# Patient Record
Sex: FEMALE | Race: BLACK OR AFRICAN AMERICAN | NOT HISPANIC OR LATINO | Employment: FULL TIME | URBAN - METROPOLITAN AREA
[De-identification: names, ages, dates, MRNs, and addresses within clinical notes are randomized per-mention and may not be internally consistent; named-entity substitution may affect disease eponyms.]

---

## 2017-01-03 ENCOUNTER — TRANSFERRED RECORDS (OUTPATIENT)
Dept: HEALTH INFORMATION MANAGEMENT | Facility: CLINIC | Age: 37
End: 2017-01-03

## 2017-01-27 ENCOUNTER — OFFICE VISIT (OUTPATIENT)
Dept: PSYCHOLOGY | Facility: CLINIC | Age: 37
End: 2017-01-27

## 2017-01-27 VITALS — BODY MASS INDEX: 31.6 KG/M2 | WEIGHT: 217 LBS

## 2017-01-27 DIAGNOSIS — F33.0 MAJOR DEPRESSIVE DISORDER, RECURRENT EPISODE, MILD (H): ICD-10-CM

## 2017-01-27 DIAGNOSIS — F54 PSYCHOLOGICAL FACTOR AFFECTING PHYSICAL CONDITION: ICD-10-CM

## 2017-01-27 NOTE — PROGRESS NOTES
Health Psychology                  Clinic    Department of Medicine  Lis Chavez, Ph.D., L.P. [phone]                          [facility] Earline Anderson, Ph.D.,  L.P. [phone]                 3rd Floor, Clinic 3A  [place] Mail Code 746   Jayro Elias, Ph.D., AMITA.ÁLVARO.ROB., L.P. [phone]     [address] [place] [address] Melissa Stokes, Ph.D., L.P. [phone]  [address] [id number]  [place] [address]    Health Psychology Follow-Up Note    Ashley Catherine is a 36-year-old woman referred initially  for psychological consultation for workup for a gastric sleeve procedure who now returns following the surgery, with concerns about recent weight gain.    We reviewed in detail.    Wt Readings from Last 4 Encounters:   01/27/17 98.431 kg (217 lb)   12/16/16 96.888 kg (213 lb 9.6 oz)   11/02/16 97.796 kg (215 lb 9.6 oz)   10/17/16 95.737 kg (211 lb 1 oz)       She is  5 foot 11 inches.  Her goal is 175. She has been gaining weight.  She acknowledges eating  More cookies and icr cream, and stopping working out.  She has been working many extra shifts.  However, she is spending money  As well, so isn't getting ahead on her bills  WE discussed meeting with a financial counselor.     She is feeling somewhat  more depressed and exhausted, for no clear reason.  She will consider getting full spectrum light as she thinks there is a seasonal component. We discussed problems at work and her thoughts about doing travel nursing.  We also explored local options.  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    She is feeling isolated, and  a bit disappointed in her friends.    She has an [employer] membership which she is under utilizing.  We explored early morning work-outs days she is working.  She reports being tired from 12-hour shifts.  We explored ways to work in  healthy eating as well as  exercise, even when tired.    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  Affect is generally positive despite her frustrations with her weight and work.  Rapport is excellent.   Extended session due to complexity of case and length of interval.      Time in:  4:03  Time out:  4:58  DIAGNOSIS:   Psychological factors affecting obesity (F54).   Major depression, recurrent, mild (F3e3.0).   Occupational Problems (Z56.9)    PLAN/RECOMMENDATIONS:   1. Ms. Catherine will return 3/7 @ 4:00 to address weight loss and social issues.  2.  Establish schedule of regular exercise and  Decrease grazing eating.    Tx plan due 4/6/17.

## 2017-02-15 ENCOUNTER — OFFICE VISIT (OUTPATIENT)
Dept: FAMILY MEDICINE | Facility: CLINIC | Age: 37
End: 2017-02-15
Payer: COMMERCIAL

## 2017-02-15 VITALS
DIASTOLIC BLOOD PRESSURE: 76 MMHG | WEIGHT: 217.3 LBS | RESPIRATION RATE: 17 BRPM | HEART RATE: 89 BPM | BODY MASS INDEX: 31.11 KG/M2 | SYSTOLIC BLOOD PRESSURE: 126 MMHG | TEMPERATURE: 98.1 F | HEIGHT: 70 IN

## 2017-02-15 DIAGNOSIS — M54.50 MIDLINE LOW BACK PAIN WITHOUT SCIATICA, UNSPECIFIED CHRONICITY: ICD-10-CM

## 2017-02-15 DIAGNOSIS — M21.42 ACQUIRED PES PLANUS OF BOTH FEET: ICD-10-CM

## 2017-02-15 DIAGNOSIS — R22.2 LUMP OF SKIN OF BACK: Primary | ICD-10-CM

## 2017-02-15 DIAGNOSIS — M21.41 ACQUIRED PES PLANUS OF BOTH FEET: ICD-10-CM

## 2017-02-15 DIAGNOSIS — I10 ESSENTIAL HYPERTENSION WITH GOAL BLOOD PRESSURE LESS THAN 140/90: ICD-10-CM

## 2017-02-15 DIAGNOSIS — Z11.3 SCREEN FOR STD (SEXUALLY TRANSMITTED DISEASE): ICD-10-CM

## 2017-02-15 PROCEDURE — 86780 TREPONEMA PALLIDUM: CPT | Performed by: FAMILY MEDICINE

## 2017-02-15 PROCEDURE — 99214 OFFICE O/P EST MOD 30 MIN: CPT | Performed by: FAMILY MEDICINE

## 2017-02-15 PROCEDURE — 87389 HIV-1 AG W/HIV-1&-2 AB AG IA: CPT | Performed by: FAMILY MEDICINE

## 2017-02-15 PROCEDURE — 87591 N.GONORRHOEAE DNA AMP PROB: CPT | Performed by: FAMILY MEDICINE

## 2017-02-15 PROCEDURE — 36415 COLL VENOUS BLD VENIPUNCTURE: CPT | Performed by: FAMILY MEDICINE

## 2017-02-15 PROCEDURE — 87491 CHLMYD TRACH DNA AMP PROBE: CPT | Performed by: FAMILY MEDICINE

## 2017-02-15 ASSESSMENT — ANXIETY QUESTIONNAIRES
IF YOU CHECKED OFF ANY PROBLEMS ON THIS QUESTIONNAIRE, HOW DIFFICULT HAVE THESE PROBLEMS MADE IT FOR YOU TO DO YOUR WORK, TAKE CARE OF THINGS AT HOME, OR GET ALONG WITH OTHER PEOPLE: SOMEWHAT DIFFICULT
7. FEELING AFRAID AS IF SOMETHING AWFUL MIGHT HAPPEN: NOT AT ALL
GAD7 TOTAL SCORE: 1
6. BECOMING EASILY ANNOYED OR IRRITABLE: NOT AT ALL
2. NOT BEING ABLE TO STOP OR CONTROL WORRYING: NOT AT ALL
3. WORRYING TOO MUCH ABOUT DIFFERENT THINGS: NOT AT ALL
1. FEELING NERVOUS, ANXIOUS, OR ON EDGE: SEVERAL DAYS
5. BEING SO RESTLESS THAT IT IS HARD TO SIT STILL: NOT AT ALL

## 2017-02-15 ASSESSMENT — PATIENT HEALTH QUESTIONNAIRE - PHQ9: 5. POOR APPETITE OR OVEREATING: NOT AT ALL

## 2017-02-15 NOTE — NURSING NOTE
"Chief Complaint   Patient presents with     Mass     STD     /76  Pulse 89  Temp 98.1  F (36.7  C) (Oral)  Resp 17  Ht 5' 9.5\" (1.765 m)  Wt 217 lb 4.8 oz (98.6 kg)  LMP 02/12/2017 (Exact Date)  Breastfeeding? No  BMI 31.63 kg/m2 Estimated body mass index is 31.63 kg/(m^2) as calculated from the following:    Height as of this encounter: 5' 9.5\" (1.765 m).    Weight as of this encounter: 217 lb 4.8 oz (98.6 kg).  bp completed using cuff size: large      left    Health Maintenance Due Pending Provider Review:  GAD7    Completing today.    Brandy Paz MA  [facility]  "

## 2017-02-15 NOTE — MR AVS SNAPSHOT
After Visit Summary   2/15/2017    Ashley Catherine    MRN: [id number]           Patient Information     Date Of Birth          1980        Visit Information        Provider Department      2/15/2017 11:00 AM Jackeline Leyva MD [facility]        Today's Diagnoses     Lump of skin of back    -  1    Screen for STD (sexually transmitted disease)        Essential hypertension with goal blood pressure less than 140/90        Midline low back pain without sciatica, unspecified chronicity           Follow-ups after your visit        Additional Services     GENERAL SURG ADULT REFERRAL       Your provider has referred you to: FM[facility]: [facility] [place] [phone]   [email or web address]  [facility]: [facility] [place] [phone]   [email or web address]      Please be aware that coverage of these services is subject to the terms and limitations of your health insurance plan.  Call member services at your health plan with any benefit or coverage questions.      Please bring the following with you to your appointment:    (1) Any X-Rays, CTs or MRIs which have been performed.  Contact the facility where they were done to arrange for  prior to your scheduled appointment.   (2) List of current medications   (3) This referral request   (4) Any documents/labs given to you for this referral            ORTHOTICS REFERRAL                 Your next 10 appointments already scheduled     Mar 07, 2017  4:00 PM CST   (Arrive by 3:45 PM)   Return Visit with Jayro Elias, PhD [facility] ([facility])    [address] [place] [address]  3rd [facility] [address] [phone]              Who to contact     If you have questions or need follow up information about today's clinic visit or your schedule please contact [facility] directly  "at [phone].  Normal or non-critical lab and imaging results will be communicated to you by MyChart, letter or phone within 4 business days after the clinic has received the results. If you do not hear from us within 7 days, please contact the clinic through [employer] or phone. If you have a critical or abnormal lab result, we will notify you by phone as soon as possible.  Submit refill requests through [employer] or call your pharmacy and they will forward the refill request to us. Please allow 3 business days for your refill to be completed.          Additional Information About Your Visit        [employer] Information     [employer] gives you secure access to your electronic health record. If you see a primary care provider, you can also send messages to your care team and make appointments. If you have questions, please call your primary care clinic.  If you do not have a primary care provider, please call [phone] and they will assist you.        Care EveryWhere ID     This is your Care EveryWhere ID. This could be used by other organizations to access your [facility] medical records  [id number]        Your Vitals Were     Pulse Temperature Respirations Height Last Period Breastfeeding?    89 98.1  F (36.7  C) (Oral) 17 5' 9.5\" (1.765 m) 02/12/2017 (Exact Date) No    BMI (Body Mass Index)                   31.63 kg/m2            Blood Pressure from Last 3 Encounters:   02/15/17 126/76   10/17/16 122/80   08/12/16 123/83    Weight from Last 3 Encounters:   02/15/17 217 lb 4.8 oz (98.6 kg)   10/17/16 211 lb 1 oz (95.7 kg)   08/12/16 202 lb (91.6 kg)              We Performed the Following     Anti Treponema     Chlamydia trachomatis PCR     GENERAL SURG ADULT REFERRAL     HIV Antigen Antibody Combo     Neisseria gonorrhoeae PCR     ORTHOTICS REFERRAL          Today's Medication Changes          These changes are accurate as of: 2/15/17 11:50 AM.  If you have any questions, ask your nurse or doctor.             "   Stop taking these medicines if you haven't already. Please contact your care team if you have questions.     cyclobenzaprine 10 MG tablet   Commonly known as:  FLEXERIL   Stopped by:  Jackeline Leyva MD           methylPREDNISolone 4 MG tablet   Commonly known as:  MEDROL DOSEPAK   Stopped by:  Jackeline Leyva MD                    Primary Care Provider Office Phone # Fax #    Violet Agrawal MD [place] [phone]       [facility] [address]  [facility] [address]        Thank you!     Thank you for choosing [facility]  for your care. Our goal is always to provide you with excellent care. Hearing back from our patients is one way we can continue to improve our services. Please take a few minutes to complete the written survey that you may receive in the mail after your visit with us. Thank you!             Your Updated Medication List - Protect others around you: Learn how to safely use, store and throw away your medicines at www.disposemymeds.org.          This list is accurate as of: 2/15/17 11:50 AM.  Always use your most recent med list.                   Brand Name Dispense Instructions for use    Biotin 5000 MCG Tabs          CALCIUM + D PO      Take by mouth daily       cetirizine 10 MG tablet    zyrTEC     Take 10 mg by mouth daily as needed for allergies       [employer] B-12 5000 MCG Subl   Generic drug:  Cyanocobalamin          hydrochlorothiazide 25 MG tablet    HYDRODIURIL    90 tablet    Take 1 tablet (25 mg) by mouth daily       MELATONIN PO      Take 3 mg by mouth       MULTIVITAMINS PO      Take by mouth daily       polyethylene glycol powder    MIRALAX    510 g    Take 17 g (1 capful) by mouth daily as needed for constipation       TYLENOL PM EXTRA STRENGTH PO

## 2017-02-15 NOTE — PROGRESS NOTES
"  SUBJECTIVE:                                                    Ashley Catherine is a 36 year old female who presents to clinic today for the following health issues:      Chief Complaint   Patient presents with     Mass     STD     Patient states that she has some type of lump on her mid-back area that she has noticed.  She has had it for 2 years- now somewhat noticeably increased in size , no pain     She's had lower back pain for a couple years now, and at first thought that this lump had something to do with that, but now thinks that it may be something different. She has flat feet, has not changed the orthotics for sometimes  Flexeril does not help much  She would also like to have STD screening done today.  Anxiety more situational dorothy winter, SEASONAL AFFECTIVE DISORDER-light helps dorothy during the jan-feb  Does not need medications for now  MAXIMO-7 SCORE 12/4/2012 1/6/2016 2/15/2017   Total Score 4 - -   Total Score - 0 1     PHQ-9 SCORE 1/6/2016 6/24/2016 2/15/2017   Total Score - - -   Total Score MyChart - 6 (Mild depression) -   Total Score 3 - 3           ROS:  Constitutional, HEENT, cardiovascular, pulmonary, gi and gu systems are negative, except as otherwise noted.    OBJECTIVE:                                                    /76  Pulse 89  Temp 98.1  F (36.7  C) (Oral)  Resp 17  Ht 5' 9.5\" (1.765 m)  Wt 217 lb 4.8 oz (98.6 kg)  LMP 02/12/2017 (Exact Date)  Breastfeeding? No  BMI 31.63 kg/m2  Body mass index is 31.63 kg/(m^2).  GENERAL: healthy, alert and no distress  RESP: lungs clear to auscultation - no rales, rhonchi or wheezes  CV: regular rates and rhythm  MS: no gross musculoskeletal defects noted, no edema.  SKIN: palpable well demarcated , possibly a cystic mass, mid upper back -left side , it about an inch    Diagnostic Test Results:  No results found for this or any previous visit (from the past 24 hour(s)).     ASSESSMENT/PLAN:                                                       " (R22.2) Lump of skin of back  (primary encounter diagnosis)  Comment: sebaceous cyst vs lipoma- increased in size  excisional biopsy- general surgeon   Plan: GENERAL SURG ADULT REFERRAL      (M21.41,  M21.42) Acquired pes planus of both feet  Plan: ORTHOTICS REFERRAL      (M54.5) Midline low back pain without sciatica, unspecified chronicity  Plan: ORTHOTICS  For flat feet may help with lower back pain as well  We also discussed lower back excercise, core strengthening, she will let us  Know if wants to start PHYSICAL THERAPY for that.    (Z11.3) Screen for STD (sexually transmitted disease)  Plan: Anti Treponema, Neisseria gonorrhoeae PCR, HIV      Antigen Antibody Combo, Chlamydia trachomatis       PCR      (I10) Essential hypertension with goal blood pressure less than 140/90- controlled  Plan: HCTZ-25 mg once daily  no medications changes. Does not need refill        History of depression & anxiety  Stable, without medications    The patient indicates understanding of these issues and agrees with the plan.          Jackeline Leyva MD  [facility]

## 2017-02-16 LAB
C TRACH DNA SPEC QL NAA+PROBE: NORMAL
HIV 1+2 AB+HIV1 P24 AG SERPL QL IA: NORMAL
N GONORRHOEA DNA SPEC QL NAA+PROBE: NORMAL
SPECIMEN SOURCE: NORMAL
SPECIMEN SOURCE: NORMAL
T PALLIDUM IGG+IGM SER QL: NEGATIVE

## 2017-02-16 ASSESSMENT — PATIENT HEALTH QUESTIONNAIRE - PHQ9: SUM OF ALL RESPONSES TO PHQ QUESTIONS 1-9: 3

## 2017-02-16 ASSESSMENT — ANXIETY QUESTIONNAIRES: GAD7 TOTAL SCORE: 1

## 2017-02-23 ENCOUNTER — TRANSFERRED RECORDS (OUTPATIENT)
Dept: HEALTH INFORMATION MANAGEMENT | Facility: CLINIC | Age: 37
End: 2017-02-23

## 2017-03-24 ENCOUNTER — OFFICE VISIT (OUTPATIENT)
Dept: PSYCHOLOGY | Facility: CLINIC | Age: 37
End: 2017-03-24

## 2017-03-24 VITALS — BODY MASS INDEX: 31.94 KG/M2 | WEIGHT: 219.4 LBS

## 2017-03-24 DIAGNOSIS — F54 PSYCHOLOGICAL FACTOR AFFECTING PHYSICAL CONDITION: ICD-10-CM

## 2017-03-24 DIAGNOSIS — F33.0 MAJOR DEPRESSIVE DISORDER, RECURRENT EPISODE, MILD (H): Primary | ICD-10-CM

## 2017-03-24 NOTE — PROGRESS NOTES
Health Psychology                  Clinic    Department of Medicine  Lis Chavez, Ph.D., L.P. [phone]                          [facility] Earline Anderson, Ph.D.,  L.P. [phone]                 3rd Floor, Clinic 3A  [place] Mail Code 74   Jayro Elias, Ph.D., A.ÁLVARO.IDALIA.P., L.P. [phone]     [address] Melissa Stokes, Ph.D., L.P. [phone]  [place] [id number]  [place] [address]    Health Psychology Follow-Up Note    Ashley Catherine is a 36-year-old woman referred initially  for psychological consultation for workup for a gastric sleeve procedure who now returns following the surgery, with concerns about recent weight gain.    We reviewed in detail.    Wt Readings from Last 4 Encounters:   03/24/17 99.5 kg (219 lb 6.4 oz)   02/15/17 98.6 kg (217 lb 4.8 oz)   01/27/17 98.4 kg (217 lb)   12/16/16 96.9 kg (213 lb 9.6 oz)       She is  5 foot 11 inches.  Her goal is 175. She has been gaining weight.  She acknowledges eating more cookies and icr cream, and stopping working out.  She feels it isn't fair  How little she eats, yet she is gaining weight..  She has been working many extra shifts.  However, she is spending money,  so isn't getting ahead on her bills  We discussed meeting with a financial counselor.  She acknowledges going to the [employer] frequently to feel better.       She is feeling somewhat  more depressed and exhausted, for no clear reason.  She feels she is stuck, despite making progress in her career (e.g., now studying for oncology certification).  We discussed problems at work and her thoughts about doing travel nursing.      She is feeling isolated, and  a bit disappointed in her friends.    She has an [employer] membership which she is under utilizing.   We explored ways to work in  healthy eating as well as exercise, even when tired.  She is still traveling (e.g., [place] is her next  trip).    We discussed her consumption of 200 calorie coffee drinks and 2 glasses of wine, exploring what cutting back could be like,  And how it would help with her plan for weight loss.    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  Affect is generally positive despite her frustrations with her weight and work.  Rapport is excellent.   Extended session due to complexity of case and length of interval.      Time in:  3:02  Time out:  3:59  DIAGNOSIS:   Psychological factors affecting obesity (F54).   Major depression, recurrent, mild (F3e3.0).   Occupational Problems (Z56.9)  PLAN/RECOMMENDATIONS:   1. Ms. Catherine will return 5/3  @ 11:00 to address weight loss and social issues.  2.  Establish schedule of regular exercise and  Decrease grazing eating.    Tx plan due 4/6/17 (next session).

## 2017-03-24 NOTE — MR AVS SNAPSHOT
After Visit Summary   3/24/2017    Ashley Catherine    MRN: [id number]           Patient Information     Date Of Birth          1980        Visit Information        Provider Department      3/24/2017 3:00 PM Jayro Elias, PhD [facility]        Today's Diagnoses     Major depressive disorder, recurrent episode, mild (H)    -  1    Psychological factor affecting physical condition           Follow-ups after your visit        Who to contact     Please call your clinic at [phone] to:    Ask questions about your health    Make or cancel appointments    Discuss your medicines    Learn about your test results    Speak to your doctor   If you have compliments or concerns about an experience at your clinic, or if you wish to file a complaint, please contact [facility] Patient Relations at [phone] or email us at [email or web address]         Additional Information About Your Visit        MyChart Information     [employer] gives you secure access to your electronic health record. If you see a primary care provider, you can also send messages to your care team and make appointments. If you have questions, please call your primary care clinic.  If you do not have a primary care provider, please call [phone] and they will assist you.      [employer] is an electronic gateway that provides easy, online access to your medical records. With [employer], you can request a clinic appointment, read your test results, renew a prescription or communicate with your care team.     To access your existing account, please contact your [facility] Clinic or call [phone] for assistance.        Care EveryWhere ID     This is your Care EveryWhere ID. This could be used by other organizations to access your [facility] medical records  [id number]        Your Vitals Were     BMI (Body Mass Index)                   31.94 kg/m2             Blood Pressure from Last 3 Encounters:   02/15/17 126/76   10/17/16 122/80   08/12/16 123/83    Weight from Last 3 Encounters:   03/24/17 99.5 kg (219 lb 6.4 oz)   02/15/17 98.6 kg (217 lb 4.8 oz)   01/27/17 98.4 kg (217 lb)              Today, you had the following     No orders found for display       Primary Care Provider Office Phone # Fax #    Violet Humphreys [phone]       [facility] [address]  [facility] [address]        Thank you!     Thank you for choosing [facility]  for your care. Our goal is always to provide you with excellent care. Hearing back from our patients is one way we can continue to improve our services. Please take a few minutes to complete the written survey that you may receive in the mail after your visit with us. Thank you!             Your Updated Medication List - Protect others around you: Learn how to safely use, store and throw away your medicines at www.disposemymeds.org.          This list is accurate as of: 3/24/17  4:06 PM.  Always use your most recent med list.                   Brand Name Dispense Instructions for use    Biotin 5000 MCG Tabs          CALCIUM + D PO      Take by mouth daily       cetirizine 10 MG tablet    zyrTEC     Take 10 mg by mouth daily as needed for allergies       CVS B-12 5000 MCG Subl   Generic drug:  Cyanocobalamin          hydrochlorothiazide 25 MG tablet    HYDRODIURIL    90 tablet    Take 1 tablet (25 mg) by mouth daily       MELATONIN PO      Take 3 mg by mouth       MULTIVITAMINS PO      Take by mouth daily       polyethylene glycol powder    MIRALAX    510 g    Take 17 g (1 capful) by mouth daily as needed for constipation       TYLENOL PM EXTRA STRENGTH PO

## 2017-03-29 ENCOUNTER — OFFICE VISIT (OUTPATIENT)
Dept: FAMILY MEDICINE | Facility: CLINIC | Age: 37
End: 2017-03-29
Payer: COMMERCIAL

## 2017-03-29 VITALS
OXYGEN SATURATION: 98 % | HEIGHT: 70 IN | WEIGHT: 220 LBS | SYSTOLIC BLOOD PRESSURE: 128 MMHG | TEMPERATURE: 98.7 F | HEART RATE: 68 BPM | RESPIRATION RATE: 16 BRPM | DIASTOLIC BLOOD PRESSURE: 72 MMHG | BODY MASS INDEX: 31.5 KG/M2

## 2017-03-29 DIAGNOSIS — N63.0 LUMP OR MASS IN BREAST: ICD-10-CM

## 2017-03-29 DIAGNOSIS — B96.89 BV (BACTERIAL VAGINOSIS): Primary | ICD-10-CM

## 2017-03-29 DIAGNOSIS — N76.0 BV (BACTERIAL VAGINOSIS): Primary | ICD-10-CM

## 2017-03-29 LAB
MICRO REPORT STATUS: ABNORMAL
SPECIMEN SOURCE: ABNORMAL
WET PREP SPEC: ABNORMAL

## 2017-03-29 PROCEDURE — 87210 SMEAR WET MOUNT SALINE/INK: CPT | Performed by: FAMILY MEDICINE

## 2017-03-29 PROCEDURE — 99214 OFFICE O/P EST MOD 30 MIN: CPT | Performed by: FAMILY MEDICINE

## 2017-03-29 RX ORDER — METRONIDAZOLE 7.5 MG/G
1 GEL VAGINAL AT BEDTIME
Qty: 35 G | Refills: 0 | Status: SHIPPED | OUTPATIENT
Start: 2017-03-29 | End: 2017-04-05

## 2017-03-29 NOTE — PROGRESS NOTES
SUBJECTIVE:                                                    Ashley Catherine is a 37 year old female who presents to clinic today for the following health issues:      1) Patient states that she has felt a lump on her left breast - yesterday has a painful lump in the left axilla and got worried but today seems to be smaller , she can still feel it but not as big and not as painful.   Vaginal Symptoms      Duration: 1 week    Description  itching    Intensity:  moderate    Accompanying signs and symptoms (fever/dysuria/abdominal or back pain): Redness    History  Sexually active: yes, single partner, contraception not required  Possibility of pregnancy: No  Recent antibiotic use: no     Precipitating or alleviating factors: None    Therapies tried and outcome:            Problem list and histories reviewed & adjusted, as indicated.  Additional history: as documented    Patient Active Problem List   Diagnosis     OLIGOMENORRHEA, C/W PCOS     Sleep apnea     HX ABNL PAP SMEAR OF CERVIX       CARDIOVASCULAR SCREENING; LDL GOAL LESS THAN 160     Hypertension goal BP (blood pressure) < 140/90     Flat feet     GERD (gastroesophageal reflux disease)     Sciatica     Obesity     S/P gastrectomy     Occupational problem     Elevated parathyroid hormone     Multiple thyroid nodules     Abnormal thyroid cytology-follicular neoplasm     Chronic pain syndrome     Major depressive disorder, recurrent episode, in full remission (H)     Bilateral low back pain with sciatica, sciatica laterality unspecified     Throat symptom     Psychological factor affecting physical condition     Major depressive disorder, recurrent episode, mild (H)     Past Surgical History:   Procedure Laterality Date     BIOPSY  03/13/2015    Thyroid nodule     ESOPHAGOSCOPY, GASTROSCOPY, DUODENOSCOPY (EGD), COMBINED  5/28/2013    Procedure: COMBINED ESOPHAGOSCOPY, GASTROSCOPY, DUODENOSCOPY (EGD);;  Surgeon: Best Willett MD;  Location: [facility]      LAPAROSCOPIC GASTRIC SLEEVE  5/13/2013    Procedure: LAPAROSCOPIC GASTRIC SLEEVE;  Laparoscopic Sleeve Gastrectomy ;  Surgeon: Best Willett MD;  Location: [facility] OR     LEEP TX, CERVICAL  1995    age 15     SEPTOPLASTY       TONSILLECTOMY  age 20s     wisdom teeth extraction         Social History   Substance Use Topics     Smoking status: Never Smoker     Smokeless tobacco: Never Used     Alcohol use 2.4 - 3.0 oz/week      Comment: 3 drinks/week     Family History   Problem Relation Age of Onset     Hypertension Sister      Hypertension Father      Allergies Father      seasonal     Lipids Father      Obesity Father      Arthritis Mother      Gynecology Mother      problems with menopause     Hypertension Mother      Other Cancer Mother      Endometrial     OSTEOPOROSIS Mother      Obesity Mother      Parathyroid Disorders Mother      3 operations     Obesity Sister      DIABETES Maternal Aunt      x several w. DM     Breast Cancer Maternal Aunt      Cancer - colorectal Maternal Uncle      60ish     Nephrolithiasis No family hx of          Current Outpatient Prescriptions   Medication Sig Dispense Refill     metroNIDAZOLE (METROGEL) 0.75 % vaginal gel Place 1 applicator (5 g) vaginally At Bedtime for 7 days 35 g 0     MELATONIN PO Take 3 mg by mouth       Diphenhydramine-APAP, sleep, (TYLENOL PM EXTRA STRENGTH PO)        hydrochlorothiazide (HYDRODIURIL) 25 MG tablet Take 1 tablet (25 mg) by mouth daily 90 tablet 3     Biotin 5000 MCG TABS        Cyanocobalamin (CVS B-12) 5000 MCG SUBL        polyethylene glycol (MIRALAX) powder Take 17 g (1 capful) by mouth daily as needed for constipation 510 g 1     cetirizine (ZYRTEC) 10 MG tablet Take 10 mg by mouth daily as needed for allergies       Calcium Carbonate-Vitamin D (CALCIUM + D PO) Take by mouth daily       Multiple Vitamin (MULTIVITAMINS PO) Take by mouth daily       Allergies   Allergen Reactions     Nkda [No Known Drug Allergies]      No Known Allergies       Recent Labs   Lab Test 02/11/16 02/08/16   1152  02/01/16   1152  01/27/16   1003  11/06/15   1211   06/03/15   1040   12/23/14   1613   08/08/13   1456   06/03/13   1100   02/19/13   1252   07/12/12   0820   A1C   --    --    --    --    --    --    --    --   5.0   --   4.6   --    --    --    --    --   5.4   LDL   --    --    --    --    --    --    --    --   67   --   120   --    --    --   84   --   108   HDL   --    --    --    --    --    --    --    --   77   --   44*   --    --    --   51   --   55   TRIG   --    --    --    --    --    --    --    --   91   --   80   --   136   --   106   --   86   ALT   --    --   19   --   20   --   18   --   20   < >  29   < >  26   < >  27   --   12   CR   --   0.69  0.67   --   0.67   < >  0.67   --   0.78   < >  0.72   < >  0.58   < >  0.68   < >  0.60   GFRESTIMATED   --   >90  Non  GFR Calc    >90  Non  GFR Calc     --   >90  Non  GFR Calc     --   >90  Non  GFR Calc     --   84   < >  >90   < >  >90   < >  >90   < >  >90   GFRESTBLACK   --   >90   GFR Calc    >90   GFR Calc     --   >90   GFR Calc     --   >90   GFR Calc     --   >90   GFR Calc     < >  >90   < >  >90   < >  >90   < >  >90   POTASSIUM   --   3.6  3.2*   --   3.8   --   3.9   --   3.8   < >  3.7   < >  3.8   < >  4.3   < >  3.8   TSH  0.94   --    --   0.86   --    --    --    < >   --    < >   --    --    --    --    --    < >  1.05    < > = values in this interval not displayed.      BP Readings from Last 3 Encounters:   03/29/17 128/72   02/15/17 126/76   10/17/16 122/80    Wt Readings from Last 3 Encounters:   03/29/17 220 lb (99.8 kg)   02/15/17 217 lb 4.8 oz (98.6 kg)   10/17/16 211 lb 1 oz (95.7 kg)                  Labs reviewed in EPIC    Reviewed and updated as needed this visit by clinical staff  Tobacco  Allergies  Meds  Med Hx  Surg  "Hx  Fam Hx  Soc Hx      Reviewed and updated as needed this visit by Provider         ROS:  Constitutional, HEENT, cardiovascular, pulmonary, GI, , musculoskeletal, neuro, skin, endocrine and psych systems are negative, except as otherwise noted.    OBJECTIVE:                                                    /72  Pulse 68  Temp 98.7  F (37.1  C) (Oral)  Resp 16  Ht 5' 9.5\" (1.765 m)  Wt 220 lb (99.8 kg)  LMP 03/20/2017  SpO2 98%  BMI 32.02 kg/m2  Body mass index is 32.02 kg/(m^2).  GENERAL: healthy, alert and no distress  NECK: no adenopathy, no asymmetry, masses, or scars and thyroid normal to palpation  BREAST: normal without masses on the right side and there is an area of tenderness with palpation and a small lump - LEFT breast lower outer quadrant , no erythema and no skin changes  tenderness or nipple discharge and no palpable axillary masses or adenopathy  MS: no gross musculoskeletal defects noted, no edema    Diagnostic Test Results:  Wet prep , shows BV      ASSESSMENT/PLAN:                                                      1. BV (bacterial vaginosis)  Will treat , RTC if no improving or worsening.    - Wet prep  - metroNIDAZOLE (METROGEL) 0.75 % vaginal gel; Place 1 applicator (5 g) vaginally At Bedtime for 7 days  Dispense: 35 g; Refill: 0    2. Lump or mass in breast  We discussed doing a diagnostic mammo and an US of the left breast where the lump is ,   - MA Diagnostic Digital Bilateral; Future    RTC if no improving or worsening.  Pt is aware  and comfortable with the current plan.      Ct Jameson MD  [facility]    "

## 2017-03-29 NOTE — MR AVS SNAPSHOT
After Visit Summary   3/29/2017    Ashley Catherine    MRN: [id number]           Patient Information     Date Of Birth          1980        Visit Information        Provider Department      3/29/2017 9:30 AM Ct Jameson MD [facility]        Today's Diagnoses     BV (bacterial vaginosis)    -  1    Lump or mass in breast           Follow-ups after your visit        Your next 10 appointments already scheduled     May 03, 2017 11:00 AM CDT   (Arrive by 10:45 AM)   Return Visit with Jayro Elias, PhD [facility] ([facility])    [address] [place] [address]  3rd Floor  [facility] [address] [phone]              Future tests that were ordered for you today     Open Future Orders        Priority Expected Expires Ordered    MA Diagnostic Digital Bilateral Routine  3/29/2018 3/29/2017            Who to contact     If you have questions or need follow up information about today's clinic visit or your schedule please contact [facility] directly at [phone].  Normal or non-critical lab and imaging results will be communicated to you by MyChart, letter or phone within 4 business days after the clinic has received the results. If you do not hear from us within 7 days, please contact the clinic through [employer] or phone. If you have a critical or abnormal lab result, we will notify you by phone as soon as possible.  Submit refill requests through [employer] or call your pharmacy and they will forward the refill request to us. Please allow 3 business days for your refill to be completed.          Additional Information About Your Visit        MyChart Information     [employer] gives you secure access to your electronic health record. If you see a primary care provider, you can also send messages to your care team and make appointments. If you have questions, please call your primary care clinic.  If you do not have a primary care provider,  "please call [phone] and they will assist you.        Care EveryWhere ID     This is your Care EveryWhere ID. This could be used by other organizations to access your [facility] medical records  [id number]        Your Vitals Were     Pulse Temperature Respirations Height Last Period Pulse Oximetry    68 98.7  F (37.1  C) (Oral) 16 5' 9.5\" (1.765 m) 03/20/2017 98%    BMI (Body Mass Index)                   32.02 kg/m2            Blood Pressure from Last 3 Encounters:   03/29/17 128/72   02/15/17 126/76   10/17/16 122/80    Weight from Last 3 Encounters:   03/29/17 220 lb (99.8 kg)   02/15/17 217 lb 4.8 oz (98.6 kg)   10/17/16 211 lb 1 oz (95.7 kg)              We Performed the Following     Wet prep          Today's Medication Changes          These changes are accurate as of: 3/29/17 10:30 AM.  If you have any questions, ask your nurse or doctor.               Start taking these medicines.        Dose/Directions    metroNIDAZOLE 0.75 % vaginal gel   Commonly known as:  METROGEL   Used for:  BV (bacterial vaginosis)   Started by:  Ct Jameson MD        Dose:  1 applicator   Place 1 applicator (5 g) vaginally At Bedtime for 7 days   Quantity:  35 g   Refills:  0            Where to get your medicines      These medications were sent to [facility] [address], [address]  [address], [facility] [address], [facility] [address]     Phone:  [phone]     metroNIDAZOLE 0.75 % vaginal gel                Primary Care Provider Office Phone # Fax #    Viloet Humphreys [phone]       [facility] [address]  [facility] [address]        Thank you!     Thank you for choosing [facility]  for your care. Our goal is always to provide you with excellent care. Hearing back from our patients is one way we can continue to improve our services. Please take a few minutes to complete the written survey that you may receive in the mail " after your visit with us. Thank you!             Your Updated Medication List - Protect others around you: Learn how to safely use, store and throw away your medicines at www.disposemymeds.org.          This list is accurate as of: 3/29/17 10:30 AM.  Always use your most recent med list.                   Brand Name Dispense Instructions for use    Biotin 5000 MCG Tabs          CALCIUM + D PO      Take by mouth daily       cetirizine 10 MG tablet    zyrTEC     Take 10 mg by mouth daily as needed for allergies       CVS B-12 5000 MCG Subl   Generic drug:  Cyanocobalamin          hydrochlorothiazide 25 MG tablet    HYDRODIURIL    90 tablet    Take 1 tablet (25 mg) by mouth daily       MELATONIN PO      Take 3 mg by mouth       metroNIDAZOLE 0.75 % vaginal gel    METROGEL    35 g    Place 1 applicator (5 g) vaginally At Bedtime for 7 days       MULTIVITAMINS PO      Take by mouth daily       polyethylene glycol powder    MIRALAX    510 g    Take 17 g (1 capful) by mouth daily as needed for constipation       TYLENOL PM EXTRA STRENGTH PO

## 2017-04-04 ENCOUNTER — HOSPITAL ENCOUNTER (OUTPATIENT)
Dept: MAMMOGRAPHY | Facility: CLINIC | Age: 37
Discharge: HOME | End: 2017-04-04
Attending: FAMILY MEDICINE | Admitting: FAMILY MEDICINE
Payer: COMMERCIAL

## 2017-04-04 ENCOUNTER — HOSPITAL ENCOUNTER (OUTPATIENT)
Dept: MAMMOGRAPHY | Facility: CLINIC | Age: 37
End: 2017-04-04
Attending: FAMILY MEDICINE
Payer: COMMERCIAL

## 2017-04-04 DIAGNOSIS — N63.0 LUMP OR MASS IN BREAST: ICD-10-CM

## 2017-04-04 PROCEDURE — 76642 ULTRASOUND BREAST LIMITED: CPT | Mod: LT

## 2017-04-04 PROCEDURE — G0204 DX MAMMO INCL CAD BI: HCPCS

## 2017-04-25 ENCOUNTER — OFFICE VISIT (OUTPATIENT)
Dept: FAMILY MEDICINE | Facility: CLINIC | Age: 37
End: 2017-04-25
Payer: COMMERCIAL

## 2017-04-25 VITALS
HEART RATE: 84 BPM | SYSTOLIC BLOOD PRESSURE: 124 MMHG | HEIGHT: 70 IN | BODY MASS INDEX: 31.85 KG/M2 | TEMPERATURE: 98.2 F | WEIGHT: 222.5 LBS | RESPIRATION RATE: 14 BRPM | OXYGEN SATURATION: 96 % | DIASTOLIC BLOOD PRESSURE: 84 MMHG

## 2017-04-25 DIAGNOSIS — F33.42 MAJOR DEPRESSIVE DISORDER, RECURRENT EPISODE, IN FULL REMISSION (H): ICD-10-CM

## 2017-04-25 DIAGNOSIS — Z11.3 SCREENING EXAMINATION FOR VENEREAL DISEASE: Primary | ICD-10-CM

## 2017-04-25 LAB
HCV AB SERPL QL IA: NORMAL
HIV 1+2 AB+HIV1 P24 AG SERPL QL IA: NORMAL
T PALLIDUM IGG+IGM SER QL: NEGATIVE

## 2017-04-25 PROCEDURE — 36415 COLL VENOUS BLD VENIPUNCTURE: CPT | Performed by: FAMILY MEDICINE

## 2017-04-25 PROCEDURE — 99213 OFFICE O/P EST LOW 20 MIN: CPT | Performed by: FAMILY MEDICINE

## 2017-04-25 PROCEDURE — 87389 HIV-1 AG W/HIV-1&-2 AB AG IA: CPT | Performed by: FAMILY MEDICINE

## 2017-04-25 PROCEDURE — 87491 CHLMYD TRACH DNA AMP PROBE: CPT | Performed by: FAMILY MEDICINE

## 2017-04-25 PROCEDURE — 86803 HEPATITIS C AB TEST: CPT | Performed by: FAMILY MEDICINE

## 2017-04-25 PROCEDURE — 86780 TREPONEMA PALLIDUM: CPT | Performed by: FAMILY MEDICINE

## 2017-04-25 PROCEDURE — 87591 N.GONORRHOEAE DNA AMP PROB: CPT | Performed by: FAMILY MEDICINE

## 2017-04-25 NOTE — MR AVS SNAPSHOT
After Visit Summary   4/25/2017    Ashley Catherine    MRN: [id number]           Patient Information     Date Of Birth          1980        Visit Information        Provider Department      4/25/2017 9:00 AM Ct Jameson MD [facility]        Today's Diagnoses     Screening examination for venereal disease    -  1    Major depressive disorder, recurrent episode, in full remission (H)           Follow-ups after your visit        Your next 10 appointments already scheduled     May 03, 2017 11:00 AM CDT   (Arrive by 10:45 AM)   Return Visit with Jayro Elias, PhD [facility] ([facility])    [address] [address]  3rd Floor  [facility] [address] [phone]              Who to contact     If you have questions or need follow up information about today's clinic visit or your schedule please contact [facility] directly at [phone].  Normal or non-critical lab and imaging results will be communicated to you by MyChart, letter or phone within 4 business days after the clinic has received the results. If you do not hear from us within 7 days, please contact the clinic through [employer] or phone. If you have a critical or abnormal lab result, we will notify you by phone as soon as possible.  Submit refill requests through [employer] or call your pharmacy and they will forward the refill request to us. Please allow 3 business days for your refill to be completed.          Additional Information About Your Visit        MyChart Information     [employer] gives you secure access to your electronic health record. If you see a primary care provider, you can also send messages to your care team and make appointments. If you have questions, please call your primary care clinic.  If you do not have a primary care provider, please call [phone] and they will assist you.        Care EveryWhere ID     This is your Care EveryWhere ID. This  "could be used by other organizations to access your [facility] medical records  [id number]        Your Vitals Were     Pulse Temperature Respirations Height Last Period Pulse Oximetry    84 98.2  F (36.8  C) (Oral) 14 5' 9.5\" (1.765 m) 03/20/2017 96%    Breastfeeding? BMI (Body Mass Index)                No 32.39 kg/m2           Blood Pressure from Last 3 Encounters:   04/25/17 124/84   03/29/17 128/72   02/15/17 126/76    Weight from Last 3 Encounters:   04/25/17 222 lb 8 oz (100.9 kg)   03/29/17 220 lb (99.8 kg)   02/15/17 217 lb 4.8 oz (98.6 kg)              We Performed the Following     Anti Treponema     CHLAMYDIA TRACHOMATIS PCR     Hepatitis C antibody     HIV Antigen Antibody Combo     NEISSERIA GONORRHOEA PCR        Primary Care Provider Office Phone # Fax #    Violet Humphreys [phone]       [facility] [address] [address]        Thank you!     Thank you for choosing [facility]  for your care. Our goal is always to provide you with excellent care. Hearing back from our patients is one way we can continue to improve our services. Please take a few minutes to complete the written survey that you may receive in the mail after your visit with us. Thank you!             Your Updated Medication List - Protect others around you: Learn how to safely use, store and throw away your medicines at www.disposemymeds.org.          This list is accurate as of: 4/25/17  9:34 AM.  Always use your most recent med list.                   Brand Name Dispense Instructions for use    Biotin 5000 MCG Tabs          CALCIUM + D PO      Take by mouth daily       cetirizine 10 MG tablet    zyrTEC     Take 10 mg by mouth daily as needed for allergies       CVS B-12 5000 MCG Subl   Generic drug:  Cyanocobalamin          hydrochlorothiazide 25 MG tablet    HYDRODIURIL    90 tablet    Take 1 tablet (25 mg) by mouth daily       MELATONIN PO      Take 3 mg by mouth "       MULTIVITAMINS PO      Take by mouth daily       polyethylene glycol powder    MIRALAX    510 g    Take 17 g (1 capful) by mouth daily as needed for constipation       TYLENOL PM EXTRA STRENGTH PO

## 2017-04-25 NOTE — NURSING NOTE
"Chief Complaint   Patient presents with     STD     possible exposure-       Initial /84  Pulse 84  Temp 98.2  F (36.8  C) (Oral)  Resp 14  Ht 5' 9.5\" (1.765 m)  Wt 222 lb 8 oz (100.9 kg)  LMP 03/20/2017  SpO2 96%  Breastfeeding? No  BMI 32.39 kg/m2 Estimated body mass index is 32.39 kg/(m^2) as calculated from the following:    Height as of this encounter: 5' 9.5\" (1.765 m).    Weight as of this encounter: 222 lb 8 oz (100.9 kg).  BP completed using cuff size: large    Health Maintenance that is potentially due pending provider review:  There are no preventive care reminders to display for this patient.      n/a  "

## 2017-04-25 NOTE — PROGRESS NOTES
SUBJECTIVE:                                                    Ashley Catherine is a 37 year old female who presents to clinic today for the following health issues:      Chief Complaint   Patient presents with     STD     possible exposure-             Problem list and histories reviewed & adjusted, as indicated.  Additional history: as documented    Patient Active Problem List   Diagnosis     OLIGOMENORRHEA, C/W PCOS     Sleep apnea     HX ABNL PAP SMEAR OF CERVIX       CARDIOVASCULAR SCREENING; LDL GOAL LESS THAN 160     Hypertension goal BP (blood pressure) < 140/90     Flat feet     GERD (gastroesophageal reflux disease)     Sciatica     Obesity     S/P gastrectomy     Occupational problem     Elevated parathyroid hormone     Multiple thyroid nodules     Abnormal thyroid cytology-follicular neoplasm     Chronic pain syndrome     Major depressive disorder, recurrent episode, in full remission (H)     Bilateral low back pain with sciatica, sciatica laterality unspecified     Throat symptom     Psychological factor affecting physical condition     Major depressive disorder, recurrent episode, mild (H)     Past Surgical History:   Procedure Laterality Date     BIOPSY  03/13/2015    Thyroid nodule     ESOPHAGOSCOPY, GASTROSCOPY, DUODENOSCOPY (EGD), COMBINED  5/28/2013    Procedure: COMBINED ESOPHAGOSCOPY, GASTROSCOPY, DUODENOSCOPY (EGD);;  Surgeon: Best Willett MD;  Location:  GI     LAPAROSCOPIC GASTRIC SLEEVE  5/13/2013    Procedure: LAPAROSCOPIC GASTRIC SLEEVE;  Laparoscopic Sleeve Gastrectomy ;  Surgeon: Best Willett MD;  Location: [facility] OR     LEEP TX, CERVICAL  1995    age 15     SEPTOPLASTY       TONSILLECTOMY  age 20s     wisdom teeth extraction         Social History   Substance Use Topics     Smoking status: Never Smoker     Smokeless tobacco: Never Used     Alcohol use 2.4 - 3.0 oz/week      Comment: 3 drinks/week     Family History   Problem Relation Age of Onset     Hypertension Sister       Hypertension Father      Allergies Father      seasonal     Lipids Father      Obesity Father      Arthritis Mother      Gynecology Mother      problems with menopause     Hypertension Mother      Other Cancer Mother      Endometrial     OSTEOPOROSIS Mother      Obesity Mother      Parathyroid Disorders Mother      3 operations     Obesity Sister      DIABETES Maternal Aunt      x several w. DM     Breast Cancer Maternal Aunt      Cancer - colorectal Maternal Uncle      60ish     Nephrolithiasis No family hx of          Current Outpatient Prescriptions   Medication Sig Dispense Refill     MELATONIN PO Take 3 mg by mouth       Diphenhydramine-APAP, sleep, (TYLENOL PM EXTRA STRENGTH PO)        hydrochlorothiazide (HYDRODIURIL) 25 MG tablet Take 1 tablet (25 mg) by mouth daily 90 tablet 3     Biotin 5000 MCG TABS        Cyanocobalamin (CVS B-12) 5000 MCG SUBL        polyethylene glycol (MIRALAX) powder Take 17 g (1 capful) by mouth daily as needed for constipation 510 g 1     cetirizine (ZYRTEC) 10 MG tablet Take 10 mg by mouth daily as needed for allergies       Calcium Carbonate-Vitamin D (CALCIUM + D PO) Take by mouth daily       Multiple Vitamin (MULTIVITAMINS PO) Take by mouth daily       Allergies   Allergen Reactions     Nkda [No Known Drug Allergies]      No Known Allergies      Recent Labs   Lab Test 02/11/16 02/08/16   1152  02/01/16   1152  01/27/16   1003  11/06/15   1211   06/03/15   1040   12/23/14   1613   08/08/13   1456   06/03/13   1100   02/19/13   1252   07/12/12   0820   A1C   --    --    --    --    --    --    --    --   5.0   --   4.6   --    --    --    --    --   5.4   LDL   --    --    --    --    --    --    --    --   67   --   120   --    --    --   84   --   108   HDL   --    --    --    --    --    --    --    --   77   --   44*   --    --    --   51   --   55   TRIG   --    --    --    --    --    --    --    --   91   --   80   --   136   --   106   --   86   ALT   --    --   19   --   " 20   --   18   --   20   < >  29   < >  26   < >  27   --   12   CR   --   0.69  0.67   --   0.67   < >  0.67   --   0.78   < >  0.72   < >  0.58   < >  0.68   < >  0.60   GFRESTIMATED   --   >90  Non  GFR Calc    >90  Non  GFR Calc     --   >90  Non  GFR Calc     --   >90  Non  GFR Calc     --   84   < >  >90   < >  >90   < >  >90   < >  >90   GFRESTBLACK   --   >90   GFR Calc    >90   GFR Calc     --   >90   GFR Calc     --   >90   GFR Calc     --   >90   GFR Calc     < >  >90   < >  >90   < >  >90   < >  >90   POTASSIUM   --   3.6  3.2*   --   3.8   --   3.9   --   3.8   < >  3.7   < >  3.8   < >  4.3   < >  3.8   TSH  0.94   --    --   0.86   --    --    --    < >   --    < >   --    --    --    --    --    < >  1.05    < > = values in this interval not displayed.      BP Readings from Last 3 Encounters:   04/25/17 124/84   03/29/17 128/72   02/15/17 126/76    Wt Readings from Last 3 Encounters:   04/25/17 222 lb 8 oz (100.9 kg)   03/29/17 220 lb (99.8 kg)   02/15/17 217 lb 4.8 oz (98.6 kg)                  Labs reviewed in EPIC    Reviewed and updated as needed this visit by clinical staff  Tobacco  Allergies  Meds  Med Hx  Surg Hx  Fam Hx  Soc Hx      Reviewed and updated as needed this visit by Provider         ROS:  Constitutional, HEENT, cardiovascular, pulmonary, GI, , musculoskeletal, neuro, skin, endocrine and psych systems are negative, except as otherwise noted.    OBJECTIVE:                                                    /84  Pulse 84  Temp 98.2  F (36.8  C) (Oral)  Resp 14  Ht 5' 9.5\" (1.765 m)  Wt 222 lb 8 oz (100.9 kg)  LMP 03/20/2017  SpO2 96%  Breastfeeding? No  BMI 32.39 kg/m2  Body mass index is 32.39 kg/(m^2).  GENERAL: healthy, alert and no distress  ABDOMEN: soft, nontender, no hepatosplenomegaly, no masses and bowel " sounds normal  MS: no gross musculoskeletal defects noted, no edema    Diagnostic Test Results:  none      ASSESSMENT/PLAN:                                                        1. Screening examination for venereal disease  STD screen and no symptoms, will check and will inform her about the results .  - NEISSERIA GONORRHOEA PCR  - CHLAMYDIA TRACHOMATIS PCR  - HIV Antigen Antibody Combo  - Anti Treponema  - Hepatitis C antibody    (F33.42) Major depressive disorder, recurrent episode, in full remission (H)  Comment: her depression is under control and she is not on any antidepressants, doing well, with exercise , yoga, melatonin for sleep etc   Plan: f/u in 6 months , Pt is aware  and comfortable with the current plan.        Pt is aware  and comfortable with the current plan.      Ct Jameson MD  [facility]

## 2017-04-26 LAB
C TRACH DNA SPEC QL NAA+PROBE: NORMAL
N GONORRHOEA DNA SPEC QL NAA+PROBE: NORMAL
SPECIMEN SOURCE: NORMAL
SPECIMEN SOURCE: NORMAL

## 2017-05-03 ENCOUNTER — OFFICE VISIT (OUTPATIENT)
Dept: PSYCHOLOGY | Facility: CLINIC | Age: 37
End: 2017-05-03

## 2017-05-03 VITALS — WEIGHT: 223.5 LBS | BODY MASS INDEX: 32.53 KG/M2

## 2017-05-03 DIAGNOSIS — F54 PSYCHOLOGICAL FACTOR AFFECTING PHYSICAL CONDITION: ICD-10-CM

## 2017-05-03 DIAGNOSIS — F33.0 MAJOR DEPRESSIVE DISORDER, RECURRENT EPISODE, MILD (H): Primary | ICD-10-CM

## 2017-05-03 DIAGNOSIS — Z56.9 OCCUPATIONAL PROBLEM: ICD-10-CM

## 2017-05-03 SDOH — ECONOMIC STABILITY - INCOME SECURITY: UNSPECIFIED PROBLEMS RELATED TO EMPLOYMENT: Z56.9

## 2017-05-03 NOTE — PROGRESS NOTES
Health Psychology                  Clinic    Department of Medicine  Lis Chavez, Ph.D., L.P. [phone]                          [facility] Earlien Anderson, Ph.D.,  L.P. [phone]                 3rd Floor, Clinic 3A  [place] Mail Code 749   Jayro Elias, Ph.D., AMITA.ÁLVARO.ROB., L.P. [phone]     [address] Melissa Stokes, Ph.D., L.P. [phone]  [place] [address]  [place] [address]    Health Psychology Follow-Up Note    Ashley Catherine is a 37-year-old woman referred initially  for psychological consultation for workup for a gastric sleeve procedure who now returns following the surgery, with concerns about recent weight gain.    We reviewed in detail.    Wt Readings from Last 4 Encounters:   05/03/17 101.4 kg (223 lb 8 oz)   04/25/17 100.9 kg (222 lb 8 oz)   03/29/17 99.8 kg (220 lb)   03/24/17 99.5 kg (219 lb 6.4 oz)       She is  5 foot 11 inches.  Her goal is 175. She has been gaining weight.  She acknowledges eating more cookies and icr cream, and stopping working out.  She feels it isn't fair  How little she eats, yet she is gaining weight..  She has been working many extra shifts.  However, she is spending money,  so isn't getting ahead on her bills  We previously discussed meeting with a financial counselor, but did not today.     We discussed her consumption of 200 calorie coffee drinks and 2 glasses of wine, exploring what cutting back could be like,  And how it would help with her plan for weight loss.  She walks >10,000 steps most days and feels frustrated it isn't enough.  We spent most of the session putting it into perspective, and reaching a point of her recognizing that if she wants to lose weight she has to resume the patterns that previously worked for her.  [place] before coming in today.  She reports some sleep issues, wondering how that affects her  weight.    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  Affect is generally positive despite her frustrations with her weight and work.  Rapport is excellent.   Extended session due to complexity of case and length of interval.    Extended session due to complexity of case and length of interval.  A treatment plan was completed.        Time in:  11:03  Time out:  11:59  DIAGNOSIS:   Psychological factors affecting obesity (F54).   Major depression, recurrent, mild (F3e3.0).   Occupational Problems (Z56.9)  PLAN/RECOMMENDATIONS:   1. Ms. Catherine will return 6/21 @ 11:00 to address weight loss and social issues.  2.  Establish schedule of regular exercise and  Decrease grazing eating.    Tx plan due 5/3/18

## 2017-05-03 NOTE — MR AVS SNAPSHOT
After Visit Summary   5/3/2017    Ashley Catherine    MRN: [id number]           Patient Information     Date Of Birth          1980        Visit Information        Provider Department      5/3/2017 11:00 AM Jayro Elias, PhD [facility]        Today's Diagnoses     Major depressive disorder, recurrent episode, mild (H)    -  1    Psychological factor affecting physical condition        Occupational problem           Follow-ups after your visit        Your next 10 appointments already scheduled     May 10, 2017 10:15 AM CDT   [facility] C[facility]I [facility]    [address]  [address]  [facility] [address] [phone]           Bring a current list of meds and any records pertaining to this visit.  For Physicals, please bring immunization records and any forms needing to be filled out.  Please arrive 10 minutes early to complete paperwork.              Who to contact     Please call your clinic at [phone] to:    Ask questions about your health    Make or cancel appointments    Discuss your medicines    Learn about your test results    Speak to your doctor   If you have compliments or concerns about an experience at your clinic, or if you wish to file a complaint, please contact [facility] Patient Relations at [phone] or email us at [email or web address]         Additional Information About Your Visit        MyChart Information     [employer] gives you secure access to your electronic health record. If you see a primary care provider, you can also send messages to your care team and make appointments. If you have questions, please call your primary care clinic.  If you do not have a primary care provider, please call [phone] and they will assist you.      [employer] is an electronic gateway that provides easy, online access to your medical records.  With [employer], you can request a clinic appointment, read your test results, renew a prescription or communicate with your care team.     To access your existing account, please contact your [facility] or call [phone] for assistance.        Care EveryWhere ID     This is your Care EveryWhere ID. This could be used by other organizations to access your [facility] medical records  [id number]        Your Vitals Were     BMI (Body Mass Index)                   32.53 kg/m2            Blood Pressure from Last 3 Encounters:   04/25/17 124/84   03/29/17 128/72   02/15/17 126/76    Weight from Last 3 Encounters:   05/03/17 101.4 kg (223 lb 8 oz)   04/25/17 100.9 kg (222 lb 8 oz)   03/29/17 99.8 kg (220 lb)              Today, you had the following     No orders found for display       Primary Care Provider Office Phone # Fax #    Violet Humphreys [phone]       [facility] [address] [address]        Thank you!     Thank you for choosing [facility]IMARY [facility]  for your care. Our goal is always to provide you with excellent care. Hearing back from our patients is one way we can continue to improve our services. Please take a few minutes to complete the written survey that you may receive in the mail after your visit with us. Thank you!             Your Updated Medication List - Protect others around you: Learn how to safely use, store and throw away your medicines at www.disposemymeds.org.          This list is accurate as of: 5/3/17 12:04 PM.  Always use your most recent med list.                   Brand Name Dispense Instructions for use    Biotin 5000 MCG Tabs          CALCIUM + D PO      Take by mouth daily       cetirizine 10 MG tablet    zyrTEC     Take 10 mg by mouth daily as needed for allergies       [employer] B-12 5000 MCG Subl   Generic drug:  Cyanocobalamin          hydrochlorothiazide 25 MG tablet    HYDRODIURIL     90 tablet    Take 1 tablet (25 mg) by mouth daily       MELATONIN PO      Take 3 mg by mouth       MULTIVITAMINS PO      Take by mouth daily       polyethylene glycol powder    MIRALAX    510 g    Take 17 g (1 capful) by mouth daily as needed for constipation       TYLENOL PM EXTRA STRENGTH PO

## 2017-05-10 ENCOUNTER — OFFICE VISIT (OUTPATIENT)
Dept: FAMILY MEDICINE | Facility: CLINIC | Age: 37
End: 2017-05-10
Payer: COMMERCIAL

## 2017-05-10 VITALS — DIASTOLIC BLOOD PRESSURE: 70 MMHG | TEMPERATURE: 98.4 F | SYSTOLIC BLOOD PRESSURE: 120 MMHG

## 2017-05-10 DIAGNOSIS — Z71.84 TRAVEL ADVICE ENCOUNTER: Primary | ICD-10-CM

## 2017-05-10 DIAGNOSIS — Z23 NEED FOR VACCINATION: ICD-10-CM

## 2017-05-10 PROCEDURE — 99402 PREV MED CNSL INDIV APPRX 30: CPT | Mod: 25 | Performed by: NURSE PRACTITIONER

## 2017-05-10 PROCEDURE — 90632 HEPA VACCINE ADULT IM: CPT | Mod: GA | Performed by: NURSE PRACTITIONER

## 2017-05-10 PROCEDURE — 90471 IMMUNIZATION ADMIN: CPT | Mod: GA | Performed by: NURSE PRACTITIONER

## 2017-05-10 RX ORDER — AZITHROMYCIN 500 MG/1
500 TABLET, FILM COATED ORAL DAILY
Qty: 3 TABLET | Refills: 0 | Status: SHIPPED | OUTPATIENT
Start: 2017-05-10 | End: 2017-05-13

## 2017-05-10 NOTE — MR AVS SNAPSHOT
After Visit Summary   5/10/2017    Ashley Catherine    MRN: [id number]           Patient Information     Date Of Birth          1980        Visit Information        Provider Department      5/10/2017 10:15 AM Karly Coats APRN [facility]        Today's Diagnoses     Travel advice encounter    -  1    Need for vaccination          Care Instructions    Today May 10, 2017 you received the    Hepatitis A Vaccine -   .    These appointments can be made as a NURSE ONLY visit.    **It is very important for the vaccinations to be given on the scheduled day(s), this helps ensure you receive the full effectiveness of the vaccine.**    Please call [facility] with any questions [phone]    Thank you for visiting [facility]            Follow-ups after your visit        Your next 10 appointments already scheduled     May 11, 2017 11:45 AM CDT   Abyt Short with Violet Humphreys MD   [facility] ([facility])    [address] [facility] [id number] [phone]            May 15, 2017 11:00 AM CDT   (Arrive by 10:45 AM)   New Patient Visit with Santosh De Los Santos MD   [facility] ([facility])    [address] [place] [address]   [phone]            Jun 01, 2017 12:30 PM CDT   (Arrive by 12:15 PM)   RETURN BARIATRIC SURGERY with Elizabeth Park PA-C   [facility] ([facility])    [address] [place] [address]   [phone]            Jun 21, 2017 11:00 AM CDT   (Arrive by 10:45 AM)   Return Visit with Jayro Elias, PhD [facility] ([facility])    [address] [place] [id number] [phone]              Who to contact     If you have questions or need follow  up information about today's clinic visit or your schedule please contact [facility] directly at [phone].  Normal or non-critical lab and imaging results will be communicated to you by [employer], letter or phone within 4 business days after the clinic has received the results. If you do not hear from us within 7 days, please contact the clinic through [employer] or phone. If you have a critical or abnormal lab result, we will notify you by phone as soon as possible.  Submit refill requests through [employer] or call your pharmacy and they will forward the refill request to us. Please allow 3 business days for your refill to be completed.          Additional Information About Your Visit        [employer] Information     [employer] gives you secure access to your electronic health record. If you see a primary care provider, you can also send messages to your care team and make appointments. If you have questions, please call your primary care clinic.  If you do not have a primary care provider, please call [phone] and they will assist you.        Care EveryWhere ID     This is your Care EveryWhere ID. This could be used by other organizations to access your [facility] medical records  [id number]        Your Vitals Were     Temperature                   98.4  F (36.9  C) (Oral)            Blood Pressure from Last 3 Encounters:   05/10/17 120/70   04/25/17 124/84   03/29/17 128/72    Weight from Last 3 Encounters:   04/25/17 222 lb 8 oz (100.9 kg)   03/29/17 220 lb (99.8 kg)   02/15/17 217 lb 4.8 oz (98.6 kg)              We Performed the Following     HEPA VACCINE ADULT IM          Today's Medication Changes          These changes are accurate as of: 5/10/17 10:58 AM.  If you have any questions, ask your nurse or doctor.               Start taking these medicines.        Dose/Directions    azithromycin 500 MG tablet   Commonly known as:  ZITHROMAX   Used for:  Travel advice encounter   Started by:  Karly Coats  CECI Padron CNP        Dose:  500 mg   Take 1 tablet (500 mg) by mouth daily for 3 doses Take 1 tablet a day for up to 3 days for severe diarrhea   Quantity:  3 tablet   Refills:  0            Where to get your medicines      These medications were sent to [facility] [place] - [address], [address]  [address], [address], [facility] [address]     Phone:  [phone]     azithromycin 500 MG tablet                Primary Care Provider Office Phone # Fax #    [place]uTanja Humphreys [phone]       [facility] [address]  [facility] [address]        Thank you!     Thank you for choosing [facility]  for your care. Our goal is always to provide you with excellent care. Hearing back from our patients is one way we can continue to improve our services. Please take a few minutes to complete the written survey that you may receive in the mail after your visit with us. Thank you!             Your Updated Medication List - Protect others around you: Learn how to safely use, store and throw away your medicines at www.disposemymeds.org.          This list is accurate as of: 5/10/17 10:58 AM.  Always use your most recent med list.                   Brand Name Dispense Instructions for use    azithromycin 500 MG tablet    ZITHROMAX    3 tablet    Take 1 tablet (500 mg) by mouth daily for 3 doses Take 1 tablet a day for up to 3 days for severe diarrhea       Biotin 5000 MCG Tabs          CALCIUM + D PO      Take by mouth daily       cetirizine 10 MG tablet    zyrTEC     Take 10 mg by mouth daily as needed for allergies       CVS B-12 5000 MCG Subl   Generic drug:  Cyanocobalamin          hydrochlorothiazide 25 MG tablet    HYDRODIURIL    90 tablet    Take 1 tablet (25 mg) by mouth daily       MELATONIN PO      Take 3 mg by mouth       MULTIVITAMINS PO      Take by mouth daily       polyethylene glycol powder    MIRALAX    510 g    Take 17 g  (1 capful) by mouth daily as needed for constipation       TYLENOL PM EXTRA STRENGTH PO

## 2017-05-10 NOTE — PROGRESS NOTES
Nurse Note      Itinerary:  [facility]      Departure Date: 6/7/17      Return Date: 6/16/17      Length of Trip 10 days      Reason for Travel: Tourism           Urban or rural: urban      Accommodations: Private dwelling        IMMUNIZATION HISTORY  Have you received any immunizations within the past 4 weeks?  No  Have you ever fainted from having your blood drawn or from an injection?  No  Have you ever had a fever reaction to vaccination?  No  Have you ever had any bad reaction or side effect from any vaccination?  No  Have you ever had hepatitis A or B vaccine?  Yes  Do you live (or work closely) with anyone who has AIDS, an AIDS-like condition, any other immune disorder or who is on chemotherapy for cancer?  Yes  Do you have a family history of immunodeficiency?  No  Have you received any injection of immune globulin or any blood products during the past 12 months?  no    Patient roomed by Brandon Rod  Ashley Catherine is a 37 year old female seen today alone for counsultation for international travel to [facility] for Tourism.  Patient will be departing in  1 month(s) and staying for   1 week(s) and  traveling alone.      Patient itinerary :  will be in the [facility] region [facility] which presents risk for Dengue Fever, Chikungungya, Zika, food borne illnesses, motor vehicle accidents and Typhoid. exposure.      Patient's activities will include sightseeing and beach activities (salt water).    Patient's country of birth is [place]    Special medical concerns: none  Pre-travel questionnaire was completed by patient and reviewed by provider.     Vitals: /70  Temp 98.4  F (36.9  C) (Oral)  BMI= There is no height or weight on file to calculate BMI.    EXAM:  General:  Well-nourished, well-developed in no acute distress.  Appears to be stated age, interacts appropriately and expresses understanding of information given to patient.    Current Outpatient Prescriptions   Medication Sig Dispense  Refill     MELATONIN PO Take 3 mg by mouth       Diphenhydramine-APAP, sleep, (TYLENOL PM EXTRA STRENGTH PO)        hydrochlorothiazide (HYDRODIURIL) 25 MG tablet Take 1 tablet (25 mg) by mouth daily 90 tablet 3     Biotin 5000 MCG TABS        Cyanocobalamin (CVS B-12) 5000 MCG SUBL        polyethylene glycol (MIRALAX) powder Take 17 g (1 capful) by mouth daily as needed for constipation 510 g 1     cetirizine (ZYRTEC) 10 MG tablet Take 10 mg by mouth daily as needed for allergies       Calcium Carbonate-Vitamin D (CALCIUM + D PO) Take by mouth daily       Multiple Vitamin (MULTIVITAMINS PO) Take by mouth daily       Patient Active Problem List   Diagnosis     OLIGOMENORRHEA, C/W PCOS     Sleep apnea     HX ABNL PAP SMEAR OF CERVIX       CARDIOVASCULAR SCREENING; LDL GOAL LESS THAN 160     Hypertension goal BP (blood pressure) < 140/90     Flat feet     GERD (gastroesophageal reflux disease)     Sciatica     Obesity     S/P gastrectomy     Occupational problem     Elevated parathyroid hormone     Multiple thyroid nodules     Abnormal thyroid cytology-follicular neoplasm     Chronic pain syndrome     Major depressive disorder, recurrent episode, in full remission (H)     Bilateral low back pain with sciatica, sciatica laterality unspecified     Throat symptom     Psychological factor affecting physical condition     Major depressive disorder, recurrent episode, mild (H)     Allergies   Allergen Reactions     Nkda [No Known Drug Allergies]      No Known Allergies          Immunizations discussed include:   Hepatitis A:  Ordered/given today, risks, benefits and side effects reviewed  Hepatitis B: Up to date  Influenza: Declined  Not concerned about risk of disease  Typhoid: Up to date  Rabies: Declined  Not concerned about risk of disease  Yellow Fever: Not indicated  Japanese Encephalitis: Not indicated  Meningococcus: Not indicated  Tetanus/Diphtheria: Up to date  Measles/Mumps/Rubella: Up to date  Cholera: Not  needed  Polio: Up to date  Pneumococcal: Under age of 65  Varicella: Immune by disease history per patient report  Zostavax:  Not indicated  HPV:  Not indicated  TB:  Low risk     Altitude Exposure on this trip: no    ASSESSMENT/PLAN:    ICD-10-CM    1. Travel advice encounter Z71.89 HEPA VACCINE ADULT IM     azithromycin (ZITHROMAX) 500 MG tablet   2. Need for vaccination Z23 HEPA VACCINE ADULT IM     I have reviewed general recommendations for safe travel   including: food/water precautions, insect precautions, safer sex   practices given high prevalence of Zika, HIV and other STDs,   roadway safety. Educational materials and [employer] report provided.    Malaraia prophylaxis recommended: none  Symptomatic treatment for traveler's diarrhea: azithromycin  Altitude illness prevention and treatment: no      Evacuation insurance advised and resources were provided to patient.    Total visit time 30 minutes  with over 50% of time spent counseling patient as detailed above.    Karly Coats CNP

## 2017-05-10 NOTE — NURSING NOTE
Screening Questionnaire for Adult Immunization    Are you sick today?   No   Do you have allergies to medications, food, a vaccine component or latex?   No   Have you ever had a serious reaction after receiving a vaccination?   No   Do you have a long-term health problem with heart disease, lung disease, asthma, kidney disease, metabolic disease (e.g. diabetes), anemia, or other blood disorder?   No   Do you have cancer, leukemia, HIV/AIDS, or any other immune system problem?   No   In the past 3 months, have you taken medications that affect  your immune system, such as prednisone, other steroids, or anticancer drugs; drugs for the treatment of rheumatoid arthritis, Crohn s disease, or psoriasis; or have you had radiation treatments?   No   Have you had a seizure, or a brain or other nervous system problem?   No   During the past year, have you received a transfusion of blood or blood     products, or been given immune (gamma) globulin or antiviral drug?   No   For women: Are you pregnant or is there a chance you could become        pregnant during the next month?   No   Have you received any vaccinations in the past 4 weeks?   No     Immunization questionnaire answers were all negative.      MNVFC doesn't apply on this patient    Per orders of MANDO Coats, injection of Hep A  given by Delia Hinson. Patient instructed to remain in clinic for 20 minutes afterwards, and to report any adverse reaction to me immediately.       Screening performed by Delia Hinson on 5/10/2017 at 12:01 PM.

## 2017-05-10 NOTE — PATIENT INSTRUCTIONS
Today May 10, 2017 you received the    Hepatitis A Vaccine -   .    These appointments can be made as a NURSE ONLY visit.    **It is very important for the vaccinations to be given on the scheduled day(s), this helps ensure you receive the full effectiveness of the vaccine.**    Please call [facility] with any questions [phone]    Thank you for visiting [facility]

## 2017-05-10 NOTE — NURSING NOTE
"Chief Complaint   Patient presents with     Travel Clinic     initial /70  Temp 98.4  F (36.9  C) (Oral) Estimated body mass index is 32.53 kg/(m^2) as calculated from the following:    Height as of 4/25/17: 5' 9.5\" (1.765 m).    Weight as of 5/3/17: 223 lb 8 oz (101.4 kg).  BP completed using cuff size: large.  L  arm      Health Maintenance that is potentially due pending provider review:  NONE    n/a    Brandon Peñaloza ma  "

## 2017-05-11 ENCOUNTER — OFFICE VISIT (OUTPATIENT)
Dept: FAMILY MEDICINE | Facility: CLINIC | Age: 37
End: 2017-05-11
Payer: COMMERCIAL

## 2017-05-11 VITALS
RESPIRATION RATE: 15 BRPM | WEIGHT: 220.6 LBS | OXYGEN SATURATION: 100 % | DIASTOLIC BLOOD PRESSURE: 78 MMHG | BODY MASS INDEX: 31.58 KG/M2 | SYSTOLIC BLOOD PRESSURE: 132 MMHG | HEART RATE: 93 BPM | HEIGHT: 70 IN | TEMPERATURE: 96 F

## 2017-05-11 DIAGNOSIS — R87.9 ABNORMAL VAGINAL FLUIDS: Primary | ICD-10-CM

## 2017-05-11 DIAGNOSIS — D17.1 BENIGN LIPOMATOUS NEOPLASM OF SKIN AND SUBCUTANEOUS TISSUE OF TRUNK: ICD-10-CM

## 2017-05-11 DIAGNOSIS — R10.31 RLQ ABDOMINAL PAIN: ICD-10-CM

## 2017-05-11 LAB
MICRO REPORT STATUS: ABNORMAL
SPECIMEN SOURCE: ABNORMAL
WET PREP SPEC: ABNORMAL

## 2017-05-11 PROCEDURE — 87210 SMEAR WET MOUNT SALINE/INK: CPT | Performed by: FAMILY MEDICINE

## 2017-05-11 PROCEDURE — 99214 OFFICE O/P EST MOD 30 MIN: CPT | Performed by: FAMILY MEDICINE

## 2017-05-11 RX ORDER — METRONIDAZOLE 500 MG/1
500 TABLET ORAL 2 TIMES DAILY
Qty: 14 TABLET | Refills: 0 | Status: SHIPPED | OUTPATIENT
Start: 2017-05-11 | End: 2017-05-18

## 2017-05-11 RX ORDER — METRONIDAZOLE 7.5 MG/G
GEL VAGINAL
Qty: 70 G | Refills: 3 | Status: SHIPPED | OUTPATIENT
Start: 2017-05-11 | End: 2018-03-29

## 2017-05-11 NOTE — PROGRESS NOTES
"  SUBJECTIVE:                                                    Ashley Catherine is a 37 year old female who presents to clinic today for the following health issues:      Vaginal Symptoms      Duration: 1 week    Description  vaginal discharge - white, sometimes \"glue-like\" discharge and itching    Intensity:  mild    Accompanying signs and symptoms (fever/dysuria/abdominal or back pain): None    History  Sexually active: not at present  Possibility of pregnancy: No  Recent antibiotic use: no- not since last month    Precipitating or alleviating factors: None    Therapies tried and outcome: none      Had recent STI screens - all neg    Also having some sporadic right side pain- by her lower abdomen/ hip area.   Has constipation - just started miralax for this has not had reg bms yet though  No fevers no blood  gastric bypass surgery status    Has lipoma along back - would like removed    Problem list and histories reviewed & adjusted, as indicated.  Additional history: as documented    Patient Active Problem List   Diagnosis     OLIGOMENORRHEA, C/W PCOS     Sleep apnea     HX ABNL PAP SMEAR OF CERVIX       CARDIOVASCULAR SCREENING; LDL GOAL LESS THAN 160     Hypertension goal BP (blood pressure) < 140/90     Flat feet     GERD (gastroesophageal reflux disease)     Sciatica     Obesity     S/P gastrectomy     Occupational problem     Elevated parathyroid hormone     Multiple thyroid nodules     Abnormal thyroid cytology-follicular neoplasm     Chronic pain syndrome     Major depressive disorder, recurrent episode, in full remission (H)     Bilateral low back pain with sciatica, sciatica laterality unspecified     Throat symptom     Psychological factor affecting physical condition     Major depressive disorder, recurrent episode, mild (H)     Past Surgical History:   Procedure Laterality Date     BIOPSY  03/13/2015    Thyroid nodule     ESOPHAGOSCOPY, GASTROSCOPY, DUODENOSCOPY (EGD), COMBINED  5/28/2013    Procedure: " COMBINED ESOPHAGOSCOPY, GASTROSCOPY, DUODENOSCOPY (EGD);;  Surgeon: Best Willett MD;  Location: [facility]     LAPAROSCOPIC GASTRIC SLEEVE  5/13/2013    Procedure: LAPAROSCOPIC GASTRIC SLEEVE;  Laparoscopic Sleeve Gastrectomy ;  Surgeon: Best Willett MD;  Location: [facility]     LEEP TX, CERVICAL  1995    age 15     SEPTOPLASTY       TONSILLECTOMY  age 20s     wisdom teeth extraction         Social History   Substance Use Topics     Smoking status: Never Smoker     Smokeless tobacco: Never Used     Alcohol use 2.4 - 3.0 oz/week      Comment: 3 drinks/week     Family History   Problem Relation Age of Onset     Hypertension Sister      Hypertension Father      Allergies Father      seasonal     Lipids Father      Obesity Father      Arthritis Mother      Gynecology Mother      problems with menopause     Hypertension Mother      Other Cancer Mother      Endometrial     OSTEOPOROSIS Mother      Obesity Mother      Parathyroid Disorders Mother      3 operations     Obesity Sister      DIABETES Maternal Aunt      x several w. DM     Breast Cancer Maternal Aunt      Cancer - colorectal Maternal Uncle      60ish     Nephrolithiasis No family hx of          Current Outpatient Prescriptions   Medication Sig Dispense Refill     MELATONIN PO Take 3 mg by mouth       Diphenhydramine-APAP, sleep, (TYLENOL PM EXTRA STRENGTH PO)        hydrochlorothiazide (HYDRODIURIL) 25 MG tablet Take 1 tablet (25 mg) by mouth daily 90 tablet 3     Biotin 5000 MCG TABS        Cyanocobalamin (CVS B-12) 5000 MCG SUBL        polyethylene glycol (MIRALAX) powder Take 17 g (1 capful) by mouth daily as needed for constipation 510 g 1     cetirizine (ZYRTEC) 10 MG tablet Take 10 mg by mouth daily as needed for allergies       Calcium Carbonate-Vitamin D (CALCIUM + D PO) Take by mouth daily       Multiple Vitamin (MULTIVITAMINS PO) Take by mouth daily       azithromycin (ZITHROMAX) 500 MG tablet Take 1 tablet (500 mg) by mouth daily for 3 doses  "Take 1 tablet a day for up to 3 days for severe diarrhea (Patient not taking: Reported on 5/11/2017) 3 tablet 0     Labs reviewed in EPIC    Reviewed and updated as needed this visit by clinical staff  Meds       Reviewed and updated as needed this visit by Provider         ROS:  Constitutional, HEENT, cardiovascular, pulmonary, gi and gu systems are negative, except as otherwise noted.    OBJECTIVE:                                                    /78  Pulse 93  Temp 96  F (35.6  C) (Oral)  Resp 15  Ht 5' 9.5\" (1.765 m)  Wt 220 lb 9.6 oz (100.1 kg)  LMP 04/21/2017 (Approximate)  SpO2 100%  Breastfeeding? No  BMI 32.11 kg/m2  Body mass index is 32.11 kg/(m^2).  GENERAL APPEARANCE: healthy, alert and no distress  ABDOMEN: soft, nontender, without hepatosplenomegaly or masses, bowel sounds normal and right lower quad pain but n rebound or guarding  No masses felt  Pain right along ridge of ant sup vaibhav crest   (female): normal cervix, adnexae, and uterus without masses or discharge  SKIN: no suspicious lesions or rashes and lipoma along her back left side midline, center    Diagnostic test results:  Diagnostic Test Results:  Results for orders placed or performed in visit on 05/11/17 (from the past 24 hour(s))   Wet prep   Result Value Ref Range    Specimen Description Vagina     Wet Prep (A)      No Trichomonas seen  Clue cells seen  No yeast seen      Micro Report Status FINAL 05/11/2017         ASSESSMENT/PLAN:                                                    1. Abnormal vaginal fluids  Recurrent BV - discussed treatment  Plan: Wet prep, metroNIDAZOLE (FLAGYL) 500 MG tablet,        (R10.31) RLQ abdominal pain  Comment: discussed trying stool clearing first but if no better or if fevers or worsening pain should have imaging and labs for appy  Plan:     (D17.1) Benign lipomatous neoplasm of skin and subcutaneous tissue of trunk  Comment:   Plan: GENERAL SURG ADULT REFERRAL        " referred      Follow up with Provider - see avs     Violet Humphreys MD  [facility]

## 2017-05-11 NOTE — MR AVS SNAPSHOT
After Visit Summary   5/11/2017    Ashley Catherine    MRN: [id number]           Patient Information     Date Of Birth          1980        Visit Information        Provider Department      5/11/2017 11:45 AM Violet Humphreys MD [facility]        Today's Diagnoses     Abnormal vaginal fluids    -  1    RLQ abdominal pain        Benign lipomatous neoplasm of skin and subcutaneous tissue of trunk           Follow-ups after your visit        Additional Services     GENERAL SURG ADULT REFERRAL       Your provider has referred you to: [facility]: [facility] [phone]   [email or web address]  [facility]: [facility] [phone]   [email or web address]    Please be aware that coverage of these services is subject to the terms and limitations of your health insurance plan.  Call member services at your health plan with any benefit or coverage questions.      Please bring the following with you to your appointment:    (1) Any X-Rays, CTs or MRIs which have been performed.  Contact the facility where they were done to arrange for  prior to your scheduled appointment.   (2) List of current medications   (3) This referral request   (4) Any documents/labs given to you for this referral                  Your next 10 appointments already scheduled     May 15, 2017 11:00 AM CDT   (Arrive by 10:45 AM)   New Patient Visit with Santosh De Los Santos MD   [facility] ([facility])    [address] [place] [id number] [phone]            Jun 01, 2017 12:30 PM CDT   (Arrive by 12:15 PM)   RETURN BARIATRIC SURGERY with Elizabeth Park PA-C   [facility] ([facility])    [address] [place] [address] [id number] [phone]         "    Jun 21, 2017 11:00 AM CDT   (Arrive by 10:45 AM)   Return Visit with Jayro Elias, PhD [facility] ([facility])    [address]  [address] [facility] [address] [phone]              Who to contact     If you have questions or need follow up information about today's clinic visit or your schedule please contact [facility] directly at [phone].  Normal or non-critical lab and imaging results will be communicated to you by Agendiahart, letter or phone within 4 business days after the clinic has received the results. If you do not hear from us within 7 days, please contact the clinic through Agendiahart or phone. If you have a critical or abnormal lab result, we will notify you by phone as soon as possible.  Submit refill requests through Cinelan or call your pharmacy and they will forward the refill request to us. Please allow 3 business days for your refill to be completed.          Additional Information About Your Visit        [employer] Information     [employer] gives you secure access to your electronic health record. If you see a primary care provider, you can also send messages to your care team and make appointments. If you have questions, please call your primary care clinic.  If you do not have a primary care provider, please call [phone] and they will assist you.        Care EveryWhere ID     This is your Care EveryWhere ID. This could be used by other organizations to access your [place] medical records  [id number]        Your Vitals Were     Pulse Temperature Respirations Height Last Period Pulse Oximetry    93 96  F (35.6  C) (Oral) 15 5' 9.5\" (1.765 m) 04/21/2017 (Approximate) 100%    Breastfeeding? BMI (Body Mass Index)                No 32.11 kg/m2           Blood Pressure from Last 3 Encounters:   05/11/17 132/78   05/10/17 120/70   04/25/17 124/84    Weight from Last 3 Encounters:   05/11/17 220 lb 9.6 oz (100.1 " kg)   05/03/17 223 lb 8 oz (101.4 kg)   04/25/17 222 lb 8 oz (100.9 kg)              We Performed the Following     GENERAL SURG ADULT REFERRAL     Wet prep          Today's Medication Changes          These changes are accurate as of: 5/11/17 12:28 PM.  If you have any questions, ask your nurse or doctor.               Start taking these medicines.        Dose/Directions    metroNIDAZOLE 0.75 % vaginal gel   Commonly known as:  METROGEL   Used for:  Abnormal vaginal fluids   Started by:  Violet Humphreys MD        Use twice weekly to vagina for recurrent BV symptoms.  Do this for 3 months after finishing oral medications   Quantity:  70 g   Refills:  3       metroNIDAZOLE 500 MG tablet   Commonly known as:  FLAGYL   Used for:  Abnormal vaginal fluids   Started by:  Violet Humphreys MD        Dose:  500 mg   Take 1 tablet (500 mg) by mouth 2 times daily for 7 days   Quantity:  14 tablet   Refills:  0            Where to get your medicines      These medications were sent to [facility] [address], [facility] [address]  [address], [place] [address], [facility] [address]     Phone:  [phone]     metroNIDAZOLE 0.75 % vaginal gel    metroNIDAZOLE 500 MG tablet                Primary Care Provider Office Phone # Fax #    Violet Humphreys [phone]       [facility] [address] [address]        Thank you!     Thank you for choosing [facility]  for your care. Our goal is always to provide you with excellent care. Hearing back from our patients is one way we can continue to improve our services. Please take a few minutes to complete the written survey that you may receive in the mail after your visit with us. Thank you!             Your Updated Medication List - Protect others around you: Learn how to safely use, store and throw away your medicines at www.disposemymeds.org.          This list is accurate as  of: 5/11/17 12:28 PM.  Always use your most recent med list.                   Brand Name Dispense Instructions for use    azithromycin 500 MG tablet    ZITHROMAX    3 tablet    Take 1 tablet (500 mg) by mouth daily for 3 doses Take 1 tablet a day for up to 3 days for severe diarrhea       Biotin 5000 MCG Tabs          CALCIUM + D PO      Take by mouth daily       cetirizine 10 MG tablet    zyrTEC     Take 10 mg by mouth daily as needed for allergies       CVS B-12 5000 MCG Subl   Generic drug:  Cyanocobalamin          hydrochlorothiazide 25 MG tablet    HYDRODIURIL    90 tablet    Take 1 tablet (25 mg) by mouth daily       MELATONIN PO      Take 3 mg by mouth       metroNIDAZOLE 0.75 % vaginal gel    METROGEL    70 g    Use twice weekly to vagina for recurrent BV symptoms.  Do this for 3 months after finishing oral medications       metroNIDAZOLE 500 MG tablet    FLAGYL    14 tablet    Take 1 tablet (500 mg) by mouth 2 times daily for 7 days       MULTIVITAMINS PO      Take by mouth daily       polyethylene glycol powder    MIRALAX    510 g    Take 17 g (1 capful) by mouth daily as needed for constipation       TYLENOL PM EXTRA STRENGTH PO

## 2017-05-15 ENCOUNTER — OFFICE VISIT (OUTPATIENT)
Dept: ENDOCRINOLOGY | Facility: CLINIC | Age: 37
End: 2017-05-15

## 2017-05-15 VITALS
DIASTOLIC BLOOD PRESSURE: 88 MMHG | HEART RATE: 93 BPM | BODY MASS INDEX: 31.85 KG/M2 | TEMPERATURE: 98.7 F | WEIGHT: 222.5 LBS | SYSTOLIC BLOOD PRESSURE: 143 MMHG | OXYGEN SATURATION: 94 % | HEIGHT: 70 IN

## 2017-05-15 DIAGNOSIS — Z98.84 STATUS POST BARIATRIC SURGERY: ICD-10-CM

## 2017-05-15 DIAGNOSIS — E66.09 NON MORBID OBESITY DUE TO EXCESS CALORIES: Primary | ICD-10-CM

## 2017-05-15 LAB
ALBUMIN SERPL-MCNC: 3.4 G/DL (ref 3.4–5)
ALP SERPL-CCNC: 60 U/L (ref 40–150)
ALT SERPL W P-5'-P-CCNC: 20 U/L (ref 0–50)
ANION GAP SERPL CALCULATED.3IONS-SCNC: 8 MMOL/L (ref 3–14)
AST SERPL W P-5'-P-CCNC: 19 U/L (ref 0–45)
BILIRUB SERPL-MCNC: 0.6 MG/DL (ref 0.2–1.3)
BUN SERPL-MCNC: 13 MG/DL (ref 7–30)
CALCIUM SERPL-MCNC: 9.1 MG/DL (ref 8.5–10.1)
CHLORIDE SERPL-SCNC: 104 MMOL/L (ref 94–109)
CHOLEST SERPL-MCNC: 157 MG/DL
CO2 SERPL-SCNC: 28 MMOL/L (ref 20–32)
CREAT SERPL-MCNC: 0.54 MG/DL (ref 0.52–1.04)
FERRITIN SERPL-MCNC: 16 NG/ML (ref 12–150)
GFR SERPL CREATININE-BSD FRML MDRD: ABNORMAL ML/MIN/1.7M2
GLUCOSE SERPL-MCNC: 100 MG/DL (ref 70–99)
HDLC SERPL-MCNC: 81 MG/DL
LDLC SERPL CALC-MCNC: 52 MG/DL
NONHDLC SERPL-MCNC: 76 MG/DL
POTASSIUM SERPL-SCNC: 3.8 MMOL/L (ref 3.4–5.3)
PREALB SERPL IA-MCNC: 25 MG/DL (ref 15–45)
PROT SERPL-MCNC: 7.2 G/DL (ref 6.8–8.8)
SODIUM SERPL-SCNC: 140 MMOL/L (ref 133–144)
TRIGL SERPL-MCNC: 120 MG/DL
VIT B12 SERPL-MCNC: 1891 PG/ML (ref 193–986)

## 2017-05-15 ASSESSMENT — ENCOUNTER SYMPTOMS
MYALGIAS: 0
FEVER: 0
TROUBLE SWALLOWING: 0
VOMITING: 1
BLOATING: 0
WEIGHT GAIN: 1
RECTAL PAIN: 0
POLYDIPSIA: 0
MEMORY LOSS: 0
TREMORS: 0
NECK PAIN: 0
HEARTBURN: 1
HEADACHES: 0
JOINT SWELLING: 0
CHILLS: 0
NUMBNESS: 1
MUSCLE WEAKNESS: 0
DECREASED APPETITE: 0
ARTHRALGIAS: 0
BOWEL INCONTINENCE: 0
PARALYSIS: 0
NAUSEA: 0
BLOOD IN STOOL: 0
SINUS CONGESTION: 0
HALLUCINATIONS: 0
INCREASED ENERGY: 0
BACK PAIN: 1
SORE THROAT: 1
DIZZINESS: 0
STIFFNESS: 0
ABDOMINAL PAIN: 1
NECK MASS: 0
MUSCLE CRAMPS: 0
TASTE DISTURBANCE: 0
CONSTIPATION: 0
DECREASED LIBIDO: 0
FATIGUE: 0
SEIZURES: 0
LOSS OF CONSCIOUSNESS: 0
DISTURBANCES IN COORDINATION: 0
WEIGHT LOSS: 0
SPEECH CHANGE: 0
RECTAL BLEEDING: 0
TINGLING: 1
SINUS PAIN: 0
DIARRHEA: 0
JAUNDICE: 0
ALTERED TEMPERATURE REGULATION: 0
HOARSE VOICE: 0
SMELL DISTURBANCE: 0
NIGHT SWEATS: 0
HOT FLASHES: 0
POLYPHAGIA: 0
WEAKNESS: 0

## 2017-05-15 ASSESSMENT — PAIN SCALES - GENERAL: PAINLEVEL: NO PAIN (0)

## 2017-05-15 NOTE — MR AVS SNAPSHOT
After Visit Summary   5/15/2017    Ashley Catherine    MRN: [id number]           Patient Information     Date Of Birth          1980        Visit Information        Provider Department      5/15/2017 11:00 AM Santosh De Los Santos MD [facility] Medical Weight Management        Today's Diagnoses     Non morbid obesity due to excess calories    -  1      Care Instructions    Follow up with Dr. De Los Santos in 3 months    Belen Mancia, RN care coordinator [phone]      MEDICATION STARTED AT THIS APPOINTMENT    We are starting Qsymia. This is a specific obesity medication and is a combination of Phentermine and Topiramate, formulated as a sustained release, taken one time a day. There are a few different doses of the medication. Doses come in 3.75/23 mg (usually skipped), 7.5/46 mg, 11.25/69 mg, and 15/92 mg. Call the nurse at [phone] if you have any questions or concerns. (Do not stop taking it if you don't think it's working. For some people it works even though they do not feel much different.)    For some of our patients, the pills work right away. They feel and think quite differently about food. Other patients don't feel much of a change but find in fact they have lost weight! Like all weight loss medications, Qsymia works best when you help it work.  This means:    1) Have less tempting high calorie (fattening) food around the house or office    2) Have lower calorie food (fruits, vegetables,low fat meats and dairy) for snacks    3) Eat out only one time or less each week.   4) Eat your meals at a table with the TV or computer off.    Side-effects (generally well tolerated because it is a sustained release medication)    Topiramate:   -Tingling in hands,feet, or face (usually not very troublesome)   -Mental confusion and word finding trouble (about 10% of patients have this.)     -Feeling sleepy or a bit dopey- this goes away very soon after starting.      Phentermine:    -Feelings of  racing pulse or rapid heart beat.    -Increased anxiety.   -Some people can get an elevated blood pressure. Because of this we may have  you  come back within a week or so of starting the medication for a blood pressure check.    One of the dangers of topiramate is the possibility of birth defects--if you get pregnant when you are on it, there is the risk that your baby will be born with a cleft lip or palate.  If you are on topiramate and of child bearing age, you need to be on a reliable form of birth control or refrain from sexual intercourse.     It is very rare for insurance to pay for this and it typically costs around $200 per month. Please check out the website www.qsymia.com and sign up for the Pharmacy savings program to save $75 a month for 12 months if eligible. The medication can also be paid for out of pocket. It may require a prior authorization which could take up to 1-2 weeks.      In order to get refills of this or any medication we prescribe you must be seen in the medical weight mgmt clinic every 2-4 months. Please have your pharmacy fax a refill request to [phone].                  Follow-ups after your visit        Follow-up notes from your care team     Return in about 3 months (around 8/15/2017).      Your next 10 appointments already scheduled     May 15, 2017 11:45 AM CDT   LAB with  LAB    Health Lab ([facility])    [address] [place] [id number] [phone]           Patient must bring picture ID.  Patient should be prepared to give a urine specimen  Please do not eat 10-12 hours before your appointment if you are coming in fasting for labs on lipids, cholesterol, or glucose (sugar).  Pregnant women should follow their Care Team instructions. Water with medications is okay. Do not drink coffee or other fluids.   If you have concerns about taking  your medications, please ask at office or if scheduling via Indexing, send a  message by clicking on Secure Messaging, Message Your Care Team.            Jun 01, 2017 12:30 PM CDT   (Arrive by 12:15 PM)   RETURN BARIATRIC SURGERY with Elizabeth Park PA-C   [facility] ([facility])    [address]  [address] [facility] [address] [phone]            Jun 21, 2017 11:00 AM CDT   (Arrive by 10:45 AM)   Return Visit with Jayro Elias, PhD [facility] ([facility])    [address]  [address] [facility] [address] [phone]              Who to contact     Please call your clinic at [phone] to:    Ask questions about your health    Make or cancel appointments    Discuss your medicines    Learn about your test results    Speak to your doctor   If you have compliments or concerns about an experience at your clinic, or if you wish to file a complaint, please contact [facility] Patient Relations at [phone] or email us at [email or web address]         Additional Information About Your Visit        [employer] Information     [employer] gives you secure access to your electronic health record. If you see a primary care provider, you can also send messages to your care team and make appointments. If you have questions, please call your primary care clinic.  If you do not have a primary care provider, please call [phone] and they will assist you.      [employer] is an electronic gateway that provides easy, online access to your medical records. With [employer], you can request a clinic appointment, read your test results, renew a prescription or communicate with your care team.     To access your existing account, please contact your [facility] or call [phone] for assistance.        Care EveryWhere ID     This is your Care EveryWhere ID. This could be used by other organizations to access  "your [place] medical records  [id number]        Your Vitals Were     Pulse Temperature Height Last Period Pulse Oximetry BMI (Body Mass Index)    93 98.7  F (37.1  C) 1.765 m (5' 9.5\") 04/21/2017 (Approximate) 94% 32.39 kg/m2       Blood Pressure from Last 3 Encounters:   05/15/17 143/88   05/11/17 132/78   05/10/17 120/70    Weight from Last 3 Encounters:   05/15/17 100.9 kg (222 lb 8 oz)   05/11/17 100.1 kg (220 lb 9.6 oz)   04/25/17 100.9 kg (222 lb 8 oz)              Today, you had the following     No orders found for display         Today's Medication Changes          These changes are accurate as of: 5/15/17 11:28 AM.  If you have any questions, ask your nurse or doctor.               Start taking these medicines.        Dose/Directions    Phentermine-Topiramate 7.5-46 MG Cp24   Used for:  Non morbid obesity due to excess calories   Started by:  Santosh De Los Santos MD        Dose:  1 capsule   Take 1 capsule by mouth daily   Quantity:  30 capsule   Refills:  5            Where to get your medicines      Some of these will need a paper prescription and others can be bought over the counter.  Ask your nurse if you have questions.     Bring a paper prescription for each of these medications     Phentermine-Topiramate 7.5-46 MG Cp24                Primary Care Provider Office Phone # Fax #    Violet Humphreys [phone]       [address] [address]        Thank you!     Thank you for choosing [facility] MEDICAL WEIGHT MANAGEMENT  for your care. Our goal is always to provide you with excellent care. Hearing back from our patients is one way we can continue to improve our services. Please take a few minutes to complete the written survey that you may receive in the mail after your visit with us. Thank you!             Your Updated Medication List - Protect others around you: Learn how to safely use, store and throw away your medicines at " www.disposemymeds.org.          This list is accurate as of: 5/15/17 11:28 AM.  Always use your most recent med list.                   Brand Name Dispense Instructions for use    Biotin 5000 MCG Tabs          CALCIUM + D PO      Take by mouth daily       cetirizine 10 MG tablet    zyrTEC     Take 10 mg by mouth daily as needed for allergies       CVS B-12 5000 MCG Subl   Generic drug:  Cyanocobalamin          hydrochlorothiazide 25 MG tablet    HYDRODIURIL    90 tablet    Take 1 tablet (25 mg) by mouth daily       MELATONIN PO      Take 3 mg by mouth       metroNIDAZOLE 0.75 % vaginal gel    METROGEL    70 g    Use twice weekly to vagina for recurrent BV symptoms.  Do this for 3 months after finishing oral medications       metroNIDAZOLE 500 MG tablet    FLAGYL    14 tablet    Take 1 tablet (500 mg) by mouth 2 times daily for 7 days       MULTIVITAMINS PO      Take by mouth daily       Phentermine-Topiramate 7.5-46 MG Cp24     30 capsule    Take 1 capsule by mouth daily       polyethylene glycol powder    MIRALAX    510 g    Take 17 g (1 capful) by mouth daily as needed for constipation       TYLENOL PM EXTRA STRENGTH PO

## 2017-05-15 NOTE — LETTER
"5/15/2017       RE: Ashley Catherine  [address]  [facility] [address]     Dear Colleague,    Thank you for referring your patient, Ashley Catherine, to the [facility] MEDICAL WEIGHT MANAGEMENT at [facility]. Please see a copy of my visit note below.        New Medical Weight Management Consult    PATIENT:  Ashley Catherine  MRN:         [id number]  :         1980  RAKEL:         5/15/2017    Dear Violet Humphreys MD,    I had the pleasure of seeing your patient, Ashley Catherine.  Full intake/assessment done to determine barriers to weight loss success and develop a treatment plan.  Ashley Catherine is a 37 year old female interested in treatment of medical problems associated with weight.  Her weight today is 222 lbs 8 oz, Body mass index is 32.39 kg/(m^2)., and she has the following co-morbidities:     5/15/2017   I have the following co-morbidities associated with obesity: High Blood Pressure, GERD (Reflux)       Patient Goals Reviewed With Patient 5/15/2017   I am interested in attaining a healthier weight to diminish current health problems related to co-morbid conditions: Yes   I am interested in attaining a healthier weight in order to prevent future health problems: Yes       Referring Provider 5/15/2017   Please name the provider who referred you to Medical Weight Management.  If you do not know, please answer: \"I Don't Know\". None       Wt Readings from Last 4 Encounters:   05/15/17 100.9 kg (222 lb 8 oz)   17 100.1 kg (220 lb 9.6 oz)   17 100.9 kg (222 lb 8 oz)   17 99.8 kg (220 lb)       Weight History Reviewed With Patient 5/15/2017   How concerned are you about your weight? Very Concerned   Would you describe your weight gain as gradual? Yes   I became overweight: As a Teenager   The following factors have contributed to my weight gain:  A Health Crisis/Stress   I have tried the following methods to lose weight: Watching Portions or Calories, " Exercise, Medications, Weight Loss Surgery   I have the following family history of obesity/being overweight:  My mother is overwieght, One or more of my siblings are overweight, Many of my relatives are overweight   Has anyone in your family had weight loss surgery? Yes       Diet Recall Reviewed With Patient 5/15/2017   How many glasses of juice do you drink in a typical day? 1   How many of glasses of milk do you drink in a typical day? 0   How many 8oz glasses of sugar containing drinks such as Ankush-Aid/sweet tea do you drink in a day? 0   How many cans/bottles of sugar pop/soda/tea/sports drinks do you drink in a day? 2   How many cans/bottles of diet pop/soda/tea or sports drink do you drink in a day? 0   How often do you have a drink of alcohol? 2-4 Times a Month   If you do drink, how many drinks might you have in a day? 1 or 2       Eating Habits Reviewed With Patient 5/15/2017   Generally, my meals include foods like these: bread, pasta, rice, potatoes, corn, crackers, sweet dessert, pop, or juice. A Few Times a Week   Generally, my meals include foods like these: fried meats, brats, burgers, french fries, pizza, cheese, chips, or ice cream. Half of the Week   Eat fast food (like [employer], [employer], [employer]aco [employer]). A Few Times a Week   Eat at a buffet or sit-down restaurant. A Few Times a Week   Eat most of my meals in front of the TV or computer. A Few Times a Week   Often skip meals, eat at random times, have no regular eating times. Half of the Week   Rarely sit down for a meal but snack or graze throughout.  A Few Times a Week   Eat extra snacks between meals. A Few Times a Week   Eat most of my food at the end of the day. Never   Eat in the middle of the night or wake up at night to eat. Never   Eat extra snacks to prevent or correct low blood sugar. Never   Eat to prevent acid reflux or stomach pain. Never   Worry about not having enough food to eat. Never   Have you been to the food shelf at least a  few times this year? No   I eat when I am depressed, stressed, anxious, or bored. A Few Times a Week   I eat when I am happy or as a reward. Never   I feel hungry all the time even if I just have eaten. A Few Times a Week   Feeling full is important to me. Never   Once I start eating, it is hard to stop. Never   I finish all the food on my plate even if I am already full. Never   I can't resist eating delicious food or walk past the good food/smell. Never   I eat/snack without noticing that I am eating. Never   I eat when I am preparing the meal. Never   I eat more than usual when I see others eating. Never   I have trouble not eating sweets, ice cream, cookies, or chips if they are around the house. A Few Times a Week   I think about food all day. Never   What foods, if any, do you crave? Sweets/Candy/Chocolate   Please list any other foods you crave? Ice cream, chocolate   I feel out of control when eating. Weekly   I eat a large amount of food, like a loaf of bread, a box of cookies, a pint/quart of ice cream, all at once. Never   I eat a large amount of food even when I am not hungry. Never   I eat rapidly. Almost Everyday   I eat alone because I feel embarrassed and do not want others to see how much I have eaten. Never   I eat until I am uncomfortably full. Monthly   I feel bad, disgusted, or guilty after I overeat. Monthly   I make myself vomit what I have eaten or use laxatives to get rid of food. Never       Activity/Exercise History Reviewed With Patient 5/15/2017   How much of a typical 12 hour day do you spend sitting? Less Than Half the Day   How much of a typical 12 hour day do you spend lying down? Less Than Half the Day   How much of a typical day do you spend walking/standing? Most of the Day   How many hours (not including work) do you spend on the TV/Video Games/Computer/Tablet/Phone? 2-3 Hours   How many times a week are you active for the purpose of exercise? 2-3 Times a Week   How many total  minutes do you spend doing some activity for the purpose of exercising when you exercise? More Than 30 Minutes   What keeps you from being more active? 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14    hospitalized at [facility] x 1 week     History of steroid therapy      HX ABNL PAP SMEAR OF CERVIX   1995    LEEP AT 15 yo     Hypertension      Hypertrophy of breast      Hypertrophy of tonsils alone      Irregular heart beat     palpitations     LBP (low back pain)      LSIL (low grade squamous intraepithelial lesion) on Pap smear 5/2006     Lumbago     RESOLVED     Major depressive disorder, recurrent episode, in partial or unspecified remission 4/1/2013     Morbid obesity (H)     bariatric surgery 5/13/2013     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 Less Than 50%   What is your marital status? Single   If in a relationship, is your significant other overweight? N/A   Do you have children? No   If you have children, are they overweight? N/A       Mental Health History Reviewed With Patient 5/15/2017   Have you ever been physically or sexually abused? No   How often in the past 2 weeks have you felt little interest or pleasure in doing things? For Several Days   Over the past 2 weeks how often have you felt down, depressed, or hopeless? Not at all       Sleep History Reviewed With Patient 5/15/2017   How many hours do you sleep at night? 7   Do you think that you snore loudly or has  "anybody ever heard you snore loudly (louder than talking or so loud it can be heard behind a shut door)? No   Has anyone seen or heard you stop breathing during your sleep? No   Do you often feel tired, fatigued, or sleepy during the day? No       MEDICATIONS:   Current Outpatient Prescriptions   Medication Sig Dispense Refill     metroNIDAZOLE (FLAGYL) 500 MG tablet Take 1 tablet (500 mg) by mouth 2 times daily for 7 days 14 tablet 0     metroNIDAZOLE (METROGEL) 0.75 % vaginal gel Use twice weekly to vagina for recurrent BV symptoms.  Do this for 3 months after finishing oral medications 70 g 3     MELATONIN PO Take 3 mg by mouth       Diphenhydramine-APAP, sleep, (TYLENOL PM EXTRA STRENGTH PO)        hydrochlorothiazide (HYDRODIURIL) 25 MG tablet Take 1 tablet (25 mg) by mouth daily 90 tablet 3     Biotin 5000 MCG TABS        Cyanocobalamin ([employer] B-12) 5000 MCG SUBL        polyethylene glycol (MIRALAX) powder Take 17 g (1 capful) by mouth daily as needed for constipation 510 g 1     cetirizine (ZYRTEC) 10 MG tablet Take 10 mg by mouth daily as needed for allergies       Calcium Carbonate-Vitamin D (CALCIUM + D PO) Take by mouth daily       Multiple Vitamin (MULTIVITAMINS PO) Take by mouth daily         ALLERGIES:   Allergies   Allergen Reactions     Nkda [No Known Drug Allergies]      No Known Allergies        PHYSICAL EXAM:  /88 (BP Location: Left arm, Patient Position: Chair, Cuff Size: Adult Large)  Pulse 93  Temp 98.7  F (37.1  C)  Ht 1.765 m (5' 9.5\")  Wt 100.9 kg (222 lb 8 oz)  LMP 04/21/2017 (Approximate)  SpO2 94%  BMI 32.39 kg/m2   A & O x 3  HEENT: NCAT, mucous membranes moist  Respirations unlabored  Location of obesity: Mixed Obesity    ASSESSMENT:  Ashley is a patient with mature onset obesity with significant element of familial/genetic influence and with current health consequences. She does need aggressive weight loss plan due to High Blood Pressure, GERD (Reflux). Was seen here twice " in 2015 with weight loss from 221 to 194 on Qsymia 7/46, had side effects at higher dose.    Ashley Catherine eats a high carb diet, eats a high fat diet, uses food as mood management and has a disorganized meal pattern.    Her problem is complicated by strong craving/reward pathways    Her ability to lose weight is impacted by lack of confidence.    PLAN:    Volumetrics eating plan  Meal planning    Craving/Reward   Ancillary testing:  N/A.  Food Plan:  Volumetrics and High protein/low carbohydrate.   Activity Plan:  Activity journal.  Supplementary:  N/A.   Medication:  The patient will begin medication in pursuit of improved medical status as influenced by body weight. She will start Qsymia.  There is a mutual understanding of the goals and risks of this therapy. The patient is in agreement. She is educated on dosage regimen and possible side effects.    RTC:    12 weeks.  30/45 minutes spent on counseling and education    Sincerely,    Santosh De Los Santos MD

## 2017-05-15 NOTE — NURSING NOTE
"Chief Complaint   Patient presents with     Weight Problem     NMWM Has Not Been Seen Over A Year       Vitals:    05/15/17 1110   BP: 143/88   BP Location: Left arm   Patient Position: Chair   Cuff Size: Adult Large   Pulse: 93   Temp: 98.7  F (37.1  C)   SpO2: 94%   Weight: 222 lb 8 oz   Height: 5' 9.5\"       Body mass index is 32.39 kg/(m^2).      Saniya Singh                          "

## 2017-05-15 NOTE — PATIENT INSTRUCTIONS
Follow up with Dr. De Los Santos in 3 months    Belen Mancia, RN care coordinator [phone]      MEDICATION STARTED AT THIS APPOINTMENT    We are starting Qsymia. This is a specific obesity medication and is a combination of Phentermine and Topiramate, formulated as a sustained release, taken one time a day. There are a few different doses of the medication. Doses come in 3.75/23 mg (usually skipped), 7.5/46 mg, 11.25/69 mg, and 15/92 mg. Call the nurse at [phone] if you have any questions or concerns. (Do not stop taking it if you don't think it's working. For some people it works even though they do not feel much different.)    For some of our patients, the pills work right away. They feel and think quite differently about food. Other patients don't feel much of a change but find in fact they have lost weight! Like all weight loss medications, Qsymia works best when you help it work.  This means:    1) Have less tempting high calorie (fattening) food around the house or office    2) Have lower calorie food (fruits, vegetables,low fat meats and dairy) for snacks    3) Eat out only one time or less each week.   4) Eat your meals at a table with the TV or computer off.    Side-effects (generally well tolerated because it is a sustained release medication)    Topiramate:   -Tingling in hands,feet, or face (usually not very troublesome)   -Mental confusion and word finding trouble (about 10% of patients have this.)     -Feeling sleepy or a bit dopey- this goes away very soon after starting.      Phentermine:    -Feelings of racing pulse or rapid heart beat.    -Increased anxiety.   -Some people can get an elevated blood pressure. Because of this we may have  you  come back within a week or so of starting the medication for a blood pressure check.    One of the dangers of topiramate is the possibility of birth defects--if you get pregnant when you are on it, there is the risk that your baby will be born with a  cleft lip or palate.  If you are on topiramate and of child bearing age, you need to be on a reliable form of birth control or refrain from sexual intercourse.     It is very rare for insurance to pay for this and it typically costs around $200 per month. Please check out the website www.qsymia.com and sign up for the Pharmacy savings program to save $75 a month for 12 months if eligible. The medication can also be paid for out of pocket. It may require a prior authorization which could take up to 1-2 weeks.      In order to get refills of this or any medication we prescribe you must be seen in the medical weight mgmt clinic every 2-4 months. Please have your pharmacy fax a refill request to [phone].

## 2017-05-15 NOTE — PROGRESS NOTES
"    New Medical Weight Management Consult    PATIENT:  Ashley Catherine  MRN:         [id number]  :         1980  RAKEL:         5/15/2017    Dear Violet Humphreys MD,    I had the pleasure of seeing your patient, Ashley Catherine.  Full intake/assessment done to determine barriers to weight loss success and develop a treatment plan.  Ashley Catherine is a 37 year old female interested in treatment of medical problems associated with weight.  Her weight today is 222 lbs 8 oz, Body mass index is 32.39 kg/(m^2)., and she has the following co-morbidities:     5/15/2017   I have the following co-morbidities associated with obesity: High Blood Pressure, GERD (Reflux)       Patient Goals Reviewed With Patient 5/15/2017   I am interested in attaining a healthier weight to diminish current health problems related to co-morbid conditions: Yes   I am interested in attaining a healthier weight in order to prevent future health problems: Yes       Referring Provider 5/15/2017   Please name the provider who referred you to Medical Weight Management.  If you do not know, please answer: \"I Don't Know\". None       Wt Readings from Last 4 Encounters:   05/15/17 100.9 kg (222 lb 8 oz)   17 100.1 kg (220 lb 9.6 oz)   17 100.9 kg (222 lb 8 oz)   17 99.8 kg (220 lb)       Weight History Reviewed With Patient 5/15/2017   How concerned are you about your weight? Very Concerned   Would you describe your weight gain as gradual? Yes   I became overweight: As a Teenager   The following factors have contributed to my weight gain:  A Health Crisis/Stress   I have tried the following methods to lose weight: Watching Portions or Calories, Exercise, Medications, Weight Loss Surgery   I have the following family history of obesity/being overweight:  My mother is overwieght, One or more of my siblings are overweight, Many of my relatives are overweight   Has anyone in your family had weight loss surgery? Yes       Diet Recall Reviewed " With Patient 5/15/2017   How many glasses of juice do you drink in a typical day? 1   How many of glasses of milk do you drink in a typical day? 0   How many 8oz glasses of sugar containing drinks such as Ankush-Aid/sweet tea do you drink in a day? 0   How many cans/bottles of sugar pop/soda/tea/sports drinks do you drink in a day? 2   How many cans/bottles of diet pop/soda/tea or sports drink do you drink in a day? 0   How often do you have a drink of alcohol? 2-4 Times a Month   If you do drink, how many drinks might you have in a day? 1 or 2       Eating Habits Reviewed With Patient 5/15/2017   Generally, my meals include foods like these: bread, pasta, rice, potatoes, corn, crackers, sweet dessert, pop, or juice. A Few Times a Week   Generally, my meals include foods like these: fried meats, brats, burgers, french fries, pizza, cheese, chips, or ice cream. Half of the Week   Eat fast food (like [employer], [employer], [employer]). A Few Times a Week   Eat at a buffet or sit-down restaurant. A Few Times a Week   Eat most of my meals in front of the TV or computer. A Few Times a Week   Often skip meals, eat at random times, have no regular eating times. Half of the Week   Rarely sit down for a meal but snack or graze throughout.  A Few Times a Week   Eat extra snacks between meals. A Few Times a Week   Eat most of my food at the end of the day. Never   Eat in the middle of the night or wake up at night to eat. Never   Eat extra snacks to prevent or correct low blood sugar. Never   Eat to prevent acid reflux or stomach pain. Never   Worry about not having enough food to eat. Never   Have you been to the food shelf at least a few times this year? No   I eat when I am depressed, stressed, anxious, or bored. A Few Times a Week   I eat when I am happy or as a reward. Never   I feel hungry all the time even if I just have eaten. A Few Times a Week   Feeling full is important to me. Never   Once I start eating, it is hard  to stop. Never   I finish all the food on my plate even if I am already full. Never   I can't resist eating delicious food or walk past the good food/smell. Never   I eat/snack without noticing that I am eating. Never   I eat when I am preparing the meal. Never   I eat more than usual when I see others eating. Never   I have trouble not eating sweets, ice cream, cookies, or chips if they are around the house. A Few Times a Week   I think about food all day. Never   What foods, if any, do you crave? Sweets/Candy/Chocolate   Please list any other foods you crave? Ice cream, chocolate   I feel out of control when eating. Weekly   I eat a large amount of food, like a loaf of bread, a box of cookies, a pint/quart of ice cream, all at once. Never   I eat a large amount of food even when I am not hungry. Never   I eat rapidly. Almost Everyday   I eat alone because I feel embarrassed and do not want others to see how much I have eaten. Never   I eat until I am uncomfortably full. Monthly   I feel bad, disgusted, or guilty after I overeat. Monthly   I make myself vomit what I have eaten or use laxatives to get rid of food. Never       Activity/Exercise History Reviewed With Patient 5/15/2017   How much of a typical 12 hour day do you spend sitting? Less Than Half the Day   How much of a typical 12 hour day do you spend lying down? Less Than Half the Day   How much of a typical day do you spend walking/standing? Most of the Day   How many hours (not including work) do you spend on the TV/Video Games/Computer/Tablet/Phone? 2-3 Hours   How many times a week are you active for the purpose of exercise? 2-3 Times a Week   How many total minutes do you spend doing some activity for the purpose of exercising when you exercise? More Than 30 Minutes   What keeps you from being more active? Lack of Time, Too tired, Worried People Will Look At Me       PAST MEDICAL HISTORY:  Past Medical History:   Diagnosis Date     Depression       Esophageal reflux      Family history of hyperparathyroidism 3/6/2015     Forearm fracture childhood    jumping fall     Guillain-Kemp disease (H) age 14    hospitalized at [facility] x 1 week     History of steroid therapy      HX ABNL PAP SMEAR OF CERVIX   1995    LEEP AT 15 yo     Hypertension      Hypertrophy of breast      Hypertrophy of tonsils alone      Irregular heart beat     palpitations     LBP (low back pain)      LSIL (low grade squamous intraepithelial lesion) on Pap smear 5/2006     Lumbago     RESOLVED     Major depressive disorder, recurrent episode, in partial or unspecified remission 4/1/2013     Morbid obesity (H)     bariatric surgery 5/13/2013     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 Less Than 50%   What is your marital status? Single   If in a relationship, is your significant other overweight? N/A   Do you have children? No   If you have children, are they overweight? N/A       Mental Health History Reviewed With Patient 5/15/2017   Have you ever been physically or sexually abused? No   How often in the past 2 weeks have you felt little interest or pleasure in doing things? For Several Days   Over the past 2 weeks how often have you felt down, depressed, or hopeless? Not at all       Sleep History Reviewed With Patient 5/15/2017   How many hours do you sleep at night? 7   Do you think that you snore loudly or has anybody ever heard you snore loudly (louder than talking or so loud it can be heard behind a shut door)? No   Has anyone seen or heard you stop breathing during your sleep? No   Do you often feel tired, fatigued, or sleepy during the day? No       MEDICATIONS:   Current Outpatient Prescriptions  "  Medication Sig Dispense Refill     metroNIDAZOLE (FLAGYL) 500 MG tablet Take 1 tablet (500 mg) by mouth 2 times daily for 7 days 14 tablet 0     metroNIDAZOLE (METROGEL) 0.75 % vaginal gel Use twice weekly to vagina for recurrent BV symptoms.  Do this for 3 months after finishing oral medications 70 g 3     MELATONIN PO Take 3 mg by mouth       Diphenhydramine-APAP, sleep, (TYLENOL PM EXTRA STRENGTH PO)        hydrochlorothiazide (HYDRODIURIL) 25 MG tablet Take 1 tablet (25 mg) by mouth daily 90 tablet 3     Biotin 5000 MCG TABS        Cyanocobalamin (CVS B-12) 5000 MCG SUBL        polyethylene glycol (MIRALAX) powder Take 17 g (1 capful) by mouth daily as needed for constipation 510 g 1     cetirizine (ZYRTEC) 10 MG tablet Take 10 mg by mouth daily as needed for allergies       Calcium Carbonate-Vitamin D (CALCIUM + D PO) Take by mouth daily       Multiple Vitamin (MULTIVITAMINS PO) Take by mouth daily         ALLERGIES:   Allergies   Allergen Reactions     Nkda [No Known Drug Allergies]      No Known Allergies        PHYSICAL EXAM:  /88 (BP Location: Left arm, Patient Position: Chair, Cuff Size: Adult Large)  Pulse 93  Temp 98.7  F (37.1  C)  Ht 1.765 m (5' 9.5\")  Wt 100.9 kg (222 lb 8 oz)  LMP 04/21/2017 (Approximate)  SpO2 94%  BMI 32.39 kg/m2   A & O x 3  HEENT: NCAT, mucous membranes moist  Respirations unlabored  Location of obesity: Mixed Obesity    ASSESSMENT:  Ashley is a patient with mature onset obesity with significant element of familial/genetic influence and with current health consequences. She does need aggressive weight loss plan due to High Blood Pressure, GERD (Reflux). Was seen here twice in 2015 with weight loss from 221 to 194 on Qsymia 7/46, had side effects at higher dose.    Ashley Catherine eats a high carb diet, eats a high fat diet, uses food as mood management and has a disorganized meal pattern.    Her problem is complicated by strong craving/reward pathways    Her ability to " lose weight is impacted by lack of confidence.    PLAN:    Volumetrics eating plan  Meal planning    Craving/Reward   Ancillary testing:  N/A.  Food Plan:  Volumetrics and High protein/low carbohydrate.   Activity Plan:  Activity journal.  Supplementary:  N/A.   Medication:  The patient will begin medication in pursuit of improved medical status as influenced by body weight. She will start Qsymia.  There is a mutual understanding of the goals and risks of this therapy. The patient is in agreement. She is educated on dosage regimen and possible side effects.    RTC:    12 weeks.  30/45 minutes spent on counseling and education    Sincerely,    Santosh De Los Santos MD

## 2017-05-17 LAB
ANNOTATION COMMENT IMP: NORMAL
DEPRECATED CALCIDIOL+CALCIFEROL SERPL-MC: 47 UG/L (ref 20–75)
RETINYL PALMITATE SERPL-MCNC: 0.03 UG/ML
VIT A SERPL-MCNC: 0.54 UG/ML
VIT B1 BLD-MCNC: 121 UG/DL

## 2017-05-31 ENCOUNTER — CARE COORDINATION (OUTPATIENT)
Dept: SURGERY | Facility: CLINIC | Age: 37
End: 2017-05-31

## 2017-05-31 NOTE — PROGRESS NOTES
Left a message on patients phone in regards to needing to have questionnaire completed before appt. Also informed her of the dieitican visit. I also gave her the number in case she needs to reschedule.

## 2017-06-01 ENCOUNTER — ALLIED HEALTH/NURSE VISIT (OUTPATIENT)
Dept: SURGERY | Facility: CLINIC | Age: 37
End: 2017-06-01

## 2017-06-01 ENCOUNTER — OFFICE VISIT (OUTPATIENT)
Dept: SURGERY | Facility: CLINIC | Age: 37
End: 2017-06-01

## 2017-06-01 VITALS
OXYGEN SATURATION: 98 % | SYSTOLIC BLOOD PRESSURE: 133 MMHG | HEART RATE: 90 BPM | DIASTOLIC BLOOD PRESSURE: 91 MMHG | HEIGHT: 70 IN | WEIGHT: 215.7 LBS | TEMPERATURE: 98.1 F | BODY MASS INDEX: 30.88 KG/M2

## 2017-06-01 DIAGNOSIS — Z98.84 BARIATRIC SURGERY STATUS: Primary | ICD-10-CM

## 2017-06-01 RX ORDER — MULTIVIT-MIN/IRON/FOLIC ACID/K 18-600-40
CAPSULE ORAL
Status: ON HOLD | COMMUNITY
End: 2019-06-25

## 2017-06-01 RX ORDER — CYCLOBENZAPRINE HCL 10 MG
TABLET ORAL
COMMUNITY
Start: 2015-06-23 | End: 2017-09-25

## 2017-06-01 NOTE — NURSING NOTE
"(   Chief Complaint   Patient presents with     RECHECK     Yearly follow upL 5/28/13    )    ( Weight: 215 lb 11.2 oz )  ( Height: 5' 9.5\" )  ( BMI (Calculated): 31.46 )  ( Initial Weight: 274 lb 12.8 oz )  ( Cumulative weight loss (lbs): 59.1 )  ( Last Visits Weight: 203 lb 4.8 oz )  ( Wt change since last visit (lbs): 12.4 )  (   )  (   )    ( BP: (!) 133/91 )  ( Site: Arm, upper left )  ( Temp: 98.1  F (36.7  C) )  ( Temp src: Oral )  ( Pulse: 90 )  (   )  ( SpO2: 98 % )    (   Patient Active Problem List   Diagnosis     OLIGOMENORRHEA, C/W PCOS     Sleep apnea     HX ABNL PAP SMEAR OF CERVIX       CARDIOVASCULAR SCREENING; LDL GOAL LESS THAN 160     Hypertension goal BP (blood pressure) < 140/90     Flat feet     GERD (gastroesophageal reflux disease)     Sciatica     Obesity     S/P gastrectomy     Occupational problem     Elevated parathyroid hormone     Multiple thyroid nodules     Abnormal thyroid cytology-follicular neoplasm     Chronic pain syndrome     Major depressive disorder, recurrent episode, in full remission (H)     Bilateral low back pain with sciatica, sciatica laterality unspecified     Throat symptom     Psychological factor affecting physical condition     Major depressive disorder, recurrent episode, mild (H)    )  (   Current Outpatient Prescriptions   Medication Sig Dispense Refill     cyclobenzaprine (FLEXERIL) 10 MG tablet Indications: PN:   RESHMA MAYES Apr 5, 2016  5:34 PM Received from: External Pharmacy       Cholecalciferol (VITAMIN D) 2000 UNITS CAPS        Phentermine-Topiramate 7.5-46 MG CP24 Take 1 capsule by mouth daily 30 capsule 5     Diphenhydramine-APAP, sleep, (TYLENOL PM EXTRA STRENGTH PO)        hydrochlorothiazide (HYDRODIURIL) 25 MG tablet Take 1 tablet (25 mg) by mouth daily 90 tablet 3     Cyanocobalamin (CVS B-12) 5000 MCG SUBL        polyethylene glycol (MIRALAX) powder Take 17 g (1 capful) by mouth daily as needed for constipation 510 g 1     cetirizine " (ZYRTEC) 10 MG tablet Take 10 mg by mouth daily as needed for allergies       Calcium Carbonate-Vitamin D (CALCIUM + D PO) Take by mouth daily       Multiple Vitamin (MULTIVITAMINS PO) Take by mouth daily       metroNIDAZOLE (METROGEL) 0.75 % vaginal gel Use twice weekly to vagina for recurrent BV symptoms.  Do this for 3 months after finishing oral medications (Patient not taking: Reported on 6/1/2017) 70 g 3     MELATONIN PO Take 3 mg by mouth      )  ( Diabetes Eval:    )    ( Pain Eval:  No Pain (0) )    ( Wound Eval:       )    (   History   Smoking Status     Never Smoker   Smokeless Tobacco     Never Used    )    ( Signed By:  Liya Curran; June 1, 2017; 12:54 PM )

## 2017-06-01 NOTE — MR AVS SNAPSHOT
After Visit Summary   6/1/2017    Ashley Catherine    MRN: [id number]           Patient Information     Date Of Birth          1980        Visit Information        Provider Department      6/1/2017 12:30 PM Elizabeth Park PA-C [facility]        Today's Diagnoses     Bariatric surgery status    -  1       Follow-ups after your visit        Your next 10 appointments already scheduled     Jun 01, 2017  1:30 PM CDT   NUTRITION VISIT with Cheryle Ricketts   [facility] ([facility])    [address] [place] [id number]   [phone]            Jun 21, 2017 11:00 AM CDT   (Arrive by 10:45 AM)   Return Visit with Jayro Elias, PhD [facility] ([facility])    [address] [place] [id number]   [phone]            Aug 21, 2017 11:30 AM CDT   (Arrive by 11:15 AM)   Return Visit with Santosh De Los Santos MD   [facility] ([facility])    [address] [place] [id number] [phone]              Who to contact     Please call your clinic at [phone] to:    Ask questions about your health    Make or cancel appointments    Discuss your medicines    Learn about your test results    Speak to your doctor   If you have compliments or concerns about an experience at your clinic, or if you wish to file a complaint, please contact [facility] Patient Relations at [phone] or email us at [email or web address]         Additional Information About Your Visit        MyChart Information     [employer] gives you secure access to your electronic health record. If you see a primary care provider, you can also send messages to your care team and make appointments. If you have questions, please call your primary care clinic.  If  "you do not have a primary care provider, please call [phone] and they will assist you.      [employer] is an electronic gateway that provides easy, online access to your medical records. With [employer], you can request a clinic appointment, read your test results, renew a prescription or communicate with your care team.     To access your existing account, please contact your [facility] or call [phone] for assistance.        Care EveryWhere ID     This is your Care EveryWhere ID. This could be used by other organizations to access your [facility] medical records  [id number]        Your Vitals Were     Pulse Temperature Height Last Period Pulse Oximetry BMI (Body Mass Index)    90 98.1  F (36.7  C) (Oral) 5' 9.5\" 04/21/2017 (Approximate) 98% 31.4 kg/m2       Blood Pressure from Last 3 Encounters:   06/01/17 (!) 133/91   05/15/17 143/88   05/11/17 132/78    Weight from Last 3 Encounters:   06/01/17 215 lb 11.2 oz   05/15/17 222 lb 8 oz   05/11/17 220 lb 9.6 oz              Today, you had the following     No orders found for display         Today's Medication Changes          These changes are accurate as of: 6/1/17  1:12 PM.  If you have any questions, ask your nurse or doctor.               Stop taking these medicines if you haven't already. Please contact your care team if you have questions.     Biotin 5000 MCG Tabs   Stopped by:  Elizabeth Park PA-C                    Primary Care Provider Office Phone # Fax #    [place] Humphreys [phone]       [facility] [address] [address]        Thank you!     Thank you for choosing [facility] SURGICAL WE[facility] MANAGEMENT  for your care. Our goal is always to provide you with excellent care. Hearing back from our patients is one way we can continue to improve our services. Please take a few minutes to complete the written survey that you may receive in the mail after your " visit with us. Thank you!             Your Updated Medication List - Protect others around you: Learn how to safely use, store and throw away your medicines at www.disposemymeds.org.          This list is accurate as of: 6/1/17  1:12 PM.  Always use your most recent med list.                   Brand Name Dispense Instructions for use    CALCIUM + D PO      Take by mouth daily       cetirizine 10 MG tablet    zyrTEC     Take 10 mg by mouth daily as needed for allergies       CVS B-12 5000 MCG Subl   Generic drug:  Cyanocobalamin          cyclobenzaprine 10 MG tablet    FLEXERIL     Indications: PN:   RESHMA MAYES Apr 5, 2016  5:34 PM Received from: External Pharmacy       hydrochlorothiazide 25 MG tablet    HYDRODIURIL    90 tablet    Take 1 tablet (25 mg) by mouth daily       MELATONIN PO      Take 3 mg by mouth       metroNIDAZOLE 0.75 % vaginal gel    METROGEL    70 g    Use twice weekly to vagina for recurrent BV symptoms.  Do this for 3 months after finishing oral medications       MULTIVITAMINS PO      Take by mouth daily       Phentermine-Topiramate 7.5-46 MG Cp24     30 capsule    Take 1 capsule by mouth daily       polyethylene glycol powder    MIRALAX    510 g    Take 17 g (1 capful) by mouth daily as needed for constipation       TYLENOL PM EXTRA STRENGTH PO          vitamin D 2000 UNITS Caps

## 2017-06-01 NOTE — PROGRESS NOTES
Nutrition Assessment  Reason For Visit:  Ashley Catherine is a 34 year-old female presenting today for nutrition follow-up, 4 years s/p SG. Referred by JAKE Palacios    Anthropometrics: Pre-op Weight: 274.8 lbs  Current Weight: 215.7 lbs  Weight loss: 59.1lbs    Pt reports her lowest weight after surgery was 187#'s.     Nutrition History:  Pt follows bariatric regular diet and reports eating ~ 1 cup of food per meal   B: (if not working) skinny latte (~200kcals), greek yogurt and 3 pieces of myles/ 1 sausage rico  L: salad w/ veggies cheese chicken and salad dressing or soup  Snack: crackers with peanut butter or jayson crackers  D: eat out or on the go foods (hot foods from hot bar at [facility])  Beverages:latte, 2-4 drinks 1 night per week (wine, vodka)     Exercise: has fitbit 10,000 steps, gym 1x/week stairs or treadmill  Nutrition Prescription:  Grams Protein: 50-60 (minimum)  Amount of Fluid: 48-64 oz     Nutrition Diagnosis  Food and nutrition related knowledge deficit r/t needing bariatric regular diet reinforcement aeb pt unable to verbalize understanding of bariatric regular diet.     Intervention  Intervention At Appointment:  Materials/Education provided on mindful eating. Discussed incorporating more non-starchy vegetables in diet and doing modified my plate. Utilize non-starchy vegetables and small fruit for snacks. Encouraged low calorie/no calorie beverages. Provided pt with list of goals and RD contact information.      Goals:   2) Pt to consume 50-60 gm protein/day.  3) Pt to consume 48-64 oz fluids/day- between meals only.  4) Eat slowly, chew foods well.  5) Follow modified my plate method for weight loss      Follow-Up:  PRN     Time spent with patient: 15 minutes.     Cheryle Ricketts RD, LD

## 2017-06-01 NOTE — PROGRESS NOTES
Return Bariatric Surgery Note    RE: Ashley Catherine  MR#: [id number]  : 1980  VISIT DATE: 2017    Dear Juliann, Violet Agrawal,    I had the pleasure of seeing your patient, Ashley Catherine, in my post-bariatric surgery assessment clinic.    CHIEF COMPLAINT: Post-bariatric surgery follow-up    HISTORY OF PRESENT ILLNESS:  Questions Regarding Prior Weight Loss Surgery Reviewed With Patient 2017   I had the following weight loss procedure: Sleeve Gastrectomy   What year was your surgery? 2013   How has your weight changed since your last visit? I have gained weight   Are you currently taking any weight loss medications? Yes   Do you currently have any of the following: Heartburn, acid reflux, or GERD (acid reflux disease)?   Have you been to the Emergency room since your last visit with us? Yes   Were you in the hospital since your last visit with us? No   Do you have any concerns today? Acid reflux   GERD has been worse recently    Weight History:     2017   What is your highest lifetime weight? 284   What is your lowest weight since surgery? (In pounds) 184     Wt Readings from Last 4 Encounters:   17 215 lb 11.2 oz   05/15/17 222 lb 8 oz   17 220 lb 9.6 oz   17 222 lb 8 oz     Initial Weight: 274 lb 12.8 oz  Current Weight: Weight: 215 lb 11.2 oz  Cumulative weight loss (lbs): 59.1  Last Visits Weight: 203 lb 4.8 oz    Took Qsymia in the past and lost 20 lbs.  She was having side effects (numbness, depression) and so she discontinued it.  Recently saw Dr De Los Santos again and restarted Qsymia. Has lost 7 lbs since seen last.    Questions Regarding Co-Morbidities and Health Concerns Reviewed With Patient 2017   Pre-diabetes: Never   Diabetes II: Never   High Blood Pressure: Improved   High cholesterol: Never   Heartburn/Reflux: Never   Are you taking daily medication for heartburn, acid reflux, or GERD (acid reflux disease)? No   Sleep apnea: Gone away   PCOS: Never   Back pain:  Improved   Joint pain: Never   Lower leg swelling: Never       Eating Habits 6/1/2017   How many meals do you eat per day? 3   Do you snack between meals? Sometimes   How much food are you eating at each meal? 1/2 cup to 1 cup   Are you able to separate your meals and liquids by at least 30 minutes? Yes   Are you able to avoid liquid calories? Sometimes       Exercise Questions Reviewed With Patient 6/1/2017   How often do you exercise? 1 to 2 times per week   What is the duration of your exercise (in minutes)? 60+ Minutes   What types of exercise do you do? walking, gym membership, swimming, group fitness classes, weightlifting   What keeps you from being more active?  Lack of Time, Too tired       Social History:      6/1/2017   Are you smoking? No   Are you drinking alcohol? Yes   How much alcohol? 2-4       Medications:  Current Outpatient Prescriptions   Medication     cyclobenzaprine (FLEXERIL) 10 MG tablet     Cholecalciferol (VITAMIN D) 2000 UNITS CAPS     Phentermine-Topiramate 7.5-46 MG CP24     Diphenhydramine-APAP, sleep, (TYLENOL PM EXTRA STRENGTH PO)     hydrochlorothiazide (HYDRODIURIL) 25 MG tablet     Cyanocobalamin (CVS B-12) 5000 MCG SUBL     polyethylene glycol (MIRALAX) powder     cetirizine (ZYRTEC) 10 MG tablet     Calcium Carbonate-Vitamin D (CALCIUM + D PO)     Multiple Vitamin (MULTIVITAMINS PO)     metroNIDAZOLE (METROGEL) 0.75 % vaginal gel     MELATONIN PO     No current facility-administered medications for this visit.          6/1/2017   Do you avoid NSAIDs such as (Ibuprofen, Aleve, Naproxen, Advil)?   No       ROS:  GI:      6/1/2017   Vomiting: Yes   Diarrhea: No   Constipation: Yes   Swallowing trouble: No   Abdominal pain: No   Heartburn: No   Rash in skin folds: No   Depression: Yes   Stress urinary incontinence No     Skin:   BAR RBS ROS - SKIN 6/1/2017   Rash in skin folds: No     Psych:      6/1/2017   Depression: Yes   Anxiety: No     Female Only:   SANJAY RBS ROS - FEMALE ONLY  "6/1/2017   Female ONLY: Irregular menstrual cycles     LABS/IMAGING/MEDICAL RECORDS REVIEW: Recent bariatric labs reviewed and normal.    PHYSICAL EXAMINATION:  BP (!) 133/91  Pulse 90  Temp 98.1  F (36.7  C) (Oral)  Ht 5' 9.5\"  Wt 215 lb 11.2 oz  LMP 04/21/2017 (Approximate)  SpO2 98%  BMI 31.4 kg/m2   General: No apparent distress  Neuro: A & O x 3  Head: Atraumatic, normocephalic  Eyes: PERRL, EOMI  Skin: warm and dry, no rashes on exposed skin  Respiratory: respirations unlabored  Abdomen: soft NT ND  Extremities: No LE swelling    ASSESSMENT AND PLAN:      1. 4 years status laparoscopic gastric sleeve  2. Morbid Obesity current BMI: Body mass index is 31.4 kg/(m^2).  3. Post surgical malabsorption:   Labs ordered per protocol.   Follow food plan per dietitian recommendations.   Continue taking recommended post-op vitamins.  4. Return to clinic in 1 year.      Sincerely,    Elizabeth Park PA-C       I spent a total of 20 minutes face to face with Ashley during today's office visit. Over 50% of this time was spent counseling the patient and/or coordinating care.  "

## 2017-06-01 NOTE — MR AVS SNAPSHOT
MRN:[id number]                      After Visit Summary   6/1/2017    Ashley Catherine    MRN: [id number]           Visit Information        Provider Department      6/1/2017 1:30 PM Cheryle Ricketts [facility] Surgical Weight Management        Your next 10 appointments already scheduled     Jun 21, 2017 11:00 AM CDT   (Arrive by 10:45 AM)   Return Visit with Jayro Elias, PhD [facility] ([facility])    [address]  3rd Floor  [place] [address] [phone]            Aug 21, 2017 11:30 AM CDT   (Arrive by 11:15 AM)   Return Visit with Santosh De Los Santos MD   [facility] Medical [facility] Management ([facility])    [address]  [address]th [facility] [address] [phone]              MyChart Information     [employer] gives you secure access to your electronic health record. If you see a primary care provider, you can also send messages to your care team and make appointments. If you have questions, please call your primary care clinic.  If you do not have a primary care provider, please call [phone] and they will assist you.      [employer] is an electronic gateway that provides easy, online access to your medical records. With [employer], you can request a clinic appointment, read your test results, renew a prescription or communicate with your care team.     To access your existing account, please contact your [facility] or call [phone] for assistance.        Care EveryWhere ID     This is your Care EveryWhere ID. This could be used by other organizations to access your [place] medical records  [id number]

## 2017-06-01 NOTE — LETTER
2017       RE: Ashley Catherine  [address]  [place] [address]     Dear Colleague,    Thank you for referring your patient, Ashley Catherine, to the [facility] SURGICAL WE[facility] MANAGEMENT at [facility]. Please see a copy of my visit note below.  Return Bariatric Surgery Note  RE: Ashley Catherine  MR#: [id number]  : 1980  VISIT DATE: 2017    Dear Juliann, Violet Agrawal,    I had the pleasure of seeing your patient, Ashley Catherine, in my post-bariatric surgery assessment clinic.    CHIEF COMPLAINT: Post-bariatric surgery follow-up    HISTORY OF PRESENT ILLNESS:  Questions Regarding Prior Weight Loss Surgery Reviewed With Patient 2017   I had the following weight loss procedure: Sleeve Gastrectomy   What year was your surgery? 2013   How has your weight changed since your last visit? I have gained weight   Are you currently taking any weight loss medications? Yes   Do you currently have any of the following: Heartburn, acid reflux, or GERD (acid reflux disease)?   Have you been to the Emergency room since your last visit with us? Yes   Were you in the hospital since your last visit with us? No   Do you have any concerns today? Acid reflux   GERD has been worse recently    Weight History:     2017   What is your highest lifetime weight? 284   What is your lowest weight since surgery? (In pounds) 184     Wt Readings from Last 4 Encounters:   17 215 lb 11.2 oz   05/15/17 222 lb 8 oz   17 220 lb 9.6 oz   17 222 lb 8 oz     Initial Weight: 274 lb 12.8 oz  Current Weight: Weight: 215 lb 11.2 oz  Cumulative weight loss (lbs): 59.1  Last Visits Weight: 203 lb 4.8 oz    Took Qsymia in the past and lost 20 lbs.  She was having side effects (numbness, depression) and so she discontinued it.  Recently saw Dr De Los Santos again and restarted Qsymia. Has lost 7 lbs since seen last.    Questions Regarding Co-Morbidities and Health Concerns Reviewed With  Patient 6/1/2017   Pre-diabetes: Never   Diabetes II: Never   High Blood Pressure: Improved   High cholesterol: Never   Heartburn/Reflux: Never   Are you taking daily medication for heartburn, acid reflux, or GERD (acid reflux disease)? No   Sleep apnea: Gone away   PCOS: Never   Back pain: Improved   Joint pain: Never   Lower leg swelling: Never       Eating Habits 6/1/2017   How many meals do you eat per day? 3   Do you snack between meals? Sometimes   How much food are you eating at each meal? 1/2 cup to 1 cup   Are you able to separate your meals and liquids by at least 30 minutes? Yes   Are you able to avoid liquid calories? Sometimes       Exercise Questions Reviewed With Patient 6/1/2017   How often do you exercise? 1 to 2 times per week   What is the duration of your exercise (in minutes)? 60+ Minutes   What types of exercise do you do? walking, gym membership, swimming, group fitness classes, weightlifting   What keeps you from being more active?  Lack of Time, Too tired       Social History:      6/1/2017   Are you smoking? No   Are you drinking alcohol? Yes   How much alcohol? 2-4       Medications:  Current Outpatient Prescriptions   Medication     cyclobenzaprine (FLEXERIL) 10 MG tablet     Cholecalciferol (VITAMIN D) 2000 UNITS CAPS     Phentermine-Topiramate 7.5-46 MG CP24     Diphenhydramine-APAP, sleep, (TYLENOL PM EXTRA STRENGTH PO)     hydrochlorothiazide (HYDRODIURIL) 25 MG tablet     Cyanocobalamin (CVS B-12) 5000 MCG SUBL     polyethylene glycol (MIRALAX) powder     cetirizine (ZYRTEC) 10 MG tablet     Calcium Carbonate-Vitamin D (CALCIUM + D PO)     Multiple Vitamin (MULTIVITAMINS PO)     metroNIDAZOLE (METROGEL) 0.75 % vaginal gel     MELATONIN PO     No current facility-administered medications for this visit.          6/1/2017   Do you avoid NSAIDs such as (Ibuprofen, Aleve, Naproxen, Advil)?   No       ROS:  GI:      6/1/2017   Vomiting: Yes   Diarrhea: No   Constipation: Yes   Swallowing  "trouble: No   Abdominal pain: No   Heartburn: No   Rash in skin folds: No   Depression: Yes   Stress urinary incontinence No     Skin:   BAR RBS ROS - SKIN 6/1/2017   Rash in skin folds: No     Psych:      6/1/2017   Depression: Yes   Anxiety: No     Female Only:   BAR RBS ROS - FEMALE ONLY 6/1/2017   Female ONLY: Irregular menstrual cycles     LABS/IMAGING/MEDICAL RECORDS REVIEW: Recent bariatric labs reviewed and normal.    PHYSICAL EXAMINATION:  BP (!) 133/91  Pulse 90  Temp 98.1  F (36.7  C) (Oral)  Ht 5' 9.5\"  Wt 215 lb 11.2 oz  LMP 04/21/2017 (Approximate)  SpO2 98%  BMI 31.4 kg/m2   General: No apparent distress  Neuro: A & O x 3  Head: Atraumatic, normocephalic  Eyes: PERRL, EOMI  Skin: warm and dry, no rashes on exposed skin  Respiratory: respirations unlabored  Abdomen: soft NT ND  Extremities: No LE swelling    ASSESSMENT AND PLAN:      1. 4 years status laparoscopic gastric sleeve  2. Morbid Obesity current BMI: Body mass index is 31.4 kg/(m^2).  3. Post surgical malabsorption:   Labs ordered per protocol.   Follow food plan per dietitian recommendations.   Continue taking recommended post-op vitamins.  4. Return to clinic in 1 year.      Sincerely,    Elizabeth Park PA-C       "

## 2017-06-21 ENCOUNTER — OFFICE VISIT (OUTPATIENT)
Dept: PSYCHOLOGY | Facility: CLINIC | Age: 37
End: 2017-06-21

## 2017-06-21 VITALS — WEIGHT: 204.8 LBS | BODY MASS INDEX: 29.81 KG/M2

## 2017-06-21 DIAGNOSIS — Z56.9 OCCUPATIONAL PROBLEM: ICD-10-CM

## 2017-06-21 DIAGNOSIS — F54 PSYCHOLOGICAL FACTOR AFFECTING PHYSICAL CONDITION: ICD-10-CM

## 2017-06-21 DIAGNOSIS — F33.0 MAJOR DEPRESSIVE DISORDER, RECURRENT EPISODE, MILD (H): Primary | ICD-10-CM

## 2017-06-21 SDOH — ECONOMIC STABILITY - INCOME SECURITY: UNSPECIFIED PROBLEMS RELATED TO EMPLOYMENT: Z56.9

## 2017-06-21 NOTE — MR AVS SNAPSHOT
After Visit Summary   6/21/2017    Ashley Catherine    MRN: [id number]           Patient Information     Date Of Birth          1980        Visit Information        Provider Department      6/21/2017 11:00 AM Jayro Elias, PhD [facility]        Today's Diagnoses     Major depressive disorder, recurrent episode, mild (H)    -  1    Psychological factor affecting physical condition        Occupational problem           Follow-ups after your visit        Your next 10 appointments already scheduled     Aug 21, 2017 11:30 AM CDT   (Arrive by 11:15 AM)   Return Visit with Santosh De Los Santos MD   [facility] Medical Weight Management ([facility])    [address]  [address]th [place] [address] [phone]              Who to contact     Please call your clinic at [phone] to:    Ask questions about your health    Make or cancel appointments    Discuss your medicines    Learn about your test results    Speak to your doctor   If you have compliments or concerns about an experience at your clinic, or if you wish to file a complaint, please contact [facility] Patient Relations at [phone] or email us at [email or web address]         Additional Information About Your Visit        MyChart Information     [employer] gives you secure access to your electronic health record. If you see a primary care provider, you can also send messages to your care team and make appointments. If you have questions, please call your primary care clinic.  If you do not have a primary care provider, please call [phone] and they will assist you.      [employer] is an electronic gateway that provides easy, online access to your medical records. With [employer], you can request a clinic appointment, read your test results, renew a prescription or communicate with your care team.     To access your existing account, please  contact your [facility] or call [phone] for assistance.        Care EveryWhere ID     This is your Care EveryWhere ID. This could be used by other organizations to access your [place] medical records  [id number]        Your Vitals Were     BMI (Body Mass Index)                   29.81 kg/m2            Blood Pressure from Last 3 Encounters:   06/01/17 (!) 133/91   05/15/17 143/88   05/11/17 132/78    Weight from Last 3 Encounters:   06/21/17 92.9 kg (204 lb 12.8 oz)   06/01/17 97.8 kg (215 lb 11.2 oz)   05/15/17 100.9 kg (222 lb 8 oz)              Today, you had the following     No orders found for display       Primary Care Provider Office Phone # Fax #    Violet Humphreys [phone]       [facility] [address] [address]        Equal Access to Services     MABEL BAILEY : Hadii aad ku hadasho Soomaali, waaxda luqadaha, qaybta kaalmada adeegyada, waxay idiin hayaan adeeg kharafranklyn arteaga . So [facility] [phone].    ATENCIÓN: Si habla español, tiene a gibbons disposición servicios gratuitos de asistencia lingüística. Llame al [phone].    We comply with applicable federal civil rights laws and [place] laws. We do not discriminate on the basis of race, color, national origin, age, disability sex, sexual orientation or gender identity.            Thank you!     Thank you for choosing [facility] PRIMARY CARE [facility]  for your care. Our goal is always to provide you with excellent care. Hearing back from our patients is one way we can continue to improve our services. Please take a few minutes to complete the written survey that you may receive in the mail after your visit with us. Thank you!             Your Updated Medication List - Protect others around you: Learn how to safely use, store and throw away your medicines at www.disposemymeds.org.          This list is accurate as of: 6/21/17 12:02 PM.  Always use your most  recent med list.                   Brand Name Dispense Instructions for use Diagnosis    CALCIUM + D PO      Take by mouth daily        cetirizine 10 MG tablet    zyrTEC     Take 10 mg by mouth daily as needed for allergies        CVS B-12 5000 MCG Subl   Generic drug:  Cyanocobalamin       Multiple thyroid nodules       cyclobenzaprine 10 MG tablet    FLEXERIL     Indications: PN:   GERBER RESHMAAMITA Diaz Apr 5, 2016  5:34 PM Received from: External Pharmacy        hydrochlorothiazide 25 MG tablet    HYDRODIURIL    90 tablet    Take 1 tablet (25 mg) by mouth daily    Essential hypertension with goal blood pressure less than 140/90       MELATONIN PO      Take 3 mg by mouth        metroNIDAZOLE 0.75 % vaginal gel    METROGEL    70 g    Use twice weekly to vagina for recurrent BV symptoms.  Do this for 3 months after finishing oral medications    Abnormal vaginal fluids       MULTIVITAMINS PO      Take by mouth daily        Phentermine-Topiramate 7.5-46 MG Cp24     30 capsule    Take 1 capsule by mouth daily    Non morbid obesity due to excess calories       polyethylene glycol powder    MIRALAX    510 g    Take 17 g (1 capful) by mouth daily as needed for constipation    Constipation, unspecified constipation type       TYLENOL PM EXTRA STRENGTH PO           vitamin D 2000 UNITS Caps

## 2017-06-21 NOTE — PROGRESS NOTES
Health Psychology                  Clinic    Department of Medicine  Lis Chavez, Ph.D., L.P. [phone]                          [facility] Earline Anderson, Ph.D.,  L.P. [phone]                 3rd Floor, Clinic [address]A  [place] Mail [address]   Jayro Elias, Ph.D., AMITA.[email or web address]., L.P. [phone]     [address] Melissa Stokes, Ph.D., L.P. [phone]  [place] [id number]  [place] [address]    Health Psychology Follow-Up Note    Ashley Catherine is a 37-year-old woman referred initially  for psychological consultation for workup for a gastric sleeve procedure who now returns following the surgery, with concerns about weight management.  She initiated topirimate and is making great progress, however  Hopes not to have long term use and is concerned about side effects.    We reviewed in detail the changes she is making with rgard to nutrional intake.  She is exercising with 10,000 or more steps per day almost every day.    Wt Readings from Last 4 Encounters:   06/21/17 92.9 kg (204 lb 12.8 oz)   06/01/17 97.8 kg (215 lb 11.2 oz)   05/15/17 100.9 kg (222 lb 8 oz)   05/11/17 100.1 kg (220 lb 9.6 oz)       She is  5 foot 11 inches.  Her goal is 175. She has been gaining weight.  She acknowledges eating more cookies and icr cream, and stopping working out.  She feels it isn't fair  How little she eats, yet she is gaining weight..  She has been working many extra shifts.  However, she is spending money,  so isn't getting ahead on her bills  We previously discussed meeting with a financial counselor, but did not today. Her mood is much improved.  Her job is okay, but there are frustrations with management (e.g., hasn't yet had onfloor chemo training). We discussed her frustrations with trying to date.    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  Affect is generally positive  despite her frustrations with her weight and work.  Rapport is excellent.   Extended session due to complexity of case and length of interval.    Extended session due to complexity of case and length of interval.    Time in:  11:03  Time out:  11:59  DIAGNOSIS:   Psychological factors affecting obesity (F54).   Major depression, recurrent, mild (F3e3.0).   Occupational Problems (Z56.9)  PLAN/RECOMMENDATIONS:   1. Ms. Catherine will return 8/2 @ 11:00 to address weight loss and social issues.  2.  Establish schedule of regular exercise and  Decrease grazing eating.    Tx plan due 5/3/18

## 2017-06-29 DIAGNOSIS — E04.1 THYROID NODULE: Primary | ICD-10-CM

## 2017-07-12 DIAGNOSIS — E66.9 NON MORBID OBESITY, UNSPECIFIED OBESITY TYPE: Primary | ICD-10-CM

## 2017-07-13 ENCOUNTER — TELEPHONE (OUTPATIENT)
Dept: INTERNAL MEDICINE | Facility: CLINIC | Age: 37
End: 2017-07-13

## 2017-07-13 ENCOUNTER — OFFICE VISIT (OUTPATIENT)
Dept: FAMILY MEDICINE | Facility: CLINIC | Age: 37
End: 2017-07-13
Payer: COMMERCIAL

## 2017-07-13 ENCOUNTER — TELEPHONE (OUTPATIENT)
Dept: ENDOCRINOLOGY | Facility: CLINIC | Age: 37
End: 2017-07-13

## 2017-07-13 VITALS
TEMPERATURE: 98.3 F | SYSTOLIC BLOOD PRESSURE: 106 MMHG | WEIGHT: 197 LBS | OXYGEN SATURATION: 99 % | BODY MASS INDEX: 28.2 KG/M2 | RESPIRATION RATE: 14 BRPM | DIASTOLIC BLOOD PRESSURE: 66 MMHG | HEART RATE: 97 BPM | HEIGHT: 70 IN

## 2017-07-13 DIAGNOSIS — N76.0 BV (BACTERIAL VAGINOSIS): Primary | ICD-10-CM

## 2017-07-13 DIAGNOSIS — B96.89 BV (BACTERIAL VAGINOSIS): Primary | ICD-10-CM

## 2017-07-13 DIAGNOSIS — E66.9 NON MORBID OBESITY, UNSPECIFIED OBESITY TYPE: ICD-10-CM

## 2017-07-13 LAB
ERYTHROCYTE [DISTWIDTH] IN BLOOD BY AUTOMATED COUNT: 13.2 % (ref 10–15)
HCT VFR BLD AUTO: 38.2 % (ref 35–47)
HGB BLD-MCNC: 12.9 G/DL (ref 11.7–15.7)
MCH RBC QN AUTO: 31.2 PG (ref 26.5–33)
MCHC RBC AUTO-ENTMCNC: 33.8 G/DL (ref 31.5–36.5)
MCV RBC AUTO: 92 FL (ref 78–100)
MICRO REPORT STATUS: NORMAL
PLATELET # BLD AUTO: 370 10E9/L (ref 150–450)
RBC # BLD AUTO: 4.14 10E12/L (ref 3.8–5.2)
SPECIMEN SOURCE: NORMAL
WBC # BLD AUTO: 6.4 10E9/L (ref 4–11)
WET PREP SPEC: NORMAL

## 2017-07-13 PROCEDURE — 87210 SMEAR WET MOUNT SALINE/INK: CPT | Performed by: FAMILY MEDICINE

## 2017-07-13 PROCEDURE — 80053 COMPREHEN METABOLIC PANEL: CPT | Performed by: PHYSICIAN ASSISTANT

## 2017-07-13 PROCEDURE — 85027 COMPLETE CBC AUTOMATED: CPT | Performed by: PHYSICIAN ASSISTANT

## 2017-07-13 PROCEDURE — 36415 COLL VENOUS BLD VENIPUNCTURE: CPT | Performed by: PHYSICIAN ASSISTANT

## 2017-07-13 PROCEDURE — 99213 OFFICE O/P EST LOW 20 MIN: CPT | Performed by: FAMILY MEDICINE

## 2017-07-13 NOTE — NURSING NOTE
"Chief Complaint   Patient presents with     RECHECK     follow up BV       Initial /66  Pulse 97  Temp 98.3  F (36.8  C) (Oral)  Resp 14  Ht 5' 9.5\" (1.765 m)  Wt 197 lb (89.4 kg)  SpO2 99%  Breastfeeding? No  BMI 28.67 kg/m2 Estimated body mass index is 28.67 kg/(m^2) as calculated from the following:    Height as of this encounter: 5' 9.5\" (1.765 m).    Weight as of this encounter: 197 lb (89.4 kg).  BP completed using cuff size: large    Health Maintenance that is potentially due pending provider review:  There are no preventive care reminders to display for this patient.      n/a  "

## 2017-07-13 NOTE — TELEPHONE ENCOUNTER
[facility] Prior Authorization Team Request    Medication: Qsymia 7.5/45mg  Dosin daily  Qty: 30  Day Supply: 30  NDC (required for Medicaid members): [address]-58     Insurance   BIN: [id number]  PCN: [id number]  Grp: [id number]  ID: [id number]    CoverMyMeds Key (if applicable):     Additional documentation:       Filling Pharmacy: [facility]  Phone Number: [phone]  Contact:    Pharmacy NPI (required for Medicaid members): [id number]

## 2017-07-13 NOTE — MR AVS SNAPSHOT
After Visit Summary   7/13/2017    Ashley Catherine    MRN: [id number]           Patient Information     Date Of Birth          1980        Visit Information        Provider Department      7/13/2017 1:45 PM Violet Humphreys MD [facility]        Today's Diagnoses     BV (bacterial vaginosis)    -  1    Non morbid obesity, unspecified obesity type           Follow-ups after your visit        Your next 10 appointments already scheduled     Jul 14, 2017 10:00 AM CDT   (Arrive by 9:45 AM)   US BIOPSY THYROID FINE NEEDLE ASPIRATION with UCUS3,  IMAGING NURSE, UC PROCEDURAL RAD   [facility] ([facility] [place])    [address]  1st Floor  [facility] [address] [phone]           Tell us in advance if there s any chance you may be pregnant.  Bring a list of your medicines to the exam. Include vitamins, minerals and over-the-counter drugs.  If you take blood thinners, you may need to stop taking them a few days before treatment. Talk to your doctor before stopping these medicines. You will need a blood test the morning of your exam.   Stop taking Coumadin (warfarin) 3 days before your exam. Restart the day after your exam.   If you take aspirin, you may need to stop taking it 3 days before your scan.   If you take Plavix, Ticlid, Pletal or Persantine, you may need to stop taking them 5 days before your scan. Please talk to your doctor before stopping these medicines.  If you will receive sedation for this test (medicine to help you relax):   See your family doctor for an exam within 30 days of treatment.   Plan for an adult to drive you home and stay with you for at least 6 hours.   Follow the eating and drinking guidelines checked below:   No eating or drinking for 4 hours before your test. You may take medicine with small sips of water.   If you have diabetes:If you take insulin, call your diabetes care team. Do not take diabetes pills  on the morning of your test. If you take metformin (Avandamet, Glucophage, Glucovance, Metaglip) and received contrast, wait 48 hours before re-starting this medicine.  Please call the Imaging Department at your exam site with any questions.            Aug 02, 2017 11:00 AM CDT   (Arrive by 10:45 AM)   Return Visit with Jayro Elias, PhD [facility] ([facility] [place])    [address]  3rd Floor  [facility] [address] [phone]            Aug 21, 2017 11:30 AM CDT   (Arrive by 11:15 AM)   Return Visit with Santosh De Los Santos MD   [facility] ([facility])    [address]  4th Floor  [facility] [address] [phone]              Who to contact     If you have questions or need follow up information about today's clinic visit or your schedule please contact [facility] directly at [phone].  Normal or non-critical lab and imaging results will be communicated to you by ValenTxhart, letter or phone within 4 business days after the clinic has received the results. If you do not hear from us within 7 days, please contact the clinic through ValenTxhart or phone. If you have a critical or abnormal lab result, we will notify you by phone as soon as possible.  Submit refill requests through Linkovery or call your pharmacy and they will forward the refill request to us. Please allow 3 business days for your refill to be completed.          Additional Information About Your Visit        Linkovery Information     Linkovery gives you secure access to your electronic health record. If you see a primary care provider, you can also send messages to your care team and make appointments. If you have questions, please call your primary care clinic.  If you do not have a primary care provider, please call [phone] and they will assist you.        Care EveryWhere ID     This is your Care EveryWhere ID.  "This could be used by other organizations to access your [facility] medical records  [id number]        Your Vitals Were     Pulse Temperature Respirations Height Pulse Oximetry Breastfeeding?    97 98.3  F (36.8  C) (Oral) 14 5' 9.5\" (1.765 m) 99% No    BMI (Body Mass Index)                   28.67 kg/m2            Blood Pressure from Last 3 Encounters:   07/13/17 106/66   06/01/17 (!) 133/91   05/15/17 143/88    Weight from Last 3 Encounters:   07/13/17 197 lb (89.4 kg)   06/21/17 204 lb 12.8 oz (92.9 kg)   06/01/17 215 lb 11.2 oz (97.8 kg)              We Performed the Following     CBC with platelets     Comprehensive metabolic panel     Wet [facility]        Primary Care Provider Office Phone # Fax #    Violet Humphreys [phone]       [facility] [address] [address]        Equal Access to Services     JOSSE [facility] SystemSARAH : Hadii sabas ku hadasho Soomaali, waaxda luqadaha, qaybta kaalmada adeegyada, carmen arteaga . So [facility] [phone].    ATENCIÓN: Si habla español, tiene a gibbons disposición servicios gratuitos de asistencia lingüística. Llame al [phone].    We comply with applicable federal civil rights laws and [place] laws. We do not discriminate on the basis of race, color, national origin, age, disability sex, sexual orientation or gender identity.            Thank you!     Thank you for choosing [facility]  for your care. Our goal is always to provide you with excellent care. Hearing back from our patients is one way we can continue to improve our services. Please take a few minutes to complete the written survey that you may receive in the mail after your visit with us. Thank you!             Your Updated Medication List - Protect others around you: Learn how to safely use, store and throw away your medicines at www.disposemymeds.org.          This list is accurate as of: 7/13/17  3:25 PM.  Always use your most " recent med list.                   Brand Name Dispense Instructions for use Diagnosis    CALCIUM + D PO      Take by mouth daily        cetirizine 10 MG tablet    zyrTEC     Take 10 mg by mouth daily as needed for allergies        [employer] B-12 5000 MCG Subl   Generic drug:  Cyanocobalamin       Multiple thyroid nodules       cyclobenzaprine 10 MG tablet    FLEXERIL     Indications: PN:   GERBER RESHMAAMITA Diaz Apr 5, 2016  5:34 PM Received from: External Pharmacy        hydrochlorothiazide 25 MG tablet    HYDRODIURIL    90 tablet    Take 1 tablet (25 mg) by mouth daily    Essential hypertension with goal blood pressure less than 140/90       MELATONIN PO      Take 3 mg by mouth        metroNIDAZOLE 0.75 % vaginal gel    METROGEL    70 g    Use twice weekly to vagina for recurrent BV symptoms.  Do this for 3 months after finishing oral medications    Abnormal vaginal fluids       MULTIVITAMINS PO      Take by mouth daily        Phentermine-Topiramate 7.5-46 MG Cp24     30 capsule    Take 1 capsule by mouth daily    Non morbid obesity due to excess calories       polyethylene glycol powder    MIRALAX    510 g    Take 17 g (1 capful) by mouth daily as needed for constipation    Constipation, unspecified constipation type       TYLENOL PM EXTRA STRENGTH PO           vitamin D 2000 UNITS Caps

## 2017-07-13 NOTE — LETTER
July 19, 2017      TO: Ashley Catherine  [address] [facility] [address]         Dear Ms. Ashley Catherine,    We received and reviewed your test results done on 7/13/2017.  You may receive more than one letter if we receive the results on multiple days.  Please share all lab and test results with your primary care provider and keep a copy for your own records.        Your test results are normal except for the following addressed below:    Your Potassium level is low. As discussed over the phone, please follow with Primary Care Provider to discuss at treatment plan.    If you have any questions, feel free contact us at the Call Center [phone].      Resulted Orders   Comprehensive metabolic panel   Result Value Ref Range    Sodium 140 133 - 144 mmol/L    Potassium 3.0 (L) 3.4 - 5.3 mmol/L    Chloride 100 94 - 109 mmol/L    Carbon Dioxide 33 (H) 20 - 32 mmol/L    Anion Gap 7 3 - 14 mmol/L    Glucose 80 70 - 99 mg/dL    Urea Nitrogen 13 7 - 30 mg/dL    Creatinine 0.72 0.52 - 1.04 mg/dL    GFR Estimate >90  Non  GFR Calc   >60 mL/min/1.7m2    GFR Estimate If Black >90   GFR Calc   >60 mL/min/1.7m2    Calcium 9.3 8.5 - 10.1 mg/dL    Bilirubin Total 0.5 0.2 - 1.3 mg/dL    Albumin 3.8 3.4 - 5.0 g/dL    Protein Total 7.3 6.8 - 8.8 g/dL    Alkaline Phosphatase 49 40 - 150 U/L    ALT 22 0 - 50 U/L    AST 14 0 - 45 U/L   CBC with platelets   Result Value Ref Range    WBC 6.4 4.0 - 11.0 10e9/L    RBC Count 4.14 3.8 - 5.2 10e12/L    Hemoglobin 12.9 11.7 - 15.7 g/dL    Hematocrit 38.2 35.0 - 47.0 %    MCV 92 78 - 100 fl    MCH 31.2 26.5 - 33.0 pg    MCHC 33.8 31.5 - 36.5 g/dL    RDW 13.2 10.0 - 15.0 %    Platelet Count 370 150 - 450 10e9/L           Sincerely,    [place] PA-C

## 2017-07-13 NOTE — TELEPHONE ENCOUNTER
----- Message from Rebeka Patel RN sent at 7/7/2017  2:24 PM CDT -----  Regarding: FW: call  My chart read and she is scheduled   ----- Message -----     From: Crystal Wyatt MD     Sent: 7/6/2017   6:27 PM       To: [facility] Triage-  Subject: call                                             Please call her and read her the mychart she hasn't read.  Thanks    Crystal Wyatt

## 2017-07-13 NOTE — PROGRESS NOTES
SUBJECTIVE:                                                    Ashley Catherine is a 37 year old female who presents to clinic today for the following health issues:      Chief Complaint   Patient presents with     RECHECK     follow up BV     Pt states did not use medication and wants retest to make sure BV is gone. Doesn't have symptoms, just feels a bit of vaginal dryness.  See previous notes  Had recurrent BV in past  Started treatment dose and then maintenance dose but stopped  Wonders if she needs to continue - not really having symptoms.  No new partner  Not sexually active now    Also has plans for repeat thyroid nodule biopsy - has had one followed and appears to be larger/changing    NKDA      Problem list and histories reviewed & adjusted, as indicated.  Additional history: as documented    Patient Active Problem List   Diagnosis     OLIGOMENORRHEA, C/W PCOS     Sleep apnea     HX ABNL PAP SMEAR OF CERVIX       CARDIOVASCULAR SCREENING; LDL GOAL LESS THAN 160     Hypertension goal BP (blood pressure) < 140/90     Flat feet     GERD (gastroesophageal reflux disease)     Sciatica     Obesity     S/P gastrectomy     Occupational problem     Elevated parathyroid hormone     Multiple thyroid nodules     Abnormal thyroid cytology-follicular neoplasm     Chronic pain syndrome     Major depressive disorder, recurrent episode, in full remission (H)     Bilateral low back pain with sciatica, sciatica laterality unspecified     Throat symptom     Psychological factor affecting physical condition     Major depressive disorder, recurrent episode, mild (H)     Past Surgical History:   Procedure Laterality Date     BIOPSY  03/13/2015    Thyroid nodule     ESOPHAGOSCOPY, GASTROSCOPY, DUODENOSCOPY (EGD), COMBINED  5/28/2013    Procedure: COMBINED ESOPHAGOSCOPY, GASTROSCOPY, DUODENOSCOPY (EGD);;  Surgeon: Best Willett MD;  Location:  GI     LAPAROSCOPIC GASTRIC SLEEVE  5/13/2013    Procedure: LAPAROSCOPIC GASTRIC  SLEEVE;  Laparoscopic Sleeve Gastrectomy ;  Surgeon: Best Willett MD;  Location:  OR     [facility] [place], CERVICAL  1995    age 15     SEPTOPLASTY       TONSILLECTOMY  age 20s     wisdom teeth extraction         Social History   Substance Use Topics     Smoking status: Never Smoker     Smokeless tobacco: Never Used     Alcohol use 2.4 - 3.0 oz/week      Comment: 3 drinks/week     Family History   Problem Relation Age of Onset     Hypertension Sister      Hypertension Father      Allergies Father      seasonal     Lipids Father      Obesity Father      Arthritis Mother      Gynecology Mother      problems with menopause     Hypertension Mother      Other Cancer Mother      Endometrial     OSTEOPOROSIS Mother      Obesity Mother      Parathyroid Disorders Mother      3 operations     Obesity Sister      DIABETES Maternal Aunt      x several w. DM     Breast Cancer Maternal Aunt      Cancer - colorectal Maternal Uncle      60ish     Hypertension Sister      Obesity Sister      Nephrolithiasis No family hx of          Current Outpatient Prescriptions   Medication Sig Dispense Refill     cyclobenzaprine (FLEXERIL) 10 MG tablet Indications: PN:   RESHMA MAYES Apr 5, 2016  5:34 PM Received from: External Pharmacy       Cholecalciferol (VITAMIN D) 2000 UNITS CAPS        Phentermine-Topiramate 7.5-46 MG CP24 Take 1 capsule by mouth daily 30 capsule 5     MELATONIN PO Take 3 mg by mouth       Diphenhydramine-APAP, sleep, (TYLENOL PM EXTRA STRENGTH PO)        hydrochlorothiazide (HYDRODIURIL) 25 MG tablet Take 1 tablet (25 mg) by mouth daily 90 tablet 3     Cyanocobalamin (CVS B-12) 5000 MCG SUBL        polyethylene glycol (MIRALAX) powder Take 17 g (1 capful) by mouth daily as needed for constipation 510 g 1     cetirizine (ZYRTEC) 10 MG tablet Take 10 mg by mouth daily as needed for allergies       Calcium Carbonate-Vitamin D (CALCIUM + D PO) Take by mouth daily       Multiple Vitamin (MULTIVITAMINS PO) Take by mouth  "daily       metroNIDAZOLE (METROGEL) 0.75 % vaginal gel Use twice weekly to vagina for recurrent BV symptoms.  Do this for 3 months after finishing oral medications (Patient not taking: Reported on 6/1/2017) 70 g 3     Allergies   Allergen Reactions     Nkda [No Known Drug Allergies]      No Known Allergies        Reviewed and updated as needed this visit by clinical staff  Tobacco  Allergies  Meds  Med Hx  Surg Hx  Fam Hx  Soc Hx      Reviewed and updated as needed this visit by Provider         ROS:  Constitutional, HEENT, cardiovascular, pulmonary, gi and gu systems are negative, except as otherwise noted.    OBJECTIVE:                                                    /66  Pulse 97  Temp 98.3  F (36.8  C) (Oral)  Resp 14  Ht 5' 9.5\" (1.765 m)  Wt 197 lb (89.4 kg)  SpO2 99%  Breastfeeding? No  BMI 28.67 kg/m2  Body mass index is 28.67 kg/(m^2).  GENERAL APPEARANCE: healthy, alert and no distress   (female): normal cervix, adnexae, and uterus without masses or discharge    Diagnostic test results:  Diagnostic Test Results:  Results for orders placed or performed in visit on 07/13/17 (from the past 24 hour(s))   CBC with platelets   Result Value Ref Range    WBC 6.4 4.0 - 11.0 10e9/L    RBC Count 4.14 3.8 - 5.2 10e12/L    Hemoglobin 12.9 11.7 - 15.7 g/dL    Hematocrit 38.2 35.0 - 47.0 %    MCV 92 78 - 100 fl    MCH 31.2 26.5 - 33.0 pg    MCHC 33.8 31.5 - 36.5 g/dL    RDW 13.2 10.0 - 15.0 %    Platelet Count 370 150 - 450 10e9/L   Wet prep   Result Value Ref Range    Specimen Description Vagina     Wet Prep       No Trichomonas seen  No clue cells seen  No yeast seen      Micro Report Status FINAL 07/13/2017         ASSESSMENT/PLAN:                                                    1. BV (bacterial vaginosis)  Cleared  May return with new partners - reviewed this with her  Can stop vag gel for now  - Wet prep      Follow up with Provider - connor Humphreys MD  [facility] " CLINIC

## 2017-07-13 NOTE — PROGRESS NOTES
Hello,    Great news, your results were normal.  No BV noted!  Feel free to stop the vaginal gel for now    Violet Humphreys MD

## 2017-07-14 LAB
ALBUMIN SERPL-MCNC: 3.8 G/DL (ref 3.4–5)
ALP SERPL-CCNC: 49 U/L (ref 40–150)
ALT SERPL W P-5'-P-CCNC: 22 U/L (ref 0–50)
ANION GAP SERPL CALCULATED.3IONS-SCNC: 7 MMOL/L (ref 3–14)
AST SERPL W P-5'-P-CCNC: 14 U/L (ref 0–45)
BILIRUB SERPL-MCNC: 0.5 MG/DL (ref 0.2–1.3)
BUN SERPL-MCNC: 13 MG/DL (ref 7–30)
CALCIUM SERPL-MCNC: 9.3 MG/DL (ref 8.5–10.1)
CHLORIDE SERPL-SCNC: 100 MMOL/L (ref 94–109)
CO2 SERPL-SCNC: 33 MMOL/L (ref 20–32)
CREAT SERPL-MCNC: 0.72 MG/DL (ref 0.52–1.04)
GFR SERPL CREATININE-BSD FRML MDRD: ABNORMAL ML/MIN/1.7M2
GLUCOSE SERPL-MCNC: 80 MG/DL (ref 70–99)
POTASSIUM SERPL-SCNC: 3 MMOL/L (ref 3.4–5.3)
PROT SERPL-MCNC: 7.3 G/DL (ref 6.8–8.8)
SODIUM SERPL-SCNC: 140 MMOL/L (ref 133–144)

## 2017-07-17 NOTE — NURSING NOTE
Called and spoke to patient regarding her low potassium. Patient will plan to follow up with her PCP and she is currently taking HCTZ. She will update our team accordingly.

## 2017-07-18 ENCOUNTER — MYC MEDICAL ADVICE (OUTPATIENT)
Dept: FAMILY MEDICINE | Facility: CLINIC | Age: 37
End: 2017-07-18

## 2017-07-18 DIAGNOSIS — Z90.3 S/P GASTRECTOMY: Primary | ICD-10-CM

## 2017-07-19 RX ORDER — POTASSIUM CHLORIDE 1500 MG/1
20 TABLET, EXTENDED RELEASE ORAL DAILY
Qty: 90 TABLET | Refills: 3 | Status: SHIPPED | OUTPATIENT
Start: 2017-07-19 | End: 2018-01-29 | Stop reason: ALTCHOICE

## 2017-07-19 NOTE — TELEPHONE ENCOUNTER
PA Initiation    Medication: Qsymia 7.5/46mg  Insurance Company: "[employer] - Phone [phone] Fax [phone]  Pharmacy Filling the Rx: [place] [address], [address]  Filling Pharmacy Phone: [phone]  Filling Pharmacy Fax:    Start Date: 7/19/2017

## 2017-07-25 NOTE — TELEPHONE ENCOUNTER
Received additional questions from insurance, answered and manually faxed back to [facility] @[phone]

## 2017-08-02 ENCOUNTER — VIRTUAL VISIT (OUTPATIENT)
Dept: ENDOCRINOLOGY | Facility: CLINIC | Age: 37
End: 2017-08-02

## 2017-08-02 ENCOUNTER — OFFICE VISIT (OUTPATIENT)
Dept: PSYCHOLOGY | Facility: CLINIC | Age: 37
End: 2017-08-02

## 2017-08-02 ENCOUNTER — MYC MEDICAL ADVICE (OUTPATIENT)
Dept: FAMILY MEDICINE | Facility: CLINIC | Age: 37
End: 2017-08-02

## 2017-08-02 VITALS — BODY MASS INDEX: 28.75 KG/M2 | WEIGHT: 197.5 LBS

## 2017-08-02 DIAGNOSIS — R89.6 ABNORMAL CYTOLOGY: ICD-10-CM

## 2017-08-02 DIAGNOSIS — F33.0 MAJOR DEPRESSIVE DISORDER, RECURRENT EPISODE, MILD (H): Primary | ICD-10-CM

## 2017-08-02 DIAGNOSIS — Z56.9 OCCUPATIONAL PROBLEM: ICD-10-CM

## 2017-08-02 DIAGNOSIS — E04.2 MULTIPLE THYROID NODULES: ICD-10-CM

## 2017-08-02 DIAGNOSIS — E04.2 MULTIPLE THYROID NODULES: Primary | ICD-10-CM

## 2017-08-02 DIAGNOSIS — Z98.84 BARIATRIC SURGERY STATUS: Primary | ICD-10-CM

## 2017-08-02 DIAGNOSIS — F54 PSYCHOLOGICAL FACTOR AFFECTING PHYSICAL CONDITION: ICD-10-CM

## 2017-08-02 LAB — TSH SERPL DL<=0.05 MIU/L-ACNC: 0.56 MU/L (ref 0.4–4)

## 2017-08-02 SDOH — ECONOMIC STABILITY - INCOME SECURITY: UNSPECIFIED PROBLEMS RELATED TO EMPLOYMENT: Z56.9

## 2017-08-02 NOTE — PROGRESS NOTES
Health Psychology                  Clinic    Department of Medicine  Lis Chavez, Ph.D., L.P. [phone]                          [facility] Earline Anderson, Ph.D.,  L.P. [phone]                 3rd Floor, Clinic [address]  [place] Mail Code 749   Jayro Elias, Ph.D., AMITA.[email or web address]., L.P. [phone]     [address] [place] [address] Melissa Stokes, Ph.D., L.P. [phone]  [id number]  [place] [address]    Health Psychology Follow-Up Note    Ashley Catherine is a 37-year-old woman referred initially  for psychological consultation for workup for a gastric sleeve procedure who now returns following the surgery, with concerns about weight management.  She initiated topirimate and is making great progress, however  Hopes not to have long term use and is concerned about side effects.    We reviewed in detail the changes she is making with regard to nutrional intake. She is cooking more of her own food.  She is exercising with 10,000 or more steps per day almost every day.  She is reinforced for the changes and can see the pattern of not doing as well when travelling.    Wt Readings from Last 4 Encounters:   08/02/17 89.6 kg (197 lb 8 oz)   07/13/17 89.4 kg (197 lb)   06/21/17 92.9 kg (204 lb 12.8 oz)   06/01/17 97.8 kg (215 lb 11.2 oz)       She is  5 foot 11 inches.  Her goal is 175. She has been gaining weight. Her mood is much improved.  Her job is okay, but there are frustrations with management (e.g., hasn't yet had onfloor chemo training)getting better.  She has gotten the chemo training and is working toward certification.  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  She still has hopes to leave within 6-12 months.    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  Affect is generally positive despite her frustrations with her weight and work.  Rapport is excellent.   Extended session due  to complexity of case and length of interval.      Time in:  11:04  Time out:  11:58  DIAGNOSIS:   Psychological factors affecting obesity (F54).   Major depression, recurrent, mild (F3e3.0).   Occupational Problems (Z56.9)  PLAN/RECOMMENDATIONS:   1. Ms. Catherine will return 9/29 @ 10:00 to address weight loss and social issues.  2.  Establish schedule of regular exercise and  Decrease grazing eating.    Tx plan due 5/3/18

## 2017-08-02 NOTE — PROGRESS NOTES
Telephone visit    Attending ASSESSMENT/PLAN:     1.  Thyroid nodule, left isthmus, 2.2 cm with  follicular neoplasm cytology x 2 . Predominance of Hurthle cells. Thyroseq negative. This nodule has shown significant size fluctuations since discovery in 2015.  Most recently it has increased in size over 6 fold, yet it is still approximatley back to the size it was in 2015 when lst discovered.      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  Negative predictive value of 96% has been reported for Thyroseq negative molecular in the setting of FN cytology .  Still, I am uncomfortable with the changes we see on serial US.   Https://www.ncbi.nlm.nih.gov/pubmed/26825205    I have counseled her again on thyroid nodule cytology and risks of malignancy, interpretation of the moleulcar and additional options, including  1.  Continue to monitor without more tests  2.  Diagnostic lobectomy  3.  Afirma - this would be very helpful if negative but it positive we would have 2 molecular tests with conflicting results, and we would have to decide which we believe more.    Ashley would like to speak with thyroid surgeon about what surgery entails.  We will schedule follow up US for 12/16 to be done in the event she doesn't go to surgery before then.    Repeat TSH at her convenience - high TSH could explain the growth we have seen.     START TIME 731 AM  STOP TIME 747  TOTAL TIME: 16 minutes    Crystal Wyatt MD      Cc/  HISTORY OF PRESENT ILLNESS Ashley presents for follow up of thyroid nodule. She was last seen by me 6/16.     She has now had FNAB x 2  of the same thyroid nodule   3/13/15 US guided FNAB of a thyroid nodule -cytology  suspicious for follicular neoplasm.  Predominance of Hurthle cell features. Needle wash PTH < 10.  Thyroseq negative  Repeat FNAB was 7/14/17 performed because of significant increase in size of the nodule.  Cytology [id number] suspicoius for follicular  "neoplasm.  Predominance of Hurthle cell features.       I have again reviewed all the images.  6/29/17 thyroid US: left isthmus nodule 1.3 x 1.1 x 2.2 cm hypoechoic , grade 4 vascularity; The anterior border is not delineated on transverse view.   1/27/16: left isthmus nodule 0.8 x 0.5 x 1 cm  2/23/15 left isthmus nodule 1.6 x 1.3 x 1.9 cm.      Her last TFTS were 2/11/16: TSH 0.94, free T4 1.13.      REVIEW OF SYSTEMS  Has URI now  Chronic \"throat\" issues she doesn't believe are due to the nodule.    Past Medical History  Past Medical History:   Diagnosis Date     Bariatric surgery status 05/13/2013     Depression      Esophageal reflux      Family history of hyperparathyroidism 3/6/2015     Forearm fracture childhood    jumping fall     Guillain-Alvarado disease (H) age 14    hospitalized at [facility] x 1 week     History of steroid therapy      HX ABNL PAP SMEAR OF CERVIX   1995    LEEP AT 15 yo     Hypertension      Hypertrophy of breast      Hypertrophy of tonsils alone      Hypovitaminosis D     with secondary high PTH     Irregular heart beat     palpitations     LBP (low back pain)      LSIL (low grade squamous intraepithelial lesion) on Pap smear 5/2006     Lumbago     RESOLVED     Major depressive disorder, recurrent episode, in partial or unspecified remission 4/1/2013     Morbid obesity (H)     bariatric surgery 5/13/2013     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      OLIGOMENORRHEA, C/W PCOS      Sciatica      SLEEP APNEA 6/2002    No longer has since tonsillectomy and septoplasty     Past Surgical History:   Procedure Laterality Date     BIOPSY  03/13/2015    Thyroid nodule     ESOPHAGOSCOPY, GASTROSCOPY, DUODENOSCOPY (EGD), COMBINED  5/28/2013    Procedure: COMBINED ESOPHAGOSCOPY, GASTROSCOPY, DUODENOSCOPY (EGD);;  Surgeon: Best Willett MD;  Location: [facility]     LAPAROSCOPIC GASTRIC SLEEVE  5/13/2013    Procedure: LAPAROSCOPIC GASTRIC SLEEVE;  Laparoscopic Sleeve Gastrectomy ;  Surgeon: Best Willett MD;  " Location:  OR     [facility] [place], CERVICAL  1995    age 15     SEPTOPLASTY       TONSILLECTOMY  age 20s     wisdom teeth extraction         Medications    Current Outpatient Prescriptions   Medication Sig Dispense Refill     potassium chloride SA (POTASSIUM CHLORIDE) 20 MEQ CR tablet Take 1 tablet (20 mEq) by mouth daily 90 tablet 3     cyclobenzaprine (FLEXERIL) 10 MG tablet Indications: PN:   RESHMA MAYES   Johnlauren Apr 5, 2016  5:34 PM Received from: External Pharmacy       Cholecalciferol (VITAMIN D) 2000 UNITS CAPS        Phentermine-Topiramate 7.5-46 MG CP24 Take 1 capsule by mouth daily 30 capsule 5     metroNIDAZOLE (METROGEL) 0.75 % vaginal gel Use twice weekly to vagina for recurrent BV symptoms.  Do this for 3 months after finishing oral medications (Patient not taking: Reported on 6/1/2017) 70 g 3     MELATONIN PO Take 3 mg by mouth       Diphenhydramine-APAP, sleep, (TYLENOL PM EXTRA STRENGTH PO)        hydrochlorothiazide (HYDRODIURIL) 25 MG tablet Take 1 tablet (25 mg) by mouth daily 90 tablet 3     Cyanocobalamin (CVS B-12) 5000 MCG SUBL        polyethylene glycol (MIRALAX) powder Take 17 g (1 capful) by mouth daily as needed for constipation 510 g 1     cetirizine (ZYRTEC) 10 MG tablet Take 10 mg by mouth daily as needed for allergies       Calcium Carbonate-Vitamin D (CALCIUM + D PO) Take by mouth daily       Multiple Vitamin (MULTIVITAMINS PO) Take by mouth daily         Family History  family history includes Allergies in her father; Arthritis in her mother; Breast Cancer in her maternal aunt; Cancer - colorectal in her maternal uncle; DIABETES in her maternal aunt; Gynecology in her mother; Hypertension in her father, mother, sister, and sister; Lipids in her father; OSTEOPOROSIS in her mother; Obesity in her father, mother, sister, and sister; Other Cancer in her mother; Parathyroid Disorders in her mother. There is no history of Nephrolithiasis.      DATA REVIEW    ENDO THYROID LABS-[facility] Latest Ref Rng &  Units 2/11/2016 1/27/2016   TSH mcU/mL 0.94 0.86   T4 FREE ng/dL 1.13    THYROGLOBULIN ANTIBODY <40 IU/mL     THYR PEROXIDASE REYES <35 IU/mL     PREALBUMIN 15 - 45 mg/dL     CALCITONIN        ENDO THYROID LABS-[facility] Latest Ref Rng & Units 3/25/2015 3/3/2015   TSH mcU/mL  1.14   T4 FREE ng/dL     THYROGLOBULIN ANTIBODY <40 IU/mL <20    THYR PEROXIDASE REYES <35 IU/mL <10    PREALBUMIN 15 - 45 mg/dL     CALCITONIN   <2.0 . . .     ENDO CALCIUM LABS-[facility] Latest Ref Rng & Units 7/13/2017   CALCIUM 8.5 - 10.1 mg/dL 9.3   CALCIUM IONIZED 4.4 - 5.2 mg/dL    PHOSPHOROUS 2.5 - 4.5 mg/dL    MAGNESIUM 1.6 - 2.3 mg/dL    ALBUMIN 3.4 - 5.0 g/dL 3.8   BUN 7 - 30 mg/dL 13   CREATININE 0.52 - 1.04 mg/dL 0.72   PARATHYROID HORMONE INTACT 12 - 72 pg/mL    ALKPHOS 40 - 150 U/L 49   25 OH VIT D2 ug/L    25 OH VIT D3 ug/L    25 OH VIT D TOTAL 30 - 75 ug/L    VITAMIN D DEFICIENCY SCREENING 20 - 75 ug/L    PROTEIN, TOTAL 6.8 - 8.8 g/dL 7.3     ENDO CALCIUM LABS-[facility] Latest Ref Rng & Units 5/15/2017   CALCIUM 8.5 - 10.1 mg/dL 9.1   CALCIUM IONIZED 4.4 - 5.2 mg/dL    PHOSPHOROUS 2.5 - 4.5 mg/dL    MAGNESIUM 1.6 - 2.3 mg/dL    ALBUMIN 3.4 - 5.0 g/dL 3.4   BUN 7 - 30 mg/dL 13   CREATININE 0.52 - 1.04 mg/dL 0.54   PARATHYROID HORMONE INTACT 12 - 72 pg/mL    ALKPHOS 40 - 150 U/L 60   25 OH VIT D2 ug/L    25 OH VIT D3 ug/L    25 OH VIT D TOTAL 30 - 75 ug/L    VITAMIN D DEFICIENCY SCREENING 20 - 75 ug/L 47   PROTEIN, TOTAL 6.8 - 8.8 g/dL 7.2     Patient Name: JOSEY KHALIL   MR#: [id number]   Specimen #: [id number]   Collected: 7/14/2017   Received: 7/14/2017   Reported: 7/17/2017 16:41   Ordering Phy(s): TEN KABA     For improved result formatting, select 'View Enhanced Report Format'   under Linked Documents section.     SPECIMEN/STAIN PROCESS:   FNA-thyroid, Isthmus Nodule #1        Pap-Cyto x 3, Diff Quick Stain-cyto x 3     ----------------------------------------------------------------     CYTOLOGIC INTERPRETATION:     FNA-thyroid, Isthmus  Nodule #1:   -Suspicious for follicular neoplasm     Predominance of Hurthle cell features     The Printer implied risk of malignancy and recommended clinical   management:   Suspicious for a follicular neoplasm has a 15-30% risk of malignancy,   recommended management is surgical lobectomy     Specimen Adequacy: Satisfactory for evaluation.     I have personally reviewed all specimens and/or slides, including the   listed special stains, and used them with my medical judgment to   determine the final diagnosis.     Electronically signed out by:   Frankie Henriquez M.D., [facility]     Processed and screened at [facility]     CLINICAL HISTORY:   The patient is a 37-year-old female undergoing repeat fine needle   aspiration of a nodule previously read as follicular neoplasm, thyroid   sequence negative.     ,     GROSS:   FNA-thyroid, Isthmus Nodule #1: Received are 3 fixed slides, processed   for Pap stain, and 3 air dried slides, processed for Diff Quik stain.   Afirma tube held.     INTRAOPERATIVE CONSULTATION:   FNA Performance: Fine needle aspiration was not performed by [facility],    Pathology staff.   Immediate Adequacy: On site specimen adequacy evaluation was performed   by Dr. SARA Crawley MD via telepathology.     Onsite adequacy/interpretation:   Pass A1 adequate. Pass A2 put directly into Afirma tube. Pass A3-A4   adequate.     MICROSCOPIC:   The microscopic examination is performed.     Rao Pacheco MD, Cytopathology Fellow, and Frankie Henriquez MD     CPT Codes:    35194-SCEG-AUS, 35896-AMXB-RNG   A: 24847-ISDM     TESTING LAB LOCATION:   [facility], [address]   [address] [phone]     COLLECTION SITE:   Client:  [facility]   Location:  [facility] (B)

## 2017-08-02 NOTE — MR AVS SNAPSHOT
After Visit Summary   8/2/2017    Ashley Catherine    MRN: [id number]           Patient Information     Date Of Birth          1980        Visit Information        Provider Department      8/2/2017 7:30 AM Crystal Wyatt MD [facility]        Today's Diagnoses     Multiple thyroid nodules    -  1    Abnormal thyroid cytology-follicular neoplasm          Care Instructions    We will set you up to meet with thyroid surgeon Dr Delia Harper to discuss the potential for diagnostic surgery on the thyroid nodule.  I am placing order for follow up thyroid ultrasound in December, to be done in the event you do not have surgery before then.    I am also placing orders for follow up thyroid blood test, to confirm it remains normal.  Your last one was Feb, 2016, normal then.   If the thyroid function has become abnormal it could stimulate thyroid nodule growth.      I am leaving orders for follow up labs on the medical record.  Please call [phone]  to schedule lab follow up testing as directed.  Alternatively, it can be drawn in any [facility] lab.              Follow-ups after your visit        Additional Services     GENERAL SURG ADULT REFERRAL       Your provider has referred you to: Dr Delia Harper thyroid surgeon.     Please be aware that coverage of these services is subject to the terms and limitations of your health insurance plan.  Call member services at your health plan with any benefit or coverage questions.      Please bring the following with you to your appointment:    (1) Any X-Rays, CTs or MRIs which have been performed.  Contact the facility where they were done to arrange for  prior to your scheduled appointment.   (2) List of current medications   (3) This referral request   (4) Any documents/labs given to you for this referral                  Follow-up notes from your care team     Return in about 5 months (around 12/19/2017).      Your next 10 appointments  already scheduled     Aug 21, 2017 11:30 AM CDT   (Arrive by 11:15 AM)   Return Visit with Santosh De Los Santos MD   [facility] ([facility])    [address] [address] [phone]              Future tests that were ordered for you today     Open Future Orders        Priority Expected Expires Ordered    TSH Routine  8/2/2018 8/2/2017    US Thyroid Routine 12/4/2017 8/2/2018 8/2/2017    US head neck soft tissue Routine 12/4/2017 8/2/2018 8/2/2017            Who to contact     Please call your clinic at [phone] to:    Ask questions about your health    Make or cancel appointments    Discuss your medicines    Learn about your test results    Speak to your doctor   If you have compliments or concerns about an experience at your clinic, or if you wish to file a complaint, please contact [facility] Patient Relations at [phone] or email us at [email or web address]         Additional Information About Your Visit        [employer] Information     [employer] gives you secure access to your electronic health record. If you see a primary care provider, you can also send messages to your care team and make appointments. If you have questions, please call your primary care clinic.  If you do not have a primary care provider, please call [phone] and they will assist you.      [employer] is an electronic gateway that provides easy, online access to your medical records. With [employer], you can request a clinic appointment, read your test results, renew a prescription or communicate with your care team.     To access your existing account, please contact your [facility] or call [phone] for assistance.        Care EveryWhere ID     This is your Care EveryWhere ID. This could be used by other organizations to access your [facility] medical records  [id number]          Blood Pressure from Last 3 Encounters:   07/13/17 106/66   06/01/17 (!) 133/91   05/15/17 143/88    Weight from Last 3 Encounters:   07/13/17 89.4 kg (197 lb)   06/01/17 97.8 kg (215 lb 11.2 oz)   05/15/17 100.9 kg (222 lb 8 oz)              We Performed the Following     GENERAL SURG ADULT REFERRAL        Primary Care Provider Office Phone # Fax #    [place] [phone]       [facility] [address]  [facility] [address]        Equal Access to Services     [facility] CenterSARAH : Hadii sabas Alejandre, waaxda lawrence, qaybta kaalmada akanksha, carmen arteaga . So [facility] [phone].    ATENCIÓN: Si habla español, tiene a gibbons disposición servicios gratuitos de asistencia lingüística. Ll[facility] [phone].    We comply with applicable federal civil rights laws and [place] laws. We do not discriminate on the basis of race, color, national origin, age, disability sex, sexual orientation or gender identity.            Thank you!     Thank you for choosing [facility] ENDOCRINOLOGY  for your care. Our goal is always to provide you with excellent care. Hearing back from our patients is one way we can continue to improve our services. Please take a few minutes to complete the written survey that you may receive in the mail after your visit with us. Thank you!             Your Updated Medication List - Protect others around you: Learn how to safely use, store and throw away your medicines at www.disposemymeds.org.          This list is accurate as of: 8/2/17 11:11 AM.  Always use your most recent med list.                   Brand Name Dispense Instructions for use Diagnosis    CALCIUM + D PO      Take by mouth daily        cetirizine 10 MG tablet    zyrTEC     Take 10 mg by mouth daily as needed for allergies        CVS B-12 5000 MCG Subl   Generic drug:  Cyanocobalamin       Multiple thyroid nodules       cyclobenzaprine 10 MG tablet     FLEXERIL     Indications: PN:   RESHMA MAYES Apr 5, 2016  5:34 PM Received from: External Pharmacy        hydrochlorothiazide 25 MG tablet    HYDRODIURIL    90 tablet    Take 1 tablet (25 mg) by mouth daily    Essential hypertension with goal blood pressure less than 140/90       MELATONIN PO      Take 3 mg by mouth        metroNIDAZOLE 0.75 % vaginal gel    METROGEL    70 g    Use twice weekly to vagina for recurrent BV symptoms.  Do this for 3 months after finishing oral medications    Abnormal vaginal fluids       MULTIVITAMINS PO      Take by mouth daily        Phentermine-Topiramate 7.5-46 MG Cp24     30 capsule    Take 1 capsule by mouth daily    Non morbid obesity due to excess calories       polyethylene glycol powder    MIRALAX    510 g    Take 17 g (1 capful) by mouth daily as needed for constipation    Constipation, unspecified constipation type       potassium chloride SA 20 MEQ CR tablet    potassium chloride    90 tablet    Take 1 tablet (20 mEq) by mouth daily    S/P gastrectomy       TYLENOL PM EXTRA STRENGTH PO           vitamin D 2000 UNITS Caps

## 2017-08-02 NOTE — MR AVS SNAPSHOT
After Visit Summary   8/2/2017    Ashley Catherine    MRN: [id number]           Patient Information     Date Of Birth          1980        Visit Information        Provider Department      8/2/2017 11:00 AM Jayro Elias, PhD [facility]        Today's Diagnoses     Major depressive disorder, recurrent episode, mild (H)    -  1    Psychological factor affecting physical condition        Occupational problem           Follow-ups after your visit        Your next 10 appointments already scheduled     Aug 02, 2017 12:15 PM CDT   LAB with  LAB   [facility] ([facility])    [address] [place] [id number] [phone]           Patient must bring picture ID. Patient should be prepared to give a urine specimen  Please do not eat 10-12 hours before your appointment if you are coming in fasting for labs on lipids, cholesterol, or glucose (sugar). Pregnant women should follow their Care Team instructions. Water with medications is okay. Do not drink coffee or other fluids. If you have concerns about taking  your medications, please ask at office or if scheduling via [facility], send a message by clicking on Secure Messaging, Message Your Care Team.            Aug 21, 2017 11:30 AM CDT   (Arrive by 11:15 AM)   Return Visit with Santosh De Los Santos MD   [facility] ([facility])    [address] [place] [id number] [phone]            Sep 25, 2017  3:00 PM CDT   (Arrive by 2:45 PM)   New Patient Visit with Delia Harper MD   [facility] ([facility])    [address] [place] [address]   [phone]              Future tests that were ordered for you today     Open Future Orders        Priority Expected Expires Ordered    TSH Routine  8/2/2018 8/2/2017    US Thyroid Routine  12/4/2017 8/2/2018 8/2/2017    US head neck soft tissue Routine 12/4/2017 8/2/2018 8/2/2017            Who to contact     Please call your clinic at [phone] to:    Ask questions about your health    Make or cancel appointments    Discuss your medicines    Learn about your test results    Speak to your doctor   If you have compliments or concerns about an experience at your clinic, or if you wish to file a complaint, please contact [facility] Patient Relations at [phone] or email us at [email or web address]         Additional Information About Your Visit        [employer] Information     [employer] gives you secure access to your electronic health record. If you see a primary care provider, you can also send messages to your care team and make appointments. If you have questions, please call your primary care clinic.  If you do not have a primary care provider, please call [phone] and they will assist you.      [employer] is an electronic gateway that provides easy, online access to your medical records. With [employer], you can request a clinic appointment, read your test results, renew a prescription or communicate with your care team.     To access your existing account, please contact your [facility] or call [phone] for assistance.        Care EveryWhere ID     This is your Care EveryWhere ID. This could be used by other organizations to access your [facility] medical records  [id number]        Your Vitals Were     BMI (Body Mass Index)                   28.75 kg/m2            Blood Pressure from Last 3 Encounters:   07/13/17 106/66   06/01/17 (!) 133/91   05/15/17 143/88    Weight from Last 3 Encounters:   08/02/17 89.6 kg (197 lb 8 oz)   07/13/17 89.4 kg (197 lb)   06/21/17 92.9 kg (204 lb 12.8 oz)              Today, you had the following     No orders found for display       Primary Care Provider Office Phone # Fax #    Violet Agrawal  MD Juliann [phone]       [facility] [address]  [facility] [address]        Equal Access to Services     MABEL BAILEY AH: Nati sabas lundberg tee Alejandre, wajuanisda luqadaha, qaybta kaalmada akanksha, carmen melo laJarrodvalerie weston. So [facility] [phone].    ATENCIÓN: Si habla español, tiene a gibbons disposición servicios gratuitos de asistencia lingüística. Llame al [phone].    We comply with applicable federal civil rights laws and [place] laws. We do not discriminate on the basis of race, color, national origin, age, disability sex, sexual orientation or gender identity.            Thank you!     Thank you for choosing [facility]  for your care. Our goal is always to provide you with excellent care. Hearing back from our patients is one way we can continue to improve our services. Please take a few minutes to complete the written survey that you may receive in the mail after your visit with us. Thank you!             Your Updated Medication List - Protect others around you: Learn how to safely use, store and throw away your medicines at www.disposemymeds.org.          This list is accurate as of: 8/2/17 12:05 PM.  Always use your most recent med list.                   Brand Name Dispense Instructions for use Diagnosis    CALCIUM + D PO      Take by mouth daily        cetirizine 10 MG tablet    zyrTEC     Take 10 mg by mouth daily as needed for allergies        CVS B-12 5000 MCG Subl   Generic drug:  Cyanocobalamin       Multiple thyroid nodules       cyclobenzaprine 10 MG tablet    FLEXERIL     Indications: PN:   RESHMA MAYES Apr 5, 2016  5:34 PM Received from: External Pharmacy        hydrochlorothiazide 25 MG tablet    HYDRODIURIL    90 tablet    Take 1 tablet (25 mg) by mouth daily    Essential hypertension with goal blood pressure less than 140/90       MELATONIN PO      Take 3 mg by mouth        metroNIDAZOLE 0.75 % vaginal gel     METROGEL    70 g    Use twice weekly to vagina for recurrent BV symptoms.  Do this for 3 months after finishing oral medications    Abnormal vaginal fluids       MULTIVITAMINS PO      Take by mouth daily        Phentermine-Topiramate 7.5-46 MG Cp24     30 capsule    Take 1 capsule by mouth daily    Non morbid obesity due to excess calories       polyethylene glycol powder    MIRALAX    510 g    Take 17 g (1 capful) by mouth daily as needed for constipation    Constipation, unspecified constipation type       potassium chloride SA 20 MEQ CR tablet    potassium chloride    90 tablet    Take 1 tablet (20 mEq) by mouth daily    S/P gastrectomy       TYLENOL PM EXTRA STRENGTH PO           vitamin D 2000 UNITS Caps

## 2017-08-02 NOTE — PATIENT INSTRUCTIONS
We will set you up to meet with thyroid surgeon Dr Delia Harper to discuss the potential for diagnostic surgery on the thyroid nodule.  I am placing order for follow up thyroid ultrasound in December, to be done in the event you do not have surgery before then.    I am also placing orders for follow up thyroid blood test, to confirm it remains normal.  Your last one was Feb, 2016, normal then.   If the thyroid function has become abnormal it could stimulate thyroid nodule growth.      I am leaving orders for follow up labs on the medical record.  Please call [phone]  to schedule lab follow up testing as directed.  Alternatively, it can be drawn in any [place] lab.

## 2017-08-03 NOTE — TELEPHONE ENCOUNTER
SN  Please see Loylap message below.  Do you want a potassium checked? Was 3.0 on 7/13/17.  Carolyn James RN

## 2017-08-04 LAB — POTASSIUM SERPL-SCNC: 3.7 MMOL/L (ref 3.4–5.3)

## 2017-08-11 ENCOUNTER — OFFICE VISIT (OUTPATIENT)
Dept: ENDOCRINOLOGY | Facility: CLINIC | Age: 37
End: 2017-08-11

## 2017-08-11 VITALS
DIASTOLIC BLOOD PRESSURE: 89 MMHG | WEIGHT: 195 LBS | OXYGEN SATURATION: 98 % | HEART RATE: 86 BPM | SYSTOLIC BLOOD PRESSURE: 132 MMHG | BODY MASS INDEX: 27.92 KG/M2 | HEIGHT: 70 IN | TEMPERATURE: 98.3 F

## 2017-08-11 DIAGNOSIS — E66.01 MORBID OBESITY DUE TO EXCESS CALORIES (H): Primary | ICD-10-CM

## 2017-08-11 ASSESSMENT — ENCOUNTER SYMPTOMS
BACK PAIN: 1
JAUNDICE: 0
SINUS PAIN: 0
DEPRESSION: 0
ABDOMINAL PAIN: 0
HOARSE VOICE: 0
TINGLING: 1
BLOATING: 0
SEIZURES: 0
STIFFNESS: 0
MUSCLE CRAMPS: 1
VOMITING: 1
TASTE DISTURBANCE: 0
INSOMNIA: 1
HEARTBURN: 1
DECREASED CONCENTRATION: 0
ARTHRALGIAS: 0
PANIC: 0
BOWEL INCONTINENCE: 0
DIZZINESS: 0
NAUSEA: 0
SPEECH CHANGE: 0
NUMBNESS: 1
DISTURBANCES IN COORDINATION: 0
PARALYSIS: 0
TREMORS: 0
HEADACHES: 0
NERVOUS/ANXIOUS: 1
LOSS OF CONSCIOUSNESS: 0
BLOOD IN STOOL: 0
RECTAL PAIN: 0
WEAKNESS: 0
TROUBLE SWALLOWING: 0
SINUS CONGESTION: 0
RECTAL BLEEDING: 0
NECK PAIN: 0
DIARRHEA: 0
JOINT SWELLING: 0
SORE THROAT: 0
SMELL DISTURBANCE: 0
CONSTIPATION: 1
NECK MASS: 0
MEMORY LOSS: 0
MUSCLE WEAKNESS: 0

## 2017-08-11 ASSESSMENT — PAIN SCALES - GENERAL: PAINLEVEL: NO PAIN (0)

## 2017-08-11 NOTE — MR AVS SNAPSHOT
After Visit Summary   8/11/2017    Ashley Catherine    MRN: [id number]           Patient Information     Date Of Birth          1980        Visit Information        Provider Department      8/11/2017 11:45 AM aSntosh De Los Santos MD [facility] Medical Weight Management        Today's Diagnoses     Morbid obesity due to excess calories (H)    -  1       Follow-ups after your visit        Follow-up notes from your care team     Return in about 3 months (around 11/11/2017).      Your next 10 appointments already scheduled     Sep 25, 2017  3:00 PM CDT   (Arrive by 2:45 PM)   New Patient Visit with Delia Harper MD   [facility] Ear Nose and Throat ([facility] [place])    [address] [place] [address]  4th [facility] [address]   [phone]            Sep 29, 2017 10:00 AM CDT   (Arrive by 9:45 AM)   Return Visit with Jayro Elias, PhD [facility] ([facility])    [address] [place] [address]  3rd [facility] [address]   [phone]            Jan 05, 2018 10:00 AM CST   US HEAD NECK SOFT TISSUE with [facility] [place] [address] Imaging Center US ([facility])    [address] [place] [id number] [phone]           Please bring a list of your medicines (including vitamins, minerals and over-the-counter drugs). Also, tell your doctor about any allergies you may have. Wear comfortable clothes and leave your valuables at home.  You do not need to do anything special to prepare for your exam.  Please call the Imaging Department at your exam site with any questions.            Jan 05, 2018 10:45 AM CST   US THYROID with UCUS1   [facility] Imaging Center US ([facility])    [address] [place] [id number] [phone]           Please bring a list of your medicines (including vitamins, minerals and over-the-counter drugs). Also, tell your  doctor about any allergies you may have. Wear comfortable clothes and leave your valuables at home.  You do not need to do anything special to prepare for your exam.  Please call the Imaging Department at your exam site with any questions.            Kwesi 10, 2018  1:00 PM CST   (Arrive by 12:45 PM)   RETURN ENDOCRINE with Crystal Wyatt MD   [facility] Endocrinology ([facility] [place])    [address] [address] [phone]              Who to contact     Please call your clinic at [phone] to:    Ask questions about your health    Make or cancel appointments    Discuss your medicines    Learn about your test results    Speak to your doctor   If you have compliments or concerns about an experience at your clinic, or if you wish to file a complaint, please contact [facility] Patient Relations at [phone] or email us at [email or web address]         Additional Information About Your Visit        LOOKKharPPI Information     Fariqakt gives you secure access to your electronic health record. If you see a primary care provider, you can also send messages to your care team and make appointments. If you have questions, please call your primary care clinic.  If you do not have a primary care provider, please call [phone] and they will assist you.      ThinkHR is an electronic gateway that provides easy, online access to your medical records. With ThinkHR, you can request a clinic appointment, read your test results, renew a prescription or communicate with your care team.     To access your existing account, please contact your [facility] Clinic or call [phone] for assistance.        Care EveryWhere ID     This is your Care EveryWhere ID. This could be used by other organizations to access your [place] medical records  [id number]        Your Vitals Were     Pulse Temperature Height Pulse  "Oximetry BMI (Body Mass Index)       86 98.3  F (36.8  C) (Oral) 1.779 m (5' 10.03\") 98% 27.96 kg/m2        Blood Pressure from Last 3 Encounters:   08/11/17 132/89   07/13/17 106/66   06/01/17 (!) 133/91    Weight from Last 3 Encounters:   08/11/17 88.5 kg (195 lb)   07/13/17 89.4 kg (197 lb)   06/01/17 97.8 kg (215 lb 11.2 oz)              Today, you had the following     No orders found for display       Primary Care Provider Office Phone # Fax #    Violet Humphreys [phone] [address] [address]        Equal Access to Services     [facility] : Nati Alejandre, waaxdariel lukonstantin, qaybta kaalzohaib vale, carmen arteaga . So [facility] [phone].    ATENCIÓN: Si habla español, tiene a gibbons disposición servicios gratuitos de asistencia lingüística. Stefanjohn al [phone].    We comply with applicable federal civil rights laws and [place] laws. We do not discriminate on the basis of race, color, national origin, age, disability sex, sexual orientation or gender identity.            Thank you!     Thank you for choosing [facility] WEIGHT MANAGEMENT  for your care. Our goal is always to provide you with excellent care. Hearing back from our patients is one way we can continue to improve our services. Please take a few minutes to complete the written survey that you may receive in the mail after your visit with us. Thank you!             Your Updated Medication List - Protect others around you: Learn how to safely use, store and throw away your medicines at www.disposemymeds.org.          This list is accurate as of: 8/11/17 11:59 PM.  Always use your most recent med list.                   Brand Name Dispense Instructions for use Diagnosis    CALCIUM + D PO      Take by mouth daily        cetirizine 10 MG tablet    zyrTEC     Take 10 mg by mouth daily as needed for allergies        [employer] B-12 5000 MCG Subl   Generic drug: "  Cyanocobalamin       Multiple thyroid nodules       cyclobenzaprine 10 MG tablet    FLEXERIL     Indications: PN:   RESHMA MAYES Apr 5, 2016  5:34 PM Received from: External Pharmacy        MELATONIN PO      Take 3 mg by mouth        metroNIDAZOLE 0.75 % vaginal gel    METROGEL    70 g    Use twice weekly to vagina for recurrent BV symptoms.  Do this for 3 months after finishing oral medications    Abnormal vaginal fluids       MULTIVITAMINS PO      Take by mouth daily        Phentermine-Topiramate 7.5-46 MG Cp24     30 capsule    Take 1 capsule by mouth daily    Non morbid obesity due to excess calories       polyethylene glycol powder    MIRALAX    510 g    Take 17 g (1 capful) by mouth daily as needed for constipation    Constipation, unspecified constipation type       potassium chloride SA 20 MEQ CR tablet    potassium chloride    90 tablet    Take 1 tablet (20 mEq) by mouth daily    S/P gastrectomy       TYLENOL PM EXTRA STRENGTH PO           vitamin D 2000 UNITS Caps

## 2017-08-11 NOTE — PROGRESS NOTES
"    Return Medical Weight Management Note     Ashley Catherine  MRN:  [id number]  :  1980  RAKEL:  2017    Dear Violet Humphreys MD,    I had the pleasure of seeing your patient Ashley Catherine.  She is a 37 year old female who I am continuing to see for treatment of obesity related to:       2017   I have the following co-morbidities associated with obesity: -   Are you taking daily medication for heartburn, acid reflux, or GERD (acid reflux disease)? No       CURRENT WEIGHT:   195 lbs 0 oz    Wt Readings from Last 4 Encounters:   17 88.5 kg (195 lb)   17 89.4 kg (197 lb)   17 97.8 kg (215 lb 11.2 oz)   05/15/17 100.9 kg (222 lb 8 oz)       Height:  5' 10.025\"  Body Mass Index:  Body mass index is 27.96 kg/(m^2).  Vitals:  B/P: 132/89, P: 86    Initial consult weight was 222 on 5/15/2017.  Weight change since last seen on 5/15/2017 is down 27 pounds.   Total loss is 27 pounds.    INTERVAL HISTORY:  Has been able to major food life changes, more vegetables and less starch, with aid of Qsymia. Tolerating the medication fairly well with perhaps some mild memory issues.    Diet and Activity Changes Since Last Visit Reviewed With Patient 2017   I have made the following changes to my diet since my last visit: Less deserts, more vegetables, less dairy   For breakfast, I typically eat: Boiled egg, hummus   For lunch, I typically eat: Hummus, chicken, vegetable   For supper, I typically eat: Salad, fish,    For snack(s), I typically eat: Nuts, hummus, popcorn   I have made the following changes to my activity/exercise [place]       MEDICATIONS:   Current Outpatient Prescriptions   Medication     potassium chloride SA (POTASSIUM CHLORIDE) 20 MEQ CR tablet     cyclobenzaprine (FLEXERIL) 10 MG tablet     Cholecalciferol (VITAMIN D) 2000 UNITS CAPS     Phentermine-Topiramate 7.5-46 MG CP24     metroNIDAZOLE (METROGEL) 0.75 % vaginal gel     MELATONIN PO "     Diphenhydramine-APAP, sleep, (TYLENOL PM EXTRA STRENGTH PO)     hydrochlorothiazide (HYDRODIURIL) 25 MG tablet     Cyanocobalamin (CVS B-12) 5000 MCG SUBL     polyethylene glycol (MIRALAX) powder     cetirizine (ZYRTEC) 10 MG tablet     Calcium Carbonate-Vitamin D (CALCIUM + D PO)     Multiple Vitamin (MULTIVITAMINS PO)     No current facility-administered medications for this visit.        Weight Loss Medication History Reviewed With Patient 8/11/2017   Which weight loss medications are you currently taking on a regular basis?  Qysmia (phentermine/topiramate)   Are you having any side effects from the weight loss medication that we have prescribed you? Yes   If you are having side effects please describe: Psych, low K       ASSESSMENT:   Excellent initial response to food change with support of Qsymia, maintain this plan.    FOLLOW-UP:    12 weeks.  I spent 15 minutes with this patient face to face and explained the conditions and plans (more than 50% of time was counseling/coordination of weight management).    Sincerely,    Santosh De Los Santos MD

## 2017-08-11 NOTE — NURSING NOTE
"No chief complaint on file.      Vitals:    08/11/17 1156   BP: 132/89   Pulse: 86   Temp: 98.3  F (36.8  C)   TempSrc: Oral   SpO2: 98%   Weight: 195 lb   Height: 5' 10.03\"       Body mass index is 27.96 kg/(m^2).      Ely WALKER LPN                       "

## 2017-08-11 NOTE — LETTER
"2017       RE: Ashley Catherine  [address]  [facility] [address]     Dear Colleague,    Thank you for referring your patient, Ashley Catherine, to the [facility] MEDICAL WEIGHT MANAGEMENT at [facility]. Please see a copy of my visit note below.        Return Medical Weight Management Note     Ashley Catherine  MRN:  [id number]  :  1980  RAKEL:  2017    Dear Violet Humphreys MD,    I had the pleasure of seeing your patient Ashley Catherine.  She is a 37 year old female who I am continuing to see for treatment of obesity related to:       2017   I have the following co-morbidities associated with obesity: -   Are you taking daily medication for heartburn, acid reflux, or GERD (acid reflux disease)? No       CURRENT WEIGHT:   195 lbs 0 oz    Wt Readings from Last 4 Encounters:   17 88.5 kg (195 lb)   17 89.4 kg (197 lb)   17 97.8 kg (215 lb 11.2 oz)   05/15/17 100.9 kg (222 lb 8 oz)       Height:  5' 10.025\"  Body Mass Index:  Body mass index is 27.96 kg/(m^2).  Vitals:  B/P: 132/89, P: 86    Initial consult weight was 222 on 5/15/2017.  Weight change since last seen on 5/15/2017 is down 27 pounds.   Total loss is 27 pounds.    INTERVAL HISTORY:  Has been able to major food life changes, more vegetables and less starch, with aid of Qsymia. Tolerating the medication fairly well with perhaps some mild memory issues.    Diet and Activity Changes Since Last Visit Reviewed With Patient 2017   I have made the following changes to my diet since my last visit: Less deserts, more vegetables, less dairy   For breakfast, I typically eat: Boiled egg, hummus   For lunch, I typically eat: Hummus, chicken, vegetable   For supper, I typically eat: Salad, fish,    For snack(s), I typically eat: Nuts, hummus, popcorn   I have made the following changes to my activity/exercise [place]       MEDICATIONS:   Current Outpatient " Prescriptions   Medication     potassium chloride SA (POTASSIUM CHLORIDE) 20 MEQ CR tablet     cyclobenzaprine (FLEXERIL) 10 MG tablet     Cholecalciferol (VITAMIN D) 2000 UNITS CAPS     Phentermine-Topiramate 7.5-46 MG CP24     metroNIDAZOLE (METROGEL) 0.75 % vaginal gel     MELATONIN PO     Diphenhydramine-APAP, sleep, (TYLENOL PM EXTRA STRENGTH PO)     hydrochlorothiazide (HYDRODIURIL) 25 MG tablet     Cyanocobalamin (CVS B-12) 5000 MCG SUBL     polyethylene glycol (MIRALAX) powder     cetirizine (ZYRTEC) 10 MG tablet     Calcium Carbonate-Vitamin D (CALCIUM + D PO)     Multiple Vitamin (MULTIVITAMINS PO)     No current facility-administered medications for this visit.        Weight Loss Medication History Reviewed With Patient 8/11/2017   Which weight loss medications are you currently taking on a regular basis?  Qysmia (phentermine/topiramate)   Are you having any side effects from the weight loss medication that we have prescribed you? Yes   If you are having side effects please describe: Psych, low K       ASSESSMENT:   Excellent initial response to food change with support of Qsymia, maintain this plan.    FOLLOW-UP:    12 weeks.  I spent 15 minutes with this patient face to face and explained the conditions and plans (more than 50% of time was counseling/coordination of weight management).    Sincerely,    Santosh De Los Santos MD

## 2017-08-31 ENCOUNTER — PRE VISIT (OUTPATIENT)
Dept: OTOLARYNGOLOGY | Facility: CLINIC | Age: 37
End: 2017-08-31

## 2017-08-31 NOTE — TELEPHONE ENCOUNTER
1.  Date/reason for appt:  9/25/17 - surgery consult    2.  Referring provider: Dr. Wyatt    3.  Call to patient (Yes / No - short description): no, recs in epic    4.  Previous care at:    [facility] Endocrinology (office notes, FNA, US, labs, path)

## 2017-09-01 ENCOUNTER — MYC REFILL (OUTPATIENT)
Dept: FAMILY MEDICINE | Facility: CLINIC | Age: 37
End: 2017-09-01

## 2017-09-01 DIAGNOSIS — M54.30 SCIATICA, UNSPECIFIED LATERALITY: ICD-10-CM

## 2017-09-01 DIAGNOSIS — I10 ESSENTIAL HYPERTENSION WITH GOAL BLOOD PRESSURE LESS THAN 140/90: ICD-10-CM

## 2017-09-01 RX ORDER — HYDROCHLOROTHIAZIDE 25 MG/1
25 TABLET ORAL DAILY
Qty: 90 TABLET | Refills: 3 | Status: CANCELLED | OUTPATIENT
Start: 2017-09-01

## 2017-09-01 RX ORDER — HYDROCHLOROTHIAZIDE 25 MG/1
TABLET ORAL
Qty: 90 TABLET | Refills: 1 | Status: SHIPPED | OUTPATIENT
Start: 2017-09-01 | End: 2018-02-24

## 2017-09-01 NOTE — TELEPHONE ENCOUNTER
Flexeril       Last Written Prescription Date:  06/23/2015  Last Fill Quantity: ,   # refills:   Last Office Visit with [facility], [facility] or [facility] prescribing provider: 07/13/2017  Future Office visit:       Routing refill request to provider for review/approval because:  Drug not on the [facility], [facility] or [facility] refill protocol or controlled substance  Medication is reported/historical    Hydrochlorothiazide       Last Written Prescription Date: 08/12/2016  Last Fill Quantity: 90, # refills: 3  Last Office Visit with [facility], [facility] or [facility] prescribing provider: 07/13/2017       Potassium   Date Value Ref Range Status   08/02/2017 3.7 3.4 - 5.3 mmol/L Final     Creatinine   Date Value Ref Range Status   07/13/2017 0.72 0.52 - 1.04 mg/dL Final     BP Readings from Last 3 Encounters:   08/11/17 132/89   07/13/17 106/66   06/01/17 (!) 133/91

## 2017-09-01 NOTE — TELEPHONE ENCOUNTER
HCTZ:  Prescription approved per [facility] Refill Protocol.    Flexeril:  Routing refill request to provider for review/approval because:  Drug not on the [facility] refill protocol   Medication is reported/historical    Laureen SOMERS RN

## 2017-09-04 RX ORDER — CYCLOBENZAPRINE HCL 10 MG
TABLET ORAL
Qty: 30 TABLET | Refills: 5 | Status: ON HOLD | OUTPATIENT
Start: 2017-09-04 | End: 2019-06-25

## 2017-09-25 ENCOUNTER — OFFICE VISIT (OUTPATIENT)
Dept: OTOLARYNGOLOGY | Facility: CLINIC | Age: 37
End: 2017-09-25

## 2017-09-25 DIAGNOSIS — E04.2 MULTIPLE THYROID NODULES: Primary | ICD-10-CM

## 2017-09-25 ASSESSMENT — PAIN SCALES - GENERAL: PAINLEVEL: NO PAIN (0)

## 2017-09-25 NOTE — NURSING NOTE
Chief Complaint   Patient presents with     RECHECK     Surgical consult. declined height and weight.     Alexander Mendoza LPN

## 2017-09-25 NOTE — LETTER
9/25/2017       RE: Ashley Catherine  [address]  [facility] [address]     Dear Colleague,    Thank you for referring your patient, Ashley Catherine, to the [facility] EAR NOSE AND THROAT at [facility]. Please see a copy of my visit note below.    I was asked by Dr. Crystal Wyatt to see this patient regarding a left isthmus nodule.      HISTORY OF PRESENT ILLNESS:  This is a 37-year-old female who was initially noted to have an isthmus nodule around 07/2016.  At that time, she underwent a biopsy that was suspicious for follicular neoplasm.  Regarding this thyroid nodule, she initially was noted to have a left isthmus nodule in 2015.  Again, biopsy of that nodule at that time was also suspicious for follicular neoplasm.  She underwent sequential ultrasounds and biopsies over a 6-12 month period.  Ultrasound of the isthmus nodule, most recently in 06/2017, revealed a 1.3 x 1.1 x 2.2 cm isthmus nodule that is categorized as a greater than 20% growth compared to the 2015 ultrasound.        BIOPSIES:  Again, we have 2 fine needle aspiration biopsies suspicious for follicular neoplasm.  Afirma testing or ThyroSeq was negative.      Regarding the thyroid nodule, she has no problems with voice quality, inspiration or swallowing.  She has no symptoms of hypo or hyperthyroidism.  The patient's TSH is within normal limits.  There is a previous history of secondary hyperparathyroidism likely secondary to her gastric sleeve.  There is also family history of hyperparathyroidism.  No head and neck radiation exposure, no previous thyroid carcinoma.  No family history of thyroid carcinoma.      PAST MEDICAL, SURGICAL HISTORY AND MEDICATIONS:  Reviewed and are available in the EMR.      REVIEW OF SYSTEMS:  A 10-point review of systems is pertinent for that noted in the HPI.      PHYSICAL EXAMINATION:  Just inspecting the neck, you can see a fullness in the midline of the neck just above the  collarbone that moves with swallowing.  On palpating it, she actually has multiple nodules, more prominent on the left than the right, and a firm nodule in the mid to left aspect of the isthmus.  The thyroid gland does move nicely with swallowing.  There is no cervical lymphadenopathy.      LABORATORY, DATA PATHOLOGY AND ULTRASOUND:  As discussed above.      ASSESSMENT/PLAN:  I had a long discussion with the patient that again she does have 2 biopsies that are suspicious for follicular neoplasm over at least a 1-year period.  Options include repeating an ultrasound in 6 months to a year.  If no change in size or characteristics of the nodules, the options would be to continue monitoring versus repeat biopsy.  If there is a change in size, then I agree with Dr. Wyatt repeat the biopsy or proceed with a left thyroid lobectomy and isthmusectomy.  The patient seems very reliable and does not want to proceed with surgery at this time.  We did, however, review what a left thyroid lobectomy and isthmusectomy would entail as well as the risks of bleeding, infection, injury to the recurrent laryngeal nerve and potential need for thyroid hormone replacement.  The patient is aware of this and would like to follow up with the ultrasound at 6 months from her last one to see if there is any change in size of the nodule.         BRYCE HARPER MD             D: 10/23/2017 13:02   T: 10/23/2017 14:05   MT: craig      Name:     JOSEY KHALIL   MRN:      [id number]        Account:      [id number]   :      1980           Service Date: 2017      Document: [id number]

## 2017-09-25 NOTE — MR AVS SNAPSHOT
After Visit Summary   9/25/2017    Ashley Catherine    MRN: [id number]           Patient Information     Date Of Birth          1980        Visit Information        Provider Department      9/25/2017 3:00 PM Delia Harper MD [facility]        Today's Diagnoses     Multiple thyroid nodules    -  1      Care Instructions    The care plan will include a repeat ultra sound in 6 months, March, to re-evaluate the thyroid nodules.   Jose Davis RN  [phone]              Follow-ups after your visit        Your next 10 appointments already scheduled     Oct 30, 2017  2:00 PM CDT   MyChart Short with Violet Humphreys MD   [facility] ([facility])    [address] [facility] [address] [address]   [phone]            Nov 15, 2017  4:00 PM CST   (Arrive by 3:45 PM)   Return Visit with Jayro Elias, PhD [facility] ([facility])    [address] [place] [id number] [phone]            Jan 05, 2018 10:00 AM CST   US HEAD NECK SOFT TISSUE with [address] [facility]  ([facility])    [address] [place] [address] [id number] [phone]           Please bring a list of your medicines (including vitamins, minerals and over-the-counter drugs). Also, tell your doctor about any allergies you may have. Wear comfortable clothes and leave your valuables at home.  You do not need to do anything special to prepare for your exam.  Please call the Imaging Department at your exam site with any questions.            Jan 05, 2018 10:45 AM CST   US THYROID with [address] [place] [address] [facility] [place] ([facility])    [address] [place] [address] [id number] [phone]           Please bring a list of your medicines (including vitamins, minerals and over-the-counter drugs). Also,  tell your doctor about any allergies you may have. Wear comfortable clothes and leave your valuables at home.  You do not need to do anything special to prepare for your exam.  Please call the Imaging Department at your exam site with any questions.            Kwesi 10, 2018  1:00 PM CST   (Arrive by 12:45 PM)   RETURN ENDOCRINE with Crystal Wyatt MD   [facility] ([facility] [place])    [address] [address] [phone]              Who to contact     Please call your clinic at [phone] to:    Ask questions about your health    Make or cancel appointments    Discuss your medicines    Learn about your test results    Speak to your doctor   If you have compliments or concerns about an experience at your clinic, or if you wish to file a complaint, please contact [facility] Patient Relations at [phone] or email us at [email or web address]         Additional Information About Your Visit        [employer] Information     [employer] gives you secure access to your electronic health record. If you see a primary care provider, you can also send messages to your care team and make appointments. If you have questions, please call your primary care clinic.  If you do not have a primary care provider, please call [phone] and they will assist you.      RETAIL PRO is an electronic gateway that provides easy, online access to your medical records. With RETAIL PRO, you can request a clinic appointment, read your test results, renew a prescription or communicate with your care team.     To access your existing account, please contact your [facility] or call [phone] for assistance.        Care EveryWhere ID     This is your Care EveryWhere ID. This could be used by other organizations to access your [place] medical records  [id number]         Blood Pressure from Last 3 Encounters:    10/04/17 110/70   09/28/17 108/74   08/11/17 132/89    Weight from Last 3 Encounters:   10/04/17 84.4 kg (186 lb)   09/28/17 87.5 kg (193 lb)   08/11/17 88.5 kg (195 lb)               Primary Care Provider Office Phone # Fax #    [place]ja Humphreys [phone]       [address] [address]        Equal Access to Services     MABEL BAILEY : Hadii aad ku hadasho Soomaali, waaxda luqadaha, qaybta kaalmada adeegyada, carmen bruno hayzairan riri arteaga . So [facility] [phone].    ATENCIÓN: Si habla español, tiene a gibbons disposición servicios gratuitos de asistencia lingüística. [facility] [phone].    We comply with applicable federal civil rights laws and [place] laws. We do not discriminate on the basis of race, color, national origin, age, disability, sex, sexual orientation, or gender identity.            Thank you!     Thank you for choosing [facility] E[facility]  for your care. Our goal is always to provide you with excellent care. Hearing back from our patients is one way we can continue to improve our services. Please take a few minutes to complete the written survey that you may receive in the mail after your visit with us. Thank you!             Your Updated Medication List - Protect others around you: Learn how to safely use, store and throw away your medicines at www.disposemymeds.org.          This list is accurate as of: 9/25/17 11:59 PM.  Always use your most recent med list.                   Brand Name Dispense Instructions for use Diagnosis    CALCIUM + D PO      Take by mouth daily        cetirizine 10 MG tablet    zyrTEC     Take 10 mg by mouth daily as needed for allergies        [employer] B-12 5000 MCG Subl   Generic drug:  Cyanocobalamin       Multiple thyroid nodules       cyclobenzaprine 10 MG tablet    FLEXERIL    30 tablet    TAKE ONE-HALF TABLET BY MOUTH TWICE A DAY AS NEEDED FOR MUSCLE SPASMS    Sciatica, unspecified laterality        hydrochlorothiazide 25 MG tablet    HYDRODIURIL    90 tablet    TAKE ONE TABLET BY MOUTH EVERY DAY    Essential hypertension with goal blood pressure less than 140/90       MELATONIN PO      Take 3 mg by mouth        metroNIDAZOLE 0.75 % vaginal gel    METROGEL    70 g    Use twice weekly to vagina for recurrent BV symptoms.  Do this for 3 months after finishing oral medications    Abnormal vaginal fluids       MULTIVITAMINS PO      Take by mouth daily        Phentermine-Topiramate 7.5-46 MG Cp24     30 capsule    Take 1 capsule by mouth daily    Non morbid obesity due to excess calories       polyethylene glycol powder    MIRALAX    510 g    Take 17 g (1 capful) by mouth daily as needed for constipation    Constipation, unspecified constipation type       potassium chloride SA 20 MEQ CR tablet    KLOR-CON    90 tablet    Take 1 tablet (20 mEq) by mouth daily    S/P gastrectomy       TYLENOL PM EXTRA STRENGTH PO           vitamin D 2000 UNITS Caps

## 2017-09-25 NOTE — PATIENT INSTRUCTIONS
The care plan will include a repeat ultra sound in 6 months, March, to re-evaluate the thyroid nodules.   Jose Davis RN  [phone]

## 2017-09-28 ENCOUNTER — OFFICE VISIT (OUTPATIENT)
Dept: FAMILY MEDICINE | Facility: CLINIC | Age: 37
End: 2017-09-28
Payer: COMMERCIAL

## 2017-09-28 VITALS
DIASTOLIC BLOOD PRESSURE: 74 MMHG | OXYGEN SATURATION: 100 % | HEART RATE: 84 BPM | SYSTOLIC BLOOD PRESSURE: 108 MMHG | WEIGHT: 193 LBS | TEMPERATURE: 97.5 F | RESPIRATION RATE: 22 BRPM | BODY MASS INDEX: 27.67 KG/M2

## 2017-09-28 DIAGNOSIS — Z11.3 SCREEN FOR STD (SEXUALLY TRANSMITTED DISEASE): ICD-10-CM

## 2017-09-28 DIAGNOSIS — E66.3 OVERWEIGHT (BMI 25.0-29.9): ICD-10-CM

## 2017-09-28 DIAGNOSIS — Z23 NEED FOR PROPHYLACTIC VACCINATION AND INOCULATION AGAINST INFLUENZA: ICD-10-CM

## 2017-09-28 DIAGNOSIS — F33.42 MAJOR DEPRESSIVE DISORDER, RECURRENT EPISODE, IN FULL REMISSION (H): ICD-10-CM

## 2017-09-28 DIAGNOSIS — B37.31 CANDIDIASIS OF VULVA AND VAGINA: Primary | ICD-10-CM

## 2017-09-28 DIAGNOSIS — I10 BENIGN ESSENTIAL HYPERTENSION: ICD-10-CM

## 2017-09-28 LAB
HIV 1+2 AB+HIV1 P24 AG SERPL QL IA: NONREACTIVE
SPECIMEN SOURCE: ABNORMAL
WET PREP SPEC: ABNORMAL

## 2017-09-28 PROCEDURE — 36415 COLL VENOUS BLD VENIPUNCTURE: CPT | Performed by: FAMILY MEDICINE

## 2017-09-28 PROCEDURE — 87591 N.GONORRHOEAE DNA AMP PROB: CPT | Performed by: FAMILY MEDICINE

## 2017-09-28 PROCEDURE — 87491 CHLMYD TRACH DNA AMP PROBE: CPT | Performed by: FAMILY MEDICINE

## 2017-09-28 PROCEDURE — 87389 HIV-1 AG W/HIV-1&-2 AB AG IA: CPT | Performed by: FAMILY MEDICINE

## 2017-09-28 PROCEDURE — 99214 OFFICE O/P EST MOD 30 MIN: CPT | Performed by: FAMILY MEDICINE

## 2017-09-28 PROCEDURE — 87210 SMEAR WET MOUNT SALINE/INK: CPT | Performed by: FAMILY MEDICINE

## 2017-09-28 RX ORDER — FLUCONAZOLE 150 MG/1
150 TABLET ORAL ONCE
Qty: 1 TABLET | Refills: 0 | Status: SHIPPED | OUTPATIENT
Start: 2017-09-28 | End: 2017-09-28

## 2017-09-28 ASSESSMENT — ANXIETY QUESTIONNAIRES
GAD7 TOTAL SCORE: 1
3. WORRYING TOO MUCH ABOUT DIFFERENT THINGS: NOT AT ALL
1. FEELING NERVOUS, ANXIOUS, OR ON EDGE: NOT AT ALL
IF YOU CHECKED OFF ANY PROBLEMS ON THIS QUESTIONNAIRE, HOW DIFFICULT HAVE THESE PROBLEMS MADE IT FOR YOU TO DO YOUR WORK, TAKE CARE OF THINGS AT HOME, OR GET ALONG WITH OTHER PEOPLE: NOT DIFFICULT AT ALL
5. BEING SO RESTLESS THAT IT IS HARD TO SIT STILL: NOT AT ALL
7. FEELING AFRAID AS IF SOMETHING AWFUL MIGHT HAPPEN: NOT AT ALL
6. BECOMING EASILY ANNOYED OR IRRITABLE: SEVERAL DAYS
2. NOT BEING ABLE TO STOP OR CONTROL WORRYING: NOT AT ALL

## 2017-09-28 ASSESSMENT — PATIENT HEALTH QUESTIONNAIRE - PHQ9
5. POOR APPETITE OR OVEREATING: NOT AT ALL
SUM OF ALL RESPONSES TO PHQ QUESTIONS 1-9: 1

## 2017-09-28 NOTE — MR AVS SNAPSHOT
After Visit Summary   9/28/2017    Ashley Catherine    MRN: [id number]           Patient Information     Date Of Birth          1980        Visit Information        Provider Department      9/28/2017 8:20 AM Odilia Barrera MD [facility]        Today's Diagnoses     Need for prophylactic vaccination and inoculation against influenza    -  1    Screen for STD (sexually transmitted disease)        Benign essential hypertension        Overweight (BMI 25.0-29.9)        Major depressive disorder, recurrent episode, in full remission (H)        Vaginitis and vulvovaginitis          Care Instructions    1.  Weight Loss Tips  1. Do not eat after 6 hrs before your expected bedtime  2. Have your heaviest meal for breakfast, a slightly lighter meal at lunch and a snack 6 hrs before bed  3. No sugar/calorie drinks except milk ie no fruit juice, pop, alcohol.  4. Drink milk 30min before meals to decrease your hunger. Also it is excellent as part of your last meal of the day snack  5. Drink lots of water  6. Increase fiber in diet: all bran cereal, salads, popcorn etc  7. Have only one small serving of fruit a day about 1/2 cup (as this is high in sugar)  8. EXERCISE is the bottom line. Without it, you will gain weight even on a low calorie diet. Best if done 2-3X a day as can    Being overweight contributes to high blood pressure and high cholesterol, both of which cause heart attacks, strokes and kidney failure, prediabetes and diabetes, arthritis, and liver disease             Follow-ups after your visit        Your next 10 appointments already scheduled     Sep 29, 2017 10:00 AM CDT   (Arrive by 9:45 AM)   Return Visit with Jayro Elias, PhD [facility] ([facility])    [address] [place] [address] [phone]            Jan 05, 2018 10:00 AM CST   US HEAD NECK SOFT TISSUE with UCUS1     [facility] ([facility])    [address] [place] [address] [phone]           Please bring a list of your medicines (including vitamins, minerals and over-the-counter drugs). Also, tell your doctor about any allergies you may have. Wear comfortable clothes and leave your valuables at home.  You do not need to do anything special to prepare for your exam.  Please call the Imaging Department at your exam site with any questions.            Jan 05, 2018 10:45 AM CST   US THYROID with UCUS1   [facility] ([facility])    [address] [place] [address] [phone]           Please bring a list of your medicines (including vitamins, minerals and over-the-counter drugs). Also, tell your doctor about any allergies you may have. Wear comfortable clothes and leave your valuables at home.  You do not need to do anything special to prepare for your exam.  Please call the Imaging Department at your exam site with any questions.            Kwesi 10, 2018  1:00 PM CST   (Arrive by 12:45 PM)   RETURN ENDOCRINE with Crystal Wyatt MD   [facility] ([facility])    [address] [place] [address] [phone]              Who to contact     If you have questions or need follow up information about today's clinic visit or your schedule please contact [facility] directly at [phone].  Normal or non-critical lab and imaging results will be communicated to you by MyChart, letter or phone within 4 business days after the clinic has received the results. If you do not hear from us within 7 days, please contact the clinic through MyChart or phone. If you have a critical or abnormal lab result, we will notify you by phone as soon as possible.  Submit refill requests through [employer] or call your pharmacy  and they will forward the refill request to us. Please allow 3 business days for your refill to be completed.          Additional Information About Your Visit        [employer] Information     [employer] gives you secure access to your electronic health record. If you see a primary care provider, you can also send messages to your care team and make appointments. If you have questions, please call your primary care clinic.  If you do not have a primary care provider, please call [phone] and they will assist you.        Care EveryWhere ID     This is your Care EveryWhere ID. This could be used by other organizations to access your [place] medical records  [id number]        Your Vitals Were     Pulse Temperature Respirations Last Period Pulse Oximetry BMI (Body Mass Index)    84 97.5  F (36.4  C) (Tympanic) 22 09/01/2017 (Approximate) 100% 27.67 kg/m2       Blood Pressure from Last 3 Encounters:   09/28/17 108/74   08/11/17 132/89   07/13/17 106/66    Weight from Last 3 Encounters:   09/28/17 193 lb (87.5 kg)   08/11/17 195 lb (88.5 kg)   07/13/17 197 lb (89.4 kg)              We Performed the Following     Chlamydia trachomatis PCR     DEPRESSION ACTION PLAN (DAP)     HIV Antigen Antibody Combo     Neisseria gonorrhoeae PCR     Wet prep        Primary Care Provider Office Phone # Fax #    [place]ja Humphreys [phone] [address] [address]        Equal Access to Services     JOSSE BAILEY : Hadii sabas oliveira Sowaldo, waaxda luqadaha, qaybta kaalmada akanksha, waxay kiana weston. So [facility] [phone].    ATENCIÓN: Si habla español, tiene a gibbons disposición servicios gratuitos de asistencia lingüística. Llame al [phone].    We comply with applicable federal civil rights laws and [place] laws. We do not discriminate on the basis of race, color, national origin, age, disability sex, sexual orientation or gender identity.             Thank you!     Thank you for choosing [facility]  for your care. Our goal is always to provide you with excellent care. Hearing back from our patients is one way we can continue to improve our services. Please take a few minutes to complete the written survey that you may receive in the mail after your visit with us. Thank you!             Your Updated Medication List - Protect others around you: Learn how to safely use, store and throw away your medicines at www.disposemymeds.org.          This list is accurate as of: 9/28/17 12:33 PM.  Always use your most recent med list.                   Brand Name Dispense Instructions for use Diagnosis    CALCIUM + D PO      Take by mouth daily        cetirizine 10 MG tablet    zyrTEC     Take 10 mg by mouth daily as needed for allergies        [employer]S B-12 5000 MCG Subl   Generic drug:  Cyanocobalamin       Multiple thyroid nodules       cyclobenzaprine 10 MG tablet    FLEXERIL    30 tablet    TAKE ONE-HALF TABLET BY MOUTH TWICE A DAY AS NEEDED FOR MUSCLE SPASMS    Sciatica, unspecified laterality       hydrochlorothiazide 25 MG tablet    HYDRODIURIL    90 tablet    TAKE ONE TABLET BY MOUTH EVERY DAY    Essential hypertension with goal blood pressure less than 140/90       MELATONIN PO      Take 3 mg by mouth        metroNIDAZOLE 0.75 % vaginal gel    METROGEL    70 g    Use twice weekly to vagina for recurrent BV symptoms.  Do this for 3 months after finishing oral medications    Abnormal vaginal fluids       MULTIVITAMINS PO      Take by mouth daily        Phentermine-Topiramate 7.5-46 MG Cp24     30 capsule    Take 1 capsule by mouth daily    Non morbid obesity due to excess calories       polyethylene glycol powder    MIRALAX    510 g    Take 17 g (1 capful) by mouth daily as needed for constipation    Constipation, unspecified constipation type       potassium chloride SA 20 MEQ CR tablet    KLOR-CON    90 tablet    Take 1 tablet (20 mEq) by  mouth daily    S/P gastrectomy       TYLENOL PM EXTRA STRENGTH PO           vitamin D 2000 UNITS Caps

## 2017-09-28 NOTE — LETTER
September 30, 2017      Ashley Matuten  [address] [place] [facility] [address]        Dear ,    We are writing to inform you of your test results.    Test results indicate you may require additional follow up, see comment below.    Resulted Orders   HIV Antigen Antibody Combo   Result Value Ref Range    HIV Antigen Antibody Combo Nonreactive NR^Nonreactive          Comment:      HIV-1 p24 Ag & HIV-1/HIV-2 Ab Not Detected   Wet prep   Result Value Ref Range    Specimen Description Vagina     Wet Prep No Trichomonas seen     Wet Prep Yeast seen (A)     Wet Prep No clue cells seen        If you have any questions or concerns, please call the clinic at the number listed above.       Sincerely,        Odilia Barrera MD

## 2017-09-28 NOTE — LETTER
My Depression Action Plan  Name: Ashley Catherine   Date of Birth 1980  Date: 9/28/2017    My doctor: Violet Humphreys   My clinic: [address] [address]  [phone]          GREEN    ZONE   Good Control    What it looks like:     Things are going generally well. You have normal up s and down s. You may even feel depressed from time to time, but bad moods usually last less than a day.   What you need to do:  1. Continue to care for yourself (see self care plan)  2. Check your depression survival kit and update it as needed  3. Follow your physician s recommendations including any medication.  4. Do not stop taking medication unless you consult with your physician first.           YELLOW         ZONE Getting Worse    What it looks like:     Depression is starting to interfere with your life.     It may be hard to get out of bed; you may be starting to isolate yourself from others.    Symptoms of depression are starting to last most all day and this has happened for several days.     You may have suicidal thoughts but they are not constant.   What you need to do:     1. Call your care team, your response to treatment will improve if you keep your care team informed of your progress. Yellow periods are signs an adjustment may need to be made.     2. Continue your self-care, even if you have to fake it!    3. Talk to someone in your support network    4. Open up your depression survival kit           RED    ZONE Medical Alert - Get Help    What it looks like:     Depression is seriously interfering with your life.     You may experience these or other symptoms: You can t get out of bed most days, can t work or engage in other necessary activities, you have trouble taking care of basic hygiene, or basic responsibilities, thoughts of suicide or death that will not go away, self-injurious behavior.     What you need to  do:  1. Call your care team and request a same-day appointment. If they are not available (weekends or after hours) call your local crisis line, emergency room or 911.      Electronically signed by: Odilia Barrera, September 28, 2017    Depression Self Care Plan / Survival Kit    Self-Care for Depression  Here s the deal. Your body and mind are really not as separate as most people think.  What you do and think affects how you feel and how you feel influences what you do and think. This means if you do things that people who feel good do, it will help you feel better.  Sometimes this is all it takes.  There is also a place for medication and therapy depending on how severe your depression is, so be sure to consult with your medical provider and/ or Behavioral Health Consultant if your symptoms are worsening or not improving.     In order to better manage my stress, I will:    Exercise  Get some form of exercise, every day. This will help reduce pain and release endorphins, the  feel good  chemicals in your brain. This is almost as good as taking antidepressants!  This is not the same as joining a gym and then never going! (they count on that by the way ) It can be as simple as just going for a walk or doing some gardening, anything that will get you moving.      Hygiene   Maintain good hygiene (Get out of bed in the morning, Make your bed, Brush your teeth, Take a shower, and Get dressed like you were going to work, even if you are unemployed).  If your clothes don't fit try to get ones that do.    Diet  I will strive to eat foods that are good for me, drink plenty of water, and avoid excessive sugar, caffeine, alcohol, and other mood-altering substances.  Some foods that are helpful in depression are: complex carbohydrates, B vitamins, flaxseed, fish or fish oil, fresh fruits and vegetables.    Psychotherapy  I agree to participate in Individual Therapy (if recommended).    Medication  If prescribed medications, I  agree to take them.  Missing doses can result in serious side effects.  I understand that drinking alcohol, or other illicit drug use, may cause potential side effects.  I will not stop my medication abruptly without first discussing it with my provider.    Staying Connected With Others  I will stay in touch with my friends, family members, and my primary care provider/team.    Use your imagination  Be creative.  We all have a creative side; it doesn t matter if it s oil painting, sand castles, or mud pies! This will also kick up the endorphins.    Witness Beauty  (AKA stop and smell the roses) Take a look outside, even in mid-winter. Notice colors, textures. Watch the squirrels and birds.     Service to others  Be of service to others.  There is always someone else in need.  By helping others we can  get out of ourselves  and remember the really important things.  This also provides opportunities for practicing all the other parts of the program.    Humor  Laugh and be silly!  Adjust your TV habits for less news and crime-drama and more comedy.    Control your stress  Try breathing deep, massage therapy, biofeedback, and meditation. Find time to relax each day.     My support system    Clinic Contact:  Phone number:    Contact 1:  Phone number:    Contact 2:  Phone number:    Jehovah's witness/:  Phone number:    Therapist:  Phone number:    Local crisis center:    Phone number:    Other community support:  Phone number:

## 2017-09-28 NOTE — NURSING NOTE
"Chief Complaint   Patient presents with     STD     std screening       Initial /74  Pulse 84  Temp 97.5  F (36.4  C) (Tympanic)  Resp 22  Wt 193 lb (87.5 kg)  LMP 09/01/2017 (Approximate)  SpO2 100%  BMI 27.67 kg/m2 Estimated body mass index is 27.67 kg/(m^2) as calculated from the following:    Height as of 8/11/17: 5' 10.03\" (1.779 m).    Weight as of this encounter: 193 lb (87.5 kg).  Medication Reconciliation: complete    "

## 2017-09-28 NOTE — LETTER
September 30, 2017      Ashley Catherine  [address] [place] [facility] [address]        Dear ,    We are writing to inform you of your test results.    Your test results fall within the expected range(s) or remain unchanged from previous results.  Please continue with current treatment plan.    Resulted Orders   HIV Antigen Antibody Combo   Result Value Ref Range    HIV Antigen Antibody Combo Nonreactive NR^Nonreactive          Comment:      HIV-1 p24 Ag & HIV-1/HIV-2 Ab Not Detected   Wet prep   Result Value Ref Range    Specimen Description Vagina     Wet Prep No Trichomonas seen     Wet Prep Yeast seen (A)     Wet Prep No clue cells seen    Chlamydia trachomatis PCR   Result Value Ref Range    Specimen Description Cervical     Chlamydia Trachomatis PCR Negative NEG^Negative      Comment:      Negative for C. trachomatis rRNA by transcription mediated amplification.  A negative result by transcription mediated amplification does not preclude   the presence of C. trachomatis infection because results are dependent on   proper and adequate collection, absence of inhibitors, and sufficient rRNA to   be detected.     Neisseria gonorrhoeae PCR   Result Value Ref Range    Specimen Descrip Cervical     N Gonorrhea PCR Negative NEG^Negative      Comment:      Negative for N. gonorrhoeae rRNA by transcription mediated amplification.  A negative result by transcription mediated amplification does not preclude   the presence of N. gonorrhoeae infection because results are dependent on   proper and adequate collection, absence of inhibitors, and sufficient rRNA to   be detected.         If you have any questions or concerns, please call the clinic at the number listed above.       Sincerely,        Odilia Barrera MD

## 2017-09-28 NOTE — PROGRESS NOTES
SUBJECTIVE:   Ashley Catherine is a 37 year old female who presents to clinic today for the following health issues:      Std testing      Duration: 2 d of malaise and vaginal discharge     Description (location/character/radiation): patient is here today for STD testing    Intensity:  n/a    Accompanying signs and symptoms: n/a    History (similar episodes/previous evaluation): None    Precipitating or alleviating factors: several partners recently - none with known stds or symptoms     Therapies tried and outcome: None         Vaginal Symptoms      Duration: 2 d of discharge     Description  vaginal discharge - white    Intensity:  moderate    Accompanying signs and symptoms (fever/dysuria/abdominal or back pain): None    History  Sexually active: yes, multiple partners, contraception - yes  Possibility of pregnancy: No  Recent antibiotic use: no     Precipitating or alleviating factors: None    Therapies tried and outcome: none        OVERWEIGHT    -min  -gaining   -comorbid HTN ,     Depression and Anxiety Follow-Up    Status since last visit: No change- in remission     Other associated symptoms:None    Complicating factors:     Significant life event: No     Current substance abuse: None    PHQ-9 SCORE 6/24/2016 2/15/2017 9/28/2017   Total Score - - -   Total Score MyChart 6 (Mild depression) - -   Total Score - 3 1     MAXIMO-7 SCORE 1/6/2016 2/15/2017 9/28/2017   Total Score - - -   Total Score 0 1 1       PHQ-9  English  PHQ-9   Any Language  GAD7    Hypertension Follow-up      Outpatient blood pressures are not being checked.   Here < 140/80    Low Salt Diet: not monitoring salt          Problem list and histories reviewed & adjusted, as indicated.  Additional history: as documented    Labs reviewed in EPIC    Reviewed and updated as needed this visit by clinical staff     Reviewed and updated as needed this visit by Provider         ROS:  C: NEGATIVE for fever, chills, change in weight  I: NEGATIVE for  worrisome rashes, moles or lesions  E: NEGATIVE for vision changes or irritation  E/M: NEGATIVE for ear, mouth and throat problems  R: NEGATIVE for significant cough or SOB  B: NEGATIVE for masses, tenderness or discharge  CV: NEGATIVE for chest pain, palpitations or peripheral edema  GI: NEGATIVE for nausea, abdominal pain, heartburn, or change in bowel habits   female: dysuria and vaginal discharge   M: NEGATIVE for significant arthralgias or myalgia  N: NEGATIVE for weakness, dizziness or paresthesias  E: NEGATIVE for temperature intolerance, skin/hair changes  H: NEGATIVE for bleeding problems  P: NEGATIVE for changes in mood or affect    OBJECTIVE:     /74  Pulse 84  Temp 97.5  F (36.4  C) (Tympanic)  Resp 22  Wt 193 lb (87.5 kg)  LMP 09/01/2017 (Approximate)  SpO2 100%  BMI 27.67 kg/m2  Body mass index is 27.67 kg/(m^2).  GENERAL: healthy, alert and no distress  EYES: Eyes grossly normal to inspection, PERRL and conjunctivae and sclerae normal  RESP: lungs clear to auscultation - no rales, rhonchi or wheezes  CV: regular rate and rhythm, normal S1 S2, no S3 or S4, no murmur, click or rub, no peripheral edema and peripheral pulses strong  ABDOMEN: soft, nontender, no hepatosplenomegaly, no masses and bowel sounds normal   (female): normal female external genitalia, normal urethral meatus, vaginal mucosa pink, moist, well rugated, anslight white  discharge  MS: no gross musculoskeletal defects noted, no edema  SKIN: no suspicious lesions or rashes  NEURO: Normal strength and tone, mentation intact and speech normal  PSYCH: mentation appears normal, affect normal/bright    Diagnostic Test Results:  Results for orders placed or performed in visit on 09/28/17   Wet prep   Result Value Ref Range    Specimen Description Vagina     Wet Prep No Trichomonas seen     Wet Prep Yeast seen (A)     Wet Prep No clue cells seen        ASSESSMENT/PLAN:               ICD-10-CM    1. Candidiasis of vulva and  vagina B37.3 fluconazole (DIFLUCAN) 150 MG tablet   2. Screen for STD (sexually transmitted disease) W EXPOSURE  per several partners  Z11.3 HIV Antigen Antibody Combo     Chlamydia trachomatis PCR     Neisseria gonorrhoeae PCR   3. Benign essential hypertension I10    4. Major depressive disorder, recurrent episode, in full remission (H) F33.42    5. Overweight (BMI 25.0-29.9) E66.3    6. Need for prophylactic vaccination and inoculation against influenza Z23        Patient Instructions   1.  Weight Loss Tips  1. Do not eat after 6 hrs before your expected bedtime  2. Have your heaviest meal for breakfast, a slightly lighter meal at lunch and a snack 6 hrs before bed  3. No sugar/calorie drinks except milk ie no fruit juice, pop, alcohol.  4. Drink milk 30min before meals to decrease your hunger. Also it is excellent as part of your last meal of the day snack  5. Drink lots of water  6. Increase fiber in diet: all bran cereal, salads, popcorn etc  7. Have only one small serving of fruit a day about 1/2 cup (as this is high in sugar)  8. EXERCISE is the bottom line. Without it, you will gain weight even on a low calorie diet. Best if done 2-3X a day as can    Being overweight contributes to high blood pressure and high cholesterol, both of which cause heart attacks, strokes and kidney failure, prediabetes and diabetes, arthritis, and liver disease         Odilia Barrera MD  [facility]  Weight management plan: Discussed healthy diet and exercise guidelines and patient will follow up in 6 months in clinic to re-evaluate.      Odilia Barrear MD\

## 2017-09-29 ENCOUNTER — OFFICE VISIT (OUTPATIENT)
Dept: PSYCHOLOGY | Facility: CLINIC | Age: 37
End: 2017-09-29

## 2017-09-29 VITALS — BODY MASS INDEX: 27.47 KG/M2 | WEIGHT: 191.6 LBS

## 2017-09-29 DIAGNOSIS — F54 PSYCHOLOGICAL FACTOR AFFECTING PHYSICAL CONDITION: ICD-10-CM

## 2017-09-29 DIAGNOSIS — F33.0 MAJOR DEPRESSIVE DISORDER, RECURRENT EPISODE, MILD (H): Primary | ICD-10-CM

## 2017-09-29 DIAGNOSIS — Z56.9 OCCUPATIONAL PROBLEM: ICD-10-CM

## 2017-09-29 LAB
C TRACH DNA SPEC QL NAA+PROBE: NEGATIVE
N GONORRHOEA DNA SPEC QL NAA+PROBE: NEGATIVE
SPECIMEN SOURCE: NORMAL
SPECIMEN SOURCE: NORMAL

## 2017-09-29 SDOH — ECONOMIC STABILITY - INCOME SECURITY: UNSPECIFIED PROBLEMS RELATED TO EMPLOYMENT: Z56.9

## 2017-09-29 ASSESSMENT — ANXIETY QUESTIONNAIRES: GAD7 TOTAL SCORE: 1

## 2017-09-29 NOTE — MR AVS SNAPSHOT
After Visit Summary   9/29/2017    Ashley Catherine    MRN: [id number]           Patient Information     Date Of Birth          1980        Visit Information        Provider Department      9/29/2017 10:00 AM Jayro Elias, PhD [facility]        Today's Diagnoses     Major depressive disorder, recurrent episode, mild (H)    -  1    Psychological factor affecting physical condition        Occupational problem           Follow-ups after your visit        Your next 10 appointments already scheduled     Jan 05, 2018 10:00 AM CST   US HEAD NECK SOFT TISSUE with [address] [place] [address] Imaging Center [place] ([facility])    [address] [place] [address] [id number] [phone]           Please bring a list of your medicines (including vitamins, minerals and over-the-counter drugs). Also, tell your doctor about any allergies you may have. Wear comfortable clothes and leave your valuables at home.  You do not need to do anything special to prepare for your exam.  Please call the Imaging Department at your exam site with any questions.            Jan 05, 2018 10:45 AM CST   US THYROID with [address] [place] [address] Imaging Center [facility] ([facility])    [address] [place] [address] [id number] [phone]           Please bring a list of your medicines (including vitamins, minerals and over-the-counter drugs). Also, tell your doctor about any allergies you may have. Wear comfortable clothes and leave your valuables at home.  You do not need to do anything special to prepare for your exam.  Please call the Imaging Department at your exam site with any questions.            Kwesi 10, 2018  1:00 PM CST   (Arrive by 12:45 PM)   RETURN ENDOCRINE with Crystal Wyatt MD   [facility])    [address] [place] [address] [id number] [phone]              Who to  contact     Please call your clinic at [phone] to:    Ask questions about your health    Make or cancel appointments    Discuss your medicines    Learn about your test results    Speak to your doctor   If you have compliments or concerns about an experience at your clinic, or if you wish to file a complaint, please contact [facility] Patient Relations at [phone] or email us at [email or web address]         Additional Information About Your Visit        Doctolibhart Information     [employer] gives you secure access to your electronic health record. If you see a primary care provider, you can also send messages to your care team and make appointments. If you have questions, please call your primary care clinic.  If you do not have a primary care provider, please call [phone] and they will assist you.      [address] is an electronic gateway that provides easy, online access to your medical records. With [address], you can request a clinic appointment, read your test results, renew a prescription or communicate with your care team.     To access your existing account, please contact your [facility] or call [phone] for assistance.        Care EveryWhere ID     This is your Care EveryWhere ID. This could be used by other organizations to access your [facility] medical records  [id number]        Your Vitals Were     Last Period BMI (Body Mass Index)                09/01/2017 (Approximate) 27.47 kg/m2           Blood Pressure from Last 3 Encounters:   09/28/17 108/74   08/11/17 132/89   07/13/17 106/66    Weight from Last 3 Encounters:   09/29/17 86.9 kg (191 lb 9.6 oz)   09/28/17 87.5 kg (193 lb)   08/11/17 88.5 kg (195 lb)              Today, you had the following     No orders found for display       Primary Care Provider Office Phone # Fax #    Violet Humphreys [phone] [address]  [address]        Equal Access to Services     [facility]: Hadii sabas lundberg tee Alejandre, wajuanisda luqjhony, qalori kakaran akanksha, carmen weston. So [facility] [phone].    ATENCIÓN: Si habla español, tiene a gibbons disposición servicios gratuitos de asistencia lingüística. Llame al [phone].    We comply with applicable federal civil rights laws and [place] laws. We do not discriminate on the basis of race, color, national origin, age, disability sex, sexual orientation or gender identity.            Thank you!     Thank you for choosing [facility]  for your care. Our goal is always to provide you with excellent care. Hearing back from our patients is one way we can continue to improve our services. Please take a few minutes to complete the written survey that you may receive in the mail after your visit with us. Thank you!             Your Updated Medication List - Protect others around you: Learn how to safely use, store and throw away your medicines at www.disposemymeds.org.          This list is accurate as of: 9/29/17 11:00 AM.  Always use your most recent med list.                   Brand Name Dispense Instructions for use Diagnosis    CALCIUM + D PO      Take by mouth daily        cetirizine 10 MG tablet    zyrTEC     Take 10 mg by mouth daily as needed for allergies        [employer] B-12 5000 MCG Subl   Generic drug:  Cyanocobalamin       Multiple thyroid nodules       cyclobenzaprine 10 MG tablet    FLEXERIL    30 tablet    TAKE ONE-HALF TABLET BY MOUTH TWICE A DAY AS NEEDED FOR MUSCLE SPASMS    Sciatica, unspecified laterality       hydrochlorothiazide 25 MG tablet    HYDRODIURIL    90 tablet    TAKE ONE TABLET BY MOUTH EVERY DAY    Essential hypertension with goal blood pressure less than 140/90       MELATONIN PO      Take 3 mg by mouth        metroNIDAZOLE 0.75 % vaginal gel    METROGEL    70 g    Use twice weekly to vagina for recurrent BV symptoms.  Do this  for 3 months after finishing oral medications    Abnormal vaginal fluids       MULTIVITAMINS PO      Take by mouth daily        Phentermine-Topiramate 7.5-46 MG Cp24     30 capsule    Take 1 capsule by mouth daily    Non morbid obesity due to excess calories       polyethylene glycol powder    MIRALAX    510 g    Take 17 g (1 capful) by mouth daily as needed for constipation    Constipation, unspecified constipation type       potassium chloride SA 20 MEQ CR tablet    KLOR-CON    90 tablet    Take 1 tablet (20 mEq) by mouth daily    S/P gastrectomy       TYLENOL PM EXTRA STRENGTH PO           vitamin D 2000 UNITS Caps

## 2017-09-29 NOTE — PROGRESS NOTES
Health Psychology                  Clinic    Department of Medicine  Lis Chavez, Ph.D., L.P. [phone]                          [facility] Earline Anderson, Ph.D.,  L.P. [phone]                 3rd Floor, Clinic 3A  [place] Mail Code 747   Jayro Elias, Ph.D., A.ÁLVARO.IDALIA.P., L.P. [phone]     [address] Melissa Stokes, Ph.D., L.P. [phone]  [place] [address]  [place] [address]    Health Psychology Follow-Up Note    Ashley Catherine is a 37-year-old woman referred initially  for psychological consultation for workup for a gastric sleeve procedure who now returns following the surgery, with concerns about weight management.  She initiated topirimate and is making great progress.    We reviewed in detail the changes she is making with regard to nutrional intake. She is cooking more of her own food.  She is exercising with 10,000 or more steps per day almost every day.  She is reinforced for the changes and can see the pattern of not doing as well when travelling.  She recently has been travelling more, but also walking more (e.g., climbed a mountain in [place]).    Wt Readings from Last 4 Encounters:   09/29/17 86.9 kg (191 lb 9.6 oz)   09/28/17 87.5 kg (193 lb)   08/11/17 88.5 kg (195 lb)   08/02/17 89.6 kg (197 lb 8 oz)       She is  5 foot 11 inches.  Her goal is 175. Her mood is much improved.  Her job is okay, but there are frustrations with management (e.g., now though she is chemo trained, she is frustrated by staffing shortages).  She  is working toward certification.  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  She still has hopes to leave this area, possibly as a travel nurse. 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  She has resumed online dating.    Affect is positive despite her frustrations with her weight and work.  Rapport is excellent.    Extended session due to complexity of case and length of interval.      Time in:  10:02  Time out:  10:53  Psychological factors affecting obesity (F54).   Major depression, recurrent, mild (F3e3.0).   Occupational Problems (Z56.9)  PLAN/RECOMMENDATIONS:   1. Ms. Catherine will return 11/15 @ 4:00 to address weight loss and social issues.  2.  Establish schedule of regular exercise and  Decrease grazing eating.    Tx plan due 5/3/18

## 2017-10-03 ENCOUNTER — TELEPHONE (OUTPATIENT)
Dept: FAMILY MEDICINE | Facility: CLINIC | Age: 37
End: 2017-10-03

## 2017-10-03 NOTE — TELEPHONE ENCOUNTER
Patient left a travel voicemail, requesting Malaria pills, was here in May 2017 travel to [facility], is traveling again on Monday to [facility], does she need to be seen for anything or can she get refills on Malaria, please anais her at [phone]    [facility] Unit Coordinator

## 2017-10-04 ENCOUNTER — OFFICE VISIT (OUTPATIENT)
Dept: FAMILY MEDICINE | Facility: CLINIC | Age: 37
End: 2017-10-04
Payer: COMMERCIAL

## 2017-10-04 VITALS
TEMPERATURE: 98.3 F | SYSTOLIC BLOOD PRESSURE: 110 MMHG | HEART RATE: 124 BPM | OXYGEN SATURATION: 99 % | DIASTOLIC BLOOD PRESSURE: 70 MMHG | RESPIRATION RATE: 16 BRPM | BODY MASS INDEX: 26.63 KG/M2 | HEIGHT: 70 IN | WEIGHT: 186 LBS

## 2017-10-04 DIAGNOSIS — N89.8 VAGINAL DISCHARGE: Primary | ICD-10-CM

## 2017-10-04 LAB
SPECIMEN SOURCE: ABNORMAL
WET PREP SPEC: ABNORMAL

## 2017-10-04 PROCEDURE — 87210 SMEAR WET MOUNT SALINE/INK: CPT | Performed by: FAMILY MEDICINE

## 2017-10-04 PROCEDURE — 87591 N.GONORRHOEAE DNA AMP PROB: CPT | Performed by: FAMILY MEDICINE

## 2017-10-04 PROCEDURE — 99213 OFFICE O/P EST LOW 20 MIN: CPT | Performed by: FAMILY MEDICINE

## 2017-10-04 PROCEDURE — 87491 CHLMYD TRACH DNA AMP PROBE: CPT | Performed by: FAMILY MEDICINE

## 2017-10-04 RX ORDER — FLUCONAZOLE 150 MG/1
150 TABLET ORAL ONCE
Qty: 1 TABLET | Refills: 0 | Status: SHIPPED | OUTPATIENT
Start: 2017-10-04 | End: 2017-10-04

## 2017-10-04 NOTE — NURSING NOTE
"Chief Complaint   Patient presents with     Vaginal Problem     initial /70 (BP Location: Right arm, Cuff Size: Adult Large)  Pulse 124  Temp 98.3  F (36.8  C) (Oral)  Resp 16  Ht 5' 10\" (1.778 m)  Wt 186 lb (84.4 kg)  LMP 10/04/2017  SpO2 99%  BMI 26.69 kg/m2 Estimated body mass index is 26.69 kg/(m^2) as calculated from the following:    Height as of this encounter: 5' 10\" (1.778 m).    Weight as of this encounter: 186 lb (84.4 kg).  BP completed using cuff size: large.   R arm      Health Maintenance that is potentially due pending provider review:  NONE    n/a    Brandon Peñaloza ma  "

## 2017-10-04 NOTE — MR AVS SNAPSHOT
After Visit Summary   10/4/2017    Ashley Catherine    MRN: [id number]           Patient Information     Date Of Birth          1980        Visit Information        Provider Department      10/4/2017 3:00 PM Payal Otto,  [facility]        Today's Diagnoses     Vaginal discharge    -  1       Follow-ups after your visit        Your next 10 appointments already scheduled     Nov 15, 2017  4:00 PM CST   (Arrive by 3:45 PM)   Return Visit with Jayro Elias, PhD [facility] ([facility])    [address] [place] [address] [id number] [phone]            Jan 05, 2018 10:00 AM CST   US HEAD NECK SOFT TISSUE with UCUS1   [facility]  ([facility])    [address] [place] [id number] [phone]           Please bring a list of your medicines (including vitamins, minerals and over-the-counter drugs). Also, tell your doctor about any allergies you may have. Wear comfortable clothes and leave your valuables at home.  You do not need to do anything special to prepare for your exam.  Please call the Imaging Department at your exam site with any questions.            Jan 05, 2018 10:45 AM CST   US THYROID with UCUS1   [facility] ([facility])    [address] [place] [address] [id number] [phone]           Please bring a list of your medicines (including vitamins, minerals and over-the-counter drugs). Also, tell your doctor about any allergies you may have. Wear comfortable clothes and leave your valuables at home.  You do not need to do anything special to prepare for your exam.  Please call the Imaging Department at your exam site with any questions.            Kwesi 10, 2018  1:00 PM CST   (Arrive by 12:45 PM)   RETURN ENDOCRINE with Crystal Wyatt MD   [facility] ([facility]  [facility])    [address] [facility]  3rd [facility] [address] [phone]              Who to contact     If you have questions or need follow up information about today's clinic visit or your schedule please contact [facility] directly at [phone].  Normal or non-critical lab and imaging results will be communicated to you by MyChart, letter or phone within 4 business days after the clinic has received the results. If you do not hear from us within 7 days, please contact the clinic through [employer] or phone. If you have a critical or abnormal lab result, we will notify you by phone as soon as possible.  Submit refill requests through [email or web address] or call your pharmacy and they will forward the refill request to us. Please allow 3 business days for your refill to be completed.          Additional Information About Your Visit        MyChart Information     [email or web address] gives you secure access to your electronic health record. If you see a primary care provider, you can also send messages to your care team and make appointments. If you have questions, please call your primary care clinic.  If you do not have a primary care provider, please call [phone] and they will assist you.        Care EveryWhere ID     This is your Care EveryWhere ID. This could be used by other organizations to access your [facility] medical records  [id number]        Your Vitals Were     Pulse Temperature Respirations Height Last Period Pulse Oximetry    124 98.3  F (36.8  C) (Oral) 16 5' 10\" (1.778 m) 10/04/2017 99%    BMI (Body Mass Index)                   26.69 kg/m2            Blood Pressure from Last 3 Encounters:   10/04/17 110/70   09/28/17 108/74   08/11/17 132/89    Weight from Last 3 Encounters:   10/04/17 186 lb (84.4 kg)   09/28/17 193 lb (87.5 kg)   08/11/17 195 lb (88.5 kg)              We Performed the Following     CHLAMYDIA TRACHOMATIS PCR     NEISSERIA GONORRHOEA PCR     Wet prep        "   Today's Medication Changes          These changes are accurate as of: 10/4/17  4:20 PM.  If you have any questions, ask your nurse or doctor.               Start taking these medicines.        Dose/Directions    fluconazole 150 MG tablet   Commonly known as:  DIFLUCAN   Used for:  Vaginal discharge   Started by:  Payal Otto DO        Dose:  150 mg   Take 1 tablet (150 mg) by mouth once for 1 dose   Quantity:  1 tablet   Refills:  0            Where to get your medicines      These medications were sent to [facility] [address]  [address], [facility] [address]     Phone:  [phone]     fluconazole 150 MG tablet                Primary Care Provider Office Phone # Fax #    Violet Humphreys [phone] [address] [address]        Equal Access to Services     [facility]: Hadii sabas lundberg hadasho Sowaldo, waaxda luqadaha, qaybta kaalmada adejannieyada, carmen arteaga . So [facility] [phone].    ATENCIÓN: Si habla español, tiene a gibbons disposición servicios gratuitos de asistencia lingüística. Faby al [phone].    We comply with applicable federal civil rights laws and [place] laws. We do not discriminate on the basis of race, color, national origin, age, disability, sex, sexual orientation, or gender identity.            Thank you!     Thank you for choosing [facility]  for your care. Our goal is always to provide you with excellent care. Hearing back from our patients is one way we can continue to improve our services. Please take a few minutes to complete the written survey that you may receive in the mail after your visit with us. Thank you!             Your Updated Medication List - Protect others around you: Learn how to safely use, store and throw away your medicines at www.disposemymeds.org.          This list is accurate as of: 10/4/17  4:20 PM.   Always use your most recent med list.                   Brand Name Dispense Instructions for use Diagnosis    CALCIUM + D PO      Take by mouth daily        cetirizine 10 MG tablet    zyrTEC     Take 10 mg by mouth daily as needed for allergies        [employer]S B-12 5000 MCG Subl   Generic drug:  Cyanocobalamin       Multiple thyroid nodules       cyclobenzaprine 10 MG tablet    FLEXERIL    30 tablet    TAKE ONE-HALF TABLET BY MOUTH TWICE A DAY AS NEEDED FOR MUSCLE SPASMS    Sciatica, unspecified laterality       fluconazole 150 MG tablet    DIFLUCAN    1 tablet    Take 1 tablet (150 mg) by mouth once for 1 dose    Vaginal discharge       hydrochlorothiazide 25 MG tablet    HYDRODIURIL    90 tablet    TAKE ONE TABLET BY MOUTH EVERY DAY    Essential hypertension with goal blood pressure less than 140/90       MELATONIN PO      Take 3 mg by mouth        metroNIDAZOLE 0.75 % vaginal gel    METROGEL    70 g    Use twice weekly to vagina for recurrent BV symptoms.  Do this for 3 months after finishing oral medications    Abnormal vaginal fluids       MULTIVITAMINS PO      Take by mouth daily        Phentermine-Topiramate 7.5-46 MG Cp24     30 capsule    Take 1 capsule by mouth daily    Non morbid obesity due to excess calories       polyethylene glycol powder    MIRALAX    510 g    Take 17 g (1 capful) by mouth daily as needed for constipation    Constipation, unspecified constipation type       potassium chloride SA 20 MEQ CR tablet    KLOR-CON    90 tablet    Take 1 tablet (20 mEq) by mouth daily    S/P gastrectomy       TYLENOL PM EXTRA STRENGTH PO           vitamin D 2000 UNITS Caps

## 2017-10-04 NOTE — PROGRESS NOTES
SUBJECTIVE:   Ashley Catherine is a 37 year old female who presents to clinic today for the following health issues:      Vaginal Symptoms      Duration: was treated for yeast last week    Description  vaginal discharge - creamy and itching    Intensity:  mild    Accompanying signs and symptoms (fever/dysuria/abdominal or back pain): None    History  Sexually active: yes, multiple partners, contraception - condoms  Possibility of pregnancy: No  Recent antibiotic use: no     Precipitating or alleviating factors: None    Therapies tried and outcome: Diflucan   Outcome: didn't work    Had visit on 9/28 - diflucan one dose  Improved and feels like it was was not completely treated    Pt also admits to having new partner this past weekend (after her last visit)   She did use a condom but thinks not the entire time -   Would like to get GC and chlamydia test    -------------------------------------    Problem list and histories reviewed & adjusted, as indicated.  Additional history: as documented    Patient Active Problem List   Diagnosis     OLIGOMENORRHEA, C/W PCOS     Sleep apnea     HX ABNL PAP SMEAR OF CERVIX       Flat feet     GERD (gastroesophageal reflux disease)     Sciatica     S/P gastrectomy     Occupational problem     Elevated parathyroid hormone     Multiple thyroid nodules     Abnormal thyroid cytology-follicular neoplasm     Chronic pain syndrome     Major depressive disorder, recurrent episode, in full remission (H)     Bilateral low back pain with sciatica, sciatica laterality unspecified     Throat symptom     Psychological factor affecting physical condition     Past Surgical History:   Procedure Laterality Date     BIOPSY  03/13/2015    Thyroid nodule     ESOPHAGOSCOPY, GASTROSCOPY, DUODENOSCOPY (EGD), COMBINED  5/28/2013    Procedure: COMBINED ESOPHAGOSCOPY, GASTROSCOPY, DUODENOSCOPY (EGD);;  Surgeon: Best Willett MD;  Location:  GI     LAPAROSCOPIC GASTRIC SLEEVE  5/13/2013    Procedure:  "LAPAROSCOPIC GASTRIC SLEEVE;  Laparoscopic Sleeve Gastrectomy ;  Surgeon: Best Willett MD;  Location:  OR     [facility] [place], CERVICAL  1995    age 15     SEPTOPLASTY       TONSILLECTOMY  age 20s     TONSILLECTOMY  2004?     wisdom teeth extraction         Social History   Substance Use Topics     Smoking status: Never Smoker     Smokeless tobacco: Never Used     Alcohol use 2.4 - 3.0 oz/week      Comment: 3 drinks/week     Family History   Problem Relation Age of Onset     Hypertension Sister      Hypertension Father      Allergies Father      seasonal     Lipids Father      Obesity Father      Arthritis Mother      Gynecology Mother      problems with menopause     Hypertension Mother      Other Cancer Mother      Endometrial     OSTEOPOROSIS Mother      Obesity Mother      Parathyroid Disorders Mother      3 operations     Obesity Sister      DIABETES Maternal Aunt      x several w. DM     Breast Cancer Maternal Aunt      Cancer - colorectal Maternal Uncle      60ish     Hypertension Sister      Obesity Sister      Nephrolithiasis No family hx of              Reviewed and updated as needed this visit by clinical staffTobacco  Allergies  Meds  Problems       Reviewed and updated as needed this visit by Provider  Allergies  Meds  Problems         ROS:  Constitutional, HEENT, cardiovascular, pulmonary, gi and gu systems are negative, except as otherwise noted.      OBJECTIVE:   /70 (BP Location: Right arm, Cuff Size: Adult Large)  Pulse 124  Temp 98.3  F (36.8  C) (Oral)  Resp 16  Ht 5' 10\" (1.778 m)  Wt 186 lb (84.4 kg)  LMP 10/04/2017  SpO2 99%  BMI 26.69 kg/m2  Body mass index is 26.69 kg/(m^2).  GENERAL: healthy, alert and no distress    Diagnostic Test Results:  Results for orders placed or performed in visit on 10/04/17 (from the past 24 hour(s))   Wet prep   Result Value Ref Range    Specimen Description Vagina     Wet Prep No Trichomonas seen     Wet Prep Clue cells seen (A)     Wet " Prep No yeast seen      Additional labs pending    ASSESSMENT/PLAN:     1. Vaginal discharge  Vaginal discharge but her menses started today -   The wet prep was obscured by blood so unsure if this is accurate  [place] sent with Rx for diflucan if things worsen  She also has home BV vaginal meds   Added the GC chlamydia as she had new partner with unprotected since last weeks check  - Wet prep  - NEISSERIA GONORRHOEA PCR  - CHLAMYDIA TRACHOMATIS PCR  - fluconazole (DIFLUCAN) 150 MG tablet; Take 1 tablet (150 mg) by mouth once for 1 dose  Dispense: 1 tablet; Refill: 0    Pt will call or RTC if symptoms worsen or do not improve.     Payal Otto, DO  [facility]

## 2017-10-06 NOTE — PROGRESS NOTES
Dear Ashley,   Your test results are all back -   GC and chlamydia are normal/negative.  Let us know if you have any questions.  -Payal Otto, DO

## 2017-10-23 NOTE — PROGRESS NOTES
I was asked by Dr. Crystal Wyatt to see this patient regarding a left isthmus nodule.      HISTORY OF PRESENT ILLNESS:  This is a 37-year-old female who was initially noted to have an isthmus nodule around 07/2016.  At that time, she underwent a biopsy that was suspicious for follicular neoplasm.  Regarding this thyroid nodule, she initially was noted to have a left isthmus nodule in 2015.  Again, biopsy of that nodule at that time was also suspicious for follicular neoplasm.  She underwent sequential ultrasounds and biopsies over a 6-12 month period.  Ultrasound of the isthmus nodule, most recently in 06/2017, revealed a 1.3 x 1.1 x 2.2 cm isthmus nodule that is categorized as a greater than 20% growth compared to the 2015 ultrasound.        BIOPSIES:  Again, we have 2 fine needle aspiration biopsies suspicious for follicular neoplasm.  Afirma testing or ThyroSeq was negative.      Regarding the thyroid nodule, she has no problems with voice quality, inspiration or swallowing.  She has no symptoms of hypo or hyperthyroidism.  The patient's TSH is within normal limits.  There is a previous history of secondary hyperparathyroidism likely secondary to her gastric sleeve.  There is also family history of hyperparathyroidism.  No head and neck radiation exposure, no previous thyroid carcinoma.  No family history of thyroid carcinoma.      PAST MEDICAL, SURGICAL HISTORY AND MEDICATIONS:  Reviewed and are available in the EMR.      REVIEW OF SYSTEMS:  A 10-point review of systems is pertinent for that noted in the HPI.      PHYSICAL EXAMINATION:  Just inspecting the neck, you can see a fullness in the midline of the neck just above the collarbone that moves with swallowing.  On palpating it, she actually has multiple nodules, more prominent on the left than the right, and a firm nodule in the mid to left aspect of the isthmus.  The thyroid gland does move nicely with swallowing.  There is no cervical lymphadenopathy.       LABORATORY, DATA PATHOLOGY AND ULTRASOUND:  As discussed above.      ASSESSMENT/PLAN:  I had a long discussion with the patient that again she does have 2 biopsies that are suspicious for follicular neoplasm over at least a 1-year period.  Options include repeating an ultrasound in 6 months to a year.  If no change in size or characteristics of the nodules, the options would be to continue monitoring versus repeat biopsy.  If there is a change in size, then I agree with Dr. Wyatt repeat the biopsy or proceed with a left thyroid lobectomy and isthmusectomy.  The patient seems very reliable and does not want to proceed with surgery at this time.  We did, however, review what a left thyroid lobectomy and isthmusectomy would entail as well as the risks of bleeding, infection, injury to the recurrent laryngeal nerve and potential need for thyroid hormone replacement.  The patient is aware of this and would like to follow up with the ultrasound at 6 months from her last one to see if there is any change in size of the nodule.         BRYCE HARPER MD             D: 10/23/2017 13:02   T: 10/23/2017 14:05   MT: craig      Name:     JOSEY KHALIL   MRN:      [id number]        Account:      [id number]   :      1980           Service Date: 2017      Document: [id number]

## 2017-10-30 ENCOUNTER — OFFICE VISIT (OUTPATIENT)
Dept: FAMILY MEDICINE | Facility: CLINIC | Age: 37
End: 2017-10-30
Payer: COMMERCIAL

## 2017-10-30 ENCOUNTER — DOCUMENTATION ONLY (OUTPATIENT)
Dept: OTHER | Facility: CLINIC | Age: 37
End: 2017-10-30

## 2017-10-30 VITALS
OXYGEN SATURATION: 100 % | DIASTOLIC BLOOD PRESSURE: 80 MMHG | TEMPERATURE: 98.2 F | SYSTOLIC BLOOD PRESSURE: 132 MMHG | HEART RATE: 98 BPM | BODY MASS INDEX: 27.09 KG/M2 | HEIGHT: 70 IN | WEIGHT: 189.2 LBS

## 2017-10-30 DIAGNOSIS — Z86.69 HX OF GUILLAIN-BARRE SYNDROME: ICD-10-CM

## 2017-10-30 DIAGNOSIS — Z23 NEED FOR TD VACCINE: ICD-10-CM

## 2017-10-30 DIAGNOSIS — I83.90 VARICOSE VEIN OF LEG: Primary | ICD-10-CM

## 2017-10-30 PROCEDURE — 99213 OFFICE O/P EST LOW 20 MIN: CPT | Mod: 25 | Performed by: FAMILY MEDICINE

## 2017-10-30 PROCEDURE — 90714 TD VACC NO PRESV 7 YRS+ IM: CPT | Performed by: FAMILY MEDICINE

## 2017-10-30 PROCEDURE — 90471 IMMUNIZATION ADMIN: CPT | Performed by: FAMILY MEDICINE

## 2017-10-30 NOTE — MR AVS SNAPSHOT
After Visit Summary   10/30/2017    Ashley Catherine    MRN: [id number]           Patient Information     Date Of Birth          1980        Visit Information        Provider Department      10/30/2017 2:00 PM Violet Humphreys MD [facility]        Today's Diagnoses     Varicose vein of leg    -  1    Need for TD vaccine           Follow-ups after your visit        Additional Services     VASCULAR SURGERY REFERRAL       Your provider has referred you to:   **Vascular  Services [phone] - Varicose Veins & per provider   [email or web address]  [facility]: [facility] [place] [phone]   [email or web address]    Please be aware that coverage of these services is subject to the terms and limitations of your health insurance plan.  Call member services at your health plan with any benefit or coverage questions.      Please bring the following with you to your appointment:    (1) Any X-Rays, CTs or MRIs which have been performed.  Contact the facility where they were done to arrange for  prior to your scheduled appointment.    (2) List of current medications   (3) This referral request   (4) Any documents/labs given to you for this referral                  Your next 10 appointments already scheduled     Nov 15, 2017  4:00 PM CST   (Arrive by 3:45 PM)   Return Visit with Jayro Elias, PhD [facility] ([facility])    [address] [place] [id number] [phone]            Jan 05, 2018 10:00 AM CST   US HEAD NECK SOFT TISSUE with UCUS1   [facility] ([facility])    [address] [place] [id number] [phone]           Please bring a list of your medicines (including vitamins, minerals and over-the-counter drugs). Also, tell your doctor about  any allergies you may have. Wear comfortable clothes and leave your valuables at home.  You do not need to do anything special to prepare for your exam.  Please call the Imaging Department at your exam site with any questions.            Jan 05, 2018 10:45 AM CST   US THYROID with UCUS1   [facility] ([facility])    [address] [place], [place] [id number] [phone]           Please bring a list of your medicines (including vitamins, minerals and over-the-counter drugs). Also, tell your doctor about any allergies you may have. Wear comfortable clothes and leave your valuables at home.  You do not need to do anything special to prepare for your exam.  Please call the Imaging Department at your exam site with any questions.            Kwesi 10, 2018  1:00 PM CST   (Arrive by 12:45 PM)   RETURN ENDOCRINE with Crystal Wyatt MD   [facility] ([facility])    [address] [place] [id number] [phone]              Who to contact     If you have questions or need follow up information about today's clinic visit or your schedule please contact [facility] directly at [phone].  Normal or non-critical lab and imaging results will be communicated to you by MyChart, letter or phone within 4 business days after the clinic has received the results. If you do not hear from us within 7 days, please contact the clinic through MyChart or phone. If you have a critical or abnormal lab result, we will notify you by phone as soon as possible.  Submit refill requests through [employer] or call your pharmacy and they will forward the refill request to us. Please allow 3 business days for your refill to be completed.          Additional Information About Your Visit        [employer] Information     [employer] gives you secure access to your electronic health record. If you see a primary care provider,  "you can also send messages to your care team and make appointments. If you have questions, please call your primary care clinic.  If you do not have a primary care provider, please call [phone] and they will assist you.        Care EveryWhere ID     This is your Care EveryWhere ID. This could be used by other organizations to access your [place] medical records  [id number]        Your Vitals Were     Pulse Temperature Height Last Period Pulse Oximetry Breastfeeding?    98 98.2  F (36.8  C) (Oral) 5' 10\" (1.778 m) 10/04/2017 (Exact Date) 100% No    BMI (Body Mass Index)                   27.15 kg/m2            Blood Pressure from Last 3 Encounters:   10/30/17 132/80   10/04/17 110/70   09/28/17 108/74    Weight from Last 3 Encounters:   10/30/17 189 lb 3.2 oz (85.8 kg)   10/04/17 186 lb (84.4 kg)   09/29/17 191 lb 9.6 oz (86.9 kg)              We Performed the Following     TD (ADULT, 7+) PRESERVE FREE     VASCULAR SURGERY REFERRAL        Primary Care Provider Office Phone # Fax #    Violet Humphreys [phone] [address] [address]        Equal Access to Services     JOSSE BAILEY : Hadii sabas ku hadasho Soomaali, waaxda luqadaha, qaybta kaalmada adeegyada, carmen arteaga . So [facility] [phone].    ATENCIÓN: Si habla español, tiene a gibbons disposición servicios gratuitos de asistencia lingüística. john al [phone].    We comply with applicable federal civil rights laws and [place] laws. We do not discriminate on the basis of race, color, national origin, age, disability, sex, sexual orientation, or gender identity.            Thank you!     Thank you for choosing [facility]  for your care. Our goal is always to provide you with excellent care. Hearing back from our patients is one way we can continue to improve our services. Please take a few minutes to complete the written survey that you may receive in the mail " after your visit with us. Thank you!             Your Updated Medication List - Protect others around you: Learn how to safely use, store and throw away your medicines at www.disposemymeds.org.          This list is accurate as of: 10/30/17  2:40 PM.  Always use your most recent med list.                   Brand Name Dispense Instructions for use Diagnosis    CALCIUM + D PO      Take by mouth daily        cetirizine 10 MG tablet    zyrTEC     Take 10 mg by mouth daily as needed for allergies        [employer] B-12 5000 MCG Subl   Generic drug:  Cyanocobalamin       Multiple thyroid nodules       cyclobenzaprine 10 MG tablet    FLEXERIL    30 tablet    TAKE ONE-HALF TABLET BY MOUTH TWICE A DAY AS NEEDED FOR MUSCLE SPASMS    Sciatica, unspecified laterality       hydrochlorothiazide 25 MG tablet    HYDRODIURIL    90 tablet    TAKE ONE TABLET BY MOUTH EVERY DAY    Essential hypertension with goal blood pressure less than 140/90       MELATONIN PO      Take 3 mg by mouth        metroNIDAZOLE 0.75 % vaginal gel    METROGEL    70 g    Use twice weekly to vagina for recurrent BV symptoms.  Do this for 3 months after finishing oral medications    Abnormal vaginal fluids       MULTIVITAMINS PO      Take by mouth daily        Phentermine-Topiramate 7.5-46 MG Cp24     30 capsule    Take 1 capsule by mouth daily    Non morbid obesity due to excess calories       polyethylene glycol powder    MIRALAX    510 g    Take 17 g (1 capful) by mouth daily as needed for constipation    Constipation, unspecified constipation type       potassium chloride SA 20 MEQ CR tablet    KLOR-CON    90 tablet    Take 1 tablet (20 mEq) by mouth daily    S/P gastrectomy       TYLENOL PM EXTRA STRENGTH PO           vitamin D 2000 UNITS Caps

## 2017-10-30 NOTE — PROGRESS NOTES
SUBJECTIVE:   Ashley Catherine is a 37 year old female who presents to clinic today for the following health issues:    Lt leg Varicose veins - would like to discuss getting this removed.    Has noted tenderness right over the area where the varicosities are. 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 Does use compression hose and reports they are roughly a few months to year old. Does have a family history of varicose veins. No swelling of her legs that she's noted.    Due for tetanus booster      Problem list and histories reviewed & adjusted, as indicated.  Additional history: as documented    Patient Active Problem List   Diagnosis     OLIGOMENORRHEA, C/W PCOS     Sleep apnea     HX ABNL PAP SMEAR OF CERVIX       Flat feet     GERD (gastroesophageal reflux disease)     Sciatica     S/P gastrectomy     Occupational problem     Elevated parathyroid hormone     Multiple thyroid nodules     Abnormal thyroid cytology-follicular neoplasm     Chronic pain syndrome     Major depressive disorder, recurrent episode, in full remission (H)     Bilateral low back pain with sciatica, sciatica laterality unspecified     Throat symptom     Psychological factor affecting physical condition     Hx of Guillain-[place] syndrome     Past Surgical History:   Procedure Laterality Date     BIOPSY  03/13/2015    Thyroid nodule     ESOPHAGOSCOPY, GASTROSCOPY, DUODENOSCOPY (EGD), COMBINED  5/28/2013    Procedure: COMBINED ESOPHAGOSCOPY, GASTROSCOPY, DUODENOSCOPY (EGD);;  Surgeon: Best Willett MD;  Location:  GI     LAPAROSCOPIC GASTRIC SLEEVE  5/13/2013    Procedure: LAPAROSCOPIC GASTRIC SLEEVE;  Laparoscopic Sleeve Gastrectomy ;  Surgeon: Best Willett MD;  Location:  OR     LEEP [place], CERVICAL  1995    age 15     SEPTOPLASTY       TONSILLECTOMY  age 20s     TONSILLECTOMY  2004?     wisdom teeth extraction         Social History   Substance Use Topics     Smoking status: Never Smoker     Smokeless tobacco: Never Used      Alcohol use 2.4 - 3.0 oz/week      Comment: 3 drinks/week     Family History   Problem Relation Age of Onset     Hypertension Sister      Hypertension Father      Allergies Father      seasonal     Lipids Father      Obesity Father      Arthritis Mother      Gynecology Mother      problems with menopause     Hypertension Mother      Other Cancer Mother      Endometrial     OSTEOPOROSIS Mother      Obesity Mother      Parathyroid Disorders Mother      3 operations     Obesity Sister      DIABETES Maternal Aunt      x several w. DM     Breast Cancer Maternal Aunt      Cancer - colorectal Maternal Uncle      60ish     Hypertension Sister      Obesity Sister      Nephrolithiasis No family hx of          Current Outpatient Prescriptions   Medication Sig Dispense Refill     cyclobenzaprine (FLEXERIL) 10 MG tablet TAKE ONE-HALF TABLET BY MOUTH TWICE A DAY AS NEEDED FOR MUSCLE SPASMS 30 tablet 5     hydrochlorothiazide (HYDRODIURIL) 25 MG tablet TAKE ONE TABLET BY MOUTH EVERY DAY 90 tablet 1     potassium chloride SA (POTASSIUM CHLORIDE) 20 MEQ CR tablet Take 1 tablet (20 mEq) by mouth daily 90 tablet 3     Cholecalciferol (VITAMIN D) 2000 UNITS CAPS        Phentermine-Topiramate 7.5-46 MG CP24 Take 1 capsule by mouth daily 30 capsule 5     metroNIDAZOLE (METROGEL) 0.75 % vaginal gel Use twice weekly to vagina for recurrent BV symptoms.  Do this for 3 months after finishing oral medications 70 g 3     MELATONIN PO Take 3 mg by mouth       Diphenhydramine-APAP, sleep, (TYLENOL PM EXTRA STRENGTH PO)        Cyanocobalamin ([employer] B-12) 5000 MCG SUBL        polyethylene glycol (MIRALAX) powder Take 17 g (1 capful) by mouth daily as needed for constipation 510 g 1     cetirizine (ZYRTEC) 10 MG tablet Take 10 mg by mouth daily as needed for allergies       Calcium Carbonate-Vitamin D (CALCIUM + D PO) Take by mouth daily       Multiple Vitamin (MULTIVITAMINS PO) Take by mouth daily           Reviewed and updated as needed this visit  "by clinical staff       Reviewed and updated as needed this visit by Provider         ROS:  Constitutional, HEENT, cardiovascular, pulmonary, gi and gu systems are negative, except as otherwise noted.      OBJECTIVE:                                                    /80  Pulse 98  Temp 98.2  F (36.8  C) (Oral)  Ht 5' 10\" (1.778 m)  Wt 189 lb 3.2 oz (85.8 kg)  LMP 10/04/2017 (Exact Date)  SpO2 100%  Breastfeeding? No  BMI 27.15 kg/m2  Body mass index is 27.15 kg/(m^2).  GENERAL APPEARANCE: healthy, alert and no distress  SKIN: no suspicious lesions or rashes and few superficial varicose veins are noted just behind popliteal fossa left knee.  Musculoskeletal: No lower extremity edema and negative Homans sign no pain or calf tenderness         ASSESSMENT/PLAN:                                                    1. Varicose vein of leg  Discussed talking with vascular however treatment might be more cosmetic if vessels are indeed more superficial. Reviewed making sure hose are up-to-date and firm to allow for support  - VASCULAR SURGERY REFERRAL    2. Need for TD vaccine    - TD (ADULT, 7+) PRESERVE FREE    3. Hx of Guillain-[place] syndrome  Declined flu vaccine      Follow up with Provider - as needed     Violet Humphreys MD  [facility]  "

## 2017-10-30 NOTE — PROGRESS NOTES
"Received referral via \"in-basket\", per  guidelines referral forwarded to [facility].    Greta Davis, STALINN, RN      "

## 2017-11-06 ENCOUNTER — OFFICE VISIT (OUTPATIENT)
Dept: VASCULAR SURGERY | Facility: CLINIC | Age: 37
End: 2017-11-06
Payer: COMMERCIAL

## 2017-11-06 ENCOUNTER — OFFICE VISIT (OUTPATIENT)
Dept: ENDOCRINOLOGY | Facility: CLINIC | Age: 37
End: 2017-11-06

## 2017-11-06 VITALS
DIASTOLIC BLOOD PRESSURE: 90 MMHG | WEIGHT: 190.5 LBS | SYSTOLIC BLOOD PRESSURE: 137 MMHG | BODY MASS INDEX: 27.27 KG/M2 | HEART RATE: 98 BPM | OXYGEN SATURATION: 98 % | HEIGHT: 70 IN | TEMPERATURE: 97.5 F

## 2017-11-06 DIAGNOSIS — E66.09 NON MORBID OBESITY DUE TO EXCESS CALORIES: Primary | ICD-10-CM

## 2017-11-06 DIAGNOSIS — I10 BENIGN ESSENTIAL HYPERTENSION: ICD-10-CM

## 2017-11-06 DIAGNOSIS — Z53.9 ERRONEOUS ENCOUNTER--DISREGARD: Primary | ICD-10-CM

## 2017-11-06 LAB
ANION GAP SERPL CALCULATED.3IONS-SCNC: 5 MMOL/L (ref 3–14)
BUN SERPL-MCNC: 12 MG/DL (ref 7–30)
CALCIUM SERPL-MCNC: 9 MG/DL (ref 8.5–10.1)
CHLORIDE SERPL-SCNC: 103 MMOL/L (ref 94–109)
CO2 SERPL-SCNC: 30 MMOL/L (ref 20–32)
CREAT SERPL-MCNC: 0.93 MG/DL (ref 0.52–1.04)
GFR SERPL CREATININE-BSD FRML MDRD: 68 ML/MIN/1.7M2
GLUCOSE SERPL-MCNC: 87 MG/DL (ref 70–99)
POTASSIUM SERPL-SCNC: 3.7 MMOL/L (ref 3.4–5.3)
SODIUM SERPL-SCNC: 138 MMOL/L (ref 133–144)

## 2017-11-06 PROCEDURE — 99207 ZZC VEINSOLUTIONS FREE SCREENING: CPT | Performed by: SURGERY

## 2017-11-06 ASSESSMENT — ENCOUNTER SYMPTOMS
EXERCISE INTOLERANCE: 0
EYE WATERING: 0
MYALGIAS: 0
WEIGHT GAIN: 0
DYSURIA: 0
WEAKNESS: 0
JAUNDICE: 0
LIGHT-HEADEDNESS: 0
SORE THROAT: 0
HEMATURIA: 0
STIFFNESS: 0
SNORES LOUDLY: 0
DIARRHEA: 0
HEARTBURN: 0
NECK MASS: 0
LEG PAIN: 0
PANIC: 0
RECTAL PAIN: 0
DECREASED CONCENTRATION: 1
SPUTUM PRODUCTION: 0
POOR WOUND HEALING: 0
MUSCLE WEAKNESS: 0
SMELL DISTURBANCE: 0
LOSS OF CONSCIOUSNESS: 0
MUSCLE CRAMPS: 1
BACK PAIN: 1
FLANK PAIN: 0
COUGH: 0
HEADACHES: 0
NAIL CHANGES: 0
SINUS CONGESTION: 0
HEMOPTYSIS: 0
ABDOMINAL PAIN: 0
FEVER: 0
HYPERTENSION: 0
VOMITING: 0
SLEEP DISTURBANCES DUE TO BREATHING: 0
POLYPHAGIA: 0
SEIZURES: 0
PALPITATIONS: 0
TINGLING: 1
RESPIRATORY PAIN: 0
SWOLLEN GLANDS: 0
COUGH DISTURBING SLEEP: 0
SKIN CHANGES: 0
SHORTNESS OF BREATH: 0
DECREASED LIBIDO: 0
FATIGUE: 1
NAUSEA: 0
INSOMNIA: 1
MEMORY LOSS: 0
HYPOTENSION: 0
NIGHT SWEATS: 0
ORTHOPNEA: 0
SINUS PAIN: 0
TASTE DISTURBANCE: 0
POSTURAL DYSPNEA: 0
DEPRESSION: 1
DOUBLE VISION: 0
DYSPNEA ON EXERTION: 0
SPEECH CHANGE: 0
DIFFICULTY URINATING: 0
ARTHRALGIAS: 0
SYNCOPE: 0
HOARSE VOICE: 0
WHEEZING: 0
INCREASED ENERGY: 0
NECK PAIN: 0
DIZZINESS: 0
EYE REDNESS: 0
HOT FLASHES: 0
BLOOD IN STOOL: 0
BRUISES/BLEEDS EASILY: 0
ALTERED TEMPERATURE REGULATION: 0
BOWEL INCONTINENCE: 0
NERVOUS/ANXIOUS: 1
WEIGHT LOSS: 0
EYE IRRITATION: 0
RECTAL BLEEDING: 0
CHILLS: 0
TREMORS: 0
NUMBNESS: 1
DISTURBANCES IN COORDINATION: 0
CONSTIPATION: 0
DECREASED APPETITE: 0
TROUBLE SWALLOWING: 0
POLYDIPSIA: 1
BLOATING: 0
BREAST MASS: 0
EYE PAIN: 0
JOINT SWELLING: 0
PARALYSIS: 0
HALLUCINATIONS: 0
BREAST PAIN: 0

## 2017-11-06 ASSESSMENT — PAIN SCALES - GENERAL: PAINLEVEL: NO PAIN (0)

## 2017-11-06 NOTE — PROGRESS NOTES
"Return Medical Weight Management Note     Ashley Catherine  MRN:  [id number]  :  1980  RAKEL:  2017    Dear Juliann, Violet Agrawal,    I had the pleasure of seeing your patient Ashley Catherine.  She is a 37 year old female who I am continuing to see for treatment of obesity related to:       2017   I have the following co-morbidities associated with obesity: -   Are you taking daily medication for heartburn, acid reflux, or GERD (acid reflux disease)? No       INTERVAL HISTORY:  Last visit with Dr. De Los Santos 17. She has been on Qsymia with great response of weight loss.   She has dry mouth, thirsty, little bit of tingling, and a depressed mood.   Last time she stopped Qsymia,she gained her weight back.   She is unsure of whether this is QSYMIA related or now (especially the mood change).     CURRENT WEIGHT:   190 lbs 8 oz    Wt Readings from Last 4 Encounters:   17 86.4 kg (190 lb 8 oz)   10/30/17 85.8 kg (189 lb 3.2 oz)   10/04/17 84.4 kg (186 lb)   17 87.5 kg (193 lb)       Height:  5' 10\"  Body Mass Index:  Body mass index is 27.33 kg/(m^2).  Vitals:  B/P: 137/90, P: 98, R: Data Unavailable     Initial consult weight was 222 on 5/15/2017.  Weight change since last seen on 17 is down 5 pounds.   Total loss is 32 pounds.    Diet and Activity Changes Since Last Visit Reviewed With Patient 2017   I have made the following changes to my diet since my last visit: more sweets    With regards to my diet, I am still struggling with: alcohol   For breakfast, I typically eat: coffee and eggs   For lunch, I typically eat: cafeteria   For supper, I typically eat: meat and veggies   For snack(s), I typically eat: nuts hummus   I have made the following changes to my activity/exercise since my last visit: stairs walking   With regards to my activity/exercise, I am still struggling with: regular exercise       Review of Systems     Constitutional:  Positive for fatigue and recent stressors. " Negative for fever, chills, weight loss, weight gain, decreased appetite, night sweats, height loss, post-operative complications, incisional pain, hallucinations, increased energy, hyperactivity and confused.   HENT:  Negative for ear pain, hearing loss, tinnitus, nosebleeds, trouble swallowing, hoarse voice, mouth sores, sore throat, ear discharge, tooth pain, gum tenderness, taste disturbance, smell disturbance, hearing aid, bleeding gums, dry mouth, sinus pain, sinus congestion and neck mass.    Eyes:  Negative for double vision, pain, redness, eye pain, decreased vision, eye watering, eye bulging, eye dryness, flashing lights, spots, floaters, strabismus, tunnel vision, jaundice and eye irritation.   Respiratory:   Negative for cough, hemoptysis, sputum production, shortness of breath, wheezing, sleep disturbances due to breathing, snores loudly, respiratory pain, dyspnea on exertion, cough disturbing sleep and postural dyspnea.    Cardiovascular:  Positive for few scattered varicosities. Negative for chest pain, dyspnea on exertion, palpitations, orthopnea, fingers/toes turn blue, hypertension, hypotension, syncope, history of heart murmur, pacemaker, leg pain, sleep disturbances due to breathing, light-headedness, exercise intolerance and edema.   Gastrointestinal:  Negative for heartburn, nausea, vomiting, abdominal pain, diarrhea, constipation, blood in stool, melena, rectal pain, bloating, hemorrhoids, bowel incontinence, jaundice, rectal bleeding, coffee ground emesis and change in stool.   Genitourinary:  Negative for bladder incontinence, dysuria, urgency, hematuria, flank pain, vaginal discharge, difficulty urinating, genital sores, dyspareunia, decreased libido, nocturia, voiding less frequently, arousal difficulty, abnormal vaginal bleeding, excessive menstruation, menstrual changes, hot flashes, vaginal dryness and postmenopausal bleeding.   Musculoskeletal:  Positive for back pain and muscle  cramps. Negative for myalgias, joint swelling, arthralgias, stiffness, neck pain, bone pain, muscle weakness and fracture.   Skin:  Negative for nail changes, itching, poor wound healing, rash, hair changes, skin changes, acne, warts, poor wound healing, scarring, flaky skin, Raynaud's phenomenon, sensitivity to sunlight and skin thickening.   Neurological:  Positive for tingling and numbness. Negative for dizziness, tremors, speech change, seizures, loss of consciousness, weakness, light-headedness, headaches, disturbances in coordination, memory loss, difficulty walking and paralysis.   Endo/Heme:  Negative for anemia, swollen glands and bruises/bleeds easily.   Psychiatric/Behavioral:  Positive for depression, decreased concentration and mood swings. Negative for hallucinations, memory loss and panic attacks.    Breast:  Negative for breast discharge, breast mass, breast pain and nipple retraction.   Endocrine:  Positive for polydipsia.Negative for altered temperature regulation, polyphagia, unwanted hair growth and change in facial hair.      MEDICATIONS:   Current Outpatient Prescriptions   Medication     cyclobenzaprine (FLEXERIL) 10 MG tablet     hydrochlorothiazide (HYDRODIURIL) 25 MG tablet     potassium chloride SA (POTASSIUM CHLORIDE) 20 MEQ CR tablet     Cholecalciferol (VITAMIN D) 2000 UNITS CAPS     Phentermine-Topiramate 7.5-46 MG CP24     metroNIDAZOLE (METROGEL) 0.75 % vaginal gel     MELATONIN PO     Diphenhydramine-APAP, sleep, (TYLENOL PM EXTRA STRENGTH PO)     Cyanocobalamin (CVS B-12) 5000 MCG SUBL     polyethylene glycol (MIRALAX) powder     cetirizine (ZYRTEC) 10 MG tablet     Calcium Carbonate-Vitamin D (CALCIUM + D PO)     Multiple Vitamin (MULTIVITAMINS PO)     No current facility-administered medications for this visit.        Weight Loss Medication History Reviewed With Patient 11/6/2017   Which weight loss medications are you currently taking on a regular basis?  Traci  (phentermine/topiramate)   Are you having any side effects from the weight loss medication that we have prescribed you? Yes   If you are having side effects please describe: thirst dry mouth potassium mood?       ASSESSMENT:   Obesity, with good weight loss result on medium dose QSYMIA.   She is wondering is she is having side effects to it. It is primarily low mood.   She has gained weight back last time she stopped QSYMIA.     PLAN:   - continue QSYMIA  - Discuss with Dr. Elias whether her mood issues are med side effects or not  - consider switching to Contrave if she wants to stop QSYMIA    2. HYPERTENSION:  On HCTZ with hypokalemia on KCL  - check potassium today    FOLLOW-UP:    12 weeks.    Time: 20 min spent on evaluation, management, counseling, education, & motivational interviewing with greater than 50 % of the total time was spent on counseling and coordinating care    Sincerely,    Hector Justice MD

## 2017-11-06 NOTE — PROGRESS NOTES
[facility]: Cesia Catherine Came to see me for evaluation.  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  She's never had any larger close veins.  She noticed a sore area in her left lateral popliteal region for several weeks with no history of trauma.  She noticed a small varicose vein at the area and one to have this evaluated.    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    Exam: Healthy woman.  Good results of  gastric surgery.             Good distal pulses. No large varicose veins in either leg  No edema               A small complex of reticular veins as noted in the left lateral popliteal region.                This is the area where she notices discomfort.  There is no phlebitis.      Impression:  This small area in the popliteal region may be the cause of her pain.  This is somewhat smaller than we would normally expect for discomfort from a varicosity.  This would be a very difficult place to treat with compression senses in the popliteal fossa with either a knee-high or thigh-high compression stocking.   GERD has an over-the-counter stocking that she'll occasionally wear work but the top of this is below the small varicosity.    I would recommend that she try an Ace bandage over the area and if this does relieve her discomfort to consider sclerotherapy at this site.  We discussed the risks and benefits and expected recovery with a relatively focal sclerotherapy treatment which if this is indeed tenderness caused by the vein should have a very good result.    She will call should like to undergo sclerotherapy which would be a one half session treatment.    Jayro Pérez MD   Dictated 11/6/2017

## 2017-11-06 NOTE — PATIENT INSTRUCTIONS
INFORMATION ON CONTRAVE    Contrave is specifically prescribed for obesity. It is a combination of two medications, Naltrexone and Bupropione (Wellbutrin). The Bupropion helps lessen appetite and the Naltrexone works by blocking certain receptors in the brain and curbing cravings.      For some of our patients the medication works right away. Other patients don't feel much of a change but find they've lost weight. Like all weight loss medications, Contrave works best when you help it work. This means:  1. Having less tempting high calorie (fattening) food around the house or office. (For people with strong cravings this is very important.)   2. Staying away from situations or people that may trigger your cravings .   3. Eating out only one time or less each week.  4. Eating your meals at a table with the TV or computer off.    WARNING: This medication blocks the action of opioid type pain medications. If you routinely take any medication like Codeine, Oxycontin, Percocet, Morphine, Dilaudid or Methodone, do not take this until you have talked with weight management staff. If you are planning surgery you should stop Naltrexone 4 days prior to the surgery, and should not restart it until 4 days after your last dose of narcotics. If you have an injury that requires pain medication, make sure the health care staff knows you take Naltrexone.     Dosing as follows:  Week 1- 1 tablet in the morning  Week 2- 1 tablet in the morning and 1 tablet at bedtime  Week 3- 2 tablets in the morning and 1 tablet at bedtime  Week 4 and thereafter- 2 tablets in the morning and 2 tablets at bedtime    Side-effects: The most common side effect include: nausea; constipation; headache; vomiting; dizziness; diarrhea; trouble sleeping; and dry mouth.     This medication typically isn t covered by insurance and will require a prior authorization, which can take 1-2 weeks to review. Patients can visit www.[email or web address] and sign up for the  Pharmacy savings program and receive the medication for $60-70 per month for 12 months if eligible.    Call the nurse at [phone] if you have any questions or concerns. (Do not stop taking it if you don't think it's working. For some people it works without them knowing it.)       In order to get refills of this or any medication we prescribe you must be seen in the medical weight mgmt clinic every 2-4 months. Please have your pharmacy fax a refill request to [phone].

## 2017-11-06 NOTE — NURSING NOTE
"Chief Complaint   Patient presents with     Clinic Care Coordination - Follow-up     F/u weight loss.        Vitals:    11/06/17 0805   BP: 137/90   BP Location: Left arm   Patient Position: Chair   Cuff Size: Adult Large   Pulse: 98   Temp: 97.5  F (36.4  C)   TempSrc: Oral   SpO2: 98%   Weight: 190 lb 8 oz   Height: 5' 10\"       Body mass index is 27.33 kg/(m^2).    Paulie MOLINA LPN                  "

## 2017-11-06 NOTE — MR AVS SNAPSHOT
After Visit Summary   11/6/2017    Ashley Catherine    MRN: [id number]           Patient Information     Date Of Birth          1980        Visit Information        Provider Department      11/6/2017 8:00 AM Hector Justice MD [facility] Medical Weight Management        Today's Diagnoses     Benign essential hypertension    -  1    Non morbid obesity due to excess calories          Care Instructions      INFORMATION ON CONTRAVE    Contrave is specifically prescribed for obesity. It is a combination of two medications, Naltrexone and Bupropione (Wellbutrin). The Bupropion helps lessen appetite and the Naltrexone works by blocking certain receptors in the brain and curbing cravings.      For some of our patients the medication works right away. Other patients don't feel much of a change but find they've lost weight. Like all weight loss medications, Contrave works best when you help it work. This means:  1. Having less tempting high calorie (fattening) food around the house or office. (For people with strong cravings this is very important.)   2. Staying away from situations or people that may trigger your cravings .   3. Eating out only one time or less each week.  4. Eating your meals at a table with the TV or computer off.    WARNING: This medication blocks the action of opioid type pain medications. If you routinely take any medication like Codeine, Oxycontin, Percocet, Morphine, Dilaudid or Methodone, do not take this until you have talked with weight management staff. If you are planning surgery you should stop Naltrexone 4 days prior to the surgery, and should not restart it until 4 days after your last dose of narcotics. If you have an injury that requires pain medication, make sure the health care staff knows you take Naltrexone.     Dosing as follows:  Week 1- 1 tablet in the morning  Week 2- 1 tablet in the morning and 1 tablet at bedtime  Week 3- 2 tablets in the morning and 1 tablet at  bedtime  Week 4 and thereafter- 2 tablets in the morning and 2 tablets at bedtime    Side-effects: The most common side effect include: nausea; constipation; headache; vomiting; dizziness; diarrhea; trouble sleeping; and dry mouth.     This medication typically isn t covered by insurance and will require a prior authorization, which can take 1-2 weeks to review. Patients can visit [email or web address] and sign up for the Pharmacy savings program and receive the medication for $60-70 per month for 12 months if eligible.    Call the nurse at [phone] if you have any questions or concerns. (Do not stop taking it if you don't think it's working. For some people it works without them knowing it.)       In order to get refills of this or any medication we prescribe you must be seen in the medical weight mgmt clinic every 2-4 months. Please have your pharmacy fax a refill request to [phone].                Follow-ups after your visit        Your next 10 appointments already scheduled     Nov 06, 2017  3:45 PM CST   Free Screening with Jayro Pérez MD   [facility]    [address]  [facility] [address]   [phone]            Nov 09, 2017  8:00 AM CST   MyChart Short with Violet Humphreys MD   [facility] ([facility])    [address] [place] [address]   [phone]            Nov 15, 2017  4:00 PM CST   (Arrive by 3:45 PM)   Return Visit with Jayro Elias, PhD [facility] ([facility])    [address] [place] [address]  3rd Floor  [facility] [id number] [phone]            Jan 05, 2018 10:00 AM CST   US HEAD NECK SOFT TISSUE with UCUS1   [facility]  ([facility])    [address] [place] [address]  1st [facility] [id number] [phone]           Please bring a list of your medicines  (including vitamins, minerals and over-the-counter drugs). Also, tell your doctor about any allergies you may have. Wear comfortable clothes and leave your valuables at home.  You do not need to do anything special to prepare for your exam.  Please call the Imaging Department at your exam site with any questions.            Jan 05, 2018 10:45 AM CST   US THYROID with UCUS1   [facility] ([facility])    [address] [place], [place] [id number] [phone]           Please bring a list of your medicines (including vitamins, minerals and over-the-counter drugs). Also, tell your doctor about any allergies you may have. Wear comfortable clothes and leave your valuables at home.  You do not need to do anything special to prepare for your exam.  Please call the Imaging Department at your exam site with any questions.            Kwesi 10, 2018  1:00 PM CST   (Arrive by 12:45 PM)   RETURN ENDOCRINE with Crystal Wyatt MD   [facility] ([facility])    [address] [place] [address] [phone]              Future tests that were ordered for you today     Open Future Orders        Priority Expected Expires Ordered    Basic metabolic panel Routine 11/6/2017 11/6/2018 11/6/2017            Who to contact     Please call your clinic at [phone] to:    Ask questions about your health    Make or cancel appointments    Discuss your medicines    Learn about your test results    Speak to your doctor   If you have compliments or concerns about an experience at your clinic, or if you wish to file a complaint, please contact [facility] Patient Relations at [phone] or email us at [email or web address]         Additional Information About Your Visit        [employer] Information     [employer]ot gives you secure access to your electronic health record. If you see a primary  "care provider, you can also send messages to your care team and make appointments. If you have questions, please call your primary care clinic.  If you do not have a primary care provider, please call [phone] and they will assist you.      Imagine [employer] is an electronic gateway that provides easy, online access to your medical records. With [employer], you can request a clinic appointment, read your test results, renew a prescription or communicate with your care team.     To access your existing account, please contact your [facility] or call [phone] for assistance.        Care EveryWhere ID     This is your Care EveryWhere ID. This could be used by other organizations to access your [facility] medical records  [id number]        Your Vitals Were     Pulse Temperature Height Last Period Pulse Oximetry BMI (Body Mass Index)    98 97.5  F (36.4  C) (Oral) 1.778 m (5' 10\") 10/04/2017 (Exact Date) 98% 27.33 kg/m2       Blood Pressure from Last 3 Encounters:   11/06/17 137/90   10/30/17 132/80   10/04/17 110/70    Weight from Last 3 Encounters:   11/06/17 86.4 kg (190 lb 8 oz)   10/30/17 85.8 kg (189 lb 3.2 oz)   10/04/17 84.4 kg (186 lb)                 Where to get your medicines      Some of these will need a paper prescription and others can be bought over the counter.  Ask your nurse if you have questions.     Bring a paper prescription for each of these medications     Phentermine-Topiramate 7.5-46 MG Cp24          Primary Care Provider Office Phone # Fax #    Violet Humphreys [phone] [address] [address]        Equal Access to Services     MABEL BAILEY : paulo Martin, carmen cameron. So [facility] [phone].    ATENCIÓN: Si habla español, tiene a gibbons disposición servicios gratuitos de asistencia lingüística. Llame al [phone].    We comply " with applicable federal civil rights laws and [place] laws. We do not discriminate on the basis of race, color, national origin, age, disability, sex, sexual orientation, or gender identity.            Thank you!     Thank you for choosing [facility] MEDICAL WEIGHT MANAGEMENT  for your care. Our goal is always to provide you with excellent care. Hearing back from our patients is one way we can continue to improve our services. Please take a few minutes to complete the written survey that you may receive in the mail after your visit with us. Thank you!             Your Updated Medication List - Protect others around you: Learn how to safely use, store and throw away your medicines at www.disposemymeds.org.          This list is accurate as of: 11/6/17  8:30 AM.  Always use your most recent med list.                   Brand Name Dispense Instructions for use Diagnosis    CALCIUM + D PO      Take by mouth daily        cetirizine 10 MG tablet    zyrTEC     Take 10 mg by mouth daily as needed for allergies        CVS B-12 5000 MCG Subl   Generic drug:  Cyanocobalamin       Multiple thyroid nodules       cyclobenzaprine 10 MG tablet    FLEXERIL    30 tablet    TAKE ONE-HALF TABLET BY MOUTH TWICE A DAY AS NEEDED FOR MUSCLE SPASMS    Sciatica, unspecified laterality       hydrochlorothiazide 25 MG tablet    HYDRODIURIL    90 tablet    TAKE ONE TABLET BY MOUTH EVERY DAY    Essential hypertension with goal blood pressure less than 140/90       MELATONIN PO      Take 3 mg by mouth        metroNIDAZOLE 0.75 % vaginal gel    METROGEL    70 g    Use twice weekly to vagina for recurrent BV symptoms.  Do this for 3 months after finishing oral medications    Abnormal vaginal fluids       MULTIVITAMINS PO      Take by mouth daily        Phentermine-Topiramate 7.5-46 MG Cp24     30 capsule    Take 1 capsule by mouth daily    Non morbid obesity due to excess calories       polyethylene glycol powder    MIRALAX    510 g    Take 17 g (1  capful) by mouth daily as needed for constipation    Constipation, unspecified constipation type       potassium chloride SA 20 MEQ CR tablet    KLOR-CON    90 tablet    Take 1 tablet (20 mEq) by mouth daily    S/P gastrectomy       TYLENOL PM EXTRA STRENGTH PO           vitamin D 2000 UNITS Caps

## 2017-11-06 NOTE — LETTER
"2017       RE: Ashley Catherine  [address]  [facility] [address]     Dear Colleague,    Thank you for referring your patient, Ashley Catherine, to the [facility] MEDICAL WEIGHT MANAGEMENT at [facility]. Please see a copy of my visit note below.    Return Medical Weight Management Note     Ashley Catherine  MRN:  [id number]  :  1980  RAKEL:  2017    Dear Juliann, Violet Agrawal,    I had the pleasure of seeing your patient Ashley Catherine.  She is a 37 year old female who I am continuing to see for treatment of obesity related to:       2017   I have the following co-morbidities associated with obesity: -   Are you taking daily medication for heartburn, acid reflux, or GERD (acid reflux disease)? No       INTERVAL HISTORY:  Last visit with Dr. De Los Santos 17. She has been on Qsymia with great response of weight loss.   She has dry mouth, thirsty, little bit of tingling, and a depressed mood.   Last time she stopped Qsymia,she gained her weight back.   She is unsure of whether this is QSYMIA related or now (especially the mood change).     CURRENT WEIGHT:   190 lbs 8 oz    Wt Readings from Last 4 Encounters:   17 86.4 kg (190 lb 8 oz)   10/30/17 85.8 kg (189 lb 3.2 oz)   10/04/17 84.4 kg (186 lb)   17 87.5 kg (193 lb)       Height:  5' 10\"  Body Mass Index:  Body mass index is 27.33 kg/(m^2).  Vitals:  B/P: 137/90, P: 98, R: Data Unavailable     Initial consult weight was 222 on 5/15/2017.  Weight change since last seen on 17 is down 5 pounds.   Total loss is 32 pounds.    Diet and Activity Changes Since Last Visit Reviewed With Patient 2017   I have made the following changes to my diet since my last visit: more sweets    With regards to my diet, I am still struggling with: alcohol   For breakfast, I typically eat: coffee and eggs   For lunch, I typically eat: cafeteria   For supper, I typically eat: meat and veggies   For snack(s), I typically " eat: nuts hummus   I have made the following changes to my activity/exercise since my last visit: stairs walking   With regards to my activity/exercise, I am still struggling with: regular exercise       Review of Systems     Constitutional:  Positive for fatigue and recent stressors. Negative for fever, chills, weight loss, weight gain, decreased appetite, night sweats, height loss, post-operative complications, incisional pain, hallucinations, increased energy, hyperactivity and confused.   HENT:  Negative for ear pain, hearing loss, tinnitus, nosebleeds, trouble swallowing, hoarse voice, mouth sores, sore throat, ear discharge, tooth pain, gum tenderness, taste disturbance, smell disturbance, hearing aid, bleeding gums, dry mouth, sinus pain, sinus congestion and neck mass.    Eyes:  Negative for double vision, pain, redness, eye pain, decreased vision, eye watering, eye bulging, eye dryness, flashing lights, spots, floaters, strabismus, tunnel vision, jaundice and eye irritation.   Respiratory:   Negative for cough, hemoptysis, sputum production, shortness of breath, wheezing, sleep disturbances due to breathing, snores loudly, respiratory pain, dyspnea on exertion, cough disturbing sleep and postural dyspnea.    Cardiovascular:  Positive for few scattered varicosities. Negative for chest pain, dyspnea on exertion, palpitations, orthopnea, fingers/toes turn blue, hypertension, hypotension, syncope, history of heart murmur, pacemaker, leg pain, sleep disturbances due to breathing, light-headedness, exercise intolerance and edema.   Gastrointestinal:  Negative for heartburn, nausea, vomiting, abdominal pain, diarrhea, constipation, blood in stool, melena, rectal pain, bloating, hemorrhoids, bowel incontinence, jaundice, rectal bleeding, coffee ground emesis and change in stool.   Genitourinary:  Negative for bladder incontinence, dysuria, urgency, hematuria, flank pain, vaginal discharge, difficulty urinating,  genital sores, dyspareunia, decreased libido, nocturia, voiding less frequently, arousal difficulty, abnormal vaginal bleeding, excessive menstruation, menstrual changes, hot flashes, vaginal dryness and postmenopausal bleeding.   Musculoskeletal:  Positive for back pain and muscle cramps. Negative for myalgias, joint swelling, arthralgias, stiffness, neck pain, bone pain, muscle weakness and fracture.   Skin:  Negative for nail changes, itching, poor wound healing, rash, hair changes, skin changes, acne, warts, poor wound healing, scarring, flaky skin, Raynaud's phenomenon, sensitivity to sunlight and skin thickening.   Neurological:  Positive for tingling and numbness. Negative for dizziness, tremors, speech change, seizures, loss of consciousness, weakness, light-headedness, headaches, disturbances in coordination, memory loss, difficulty walking and paralysis.   Endo/Heme:  Negative for anemia, swollen glands and bruises/bleeds easily.   Psychiatric/Behavioral:  Positive for depression, decreased concentration and mood swings. Negative for hallucinations, memory loss and panic attacks.    Breast:  Negative for breast discharge, breast mass, breast pain and nipple retraction.   Endocrine:  Positive for polydipsia.Negative for altered temperature regulation, polyphagia, unwanted hair growth and change in facial hair.      MEDICATIONS:   Current Outpatient Prescriptions   Medication     cyclobenzaprine (FLEXERIL) 10 MG tablet     hydrochlorothiazide (HYDRODIURIL) 25 MG tablet     potassium chloride SA (POTASSIUM CHLORIDE) 20 MEQ CR tablet     Cholecalciferol (VITAMIN D) 2000 UNITS CAPS     Phentermine-Topiramate 7.5-46 MG CP24     metroNIDAZOLE (METROGEL) 0.75 % vaginal gel     MELATONIN PO     Diphenhydramine-APAP, sleep, (TYLENOL PM EXTRA STRENGTH PO)     Cyanocobalamin (CVS B-12) 5000 MCG SUBL     polyethylene glycol (MIRALAX) powder     cetirizine (ZYRTEC) 10 MG tablet     Calcium Carbonate-Vitamin D (CALCIUM +  D PO)     Multiple Vitamin (MULTIVITAMINS PO)     No current facility-administered medications for this visit.        Weight Loss Medication History Reviewed With Patient 11/6/2017   Which weight loss medications are you currently taking on a regular basis?  Qysmia (phentermine/topiramate)   Are you having any side effects from the weight loss medication that we have prescribed you? Yes   If you are having side effects please describe: thirst dry mouth potassium mood?       ASSESSMENT:   Obesity, with good weight loss result on medium dose QSYMIA.   She is wondering is she is having side effects to it. It is primarily low mood.   She has gained weight back last time she stopped QSYMIA.     PLAN:   - continue QSYMIA  - Discuss with Dr. Elias whether her mood issues are med side effects or not  - consider switching to Contrave if she wants to stop QSYMIA    2. HYPERTENSION:  On HCTZ with hypokalemia on KCL  - check potassium today    FOLLOW-UP:    12 weeks.    Time: 20 min spent on evaluation, management, counseling, education, & motivational interviewing with greater than 50 % of the total time was spent on counseling and coordinating care    Sincerely,    Hector Justice MD

## 2017-11-06 NOTE — MR AVS SNAPSHOT
After Visit Summary   11/6/2017    Ashley Catherine    MRN: [id number]           Patient Information     Date Of Birth          1980        Visit Information        Provider Department      11/6/2017 3:45 PM Jayro Pérez MD [facility]      Today's Diagnoses     ERRONEOUS ENCOUNTER--DISREGARD    -  1       Follow-ups after your visit        Your next 10 appointments already scheduled     Apr 03, 2018   Procedure with Delia Harper MD   [facility] ([facility])    [address] [place] [address]  5th [facility] [id number] [phone]           Located in the [facility] at [address] [place] [address].   parking is very convenient and highly recommended.  is a $6 flat rate fee.  Both  and self parkers should enter the main arrival plaza from [facility]; parking attendants will direct you based on your parking preference.            Apr 16, 2018  1:30 PM CDT   (Arrive by 1:15 PM)   Return Visit with Delia Harper MD   [facility] ([facility])    [address] [place] [address]  4th [facility] [id number] [phone]            May 02, 2018  4:00 PM CDT   (Arrive by 3:45 PM)   Return Visit with Jayro Elias, PhD [facility] ([facility])    [address] [place] [address]  3rd [facility] [id number] [phone]              Who to contact     If you have questions or need follow up information about today's clinic visit or your schedule please contact [facility] directly at [phone].  Normal or non-critical lab and imaging results will be communicated to you by MyChart, letter or phone within 4 business days after the clinic has received the results. If you do not hear from us within 7 days, please  contact the clinic through [email or web address] or phone. If you have a critical or abnormal lab result, we will notify you by phone as soon as possible.  Submit refill requests through [email or web address] or call your pharmacy and they will forward the refill request to us. Please allow 3 business days for your refill to be completed.          Additional Information About Your Visit        Sq[facility]gle[facility] Information     [email or web address] gives you secure access to your electronic health record. If you see a primary care provider, you can also send messages to your care team and make appointments. If you have questions, please call your primary care clinic.  If you do not have a primary care provider, please call [phone] and they will assist you.        Care EveryWhere ID     This is your Care EveryWhere ID. This could be used by other organizations to access your [facility] medical records  [id number]        Your Vitals Were     Last Period                   11/02/2017 (Approximate)            Blood Pressure from Last 3 Encounters:   03/26/18 118/62   03/14/18 124/76   01/17/18 126/84    Weight from Last 3 Encounters:   03/26/18 190 lb 3.2 oz (86.3 kg)   03/14/18 194 lb 12.8 oz (88.4 kg)   01/29/18 186 lb (84.4 kg)              Today, you had the following     No orders found for display         Where to get your medicines      Some of these will need a paper prescription and others can be bought over the counter.  Ask your nurse if you have questions.     Bring a paper prescription for each of these medications     Phentermine-Topiramate 7.5-46 MG Cp24          Primary Care Provider Office Phone # Fax #    [place] [phone] [address] [address]        Equal Access to Services     [facility]: Hadreuben Alejandre, paulo bravo, carmen cameron. So [facility] [phone].    ATENCIÓN: Si habla español, tiene a gibbons disposición  servicios gratuitos de asistencia lingüística. Faby jhaveri [phone].    We comply with applicable federal civil rights laws and [place] laws. We do not discriminate on the basis of race, color, national origin, age, disability, sex, sexual orientation, or gender identity.            Thank you!     Thank you for choosing [facility]  for your care. Our goal is always to provide you with excellent care. Hearing back from our patients is one way we can continue to improve our services. Please take a few minutes to complete the written survey that you may receive in the mail after your visit with us. Thank you!             Your Updated Medication List - Protect others around you: Learn how to safely use, store and throw away your medicines at www.disposemymeds.org.          This list is accurate as of 11/6/17 11:59 PM.  Always use your most recent med list.                   Brand Name Dispense Instructions for use Diagnosis    CALCIUM + D PO      Take by mouth daily        cetirizine 10 MG tablet    zyrTEC     Take 10 mg by mouth daily as needed for allergies        CVS B-12 5000 MCG Subl   Generic drug:  Cyanocobalamin       Multiple thyroid nodules       cyclobenzaprine 10 MG tablet    FLEXERIL    30 tablet    TAKE ONE-HALF TABLET BY MOUTH TWICE A DAY AS NEEDED FOR MUSCLE SPASMS    Sciatica, unspecified laterality       MELATONIN PO      Take 3 mg by mouth        metroNIDAZOLE 0.75 % vaginal gel    METROGEL    70 g    Use twice weekly to vagina for recurrent BV symptoms.  Do this for 3 months after finishing oral medications    Abnormal vaginal fluids       MULTIVITAMINS PO      Take by mouth daily        Phentermine-Topiramate 7.5-46 MG Cp24     30 capsule    Take 1 capsule by mouth daily    Non morbid obesity due to excess calories       polyethylene glycol powder    MIRALAX    510 g    Take 17 g (1 capful) by mouth daily as needed for constipation    Constipation, unspecified constipation  type       potassium chloride SA 20 MEQ CR tablet    KLOR-CON    90 tablet    Take 1 tablet (20 mEq) by mouth daily    S/P gastrectomy       TYLENOL PM EXTRA STRENGTH PO           vitamin D 2000 UNITS Caps

## 2017-11-09 ENCOUNTER — TELEPHONE (OUTPATIENT)
Dept: FAMILY MEDICINE | Facility: CLINIC | Age: 37
End: 2017-11-09

## 2017-11-09 ENCOUNTER — OFFICE VISIT (OUTPATIENT)
Dept: FAMILY MEDICINE | Facility: CLINIC | Age: 37
End: 2017-11-09
Payer: COMMERCIAL

## 2017-11-09 VITALS
HEIGHT: 70 IN | BODY MASS INDEX: 26.88 KG/M2 | TEMPERATURE: 96.9 F | DIASTOLIC BLOOD PRESSURE: 81 MMHG | OXYGEN SATURATION: 98 % | SYSTOLIC BLOOD PRESSURE: 115 MMHG | WEIGHT: 187.8 LBS | HEART RATE: 82 BPM

## 2017-11-09 DIAGNOSIS — F43.22 ADJUSTMENT DISORDER WITH ANXIOUS MOOD: Primary | ICD-10-CM

## 2017-11-09 PROCEDURE — 99214 OFFICE O/P EST MOD 30 MIN: CPT | Performed by: FAMILY MEDICINE

## 2017-11-09 RX ORDER — HYDROXYZINE PAMOATE 50 MG/1
50-100 CAPSULE ORAL
Qty: 30 CAPSULE | Refills: 1 | Status: SHIPPED | OUTPATIENT
Start: 2017-11-09 | End: 2018-09-15

## 2017-11-09 ASSESSMENT — PATIENT HEALTH QUESTIONNAIRE - PHQ9
SUM OF ALL RESPONSES TO PHQ QUESTIONS 1-9: 8
5. POOR APPETITE OR OVEREATING: MORE THAN HALF THE DAYS

## 2017-11-09 ASSESSMENT — ANXIETY QUESTIONNAIRES
7. FEELING AFRAID AS IF SOMETHING AWFUL MIGHT HAPPEN: SEVERAL DAYS
IF YOU CHECKED OFF ANY PROBLEMS ON THIS QUESTIONNAIRE, HOW DIFFICULT HAVE THESE PROBLEMS MADE IT FOR YOU TO DO YOUR WORK, TAKE CARE OF THINGS AT HOME, OR GET ALONG WITH OTHER PEOPLE: SOMEWHAT DIFFICULT
5. BEING SO RESTLESS THAT IT IS HARD TO SIT STILL: NOT AT ALL
3. WORRYING TOO MUCH ABOUT DIFFERENT THINGS: MORE THAN HALF THE DAYS
6. BECOMING EASILY ANNOYED OR IRRITABLE: MORE THAN HALF THE DAYS
1. FEELING NERVOUS, ANXIOUS, OR ON EDGE: NEARLY EVERY DAY
GAD7 TOTAL SCORE: 12
2. NOT BEING ABLE TO STOP OR CONTROL WORRYING: MORE THAN HALF THE DAYS

## 2017-11-09 NOTE — TELEPHONE ENCOUNTER
Received form(s) from [employer] - Certification of Health Care Provider for Employee's Serious Health Condition.  Placed form(s) in/on SN's box.  Forms need to be filled out and signed, no fax number or address provided on form. Called patient and left voicemail asking for her to call back with information on where to send form.     Call pt to verify form was sent: No  Copy needs to be sent for scanning after completion: Yes    MARIANNE Smith

## 2017-11-09 NOTE — NURSING NOTE
"Chief Complaint   Patient presents with     MOOD CHANGES       Initial /81  Pulse 82  Temp 96.9  F (36.1  C) (Oral)  Ht 5' 10\" (1.778 m)  Wt 187 lb 12.8 oz (85.2 kg)  LMP 11/02/2017 (Approximate)  SpO2 98%  BMI 26.95 kg/m2 Estimated body mass index is 26.95 kg/(m^2) as calculated from the following:    Height as of this encounter: 5' 10\" (1.778 m).    Weight as of this encounter: 187 lb 12.8 oz (85.2 kg).  Medication Reconciliation: complete      Health Maintenance that is potentially due pending provider review:  NONE    n/a    MARIANNE Smith  "

## 2017-11-09 NOTE — PROGRESS NOTES
SUBJECTIVE:   Ashley Catherine is a 37 year old female who presents to clinic today for the following health issues:      Abnormal Mood Symptoms      Duration: x2-3 weeks    Description:  Depression: YES- mild   Anxiety: YES- moderate   Panic attacks: YES- mild     Accompanying signs and symptoms: see PHQ-9 and MAXIMO scores    History (similar episodes/previous evaluation): Depression in the past, had evaluation done and used to be on Celexa     Precipitating or alleviating factors: None    Therapies tried and outcome: Celexa (Citalopram) - was on it for about a year, felt better after stopping Celexa     Here today with feelings of anxiety. Has been off of work for most of the summer and is now back to work. Cannot really say that she feels anxious or nervous but has noted increasing irritability and frustration and overall dis ease. Has been easily frustrated by coworkers and snapped at one coworkers well. Does not like feeling this way wonders what she can do. In the past she has tried citalopram felt that this really did not work for her. Had Wellbutrin added to this and felt this caused a lot of increased panic and anxiety. Also is not sleeping well and feels a lot of anxiety before her shifts and thinks this is the cause. She otherwise feels great is exercising has no other concerns. Her other medical conditions are stable. She does have a history of gastric bypass and weight is under good control. She is taking combination medications for this phentermine and topiramate but has not had the dose increased recently. She is tolerating a low-dose see notes below. No other stimulant medications are noted. No changes to medicines.  PHQ-9 SCORE 2/15/2017 9/28/2017 11/9/2017   Total Score - - -   Total Score MyChart - - -   Total Score 3 1 8      MAXIMO-7 SCORE 2/15/2017 9/28/2017 11/9/2017   Total Score - - -   Total Score 1 1 12           Current Outpatient Prescriptions   Medication     hydrOXYzine (VISTARIL) 50 MG  capsule     sertraline (ZOLOFT) 50 MG tablet     Phentermine-Topiramate 7.5-46 MG CP24     cyclobenzaprine (FLEXERIL) 10 MG tablet     hydrochlorothiazide (HYDRODIURIL) 25 MG tablet     potassium chloride SA (POTASSIUM CHLORIDE) 20 MEQ CR tablet     Cholecalciferol (VITAMIN D) 2000 UNITS CAPS     metroNIDAZOLE (METROGEL) 0.75 % vaginal gel     MELATONIN PO     Diphenhydramine-APAP, sleep, (TYLENOL PM EXTRA STRENGTH PO)     Cyanocobalamin (CVS B-12) 5000 MCG SUBL     polyethylene glycol (MIRALAX) powder     cetirizine (ZYRTEC) 10 MG tablet     Calcium Carbonate-Vitamin D (CALCIUM + D PO)     Multiple Vitamin (MULTIVITAMINS PO)     No current facility-administered medications for this visit.             Problem list and histories reviewed & adjusted, as indicated.  Additional history: as documented    Patient Active Problem List   Diagnosis     OLIGOMENORRHEA, C/W PCOS     Sleep apnea     HX ABNL PAP SMEAR OF CERVIX       Flat feet     GERD (gastroesophageal reflux disease)     Sciatica     S/P gastrectomy     Occupational problem     Elevated parathyroid hormone     Multiple thyroid nodules     Abnormal thyroid cytology-follicular neoplasm     Chronic pain syndrome     Major depressive disorder, recurrent episode, in full remission (H)     Bilateral low back pain with sciatica, sciatica laterality unspecified     Throat symptom     Psychological factor affecting physical condition     Hx of Guillain-Emporia syndrome     Adjustment disorder with anxious mood     Past Surgical History:   Procedure Laterality Date     BIOPSY  03/13/2015    Thyroid nodule     ESOPHAGOSCOPY, GASTROSCOPY, DUODENOSCOPY (EGD), COMBINED  5/28/2013    Procedure: COMBINED ESOPHAGOSCOPY, GASTROSCOPY, DUODENOSCOPY (EGD);;  Surgeon: Best Willett MD;  Location: [facility]     LAPAROSCOPIC GASTRIC SLEEVE  5/13/2013    Procedure: LAPAROSCOPIC GASTRIC SLEEVE;  Laparoscopic Sleeve Gastrectomy ;  Surgeon: Best Willett MD;  Location: [facility]      LEEP TX, CERVICAL  1995    age 15     SEPTOPLASTY       TONSILLECTOMY  age 20s     TONSILLECTOMY  2004?     wisdom teeth extraction         Social History   Substance Use Topics     Smoking status: Never Smoker     Smokeless tobacco: Never Used     Alcohol use 2.4 - 3.0 oz/week      Comment: 3 drinks/week     Family History   Problem Relation Age of Onset     Hypertension Sister      Hypertension Father      Allergies Father      seasonal     Lipids Father      Obesity Father      Arthritis Mother      Gynecology Mother      problems with menopause     Hypertension Mother      Other Cancer Mother      Endometrial     OSTEOPOROSIS Mother      Obesity Mother      Parathyroid Disorders Mother      3 operations     Obesity Sister      DIABETES Maternal Aunt      x several w. DM     Breast Cancer Maternal Aunt      Cancer - colorectal Maternal Uncle      60ish     Hypertension Sister      Obesity Sister      Nephrolithiasis No family hx of          Current Outpatient Prescriptions   Medication Sig Dispense Refill     hydrOXYzine (VISTARIL) 50 MG capsule Take 1-2 capsules ( mg) by mouth nightly as needed for anxiety (sleep) 30 capsule 1     sertraline (ZOLOFT) 50 MG tablet Take 1 tablet (50 mg) by mouth daily 30 tablet 1     Phentermine-Topiramate 7.5-46 MG CP24 Take 1 capsule by mouth daily 30 capsule 5     cyclobenzaprine (FLEXERIL) 10 MG tablet TAKE ONE-HALF TABLET BY MOUTH TWICE A DAY AS NEEDED FOR MUSCLE SPASMS 30 tablet 5     hydrochlorothiazide (HYDRODIURIL) 25 MG tablet TAKE ONE TABLET BY MOUTH EVERY DAY 90 tablet 1     potassium chloride SA (POTASSIUM CHLORIDE) 20 MEQ CR tablet Take 1 tablet (20 mEq) by mouth daily 90 tablet 3     Cholecalciferol (VITAMIN D) 2000 UNITS CAPS        metroNIDAZOLE (METROGEL) 0.75 % vaginal gel Use twice weekly to vagina for recurrent BV symptoms.  Do this for 3 months after finishing oral medications 70 g 3     MELATONIN PO Take 3 mg by mouth       Diphenhydramine-APAP, sleep,  "(TYLENOL PM EXTRA STRENGTH PO)        Cyanocobalamin (CVS B-12) 5000 MCG SUBL        polyethylene glycol (MIRALAX) powder Take 17 g (1 capful) by mouth daily as needed for constipation 510 g 1     cetirizine (ZYRTEC) 10 MG tablet Take 10 mg by mouth daily as needed for allergies       Calcium Carbonate-Vitamin D (CALCIUM + D PO) Take by mouth daily       Multiple Vitamin (MULTIVITAMINS PO) Take by mouth daily           Reviewed and updated as needed this [facility]  Meds       Reviewed and updated as needed this visit by Provider         ROS:  Constitutional, HEENT, cardiovascular, pulmonary, gi and gu systems are negative, except as otherwise noted.      OBJECTIVE:                                                    /81  Pulse 82  Temp 96.9  F (36.1  C) (Oral)  Ht 5' 10\" (1.778 m)  Wt 187 lb 12.8 oz (85.2 kg)  LMP 11/02/2017 (Approximate)  SpO2 98%  BMI 26.95 kg/m2  Body mass index is 26.95 kg/(m^2).  GENERAL APPEARANCE: healthy, alert and no distress  PSYCH: mentation appears normal, affect normal/bright and worried         ASSESSMENT/PLAN:                                                    1. Adjustment disorder with anxious mood  We talked about different ways to manage this. Certainly her poor sleep is feeding into a lot of her symptoms. However she feels that sleep alone is not the only cause. She has tried some over-the-counter Benadryl and melatonin and these have not worked well. Has tried trazodone in the past and did not have a good response to this. We talked about trying Vistaril instead for nighttime use discussed trying different doses. And we also talked about sertraline starting with 50 mg and increasing as needed over the next month. It will likely goal of 100 mg if tolerated well. Certainly reviewed importance of stress reduction with meditation and exercise and mindfulness. Would like to see if better sleep and medications below help.  - hydrOXYzine (VISTARIL) " 50 MG capsule; Take 1-2 capsules ( mg) by mouth nightly as needed for anxiety (sleep)  Dispense: 30 capsule; Refill: 1  - sertraline (ZOLOFT) 50 MG tablet; Take 1 tablet (50 mg) by mouth daily  Dispense: 30 tablet; Refill: 1  She did ask about possibility of having a few days of FMLA per month authorized if she does have difficulty managing this in the short-term. I think that is appropriate for the next few months. When forms, will fill them out.      Follow up with Provider - 4 weeks  Total time was over 25 minutes with >50% spent in counseling      Violet Humphreys MD  [facility]

## 2017-11-09 NOTE — MR AVS SNAPSHOT
After Visit Summary   11/9/2017    Ashley Catherine    MRN: [id number]           Patient Information     Date Of Birth          1980        Visit Information        Provider Department      11/9/2017 8:00 AM Violet Humphreys MD [facility]        Today's Diagnoses     Adjustment disorder with anxious mood    -  1       Follow-ups after your visit        Your next 10 appointments already scheduled     Nov 15, 2017  4:00 PM CST   (Arrive by 3:45 PM)   Return Visit with Jayro Elias, PhD [facility] ([facility] [place])    [address] [place] [id number] [phone]            Jan 05, 2018 10:00 AM CST   US HEAD NECK SOFT TISSUE with [address] [place] [address] Imaging Center  ([facility])    [address] [place] [id number] [phone]           Please bring a list of your medicines (including vitamins, minerals and over-the-counter drugs). Also, tell your doctor about any allergies you may have. Wear comfortable clothes and leave your valuables at home.  You do not need to do anything special to prepare for your exam.  Please call the Imaging Department at your exam site with any questions.            Jan 05, 2018 10:45 AM CST   US THYROID with [address]US[address] [place] [address] Imaging Center US ([facility])    [address] [place] [address] [id number] [phone]           Please bring a list of your medicines (including vitamins, minerals and over-the-counter drugs). Also, tell your doctor about any allergies you may have. Wear comfortable clothes and leave your valuables at home.  You do not need to do anything special to prepare for your exam.  Please call the Imaging Department at your exam site with any questions.            Kwesi 10, 2018  1:00 PM CST   (Arrive by 12:45 PM)   RETURN ENDOCRINE with Crystal Wyatt MD   [facility]  "Endocrinology ([facility] [place])    [address]  3rd Floor  [facility] [address] [phone]              Who to contact     If you have questions or need follow up information about today's clinic visit or your schedule please contact [facility] directly at [phone].  Normal or non-critical lab and imaging results will be communicated to you by MyChart, letter or phone within 4 business days after the clinic has received the results. If you do not hear from us within 7 days, please contact the clinic through Videojughart or phone. If you have a critical or abnormal lab result, we will notify you by phone as soon as possible.  Submit refill requests through Bill.Forward or call your pharmacy and they will forward the refill request to us. Please allow 3 business days for your refill to be completed.          Additional Information About Your Visit        Videojughart Information     Bill.Forward gives you secure access to your electronic health record. If you see a primary care provider, you can also send messages to your care team and make appointments. If you have questions, please call your primary care clinic.  If you do not have a primary care provider, please call [phone] and they will assist you.        Care EveryWhere ID     This is your Care EveryWhere ID. This could be used by other organizations to access your [place] medical records  [id number]        Your Vitals Were     Pulse Temperature Height Last Period Pulse Oximetry BMI (Body Mass Index)    82 96.9  F (36.1  C) (Oral) 5' 10\" (1.778 m) 11/02/2017 (Approximate) 98% 26.95 kg/m2       Blood Pressure from Last 3 Encounters:   11/09/17 115/81   11/06/17 137/90   10/30/17 132/80    Weight from Last 3 Encounters:   11/09/17 187 lb 12.8 oz (85.2 kg)   11/06/17 190 lb 8 oz (86.4 kg)   10/30/17 189 lb 3.2 oz (85.8 kg)              Today, you had the following     No orders found for display         Today's Medication " Changes          These changes are accurate as of: 11/9/17 10:33 AM.  If you have any questions, ask your nurse or doctor.               Start taking these medicines.        Dose/Directions    hydrOXYzine 50 MG capsule   Commonly known as:  VISTARIL   Used for:  Adjustment disorder with anxious mood        Dose:   mg   Take 1-2 capsules ( mg) by mouth nightly as needed for anxiety (sleep)   Quantity:  30 capsule   Refills:  1       sertraline 50 MG tablet   Commonly known as:  ZOLOFT   Used for:  Adjustment disorder with anxious mood        Dose:  50 mg   Take 1 tablet (50 mg) by mouth daily   Quantity:  30 tablet   Refills:  1            Where to get your medicines      These medications were sent to [facility], [place] - [address]  [address], [place] [address], [facility] [address]     Phone:  [phone]     hydrOXYzine 50 MG capsule    sertraline 50 MG tablet                Primary Care Provider Office Phone # Fax #    FoxhomeTanja Humphreys [phone] [address] [address]        Equal Access to Services     [facility]: Hadii sabas lundberg hadasho Sowaldo, waaxda luqadaha, qaybta kaalmada adeblossom, carmen arteaga . So [facility] [phone].    ATENCIÓN: Si habla español, tiene a gibbons disposición servicios gratuitos de asistencia lingüística. [facility] [phone].    We comply with applicable federal civil rights laws and [place] laws. We do not discriminate on the basis of race, color, national origin, age, disability, sex, sexual orientation, or gender identity.            Thank you!     Thank you for choosing [facility]  for your care. Our goal is always to provide you with excellent care. Hearing back from our patients is one way we can continue to improve our services. Please take a few minutes to complete the written survey that you may receive in the mail after your visit  with us. Thank you!             Your Updated Medication List - Protect others around you: Learn how to safely use, store and throw away your medicines at www.disposemymeds.org.          This list is accurate as of: 11/9/17 10:33 AM.  Always use your most recent med list.                   Brand Name Dispense Instructions for use Diagnosis    CALCIUM + D PO      Take by mouth daily        cetirizine 10 MG tablet    zyrTEC     Take 10 mg by mouth daily as needed for allergies        [employer] B-12 5000 MCG Subl   Generic drug:  Cyanocobalamin       Multiple thyroid nodules       cyclobenzaprine 10 MG tablet    FLEXERIL    30 tablet    TAKE ONE-HALF TABLET BY MOUTH TWICE A DAY AS NEEDED FOR MUSCLE SPASMS    Sciatica, unspecified laterality       hydrochlorothiazide 25 MG tablet    HYDRODIURIL    90 tablet    TAKE ONE TABLET BY MOUTH EVERY DAY    Essential hypertension with goal blood pressure less than 140/90       hydrOXYzine 50 MG capsule    VISTARIL    30 capsule    Take 1-2 capsules ( mg) by mouth nightly as needed for anxiety (sleep)    Adjustment disorder with anxious mood       MELATONIN PO      Take 3 mg by mouth        metroNIDAZOLE 0.75 % vaginal gel    METROGEL    70 g    Use twice weekly to vagina for recurrent BV symptoms.  Do this for 3 months after finishing oral medications    Abnormal vaginal fluids       MULTIVITAMINS PO      Take by mouth daily        Phentermine-Topiramate 7.5-46 MG Cp24     30 capsule    Take 1 capsule by mouth daily    Non morbid obesity due to excess calories       polyethylene glycol powder    MIRALAX    510 g    Take 17 g (1 capful) by mouth daily as needed for constipation    Constipation, unspecified constipation type       potassium chloride SA 20 MEQ CR tablet    KLOR-CON    90 tablet    Take 1 tablet (20 mEq) by mouth daily    S/P gastrectomy       sertraline 50 MG tablet    ZOLOFT    30 tablet    Take 1 tablet (50 mg) by mouth daily    Adjustment disorder with anxious  mood       TYLENOL PM EXTRA STRENGTH PO           vitamin D 2000 UNITS Caps

## 2017-11-10 ASSESSMENT — ANXIETY QUESTIONNAIRES: GAD7 TOTAL SCORE: 12

## 2017-11-15 ENCOUNTER — OFFICE VISIT (OUTPATIENT)
Dept: PSYCHOLOGY | Facility: CLINIC | Age: 37
End: 2017-11-15

## 2017-11-15 VITALS — BODY MASS INDEX: 27.2 KG/M2 | WEIGHT: 189.6 LBS

## 2017-11-15 DIAGNOSIS — E66.01 PSYCHOLOGICAL FACTORS AFFECTING MORBID OBESITY (H): ICD-10-CM

## 2017-11-15 DIAGNOSIS — F54 PSYCHOLOGICAL FACTORS AFFECTING MORBID OBESITY (H): ICD-10-CM

## 2017-11-15 DIAGNOSIS — F33.0 MAJOR DEPRESSIVE DISORDER, RECURRENT EPISODE, MILD (H): Primary | ICD-10-CM

## 2017-11-15 DIAGNOSIS — Z56.9 OCCUPATIONAL PROBLEM: ICD-10-CM

## 2017-11-15 SDOH — ECONOMIC STABILITY - INCOME SECURITY: UNSPECIFIED PROBLEMS RELATED TO EMPLOYMENT: Z56.9

## 2017-11-15 NOTE — MR AVS SNAPSHOT
After Visit Summary   11/15/2017    Ashley Catherine    MRN: [id number]           Patient Information     Date Of Birth          1980        Visit Information        Provider Department      11/15/2017 4:00 PM Jayro Elias, PhD [facility]        Today's Diagnoses     Major depressive disorder, recurrent episode, mild (H)    -  1    Psychological factors affecting morbid obesity (H)        Occupational problem           Follow-ups after your visit        Your next 10 appointments already scheduled     Jan 05, 2018 10:00 AM CST   US HEAD NECK SOFT TISSUE with UCUS[address] [place] [address] Imaging Center US ([facility])    [address] [place] [id number] [phone]           Please bring a list of your medicines (including vitamins, minerals and over-the-counter drugs). Also, tell your doctor about any allergies you may have. Wear comfortable clothes and leave your valuables at home.  You do not need to do anything special to prepare for your exam.  Please call the Imaging Department at your exam site with any questions.            Jan 05, 2018 10:45 AM CST   US THYROID with [address]US[address] [place] [address] Imaging Center US ([facility])    [address] [place] [address] [id number] [phone]           Please bring a list of your medicines (including vitamins, minerals and over-the-counter drugs). Also, tell your doctor about any allergies you may have. Wear comfortable clothes and leave your valuables at home.  You do not need to do anything special to prepare for your exam.  Please call the Imaging Department at your exam site with any questions.            Kwesi 10, 2018  1:00 PM CST   (Arrive by 12:45 PM)   RETURN ENDOCRINE with Crystal Wyatt MD   [facility])    [address] [place] [id number] [phone]              Who  to contact     Please call your clinic at [phone] to:    Ask questions about your health    Make or cancel appointments    Discuss your medicines    Learn about your test results    Speak to your doctor   If you have compliments or concerns about an experience at your clinic, or if you wish to file a complaint, please contact [facility] Patient Relations at [phone] or email us at [email or web address]         Additional Information About Your Visit        PharmaNationhart Information     BrightScopet gives you secure access to your electronic health record. If you see a primary care provider, you can also send messages to your care team and make appointments. If you have questions, please call your primary care clinic.  If you do not have a primary care provider, please call [phone] and they will assist you.      rateGenius is an electronic gateway that provides easy, online access to your medical records. With rateGenius, you can request a clinic appointment, read your test results, renew a prescription or communicate with your care team.     To access your existing account, please contact your [facility] or call [phone] for assistance.        Care EveryWhere ID     This is your Care EveryWhere ID. This could be used by other organizations to access your [place] medical records  [id number]        Your Vitals Were     Last Period BMI (Body Mass Index)                11/02/2017 (Approximate) 27.2 kg/m2           Blood Pressure from Last 3 Encounters:   11/09/17 115/81   11/06/17 137/90   10/30/17 132/80    Weight from Last 3 Encounters:   11/15/17 86 kg (189 lb 9.6 oz)   11/09/17 85.2 kg (187 lb 12.8 oz)   11/06/17 86.4 kg (190 lb 8 oz)              Today, you had the following     No orders found for display       Primary Care Provider Office Phone # Fax #    Violet Humphreys [phone]       [address]  [facility] [address]        Equal Access to Services     [facility] : Hadii aad ku hadduanejorge Rachelali, wajuanisda palmiraqkenzieha, qalori kaalbadariel vale, carmen weston. So [facility] [phone].    ATENCIÓN: Si habla español, tiene a gibbons disposición servicios gratuitos de asistencia lingüística. Faby al [phone].    We comply with applicable federal civil rights laws and [place] laws. We do not discriminate on the basis of race, color, national origin, age, disability, sex, sexual orientation, or gender identity.            Thank you!     Thank you for choosing [facility]  for your care. Our goal is always to provide you with excellent care. Hearing back from our patients is one way we can continue to improve our services. Please take a few minutes to complete the written survey that you may receive in the mail after your visit with us. Thank you!             Your Updated Medication List - Protect others around you: Learn how to safely use, store and throw away your medicines at www.disposemymeds.org.          This list is accurate as of: 11/15/17  5:03 PM.  Always use your most recent med list.                   Brand Name Dispense Instructions for use Diagnosis    CALCIUM + D PO      Take by mouth daily        cetirizine 10 MG tablet    zyrTEC     Take 10 mg by mouth daily as needed for allergies        [employer] B-12 5000 MCG Subl   Generic drug:  Cyanocobalamin       Multiple thyroid nodules       cyclobenzaprine 10 MG tablet    FLEXERIL    30 tablet    TAKE ONE-HALF TABLET BY MOUTH TWICE A DAY AS NEEDED FOR MUSCLE SPASMS    Sciatica, unspecified laterality       hydrochlorothiazide 25 MG tablet    HYDRODIURIL    90 tablet    TAKE ONE TABLET BY MOUTH EVERY DAY    Essential hypertension with goal blood pressure less than 140/90       hydrOXYzine 50 MG capsule    VISTARIL    30 capsule    Take 1-2 capsules ( mg) by mouth nightly as needed for anxiety (sleep)     Adjustment disorder with anxious mood       MELATONIN PO      Take 3 mg by mouth        metroNIDAZOLE 0.75 % vaginal gel    METROGEL    70 g    Use twice weekly to vagina for recurrent BV symptoms.  Do this for 3 months after finishing oral medications    Abnormal vaginal fluids       MULTIVITAMINS PO      Take by mouth daily        Phentermine-Topiramate 7.5-46 MG Cp24     30 capsule    Take 1 capsule by mouth daily    Non morbid obesity due to excess calories       polyethylene glycol powder    MIRALAX    510 g    Take 17 g (1 capful) by mouth daily as needed for constipation    Constipation, unspecified constipation type       potassium chloride SA 20 MEQ CR tablet    KLOR-CON    90 tablet    Take 1 tablet (20 mEq) by mouth daily    S/P gastrectomy       sertraline 50 MG tablet    ZOLOFT    30 tablet    Take 1 tablet (50 mg) by mouth daily    Adjustment disorder with anxious mood       TYLENOL PM EXTRA STRENGTH PO           vitamin D 2000 UNITS Caps

## 2017-11-15 NOTE — PROGRESS NOTES
Health Psychology                  Clinic    Department of Medicine  Lis Chavez, Ph.D., L.P. [phone]                          [facility] Earline Anderson, Ph.D.,  L.P. [phone]                 3rd Floor, Clinic 3A  [place] Mail Code 780   Jayro Elias, Ph.D., A.B.IDALIA.P., L.P. [phone]     [address] Melissa Stokes, Ph.D., L.P. [phone]  [place] [id number]  [place] [address]    Health Psychology Follow-Up Note    Ashley Catherine is a 37-year-old woman referred initially  for psychological consultation for workup for a gastric sleeve procedure who now returns following the surgery, with concerns about weight management.  She initiated topirimate and is making great progress.    We reviewed in detail the changes she is making with regard to nutrional intake. She is cooking more of her own food.  She is exercising with 10,000 or more steps per day almost every day.  She is reinforced for the changes and can see the pattern of not doing as well when travelling.  She recently has been travelling more, but also walking more (e.g., climbed a mountain in [place]).    2 lb. Weight loss since last visit.  Today 189.6 lbs.     Wt Readings from Last 4 Encounters:   11/15/17 86 kg (189 lb 9.6 oz)   11/09/17 85.2 kg (187 lb 12.8 oz)   11/06/17 86.4 kg (190 lb 8 oz)   10/30/17 85.8 kg (189 lb 3.2 oz)       She is  5 foot 11 inches.  Her goal is 175. Her mood is much improved.  Her job is okay, but there are frustrations with management (e.g., now though she is chemo trained, she is frustrated by staffing shortages).  She  is working toward certification.  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  She still has hopes to leave this area. 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  She has resumed online dating.  She is participating in various activities (e.g., [employer], a  food shelf).  She reports increased anxiety.  She is not sure why.  There may be a seasonal component.  We discussed drivers of anxiety:  Work, long-term finances/debt, not being in a relationship).   She is encouraged to speak with a financial counselor  To develop financial goals.  She feels others want her to conform.  She is frustrated by a lack of recognition at work and by colleagues who are lazy.   We discussed how to gain more positive recognition at work. She has cut back on exercise and is encouraged to resume. She also got a script for Zoloft and hydroxazine.  She is encouraged to use her full spectrum light again.    Affect is positive despite her frustrations with her weight and work.  Rapport is excellent.   Extended session due to complexity of case and length of interval.      Time in:  3:58  Time out:  4:54  Psychological factors affecting obesity (F54).   Major depression, recurrent, mild (F3e3.0).   Occupational Problems (Z56.9)  PLAN/RECOMMENDATIONS:   1. Ms. Catherine will return 12/22 @ 4:00 to address weight loss and social issues with problem solving therapy. .  2.  Resume schedule of regular exercise and  decrease grazing eating.  3.  Full spectrum lighting.      Tx plan due 5/3/18

## 2017-11-24 ENCOUNTER — TELEPHONE (OUTPATIENT)
Dept: FAMILY MEDICINE | Facility: CLINIC | Age: 37
End: 2017-11-24

## 2017-11-24 NOTE — TELEPHONE ENCOUNTER
Reason for Call:  Form, our goal is to have forms completed with 72 hours, however, some forms may require a visit or additional information.    Type of letter, form or note:  FMLA    Who is the form from?: Patient    Where did the form come from: form was mailed in    What clinic location was the form placed at?:[place]    Where the form was placed: I dont know    What number is listed as a contact on the form?:        Additional comments: Please fax to [phone]    Call taken on 11/24/2017 at 10:45 AM by Nidia Reyna

## 2017-11-24 NOTE — TELEPHONE ENCOUNTER
Found copy of forms in media and faxed them again to [phone]. Informed patient.     MARIANNE Smith

## 2017-11-26 ENCOUNTER — MYC MEDICAL ADVICE (OUTPATIENT)
Dept: ENDOCRINOLOGY | Facility: CLINIC | Age: 37
End: 2017-11-26

## 2017-11-26 DIAGNOSIS — E66.01 MORBID OBESITY (H): Primary | ICD-10-CM

## 2017-11-29 ENCOUNTER — TELEPHONE (OUTPATIENT)
Dept: CARDIOLOGY | Facility: CLINIC | Age: 37
End: 2017-11-29

## 2017-11-29 NOTE — TELEPHONE ENCOUNTER
Please do not close this encounter until this has been addressed.  (prior auth approved/denied, prescriber refusal to complete prior auth or medication changed/discontinued)    Prior Authorization needed on: Contrave  Drug NDC: [address]-23     Insurance: [employer]  Member ID: [id number]   Insurance phone #: [phone]    Pharmacy NPI: [id number]  Pharmacy Phone #: [phone]  Pharmacy Fax #: [phone]    Please let us know if the PA gets approved or denied or if medication is changed

## 2017-12-01 NOTE — TELEPHONE ENCOUNTER
Prior Authorization Specialty Medication Request    Medication/Dose: Contrave  Diagnosis and ICD  New/Renewal/Insurance Change PA    Important Lab Values:    Previously Tried and Failed Therapies:     Rationale    Would you like to include any research articles?    If yes please include the hyperlink(s) below or fax @ [phone].    (Include Name and MRN)    If you received a fax notification from an outside Pharmacy;     Prior Authorization needed on: Contrave  Drug NDC: [address]-45                    Insurance: [employer]  Member ID: [id number]   Insurance phone #: [phone]     Pharmacy NPI: [id number]  Pharmacy Phone #: [phone]  Pharmacy Fax #: [phone]                          Documentation

## 2017-12-01 NOTE — TELEPHONE ENCOUNTER
Central Prior Authorization Team   Phone: [phone]  Fax: [phone]    PA Initiation    Medication: Contrave - PA initiated  Insurance Company: [employer] - Phone [phone] Fax [phone]  Pharmacy Filling the Rx: [place], [place] - [address], [address]  Filling Pharmacy Phone: [phone]  Filling Pharmacy Fax: [phone]  Start Date: 12/1/2017

## 2017-12-08 NOTE — TELEPHONE ENCOUNTER
PRIOR AUTHORIZATION DENIED    Medication: Contrave - PA Denied    Denial Date: 12/7/2017    Denial Rational: Pt has not tried/failed both phentermine and orlistat          Appeal Information:

## 2017-12-22 ENCOUNTER — OFFICE VISIT (OUTPATIENT)
Dept: PSYCHOLOGY | Facility: CLINIC | Age: 37
End: 2017-12-22
Payer: COMMERCIAL

## 2017-12-22 VITALS — BODY MASS INDEX: 28.09 KG/M2 | WEIGHT: 195.8 LBS

## 2017-12-22 DIAGNOSIS — F54 PSYCHOLOGICAL FACTORS AFFECTING MORBID OBESITY (H): ICD-10-CM

## 2017-12-22 DIAGNOSIS — E66.01 PSYCHOLOGICAL FACTORS AFFECTING MORBID OBESITY (H): ICD-10-CM

## 2017-12-22 DIAGNOSIS — Z56.9 OCCUPATIONAL PROBLEM: ICD-10-CM

## 2017-12-22 DIAGNOSIS — F33.0 MAJOR DEPRESSIVE DISORDER, RECURRENT EPISODE, MILD (H): Primary | ICD-10-CM

## 2017-12-22 SDOH — ECONOMIC STABILITY - INCOME SECURITY: UNSPECIFIED PROBLEMS RELATED TO EMPLOYMENT: Z56.9

## 2017-12-22 NOTE — MR AVS SNAPSHOT
After Visit Summary   12/22/2017    Ashley Catherine    MRN: [id number]           Patient Information     Date Of Birth          1980        Visit Information        Provider Department      12/22/2017 4:00 PM Jayro Elias, PhD [facility]        Today's Diagnoses     Major depressive disorder, recurrent episode, mild (H)    -  1    Psychological factors affecting morbid obesity (H)        Occupational problem           Follow-ups after your visit        Your next 10 appointments already scheduled     Jan 05, 2018 10:00 AM CST   US HEAD NECK SOFT TISSUE with [facility] [place] [address] Imaging Center [place] ([facility])    [address] [place] [address] [id number] [phone]           Please bring a list of your medicines (including vitamins, minerals and over-the-counter drugs). Also, tell your doctor about any allergies you may have. Wear comfortable clothes and leave your valuables at home.  You do not need to do anything special to prepare for your exam.  Please call the Imaging Department at your exam site with any questions.            Jan 05, 2018 10:45 AM CST   US THYROID with [address] [place] [address] Imaging Center [place] ([facility])    [address] [place] [address] [id number] [phone]           Please bring a list of your medicines (including vitamins, minerals and over-the-counter drugs). Also, tell your doctor about any allergies you may have. Wear comfortable clothes and leave your valuables at home.  You do not need to do anything special to prepare for your exam.  Please call the Imaging Department at your exam site with any questions.            Kwesi 10, 2018  1:00 PM CST   (Arrive by 12:45 PM)   RETURN ENDOCRINE with Crystal Wyatt MD   [facility] Endocrinology [facility])    [address] [place] [address] [id number] [phone]              Who  to contact     Please call your clinic at [phone] to:    Ask questions about your health    Make or cancel appointments    Discuss your medicines    Learn about your test results    Speak to your doctor   If you have compliments or concerns about an experience at your clinic, or if you wish to file a complaint, please contact [facility] Patient Relations at [phone] or email us at [email or web address]         Additional Information About Your Visit        Ki[employer] Information     [employer] gives you secure access to your electronic health record. If you see a primary care provider, you can also send messages to your care team and make appointments. If you have questions, please call your primary care clinic.  If you do not have a primary care provider, please call [phone] and they will assist you.      [employer] is an electronic gateway that provides easy, online access to your medical records. With [employer], you can request a clinic appointment, read your test results, renew a prescription or communicate with your care team.     To access your existing account, please contact your [facility] or call [phone] for assistance.        Care EveryWhere ID     This is your Care EveryWhere ID. This could be used by other organizations to access your [facility] medical records  [id number]        Your Vitals Were     BMI (Body Mass Index)                   28.09 kg/m2            Blood Pressure from Last 3 Encounters:   11/09/17 115/81   11/06/17 137/90   10/30/17 132/80    Weight from Last 3 Encounters:   12/22/17 88.8 kg (195 lb 12.8 oz)   11/15/17 86 kg (189 lb 9.6 oz)   11/09/17 85.2 kg (187 lb 12.8 oz)              Today, you had the following     No orders found for display       Primary Care Provider Office Phone # Fax #    Violet Humphreys [phone] [address] [address]        Equal  Access to Services     [facility]: Hadii aad ku hadduanejorge Rachelali, wajuanisda luqjhony, qalori kaalbacarmen alex. So [facility] [phone].    ATENCIÓN: Si habla espphuc, tiene a gibbosn disposición servicios gratuitos de asistencia lingüística. Llame al [phone].    We comply with applicable federal civil rights laws and [place] laws. We do not discriminate on the basis of race, color, national origin, age, disability, sex, sexual orientation, or gender identity.            Thank you!     Thank you for choosing [facility]  for your care. Our goal is always to provide you with excellent care. Hearing back from our patients is one way we can continue to improve our services. Please take a few minutes to complete the written survey that you may receive in the mail after your visit with us. Thank you!             Your Updated Medication List - Protect others around you: Learn how to safely use, store and throw away your medicines at www.disposemymeds.org.          This list is accurate as of: 12/22/17  5:13 PM.  Always use your most recent med list.                   Brand Name Dispense Instructions for use Diagnosis    CALCIUM + D PO      Take by mouth daily        cetirizine 10 MG tablet    zyrTEC     Take 10 mg by mouth daily as needed for allergies        [employer] B-12 5000 MCG Subl   Generic drug:  Cyanocobalamin       Multiple thyroid nodules       cyclobenzaprine 10 MG tablet    FLEXERIL    30 tablet    TAKE ONE-HALF TABLET BY MOUTH TWICE A DAY AS NEEDED FOR MUSCLE SPASMS    Sciatica, unspecified laterality       hydrochlorothiazide 25 MG tablet    HYDRODIURIL    90 tablet    TAKE ONE TABLET BY MOUTH EVERY DAY    Essential hypertension with goal blood pressure less than 140/90       hydrOXYzine 50 MG capsule    VISTARIL    30 capsule    Take 1-2 capsules ( mg) by mouth nightly as needed for anxiety (sleep)    Adjustment disorder with anxious mood        MELATONIN PO      Take 3 mg by mouth        metroNIDAZOLE 0.75 % vaginal gel    METROGEL    70 g    Use twice weekly to vagina for recurrent BV symptoms.  Do this for 3 months after finishing oral medications    Abnormal vaginal fluids       MULTIVITAMINS PO      Take by mouth daily        naltrexone-bupropion 8-90 MG per 12 hr tablet    CONTRAVE    120 tablet    Take 2 tablets by mouth 2 times daily    Morbid obesity (H)       polyethylene glycol powder    MIRALAX    510 g    Take 17 g (1 capful) by mouth daily as needed for constipation    Constipation, unspecified constipation type       potassium chloride SA 20 MEQ CR tablet    KLOR-CON    90 tablet    Take 1 tablet (20 mEq) by mouth daily    S/P gastrectomy       sertraline 50 MG tablet    ZOLOFT    30 tablet    Take 1 tablet (50 mg) by mouth daily    Adjustment disorder with anxious mood       TYLENOL PM EXTRA STRENGTH PO           vitamin D 2000 UNITS Caps

## 2017-12-22 NOTE — PROGRESS NOTES
Health Psychology                  Clinic    Department of Medicine  Lis Chavez, Ph.D., L.P. [phone]                          [facility] Earline Anderson, Ph.D.,  L.P. [phone]                 3rd Floor, Clinic 3A  [place] Mail Code 718   Jayro Elias, Ph.D., A.B.P.P., L.P. [phone]     [address] [place] [address] Melissa Stokes, Ph.D., L.P. [phone]  [address]  [place] [address]    Health Psychology Follow-Up Note    Ashley Catherine is a 37-year-old woman referred initially  for psychological consultation for workup for a gastric sleeve procedure who now returns following the surgery, with concerns about weight management.  She initiated topirimate and is making great progress.    We reviewed in detail the changes she is making with regard to nutrional intake. She is cooking more of her own food.  She is exercising with 10,000 or more steps per day almost every day.  She is reinforced for the changes and can see the pattern of not doing as well when travelling.  She recently has been travelling more, but also walking more (e.g., climbed a mountain in [place]).    6 lb. Weight gainsince last visit.  Today 195.8 lbs.   She gained 2 lbs on a cruise.    Wt Readings from Last 4 Encounters:   12/22/17 88.8 kg (195 lb 12.8 oz)   11/15/17 86 kg (189 lb 9.6 oz)   11/09/17 85.2 kg (187 lb 12.8 oz)   11/06/17 86.4 kg (190 lb 8 oz)     Past Medical History:   Diagnosis Date     Bariatric surgery status 05/13/2013     Depression      Esophageal reflux      Family history of hyperparathyroidism 3/6/2015     Forearm fracture childhood    jumping fall     Guillain-Alto disease (H) age 14    hospitalized at [facility] x 1 week     History of steroid therapy      HX ABNL PAP SMEAR OF CERVIX   1995    LEEP AT 15 yo     Hypertension      Hypertrophy of breast      Hypertrophy of tonsils alone      Hypovitaminosis D     with  secondary high PTH     Irregular heart beat     palpitations     LBP (low back pain)      LSIL (low grade squamous intraepithelial lesion) on Pap smear 5/2006     Lumbago     RESOLVED     Major depressive disorder, recurrent episode, in partial or unspecified remission 4/1/2013     Morbid obesity (H)     bariatric surgery 5/13/2013     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)      OLIGOMENORRHEA, C/W PCOS      Sciatica      SLEEP APNEA 6/2002    No longer has since tonsillectomy and septoplasty     Past Surgical History:   Procedure Laterality Date     BIOPSY  03/13/2015    Thyroid nodule     ESOPHAGOSCOPY, GASTROSCOPY, DUODENOSCOPY (EGD), COMBINED  5/28/2013    Procedure: COMBINED ESOPHAGOSCOPY, GASTROSCOPY, DUODENOSCOPY (EGD);;  Surgeon: Best Willett MD;  Location:  GI     LAPAROSCOPIC GASTRIC SLEEVE  5/13/2013    Procedure: LAPAROSCOPIC GASTRIC SLEEVE;  Laparoscopic Sleeve Gastrectomy ;  Surgeon: Best Willett MD;  Location:  OR     LEEP TX, CERVICAL  1995    age 15     SEPTOPLASTY       TONSILLECTOMY  age 20s     TONSILLECTOMY  2004?     wisdom teeth extraction         She is  5 foot 11 inches.  Her goal is 175. Her mood is much improved.  Her job is okay, but there are frustrations with management (e.g., now though she is chemo trained, she is frustrated by staffing shortages).  She  is working toward certification, with test 12/28. She has plans to study..  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  She still has hopes to leave this area. 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  She has resumed online dating.  She is participating in various activities (e.g., [employer], a food shelf).  She reports increased anxiety.  She reported panic this week.  She recently started Contrave, which has WE]ellbutrin in it, so there is a possibility that is a factor.  We discussed drivers of anxiety:  Work, long-term finances/debt, not being in a  relationship).   She is encouraged to speak with a financial counselor again to develop financial goals  And money management practices.  She feels others want her to conform.  She is frustrated by a lack of recognition at work and by colleagues who are lazy.   We discussed how to gain more positive recognition at work. She has cut back on exercise and is encouraged to resume. She also got a script for Zoloft and hydroxazine, but hasn't taken the Zoloft yet.  She is encouraged to use her full spectrum light again.    Affect is positive despite her frustrations with her weight and work.  Rapport is excellent.   Extended session due to complexity of case and length of interval.      Time in:  4:06  Time out:  5:08  Psychological factors affecting obesity (F54).   Major depression, recurrent, mild (F3e3.0).   Occupational Problems (Z56.9)  PLAN/RECOMMENDATIONS:   1. Ms. Catherine will return 2/8 @ 11:00 to address weight loss and social issues with problem solving therapy. .  2.  Resume schedule of regular exercise and  decrease grazing eating.  3.  Full spectrum lighting.      Tx plan due 5/3/18

## 2018-01-04 ENCOUNTER — MYC MEDICAL ADVICE (OUTPATIENT)
Dept: FAMILY MEDICINE | Facility: CLINIC | Age: 38
End: 2018-01-04

## 2018-01-04 DIAGNOSIS — M21.42 FLAT FEET: Primary | ICD-10-CM

## 2018-01-04 DIAGNOSIS — M21.41 FLAT FEET: Primary | ICD-10-CM

## 2018-01-04 NOTE — TELEPHONE ENCOUNTER
Last A1C done in 2014.  Diabetes not on current problem list.  Asked patient who sent her message.  Carolyn James RN

## 2018-01-04 NOTE — TELEPHONE ENCOUNTER
SN,  Please see below MyChart messages.  Only thing not addressed is orthotics referral patient requesting  Please advise.  Thanks,  Laureen SOMERS RN

## 2018-01-05 ENCOUNTER — RADIANT APPOINTMENT (OUTPATIENT)
Dept: ULTRASOUND IMAGING | Facility: CLINIC | Age: 38
End: 2018-01-05
Payer: COMMERCIAL

## 2018-01-05 DIAGNOSIS — E04.2 MULTIPLE THYROID NODULES: ICD-10-CM

## 2018-01-05 DIAGNOSIS — R89.6 ABNORMAL CYTOLOGY: ICD-10-CM

## 2018-01-05 NOTE — PROGRESS NOTES
Review of images on PACS  1/5/18 thyroid and neck US:  Left isthmus nodule 2 x 1.3 x 2.4 cm. Hypoechoic with thin halo, grade 3 blood flow. (was 1.3 x 1.1 x 2.2 6/29/17; was 0.8 x 0.5 x 1 1/27/16; was 1.6 x 1.3 x 1.9 cm 2/23/15)  subcm left thyroid nodules  No abnormal cervical nodes.    8/2/17 TSH 0.56      EXAMINATION: Ultrasound thyroid , 1/5/2018 10:35 AM      COMPARISON: 6/29/2017     HISTORY: Multiple thyroid nodules, abnormal cytology     Technique: Grayscale and color ultrasound imaging of the thyroid was  performed.     Findings:    Thyroid parenchyma: Homogeneously hyperechoic     The right lobe of the thyroid measures: 2.1 x 1.9 x 5.9 cm      The thyroid isthmus measures: 0.5 cm      The left lobe of the thyroid measures: 2.4 x 1.8 x 5.2 cm      Right lobe:  No nodules.     Isthmus:   Nodule 1: This nodule was previously sampled on 7/14/2017 and returned  suspicious for follicular neoplasm.  Nodule measurement: 2.0 x 1.3 x 2.4 cm , measuring 1.3 x 1.1 x 2.2 cm  on the previous scan  Echogenicity: Heterogeneous  Consistency: solid  Calcifications: None  Hypervascular: Mild hyperemia  Interval growth (>20%): Significant interval growth since 1/27/2016,  mild interval growth since 6/29/2017     Left Lobe:   Nodule 1:  Nodule measurement: 5 x 3 x 6 mm , 5 x 3 x 5 mm  Echogenicity: Hypoechoic   Consistency: solid  Calcifications: no  Hypervascular: no  Interval growth (>20%): no     Nodule 2:  Nodule measurement: 4 x 3 x 4 mm , previously the same based on cine  images.  Echogenicity: Hypoechoic  Consistency: Likely cystic  Calcifications: None  Hypervascular: no  Interval growth (>20%): no         Impression:  1. Mild interval increase since 6/29/2017 and significant interval  growth since 1/27/2016 of the isthmus nodule which was previously  sampled (7/14/2017) and returned suspicious for follicular neoplasm.   2. Subcentimeter nodules in the left lobe of the thyroid without  suspicious characteristics.  These are stable from prior     I have personally reviewed the examination and initial interpretation  and I agree with the findings.     TOM RICHARDSON MD    EXAMINATION: Ultrasound soft tissue neck, 1/5/2018 10:31 AM      COMPARISON: 2/23/2015     HISTORY: Multiple thyroid nodules with abnormal cytology of isthmus  nodule sampled 7/14/2017, suspicious for follicular neoplasm with a  predominance of Hurtle cell features.     FINDINGS:     Lymph nodes are measured bilaterally with measurements given in  transverse, AP, and craniocaudal dimensions as follows:     Right:  Level 1: None  Level 2: 2 oval hypoechoic lymph nodes measuring 1.3 x 0.5 x 1.7 cm  and 0.5 x 0.7 x 1.2 cm.  Level 3: 1 oval lymph node measuring 0.7 x 0.5 x 1.2 cm.  Level 4: None  Level 5: None  Level 6: None     Left:  Level 1: None  Level 2: 3 oval lymph nodes measuring 1.5 x 0.6 x 1.4 cm, 0.7 x 0.6 x  1.0 cm and 0.6 x 0.5 x 1.2 cm.  Level 3: None  Level 4: None  Level 5: None  Level 6: None     The above lymph nodes all demonstrate normal echogenic fatty xiang.         IMPRESSION: Soft tissue neck ultrasound with lymph node measurements  as described above.  No pathologically enlarged or morphologically  suspicious lymph nodes identified.      I have personally reviewed the examination and initial interpretation  and I agree with the findings.     SRINIVASAN FERNANDES MD

## 2018-01-08 NOTE — TELEPHONE ENCOUNTER
Thanks you - I placed an order for orthotics    I don;t see that she needs an A1C however we can talk about his at her next visit    SN

## 2018-01-10 ENCOUNTER — OFFICE VISIT (OUTPATIENT)
Dept: ENDOCRINOLOGY | Facility: CLINIC | Age: 38
End: 2018-01-10
Payer: COMMERCIAL

## 2018-01-10 DIAGNOSIS — E04.2 MULTIPLE THYROID NODULES: Primary | ICD-10-CM

## 2018-01-10 DIAGNOSIS — R49.0 HOARSENESS: ICD-10-CM

## 2018-01-10 DIAGNOSIS — R89.6 ABNORMAL CYTOLOGY: ICD-10-CM

## 2018-01-10 ASSESSMENT — ENCOUNTER SYMPTOMS
SORE THROAT: 0
HEADACHES: 1
SMELL DISTURBANCE: 0
MUSCLE WEAKNESS: 0
MYALGIAS: 0
TINGLING: 1
NECK PAIN: 1
SPEECH CHANGE: 0
HALLUCINATIONS: 0
DECREASED CONCENTRATION: 0
STIFFNESS: 0
PARALYSIS: 0
LOSS OF CONSCIOUSNESS: 0
NUMBNESS: 1
POLYDIPSIA: 0
PANIC: 1
MEMORY LOSS: 0
DEPRESSION: 0
INSOMNIA: 1
INCREASED ENERGY: 1
FEVER: 0
WEIGHT GAIN: 0
ARTHRALGIAS: 0
SINUS CONGESTION: 0
WEAKNESS: 0
NIGHT SWEATS: 0
SEIZURES: 0
SINUS PAIN: 0
NERVOUS/ANXIOUS: 1
JOINT SWELLING: 0
POLYPHAGIA: 0
DIZZINESS: 1
HOARSE VOICE: 0
DECREASED APPETITE: 0
DISTURBANCES IN COORDINATION: 0
BACK PAIN: 1
FATIGUE: 1
WEIGHT LOSS: 0
MUSCLE CRAMPS: 0
CHILLS: 0
TASTE DISTURBANCE: 0
TROUBLE SWALLOWING: 0
ALTERED TEMPERATURE REGULATION: 0
NECK MASS: 0
TREMORS: 0

## 2018-01-10 ASSESSMENT — PAIN SCALES - GENERAL: PAINLEVEL: NO PAIN (0)

## 2018-01-10 NOTE — MR AVS SNAPSHOT
After Visit Summary   1/10/2018    Ashley Catherine    MRN: [id number]           Patient Information     Date Of Birth          1980        Visit Information        Provider Department      1/10/2018 1:00 PM Crystal Wyatt MD [facility]        Care Instructions    I recommend you call Dr Harper's office and schedule the surgery for sometime before summer.  See me 2 months after the surgery              Follow-ups after your visit        Your next 10 appointments already scheduled     Feb 08, 2018 11:00 AM CST   (Arrive by 10:45 AM)   Return Visit with Jayro Elias, PhD [facility] ([facility])    [address] [address] [phone]              Who to contact     Please call your clinic at [phone] to:    Ask questions about your health    Make or cancel appointments    Discuss your medicines    Learn about your test results    Speak to your doctor   If you have compliments or concerns about an experience at your clinic, or if you wish to file a complaint, please contact [facility] Patient Relations at [phone] or email us at [email or web address]         Additional Information About Your Visit        MyChart Information     [employer] gives you secure access to your electronic health record. If you see a primary care provider, you can also send messages to your care team and make appointments. If you have questions, please call your primary care clinic.  If you do not have a primary care provider, please call [phone] and they will assist you.      [employer] is an electronic gateway that provides easy, online access to your medical records. With [employer], you can request a clinic appointment, read your test results, renew a prescription or communicate with your care team.     To access your existing account, please contact your [facility] Clinic or call [phone] for assistance.        Care EveryWhere ID     This is your Care EveryWhere ID. This could be used by other organizations to access your [place] medical records  [id number]         Blood Pressure from Last 3 Encounters:   01/10/18 (P) 129/88   11/09/17 115/81   11/06/17 137/90    Weight from Last 3 Encounters:   01/10/18 (P) 86.2 kg (190 lb)   11/09/17 85.2 kg (187 lb 12.8 oz)   11/06/17 86.4 kg (190 lb 8 oz)              Today, you had the following     No orders found for display         Today's Medication Changes          These changes are accurate as of: 1/10/18  1:15 PM.  If you have any questions, ask your nurse or doctor.               Stop taking these medicines if you haven't already. Please contact your care team if you have questions.     sertraline 50 MG tablet   Commonly known as:  ZOLOFT   Stopped by:  Crystal Wyatt MD                    Primary Care Provider Office Phone # Fax #    [place] Humphreys [phone]       [address] [address]        Equal Access to Services     [facility]: Hadii sabas gabrielo Sowaldo, waaxda lukonstantin, qaybta kaalmada adeblossom, carmen weston. So [facility] [phone].    ATENCIÓN: Si habla español, tiene a gibbons disposición servicios gratuitos de asistencia lingüística. Faby al [phone].    We comply with applicable federal civil rights laws and [place] laws. We do not discriminate on the basis of race, color, national origin, age, disability, sex, sexual orientation, or gender identity.            Thank you!     Thank you for choosing [facility]  for your care. Our goal is always to provide you with excellent care. Hearing back from our patients is one way we can continue to improve our services. Please take a few minutes to complete the written survey that you may receive in the mail after your visit with us. Thank you!              Your Updated Medication List - Protect others around you: Learn how to safely use, store and throw away your medicines at www.disposemymeds.org.          This list is accurate as of: 1/10/18  1:15 PM.  Always use your most recent med list.                   Brand Name Dispense Instructions for use Diagnosis    CALCIUM + D PO      Take by mouth daily        cetirizine 10 MG tablet    zyrTEC     Take 10 mg by mouth daily as needed for allergies        [employer] B-12 5000 MCG Subl   Generic drug:  Cyanocobalamin       Multiple thyroid nodules       cyclobenzaprine 10 MG tablet    FLEXERIL    30 tablet    TAKE ONE-HALF TABLET BY MOUTH TWICE A DAY AS NEEDED FOR MUSCLE SPASMS    Sciatica, unspecified laterality       hydrochlorothiazide 25 MG tablet    HYDRODIURIL    90 tablet    TAKE ONE TABLET BY MOUTH EVERY DAY    Essential hypertension with goal blood pressure less than 140/90       hydrOXYzine 50 MG capsule    VISTARIL    30 capsule    Take 1-2 capsules ( mg) by mouth nightly as needed for anxiety (sleep)    Adjustment disorder with anxious mood       MELATONIN PO      Take 3 mg by mouth        metroNIDAZOLE 0.75 % vaginal gel    METROGEL    70 g    Use twice weekly to vagina for recurrent BV symptoms.  Do this for 3 months after finishing oral medications    Abnormal vaginal fluids       MULTIVITAMINS PO      Take by mouth daily        naltrexone-bupropion 8-90 MG per 12 hr tablet    CONTRAVE    120 tablet    Take 2 tablets by mouth 2 times daily    Morbid obesity (H)       polyethylene glycol powder    MIRALAX    510 g    Take 17 g (1 capful) by mouth daily as needed for constipation    Constipation, unspecified constipation type       potassium chloride SA 20 MEQ CR tablet    KLOR-CON    90 tablet    Take 1 tablet (20 mEq) by mouth daily    S/P gastrectomy       TYLENOL PM EXTRA STRENGTH PO           vitamin D 2000 UNITS Caps

## 2018-01-10 NOTE — PATIENT INSTRUCTIONS
I recommend you call Dr Harper's office and schedule the surgery for sometime before summer.  See me 2 months after the surgery

## 2018-01-10 NOTE — NURSING NOTE
Chief Complaint   Patient presents with     RECHECK     F/U THYRIOD NODULES     Lizz Lal, [facility]  Endocrinology & Diabetes 3G

## 2018-01-10 NOTE — LETTER
1/10/2018       RE: Ashley Catherine  [address]  [facility] [address]     Dear Colleague,    Thank you for referring your patient, Ashley Catherine, to the [facility] ENDOCRINOLOGY at [facility]. Please see a copy of my visit note below.    Telephone visit    Attending ASSESSMENT/PLAN:     1.  Thyroid nodule, left isthmus, 2.2 cm with  follicular neoplasm cytology x 2 . Predominance of Hurthle cells. Thyroseq negative. This nodule has shown significant size fluctuations since discovery in 2015.  It is now larger than in summer 2017. It is larger than when we discovered it in 2015.     Abnormal thyroid cytology - as per # 1 - Follicular neoplasm x 2  but thyroseq negative.   Still, I am uncomfortable with the changes we see on serial US.     Greater than 50% of 15 minute face to face appt on counseling. I have reviewed all the nodule images with her dating to 2015.    At this time, given the continued significant size changes, I recommend she go for diagnostic lobectomy.  She has already met with Dr Harper .   We will try to help her schedule surgery.    3  Hoarseness is noted today on exam.     Crystal Wyatt MD      Cc/  HISTORY OF PRESENT ILLNESS Ashley presents for follow up of thyroid nodule. She was last seen by me 6/16.     She has now had FNAB x 2  of the same thyroid nodule   3/13/15 US guided FNAB of a thyroid nodule -cytology  suspicious for follicular neoplasm.  Predominance of Hurthle cell features. Needle wash PTH < 10.  Thyroseq negative  Repeat FNAB was 7/14/17 performed because of significant increase in size of the nodule.  Cytology [id number] suspicoius for follicular neoplasm.  Predominance of Hurthle cell features.       I have again reviewed all the images.  6/29/17 thyroid US: left isthmus nodule 1.3 x 1.1 x 2.2 cm hypoechoic , grade 4 vascularity; The anterior border is not delineated on transverse view.   1/27/16: left isthmus nodule 0.8 x 0.5 x 1  cm  2/23/15 left isthmus nodule 1.6 x 1.3 x 1.9 cm.      Her last TFTS were 2/11/16: TSH 0.94, free T4 1.13.      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14    hospitalized at [facility] x 1 week     History of steroid therapy      HX ABNL PAP SMEAR OF CERVIX   1995    LEEP AT 15 yo     Hypertension      Hypertrophy of breast      Hypertrophy of tonsils alone      Hypovitaminosis D     with secondary high PTH     Irregular heart beat     palpitations     LBP (low back pain)      LSIL (low grade squamous intraepithelial lesion) on Pap smear 5/2006     Lumbago     RESOLVED     Major depressive disorder, recurrent episode, in partial or unspecified remission 4/1/2013     Morbid obesity (H)     bariatric surgery 5/13/2013     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)      OLIGOMENORRHEA, C/W PCOS      Sciatica      SLEEP APNEA 6/2002    No longer has since tonsillectomy and septoplasty     Past Surgical History:   Procedure Laterality Date     BIOPSY  03/13/2015    Thyroid nodule     ESOPHAGOSCOPY, GASTROSCOPY, DUODENOSCOPY (EGD), COMBINED  5/28/2013    Procedure: COMBINED ESOPHAGOSCOPY, GASTROSCOPY, DUODENOSCOPY (EGD);;  Surgeon: Best Willett MD;  Location:  GI     LAPAROSCOPIC GASTRIC SLEEVE  5/13/2013    Procedure: LAPAROSCOPIC GASTRIC SLEEVE;  Laparoscopic Sleeve Gastrectomy ;  Surgeon: Best Willett MD;  Location: [facility]     LEEP TX, CERVICAL  1995    age 15     SEPTOPLASTY       TONSILLECTOMY  age 20s     TONSILLECTOMY  2004?     wisdom teeth extraction         Medications    Current Outpatient Prescriptions   Medication Sig Dispense Refill     naltrexone-bupropion (CONTRAVE) 8-90 MG per 12  "hr tablet Take 2 tablets by mouth 2 times daily 120 tablet 3     hydrOXYzine (VISTARIL) 50 MG capsule Take 1-2 capsules ( mg) by mouth nightly as needed for anxiety (sleep) 30 capsule 1     sertraline (ZOLOFT) 50 MG tablet Take 1 tablet (50 mg) by mouth daily 30 tablet 1     cyclobenzaprine (FLEXERIL) 10 MG tablet TAKE ONE-HALF TABLET BY MOUTH TWICE A DAY AS NEEDED FOR MUSCLE SPASMS 30 tablet 5     hydrochlorothiazide (HYDRODIURIL) 25 MG tablet TAKE ONE TABLET BY MOUTH EVERY DAY 90 tablet 1     potassium chloride SA (POTASSIUM CHLORIDE) 20 MEQ CR tablet Take 1 tablet (20 mEq) by mouth daily 90 tablet 3     Cholecalciferol (VITAMIN D) 2000 UNITS CAPS        metroNIDAZOLE (METROGEL) 0.75 % vaginal gel Use twice weekly to vagina for recurrent BV symptoms.  Do this for 3 months after finishing oral medications 70 g 3     MELATONIN PO Take 3 mg by mouth       Diphenhydramine-APAP, sleep, (TYLENOL PM EXTRA STRENGTH PO)        Cyanocobalamin (CVS B-12) 5000 MCG SUBL        polyethylene glycol (MIRALAX) powder Take 17 g (1 capful) by mouth daily as needed for constipation 510 g 1     cetirizine (ZYRTEC) 10 MG tablet Take 10 mg by mouth daily as needed for allergies       Calcium Carbonate-Vitamin D (CALCIUM + D PO) Take by mouth daily       Multiple Vitamin (MULTIVITAMINS PO) Take by mouth daily         Family History  family history includes Allergies in her father; Arthritis in her mother; Breast Cancer in her maternal aunt; Cancer - colorectal in her maternal uncle; DIABETES in her maternal aunt; Gynecology in her mother; Hypertension in her father, mother, sister, and sister; Lipids in her father; OSTEOPOROSIS in her mother; Obesity in her father, mother, sister, and sister; Other Cancer in her mother; Parathyroid Disorders in her mother. There is no history of Nephrolithiasis.     GENERAL Young woman in NAD; she is hoarse.  BP (P) 129/88  Pulse (P) 86  Ht (P) 1.778 m (5' 10\")  Wt (P) 86.2 kg (190 lb)  BMI (P) " 27.26 kg/m2  SKIN: normal color, temperature, texture without hirsutism, alopecia or purple striae  HEENT: PER,  no scleral icterus, eyelid retraction, stare, lid lag, proptosis or conjunctival injection.     NECK: subtle visible prominence left of center; Left thyroid nodule approx 2 cm by palpation.  Nontender.     NEURO: Alert, responds appropriately to questions, , moves all extremities, gait normal, no tremor of the outstretched hand      DATA REVIEW    ENDO THYROID LABS-[facility] Latest Ref Rng & Units 8/2/2017 5/15/2017   TSH 0.40 - 4.00 mU/L 0.56    T4 FREE ng/dL     THYROGLOBULIN ANTIBODY <40 IU/mL     THYR PEROXIDASE REYES <35 IU/mL       ENDO THYROID LABS-[facility] Latest Ref Rng & Units 2/11/2016 1/27/2016   TSH 0.40 - 4.00 mU/L 0.94 0.86   T4 FREE ng/dL 1.13    THYROGLOBULIN ANTIBODY <40 IU/mL     THYR PEROXIDASE REYES <35 IU/mL       [facility]   TSH 0.40 - 4.00 mU/L    T4 FREE ng/dL    THYROGLOBULIN ANTIBODY <40 IU/mL <20   THYR PEROXIDASE REYES <35 IU/mL <10       EXAMINATION: Ultrasound thyroid , 1/5/2018 10:35 AM      COMPARISON: 6/29/2017     HISTORY: Multiple thyroid nodules, abnormal cytology     Technique: Grayscale and color ultrasound imaging of the thyroid was  performed.     Findings:    Thyroid parenchyma: Homogeneously hyperechoic     The right lobe of the thyroid measures: 2.1 x 1.9 x 5.9 cm      The thyroid isthmus measures: 0.5 cm      The left lobe of the thyroid measures: 2.4 x 1.8 x 5.2 cm      Right lobe:  No nodules.     Isthmus:   Nodule 1: This nodule was previously sampled on 7/14/2017 and returned  suspicious for follicular neoplasm.  Nodule measurement: 2.0 x 1.3 x 2.4 cm , measuring 1.3 x 1.1 x 2.2 cm  on the previous scan  Echogenicity: Heterogeneous  Consistency: solid  Calcifications: None  Hypervascular: Mild hyperemia  Interval growth (>20%): Significant interval growth since 1/27/2016,  mild interval growth since 6/29/2017     Left Lobe:    Nodule 1:  Nodule measurement: 5 x 3 x 6 mm , 5 x 3 x 5 mm  Echogenicity: Hypoechoic   Consistency: solid  Calcifications: no  Hypervascular: no  Interval growth (>20%): no     Nodule 2:  Nodule measurement: 4 x 3 x 4 mm , previously the same based on cine  images.  Echogenicity: Hypoechoic  Consistency: Likely cystic  Calcifications: None  Hypervascular: no  Interval growth (>20%): no         Impression:  1. Mild interval increase since 6/29/2017 and significant interval  growth since 1/27/2016 of the isthmus nodule which was previously  sampled (7/14/2017) and returned suspicious for follicular neoplasm.   2. Subcentimeter nodules in the left lobe of the thyroid without  suspicious characteristics. These are stable from prior     I have personally reviewed the examination and initial interpretation  and I agree with the findings.     TOM RICHARDSON MD    Again, thank you for allowing me to participate in the care of your patient.      Sincerely,    Crystal Wyatt MD

## 2018-01-10 NOTE — PROGRESS NOTES
Telephone visit    Attending ASSESSMENT/PLAN:     1.  Thyroid nodule, left isthmus, 2.2 cm with  follicular neoplasm cytology x 2 . Predominance of Hurthle cells. Thyroseq negative. This nodule has shown significant size fluctuations since discovery in 2015.  It is now larger than in summer 2017. It is larger than when we discovered it in 2015.     Abnormal thyroid cytology - as per # 1 - Follicular neoplasm x 2  but thyroseq negative.   Still, I am uncomfortable with the changes we see on serial US.     Greater than 50% of 15 minute face to face appt on counseling. I have reviewed all the nodule images with her dating to 2015.    At this time, given the continued significant size changes, I recommend she go for diagnostic lobectomy.  She has already met with Dr Harper .   We will try to help her schedule surgery.    3  Hoarseness is noted today on exam.     Crystal Wyatt MD      Cc/  HISTORY OF PRESENT ILLNESS Ashley presents for follow up of thyroid nodule. She was last seen by me 6/16.     She has now had FNAB x 2  of the same thyroid nodule   3/13/15 US guided FNAB of a thyroid nodule -cytology  suspicious for follicular neoplasm.  Predominance of Hurthle cell features. Needle wash PTH < 10.  Thyroseq negative  Repeat FNAB was 7/14/17 performed because of significant increase in size of the nodule.  Cytology [id number] suspicoius for follicular neoplasm.  Predominance of Hurthle cell features.       I have again reviewed all the images.  6/29/17 thyroid US: left isthmus nodule 1.3 x 1.1 x 2.2 cm hypoechoic , grade 4 vascularity; The anterior border is not delineated on transverse view.   1/27/16: left isthmus nodule 0.8 x 0.5 x 1 cm  2/23/15 left isthmus nodule 1.6 x 1.3 x 1.9 cm.      Her last TFTS were 2/11/16: TSH 0.94, free T4 1.13.      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14    hospitalized at [facility] x [facility] week     History of steroid therapy      HX ABNL PAP SMEAR OF CERVIX   1995    LEEP AT 15 yo     Hypertension      Hypertrophy of breast      Hypertrophy of tonsils alone      Hypovitaminosis D     with secondary high PTH     Irregular heart beat     palpitations     LBP (low back pain)      LSIL (low grade squamous intraepithelial lesion) on Pap smear 5/2006     Lumbago     RESOLVED     Major depressive disorder, recurrent episode, in partial or unspecified remission 4/1/2013     Morbid obesity (H)     bariatric surgery 5/13/2013     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)      OLIGOMENORRHEA, C/W PCOS      Sciatica      SLEEP APNEA 6/2002    No longer has since tonsillectomy and septoplasty     Past Surgical History:   Procedure Laterality Date     BIOPSY  03/13/2015    Thyroid nodule     ESOPHAGOSCOPY, GASTROSCOPY, DUODENOSCOPY (EGD), COMBINED  5/28/2013    Procedure: COMBINED ESOPHAGOSCOPY, GASTROSCOPY, DUODENOSCOPY (EGD);;  Surgeon: Best Willett MD;  [facility]     LAPAROSCOPIC GASTRIC SLEEVE  5/13/2013    Procedure: LAPAROSCOPIC GASTRIC SLEEVE;  Laparoscopic Sleeve Gastrectomy ;  Surgeon: Best Willett MD;  Location: [facility] OR     LEEP TX, CERVICAL  1995    age 15     SEPTOPLASTY       TONSILLECTOMY  age 20s     TONSILLECTOMY  2004?     wisdom teeth extraction         Medications    Current Outpatient Prescriptions   Medication Sig Dispense Refill     naltrexone-bupropion (CONTRAVE) 8-90 MG per 12 hr tablet Take 2 tablets by mouth 2 times daily 120 tablet 3     hydrOXYzine (VISTARIL) 50 MG capsule Take 1-2 capsules ( mg) by mouth nightly as needed for anxiety (sleep) 30 capsule 1     sertraline (ZOLOFT) 50 MG tablet Take 1 tablet (50 mg) by mouth daily 30 tablet  "1     cyclobenzaprine (FLEXERIL) 10 MG tablet TAKE ONE-HALF TABLET BY MOUTH TWICE A DAY AS NEEDED FOR MUSCLE SPASMS 30 tablet 5     hydrochlorothiazide (HYDRODIURIL) 25 MG tablet TAKE ONE TABLET BY MOUTH EVERY DAY 90 tablet 1     potassium chloride SA (POTASSIUM CHLORIDE) 20 MEQ CR tablet Take 1 tablet (20 mEq) by mouth daily 90 tablet 3     Cholecalciferol (VITAMIN D) 2000 UNITS CAPS        metroNIDAZOLE (METROGEL) 0.75 % vaginal gel Use twice weekly to vagina for recurrent BV symptoms.  Do this for 3 months after finishing oral medications 70 g 3     MELATONIN PO Take 3 mg by mouth       Diphenhydramine-APAP, sleep, (TYLENOL PM EXTRA STRENGTH PO)        Cyanocobalamin (CVS B-12) 5000 MCG SUBL        polyethylene glycol (MIRALAX) powder Take 17 g (1 capful) by mouth daily as needed for constipation 510 g 1     cetirizine (ZYRTEC) 10 MG tablet Take 10 mg by mouth daily as needed for allergies       Calcium Carbonate-Vitamin D (CALCIUM + D PO) Take by mouth daily       Multiple Vitamin (MULTIVITAMINS PO) Take by mouth daily         Family History  family history includes Allergies in her father; Arthritis in her mother; Breast Cancer in her maternal aunt; Cancer - colorectal in her maternal uncle; DIABETES in her maternal aunt; Gynecology in her mother; Hypertension in her father, mother, sister, and sister; Lipids in her father; OSTEOPOROSIS in her mother; Obesity in her father, mother, sister, and sister; Other Cancer in her mother; Parathyroid Disorders in her mother. There is no history of Nephrolithiasis.     GENERAL Young woman in NAD; she is hoarse.  BP (P) 129/88  Pulse (P) 86  Ht (P) 1.778 m (5' 10\")  Wt (P) 86.2 kg (190 lb)  BMI (P) 27.26 kg/m2  SKIN: normal color, temperature, texture without hirsutism, alopecia or purple striae  HEENT: PER,  no scleral icterus, eyelid retraction, stare, lid lag, proptosis or conjunctival injection.     NECK: subtle visible prominence left of center; Left thyroid " nodule approx 2 cm by palpation.  Nontender.     NEURO: Alert, responds appropriately to questions, , moves all extremities, gait normal, no tremor of the outstretched hand      DATA REVIEW    ENDO THYROID LABS[facility] Latest Ref Rng & Units 8/2/2017 5/15/2017   TSH 0.40 - 4.00 mU/L 0.56    T4 FREE ng/dL     THYROGLOBULIN ANTIBODY <40 IU/mL     THYR PEROXIDASE REYES <35 IU/mL       ENDO THYROID LABS[facility] Latest Ref Rng & Units 2/11/2016 1/27/2016   TSH 0.40 - 4.00 mU/L 0.94 0.86   T4 FREE ng/dL 1.13    THYROGLOBULIN ANTIBODY <40 IU/mL     THYR PEROXIDASE REYES <35 IU/mL       ENDO THYROID LABS-[facility] Latest Ref Rng & Units 3/25/2015   TSH 0.40 - 4.00 mU/L    T4 FREE ng/dL    THYROGLOBULIN ANTIBODY <40 IU/mL <20   THYR PEROXIDASE REYES <35 IU/mL <10       EXAMINATION: Ultrasound thyroid , 1/5/2018 10:35 AM      COMPARISON: 6/29/2017     HISTORY: Multiple thyroid nodules, abnormal cytology     Technique: Grayscale and color ultrasound imaging of the thyroid was  performed.     Findings:    Thyroid parenchyma: Homogeneously hyperechoic     The right lobe of the thyroid measures: 2.1 x 1.9 x 5.9 cm      The thyroid isthmus measures: 0.5 cm      The left lobe of the thyroid measures: 2.4 x 1.8 x 5.2 cm      Right lobe:  No nodules.     Isthmus:   Nodule 1: This nodule was previously sampled on 7/14/2017 and returned  suspicious for follicular neoplasm.  Nodule measurement: 2.0 x 1.3 x 2.4 cm , measuring 1.3 x 1.1 x 2.2 cm  on the previous scan  Echogenicity: Heterogeneous  Consistency: solid  Calcifications: None  Hypervascular: Mild hyperemia  Interval growth (>20%): Significant interval growth since 1/27/2016,  mild interval growth since 6/29/2017     Left Lobe:   Nodule 1:  Nodule measurement: 5 x 3 x 6 mm , 5 x 3 x 5 mm  Echogenicity: Hypoechoic   Consistency: solid  Calcifications: no  Hypervascular: no  Interval growth (>20%): no     Nodule 2:  Nodule measurement: 4 x 3 x 4 mm , previously the same based on  cine  images.  Echogenicity: Hypoechoic  Consistency: Likely cystic  Calcifications: None  Hypervascular: no  Interval growth (>20%): no         Impression:  1. Mild interval increase since 6/29/2017 and significant interval  growth since 1/27/2016 of the isthmus nodule which was previously  sampled (7/14/2017) and returned suspicious for follicular neoplasm.   2. Subcentimeter nodules in the left lobe of the thyroid without  suspicious characteristics. These are stable from prior     I have personally reviewed the examination and initial interpretation  and I agree with the findings.     TOM RICHARDSON MD

## 2018-01-17 ENCOUNTER — OFFICE VISIT (OUTPATIENT)
Dept: FAMILY MEDICINE | Facility: CLINIC | Age: 38
End: 2018-01-17
Payer: COMMERCIAL

## 2018-01-17 VITALS
TEMPERATURE: 98.6 F | BODY MASS INDEX: 27.13 KG/M2 | HEART RATE: 89 BPM | HEIGHT: 70 IN | DIASTOLIC BLOOD PRESSURE: 84 MMHG | WEIGHT: 189.5 LBS | SYSTOLIC BLOOD PRESSURE: 126 MMHG | OXYGEN SATURATION: 100 %

## 2018-01-17 DIAGNOSIS — R30.0 DYSURIA: Primary | ICD-10-CM

## 2018-01-17 DIAGNOSIS — R82.90 ABNORMAL URINE: ICD-10-CM

## 2018-01-17 LAB
ALBUMIN SERPL-MCNC: 3.3 G/DL (ref 3.4–5)
ALBUMIN UR-MCNC: ABNORMAL MG/DL
ALBUMIN UR-MCNC: NEGATIVE MG/DL
ALP SERPL-CCNC: 45 U/L (ref 40–150)
ALT SERPL W P-5'-P-CCNC: 16 U/L (ref 0–50)
ANION GAP SERPL CALCULATED.3IONS-SCNC: 4 MMOL/L (ref 3–14)
APPEARANCE UR: ABNORMAL
APPEARANCE UR: CLEAR
AST SERPL W P-5'-P-CCNC: 17 U/L (ref 0–45)
BACTERIA #/AREA URNS HPF: ABNORMAL /HPF
BILIRUB SERPL-MCNC: 0.5 MG/DL (ref 0.2–1.3)
BILIRUB UR QL STRIP: ABNORMAL
BILIRUB UR QL STRIP: NEGATIVE
BUN SERPL-MCNC: 10 MG/DL (ref 7–30)
CALCIUM SERPL-MCNC: 8.9 MG/DL (ref 8.5–10.1)
CHLORIDE SERPL-SCNC: 104 MMOL/L (ref 94–109)
CO2 SERPL-SCNC: 32 MMOL/L (ref 20–32)
COLOR UR AUTO: ABNORMAL
COLOR UR AUTO: YELLOW
CREAT SERPL-MCNC: 0.82 MG/DL (ref 0.52–1.04)
ERYTHROCYTE [DISTWIDTH] IN BLOOD BY AUTOMATED COUNT: 12.9 % (ref 10–15)
GFR SERPL CREATININE-BSD FRML MDRD: 78 ML/MIN/1.7M2
GLUCOSE SERPL-MCNC: 87 MG/DL (ref 70–99)
GLUCOSE UR STRIP-MCNC: ABNORMAL MG/DL
GLUCOSE UR STRIP-MCNC: NEGATIVE MG/DL
HCT VFR BLD AUTO: 37.8 % (ref 35–47)
HGB BLD-MCNC: 12.6 G/DL (ref 11.7–15.7)
HGB UR QL STRIP: ABNORMAL
HGB UR QL STRIP: NEGATIVE
KETONES UR STRIP-MCNC: ABNORMAL MG/DL
KETONES UR STRIP-MCNC: ABNORMAL MG/DL
LEUKOCYTE ESTERASE UR QL STRIP: ABNORMAL
LEUKOCYTE ESTERASE UR QL STRIP: NEGATIVE
MCH RBC QN AUTO: 31.9 PG (ref 26.5–33)
MCHC RBC AUTO-ENTMCNC: 33.3 G/DL (ref 31.5–36.5)
MCV RBC AUTO: 96 FL (ref 78–100)
MUCOUS THREADS #/AREA URNS LPF: PRESENT /LPF
NITRATE UR QL: ABNORMAL
NITRATE UR QL: NEGATIVE
NON-SQ EPI CELLS #/AREA URNS LPF: ABNORMAL /LPF
PH UR STRIP: 6.5 PH (ref 5–7)
PH UR STRIP: ABNORMAL PH (ref 5–7)
PLATELET # BLD AUTO: 389 10E9/L (ref 150–450)
POTASSIUM SERPL-SCNC: 3.7 MMOL/L (ref 3.4–5.3)
PROT SERPL-MCNC: 6.5 G/DL (ref 6.8–8.8)
RBC # BLD AUTO: 3.95 10E12/L (ref 3.8–5.2)
RBC #/AREA URNS AUTO: ABNORMAL /HPF
SODIUM SERPL-SCNC: 140 MMOL/L (ref 133–144)
SOURCE: ABNORMAL
SOURCE: ABNORMAL
SP GR UR STRIP: 1.02 (ref 1–1.03)
SP GR UR STRIP: ABNORMAL (ref 1–1.03)
UROBILINOGEN UR STRIP-ACNC: 2 EU/DL (ref 0.2–1)
UROBILINOGEN UR STRIP-ACNC: ABNORMAL EU/DL (ref 0.2–1)
WBC # BLD AUTO: 5.7 10E9/L (ref 4–11)
WBC #/AREA URNS AUTO: ABNORMAL /HPF

## 2018-01-17 PROCEDURE — 81001 URINALYSIS AUTO W/SCOPE: CPT | Performed by: FAMILY MEDICINE

## 2018-01-17 PROCEDURE — 99214 OFFICE O/P EST MOD 30 MIN: CPT | Performed by: FAMILY MEDICINE

## 2018-01-17 PROCEDURE — 85027 COMPLETE CBC AUTOMATED: CPT | Performed by: FAMILY MEDICINE

## 2018-01-17 PROCEDURE — 87086 URINE CULTURE/COLONY COUNT: CPT | Performed by: FAMILY MEDICINE

## 2018-01-17 PROCEDURE — 36415 COLL VENOUS BLD VENIPUNCTURE: CPT | Performed by: FAMILY MEDICINE

## 2018-01-17 PROCEDURE — 80053 COMPREHEN METABOLIC PANEL: CPT | Performed by: FAMILY MEDICINE

## 2018-01-17 NOTE — MR AVS SNAPSHOT
After Visit Summary   1/17/2018    Ashley Catherine    MRN: [id number]           Patient Information     Date Of Birth          1980        Visit Information        Provider Department      1/17/2018 10:30 AM Violet Humphreys MD [facility]        Today's Diagnoses     Dysuria    -  1    Abnormal urine          Care Instructions    Elevated Urobilinogen:    Can be due to breakdown of red cells, constipation or bacterial overgrowth.    Try to see if stools can get moving  Labs today to rule out hemolysis - if needed we can add a peripheral smear.  Liver enzymes for hepatitis            Follow-ups after your visit        Your next 10 appointments already scheduled     Jan 29, 2018  3:45 PM CST   (Arrive by 3:30 PM)   Return Visit with Delia Harper MD   [facility] ([facility])    [address] [place] [id number] [phone]            Feb 08, 2018 11:00 AM CST   (Arrive by 10:45 AM)   Return Visit with Jayro Elias, PhD [facility] ([facility])    [address] [place] [id number] [phone]              Who to contact     If you have questions or need follow up information about today's clinic visit or your schedule please contact [facility] directly at [phone].  Normal or non-critical lab and imaging results will be communicated to you by MyChart, letter or phone within 4 business days after the clinic has received the results. If you do not hear from us within 7 days, please contact the clinic through MyChart or phone. If you have a critical or abnormal lab result, we will notify you by phone as soon as possible.  Submit refill requests through [employer] or call your pharmacy and they will forward the refill request to us. Please allow 3 business days for your refill to be completed.          Additional  "Information About Your Visit        MyChart Information     [facility] gives you secure access to your electronic health record. If you see a primary care provider, you can also send messages to your care team and make appointments. If you have questions, please call your primary care clinic.  If you do not have a primary care provider, please call [phone] and they will assist you.        Care EveryWhere ID     This is your Care EveryWhere ID. This could be used by other organizations to access your [facility] medical records  [id number]        Your Vitals Were     Pulse Temperature Height Last Period Pulse Oximetry BMI (Body Mass Index)    89 98.6  F (37  C) (Oral) 5' 10\" (1.778 m) 01/05/2018 100% 27.19 kg/m2       Blood Pressure from Last 3 Encounters:   01/17/18 126/84   01/10/18 (P) 129/88   11/09/17 115/81    Weight from Last 3 Encounters:   01/17/18 189 lb 8 oz (86 kg)   01/10/18 (P) 190 lb (86.2 kg)   12/22/17 195 lb 12.8 oz (88.8 kg)              We Performed the Following     *UA reflex to Microscopic and Culture ([place] [facility] (except [place] and [place])     CBC with platelets     Comprehensive metabolic panel     UA with Microscopic reflex to Culture     Urine Culture Aerobic Bacterial        Primary Care Provider Office Phone # Fax #    [place] Humph[place] [phone] [address] [address]        Equal Access to Services     MABEL BAILEY : Hadii aad ku hadasho Soomaali, waaxda luqadaha, qaybta kaalmada akanksha, waxstar kiana arteaga . So [facility] [phone].    ATENCIÓN: Si habla español, tiene a gibbons disposición servicios gratuitos de asistencia lingüística. Llame al [phone].    We comply with applicable federal civil rights laws and [place] laws. We do not discriminate on the basis of race, color, national origin, age, disability, sex, sexual orientation, or gender identity.            Thank you!     Thank you " for choosing [facility]  for your care. Our goal is always to provide you with excellent care. Hearing back from our patients is one way we can continue to improve our services. Please take a few minutes to complete the written survey that you may receive in the mail after your visit with us. Thank you!             Your Updated Medication List - Protect others around you: Learn how to safely use, store and throw away your medicines at www.disposemymeds.org.          This list is accurate as of: 1/17/18 11:22 AM.  Always use your most recent med list.                   Brand Name Dispense Instructions for use Diagnosis    CALCIUM + D PO      Take by mouth daily        cetirizine 10 MG tablet    zyrTEC     Take 10 mg by mouth daily as needed for allergies        CVS B-12 5000 MCG Subl   Generic drug:  Cyanocobalamin       Multiple thyroid nodules       cyclobenzaprine 10 MG tablet    FLEXERIL    30 tablet    TAKE ONE-HALF TABLET BY MOUTH TWICE A DAY AS NEEDED FOR MUSCLE SPASMS    Sciatica, unspecified laterality       hydrochlorothiazide 25 MG tablet    HYDRODIURIL    90 tablet    TAKE ONE TABLET BY MOUTH EVERY DAY    Essential hypertension with goal blood pressure less than 140/90       hydrOXYzine 50 MG capsule    VISTARIL    30 capsule    Take 1-2 capsules ( mg) by mouth nightly as needed for anxiety (sleep)    Adjustment disorder with anxious mood       MELATONIN PO      Take 3 mg by mouth        metroNIDAZOLE 0.75 % vaginal gel    METROGEL    70 g    Use twice weekly to vagina for recurrent BV symptoms.  Do this for 3 months after finishing oral medications    Abnormal vaginal fluids       MULTIVITAMINS PO      Take by mouth daily        naltrexone-bupropion 8-90 MG per 12 hr tablet    CONTRAVE    120 tablet    Take 2 tablets by mouth 2 times daily    Morbid obesity (H)       polyethylene glycol powder    MIRALAX    510 g    Take 17 g (1 capful) by mouth daily as needed for constipation     Constipation, unspecified constipation type       potassium chloride SA 20 MEQ CR tablet    KLOR-CON    90 tablet    Take 1 tablet (20 mEq) by mouth daily    S/P gastrectomy       TYLENOL PM EXTRA STRENGTH PO           vitamin D 2000 UNITS Caps

## 2018-01-17 NOTE — NURSING NOTE
"Chief Complaint   Patient presents with     UTI     /84  Pulse 89  Temp 98.6  F (37  C) (Oral)  Ht 5' 10\" (1.778 m)  Wt 189 lb 8 oz (86 kg)  LMP 01/05/2018  SpO2 100%  BMI 27.19 kg/m2 Estimated body mass index is 27.19 kg/(m^2) as calculated from the following:    Height as of this encounter: 5' 10\" (1.778 m).    Weight as of this encounter: 189 lb 8 oz (86 kg).  Medication Reconciliation: complete      Health Maintenance due pending provider review:  NONE    n/a    Rosita Caraballo CMA  "

## 2018-01-17 NOTE — PROGRESS NOTES
SUBJECTIVE:   Ashley Catherine is a 37 year old female who presents to clinic today for the following health issues:      URINARY TRACT SYMPTOMS      Duration: 1 week    Description  odor and back pain    Intensity:  moderate    Accompanying signs and symptoms:  Fever/chills: no   Flank pain YES  Nausea and vomiting: YES-nausea--but also on new med  Vaginal symptoms: none  Abdominal/Pelvic Pain: no     History  History of frequent UTI's: no   History of kidney stones: no   Sexually Active: YES  Possibility of pregnancy: No    Precipitating or alleviating factors: None    Therapies tried and outcome: none     \  Provider note:  See notes above. She is here because of a foul odor in her urine. Has noticed this over the last week. Denies any new foods that she is eating. Is taking a new medication for weight loss but has been on this for several weeks and has not noticed symptoms until just now. She recently tried increasing fluids and this has helped color of the urine however the smell is still the same. No vaginal symptoms are noted. She has no pain or burning no blood in urine          Problem list and histories reviewed & adjusted, as indicated.  Additional history: as documented    Patient Active Problem List   Diagnosis     OLIGOMENORRHEA, C/W PCOS     Sleep apnea     HX ABNL PAP SMEAR OF CERVIX       Flat feet     GERD (gastroesophageal reflux disease)     Sciatica     S/P gastrectomy     Occupational problem     Elevated parathyroid hormone     Multiple thyroid nodules     Abnormal thyroid cytology-follicular neoplasm     Chronic pain syndrome     Major depressive disorder, recurrent episode, in full remission (H)     Bilateral low back pain with sciatica, sciatica laterality unspecified     Throat symptom     Psychological factor affecting physical condition     Hx of Guillain-Wawaka syndrome     Adjustment disorder with anxious mood     Past Surgical History:   Procedure Laterality Date     BIOPSY  03/13/2015     Thyroid nodule     ESOPHAGOSCOPY, GASTROSCOPY, DUODENOSCOPY (EGD), COMBINED  5/28/2013    Procedure: COMBINED ESOPHAGOSCOPY, GASTROSCOPY, DUODENOSCOPY (EGD);;  Surgeon: Best Willett MD;  Location: [facility]     LAPAROSCOPIC GASTRIC SLEEVE  5/13/2013    Procedure: LAPAROSCOPIC GASTRIC SLEEVE;  Laparoscopic Sleeve Gastrectomy ;  Surgeon: Best Willett MD;  Location: [facility]     LEEP TX, CERVICAL  1995    age 15     SEPTOPLASTY       TONSILLECTOMY  age 20s     TONSILLECTOMY  2004?     wisdom teeth extraction         Social History   Substance Use Topics     Smoking status: Never Smoker     Smokeless tobacco: Never Used     Alcohol use 2.4 - 3.0 oz/week      Comment: 3 drinks/week     Family History   Problem Relation Age of Onset     Hypertension Sister      Hypertension Father      Allergies Father      seasonal     Lipids Father      Obesity Father      Arthritis Mother      Gynecology Mother      problems with menopause     Hypertension Mother      Other Cancer Mother      Endometrial     OSTEOPOROSIS Mother      Obesity Mother      Parathyroid Disorders Mother      3 operations     Obesity Sister      DIABETES Maternal Aunt      x several w. DM     Breast Cancer Maternal Aunt      Cancer - colorectal Maternal Uncle      60ish     Hypertension Sister      Obesity Sister      Nephrolithiasis No family hx of          Current Outpatient Prescriptions   Medication Sig Dispense Refill     naltrexone-bupropion (CONTRAVE) 8-90 MG per 12 hr tablet Take 2 tablets by mouth 2 times daily 120 tablet 3     hydrOXYzine (VISTARIL) 50 MG capsule Take 1-2 capsules ( mg) by mouth nightly as needed for anxiety (sleep) 30 capsule 1     cyclobenzaprine (FLEXERIL) 10 MG tablet TAKE ONE-HALF TABLET BY MOUTH TWICE A DAY AS NEEDED FOR MUSCLE SPASMS 30 tablet 5     hydrochlorothiazide (HYDRODIURIL) 25 MG tablet TAKE ONE TABLET BY MOUTH EVERY DAY 90 tablet 1     potassium chloride SA (POTASSIUM CHLORIDE) 20 MEQ CR tablet Take 1  "tablet (20 mEq) by mouth daily 90 tablet 3     Cholecalciferol (VITAMIN D) 2000 UNITS CAPS        metroNIDAZOLE (METROGEL) 0.75 % vaginal gel Use twice weekly to vagina for recurrent BV symptoms.  Do this for 3 months after finishing oral medications 70 g 3     MELATONIN PO Take 3 mg by mouth       Diphenhydramine-APAP, sleep, (TYLENOL PM EXTRA STRENGTH PO)        Cyanocobalamin (CVS B-12) 5000 MCG SUBL        polyethylene glycol (MIRALAX) powder Take 17 g (1 capful) by mouth daily as needed for constipation 510 g 1     cetirizine (ZYRTEC) 10 MG tablet Take 10 mg by mouth daily as needed for allergies       Calcium Carbonate-Vitamin D (CALCIUM + D PO) Take by mouth daily       Multiple Vitamin (MULTIVITAMINS PO) Take by mouth daily           Reviewed and updated as needed this visit by clinical staff     Reviewed and updated as needed this visit by Provider         ROS:  Constitutional, HEENT, cardiovascular, pulmonary, gi and gu systems are negative, except as otherwise noted.      OBJECTIVE:                                                    /84  Pulse 89  Temp 98.6  F (37  C) (Oral)  Ht 5' 10\" (1.778 m)  Wt 189 lb 8 oz (86 kg)  LMP 01/05/2018  SpO2 100%  BMI 27.19 kg/m2  Body mass index is 27.19 kg/(m^2).  GENERAL APPEARANCE: healthy, alert and no distress  ABDOMEN: soft, nontender, without hepatosplenomegaly or masses and bowel sounds normal    Diagnostic test results:  Diagnostic Test Results:  Results for orders placed or performed in visit on 01/17/18 (from the past 24 hour(s))   *UA reflex to Microscopic and Culture ([place] and [facility] (except [place] and [place])   Result Value Ref Range    Color Urine Canceled, Test credited     Appearance Urine Canceled, Test credited     Glucose Urine Canceled, Test credited (A) NEG^Negative mg/dL    Bilirubin Urine Canceled, Test credited (A) NEG^Negative    Ketones Urine Canceled, Test credited (A) NEG^Negative mg/dL    Specific Gravity Urine " Canceled, Test credited 1.003 - 1.035    Blood Urine Canceled, Test credited (A) NEG^Negative    pH Urine Canceled, Test credited 5.0 - 7.0 pH    Protein Albumin Urine Canceled, Test credited (A) NEG^Negative mg/dL    Urobilinogen Urine Canceled, Test credited 0.2 - 1.0 EU/dL    Nitrite Urine Canceled, Test credited (A) NEG^Negative    Leukocyte Esterase Urine Canceled, Test credited (A) NEG^Negative    Source Canceled, Test credited    UA with Microscopic reflex to Culture   Result Value Ref Range    Color Urine Yellow     Appearance Urine Clear     Glucose Urine Negative NEG^Negative mg/dL    Bilirubin Urine Negative NEG^Negative    Ketones Urine Trace (A) NEG^Negative mg/dL    Specific Gravity Urine 1.020 1.003 - 1.035    pH Urine 6.5 5.0 - 7.0 pH    Protein Albumin Urine Negative NEG^Negative mg/dL    Urobilinogen Urine 2.0 (H) 0.2 - 1.0 EU/dL    Nitrite Urine Negative NEG^Negative    Blood Urine Negative NEG^Negative    Leukocyte Esterase Urine Negative NEG^Negative    Source Midstream Urine     WBC Urine O - 2 OTO2^O - 2 /HPF    RBC Urine O - 2 OTO2^O - 2 /HPF    Squamous Epithelial /LPF Urine Moderate (A) FEW^Few /LPF    Bacteria Urine Moderate (A) NEG^Negative /HPF    Mucous Urine Present (A) NEG^Negative /LPF   CBC with platelets   Result Value Ref Range    WBC 5.7 4.0 - 11.0 10e9/L    RBC Count 3.95 3.8 - 5.2 10e12/L    Hemoglobin 12.6 11.7 - 15.7 g/dL    Hematocrit 37.8 35.0 - 47.0 %    MCV 96 78 - 100 fl    MCH 31.9 26.5 - 33.0 pg    MCHC 33.3 31.5 - 36.5 g/dL    RDW 12.9 10.0 - 15.0 %    Platelet Count 389 150 - 450 10e9/L          ASSESSMENT/PLAN:                                                    1. Dysuria  Abnormal urine smell is noted by the patient. Her urinalysis initially showed elevated urobilinogen levels. Reviewed that she has no signs or symptoms of hemolysis based on labs. She does report that she has had some constipation and x-ray started MiraLAX. Discussed that this could be the cause of  the urobilinogen in the urine and reviewed that we should get her stools moving and see if her symptoms improve. Slightly discussed that other causes such as hepatitis can also cause urobilinogen and breakdown of red cells, labs as noted below are pending for conference of panel. Other possibility is bacterial overgrowth  - *UA reflex to Microscopic and Culture ([place] [facility] (except [place] and [place])  - UA with Microscopic reflex to Culture  - Urine Culture Aerobic Bacterial  - CBC with platelets  - Comprehensive metabolic panel    2. Abnormal urine    - CBC with platelets  - Comprehensive metabolic panel      Follow up with Provider - we'll contact her with results would like to see if her symptoms improve after moving stools and improving constipation     Violet Humphreys MD  [facility]

## 2018-01-17 NOTE — PATIENT INSTRUCTIONS
Elevated Urobilinogen:    Can be due to breakdown of red cells, constipation or bacterial overgrowth.    Try to see if stools can get moving  Labs today to rule out hemolysis - if needed we can add a peripheral smear.  Liver enzymes for hepatitis

## 2018-01-18 LAB
BACTERIA SPEC CULT: NORMAL
SPECIMEN SOURCE: NORMAL

## 2018-01-25 DIAGNOSIS — E04.1 THYROID NODULE: Primary | ICD-10-CM

## 2018-01-29 ENCOUNTER — OFFICE VISIT (OUTPATIENT)
Dept: OTOLARYNGOLOGY | Facility: CLINIC | Age: 38
End: 2018-01-29
Payer: COMMERCIAL

## 2018-01-29 VITALS — HEIGHT: 70 IN | BODY MASS INDEX: 26.63 KG/M2 | WEIGHT: 186 LBS

## 2018-01-29 DIAGNOSIS — E04.1 THYROID NODULE: Primary | ICD-10-CM

## 2018-01-29 ASSESSMENT — PAIN SCALES - GENERAL: PAINLEVEL: MILD PAIN (3)

## 2018-01-29 NOTE — MR AVS SNAPSHOT
After Visit Summary   1/29/2018    Ashley Catherine    MRN: [id number]           Patient Information     Date Of Birth          1980        Visit Information        Provider Department      1/29/2018 3:45 PM Delia Harper MD [facility]        Today's Diagnoses     Thyroid nodule    -  1      Care Instructions    Nurse teaching given on left thyroid lobectomy  and the patient expresses understanding and acceptance of instructions. Jose Davis 1/29/2018 5:19 PM          Follow-ups after your visit        Your next 10 appointments already scheduled     Mar 26, 2018  9:00 AM CDT   Pre-Op physical with Violet Humphreys MD   [facility] ([facility])    [address] [facility] [id number] [phone]            Mar 28, 2018 10:00 AM CDT   (Arrive by 9:45 AM)   Return Visit with Jayro Elias, PhD [facility] ([facility] [place])    [address] [place] [address]  3rd [facility] [id number] [phone]            Mar 30, 2018  7:30 AM CDT   (Arrive by 7:15 AM)   PAC PHONE RN ASSESSMENT with  Pac Rn   [facility] ([facility] [place] [facility])    [address] [place] [address]  4th [facility] [id number] [phone]           Note: this is not an onsite visit; there is no need to come to the facility.            Apr 03, 2018   Procedure with Delia Harper MD   [facility] ([facility] [place] [facility])    [address] [place] [address]  5th [facility] [id number] [phone]           Located in the Clinics and Surgery Center at [address] [place] [address].   parking is very convenient and highly recommended.  is a $6 flat rate fee.  Both  and self parkers should enter the main arrival plaza from [facility]; parking attendants will direct you based on your parking  "preference.            Apr 16, 2018  1:30 PM CDT   (Arrive by 1:15 PM)   Return Visit with Delia Harper MD   [facility] ([facility] [place])    [address] [address] [phone]              Who to contact     Please call your clinic at [phone] to:    Ask questions about your health    Make or cancel appointments    Discuss your medicines    Learn about your test results    Speak to your doctor            Additional Information About Your Visit        [employer] Information     [employer] gives you secure access to your electronic health record. If you see a primary care provider, you can also send messages to your care team and make appointments. If you have questions, please call your primary care clinic.  If you do not have a primary care provider, please call [phone] and they will assist you.      [employer] is an electronic gateway that provides easy, online access to your medical records. With [employer], you can request a clinic appointment, read your test results, renew a prescription or communicate with your care team.     To access your existing account, please contact your [facility] or call [phone] for assistance.        Care EveryWhere ID     This is your Care EveryWhere ID. This could be used by other organizations to access your [facility] medical records  [id number]        Your Vitals Were     Height Last Period BMI (Body Mass Index)             1.778 m (5' 10\") 01/05/2018 26.69 kg/m2          Blood Pressure from Last 3 Encounters:   01/17/18 126/84   01/10/18 (P) 129/88   11/09/17 115/81    Weight from Last 3 Encounters:   01/29/18 84.4 kg (186 lb)   01/17/18 86 kg (189 lb 8 oz)   01/10/18 (P) 86.2 kg (190 lb)              We Performed the Following     Kisha-Operative Worksheet (Head & Neck)          Today's Medication Changes          These changes are accurate as of 1/29/18 11:59 " PM.  If you have any questions, ask your nurse or doctor.               Stop taking these medicines if you haven't already. Please contact your care team if you have questions.     potassium chloride SA 20 MEQ CR tablet   Commonly known as:  KLOR-CON   Stopped by:  Delia Harper MD                    Primary Care Provider Office Phone # Fax #    MiamiTanja Humphreys [phone] [address] [address]        Equal Access to Services     [facility]: Hadii aad ku hadasho Soomaali, waaxda luqadaha, qaybta kaalmada adeegyada, waxay idiin hayaan adeeg luis alfredoarafranklyn arteaga . So [facility] [phone].    ATENCIÓN: Si habla español, tiene a gibbons disposición servicios gratuitos de asistencia lingüística. [facility] [phone].    We comply with applicable federal civil rights laws and [place] laws. We do not discriminate on the basis of race, color, national origin, age, disability, sex, sexual orientation, or gender identity.            Thank you!     Thank you for choosing [facility] EAR NOSE AND THROAT  for your care. Our goal is always to provide you with excellent care. Hearing back from our patients is one way we can continue to improve our services. Please take a few minutes to complete the written survey that you may receive in the mail after your visit with us. Thank you!             Your Updated Medication List - Protect others around you: Learn how to safely use, store and throw away your medicines at www.disposemymeds.org.          This list is accurate as of 1/29/18 11:59 PM.  Always use your most recent med list.                   Brand Name Dispense Instructions for use Diagnosis    CALCIUM + D PO      Take by mouth daily        cetirizine 10 MG tablet    zyrTEC     Take 10 mg by mouth daily as needed for allergies        CVS B-12 5000 MCG Subl   Generic drug:  Cyanocobalamin       Multiple thyroid nodules       cyclobenzaprine 10 MG tablet    FLEXERIL    30  tablet    TAKE ONE-HALF TABLET BY MOUTH TWICE A DAY AS NEEDED FOR MUSCLE SPASMS    Sciatica, unspecified laterality       hydrochlorothiazide 25 MG tablet    HYDRODIURIL    90 tablet    TAKE ONE TABLET BY MOUTH EVERY DAY    Essential hypertension with goal blood pressure less than 140/90       hydrOXYzine 50 MG capsule    VISTARIL    30 capsule    Take 1-2 capsules ( mg) by mouth nightly as needed for anxiety (sleep)    Adjustment disorder with anxious mood       MELATONIN PO      Take 3 mg by mouth        metroNIDAZOLE 0.75 % vaginal gel    METROGEL    70 g    Use twice weekly to vagina for recurrent BV symptoms.  Do this for 3 months after finishing oral medications    Abnormal vaginal fluids       MULTIVITAMINS PO      Take by mouth daily        naltrexone-bupropion 8-90 MG per 12 hr tablet    CONTRAVE    120 tablet    Take 2 tablets by mouth 2 times daily    Morbid obesity (H)       polyethylene glycol powder    MIRALAX    510 g    Take 17 g (1 capful) by mouth daily as needed for constipation    Constipation, unspecified constipation type       TYLENOL PM EXTRA STRENGTH PO           vitamin D 2000 UNITS Caps

## 2018-01-29 NOTE — PATIENT INSTRUCTIONS
Nurse teaching given on left thyroid lobectomy  and the patient expresses understanding and acceptance of instructions. Jose Davis 1/29/2018 5:19 PM

## 2018-01-29 NOTE — NURSING NOTE
"Chief Complaint   Patient presents with     RECHECK     Follow up thyroid nodule has increased in size     Height 1.778 m (5' 10\"), weight 84.4 kg (186 lb), last menstrual period 01/05/2018, not currently breastfeeding.    Charanjit Mustafa    "

## 2018-01-29 NOTE — NURSING NOTE
Relevant Diagnosis: left thyroid nodule   Teaching Topic:left thyroid lobectomy   Person(s) involved in teaching: Patient     Teaching Concerns Addressed:  Pre op teaching included the need for an H&P, NPO status pre op, hospital routines, expected recovery, activity  restrictions, antimicrobial scrub, s/s of infection, pain control methods and the importance of follow up appointments.  The patient voiced an understanding of all instructions and will call with questions.     Motivation Level:  Asks Questions:   Yes  Eager to Learn:   Yes  Cooperative:   Yes  Receptive (willing/able to accept information):   Yes     Patient  demonstrates understanding of the following:  Reason for the appointment, diagnosis and treatment plan:   Yes  Knowledge of proper use of medications and conditions for which they are ordered (with special attention to potential side effects or drug interactions):   Yes  Which situations necessitate calling provider and whom to contact:   Yes        Proper use and care of  (medical equip, care aids, etc.):   NA  Nutritional needs and diet plan:   Yes  Pain management techniques:   Yes  Patient instructed on hand hygiene:  Yes  How and/when to access community resources:   NA     Infection Prevention:  Patient   demonstrates understanding of the following:  Surgical procedure site care taught   Signs and symptoms of infection taught Yes  Wound care taught Yes     Instructional Materials Used/Given: Pre op booklet.

## 2018-01-29 NOTE — LETTER
1/29/2018       RE: Ashley Catherine  [address]  [facility] [address]     Dear Colleague,    Thank you for referring your patient, Ashley Catherine, to the [facility] E[facility] THROAT at [facility]. Please see a copy of my visit note below.    Service Date: 01/29/2018      Greater than 50% of the 40 minute appointment was in the care and consultation of this patient.  I was asked by Dr. Crystal Wyatt to see this patient for surgical options for a thyroid nodule.      HISTORY OF PRESENT ILLNESS:  This is a 37-year-old female who was noted have a left thyroid nodule just lateral to the isthmus in 02/2015.  She has had multiple ultrasounds with varying sizes, but more consistently with increasing growth of this thyroid nodule.  Most recently, the ultrasound notes that the nodule was 1.3 x 1.1 x 2.2 cm in size, grade 4 vascularity, hypoechoic.  The patient underwent 2 previous biopsies.  Both are suspicious for follicular neoplasm, ThyroSeq negative.        Regarding the thyroid nodule, the patient denies any problems with voice quality, inspiration or swallowing.  There is no family history of thyroid carcinoma, no previous history of thyroid carcinoma, no previous head and neck radiation exposure, no symptoms of hypo- or hyperthyroidism.  Thyroid function tests are normal.      PAST MEDICAL HISTORY, SURGICAL HISTORY AND MEDICATIONS:  Reviewed and are available in the EMR.      REVIEW OF SYSTEMS:  A 10 point review of systems is pertinent for that noted in the HPI.      PHYSICAL EXAMINATION:  Inspecting the neck, there is a nodule noted more towards the midline, particularly when swallowing.  In palpating the thyroid gland, the superior and inferior borders of the thyroid gland are palpable.  There is a nodule that is well circumscribed, somewhat firm on the left lobe of the thyroid gland just to the left of the midline.  There is no obvious cervical lymphadenopathy.         Thyroid function tests are discussed above.      Radiographic images include an ultrasound of the thyroid from 2018 that essentially shows that the nodule has now increased in size, measuring 2.0 x 1.3 x 2.4 cm.  This is a greater than 20% increase since the last ultrasound.      ASSESSMENT:  Enlarging left thyroid nodule that is suspicious for follicular neoplasm on biopsy.      PLAN:  Based on this, I would recommend the patient undergo a left thyroid lobectomy and isthmusectomy.  I discussed the surgical procedure with the patient, including, but not limited to, the risks of bleeding, infection, injury to the recurrent laryngeal nerve, potential loss of airway.  The patient is aware of this and agrees to proceed with surgery, and we will schedule her accordingly.         BRYCE HARPER MD             D: 2018   T: 2018   MT: swapnil      Name:     JOSEY KHALIL   MRN:      [id number]        Account:      [id number]   :      1980           Service Date: 2018      Document: [id number]

## 2018-01-30 ENCOUNTER — TELEPHONE (OUTPATIENT)
Dept: OTOLARYNGOLOGY | Facility: CLINIC | Age: 38
End: 2018-01-30

## 2018-01-30 NOTE — TELEPHONE ENCOUNTER
1/25/18  Lt msg to call Michelle in surgery scheduling for Dr Harper    1/30/18  [facility] msg to call michelle in surgery scheduling    Michelle Hi   ENT Kisha-Op Coordinator  [phone]

## 2018-01-31 ENCOUNTER — TELEPHONE (OUTPATIENT)
Dept: OTOLARYNGOLOGY | Facility: CLINIC | Age: 38
End: 2018-01-31

## 2018-01-31 NOTE — TELEPHONE ENCOUNTER
1/31/18  Pt returned call - surgery scheduled with Dr Harper at the [facility] for 4/3/18 (pt choice, earlier dates offered).    Lt thyroid lobectomy and isthmusectomy    Pre-Op PE  [facility]    PAC Call 3/30/18 7:30 am    [place] 4/16/18  1:30   [facility]    Teaching and materials given to patient by Chas TAYLOR (ENT [facility])  Michelle Hi   ENT Kisha-Op Coordinator  [phone]

## 2018-02-04 NOTE — PROGRESS NOTES
Hello,    The recent labs we did showed some ketones in the urine and some urobilinogen as well.  This could be due to dehydration or diet.  We can recheck with your next labs    Violet Humphreys MD

## 2018-02-08 ENCOUNTER — OFFICE VISIT (OUTPATIENT)
Dept: PSYCHOLOGY | Facility: CLINIC | Age: 38
End: 2018-02-08
Payer: COMMERCIAL

## 2018-02-08 VITALS — WEIGHT: 189.8 LBS | BODY MASS INDEX: 27.23 KG/M2

## 2018-02-08 DIAGNOSIS — F54 PSYCHOLOGICAL FACTORS AFFECTING MEDICAL CONDITION: ICD-10-CM

## 2018-02-08 DIAGNOSIS — F33.0 MAJOR DEPRESSIVE DISORDER, RECURRENT EPISODE, MILD (H): Primary | ICD-10-CM

## 2018-02-08 DIAGNOSIS — Z56.9 OCCUPATIONAL PROBLEM: ICD-10-CM

## 2018-02-08 DIAGNOSIS — F54 PSYCHOLOGICAL FACTORS AFFECTING MORBID OBESITY (H): ICD-10-CM

## 2018-02-08 DIAGNOSIS — E66.01 PSYCHOLOGICAL FACTORS AFFECTING MORBID OBESITY (H): ICD-10-CM

## 2018-02-08 SDOH — ECONOMIC STABILITY - INCOME SECURITY: UNSPECIFIED PROBLEMS RELATED TO EMPLOYMENT: Z56.9

## 2018-02-08 NOTE — PROGRESS NOTES
Health Psychology                  Clinic    Department of Medicine  Lis Chavez, Ph.D., L.P. [phone]                          [facility] Earline Anderson, Ph.D.,  L.P. [phone]                 3rd Floor, Clinic 3A  [place] Mail [address]   Jayro Elias, Ph.D., A.B.P.P., L.P. [phone]     [address] Melissa Stokes, Ph.D., L.P. [phone]  [place] [id number]  [place] [address]    Health Psychology Follow-Up Note    Ashley Catherine is a 37-year-old woman referred initially  for psychological consultation for workup for a gastric sleeve procedure who now returns following the surgery, with concerns about weight management.  She initiated topirimate and is making great progress.    We reviewed in detail the changes she is making with regard to nutrional intake. She is cooking more of her own food.  She is exercising with 10,000 or more steps per day almost every day.  She is reinforced for the changes and can see the pattern of not doing as well when travelling.  She recently has been travelling more, but also walking more (e.g., climbed a mountain in [place]).    189.8 down from 195.8 lbs.   When last seen 2 months ago Since early weight loss,, it has been stable during this interval  She states she plans to resume exercise at an hour most days, with some days 1.5 hours.    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14    hospitalized at [facility] x 1 week     History of steroid therapy      HX ABNL PAP SMEAR OF CERVIX   1995    LEEP AT 15  yo     Hypertension      Hypertrophy of breast      Hypertrophy of tonsils alone      Hypovitaminosis D     with secondary high PTH     Irregular heart beat     palpitations     LBP (low back pain)      LSIL (low grade squamous intraepithelial lesion) on Pap smear 5/2006     Lumbago     RESOLVED     Major depressive disorder, recurrent episode, in partial or unspecified remission 4/1/2013     Morbid obesity (H)     bariatric surgery 5/13/2013     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)      OLIGOMENORRHEA, C/W PCOS      Sciatica      SLEEP APNEA 6/2002    No longer has since tonsillectomy and septoplasty     Past Surgical History:   Procedure Laterality Date     BIOPSY  03/13/2015    Thyroid nodule     ESOPHAGOSCOPY, GASTROSCOPY, DUODENOSCOPY (EGD), COMBINED  5/28/2013    Procedure: COMBINED ESOPHAGOSCOPY, GASTROSCOPY, DUODENOSCOPY (EGD);;  Surgeon: Best Willett MD;  Location:  GI     LAPAROSCOPIC GASTRIC SLEEVE  5/13/2013    Procedure: LAPAROSCOPIC GASTRIC SLEEVE;  Laparoscopic Sleeve Gastrectomy ;  Surgeon: Best Willett MD;  Location:  OR     LEEP TX, CERVICAL  1995    age 15     SEPTOPLASTY       TONSILLECTOMY  age 20s     TONSILLECTOMY  2004?     wisdom teeth extraction         She is  5 foot 11 inches.  Her goal is 175. Her mood is much improved.  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   She still has hopes to leave this area. 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  She has resumed online dating.  She has been  participating in various activities (e.g., [employer] for the Super Bowl, a food shelf).   We discussed a friendship with a man she met in the Fall.  She is frustrtaed by his apparent lack of interest in getting to know her  They have been spending some time.  He is  fairly recently  , and is taking care of his 8-year old daughter.  We discussed relationship, communication and other issues. She has cut back on exercise and is encouraged to resume.     Affect is positive despite her frustrations with her weight and work.  Rapport is excellent.   Extended session due to complexity of case and length of interval.      Time in:  11:03  Time out:  11:58  Psychological factors affecting obesity (F54).   Major depression, recurrent, mild (F3e3.0).   Occupational Problems (Z56.9)  PLAN/RECOMMENDATIONS:   1. Ms. Catherine will return 3/28 @ 10:00 to address weight loss and social issues with problem solving therapy. .  2.  Resume schedule of regular exercise and  decrease grazing eating.  3.  Full spectrum lighting.      Tx plan due 5/3/18

## 2018-02-08 NOTE — MR AVS SNAPSHOT
After Visit Summary   2/8/2018    Ashley Catherine    MRN: [id number]           Patient Information     Date Of Birth          1980        Visit Information        Provider Department      2/8/2018 11:00 AM Jayro Elias, PhD [facility]        Today's Diagnoses     Major depressive disorder, recurrent episode, mild (H)    -  1    Psychological factors affecting morbid obesity (H)        Occupational problem        Psychological factors affecting medical condition           Follow-ups after your visit        Your next 10 appointments already scheduled     Mar 30, 2018  7:30 AM CDT   (Arrive by 7:15 AM)   PAC PHONE RN ASSESSMENT with Uc Pac Rn   [facility] ([facility])    [address] [place] [address]  [address] [facility] [address] [phone]           Note: this is not an onsite visit; there is no need to come to the facility.            Apr 03, 2018   Procedure with Delia Harper MD   [facility] ([facility])    [address] [place] [address]  5th [facility] [id number] [phone]           Located in the Clinics and Surgery Center at [address] [place] [address].   parking is very convenient and highly recommended.  is a $6 flat rate fee.  Both  and self parkers should enter the main arrival plaza from [facility]; parking attendants will direct you based on your parking preference.            Apr 16, 2018  1:30 PM CDT   (Arrive by 1:15 PM)   Return Visit with Delia Harper MD   [facility] ([facility])    [address] [place] [address] [phone]              Who to contact     Please call your clinic at [phone] to:    Ask questions about your health    Make or cancel appointments    Discuss your medicines    Learn about your test results    Speak  to your doctor   If you have compliments or concerns about an experience at your clinic, or if you wish to file a complaint, please contact [facility] Patient Relations at [phone] or email us at [email or web address]         Additional Information About Your Visit        MyChart Information     [employer] gives you secure access to your electronic health record. If you see a primary care provider, you can also send messages to your care team and make appointments. If you have questions, please call your primary care clinic.  If you do not have a primary care provider, please call [phone] and they will assist you.      [email or web address] is an electronic gateway that provides easy, online access to your medical records. With [email or web address], you can request a clinic appointment, read your test results, renew a prescription or communicate with your care team.     To access your existing account, please contact your [facility] Clinic or call [phone] for assistance.        Care EveryWhere ID     This is your Care EveryWhere ID. This could be used by other organizations to access your [place] medical records  [id number]        Your Vitals Were     BMI (Body Mass Index)                   27.23 kg/m2            Blood Pressure from Last 3 Encounters:   01/17/18 126/84   01/10/18 (P) 129/88   11/09/17 115/81    Weight from Last 3 Encounters:   02/08/18 86.1 kg (189 lb 12.8 oz)   01/29/18 84.4 kg (186 lb)   01/17/18 86 kg (189 lb 8 oz)              Today, you had the following     No orders found for display       Primary Care Provider Office Phone # Fax #    Violet Humphreys [phone] [address] [address]        Equal Access to Services     MABEL BAILEY : paulo Martin qaybta kaalmada adeegyada, waxay idiin hayaan adeeg kharash la'aan ah. So [facility] [phone].    ATENCIÓN: Si kenjila  español, tiene a gibbons disposición servicios gratuitos de asistencia lingüística. Faby jhaveri [phone].    We comply with applicable federal civil rights laws and [place] laws. We do not discriminate on the basis of race, color, national origin, age, disability, sex, sexual orientation, or gender identity.            Thank you!     Thank you for choosing [facility]  for your care. Our goal is always to provide you with excellent care. Hearing back from our patients is one way we can continue to improve our services. Please take a few minutes to complete the written survey that you may receive in the mail after your visit with us. Thank you!             Your Updated Medication List - Protect others around you: Learn how to safely use, store and throw away your medicines at www.disposemymeds.org.          This list is accurate as of 2/8/18 12:03 PM.  Always use your most recent med list.                   Brand Name Dispense Instructions for use Diagnosis    CALCIUM + D PO      Take by mouth daily        cetirizine 10 MG tablet    zyrTEC     Take 10 mg by mouth daily as needed for allergies        [employer] B-12 5000 MCG [address]   Generic drug:  Cyanocobalamin       Multiple thyroid nodules       cyclobenzaprine 10 MG tablet    FLEXERIL    30 tablet    TAKE ONE-HALF TABLET BY MOUTH TWICE A DAY AS NEEDED FOR MUSCLE SPASMS    Sciatica, unspecified laterality       hydrochlorothiazide 25 MG tablet    HYDRODIURIL    90 tablet    TAKE ONE TABLET BY MOUTH EVERY DAY    Essential hypertension with goal blood pressure less than 140/90       hydrOXYzine 50 MG capsule    VISTARIL    30 capsule    Take 1-2 capsules ( mg) by mouth nightly as needed for anxiety (sleep)    Adjustment disorder with anxious mood       MELATONIN PO      Take 3 mg by mouth        metroNIDAZOLE 0.75 % vaginal gel    METROGEL    70 g    Use twice weekly to vagina for recurrent BV symptoms.  Do this for 3 months after finishing oral  medications    Abnormal vaginal fluids       MULTIVITAMINS PO      Take by mouth daily        naltrexone-bupropion 8-90 MG per 12 hr tablet    CONTRAVE    120 tablet    Take 2 tablets by mouth 2 times daily    Morbid obesity (H)       polyethylene glycol powder    MIRALAX    510 g    Take 17 g (1 capful) by mouth daily as needed for constipation    Constipation, unspecified constipation type       TYLENOL PM EXTRA STRENGTH PO           vitamin D 2000 UNITS Caps

## 2018-02-08 NOTE — PROGRESS NOTES
Service Date: 01/29/2018      Greater than 50% of the 40 minute appointment was in the care and consultation of this patient.  I was asked by Dr. Crystal Wyatt to see this patient for surgical options for a thyroid nodule.      HISTORY OF PRESENT ILLNESS:  This is a 37-year-old female who was noted have a left thyroid nodule just lateral to the isthmus in 02/2015.  She has had multiple ultrasounds with varying sizes, but more consistently with increasing growth of this thyroid nodule.  Most recently, the ultrasound notes that the nodule was 1.3 x 1.1 x 2.2 cm in size, grade 4 vascularity, hypoechoic.  The patient underwent 2 previous biopsies.  Both are suspicious for follicular neoplasm, ThyroSeq negative.        Regarding the thyroid nodule, the patient denies any problems with voice quality, inspiration or swallowing.  There is no family history of thyroid carcinoma, no previous history of thyroid carcinoma, no previous head and neck radiation exposure, no symptoms of hypo- or hyperthyroidism.  Thyroid function tests are normal.      PAST MEDICAL HISTORY, SURGICAL HISTORY AND MEDICATIONS:  Reviewed and are available in the EMR.      REVIEW OF SYSTEMS:  A 10 point review of systems is pertinent for that noted in the HPI.      PHYSICAL EXAMINATION:  Inspecting the neck, there is a nodule noted more towards the midline, particularly when swallowing.  In palpating the thyroid gland, the superior and inferior borders of the thyroid gland are palpable.  There is a nodule that is well circumscribed, somewhat firm on the left lobe of the thyroid gland just to the left of the midline.  There is no obvious cervical lymphadenopathy.        Thyroid function tests are discussed above.      Radiographic images include an ultrasound of the thyroid from 01/2018 that essentially shows that the nodule has now increased in size, measuring 2.0 x 1.3 x 2.4 cm.  This is a greater than 20% increase since the last ultrasound.       ASSESSMENT:  Enlarging left thyroid nodule that is suspicious for follicular neoplasm on biopsy.      PLAN:  Based on this, I would recommend the patient undergo a left thyroid lobectomy and isthmusectomy.  I discussed the surgical procedure with the patient, including, but not limited to, the risks of bleeding, infection, injury to the recurrent laryngeal nerve, potential loss of airway.  The patient is aware of this and agrees to proceed with surgery, and we will schedule her accordingly.         BRYCE HARPER MD             D: 2018   T: 2018   MT: swapnil      Name:     JOSEY KHALIL   MRN:      [id number]        Account:      [id number]   :      1980           Service Date: 2018      Document: [id number]

## 2018-02-20 DIAGNOSIS — I10 ESSENTIAL HYPERTENSION WITH GOAL BLOOD PRESSURE LESS THAN 140/90: ICD-10-CM

## 2018-02-20 NOTE — TELEPHONE ENCOUNTER
"AS,  Please advise on refill in SN's absence  Pt has upcoming preop - appt over 30 days away though  Ok for 90 day Rx?  Recent OVs for acute issues  Last physical Aug 2016  Laureen SOMERS RN    Requested Prescriptions   Pending Prescriptions Disp Refills     hydrochlorothiazide (HYDRODIURIL) 25 MG tablet [Pharmacy Med Name: HYDROCHLOROTHIAZIDE 25MG TABS] 90 tablet 1     Sig: TAKE ONE TABLET BY MOUTH EVERY DAY    Diuretics (Including Combos) Protocol Passed    2/20/2018  8:42 AM       Passed - Blood pressure under 140/90 in past 12 months    BP Readings from Last 3 Encounters:   01/17/18 126/84   01/10/18 (P) 129/88   11/09/17 115/81                Passed - Recent or future visit with authorizing provider's specialty    Patient had office visit in the last year or has a visit in the next 30 days with authorizing provider.  See \"Patient Info\" tab in inbasket, or \"Choose Columns\" in Meds & Orders section of the refill encounter.            Passed - Patient is age 18 or older       Passed - No active pregancy on record       Passed - Normal serum creatinine on file in past 12 months    Recent Labs   Lab Test  01/17/18   1129   CR  0.82             Passed - Normal serum potassium on file in past 12 months    Recent Labs   Lab Test  01/17/18   1129   POTASSIUM  3.7                   Passed - Normal serum sodium on file in past 12 months    Recent Labs   Lab Test  01/17/18   1129   NA  140             Passed - No positive pregnancy test in past 12 months        Next 5 appointments (look out 90 days)     Mar 26, 2018  9:00 AM CDT   Pre-Op physical with Violet Humphreys MD   [facility] ([facility])    [address]  [facility] [id number] [phone]                  "

## 2018-02-24 ENCOUNTER — MYC REFILL (OUTPATIENT)
Dept: FAMILY MEDICINE | Facility: CLINIC | Age: 38
End: 2018-02-24

## 2018-02-24 DIAGNOSIS — I10 ESSENTIAL HYPERTENSION WITH GOAL BLOOD PRESSURE LESS THAN 140/90: ICD-10-CM

## 2018-02-26 RX ORDER — HYDROCHLOROTHIAZIDE 25 MG/1
25 TABLET ORAL DAILY
Qty: 90 TABLET | Refills: 1 | Status: SHIPPED | OUTPATIENT
Start: 2018-02-26 | End: 2018-08-17

## 2018-02-26 RX ORDER — HYDROCHLOROTHIAZIDE 25 MG/1
TABLET ORAL
Qty: 90 TABLET | Refills: 0 | Status: SHIPPED | OUTPATIENT
Start: 2018-02-26 | End: 2018-03-14

## 2018-02-26 NOTE — TELEPHONE ENCOUNTER
"Requested Prescriptions   Pending Prescriptions Disp Refills     hydrochlorothiazide (HYDRODIURIL) 25 MG tablet 90 tablet 1     Sig: Take 1 tablet (25 mg) by mouth daily    Diuretics (Including Combos) Protocol Failed    2/26/2018  7:42 AM       Failed - No positive pregnancy test in past 12 months       Passed - Blood pressure under 140/90 in past 12 months    BP Readings from Last 3 Encounters:   01/17/18 126/84   01/10/18 (P) 129/88   11/09/17 115/81                Passed - Recent or future visit with authorizing provider's specialty    Patient had office visit in the last year or has a visit in the next 30 days with authorizing provider.  See \"Patient Info\" tab in inbasket, or \"Choose Columns\" in Meds & Orders section of the refill encounter.            Passed - Patient is age 18 or older       Passed - No active pregancy on record       Passed - Normal serum creatinine on file in past 12 months    Recent Labs   Lab Test  01/17/18   1129   CR  0.82             Passed - Normal serum potassium on file in past 12 months    Recent Labs   Lab Test  01/17/18   1129   POTASSIUM  3.7                   Passed - Normal serum sodium on file in past 12 months    Recent Labs   Lab Test  01/17/18   1129   NA  140              "

## 2018-02-26 NOTE — TELEPHONE ENCOUNTER
Prescription approved per [facility] Refill Protocol.  Carolyn James RN

## 2018-02-26 NOTE — TELEPHONE ENCOUNTER
Message from MyChart:  Original authorizing provider: MD Ashley Pandya would like a refill of the following medications:  hydrochlorothiazide (HYDRODIURIL) 25 MG tablet [Violet Humphreys MD]    Preferred pharmacy: [facility], [place] - [address], [address]    Comment:

## 2018-03-14 ENCOUNTER — OFFICE VISIT (OUTPATIENT)
Dept: FAMILY MEDICINE | Facility: CLINIC | Age: 38
End: 2018-03-14
Payer: COMMERCIAL

## 2018-03-14 VITALS
HEART RATE: 88 BPM | TEMPERATURE: 98.1 F | SYSTOLIC BLOOD PRESSURE: 124 MMHG | OXYGEN SATURATION: 100 % | RESPIRATION RATE: 16 BRPM | HEIGHT: 70 IN | BODY MASS INDEX: 27.89 KG/M2 | DIASTOLIC BLOOD PRESSURE: 76 MMHG | WEIGHT: 194.8 LBS

## 2018-03-14 DIAGNOSIS — N89.8 VAGINAL DISCHARGE: Primary | ICD-10-CM

## 2018-03-14 DIAGNOSIS — B37.31 YEAST INFECTION OF THE VAGINA: ICD-10-CM

## 2018-03-14 PROCEDURE — 87491 CHLMYD TRACH DNA AMP PROBE: CPT | Performed by: FAMILY MEDICINE

## 2018-03-14 PROCEDURE — 87210 SMEAR WET MOUNT SALINE/INK: CPT | Performed by: FAMILY MEDICINE

## 2018-03-14 PROCEDURE — 99214 OFFICE O/P EST MOD 30 MIN: CPT | Performed by: FAMILY MEDICINE

## 2018-03-14 PROCEDURE — 87591 N.GONORRHOEAE DNA AMP PROB: CPT | Performed by: FAMILY MEDICINE

## 2018-03-14 RX ORDER — FLUCONAZOLE 150 MG/1
150 TABLET ORAL ONCE
Qty: 1 TABLET | Refills: 1 | Status: SHIPPED | OUTPATIENT
Start: 2018-03-14 | End: 2018-03-14

## 2018-03-14 NOTE — PROGRESS NOTES
SUBJECTIVE:   Ashley Catherine is a 37 year old female who presents to clinic today for the following health issues:      Vaginal Symptoms      Duration: 2 days    Description  vaginal discharge - clumpy discharge and itching    Intensity:  moderate    Accompanying signs and symptoms (fever/dysuria/abdominal or back pain): None    History  Sexually active: yes, multiple partners, contraception - condoms  Possibility of pregnancy: Don't Know  Recent antibiotic use: No    Precipitating or alleviating factors: None    Therapies tried and outcome: Metrogel   Outcome: does feel like it helps somewhat          Problem list and histories reviewed & adjusted, as indicated.  Additional history: as documented    Patient Active Problem List   Diagnosis     OLIGOMENORRHEA, C/W PCOS     Sleep apnea     HX ABNL PAP SMEAR OF CERVIX       Flat feet     GERD (gastroesophageal reflux disease)     Sciatica     S/P gastrectomy     Occupational problem     Elevated parathyroid hormone     Multiple thyroid nodules     Abnormal thyroid cytology-follicular neoplasm     Chronic pain syndrome     Major depressive disorder, recurrent episode, in full remission (H)     Bilateral low back pain with sciatica, sciatica laterality unspecified     Throat symptom     Psychological factor affecting physical condition     Hx of Guillain-[place] syndrome     Adjustment disorder with anxious mood     Thyroid nodule     Past Surgical History:   Procedure Laterality Date     BIOPSY  03/13/2015    Thyroid nodule     ESOPHAGOSCOPY, GASTROSCOPY, DUODENOSCOPY (EGD), COMBINED  5/28/2013    Procedure: COMBINED ESOPHAGOSCOPY, GASTROSCOPY, DUODENOSCOPY (EGD);;  Surgeon: Best Willett MD;  Location:  GI     LAPAROSCOPIC GASTRIC SLEEVE  5/13/2013    Procedure: LAPAROSCOPIC GASTRIC SLEEVE;  Laparoscopic Sleeve Gastrectomy ;  Surgeon: Best Willett MD;  Location:  OR     LEEP TX, CERVICAL  1995    age 15     SEPTOPLASTY       TONSILLECTOMY  age 20s      TONSILLECTOMY  2004?     wisdom teeth extraction         Social History   Substance Use Topics     Smoking status: Never Smoker     Smokeless tobacco: Never Used     Alcohol use 2.4 - 3.0 oz/week      Comment: 3 drinks/week     Family History   Problem Relation Age of Onset     Hypertension Sister      Hypertension Father      Allergies Father      seasonal     Lipids Father      Obesity Father      Arthritis Mother      Gynecology Mother      problems with menopause     Hypertension Mother      Other Cancer Mother      Endometrial     OSTEOPOROSIS Mother      Obesity Mother      Parathyroid Disorders Mother      3 operations     Obesity Sister      DIABETES Maternal Aunt      x several w. DM     Breast Cancer Maternal Aunt      Cancer - colorectal Maternal Uncle      60ish     Hypertension Sister      Obesity Sister      Nephrolithiasis No family hx of          Current Outpatient Prescriptions   Medication Sig Dispense Refill     fluconazole (DIFLUCAN) 150 MG tablet Take 1 tablet (150 mg) by mouth once for 1 dose 1 tablet 1     hydrochlorothiazide (HYDRODIURIL) 25 MG tablet Take 1 tablet (25 mg) by mouth daily 90 tablet 1     naltrexone-bupropion (CONTRAVE) 8-90 MG per 12 hr tablet Take 2 tablets by mouth 2 times daily 120 tablet 3     hydrOXYzine (VISTARIL) 50 MG capsule Take 1-2 capsules ( mg) by mouth nightly as needed for anxiety (sleep) 30 capsule 1     cyclobenzaprine (FLEXERIL) 10 MG tablet TAKE ONE-HALF TABLET BY MOUTH TWICE A DAY AS NEEDED FOR MUSCLE SPASMS 30 tablet 5     Cholecalciferol (VITAMIN D) 2000 UNITS CAPS        metroNIDAZOLE (METROGEL) 0.75 % vaginal gel Use twice weekly to vagina for recurrent BV symptoms.  Do this for 3 months after finishing oral medications 70 g 3     MELATONIN PO Take 3 mg by mouth       Diphenhydramine-APAP, sleep, (TYLENOL PM EXTRA STRENGTH PO)        Cyanocobalamin (CVS B-12) 5000 MCG SUBL        polyethylene glycol (MIRALAX) powder Take 17 g (1 capful) by mouth  daily as needed for constipation 510 g 1     cetirizine (ZYRTEC) 10 MG tablet Take 10 mg by mouth daily as needed for allergies       Calcium Carbonate-Vitamin D (CALCIUM + D PO) Take by mouth daily       Multiple Vitamin (MULTIVITAMINS PO) Take by mouth daily       [DISCONTINUED] hydrochlorothiazide (HYDRODIURIL) 25 MG tablet TAKE ONE TABLET BY MOUTH EVERY DAY 90 tablet 0     Allergies   Allergen Reactions     Nkda [No Known Drug Allergies]      No Known Allergies      Recent Labs   Lab Test  01/17/18   1129  11/06/17   0846  08/02/17   1227  07/13/17   1412  05/15/17   1056 02/11/16 12/23/14   1613   08/08/13   1456   07/12/12   0820   A1C   --    --    --    --    --    --    --   5.0   --   4.6   --   5.4   LDL   --    --    --    --   52   --    --   67   --   120   < >  108   HDL   --    --    --    --   81   --    --   77   --   44*   < >  55   TRIG   --    --    --    --   120   --    --   91   --   80   < >  86   ALT  16   --    --   22  20   --    < >  20   < >  29   < >  12   CR  0.82  0.93   --   0.72  0.54   --    < >  0.78   < >  0.72   < >  0.60   GFRESTIMATED  78  68   --   >90  Non  GFR Calc    >90  Non  GFR Calc     --    < >  84   < >  >90   < >  >90   GFRESTBLACK  >90  82   --   >90   GFR Calc    >90   GFR Calc     --    < >  >90   GFR Calc     < >  >90   < >  >90   POTASSIUM  3.7  3.7  3.7  3.0*  3.8   --    < >  3.8   < >  3.7   < >  3.8   TSH   --    --   0.56   --    --   0.94   < >   --    < >   --    < >  1.05    < > = values in this interval not displayed.      BP Readings from Last 3 Encounters:   03/14/18 124/76   01/17/18 126/84   01/10/18 (P) 129/88    Wt Readings from Last 3 Encounters:   03/14/18 194 lb 12.8 oz (88.4 kg)   01/29/18 186 lb (84.4 kg)   01/17/18 189 lb 8 oz (86 kg)                  Labs reviewed in EPIC    Reviewed and updated as needed this visit by clinical staff  Tobacco  Allergies   "Meds  Problems  Med Hx  Surg Hx  Fam Hx  Soc Hx        Reviewed and updated as needed this visit by Provider         ROS:  Constitutional, HEENT, cardiovascular, pulmonary, GI, , musculoskeletal, neuro, skin, endocrine and psych systems are negative, except as otherwise noted.    OBJECTIVE:     /76  Pulse 88  Temp 98.1  F (36.7  C) (Oral)  Resp 16  Ht 5' 10\" (1.778 m)  Wt 194 lb 12.8 oz (88.4 kg)  LMP 03/01/2018 (Approximate)  SpO2 100%  Breastfeeding? No  BMI 27.95 kg/m2  Body mass index is 27.95 kg/(m^2).  GENERAL: healthy, alert and no distress  NECK: no adenopathy, no asymmetry, masses, or scars and thyroid normal to palpation  RESP: lungs clear to auscultation - no rales, rhonchi or wheezes  CV: regular rate and rhythm, normal S1 S2, no S3 or S4, no murmur, click or rub, no peripheral edema and peripheral pulses strong  ABDOMEN: soft, nontender, no hepatosplenomegaly, no masses and bowel sounds normal   (female): normal female external genitalia, normal urethral meatus, vaginal mucosa, normal cervix/adnexa/uterus without masses WITH some white cottage cheese -liek vag discharge   MS: no gross musculoskeletal defects noted, no edema    Diagnostic Test Results:  Results for orders placed or performed in visit on 03/14/18 (from the past 24 hour(s))   Wet prep   Result Value Ref Range    Specimen Description Vagina     Wet Prep No Trichomonas seen     Wet Prep No clue cells seen     Wet Prep No yeast seen        ASSESSMENT/PLAN:         1. Vaginal discharge  Will check wet prep and also discussed checking for GC and chlamydia because she has a new partner   - Wet prep  - NEISSERIA GONORRHOEA PCR  - CHLAMYDIA TRACHOMATIS PCR    2. Yeast infection of the vagina  nova treat based on her symptoms and the evidence of white cheese- curds like vag discharge   - fluconazole (DIFLUCAN) 150 MG tablet; Take 1 tablet (150 mg) by mouth once for 1 dose  Dispense: 1 tablet; Refill: 1    RTC if no improving " or worsening.  Pt is aware  and comfortable with the current plan.      Ct Jameson MD  [facility]

## 2018-03-14 NOTE — MR AVS SNAPSHOT
After Visit Summary   3/14/2018    Ashley Catherine    MRN: [id number]           Patient Information     Date Of Birth          1980        Visit Information        Provider Department      3/14/2018 2:45 PM Ct Jameson MD [facility]        Today's Diagnoses     Vaginal discharge    -  1    Yeast infection of the vagina           Follow-ups after your visit        Your next 10 appointments already scheduled     Mar 26, 2018  9:00 AM CDT   Pre-Op physical with Violet Humphreys MD   [facility] ([facility])    [address]  [facility] [address]   [phone]            Mar 28, 2018 10:00 AM CDT   (Arrive by 9:45 AM)   Return Visit with Jayro Elias, PhD [facility] ([facility])    [address] [place] [address]  3rd [facility] [id number] [phone]            Mar 30, 2018  7:30 AM CDT   (Arrive by 7:15 AM)   PAC PHONE RN ASSESSMENT with  Pac Rn   [facility] Preoperative [facility] ([facility])    [address] [place] [address]  4th Floor  [facility] [address] [phone]           Note: this is not an onsite visit; there is no need to come to the facility.            Apr 03, 2018   Procedure with Delia Harper MD   [facility] Surgery [facility] Procedure [facility] ([facility])    [address] [place] [address]  5th [facility] [address] [phone]           Located in the Clinics and Surgery Center at [address] [place] [address].   parking is very convenient and highly recommended.  is a $6 flat rate fee.  Both  and self parkers should enter the main arrival plaza from [facility]; parking attendants will direct you based on your parking preference.            Apr 16, 2018  1:30 PM CDT   (Arrive by 1:15 PM)   Return Visit with Delia Harper MD   [facility] Ear Nose and Throat ([facility])    [address] [facility]  4th [facility] [address] [phone]              Who to contact     If you have questions or need follow up information about today's clinic visit or your schedule please contact [facility] directly at [phone].  Normal or non-critical lab and imaging results will be communicated to you by MyChart, letter or phone within 4 business days after the clinic has received the results. If you do not hear from us within 7 days, please contact the clinic through [employer] or phone. If you have a critical or abnormal lab result, we will notify you by phone as soon as possible.  Submit refill requests through [facility] or call your pharmacy and they will forward the refill request to us. Please allow 3 business days for your refill to be completed.          Additional Information About Your Visit        MyChart Information     [facility] gives you secure access to your electronic health record. If you see a primary care provider, you can also send messages to your care team and make appointments. If you have questions, please call your primary care clinic.  If you do not have a primary care provider, please call [phone] and they will assist you.        Care EveryWhere ID     This is your Care EveryWhere ID. This could be used by other organizations to access your [facility] medical records  [id number]        Your Vitals Were     Pulse Temperature Respirations Height Last Period Pulse Oximetry    88 98.1  F (36.7  C) (Oral) 16 5' 10\" (1.778 m) 03/01/2018 (Approximate) 100%    Breastfeeding? BMI (Body Mass Index)                No 27.95 kg/m2           Blood Pressure from Last 3 Encounters:   03/14/18 124/76   01/17/18 126/84   01/10/18 (P) 129/88    Weight from Last 3 Encounters:   03/14/18 194 lb 12.8 oz (88.4 kg)   01/29/18 186 lb (84.4 kg)   01/17/18 189 lb 8 oz (86 kg)              We Performed the Following     CHLAMYDIA TRACHOMATIS PCR     NEISSERIA " GONORRHOEA PCR     Wet prep          Today's Medication Changes          These changes are accurate as of 3/14/18  9:53 PM.  If you have any questions, ask your nurse or doctor.               Start taking these medicines.        Dose/Directions    fluconazole 150 MG tablet   Commonly known as:  DIFLUCAN   Used for:  Yeast infection of the vagina   Started by:  Ct Jameson MD        Dose:  150 mg   Take 1 tablet (150 mg) by mouth once for 1 dose   Quantity:  1 tablet   Refills:  1            Where to get your medicines      These medications were sent to [facility], [place] - [address], [address]  [address], [address], [facility] [address]     Phone:  [phone]     fluconazole 150 MG tablet                Primary Care Provider Office Phone # Fax #    Violet Humphreys [phone] [address] [address]        Equal Access to Services     [facility] : Hadii sabas gabrielo Sowaldo, waaxda luqadaha, qaybta kaalmada adeegyada, carmen arteaga . So [facility] [phone].    ATENCIÓN: Si habla español, tiene a gibbons disposición servicios gratuitos de asistencia lingüística. Llame al [phone].    We comply with applicable federal civil rights laws and [place] laws. We do not discriminate on the basis of race, color, national origin, age, disability, sex, sexual orientation, or gender identity.            Thank you!     Thank you for choosing [facility]  for your care. Our goal is always to provide you with excellent care. Hearing back from our patients is one way we can continue to improve our services. Please take a few minutes to complete the written survey that you may receive in the mail after your visit with us. Thank you!             Your Updated Medication List - Protect others around you: Learn how to safely use, store and throw away your medicines at www.disposemymeds.org.           This list is accurate as of 3/14/18  9:53 PM.  Always use your most recent med list.                   Brand Name Dispense Instructions for use Diagnosis    CALCIUM + D PO      Take by mouth daily        cetirizine 10 MG tablet    zyrTEC     Take 10 mg by mouth daily as needed for allergies        CVS B-12 5000 MCG Subl   Generic drug:  Cyanocobalamin       Multiple thyroid nodules       cyclobenzaprine 10 MG tablet    FLEXERIL    30 tablet    TAKE ONE-HALF TABLET BY MOUTH TWICE A DAY AS NEEDED FOR MUSCLE SPASMS    Sciatica, unspecified laterality       fluconazole 150 MG tablet    DIFLUCAN    1 tablet    Take 1 tablet (150 mg) by mouth once for 1 dose    Yeast infection of the vagina       hydrochlorothiazide 25 MG tablet    HYDRODIURIL    90 tablet    Take 1 tablet (25 mg) by mouth daily    Essential hypertension with goal blood pressure less than 140/90       hydrOXYzine 50 MG capsule    VISTARIL    30 capsule    Take 1-2 capsules ( mg) by mouth nightly as needed for anxiety (sleep)    Adjustment disorder with anxious mood       MELATONIN PO      Take 3 mg by mouth        metroNIDAZOLE 0.75 % vaginal gel    METROGEL    70 g    Use twice weekly to vagina for recurrent BV symptoms.  Do this for 3 months after finishing oral medications    Abnormal vaginal fluids       MULTIVITAMINS PO      Take by mouth daily        naltrexone-bupropion 8-90 MG per 12 hr tablet    CONTRAVE    120 tablet    Take 2 tablets by mouth 2 times daily    Morbid obesity (H)       polyethylene glycol powder    MIRALAX    510 g    Take 17 g (1 capful) by mouth daily as needed for constipation    Constipation, unspecified constipation type       TYLENOL PM EXTRA STRENGTH PO           vitamin D 2000 UNITS Caps

## 2018-03-15 LAB
SPECIMEN SOURCE: NORMAL
WET PREP SPEC: NORMAL

## 2018-03-26 ENCOUNTER — OFFICE VISIT (OUTPATIENT)
Dept: FAMILY MEDICINE | Facility: CLINIC | Age: 38
End: 2018-03-26
Payer: COMMERCIAL

## 2018-03-26 VITALS
HEIGHT: 70 IN | SYSTOLIC BLOOD PRESSURE: 118 MMHG | HEART RATE: 90 BPM | DIASTOLIC BLOOD PRESSURE: 62 MMHG | OXYGEN SATURATION: 99 % | RESPIRATION RATE: 16 BRPM | WEIGHT: 190.2 LBS | BODY MASS INDEX: 27.23 KG/M2 | TEMPERATURE: 98.4 F

## 2018-03-26 DIAGNOSIS — Z01.818 PREOP GENERAL PHYSICAL EXAM: Primary | ICD-10-CM

## 2018-03-26 DIAGNOSIS — E04.2 MULTIPLE THYROID NODULES: ICD-10-CM

## 2018-03-26 DIAGNOSIS — I10 BENIGN ESSENTIAL HYPERTENSION: ICD-10-CM

## 2018-03-26 LAB
ALBUMIN SERPL-MCNC: 3.7 G/DL (ref 3.4–5)
ALP SERPL-CCNC: 52 U/L (ref 40–150)
ALT SERPL W P-5'-P-CCNC: 16 U/L (ref 0–50)
ANION GAP SERPL CALCULATED.3IONS-SCNC: 7 MMOL/L (ref 3–14)
AST SERPL W P-5'-P-CCNC: 20 U/L (ref 0–45)
BILIRUB SERPL-MCNC: 0.4 MG/DL (ref 0.2–1.3)
BUN SERPL-MCNC: 11 MG/DL (ref 7–30)
CALCIUM SERPL-MCNC: 8.8 MG/DL (ref 8.5–10.1)
CHLORIDE SERPL-SCNC: 103 MMOL/L (ref 94–109)
CO2 SERPL-SCNC: 29 MMOL/L (ref 20–32)
CREAT SERPL-MCNC: 0.75 MG/DL (ref 0.52–1.04)
GFR SERPL CREATININE-BSD FRML MDRD: 86 ML/MIN/1.7M2
GLUCOSE SERPL-MCNC: 76 MG/DL (ref 70–99)
POTASSIUM SERPL-SCNC: 3.8 MMOL/L (ref 3.4–5.3)
PROT SERPL-MCNC: 7 G/DL (ref 6.8–8.8)
SODIUM SERPL-SCNC: 139 MMOL/L (ref 133–144)

## 2018-03-26 PROCEDURE — 36415 COLL VENOUS BLD VENIPUNCTURE: CPT | Performed by: FAMILY MEDICINE

## 2018-03-26 PROCEDURE — 99214 OFFICE O/P EST MOD 30 MIN: CPT | Performed by: FAMILY MEDICINE

## 2018-03-26 PROCEDURE — 80053 COMPREHEN METABOLIC PANEL: CPT | Performed by: FAMILY MEDICINE

## 2018-03-26 RX ORDER — FERROUS SULFATE 325(65) MG
325 TABLET ORAL
Status: ON HOLD | COMMUNITY
End: 2019-06-25

## 2018-03-26 NOTE — PROGRESS NOTES
SUBJECTIVE:   Ashley Catherine is a 38 year old female who presents to clinic today for the following health issues:      [facility]  [address]  [facility] [id number] [phone]  Dept: [phone]    PRE-OP EVALUATION:  Today's date: 3/26/2018    Ashley Catherine (: 1980) presents for pre-operative evaluation assessment as requested by Poonam Jaime .  She requires evaluation and anesthesia risk assessment prior to undergoing surgery/procedure for treatment of thyroidectomy .  Procedure 4/3/18    Fax number for surgical facility:  OR  Primary Physician: Violet Humphreys  Type of Anesthesia Anticipated: to be determined    Patient has a Health Care Directive or Living Will:  NO    Preop Questions 3/26/2018   Who is doing your surgery? poonam santana   What are you having done? left thyroidectomy   Date of Surgery/Procedure: 2018   Facility or Hospital where procedure/surgery will be performed: [facility]   1.  Do you have a history of Heart attack, stroke, stent, coronary bypass surgery, or other heart surgery? No   2.  Do you ever have any pain or discomfort in your chest? No   3.  Do you have a history of  Heart Failure? No   4.   Are you troubled by shortness of breath when:  walking on a level surface, or up a slight hill, or at night? No   5.  Do you currently have a cold, bronchitis or other respiratory infection? No   6.  Do you have a cough, shortness of breath, or wheezing? No   7.  Do you sometimes get pains in the calves of your legs when you walk? No   8. Do you or anyone in your family have previous history of blood clots? YES - mother had blood clot with flying    9.  Do you or does anyone in your family have a serious bleeding problem such as prolonged bleeding following surgeries or cuts? No   10. Have you ever had problems with anemia or been told to take iron pills? YES - due to gastric sleeve   11. Have you  had any abnormal blood loss such as black, tarry or bloody stools, or abnormal vaginal bleeding? No   12. Have you ever had a blood transfusion? YES -  Had plasmapheresis in past with GB   13. Have you or any of your relatives ever had problems with anesthesia? No   14. Do you have sleep apnea, excessive snoring or daytime drowsiness? No   15. Do you have any prosthetic heart valves? No   16. Do you have prosthetic joints? No   17. Is there any chance that you may be pregnant? UNKNOWN -           HPI:     HPI related to upcoming procedure: thyroid nodule removal      See problem list for active medical problems.  Problems all longstanding and stable, except as noted/documented.  See ROS for pertinent symptoms related to these conditions.                                                                                                  .    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  Avoids flu vaccines       Psychological factor affecting physical condition 11/02/2016     Priority: Medium     Throat symptom 06/25/2016     Priority: Medium     Bilateral low back pain with sciatica, sciatica laterality unspecified 01/25/2016     Priority: Medium     Major depressive disorder, recurrent episode, in full remission (H) 12/21/2015     Priority: Medium     Chronic pain syndrome 11/06/2015     Priority: Medium     Patient is followed by KHAI RAYA for ongoing prescription of pain medication.  All refills should be approved by this provider, or covering partner.    Medication(s): Vicodin.   Maximum quantity per month: 2 doses per month OK for #30 per year  Clinic visit frequency  required: 1 year     Controlled substance agreement on file: Yes       Date(s):     Pain Clinic evaluation in the past: No    DIRE Total Score(s):  No flowsheet data found.    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     Priority: Medium     Multiple thyroid nodules 02/26/2015     Priority: Medium     Elevated parathyroid hormone 01/30/2015     Priority: Medium     Stopping HCTZ  Checking dexa  Plans to repeat CA/phos/D and PTH in 3 months along with 24 hour urine calcium    SN       Occupational problem 12/08/2014     Priority: Medium     S/P gastrectomy 10/18/2013     Priority: Medium     Gastric sleeve procedure 5/2013       Sciatica 02/22/2013     Priority: Medium     GERD (gastroesophageal reflux disease) 12/04/2012     Priority: Medium     Flat feet 02/23/2012     Priority: Medium     HX ABNL PAP SMEAR OF CERVIX   04/16/2005     Priority: Medium     LEEP AT 15 yo       OLIGOMENORRHEA, C/W PCOS 06/02/2003     Priority: Medium     Sleep apnea 06/02/2003     Priority: Medium     Problem list name updated by automated process. 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14    hospitalized at [facility] x 1 week     History of steroid therapy      HX ABNL PAP SMEAR OF CERVIX   1995    LEEP AT 15 yo     Hypertension      Hypertrophy of breast      Hypertrophy of tonsils alone      Hypovitaminosis D     with secondary high PTH     Irregular heart beat     palpitations     LBP (low back pain)      LSIL (low grade squamous intraepithelial lesion) on Pap smear 5/2006     Lumbago     RESOLVED     Major depressive disorder, recurrent episode, in partial or unspecified remission 4/1/2013      Morbid obesity (H)     bariatric surgery 5/13/2013     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)      OLIGOMENORRHEA, C/W PCOS      Sciatica      SLEEP APNEA 6/2002    No longer has since tonsillectomy and septoplasty     Past Surgical History:   Procedure Laterality Date     BIOPSY  03/13/2015    Thyroid nodule     ESOPHAGOSCOPY, GASTROSCOPY, DUODENOSCOPY (EGD), COMBINED  5/28/2013    Procedure: COMBINED ESOPHAGOSCOPY, GASTROSCOPY, DUODENOSCOPY (EGD);;  Surgeon: Best Willett MD;  Location: [facility] GI     LAPAROSCOPIC GASTRIC SLEEVE  5/13/2013    Procedure: LAPAROSCOPIC GASTRIC SLEEVE;  Laparoscopic Sleeve Gastrectomy ;  Surgeon: Best Willett MD;  Location: [facility] OR     LEEP TX, CERVICAL  1995    age 15     SEPTOPLASTY       TONSILLECTOMY  age 20s     TONSILLECTOMY  2004?     wisdom teeth extraction       Current Outpatient Prescriptions   Medication Sig Dispense Refill     Ferrous Sulfate (IRON SUPPLEMENT PO)        ferrous sulfate (IRON) 325 (65 FE) MG tablet Take 325 mg by mouth daily (with breakfast)       hydrochlorothiazide (HYDRODIURIL) 25 MG tablet Take 1 tablet (25 mg) by mouth daily 90 tablet 1     naltrexone-bupropion (CONTRAVE) 8-90 MG per 12 hr tablet Take 2 tablets by mouth 2 times daily 120 tablet 3     hydrOXYzine (VISTARIL) 50 MG capsule Take 1-2 capsules ( mg) by mouth nightly as needed for anxiety (sleep) 30 capsule 1     cyclobenzaprine (FLEXERIL) 10 MG tablet TAKE ONE-HALF TABLET BY MOUTH TWICE A DAY AS NEEDED FOR MUSCLE SPASMS 30 tablet 5     Cholecalciferol (VITAMIN D) 2000 UNITS CAPS        metroNIDAZOLE (METROGEL) 0.75 % vaginal gel Use twice weekly to vagina for recurrent BV symptoms.  Do this for 3 months after finishing oral medications 70 g 3     MELATONIN PO Take 3 mg by mouth       Cyanocobalamin (CVS B-12) 5000 MCG SUBL        polyethylene glycol (MIRALAX) powder Take 17 g (1 capful) by mouth daily as needed for constipation 510 g 1     cetirizine (ZYRTEC)  "10 MG tablet Take 10 mg by mouth daily as needed for allergies       Calcium Carbonate-Vitamin D (CALCIUM + D PO) Take by mouth daily       Multiple Vitamin (MULTIVITAMINS PO) Take by mouth daily       Diphenhydramine-APAP, sleep, (TYLENOL PM EXTRA STRENGTH PO)        OTC products: none    Allergies   Allergen Reactions     Nkda [No Known Drug Allergies]      No Known Allergies       Latex Allergy: NO    Social History   Substance Use Topics     Smoking status: Never Smoker     Smokeless tobacco: Never Used     Alcohol use 2.4 - 3.0 oz/week      Comment: 3 drinks/week     History   Drug Use No       REVIEW OF SYSTEMS:   CONSTITUTIONAL: NEGATIVE for fever, chills, change in weight  ENT/MOUTH: NEGATIVE for ear, mouth and throat problems  RESP: NEGATIVE for significant cough or SOB  CV: NEGATIVE for chest pain, palpitations or peripheral edema    EXAM:   /62  Pulse 90  Temp 98.4  F (36.9  C) (Tympanic)  Resp 16  Ht 5' 9.57\" (1.767 m)  Wt 190 lb 3.2 oz (86.3 kg)  LMP 03/02/2018  SpO2 99%  BMI 27.63 kg/m2    GENERAL APPEARANCE: healthy, alert and no distress     EYES: EOMI, PERRL     HENT: ear canals and TM's normal and nose and mouth without ulcers or lesions     NECK: no adenopathy, no asymmetry, masses, or scars and thyroid normal to palpation     RESP: lungs clear to auscultation - no rales, rhonchi or wheezes     CV: regular rates and rhythm, normal S1 S2, no S3 or S4 and no murmur, click or rub     ABDOMEN:  soft, nontender, no HSM or masses and bowel sounds normal     MS: extremities normal- no gross deformities noted, no evidence of inflammation in joints, FROM in all extremities.     SKIN: no suspicious lesions or rashes     NEURO: Normal strength and tone, sensory exam grossly normal, mentation intact and speech normal     PSYCH: mentation appears normal. and affect normal/bright     LYMPHATICS: No cervical adenopathy    DIAGNOSTICS:     Labs Drawn and in Process:   Unresulted Labs Ordered in the " Past 30 Days of this Admission     Date and Time Order Name Status Description    3/26/2018 0914 COMPREHENSIVE METABOLIC PANEL In process           Recent Labs   Lab Test  01/17/18   1129  11/06/17   0846   07/13/17   1412   12/23/14   1613   08/08/13   1456   06/07/13   1245  06/03/13   1100   HGB  12.6   --    --   12.9   < >  12.8   < >  12.4   < >  13.0  12.7   PLT  389   --    --   370   < >   --    < >   --    < >  352  357   INR   --    --    --    --    --    --    --    --    --   1.08  1.00   NA  140  138   --   140   < >  136   < >  138   < >  139  137   POTASSIUM  3.7  3.7   < >  3.0*   < >  3.8   < >  3.7   < >  4.1  3.8   CR  0.82  0.93   --   0.72   < >  0.78   < >  0.72   < >  0.62  0.58   A1C   --    --    --    --    --   5.0   --   4.6   --    --    --     < > = values in this interval not displayed.        IMPRESSION:   Reason for surgery/procedure: Multiple thyroid nodules and plans for resection due to growth    The proposed surgical procedure is considered INTERMEDIATE risk.    REVISED CARDIAC RISK INDEX  The patient has the following serious cardiovascular risks for perioperative complications such as (MI, PE, VFib and 3  AV Block):  No serious cardiac risks  INTERPRETATION: 0 risks: Class I (very low risk - 0.4% complication rate)    The patient has the following additional risks for perioperative complications:  No identified additional risks      ICD-10-CM    1. Preop general physical exam Z01.818    2. Multiple thyroid nodules E04.2    3. Benign essential hypertension I10 Comprehensive metabolic panel       RECOMMENDATIONS:         --Patient is to take all scheduled medications on the day of surgery EXCEPT for modifications listed below.  Advised to have her stop Contrave 5 days before procedure in case there is need for pain control afterwards.    APPROVAL GIVEN to proceed with proposed procedure, without further diagnostic evaluation       Signed Electronically by: Violet Agrawal  MD Juliann    Copy of this evaluation report is provided to requesting physician.    [place] Preop Guidelines

## 2018-03-26 NOTE — MR AVS SNAPSHOT
After Visit Summary   3/26/2018    Ashley Catherine    MRN: [id number]           Patient Information     Date Of Birth          1980        Visit Information        Provider Department      3/26/2018 9:00 AM Violet Humphreys MD [facility]        Today's Diagnoses     Preop general physical exam    -  1    Multiple thyroid nodules        Benign essential hypertension          Care Instructions      Before Your Surgery      Call your surgeon if there is any change in your health. This includes signs of a cold or flu (such as a sore throat, runny nose, cough, rash or fever).    Do not smoke, drink alcohol or take over the counter medicine (unless your surgeon or primary care doctor tells you to) for the 24 hours before and after surgery.    If you take prescribed drugs: Follow your doctor s orders about which medicines to take and which to stop until after surgery.    Eating and drinking prior to surgery: follow the instructions from your surgeon    Take a shower or bath the night before surgery. Use the soap your surgeon gave you to gently clean your skin. If you do not have soap from your surgeon, use your regular soap. Do not shave or scrub the surgery site.  Wear clean pajamas and have clean sheets on your bed.           Follow-ups after your visit        Your next 10 appointments already scheduled     Mar 28, 2018 10:00 AM CDT   (Arrive by 9:45 AM)   Return Visit with Jayro Elias, PhD LP   [facility] ([facility])    [address] [place] [address]  3rd [facility] [address] [phone]            Mar 30, 2018  7:30 AM CDT   (Arrive by 7:15 AM)   PAC PHONE RN ASSESSMENT with Marlee Pac Rn   [facility] ([facility])    [address] [place] [id number] [phone]           Note: this is not an onsite visit; there is no need to come to the facility.             Apr 03, 2018   Procedure with Delia Harper MD   [facility] ([facility])    [address] [place] [address]  5th [facility] [address] [phone]           Located in the Clinics and Surgery Center at [address] [place] [address].   parking is very convenient and highly recommended.  is a $6 flat rate fee.  Both  and self parkers should enter the main arrival plaza from [facility]; parking attendants will direct you based on your parking preference.            Apr 16, 2018  1:30 PM CDT   (Arrive by 1:15 PM)   Return Visit with Delia Harper MD   [facility] ([facility])    [address] [place] [address]  4th [facility] [address] [phone]              Who to contact     If you have questions or need follow up information about today's clinic visit or your schedule please contact [facility] directly at [phone].  Normal or non-critical lab and imaging results will be communicated to you by KissMyAdshart, letter or phone within 4 business days after the clinic has received the results. If you do not hear from us within 7 days, please contact the clinic through Cellcryptt or phone. If you have a critical or abnormal lab result, we will notify you by phone as soon as possible.  Submit refill requests through Blyk or call your pharmacy and they will forward the refill request to us. Please allow 3 business days for your refill to be completed.          Additional Information About Your Visit        KissMyAdsharFashiolista Information     Blyk gives you secure access to your electronic health record. If you see a primary care provider, you can also send messages to your care team and make appointments. If you have questions, please call your primary care clinic.  If you do not have a primary care provider, please call [phone] and they will assist you.        Care  "EveryWhere ID     This is your Care EveryWhere ID. This could be used by other organizations to access your [facility] medical records  [id number]        Your Vitals Were     Pulse Temperature Respirations Height Last Period Pulse Oximetry    90 98.4  F (36.9  C) (Tympanic) 16 5' 9.57\" (1.767 m) 03/02/2018 99%    BMI (Body Mass Index)                   27.63 kg/m2            Blood Pressure from Last 3 Encounters:   03/26/18 118/62   03/14/18 124/76   01/17/18 126/84    Weight from Last 3 Encounters:   03/26/18 190 lb 3.2 oz (86.3 kg)   03/14/18 194 lb 12.8 oz (88.4 kg)   02/08/18 189 lb 12.8 oz (86.1 kg)              We Performed the Following     Comprehensive metabolic panel        Primary Care Provider Office Phone # Fax #    Violet Humphreys [phone] [address] [address]        Equal Access to Services     [facility]: Hadii aad ku hadasho Soomaali, waaxda luqadaha, qaybta kaalmada adeegyadariel, carmen arteaga . So [facility] [phone].    ATENCIÓN: Si habla español, tiene a gibbons disposición servicios gratuitos de asistencia lingüística. Llame al [phone].    We comply with applicable federal civil rights laws and [place] laws. We do not discriminate on the basis of race, color, national origin, age, disability, sex, sexual orientation, or gender identity.            Thank you!     Thank you for choosing [facility]  for your care. Our goal is always to provide you with excellent care. Hearing back from our patients is one way we can continue to improve our services. Please take a few minutes to complete the written survey that you may receive in the mail after your visit with us. Thank you!             Your Updated Medication List - Protect others around you: Learn how to safely use, store and throw away your medicines at www.disposemymeds.org.          This list is accurate as of 3/26/18  9:23 AM.  Always use your " most recent med list.                   Brand Name Dispense Instructions for use Diagnosis    CALCIUM + D PO      Take by mouth daily        cetirizine 10 MG tablet    zyrTEC     Take 10 mg by mouth daily as needed for allergies        CVS B-12 5000 MCG Subl   Generic drug:  Cyanocobalamin       Multiple thyroid nodules       cyclobenzaprine 10 MG tablet    FLEXERIL    30 tablet    TAKE ONE-HALF TABLET BY MOUTH TWICE A DAY AS NEEDED FOR MUSCLE SPASMS    Sciatica, unspecified laterality       hydrochlorothiazide 25 MG tablet    HYDRODIURIL    90 tablet    Take 1 tablet (25 mg) by mouth daily    Essential hypertension with goal blood pressure less than 140/90       hydrOXYzine 50 MG capsule    VISTARIL    30 capsule    Take 1-2 capsules ( mg) by mouth nightly as needed for anxiety (sleep)    Adjustment disorder with anxious mood       * IRON SUPPLEMENT PO           * ferrous sulfate 325 (65 FE) MG tablet    IRON     Take 325 mg by mouth daily (with breakfast)        MELATONIN PO      Take 3 mg by mouth        metroNIDAZOLE 0.75 % vaginal gel    METROGEL    70 g    Use twice weekly to vagina for recurrent BV symptoms.  Do this for 3 months after finishing oral medications    Abnormal vaginal fluids       MULTIVITAMINS PO      Take by mouth daily        naltrexone-bupropion 8-90 MG per 12 hr tablet    CONTRAVE    120 tablet    Take 2 tablets by mouth 2 times daily    Morbid obesity (H)       polyethylene glycol powder    MIRALAX    510 g    Take 17 g (1 capful) by mouth daily as needed for constipation    Constipation, unspecified constipation type       TYLENOL PM EXTRA STRENGTH PO           vitamin D 2000 UNITS Caps           * Notice:  This list has 2 medication(s) that are the same as other medications prescribed for you. Read the directions carefully, and ask your doctor or other care provider to review them with you.

## 2018-03-26 NOTE — NURSING NOTE
"Chief Complaint   Patient presents with     Pre-Op Exam     Initial Temp 98.4  F (36.9  C) (Tympanic)  Resp 16  Ht 5' 9.57\" (1.767 m)  Wt 190 lb 3.2 oz (86.3 kg)  LMP 03/02/2018  SpO2 99%  BMI 27.63 kg/m2 Estimated body mass index is 27.63 kg/(m^2) as calculated from the following:    Height as of this encounter: 5' 9.57\" (1.767 m).    Weight as of this encounter: 190 lb 3.2 oz (86.3 kg).  BP completed using cuff size: regular. L  arm       Health Maintenance that is potentially due pending provider review:   NONE      Mercedes Aquino CMA     "

## 2018-03-28 ENCOUNTER — OFFICE VISIT (OUTPATIENT)
Dept: PSYCHOLOGY | Facility: CLINIC | Age: 38
End: 2018-03-28
Payer: COMMERCIAL

## 2018-03-28 VITALS — BODY MASS INDEX: 27.44 KG/M2 | WEIGHT: 188.9 LBS

## 2018-03-28 DIAGNOSIS — Z56.9 OCCUPATIONAL PROBLEM: ICD-10-CM

## 2018-03-28 DIAGNOSIS — E66.01 PSYCHOLOGICAL FACTORS AFFECTING MORBID OBESITY (H): ICD-10-CM

## 2018-03-28 DIAGNOSIS — F54 PSYCHOLOGICAL FACTORS AFFECTING MORBID OBESITY (H): ICD-10-CM

## 2018-03-28 DIAGNOSIS — F33.0 MAJOR DEPRESSIVE DISORDER, RECURRENT EPISODE, MILD (H): Primary | ICD-10-CM

## 2018-03-28 SDOH — ECONOMIC STABILITY - INCOME SECURITY: UNSPECIFIED PROBLEMS RELATED TO EMPLOYMENT: Z56.9

## 2018-03-28 NOTE — PROGRESS NOTES
Health Psychology                  Clinic    Department of Medicine  Lis Chavez, Ph.D., L.P. [phone]                          [facility] Earline Anderson, Ph.D.,  L.P. [phone]                 3rd Floor, Clinic 3A  [place] Mail Code 793   Jayro Elias, Ph.D., A.B.P.P., L.P. [phone]     [address] Melissa Stokes, Ph.D., L.P. [phone]  [place] [id number]  [place] [address]    Health Psychology Follow-Up Note    Ashley Catherine is a 37-year-old woman referred initially  for psychological consultation for workup for a gastric sleeve procedure who now returns following the surgery, with concerns about weight management.  She initiated topirimate and is making great progress.    We reviewed in detail the changes she is making with regard to nutrional intake. She is cooking more of her own food.  She is exercising with 10,000 or more steps per day almost every day.  She is reinforced for the changes and can see the pattern of not doing as well when travelling.  She recently has been travelling more, but also walking more (e.g., climbed a mountain in [place]).    18.9 down from 189.8  when last seen.  She has stated she plans toexercise at an hour most days, with some days 1.5 hours.    Wt Readings from Last 4 Encounters:   03/28/18 85.7 kg (188 lb 14.4 oz)   03/26/18 86.3 kg (190 lb 3.2 oz)   03/14/18 88.4 kg (194 lb 12.8 oz)   02/08/18 86.1 kg (189 lb 12.8 oz)     Past Medical History:   Diagnosis Date     Bariatric surgery status 05/13/2013     Depression      Esophageal reflux      Family history of hyperparathyroidism 3/6/2015     Forearm fracture childhood    jumping fall     Guillain-Lykens disease (H) age 14    hospitalized at 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)      OLIGOMENORRHEA, C/W PCOS      Sciatica      SLEEP APNEA 6/2002    No longer has since tonsillectomy and septoplasty     Past Surgical History:   Procedure Laterality Date     BIOPSY  03/13/2015    Thyroid nodule     ESOPHAGOSCOPY, GASTROSCOPY, DUODENOSCOPY (EGD), COMBINED  5/28/2013    Procedure: COMBINED ESOPHAGOSCOPY, GASTROSCOPY, DUODENOSCOPY (EGD);;  Surgeon: Best Willett MD;  Location:  GI     LAPAROSCOPIC GASTRIC SLEEVE  5/13/2013    Procedure: LAPAROSCOPIC GASTRIC SLEEVE;  Laparoscopic Sleeve Gastrectomy ;  Surgeon: Best Willett MD;  Location:  OR     LEEP TX, CERVICAL  1995    age 15     SEPTOPLASTY       TONSILLECTOMY  age 20s     TONSILLECTOMY  2004?     wisdom teeth extraction         She is  5 foot 11 inches.  Her goal is 175. Her mood is much improved.  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  She still has hopes to leave this area. 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  She has resumed online dating and met somebody when she was in [place].  She has been  participating in various activities.  She has been more active (e.g., climbing a mountain in [place], trying mountain biking).   We discussed relationship, communication and other issues.     Affect is positive despite her frustrations with her weight and work.  Rapport is excellent.   Extended session due to complexity of case and length of interval.      Time in:   10:00  Time out:  11:02  Psychological factors affecting obesity (F54).   Major depression, recurrent, mild (F3e3.0).   Occupational Problems (Z56.9)  PLAN/RECOMMENDATIONS:   1. Ms. Catherine will return 5/2 @ 4:00 to address weight loss and social issues with problem solving therapy. .  2.  Resume schedule of regular exercise and  decrease grazing eating.  3.  Full spectrum lighting.      Tx plan due 5/3/18

## 2018-03-28 NOTE — MR AVS SNAPSHOT
After Visit Summary   3/28/2018    Ashley Catherine    MRN: [id number]           Patient Information     Date Of Birth          1980        Visit Information        Provider Department      3/28/2018 10:00 AM Jayro Elias, PhD [facility]        Today's Diagnoses     Major depressive disorder, recurrent episode, mild (H)    -  1    Psychological factors affecting morbid obesity (H)        Occupational problem           Follow-ups after your visit        Your next 10 appointments already scheduled     Mar 30, 2018  7:30 AM CDT   (Arrive by 7:15 AM)   PAC PHONE RN ASSESSMENT with Marlee Pac Rn   [facility] ([facility])    [address] [place] [address]  4th [facility] [address] [phone]           Note: this is not an onsite visit; there is no need to come to the facility.            Apr 03, 2018   Procedure with Delia Harper MD   [facility] ([facility])    [address] [place] [address]  5th [facility] [address] [phone]           Located in the Clinics and Surgery Center at [address] [place] [address].   parking is very convenient and highly recommended.  is a $6 flat rate fee.  Both  and self parkers should enter the main arrival plaza from [facility]; parking attendants will direct you based on your parking preference.            Apr 16, 2018  1:30 PM CDT   (Arrive by 1:15 PM)   Return Visit with Delia Harper MD   [facility] ([facility])    [address] [place] [address] [phone]              Who to contact     Please call your clinic at [phone] to:    Ask questions about your health    Make or cancel appointments    Discuss your medicines    Learn about your test results    Speak to your doctor            Additional Information About  Your Visit        [employer] Information     [employer] gives you secure access to your electronic health record. If you see a primary care provider, you can also send messages to your care team and make appointments. If you have questions, please call your primary care clinic.  If you do not have a primary care provider, please call [phone] and they will assist you.      [employer] is an electronic gateway that provides easy, online access to your medical records. With [employer], you can request a clinic appointment, read your test results, renew a prescription or communicate with your care team.     To access your existing account, please contact your [facility] or call [phone] for assistance.        Care EveryWhere ID     This is your Care EveryWhere ID. This could be used by other organizations to access your [place] medical records  [id number]        Your Vitals Were     Last Period BMI (Body Mass Index)                03/02/2018 27.44 kg/m2           Blood Pressure from Last 3 Encounters:   03/26/18 118/62   03/14/18 124/76   01/17/18 126/84    Weight from Last 3 Encounters:   03/28/18 85.7 kg (188 lb 14.4 oz)   03/26/18 86.3 kg (190 lb 3.2 oz)   03/14/18 88.4 kg (194 lb 12.8 oz)              Today, you had the following     No orders found for display       Primary Care Provider Office Phone # Fax #    DunseithTanja Humphreys [phone] [address] [address]        Equal Access to Services     MABEL BAILEY : Hadii aad ku hadasho Soomaali, waaxda luqadaha, qaybta kaalmada adeegyada, carmen arteaga . So [facility] [phone].    ATENCIÓN: Si habla español, tiene a gibbons disposición servicios gratuitos de asistencia lingüística. Llame al [phone].    We comply with applicable federal civil rights laws and [place] laws. We do not discriminate on the basis of race, color, national origin, age, disability, sex,  sexual orientation, or gender identity.            Thank you!     Thank you for choosing [facility]  for your care. Our goal is always to provide you with excellent care. Hearing back from our patients is one way we can continue to improve our services. Please take a few minutes to complete the written survey that you may receive in the mail after your visit with us. Thank you!             Your Updated Medication List - Protect others around you: Learn how to safely use, store and throw away your medicines at www.disposemymeds.org.          This list is accurate as of 3/28/18 11:07 AM.  Always use your most recent med list.                   Brand Name Dispense Instructions for use Diagnosis    CALCIUM + D PO      Take by mouth daily        cetirizine 10 MG tablet    zyrTEC     Take 10 mg by mouth daily as needed for allergies        [employer] B[facility] [address]   Generic drug:  Cyanocobalamin       Multiple thyroid nodules       cyclobenzaprine 10 MG tablet    FLEXERIL    30 tablet    TAKE ONE-HALF TABLET BY MOUTH TWICE A DAY AS NEEDED FOR MUSCLE SPASMS    Sciatica, unspecified laterality       hydrochlorothiazide 25 MG tablet    HYDRODIURIL    90 tablet    Take 1 tablet (25 mg) by mouth daily    Essential hypertension with goal blood pressure less than 140/90       hydrOXYzine 50 MG capsule    VISTARIL    30 capsule    Take 1-2 capsules ( mg) by mouth nightly as needed for anxiety (sleep)    Adjustment disorder with anxious mood       * IRON SUPPLEMENT PO           * ferrous sulfate 325 (65 FE) MG tablet    IRON     Take 325 mg by mouth daily (with breakfast)        MELATONIN PO      Take 3 mg by mouth        metroNIDAZOLE 0.75 % vaginal gel    METROGEL    70 g    Use twice weekly to vagina for recurrent BV symptoms.  Do this for 3 months after finishing oral medications    Abnormal vaginal fluids       MULTIVITAMINS PO      Take by mouth daily        naltrexone-bupropion 8-90 MG per 12 hr tablet     CONTRAVE    120 tablet    Take 2 tablets by mouth 2 times daily    Morbid obesity (H)       polyethylene glycol powder    MIRALAX    510 g    Take 17 g (1 capful) by mouth daily as needed for constipation    Constipation, unspecified constipation type       TYLENOL PM EXTRA STRENGTH PO           vitamin D 2000 UNITS Caps           * Notice:  This list has 2 medication(s) that are the same as other medications prescribed for you. Read the directions carefully, and ask your doctor or other care provider to review them with you.

## 2018-03-29 DIAGNOSIS — R87.9 ABNORMAL VAGINAL FLUIDS: ICD-10-CM

## 2018-03-29 NOTE — TELEPHONE ENCOUNTER
Routing refill request to provider for review/approval because:  Drug not on the [facility] refill protocol   Laureen SOMERS RN    Requested Prescriptions   Pending Prescriptions Disp Refills     metroNIDAZOLE (METROGEL) 0.75 % vaginal gel [Pharmacy Med Name: METRONIDAZOLE VAG GEL 0.75%] 70 g 3     Sig: USE TWICE WEEKLY TO VAGINA FOR RECURRENT BV SYMPTOMS. DO THIS FOR 3 MONTHS AFTER FINISHING ORAL MEDICATIONS.    There is no refill protocol information for this order

## 2018-04-02 ENCOUNTER — ANESTHESIA EVENT (OUTPATIENT)
Dept: SURGERY | Facility: AMBULATORY SURGERY CENTER | Age: 38
End: 2018-04-02

## 2018-04-02 RX ORDER — METRONIDAZOLE 7.5 MG/G
GEL VAGINAL
Qty: 70 G | Refills: 3 | Status: ON HOLD | OUTPATIENT
Start: 2018-04-02 | End: 2019-06-28

## 2018-04-03 ENCOUNTER — ANESTHESIA (OUTPATIENT)
Dept: SURGERY | Facility: AMBULATORY SURGERY CENTER | Age: 38
End: 2018-04-03

## 2018-04-03 ENCOUNTER — SURGERY (OUTPATIENT)
Age: 38
End: 2018-04-03

## 2018-04-03 ENCOUNTER — HOSPITAL ENCOUNTER (OUTPATIENT)
Facility: AMBULATORY SURGERY CENTER | Age: 38
End: 2018-04-03
Attending: SURGERY
Payer: COMMERCIAL

## 2018-04-03 VITALS
TEMPERATURE: 98.5 F | BODY MASS INDEX: 27.2 KG/M2 | DIASTOLIC BLOOD PRESSURE: 96 MMHG | OXYGEN SATURATION: 98 % | HEIGHT: 70 IN | SYSTOLIC BLOOD PRESSURE: 138 MMHG | RESPIRATION RATE: 14 BRPM | HEART RATE: 85 BPM | WEIGHT: 190 LBS

## 2018-04-03 DIAGNOSIS — E04.1 THYROID NODULE: Primary | ICD-10-CM

## 2018-04-03 LAB
HCG UR QL: NEGATIVE
INTERNAL QC OK POCT: YES

## 2018-04-03 RX ORDER — FENTANYL CITRATE 50 UG/ML
25-50 INJECTION, SOLUTION INTRAMUSCULAR; INTRAVENOUS
Status: DISCONTINUED | OUTPATIENT
Start: 2018-04-03 | End: 2018-04-03 | Stop reason: HOSPADM

## 2018-04-03 RX ORDER — DEXAMETHASONE SODIUM PHOSPHATE 4 MG/ML
4 INJECTION, SOLUTION INTRA-ARTICULAR; INTRALESIONAL; INTRAMUSCULAR; INTRAVENOUS; SOFT TISSUE EVERY 10 MIN PRN
Status: DISCONTINUED | OUTPATIENT
Start: 2018-04-03 | End: 2018-04-04 | Stop reason: HOSPADM

## 2018-04-03 RX ORDER — ALBUTEROL SULFATE 0.83 MG/ML
2.5 SOLUTION RESPIRATORY (INHALATION) EVERY 4 HOURS PRN
Status: DISCONTINUED | OUTPATIENT
Start: 2018-04-03 | End: 2018-04-03 | Stop reason: HOSPADM

## 2018-04-03 RX ORDER — NALOXONE HYDROCHLORIDE 0.4 MG/ML
.1-.4 INJECTION, SOLUTION INTRAMUSCULAR; INTRAVENOUS; SUBCUTANEOUS
Status: DISCONTINUED | OUTPATIENT
Start: 2018-04-03 | End: 2018-04-04 | Stop reason: HOSPADM

## 2018-04-03 RX ORDER — GABAPENTIN 300 MG/1
300 CAPSULE ORAL ONCE
Status: COMPLETED | OUTPATIENT
Start: 2018-04-03 | End: 2018-04-03

## 2018-04-03 RX ORDER — HYDROCODONE BITARTRATE AND ACETAMINOPHEN 5; 325 MG/1; MG/1
1-2 TABLET ORAL EVERY 4 HOURS PRN
Qty: 15 TABLET | Refills: 0 | Status: SHIPPED | OUTPATIENT
Start: 2018-04-03 | End: 2018-04-23

## 2018-04-03 RX ORDER — MEPERIDINE HYDROCHLORIDE 25 MG/ML
12.5 INJECTION INTRAMUSCULAR; INTRAVENOUS; SUBCUTANEOUS
Status: DISCONTINUED | OUTPATIENT
Start: 2018-04-03 | End: 2018-04-04 | Stop reason: HOSPADM

## 2018-04-03 RX ORDER — FENTANYL CITRATE 50 UG/ML
25-50 INJECTION, SOLUTION INTRAMUSCULAR; INTRAVENOUS
Status: DISCONTINUED | OUTPATIENT
Start: 2018-04-03 | End: 2018-04-04 | Stop reason: HOSPADM

## 2018-04-03 RX ORDER — HYDROMORPHONE HYDROCHLORIDE 1 MG/ML
.3-.5 INJECTION, SOLUTION INTRAMUSCULAR; INTRAVENOUS; SUBCUTANEOUS EVERY 10 MIN PRN
Status: DISCONTINUED | OUTPATIENT
Start: 2018-04-03 | End: 2018-04-04 | Stop reason: HOSPADM

## 2018-04-03 RX ORDER — ONDANSETRON 4 MG/1
4 TABLET, ORALLY DISINTEGRATING ORAL EVERY 30 MIN PRN
Status: DISCONTINUED | OUTPATIENT
Start: 2018-04-03 | End: 2018-04-04 | Stop reason: HOSPADM

## 2018-04-03 RX ORDER — ONDANSETRON 2 MG/ML
INJECTION INTRAMUSCULAR; INTRAVENOUS PRN
Status: DISCONTINUED | OUTPATIENT
Start: 2018-04-03 | End: 2018-04-03

## 2018-04-03 RX ORDER — ONDANSETRON 2 MG/ML
4 INJECTION INTRAMUSCULAR; INTRAVENOUS EVERY 30 MIN PRN
Status: DISCONTINUED | OUTPATIENT
Start: 2018-04-03 | End: 2018-04-04 | Stop reason: HOSPADM

## 2018-04-03 RX ORDER — DEXAMETHASONE SODIUM PHOSPHATE 10 MG/ML
INJECTION, SOLUTION INTRAMUSCULAR; INTRAVENOUS PRN
Status: DISCONTINUED | OUTPATIENT
Start: 2018-04-03 | End: 2018-04-03

## 2018-04-03 RX ORDER — PROPOFOL 10 MG/ML
INJECTION, EMULSION INTRAVENOUS PRN
Status: DISCONTINUED | OUTPATIENT
Start: 2018-04-03 | End: 2018-04-03

## 2018-04-03 RX ORDER — HYDROCODONE BITARTRATE AND ACETAMINOPHEN 5; 325 MG/1; MG/1
1 TABLET ORAL
Status: DISCONTINUED | OUTPATIENT
Start: 2018-04-03 | End: 2018-04-04 | Stop reason: HOSPADM

## 2018-04-03 RX ORDER — ACETAMINOPHEN 325 MG/1
975 TABLET ORAL ONCE
Status: COMPLETED | OUTPATIENT
Start: 2018-04-03 | End: 2018-04-03

## 2018-04-03 RX ORDER — SODIUM CHLORIDE, SODIUM LACTATE, POTASSIUM CHLORIDE, CALCIUM CHLORIDE 600; 310; 30; 20 MG/100ML; MG/100ML; MG/100ML; MG/100ML
INJECTION, SOLUTION INTRAVENOUS CONTINUOUS
Status: DISCONTINUED | OUTPATIENT
Start: 2018-04-03 | End: 2018-04-04 | Stop reason: HOSPADM

## 2018-04-03 RX ORDER — PROPOFOL 10 MG/ML
INJECTION, EMULSION INTRAVENOUS CONTINUOUS PRN
Status: DISCONTINUED | OUTPATIENT
Start: 2018-04-03 | End: 2018-04-03

## 2018-04-03 RX ORDER — SODIUM CHLORIDE, SODIUM LACTATE, POTASSIUM CHLORIDE, CALCIUM CHLORIDE 600; 310; 30; 20 MG/100ML; MG/100ML; MG/100ML; MG/100ML
INJECTION, SOLUTION INTRAVENOUS CONTINUOUS
Status: DISCONTINUED | OUTPATIENT
Start: 2018-04-03 | End: 2018-04-03 | Stop reason: HOSPADM

## 2018-04-03 RX ORDER — FENTANYL CITRATE 50 UG/ML
INJECTION, SOLUTION INTRAMUSCULAR; INTRAVENOUS PRN
Status: DISCONTINUED | OUTPATIENT
Start: 2018-04-03 | End: 2018-04-03

## 2018-04-03 RX ORDER — LIDOCAINE 40 MG/G
CREAM TOPICAL
Status: DISCONTINUED | OUTPATIENT
Start: 2018-04-03 | End: 2018-04-03 | Stop reason: HOSPADM

## 2018-04-03 RX ORDER — OXYCODONE HYDROCHLORIDE 5 MG/1
5 TABLET ORAL EVERY 4 HOURS PRN
Status: DISCONTINUED | OUTPATIENT
Start: 2018-04-03 | End: 2018-04-04 | Stop reason: HOSPADM

## 2018-04-03 RX ADMIN — Medication 10 MG: at 08:40

## 2018-04-03 RX ADMIN — Medication 100 MCG: at 09:05

## 2018-04-03 RX ADMIN — SODIUM CHLORIDE, SODIUM LACTATE, POTASSIUM CHLORIDE, CALCIUM CHLORIDE: 600; 310; 30; 20 INJECTION, SOLUTION INTRAVENOUS at 08:32

## 2018-04-03 RX ADMIN — PROPOFOL 160 MG: 10 INJECTION, EMULSION INTRAVENOUS at 08:40

## 2018-04-03 RX ADMIN — Medication 100 MCG: at 08:48

## 2018-04-03 RX ADMIN — Medication 100 MCG: at 08:56

## 2018-04-03 RX ADMIN — Medication 0.5 MG: at 09:20

## 2018-04-03 RX ADMIN — FENTANYL CITRATE 25 MCG: 50 INJECTION, SOLUTION INTRAMUSCULAR; INTRAVENOUS at 08:45

## 2018-04-03 RX ADMIN — ACETAMINOPHEN 975 MG: 325 TABLET ORAL at 07:13

## 2018-04-03 RX ADMIN — PROPOFOL 40 MG: 10 INJECTION, EMULSION INTRAVENOUS at 09:20

## 2018-04-03 RX ADMIN — GABAPENTIN 300 MG: 300 CAPSULE ORAL at 07:13

## 2018-04-03 RX ADMIN — PROPOFOL 200 MCG/KG/MIN: 10 INJECTION, EMULSION INTRAVENOUS at 08:38

## 2018-04-03 RX ADMIN — OXYCODONE HYDROCHLORIDE 5 MG: 5 TABLET ORAL at 10:28

## 2018-04-03 RX ADMIN — FENTANYL CITRATE 25 MCG: 50 INJECTION, SOLUTION INTRAMUSCULAR; INTRAVENOUS at 09:03

## 2018-04-03 RX ADMIN — DEXAMETHASONE SODIUM PHOSPHATE 10 MG: 10 INJECTION, SOLUTION INTRAMUSCULAR; INTRAVENOUS at 08:45

## 2018-04-03 RX ADMIN — FENTANYL CITRATE 50 MCG: 50 INJECTION, SOLUTION INTRAMUSCULAR; INTRAVENOUS at 08:38

## 2018-04-03 RX ADMIN — ONDANSETRON 4 MG: 2 INJECTION INTRAMUSCULAR; INTRAVENOUS at 09:03

## 2018-04-03 NOTE — IP AVS SNAPSHOT
[facility]    [address] [place] [address]    Phone:  [phone]    Fax:  [phone]                                       After Visit Summary   4/3/2018    Ashley Catherine    MRN: [id number]           After Visit Summary Signature Page     I have received my discharge instructions, and my questions have been answered. I have discussed any challenges I see with this plan with the nurse or doctor.    ..........................................................................................................................................  Patient/Patient Representative Signature      ..........................................................................................................................................  Patient Representative Print Name and Relationship to Patient    ..................................................               ................................................  Date                                            Time    ..........................................................................................................................................  Reviewed by Signature/Title    ...................................................              ..............................................  Date                                                            Time

## 2018-04-03 NOTE — ANESTHESIA PREPROCEDURE EVALUATION
Anesthesia Evaluation     . Pt has had prior anesthetic. Type: General    No history of anesthetic complications          ROS/MED HX    ENT/Pulmonary:     (+)sleep apnea, , . .    Neurologic:       Cardiovascular:     (+) hypertension----. : . . . :. Irregular Heartbeat/Palpitations, .       METS/Exercise Tolerance:     Hematologic:         Musculoskeletal:         GI/Hepatic:     (+) GERD       Renal/Genitourinary:         Endo:     (+) thyroid problem Obesity (s/p gastrectomy), .      Psychiatric:     (+) psychiatric history depression      Infectious Disease:         Malignancy:         Other:                                    Anesthesia Plan      History & Physical Review  History and physical reviewed and following examination; no interval change.    ASA Status:  2 .    NPO Status:  > 6 hours    Plan for General, RSI and ETT with Intravenous induction. Maintenance will be Balanced.    PONV prophylaxis:  Ondansetron (or other 5HT-3) and Dexamethasone or Solumedrol  Additional equipment: Videolaryngoscope      Postoperative Care  Postoperative pain management:  Multi-modal analgesia, Oral pain medications and IV analgesics.      Consents                          .

## 2018-04-03 NOTE — IP AVS SNAPSHOT
MRN:[id number]                      After Visit Summary   4/3/2018    Ashley Catherine    MRN: [id number]           Thank you!     Thank you for choosing [facility] for your care. Our goal is always to provide you with excellent care. Hearing back from our patients is one way we can continue to improve our services. Please take a few minutes to complete the written survey that you may receive in the mail after you visit with us. Thank you!        Patient Information     Date Of Birth          1980        About your hospital stay     You were admitted on:  April 3, 2018 You last received care in the:  [facility]    You were discharged on:  April 3, 2018       Who to Call     For medical emergencies, please call 911.  For non-urgent questions about your medical care, please call your primary care provider or clinic, [phone]  For questions related to your surgery, please call your surgery clinic        Attending Provider     Provider Specialty    Delia Harper MD General Surgery       Primary Care Provider Office Phone # Fax #    BelmontTanja Humphreys [phone]      After Care Instructions     Diet Instructions       Resume pre procedure diet            Discharge Instructions       Patient to follow up with surgeon as directed (4/16/18)            Discharge Instructions - Lifting restrictions       Lifting as tolerated (don't do anything if it hurts)            Notify Physician        If bleeding, difficulty breathing, neck swelling, or fever >101            Wound care       Ok to shower. The skin glue will fall off on its own.                  Your next 10 appointments already scheduled     Apr 16, 2018  1:30 PM CDT   (Arrive by 1:15 PM)   Return Visit with Delia Harper MD   [facility] ([facility])    [address] [place] [id number] [phone]            May 02,  2018  4:00 PM CDT   (Arrive by 3:45 PM)   Return Visit with Jayro Elias, PhD LP   [facility] ([facility])    [address]  [address] [facility] [address] [phone]              Further instructions from your care team       [facility]  Home Care Following Anesthesia  For 24 hours after surgery:  1. Get plenty of rest.  A responsible adult must stay with you for at least 24 hours after you leave the surgery center.  2. Do not drive or use heavy equipment.  If you have weakness or tingling, don't drive or use heavy equipment until this feeling goes away.   3. Do not drink alcohol.   4. Avoid strenuous or risky activities.  Ask for help when climbing stairs.  5. You may feel lightheaded.  IF so, sit for a few minutes before standing.  Have someone help you get up.   6. If you have nausea (feel sick to your stomach): Drink only clear liquids such as apple juice, ginger ale, broth or 7-Up.  Rest may also help.  Be sure to drink enough fluids.  Move to a regular diet as you feel able.   7. You may have a slight fever.  Call the doctor if your fever is over 100 F (37.7 C) (taken under the tongue) or lasts longer than 24 hours.  8. You may have a dry mouth, a sore throat, muscle aches or trouble sleeping. These should go away after 24 hours.  9. Do not make important or legal decisions.               Tips for taking pain medications  To get the best pain relief possible, remember these points:    Take pain medications as directed, before pain becomes severe.    Pain medication can upset your stomach: taking it with food may help.    Constipation is a common side effect of pain medication. Drink plenty of  fluids.    Eat foods high in fiber. Take a stool softener if recommended by your doctor or pharmacist.    Do not drink alcohol, drive or operate machinery while taking pain medications.    Ask about other ways to  control pain, such as with heat, ice or relaxation.    Tylenol/Acetaminophen Consumption  To help encourage the safe use of acetaminophen, the makers of TYLENOL  have lowered the maximum daily dose for single-ingredient Extra Strength TYLENOL  (acetaminophen) products sold in the [place] from 8 pills per day (4,000 mg) to 6 pills per day (3,000 mg). The dosing interval has also changed from 2 pills every 4-6 hours to 2 pills every 6 hours.    If you feel your pain relief is insufficient, you may take Tylenol/Acetaminophen in addition to your narcotic pain medication.     Be careful not to exceed 3,000 mg of Tylenol/Acetaminophen in a 24 hour period from all sources.    If you are taking extra strength Tylenol/acetaminophen (500 mg), the maximum dose is 6 tablets in 24 hours.    If you are taking regular strength acetaminophen (325 mg), the maximum dose is 9 tablets in 24 hours.    Call a doctor for any of the followin. Signs of infection (fever, growing tenderness at the surgery site, a large amount of drainage or bleeding, severe pain, foul-smelling drainage, redness, swelling).  2. It has been over 8 to 10 hours since surgery and you are still not able to urinate (pass water).  3. Headache for over 24 hours.  4. Numbness, tingling or weakness the day after surgery (if you had spinal anesthesia).  Your doctor is:  Dr. Delia Harper, ENT Otolaryngology: [phone]                    Or dial [phone] and ask for the resident on call for:  ENT Otolaryngology  For emergency care, call the:  [facility]:  [phone] (TTY for hearing impaired: [phone])                            Pending Results     Date and Time Order Name Status Description    4/3/2018 0939 Surgical pathology exam In process             Admission Information     Date & Time Provider Department Dept. Phone    4/3/2018 Delia Harper MD [facility] [phone]      Your Vitals Were      "Blood Pressure Pulse Temperature Respirations Height Weight    119/86 73 97.5  F (36.4  C) (Temporal) 10 1.772 m (5' 9.75\") 86.2 kg (190 lb)    Pulse Oximetry BMI (Body Mass Index)                98% 27.46 kg/m2          [employer] Information     [employer] gives you secure access to your electronic health record. If you see a primary care provider, you can also send messages to your care team and make appointments. If you have questions, please call your primary care clinic.  If you do not have a primary care provider, please call [phone] and they will assist you.      [employer] is an electronic gateway that provides easy, online access to your medical records. With [employer], you can request a clinic appointment, read your test results, renew a prescription or communicate with your care team.     To access your existing account, please contact your [facility] or call [phone] for assistance.        Care EveryWhere ID     This is your Care EveryWhere ID. This could be used by other organizations to access your [place] medical records  [id number]        Equal Access to Services     MABEL BAILEY : Hadii sabas Alejandre, wafracisco bravo, qalori vale, carmen weston. So [facility] [phone].    ATENCIÓN: Si habla español, tiene a gibbons disposición servicios gratuitos de asistencia lingüística. Faby al [phone].    We comply with applicable federal civil rights laws and [place] laws. We do not discriminate on the basis of race, color, national origin, age, disability, sex, sexual orientation, or gender identity.               Review of your medicines      START taking        Dose / Directions    HYDROcodone-acetaminophen 5-325 MG per tablet   Commonly known as:  NORCO   Used for:  Thyroid nodule        Dose:  1-2 tablet   Take 1-2 tablets by mouth every 4 hours as needed for other (Moderate to Severe Pain)   Quantity:  15 tablet "   Refills:  0         CONTINUE these medicines which have NOT CHANGED        Dose / Directions    CALCIUM + D PO        Take by mouth daily   Refills:  0       cetirizine 10 MG tablet   Commonly known as:  zyrTEC        Dose:  10 mg   Take 10 mg by mouth daily as needed for allergies   Refills:  0       [employer] B-12 5000 MCG Subl   Used for:  Multiple thyroid nodules   Generic drug:  Cyanocobalamin        Refills:  0       cyclobenzaprine 10 MG tablet   Commonly known as:  FLEXERIL   Used for:  Sciatica, unspecified laterality        TAKE ONE-HALF TABLET BY MOUTH TWICE A DAY AS NEEDED FOR MUSCLE SPASMS   Quantity:  30 tablet   Refills:  5       hydrochlorothiazide 25 MG tablet   Commonly known as:  HYDRODIURIL   Used for:  Essential hypertension with goal blood pressure less than 140/90        Dose:  25 mg   Take 1 tablet (25 mg) by mouth daily   Quantity:  90 tablet   Refills:  1       hydrOXYzine 50 MG capsule   Commonly known as:  VISTARIL   Used for:  Adjustment disorder with anxious mood        Dose:   mg   Take 1-2 capsules ( mg) by mouth nightly as needed for anxiety (sleep)   Quantity:  30 capsule   Refills:  1       * IRON SUPPLEMENT PO        Refills:  0       * ferrous sulfate 325 (65 FE) MG tablet   Commonly known as:  IRON        Dose:  325 mg   Take 325 mg by mouth daily (with breakfast)   Refills:  0       MELATONIN PO        Dose:  3 mg   Take 3 mg by mouth   Refills:  0       metroNIDAZOLE 0.75 % vaginal gel   Commonly known as:  METROGEL   Used for:  Abnormal vaginal fluids        USE TWICE WEEKLY TO VAGINA FOR RECURRENT BV SYMPTOMS. DO THIS FOR 3 MONTHS AFTER FINISHING ORAL MEDICATIONS.   Quantity:  70 g   Refills:  3       MULTIVITAMINS PO        Take by mouth daily   Refills:  0       naltrexone-bupropion 8-90 MG per 12 hr tablet   Commonly known as:  CONTRAVE   Used for:  Morbid obesity (H)        Dose:  2 tablet   Take 2 tablets by mouth 2 times daily   Quantity:  120 tablet   Refills:   3       polyethylene glycol powder   Commonly known as:  MIRALAX   Used for:  Constipation, unspecified constipation type        Dose:  1 capful   Take 17 g (1 capful) by mouth daily as needed for constipation   Quantity:  510 g   Refills:  1       TYLENOL PM EXTRA STRENGTH PO        Refills:  0       vitamin D 2000 UNITS Caps        Refills:  0       * Notice:  This list has 2 medication(s) that are the same as other medications prescribed for you. Read the directions carefully, and ask your doctor or other care provider to review them with you.         Where to get your medicines      Some of these will need a paper prescription and others can be bought over the counter. Ask your nurse if you have questions.     Bring a paper prescription for each of these medications     HYDROcodone-acetaminophen 5-325 MG per tablet                Protect others around you: Learn how to safely use, store and throw away your medicines at www.disposemymeds.org.        Information about OPIOIDS     PRESCRIPTION OPIOIDS: WHAT YOU NEED TO KNOW    Prescription opioids can be used to help relieve moderate to severe pain and are often prescribed following a surgery or injury, or for certain health conditions. These medications can be an important part of treatment but also come with serious risks. It is important to work with your health care provider to make sure you are getting the safest, most effective care.    WHAT ARE THE RISKS AND SIDE EFFECTS OF OPIOID USE?  Prescription opioids carry serious risks of addiction and overdose, especially with prolonged use. An opioid overdose, often marked by slowed breathing can cause sudden death. The use of prescription opioids can have a number of side effects as well, even when taken as directed:      Tolerance - meaning you might need to take more of a medication for the same pain relief    Physical dependence - meaning you have symptoms of withdrawal when a medication is  stopped    Increased sensitivity to pain    Constipation    Nausea, vomiting, and dry mouth    Sleepiness and dizziness    Confusion    Depression    Low levels of testosterone that can result in lower sex drive, energy, and strength    Itching and sweating    RISKS ARE GREATER WITH:    History of drug misuse, substance use disorder, or overdose    Mental health conditions (such as depression or anxiety)    Sleep apnea    Older age (65 years or older)    Pregnancy    Avoid alcohol while taking prescription opioids.   Also, unless specifically advised by your health care provider, medications to avoid include:    Benzodiazepines (such as Xanax or Valium)    Muscle relaxants (such as Soma or Flexeril)    Hypnotics (such as Ambien or Lunesta)    Other prescription opioids    KNOW YOUR OPTIONS:  Talk to your health care provider about ways to manage your pain that do not involve prescription opioids. Some of these options may actually work better and have fewer risks and side effects:    Pain relievers such as acetaminophen, ibuprofen, and naproxen    Some medications that are also used for depression or seizures    Physical therapy and exercise    Cognitive behavioral therapy, a psychological, goal-directed approach, in which patients learn how to modify physical, behavioral, and emotional triggers of pain and stress    IF YOU ARE PRESCRIBED OPIOIDS FOR PAIN:    Never take opioids in greater amounts or more often than prescribed    Follow up with your primary health care provider and work together to create a plan on how to manage your pain.    Talk about ways to help manage your pain that do not involve prescription opioids    Talk about all concerns and side effects    Help prevent misuse and abuse    Never sell or share prescription opioids    Never use another person's prescription opioids    Store prescription opioids in a secure place and out of reach of others (this may include visitors, children, friends, and  family)    Visit www.cdc.gov/drugoverdose to learn about risks of opioid abuse and overdose    If you believe you may be struggling with addiction, tell your health care provider and ask for guidance or call [facility]'s National Helpline at 6-335-627-HELP    LEARN MORE / www.cdc.gov/drugoverdose/prescribing/guideline.html    Safely dispose of unused prescription opioids: Find your local drug take-back programs and more information about the importance of safe disposal at www.doseofreality.mn.gov             Medication List: This is a list of all your medications and when to take them. Check marks below indicate your daily home schedule. Keep this list as a reference.      Medications           Morning Afternoon Evening Bedtime As Needed    CALCIUM + D PO   Take by mouth daily                                cetirizine 10 MG tablet   Commonly known as:  zyrTEC   Take 10 mg by mouth daily as needed for allergies                                [employer]S B-12 5000 MCG Subl   Generic drug:  Cyanocobalamin                                cyclobenzaprine 10 MG tablet   Commonly known as:  FLEXERIL   TAKE ONE-HALF TABLET BY MOUTH TWICE A DAY AS NEEDED FOR MUSCLE SPASMS                                hydrochlorothiazide 25 MG tablet   Commonly known as:  HYDRODIURIL   Take 1 tablet (25 mg) by mouth daily                                HYDROcodone-acetaminophen 5-325 MG per tablet   Commonly known as:  NORCO   Take 1-2 tablets by mouth every 4 hours as needed for other (Moderate to Severe Pain)                                hydrOXYzine 50 MG capsule   Commonly known as:  VISTARIL   Take 1-2 capsules ( mg) by mouth nightly as needed for anxiety (sleep)                                * IRON SUPPLEMENT PO                                * ferrous sulfate 325 (65 FE) MG tablet   Commonly known as:  IRON   Take 325 mg by mouth daily (with breakfast)                                MELATONIN PO   Take 3 mg by mouth                                 metroNIDAZOLE 0.75 % vaginal gel   Commonly known as:  METROGEL   USE TWICE WEEKLY TO VAGINA FOR RECURRENT BV SYMPTOMS. DO THIS FOR 3 MONTHS AFTER FINISHING ORAL MEDICATIONS.                                MULTIVITAMINS PO   Take by mouth daily                                naltrexone-bupropion 8-90 MG per 12 hr tablet   Commonly known as:  CONTRAVE   Take 2 tablets by mouth 2 times daily                                polyethylene glycol powder   Commonly known as:  MIRALAX   Take 17 g (1 capful) by mouth daily as needed for constipation                                TYLENOL PM EXTRA STRENGTH PO                                vitamin D 2000 UNITS Caps                                * Notice:  This list has 2 medication(s) that are the same as other medications prescribed for you. Read the directions carefully, and ask your doctor or other care provider to review them with you.

## 2018-04-03 NOTE — ANESTHESIA CARE TRANSFER NOTE
Patient: Ashley Catherine    Procedure(s):  Left Thyroid Lobectomy And Ishtmusectomy - Wound Class: I-Clean    Diagnosis: Thyroid Nodule   Diagnosis Additional Information: No value filed.    Anesthesia Type:   General, ETT     Note:  Airway :Room Air  Patient transferred to:PACU  Comments: To pacu report to RN    118/83, 97.5, 16, 92, 94%Handoff Report: Identifed the Patient, Identified the Reponsible Provider, Reviewed the pertinent medical history, Discussed the surgical course, Reviewed Intra-OP anesthesia mangement and issues during anesthesia, Set expectations for post-procedure period and Allowed opportunity for questions and acknowledgement of understanding      Vitals: (Last set prior to Anesthesia Care Transfer)    CRNA VITALS  4/3/2018 0940 - 4/3/2018 1013      4/3/2018             Resp Rate (observed): 8                Electronically Signed By: CECI Red CRNA  April 3, 2018  10:13 AM

## 2018-04-03 NOTE — ANESTHESIA CARE TRANSFER NOTE
Patient: Ashley Catherine    Procedure(s):  Left Thyroid Lobectomy And Ishtmusectomy - Wound Class: I-Clean    Diagnosis: Thyroid Nodule   Diagnosis Additional Information: No value filed.    Anesthesia Type:   General, ETT     Note:  Airway :Room Air  Patient transferred to:PACU  Comments: Uneventful transport to PACU; VSS; Report given to RN; Pt comfortable; IV patent: pt exchanging wellHandoff Report: Identifed the Patient, Identified the Reponsible Provider, Reviewed the pertinent medical history, Discussed the surgical course, Reviewed Intra-OP anesthesia mangement and issues during anesthesia, Set expectations for post-procedure period and Allowed opportunity for questions and acknowledgement of understanding      Vitals: (Last set prior to Anesthesia Care Transfer)    CRNA VITALS  4/3/2018 0940 - 4/3/2018 1040      4/3/2018             Resp Rate (observed): 8                Electronically Signed By: CECI Chapin CRNA  April 3, 2018  3:53 PM

## 2018-04-03 NOTE — BRIEF OP NOTE
[facility]    Brief Operative Note    Pre-operative diagnosis: Thyroid Nodule   Post-operative diagnosis * No post-op diagnosis entered *  Procedure: Procedure(s):  Left Thyroid Lobectomy And Ishtmusectomy - Wound Class: I-Clean  Surgeon: Surgeon(s) and Role:     * Delia Harper MD - Primary  Anesthesia: General   Estimated blood loss: 5mL  Drains: None  Specimens:   ID Type Source Tests Collected by Time Destination   A : left lobe thyroid Tissue Thyroid, left SURGICAL PATHOLOGY EXAM Delia Harper MD 4/3/2018  9:33 AM    B : pyamidal lobe Tissue Thyroid SURGICAL PATHOLOGY EXAM Delia Harper MD 4/3/2018  9:42 AM      Findings:   large isthmus nodule. left thyoid and isthmusectomy .  Complications: None.  Implants: None.

## 2018-04-03 NOTE — PLAN OF CARE
Pt BP elevated (150/102). Dr. Sheth notified. (Pt did not take BP meds this am). Plan to monitor BP for now; will call MD if elevated over 160 systolic.

## 2018-04-03 NOTE — ANESTHESIA POSTPROCEDURE EVALUATION
Patient: Ashley Catherine    Procedure(s):  Left Thyroid Lobectomy And Ishtmusectomy - Wound Class: I-Clean    Diagnosis:Thyroid Nodule   Diagnosis Additional Information: No value filed.    Anesthesia Type:  General, ETT    Note:  Anesthesia Post Evaluation    Patient location during evaluation: PACU  Patient participation: Able to fully participate in evaluation  Level of consciousness: awake and alert  Pain management: adequate  Airway patency: patent  Cardiovascular status: acceptable  Respiratory status: acceptable  Hydration status: acceptable  PONV: none     Anesthetic complications: None          Last vitals:  Vitals:    04/03/18 1045 04/03/18 1100 04/03/18 1115   BP: (!) 150/102 134/87 (!) 138/96   Pulse:      Resp: 12 12 14   Temp: 36.9  C (98.5  F)  36.9  C (98.5  F)   SpO2: 100% 98% 98%         Electronically Signed By: Thelma Tucker MD  April 3, 2018  2:44 PM

## 2018-04-03 NOTE — OP NOTE
Procedure Date: 04/03/2018      STAFF SURGEON:  Delia Harper MD      ASSISTANT:  Renetta Sidhu MD, Resident      PREOPERATIVE DIAGNOSIS:  Left thyroid nodule.      POSTOPERATIVE DIAGNOSIS:  Left thyroid nodule.      PROCEDURE PERFORMED:  Left thyroidectomy and isthmusectomy with 45 minutes of intraoperative recurrent laryngeal nerve monitoring     INDICATIONS:  Ashley Catherine is a 38-year-old female with an enlarging left thyroid nodule suspicious for follicular neoplasm on biopsy.  She has had 2 previous biopsies showing this histologic diagnosis.  Her ultrasounds have increased in size by 20%.  The fine-needle aspirations were negative ThyroSeq genetic testing.  Due to the enlarging left thyroid nodule suspicious for follicular neoplasm, we elected to bring the patient to the operating room for diagnostic and therapeutic purposes for the above procedures.      ESTIMATED BLOOD LOSS:  5 mL      ANESTHESIA:  General.      SPECIMENS:   1.  Left lobe of thyroid.   2.  Pyramidal lobe.      FINDINGS:  Large left-sided isthmus nodule.      COMPLICATIONS:  None.      DESCRIPTION OF PROCEDURE:  The patient was brought to the operating room and laid supine on the operating table.  General anesthesia was induced, and the patient was orotracheally intubated with an EMG endotracheal tube.  The leads were connected to the nerve integrity system, and impedances were checked and found to be in working function.  The head and neck were extended, and a shoulder roll was placed.  An approximately 4 cm neck incision was marked in a natural skin crease at the level just inferior to the cricoid cartilage.  The patient was then prepped and draped in a sterile fashion for this case, and a time-out was performed, and all were in agreement with the patient and procedure, and we elected to proceed.      A 4 cm incision was made over the anterior neck with a 15 blade through the skin and subcutaneous tissue.  The platysma was then divided  with monopolar electrocautery, and small subplatysmal flaps were elevated.  The median raphe was identified.  The strap muscles were  along the midline using blunt dissection and monopolar electrocautery.  The thyroid was then exposed.  The Weitlaner retractor was placed.  The thyroid capsule was grasped, and the left thyroid lobe was dissected from superior pole to inferior pole.  The superior lobe was grasped, and the upper pole was dissected.  The artery and vein were clipped and divided with LigaSure.  The superior laryngeal nerve was not identified and kept deep to our dissection.  The superior parathyroid was identified and with bipolar electrocautery was used to free this from the superior lobe. The lobe was then dissected lateral to medial and superior to inferior.  The middle thyroid vein was clipped.  We then addressed the inferior pole.  The inferior pole vessels were identified, clipped and LigaSured, and the inferior parathyroid was identified and freed from the inferior pole of the thyroid.  We then addressed the recurrent laryngeal nerve.  The recurrent laryngeal nerve was identified along the tracheoesophageal groove as it entered the larynx at the cricothyroid joint.  The thyroid was released from the trachea along Berry ligament with monopolar electrocautery.  The isthmus was removed as well, and this was dissected free from the trachea.  The isthmus was divided from the right lobe of the thyroid using LigaSure, and the specimen was sent for permanent pathology.  We identified a pyramidal lobe, and this was removed with monopolar electrocautery and sent for permanent pathology.  At the end of the procedure, we stimulated the recurrent laryngeal nerves at 1.0 mA.  Hemostasis was provided.  Fibrillar was placed within the wound bed.  The straps were loosely approximated with two 4-0 chromic sutures.  The platysma layer was closed with 4-0 chromic suture in interrupted fashion.  A running  subcuticular 5-0 Monocryl was used to close the skin.  SwiftSet skin glue was used to close the epidermis.  The patient was then turned over to Anesthesia, who woke the patient from the operating room without complication.        Dr. Bryce Harper was present for all portions of this case.      Dictated by Renetta Sidhu MD   Resident         BRYCE HARPER MD       As dictated by RENETTA SIDHU MD            D: 2018   T: 2018   MT: swapnil      Name:     JOSEY KHALIL   MRN:      [id number]        Account:        [id number]   :      1980           Procedure Date: 2018      Document: [id number]

## 2018-04-03 NOTE — DISCHARGE INSTRUCTIONS
[facility]  Home Care Following Anesthesia  For 24 hours after surgery:  1. Get plenty of rest.  A responsible adult must stay with you for at least 24 hours after you leave the surgery center.  2. Do not drive or use heavy equipment.  If you have weakness or tingling, don't drive or use heavy equipment until this feeling goes away.   3. Do not drink alcohol.   4. Avoid strenuous or risky activities.  Ask for help when climbing stairs.  5. You may feel lightheaded.  IF so, sit for a few minutes before standing.  Have someone help you get up.   6. If you have nausea (feel sick to your stomach): Drink only clear liquids such as apple juice, ginger ale, broth or 7-Up.  Rest may also help.  Be sure to drink enough fluids.  Move to a regular diet as you feel able.   7. You may have a slight fever.  Call the doctor if your fever is over 100 F (37.7 C) (taken under the tongue) or lasts longer than 24 hours.  8. You may have a dry mouth, a sore throat, muscle aches or trouble sleeping. These should go away after 24 hours.  9. Do not make important or legal decisions.               Tips for taking pain medications  To get the best pain relief possible, remember these points:    Take pain medications as directed, before pain becomes severe.    Pain medication can upset your stomach: taking it with food may help.    Constipation is a common side effect of pain medication. Drink plenty of  fluids.    Eat foods high in fiber. Take a stool softener if recommended by your doctor or pharmacist.    Do not drink alcohol, drive or operate machinery while taking pain medications.    Ask about other ways to control pain, such as with heat, ice or relaxation.    Tylenol/Acetaminophen Consumption  To help encourage the safe use of acetaminophen, the makers of TYLENOL  have lowered the maximum daily dose for single-ingredient Extra Strength TYLENOL  (acetaminophen) products sold in the [place] from 8  pills per day (4,000 mg) to 6 pills per day (3,000 mg). The dosing interval has also changed from 2 pills every 4-6 hours to 2 pills every 6 hours.    If you feel your pain relief is insufficient, you may take Tylenol/Acetaminophen in addition to your narcotic pain medication.     Be careful not to exceed 3,000 mg of Tylenol/Acetaminophen in a 24 hour period from all sources.    If you are taking extra strength Tylenol/acetaminophen (500 mg), the maximum dose is 6 tablets in 24 hours.    If you are taking regular strength acetaminophen (325 mg), the maximum dose is 9 tablets in 24 hours.    Call a doctor for any of the followin. Signs of infection (fever, growing tenderness at the surgery site, a large amount of drainage or bleeding, severe pain, foul-smelling drainage, redness, swelling).  2. It has been over 8 to 10 hours since surgery and you are still not able to urinate (pass water).  3. Headache for over 24 hours.  4. Numbness, tingling or weakness the day after surgery (if you had spinal anesthesia).  Your doctor is:  Dr. Delia Harper, ENT Otolaryngology: [phone]                    Or dial [phone] and ask for the resident on call for:  ENT Otolaryngology  For emergency care, call the:  [place] [facility]:  [phone] (TTY for hearing impaired: [phone])

## 2018-04-06 ENCOUNTER — TELEPHONE (OUTPATIENT)
Dept: OTOLARYNGOLOGY | Facility: CLINIC | Age: 38
End: 2018-04-06

## 2018-04-06 LAB — COPATH REPORT: NORMAL

## 2018-04-06 NOTE — TELEPHONE ENCOUNTER
The patient called the RN care coordinator to report that her left jaw is painful. occasionally there is sharp shooting pains form left ear down the jaw line. Dr Harper was consulted by phone and feels that it is due to sevral factors, the medication to paralize can cause some muscle spasem as well as Shaylas' hx of teeth clinching. The recommended treatment is to take 600mg motrin three times a day for about 3-5 days. The patient will do t his and will call next week if not improving.   Jose Davis ,RN  [phone]

## 2018-04-16 ENCOUNTER — OFFICE VISIT (OUTPATIENT)
Dept: OTOLARYNGOLOGY | Facility: CLINIC | Age: 38
End: 2018-04-16
Payer: COMMERCIAL

## 2018-04-16 VITALS — HEIGHT: 70 IN | WEIGHT: 193.5 LBS | BODY MASS INDEX: 27.7 KG/M2

## 2018-04-16 DIAGNOSIS — E89.0 S/P PARTIAL THYROIDECTOMY: Primary | ICD-10-CM

## 2018-04-16 ASSESSMENT — PAIN SCALES - GENERAL: PAINLEVEL: NO PAIN (0)

## 2018-04-16 NOTE — LETTER
4/16/2018       RE: Ashley Catherine  [address]  [facility] [address]     Dear Colleague,    Thank you for referring your patient, Ashley Catherine, to the [facility] at [facility]. Please see a copy of my visit note below.    REASON FOR VISIT:  This is a 38-year-old female who underwent a left thyroid lobectomy and isthmusectomy on 04/03/2018.  Since the surgery, the patient has had no problems with voice quality, inspiration or swallowing.  She did admit to some left jaw pain that began about a day or two following the surgery, then spontaneously resolved about 4 days following the surgery.  Again, she has no complaints at this time.      PHYSICAL EXAMINATION:  The wound is healing well.  The Dermabond was removed and the sutures were also removed.  There is no evidence of hematoma, seroma or infection.      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      ASSESSMENT:  Followup status post left thyroid lobectomy.      PLAN:   1.  I reviewed with the patient wound management and wound massage.   2.  I placed an order for her to have thyroid function tests checked at 6 weeks postop.  She will follow up with me on an as-needed basis.         Again, thank you for allowing me to participate in the care of your patient.      Sincerely,    Delia Harper MD

## 2018-04-16 NOTE — PROGRESS NOTES
REASON FOR VISIT:  This is a 38-year-old female who underwent a left thyroid lobectomy and isthmusectomy on 04/03/2018.  Since the surgery, the patient has had no problems with voice quality, inspiration or swallowing.  She did admit to some left jaw pain that began about a day or two following the surgery, then spontaneously resolved about 4 days following the surgery.  Again, she has no complaints at this time.      PHYSICAL EXAMINATION:  The wound is healing well.  The Dermabond was removed and the sutures were also removed.  There is no evidence of hematoma, seroma or infection.      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      ASSESSMENT:  Followup status post left thyroid lobectomy.      PLAN:   1.  I reviewed with the patient wound management and wound massage.   2.  I placed an order for her to have thyroid function tests checked at 6 weeks postop.  She will follow up with me on an as-needed basis.

## 2018-04-16 NOTE — NURSING NOTE
"Chief Complaint   Patient presents with     RECHECK     Follow up post op 4/3/18     Height 1.772 m (5' 9.75\"), weight 87.8 kg (193 lb 8 oz), not currently breastfeeding.    Charanjit Mustafa    "

## 2018-04-16 NOTE — MR AVS SNAPSHOT
After Visit Summary   4/16/2018    Ashley Catherine    MRN: [id number]           Patient Information     Date Of Birth          1980        Visit Information        Provider Department      4/16/2018 1:30 PM Delia Harper MD [facility]        Today's Diagnoses     S/P partial thyroidectomy    -  1      Care Instructions    Please have labs drawn and we will let you know the results.   Jose Davis ,RN  [phone]              Follow-ups after your visit        Your next 10 appointments already scheduled     May 02, 2018  4:00 PM CDT   (Arrive by 3:45 PM)   Return Visit with Jayro Elias, PhD [facility]    [address] [place] [address]  [address] [facility] [address] [phone]            Aug 20, 2018 11:00 AM CDT   (Arrive by 10:45 AM)   Return Visit with Hector Justice MD   [facility]    [address] [place] [address]  [address] [facility] [address] [phone]              Who to contact     Please call your clinic at [phone] to:    Ask questions about your health    Make or cancel appointments    Discuss your medicines    Learn about your test results    Speak to your doctor            Additional Information About Your Visit        [employer] Information     [employer] gives you secure access to your electronic health record. If you see a primary care provider, you can also send messages to your care team and make appointments. If you have questions, please call your primary care clinic.  If you do not have a primary care provider, please call [phone] and they will assist you.      [employer] is an electronic gateway that provides easy, online access to your medical records. With [employer], you can request a clinic appointment, read your test results, renew a prescription or communicate with your care team.     To access your existing  "account, please contact your [facility] or call [phone] for assistance.        Care EveryWhere ID     This is your Care EveryWhere ID. This could be used by other organizations to access your [place] medical records  [id number]        Your Vitals Were     Height BMI (Body Mass Index)                1.772 m (5' 9.75\") 27.96 kg/m2           Blood Pressure from Last 3 Encounters:   04/23/18 139/87   04/03/18 (!) 138/96   03/26/18 118/62    Weight from Last 3 Encounters:   04/23/18 89.6 kg (197 lb 8 oz)   04/16/18 87.8 kg (193 lb 8 oz)   04/03/18 86.2 kg (190 lb)               Primary Care Provider Office Phone # Fax #    Violet Humphreys [phone]       [address] [address]        Equal Access to Services     MABEL BAILEY : Hadii aad ku hadasho Soomaali, waaxda luqadaha, qaybta kaalmada adeegyada, carmen edwardsin hayvalerie arteaga . So [facility] [phone].    ATENCIÓN: Si kenjila espphuc, tiene a gibbons disposición servicios gratuitos de asistencia lingüística. Llame al [phone].    We comply with applicable federal civil rights laws and [place] laws. We do not discriminate on the basis of race, color, national origin, age, disability, sex, sexual orientation, or gender identity.            Thank you!     Thank you for choosing [facility]  for your care. Our goal is always to provide you with excellent care. Hearing back from our patients is one way we can continue to improve our services. Please take a few minutes to complete the written survey that you may receive in the mail after your visit with us. Thank you!             Your Updated Medication List - Protect others around you: Learn how to safely use, store and throw away your medicines at www.disposemymeds.org.          This list is accurate as of 4/16/18 11:59 PM.  Always use your most recent med list.                   Brand Name Dispense " Instructions for use Diagnosis    CALCIUM + D PO      Take by mouth daily        cetirizine 10 MG tablet    zyrTEC     Take 10 mg by mouth daily as needed for allergies        [employer] B-12 [address]   Generic drug:  Cyanocobalamin       Multiple thyroid nodules       cyclobenzaprine 10 MG tablet    FLEXERIL    30 tablet    TAKE ONE-HALF TABLET BY MOUTH TWICE A DAY AS NEEDED FOR MUSCLE SPASMS    Sciatica, unspecified laterality       hydrochlorothiazide 25 MG tablet    HYDRODIURIL    90 tablet    Take 1 tablet (25 mg) by mouth daily    Essential hypertension with goal blood pressure less than 140/90       HYDROcodone-acetaminophen 5-325 MG per tablet    NORCO    15 tablet    Take 1-2 tablets by mouth every 4 hours as needed for other (Moderate to Severe Pain)    Thyroid nodule       hydrOXYzine 50 MG capsule    VISTARIL    30 capsule    Take 1-2 capsules ( mg) by mouth nightly as needed for anxiety (sleep)    Adjustment disorder with anxious mood       * IRON SUPPLEMENT PO           * ferrous sulfate 325 (65 Fe) MG tablet    IRON     Take 325 mg by mouth daily (with breakfast)        MELATONIN PO      Take 3 mg by mouth        metroNIDAZOLE 0.75 % vaginal gel    METROGEL    70 g    USE TWICE WEEKLY TO VAGINA FOR RECURRENT BV SYMPTOMS. DO THIS FOR 3 MONTHS AFTER FINISHING ORAL MEDICATIONS.    Abnormal vaginal fluids       MULTIVITAMINS PO      Take by mouth daily        naltrexone-bupropion 8-90 MG per 12 hr tablet    CONTRAVE    120 tablet    Take 2 tablets by mouth 2 times daily    Morbid obesity (H)       polyethylene glycol powder    MIRALAX    510 g    Take 17 g (1 capful) by mouth daily as needed for constipation    Constipation, unspecified constipation type       TYLENOL PM EXTRA STRENGTH PO           vitamin D 2000 units Caps           * Notice:  This list has 2 medication(s) that are the same as other medications prescribed for you. Read the directions carefully, and ask your doctor or other care  provider to review them with you.

## 2018-04-23 ENCOUNTER — OFFICE VISIT (OUTPATIENT)
Dept: ENDOCRINOLOGY | Facility: CLINIC | Age: 38
End: 2018-04-23
Payer: COMMERCIAL

## 2018-04-23 VITALS
HEIGHT: 70 IN | SYSTOLIC BLOOD PRESSURE: 139 MMHG | OXYGEN SATURATION: 97 % | DIASTOLIC BLOOD PRESSURE: 87 MMHG | WEIGHT: 197.5 LBS | BODY MASS INDEX: 28.27 KG/M2 | HEART RATE: 97 BPM

## 2018-04-23 DIAGNOSIS — E66.09 CLASS 1 OBESITY DUE TO EXCESS CALORIES WITHOUT SERIOUS COMORBIDITY IN ADULT, UNSPECIFIED BMI: Primary | ICD-10-CM

## 2018-04-23 DIAGNOSIS — E66.811 CLASS 1 OBESITY DUE TO EXCESS CALORIES WITHOUT SERIOUS COMORBIDITY IN ADULT, UNSPECIFIED BMI: Primary | ICD-10-CM

## 2018-04-23 RX ORDER — TOPIRAMATE 50 MG/1
50 TABLET, FILM COATED ORAL DAILY
Qty: 90 TABLET | Refills: 6 | Status: SHIPPED | OUTPATIENT
Start: 2018-04-23 | End: 2018-08-01

## 2018-04-23 RX ORDER — PHENTERMINE HYDROCHLORIDE 30 MG/1
30 CAPSULE ORAL EVERY MORNING
Qty: 30 CAPSULE | Refills: 5 | Status: SHIPPED | OUTPATIENT
Start: 2018-04-23 | End: 2018-08-01

## 2018-04-23 ASSESSMENT — ENCOUNTER SYMPTOMS
BACK PAIN: 1
CONSTIPATION: 0
SWOLLEN GLANDS: 0
HOARSE VOICE: 1
DISTURBANCES IN COORDINATION: 0
DECREASED CONCENTRATION: 0
HEARTBURN: 0
WEIGHT GAIN: 0
ARTHRALGIAS: 0
CONSTIPATION: 1
VOMITING: 0
BRUISES/BLEEDS EASILY: 0
SMELL DISTURBANCE: 0
POLYPHAGIA: 0
INSOMNIA: 1
JAUNDICE: 0
SLEEP DISTURBANCES DUE TO BREATHING: 0
BOWEL INCONTINENCE: 0
PANIC: 0
HEMATURIA: 0
DECREASED CONCENTRATION: 1
NUMBNESS: 1
DIARRHEA: 0
ABDOMINAL PAIN: 0
EYE PAIN: 0
FATIGUE: 1
NECK PAIN: 0
DYSURIA: 0
HYPOTENSION: 0
LIGHT-HEADEDNESS: 0
DEPRESSION: 0
SINUS CONGESTION: 0
BREAST PAIN: 0
CHILLS: 0
SPEECH CHANGE: 0
FLANK PAIN: 0
ALTERED TEMPERATURE REGULATION: 0
NAUSEA: 0
BLOOD IN STOOL: 0
JOINT SWELLING: 0
HEADACHES: 0
DEPRESSION: 1
PALPITATIONS: 0
DYSPNEA ON EXERTION: 0
DIZZINESS: 0
EYE WATERING: 0
TINGLING: 1
NERVOUS/ANXIOUS: 1
DECREASED LIBIDO: 0
HYPERTENSION: 0
ABDOMINAL PAIN: 1
POLYDIPSIA: 1
WHEEZING: 0
SEIZURES: 0
EYE IRRITATION: 0
MUSCLE WEAKNESS: 0
RECTAL PAIN: 0
SINUS PAIN: 0
SPUTUM PRODUCTION: 0
FEVER: 0
HEMOPTYSIS: 0
WEIGHT LOSS: 0
VOMITING: 1
PARALYSIS: 0
HEADACHES: 1
DOUBLE VISION: 0
TREMORS: 0
LEG PAIN: 0
COUGH: 0
POLYDIPSIA: 0
NIGHT SWEATS: 0
INCREASED ENERGY: 0
EYE REDNESS: 0
SYNCOPE: 0
SORE THROAT: 0
DIFFICULTY URINATING: 0
POOR WOUND HEALING: 0
DECREASED APPETITE: 0
EXERCISE INTOLERANCE: 0
BLOATING: 0
HALLUCINATIONS: 0
BREAST MASS: 0
HOT FLASHES: 0
WEAKNESS: 0
STIFFNESS: 0
SKIN CHANGES: 0
MUSCLE CRAMPS: 1
NECK MASS: 0
INSOMNIA: 0
MEMORY LOSS: 0
NAIL CHANGES: 0
COUGH DISTURBING SLEEP: 0
ORTHOPNEA: 0
WEIGHT GAIN: 1
MYALGIAS: 0
SHORTNESS OF BREATH: 0
RECTAL BLEEDING: 0
TROUBLE SWALLOWING: 0
LOSS OF CONSCIOUSNESS: 0
TASTE DISTURBANCE: 0
RESPIRATORY PAIN: 0
NAUSEA: 1
POSTURAL DYSPNEA: 0
TASTE DISTURBANCE: 1
HOARSE VOICE: 0
SNORES LOUDLY: 0
HEARTBURN: 1

## 2018-04-23 NOTE — MR AVS SNAPSHOT
After Visit Summary   4/23/2018    Ashley Catherine    MRN: [id number]           Patient Information     Date Of Birth          1980        Visit Information        Provider Department      4/23/2018 1:00 PM Hector Justice MD M [facility] Medical Weight Management        Today's Diagnoses     Class 1 obesity due to excess calories without serious comorbidity in adult, unspecified BMI    -  1       Follow-ups after your visit        Follow-up notes from your care team     Return in about 3 months (around 7/23/2018).      Your next 10 appointments already scheduled     May 02, 2018  4:00 PM CDT   (Arrive by 3:45 PM)   Return Visit with Jayro Elias, PhD [facility] ([facility])    [address] [place] [address]  3rd [facility] [address] [phone]            Aug 20, 2018 11:00 AM CDT   (Arrive by 10:45 AM)   Return Visit with Hector Justice MD   [facility] ([facility])    [address] [place] [address]  4th [facility] [address] [phone]              Who to contact     Please call your clinic at [phone] to:    Ask questions about your health    Make or cancel appointments    Discuss your medicines    Learn about your test results    Speak to your doctor            Additional Information About Your Visit        [employer] Information     [employer] gives you secure access to your electronic health record. If you see a primary care provider, you can also send messages to your care team and make appointments. If you have questions, please call your primary care clinic.  If you do not have a primary care provider, please call [phone] and they will assist you.      [employer] is an electronic gateway that provides easy, online access to your medical records. With [employer], you can request a clinic appointment, read your test results, renew a prescription or communicate with your care team.    "  To access your existing account, please contact your [facility] or call [phone] for assistance.        Care EveryWhere ID     This is your Care EveryWhere ID. This could be used by other organizations to access your [place] medical records  [id number]        Your Vitals Were     Pulse Height Pulse Oximetry BMI (Body Mass Index)          97 1.772 m (5' 9.75\") 97% 28.54 kg/m2         Blood Pressure from Last 3 Encounters:   04/23/18 139/87   04/03/18 (!) 138/96   03/26/18 118/62    Weight from Last 3 Encounters:   04/23/18 89.6 kg (197 lb 8 oz)   04/16/18 87.8 kg (193 lb 8 oz)   04/03/18 86.2 kg (190 lb)              Today, you had the following     No orders found for display         Today's Medication Changes          These changes are accurate as of 4/23/18  1:27 PM.  If you have any questions, ask your nurse or doctor.               Start taking these medicines.        Dose/Directions    phentermine 30 MG capsule   Used for:  Class 1 obesity due to excess calories without serious comorbidity in adult, unspecified BMI   Started by:  Hector Justice MD        Dose:  30 mg   Take 1 capsule (30 mg) by mouth every morning   Quantity:  30 capsule   Refills:  5       topiramate 50 MG tablet   Commonly known as:  TOPAMAX   Used for:  Class 1 obesity due to excess calories without serious comorbidity in adult, unspecified BMI   Started by:  Hector Justice MD        Dose:  50 mg   Take 1 tablet (50 mg) by mouth daily   Quantity:  90 tablet   Refills:  6         These medicines have changed or have updated prescriptions.        Dose/Directions    ferrous sulfate 325 (65 Fe) MG tablet   Commonly known as:  IRON   This may have changed:  Another medication with the same name was removed. Continue taking this medication, and follow the directions you see here.   Changed by:  Hector Justice MD        Dose:  325 mg   Take 325 mg by mouth daily (with breakfast)   Refills:  0 "         Stop taking these medicines if you haven't already. Please contact your care team if you have questions.     HYDROcodone-acetaminophen 5-325 MG per tablet   Commonly known as:  NORCO   Stopped by:  Hector Justice MD                Where to get your medicines      These medications were sent to [place], [place] - [address], [address]  [address], [address], [facility] [address]     Phone:  [phone]     topiramate 50 MG tablet         Some of these will need a paper prescription and others can be bought over the counter.  Ask your nurse if you have questions.     Bring a paper prescription for each of these medications     phentermine 30 MG capsule                Primary Care Provider Office Phone # Fax #    DavenportTanja Humphreys [phone] [address] [address]        Equal Access to Services     [facility]: Hadii aad ku hadasho Soomaali, waaxda luqadaha, qaybta kaalmada adeegyada, waxay gracein hayzairan riri arteaga . So [facility] [phone].    ATENCIÓN: Si habla español, tiene a gibbons disposición servicios gratuitos de asistencia lingüística. Faby al [phone].    We comply with applicable federal civil rights laws and [place] laws. We do not discriminate on the basis of race, color, national origin, age, disability, sex, sexual orientation, or gender identity.            Thank you!     Thank you for choosing [facility] MEDICAL WEIGHT MANAGEMENT  for your care. Our goal is always to provide you with excellent care. Hearing back from our patients is one way we can continue to improve our services. Please take a few minutes to complete the written survey that you may receive in the mail after your visit with us. Thank you!             Your Updated Medication List - Protect others around you: Learn how to safely use, store and throw away your medicines at www.disposemymeds.org.          This list is  accurate as of 4/23/18  1:27 PM.  Always use your most recent med list.                   Brand Name Dispense Instructions for use Diagnosis    CALCIUM + D PO      Take by mouth daily        cetirizine 10 MG tablet    zyrTEC     Take 10 mg by mouth daily as needed for allergies        [employer] B-12 5000 MCG Subl   Generic drug:  Cyanocobalamin       Multiple thyroid nodules       cyclobenzaprine 10 MG tablet    FLEXERIL    30 tablet    TAKE ONE-HALF TABLET BY MOUTH TWICE A DAY AS NEEDED FOR MUSCLE SPASMS    Sciatica, unspecified laterality       ferrous sulfate 325 (65 Fe) MG tablet    IRON     Take 325 mg by mouth daily (with breakfast)        hydrochlorothiazide 25 MG tablet    HYDRODIURIL    90 tablet    Take 1 tablet (25 mg) by mouth daily    Essential hypertension with goal blood pressure less than 140/90       hydrOXYzine 50 MG capsule    VISTARIL    30 capsule    Take 1-2 capsules ( mg) by mouth nightly as needed for anxiety (sleep)    Adjustment disorder with anxious mood       MELATONIN PO      Take 3 mg by mouth        metroNIDAZOLE 0.75 % vaginal gel    METROGEL    70 g    USE TWICE WEEKLY TO VAGINA FOR RECURRENT BV SYMPTOMS. DO THIS FOR 3 MONTHS AFTER FINISHING ORAL MEDICATIONS.    Abnormal vaginal fluids       MULTIVITAMINS PO      Take by mouth daily        naltrexone-bupropion 8-90 MG per 12 hr tablet    CONTRAVE    120 tablet    Take 2 tablets by mouth 2 times daily    Morbid obesity (H)       phentermine 30 MG capsule     30 capsule    Take 1 capsule (30 mg) by mouth every morning    Class 1 obesity due to excess calories without serious comorbidity in adult, unspecified BMI       polyethylene glycol powder    MIRALAX    510 g    Take 17 g (1 capful) by mouth daily as needed for constipation    Constipation, unspecified constipation type       topiramate 50 MG tablet    TOPAMAX    90 tablet    Take 1 tablet (50 mg) by mouth daily    Class 1 obesity due to excess calories without serious comorbidity  in adult, unspecified BMI       TYLENOL PM EXTRA STRENGTH PO           vitamin D 2000 units Caps

## 2018-04-23 NOTE — PROGRESS NOTES
"Return Medical Weight Management Note     Ashley Catherine  MRN:  [id number]  :  1980  RAKEL:  18    Dear Juliann, Violet Agrawal,    I had the pleasure of seeing your patient Ashley Catherine.  She is a 37 year old female who I am continuing to see for treatment of obesity related to:       2017   I have the following co-morbidities associated with obesity: -   Are you taking daily medication for heartburn, acid reflux, or GERD (acid reflux disease)? No     She was started on Qsymia by Dr. De Los Santos with great response of weight loss. However, s developed side effects including dry mouth, thirsty, little bit of tingling, and a depressed mood.  She stopped Qsymia,she gained her weight back.     INTERVAL HISTORY:  She started Contrave after her last visit with me. She was able to maintain her weight but was not able to lose any more.   She had trouble with remembering to take the evening dose.   She then had to discontinue it for 1 week before and 2 weeks after her thyroid surgery.   So overall, she does not feel that she gave it the best chance, but right now wishes to go back to losing weight with QSYMIA, since she knows it worked for her. She was paying cash for QSYMIA.     CURRENT WEIGHT:   197 lbs 8 oz    Wt Readings from Last 4 Encounters:   18 89.6 kg (197 lb 8 oz)   18 87.8 kg (193 lb 8 oz)   18 86.2 kg (190 lb)   18 86.3 kg (190 lb 3.2 oz)       Height:  5' 9.75\"  Body Mass Index:  Body mass index is 28.54 kg/(m^2).  Vitals:  B/P: 137/90, P: 98, R: Data Unavailable     Initial consult weight was 222 on 5/15/2017.  Weight change since last seen on 17 is up 7 pounds.   Total loss is 25 pounds.    Diet and Activity Changes Since Last Visit Reviewed With Patient 2018   I have made the following changes to my diet since my last visit: N/A   With regards to my diet, I am still struggling with: Icecream   For breakfast, I typically eat: -   For lunch, I typically eat: -   For " supper, I typically eat: -   For snack(s), I typically eat: -   I have made the following changes to my activity/exercise since my last visit: Walking   With regards to my activity/exercise, I am still struggling with: Exercise with schedule       Review of Systems     Constitutional:  Positive for fatigue and recent stressors. Negative for fever, chills, weight loss, weight gain, decreased appetite, night sweats, height loss, post-operative complications, incisional pain, hallucinations, increased energy, hyperactivity and confused.   HENT:  Negative for ear pain, hearing loss, tinnitus, nosebleeds, trouble swallowing, hoarse voice, mouth sores, sore throat, ear discharge, tooth pain, gum tenderness, taste disturbance, smell disturbance, hearing aid, bleeding gums, dry mouth, sinus pain, sinus congestion and neck mass.    Eyes:  Negative for double vision, pain, redness, eye pain, decreased vision, eye watering, eye bulging, eye dryness, flashing lights, spots, floaters, strabismus, tunnel vision, jaundice and eye irritation.   Respiratory:   Negative for cough, hemoptysis, sputum production, shortness of breath, wheezing, sleep disturbances due to breathing, snores loudly, respiratory pain, dyspnea on exertion, cough disturbing sleep and postural dyspnea.    Cardiovascular:  Positive for few scattered varicosities. Negative for chest pain, dyspnea on exertion, palpitations, orthopnea, fingers/toes turn blue, hypertension, hypotension, syncope, history of heart murmur, pacemaker, leg pain, sleep disturbances due to breathing, light-headedness, exercise intolerance and edema.   Gastrointestinal:  Negative for heartburn, nausea, vomiting, abdominal pain, diarrhea, constipation, blood in stool, melena, rectal pain, bloating, hemorrhoids, bowel incontinence, jaundice, rectal bleeding, coffee ground emesis and change in stool.   Genitourinary:  Negative for bladder incontinence, dysuria, urgency, hematuria, flank pain,  vaginal discharge, difficulty urinating, genital sores, dyspareunia, decreased libido, nocturia, voiding less frequently, arousal difficulty, abnormal vaginal bleeding, excessive menstruation, menstrual changes, hot flashes, vaginal dryness and postmenopausal bleeding.   Musculoskeletal:  Positive for back pain and muscle cramps. Negative for myalgias, joint swelling, arthralgias, stiffness, neck pain, bone pain, muscle weakness and fracture.   Skin:  Negative for nail changes, itching, poor wound healing, rash, hair changes, skin changes, acne, warts, poor wound healing, scarring, flaky skin, Raynaud's phenomenon, sensitivity to sunlight and skin thickening.   Neurological:  Positive for tingling and numbness. Negative for dizziness, tremors, speech change, seizures, loss of consciousness, weakness, light-headedness, headaches, disturbances in coordination, memory loss, difficulty walking and paralysis.   Endo/Heme:  Negative for anemia, swollen glands and bruises/bleeds easily.   Psychiatric/Behavioral:  Positive for depression, decreased concentration and mood swings. Negative for hallucinations, memory loss and panic attacks.    Breast:  Negative for breast discharge, breast mass, breast pain and nipple retraction.   Endocrine:  Positive for polydipsia.Negative for altered temperature regulation, polyphagia, unwanted hair growth and change in facial hair.      MEDICATIONS:   Current Outpatient Prescriptions   Medication     Calcium Carbonate-Vitamin D (CALCIUM + D PO)     cetirizine (ZYRTEC) 10 MG tablet     Cholecalciferol (VITAMIN D) 2000 UNITS CAPS     Cyanocobalamin ([employer]S B-12) 5000 MCG SUBL     cyclobenzaprine (FLEXERIL) 10 MG tablet     Diphenhydramine-APAP, sleep, (TYLENOL PM EXTRA STRENGTH PO)     ferrous sulfate (IRON) 325 (65 FE) MG tablet     hydrochlorothiazide (HYDRODIURIL) 25 MG tablet     hydrOXYzine (VISTARIL) 50 MG capsule     MELATONIN PO     metroNIDAZOLE (METROGEL) 0.75 % vaginal gel      Multiple Vitamin (MULTIVITAMINS PO)     naltrexone-bupropion (CONTRAVE) 8-90 MG per 12 hr tablet     polyethylene glycol (MIRALAX) powder     No current facility-administered medications for this visit.        Weight Loss Medication History Reviewed With Patient 4/23/2018   Which weight loss medications are you currently taking on a regular basis?  Contrave (naltrexone/bupropion)   Are you having any side effects from the weight loss medication that we have prescribed you? No   If you are having side effects please describe: -       ASSESSMENT:   Obesity, with good weight loss result on medium dose QSYMIA.   She is wondering is she is having side effects to it. It is primarily low mood.   She has gained weight back last time she stopped QSYMIA.   Inadequate response to Contrave (spoitty intake, poor compliance with BID dosing)    PLAN:   - restart combination phentermine/topiramate at generic dosing due to cost concerns.   - intensify diet efforts and increase exercise     FOLLOW-UP:    12 weeks.    Time: 20 min spent on evaluation, management, counseling, education, & motivational interviewing with greater than 50 % of the total time was spent on counseling and coordinating care    Sincerely,    Hector Justice MD

## 2018-04-23 NOTE — LETTER
"2018       RE: Ashley Catherine  [address]  [place] [address]     Dear Colleague,    Thank you for referring your patient, Ashley Catherine, to the [facility]NAGEMENT at [facility]. Please see a copy of my visit note below.    Return Medical Weight Management Note     Ashley Catherine  MRN:  [id number]  :  1980  RAKEL:  18    Dear Violet Humphreys,    I had the pleasure of seeing your patient Ashley Catherine.  She is a 37 year old female who I am continuing to see for treatment of obesity related to:       2017   I have the following co-morbidities associated with obesity: -   Are you taking daily medication for heartburn, acid reflux, or GERD (acid reflux disease)? No     She was started on Qsymia by Dr. De Los Santos with great response of weight loss. However, s developed side effects including dry mouth, thirsty, little bit of tingling, and a depressed mood.  She stopped Qsymia,she gained her weight back.     INTERVAL HISTORY:  She started Contrave after her last visit with me. She was able to maintain her weight but was not able to lose any more.   She had trouble with remembering to take the evening dose.   She then had to discontinue it for 1 week before and 2 weeks after her thyroid surgery.   So overall, she does not feel that she gave it the best chance, but right now wishes to go back to losing weight with QSYMIA, since she knows it worked for her. She was paying cash for QSYMIA.     CURRENT WEIGHT:   197 lbs 8 oz    Wt Readings from Last 4 Encounters:   18 89.6 kg (197 lb 8 oz)   18 87.8 kg (193 lb 8 oz)   18 86.2 kg (190 lb)   18 86.3 kg (190 lb 3.2 oz)       Height:  5' 9.75\"  Body Mass Index:  Body mass index is 28.54 kg/(m^2).  Vitals:  B/P: 137/90, P: 98, R: Data Unavailable     Initial consult weight was 222 on 5/15/2017.  Weight change since last seen on 17 is up 7 pounds.   Total loss is 25 " pounds.    Diet and Activity Changes Since Last Visit Reviewed With Patient 4/23/2018   I have made the following changes to my diet since my last visit: N/A   With regards to my diet, I am still struggling with: Icecream   For breakfast, I typically eat: -   For lunch, I typically eat: -   For supper, I typically eat: -   For snack(s), I typically eat: -   I have made the following changes to my activity/exercise since my last visit: Walking   With regards to my activity/exercise, I am still struggling with: Exercise with schedule       Review of Systems     Constitutional:  Positive for fatigue and recent stressors. Negative for fever, chills, weight loss, weight gain, decreased appetite, night sweats, height loss, post-operative complications, incisional pain, hallucinations, increased energy, hyperactivity and confused.   HENT:  Negative for ear pain, hearing loss, tinnitus, nosebleeds, trouble swallowing, hoarse voice, mouth sores, sore throat, ear discharge, tooth pain, gum tenderness, taste disturbance, smell disturbance, hearing aid, bleeding gums, dry mouth, sinus pain, sinus congestion and neck mass.    Eyes:  Negative for double vision, pain, redness, eye pain, decreased vision, eye watering, eye bulging, eye dryness, flashing lights, spots, floaters, strabismus, tunnel vision, jaundice and eye irritation.   Respiratory:   Negative for cough, hemoptysis, sputum production, shortness of breath, wheezing, sleep disturbances due to breathing, snores loudly, respiratory pain, dyspnea on exertion, cough disturbing sleep and postural dyspnea.    Cardiovascular:  Positive for few scattered varicosities. Negative for chest pain, dyspnea on exertion, palpitations, orthopnea, fingers/toes turn blue, hypertension, hypotension, syncope, history of heart murmur, pacemaker, leg pain, sleep disturbances due to breathing, light-headedness, exercise intolerance and edema.   Gastrointestinal:  Negative for heartburn,  nausea, vomiting, abdominal pain, diarrhea, constipation, blood in stool, melena, rectal pain, bloating, hemorrhoids, bowel incontinence, jaundice, rectal bleeding, coffee ground emesis and change in stool.   Genitourinary:  Negative for bladder incontinence, dysuria, urgency, hematuria, flank pain, vaginal discharge, difficulty urinating, genital sores, dyspareunia, decreased libido, nocturia, voiding less frequently, arousal difficulty, abnormal vaginal bleeding, excessive menstruation, menstrual changes, hot flashes, vaginal dryness and postmenopausal bleeding.   Musculoskeletal:  Positive for back pain and muscle cramps. Negative for myalgias, joint swelling, arthralgias, stiffness, neck pain, bone pain, muscle weakness and fracture.   Skin:  Negative for nail changes, itching, poor wound healing, rash, hair changes, skin changes, acne, warts, poor wound healing, scarring, flaky skin, Raynaud's phenomenon, sensitivity to sunlight and skin thickening.   Neurological:  Positive for tingling and numbness. Negative for dizziness, tremors, speech change, seizures, loss of consciousness, weakness, light-headedness, headaches, disturbances in coordination, memory loss, difficulty walking and paralysis.   Endo/Heme:  Negative for anemia, swollen glands and bruises/bleeds easily.   Psychiatric/Behavioral:  Positive for depression, decreased concentration and mood swings. Negative for hallucinations, memory loss and panic attacks.    Breast:  Negative for breast discharge, breast mass, breast pain and nipple retraction.   Endocrine:  Positive for polydipsia.Negative for altered temperature regulation, polyphagia, unwanted hair growth and change in facial hair.      MEDICATIONS:   Current Outpatient Prescriptions   Medication     Calcium Carbonate-Vitamin D (CALCIUM + D PO)     cetirizine (ZYRTEC) 10 MG tablet     Cholecalciferol (VITAMIN D) 2000 UNITS CAPS     Cyanocobalamin ([employer]S B-12) 5000 MCG SUBL     cyclobenzaprine  (FLEXERIL) 10 MG tablet     Diphenhydramine-APAP, sleep, (TYLENOL PM EXTRA STRENGTH PO)     ferrous sulfate (IRON) 325 (65 FE) MG tablet     hydrochlorothiazide (HYDRODIURIL) 25 MG tablet     hydrOXYzine (VISTARIL) 50 MG capsule     MELATONIN PO     metroNIDAZOLE (METROGEL) 0.75 % vaginal gel     Multiple Vitamin (MULTIVITAMINS PO)     naltrexone-bupropion (CONTRAVE) 8-90 MG per 12 hr tablet     polyethylene glycol (MIRALAX) powder     No current facility-administered medications for this visit.        Weight Loss Medication History Reviewed With Patient 4/23/2018   Which weight loss medications are you currently taking on a regular basis?  Contrave (naltrexone/bupropion)   Are you having any side effects from the weight loss medication that we have prescribed you? No   If you are having side effects please describe: -       ASSESSMENT:   Obesity, with good weight loss result on medium dose QSYMIA.   She is wondering is she is having side effects to it. It is primarily low mood.   She has gained weight back last time she stopped QSYMIA.   Inadequate response to Contrave (spoitty intake, poor compliance with BID dosing)    PLAN:   - restart combination phentermine/topiramate at generic dosing due to cost concerns.   - intensify diet efforts and increase exercise     FOLLOW-UP:    12 weeks.    Time: 20 min spent on evaluation, management, counseling, education, & motivational interviewing with greater than 50 % of the total time was spent on counseling and coordinating care    Sincerely,    Hector Justice MD

## 2018-04-23 NOTE — NURSING NOTE
"  Chief Complaint   Patient presents with     Weight Problem     RMWM     Vitals:    04/23/18 1308   BP: 139/87   Pulse: 97   SpO2: 97%   Weight: 197 lb 8 oz   Height: 5' 9.75\"     Body mass index is 28.54 kg/(m^2).  Liya Curran CMA    "

## 2018-05-02 ENCOUNTER — OFFICE VISIT (OUTPATIENT)
Dept: PSYCHOLOGY | Facility: CLINIC | Age: 38
End: 2018-05-02
Payer: COMMERCIAL

## 2018-05-02 VITALS — BODY MASS INDEX: 27.92 KG/M2 | WEIGHT: 193.2 LBS

## 2018-05-02 DIAGNOSIS — E66.01 PSYCHOLOGICAL FACTORS AFFECTING MORBID OBESITY (H): Primary | ICD-10-CM

## 2018-05-02 DIAGNOSIS — F33.0 MAJOR DEPRESSIVE DISORDER, RECURRENT EPISODE, MILD (H): ICD-10-CM

## 2018-05-02 DIAGNOSIS — F54 PSYCHOLOGICAL FACTORS AFFECTING MORBID OBESITY (H): Primary | ICD-10-CM

## 2018-05-02 NOTE — MR AVS SNAPSHOT
After Visit Summary   5/2/2018    Ashley Catherine    MRN: [id number]           Patient Information     Date Of Birth          1980        Visit Information        Provider Department      5/2/2018 4:00 PM Jayro Elias, PhD [facility]        Today's Diagnoses     Psychological factors affecting morbid obesity (H)    -  1    Major depressive disorder, recurrent episode, mild (H)           Follow-ups after your visit        Your next 10 appointments already scheduled     Aug 20, 2018 11:00 AM CDT   (Arrive by 10:45 AM)   Return Visit with Hector Justice MD   [facility] ([facility])    [address]  [address] [facility] [address] [phone]              Who to contact     Please call your clinic at [phone] to:    Ask questions about your health    Make or cancel appointments    Discuss your medicines    Learn about your test results    Speak to your doctor            Additional Information About Your Visit        MyChart Information     [employer] gives you secure access to your electronic health record. If you see a primary care provider, you can also send messages to your care team and make appointments. If you have questions, please call your primary care clinic.  If you do not have a primary care provider, please call [phone] and they will assist you.      [employer] is an electronic gateway that provides easy, online access to your medical records. With [employer], you can request a clinic appointment, read your test results, renew a prescription or communicate with your care team.     To access your existing account, please contact your [facility] or call [phone] for assistance.        Care EveryWhere ID     This is your Care EveryWhere ID. This could be used by other organizations to access your [facility] medical records  [id number]        Your Vitals Were     BMI  (Body Mass Index)                   27.92 kg/m2            Blood Pressure from Last 3 Encounters:   04/23/18 139/87   04/03/18 (!) 138/96   03/26/18 118/62    Weight from Last 3 Encounters:   05/02/18 87.6 kg (193 lb 3.2 oz)   04/23/18 89.6 kg (197 lb 8 oz)   04/16/18 87.8 kg (193 lb 8 oz)              Today, you had the following     No orders found for display       Primary Care Provider Office Phone # Fax #    Violet Humphreys [phone]       [address] [address]        Equal Access to Services     MABEL BAILEY : Nati Alejandre, paulo bravo, elisabeth vale, carmen weston. So [facility] [phone].    ATENCIÓN: Si habla español, tiene a gibbons disposición servicios gratuitos de asistencia lingüística. Llame al [phone].    We comply with applicable federal civil rights laws and [place] laws. We do not discriminate on the basis of race, color, national origin, age, disability, sex, sexual orientation, or gender identity.            Thank you!     Thank you for choosing [facility]  for your care. Our goal is always to provide you with excellent care. Hearing back from our patients is one way we can continue to improve our services. Please take a few minutes to complete the written survey that you may receive in the mail after your visit with us. Thank you!             Your Updated Medication List - Protect others around you: Learn how to safely use, store and throw away your medicines at www.disposemymeds.org.          This list is accurate as of 5/2/18  5:03 PM.  Always use your most recent med list.                   Brand Name Dispense Instructions for use Diagnosis    CALCIUM + D PO      Take by mouth daily        cetirizine 10 MG tablet    zyrTEC     Take 10 mg by mouth daily as needed for allergies        [employer] B-12 5000 MCG Subl   Generic drug:  Cyanocobalamin       Multiple thyroid  nodules       cyclobenzaprine 10 MG tablet    FLEXERIL    30 tablet    TAKE ONE-HALF TABLET BY MOUTH TWICE A DAY AS NEEDED FOR MUSCLE SPASMS    Sciatica, unspecified laterality       ferrous sulfate 325 (65 Fe) MG tablet    IRON     Take 325 mg by mouth daily (with breakfast)        hydrochlorothiazide 25 MG tablet    HYDRODIURIL    90 tablet    Take 1 tablet (25 mg) by mouth daily    Essential hypertension with goal blood pressure less than 140/90       hydrOXYzine 50 MG capsule    VISTARIL    30 capsule    Take 1-2 capsules ( mg) by mouth nightly as needed for anxiety (sleep)    Adjustment disorder with anxious mood       MELATONIN PO      Take 3 mg by mouth        metroNIDAZOLE 0.75 % vaginal gel    METROGEL    70 g    USE TWICE WEEKLY TO VAGINA FOR RECURRENT BV SYMPTOMS. DO THIS FOR 3 MONTHS AFTER FINISHING ORAL MEDICATIONS.    Abnormal vaginal fluids       MULTIVITAMINS PO      Take by mouth daily        naltrexone-bupropion 8-90 MG per 12 hr tablet    CONTRAVE    120 tablet    Take 2 tablets by mouth 2 times daily    Morbid obesity (H)       phentermine 30 MG capsule     30 capsule    Take 1 capsule (30 mg) by mouth every morning    Class 1 obesity due to excess calories without serious comorbidity in adult, unspecified BMI       polyethylene glycol powder    MIRALAX    510 g    Take 17 g (1 capful) by mouth daily as needed for constipation    Constipation, unspecified constipation type       topiramate 50 MG tablet    TOPAMAX    90 tablet    Take 1 tablet (50 mg) by mouth daily    Class 1 obesity due to excess calories without serious comorbidity in adult, unspecified BMI       TYLENOL PM EXTRA STRENGTH PO           vitamin D 2000 units Caps

## 2018-05-02 NOTE — PROGRESS NOTES
Health Psychology                  Clinic    Department of Medicine  Lis Chavez, Ph.D., L.P. [phone]                          [facility] Earline Anderson, Ph.D.,  L.P. [phone]                 3rd Floor, Clinic 3A  [place] [address]   Jayro Elias, Ph.D., A.B.P.P., L.P. [phone]     [address] Melissa Stokes, Ph.D., L.P. [phone]  [place] [id number]  [place] [address]    Health Psychology Follow-Up Note    Ashley Catherine is a 37-year-old woman referred initially  for psychological consultation for workup for a gastric sleeve procedure who now returns following the surgery, with concerns about weight management.  She initiated topirimate and is making great progress.    We reviewed in detail the changes she is making with regard to nutrional intake. She is cooking more of her own food.  She is exercising with 10,000 or more steps per day almost every day.  She is reinforced for the changes and can see the pattern of not doing as well when travelling.  She recently has been travelling more, but also walking more (e.g., climbed a mountain in [place]).    She is up to 193.2 lbs.  She has stated she plans to exercise at an hour most days, with some days 1.5 hours.  She had a lobectomy of her thyroid 4/3/18 and dropped off exercise for a few weeks afterwards.    Wt Readings from Last 4 Encounters:   05/02/18 87.6 kg (193 lb 3.2 oz)   04/23/18 89.6 kg (197 lb 8 oz)   04/16/18 87.8 kg (193 lb 8 oz)   04/03/18 86.2 kg (190 lb)     Past Medical History:   Diagnosis Date     Bariatric surgery status 05/13/2013     Depression      Esophageal reflux      Family history of hyperparathyroidism 3/6/2015     Forearm fracture childhood    jumping fall     Guillain-Floweree disease (H) age 14    hospitalized at [facility] x 1 week     History of steroid therapy      HX ABNL PAP SMEAR OF CERVIX   1995    LEEP AT 15 yo      Hypertension      Hypertrophy of breast      Hypertrophy of tonsils alone      Hypovitaminosis D     with secondary high PTH     Irregular heart beat     palpitations     LBP (low back pain)      LSIL (low grade squamous intraepithelial lesion) on Pap smear 5/2006     Lumbago     RESOLVED     Major depressive disorder, recurrent episode, in partial or unspecified remission 4/1/2013     Morbid obesity (H)     bariatric surgery 5/13/2013     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)      OLIGOMENORRHEA, C/W PCOS      Sciatica      SLEEP APNEA 6/2002    No longer has since tonsillectomy and septoplasty     Past Surgical History:   Procedure Laterality Date     BIOPSY  03/13/2015    Thyroid nodule     ESOPHAGOSCOPY, GASTROSCOPY, DUODENOSCOPY (EGD), COMBINED  5/28/2013    Procedure: COMBINED ESOPHAGOSCOPY, GASTROSCOPY, DUODENOSCOPY (EGD);;  Surgeon: Best Willett MD;  Location:  GI     LAPAROSCOPIC GASTRIC SLEEVE  5/13/2013    Procedure: LAPAROSCOPIC GASTRIC SLEEVE;  Laparoscopic Sleeve Gastrectomy ;  Surgeon: Best Willett MD;  Location:  OR     LEEP TX, CERVICAL  1995    age 15     SEPTOPLASTY       THYROIDECTOMY Left 4/3/2018    Procedure: THYROIDECTOMY;  Left Thyroid Lobectomy And Ishtmusectomy;  Surgeon: Delia Harper MD;  Location:  OR     TONSILLECTOMY  age 20s     TONSILLECTOMY  2004?     wisdom teeth extraction         She is  5 foot 11 inches.  Her goal is 175.    Her mood is much improved.  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 Training starts in 2 weeks.   She still has hopes to leave this area, trying to give self goal of 9/18.  We discussed contacting the [employer] to obtain financial counselling  She spends a lot on travel, gambliing, and shopping.  Her reasons for leaving the area are to be able to meet more [place] potential partners and to try being away for the first time.   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  She has been  participating in various activities. We discussed relationship, communication and other issues.     Affect is positive despite her frustrations with her weight and work.  Rapport is excellent.   Extended session due to complexity of case and length of interval.      Time in:  4:02  Time out:  4:58    Diagnosis:   Psychological factors affecting obesity (F54).   Major depression, recurrent, mild (F3e3.0).   Occupational Problems (Z56.9)    PLAN/RECOMMENDATIONS:   1. Ms. Catherine will return 6/27 @ 11:00 to address weight loss and social issues with problem solving therapy. .  2.  Resume schedule of regular exercise and  decrease grazing eating.      Tx plan due 5/3/18  (next session)

## 2018-05-10 ENCOUNTER — OFFICE VISIT (OUTPATIENT)
Dept: FAMILY MEDICINE | Facility: CLINIC | Age: 38
End: 2018-05-10
Payer: COMMERCIAL

## 2018-05-10 VITALS
TEMPERATURE: 100.1 F | HEART RATE: 100 BPM | RESPIRATION RATE: 20 BRPM | HEIGHT: 70 IN | BODY MASS INDEX: 27.63 KG/M2 | DIASTOLIC BLOOD PRESSURE: 94 MMHG | OXYGEN SATURATION: 98 % | WEIGHT: 193 LBS | SYSTOLIC BLOOD PRESSURE: 139 MMHG

## 2018-05-10 DIAGNOSIS — K21.00 GASTROESOPHAGEAL REFLUX DISEASE WITH ESOPHAGITIS: Primary | ICD-10-CM

## 2018-05-10 DIAGNOSIS — E89.0 S/P PARTIAL THYROIDECTOMY: ICD-10-CM

## 2018-05-10 DIAGNOSIS — E04.2 MULTIPLE THYROID NODULES: ICD-10-CM

## 2018-05-10 PROCEDURE — 36415 COLL VENOUS BLD VENIPUNCTURE: CPT | Performed by: SURGERY

## 2018-05-10 PROCEDURE — 99214 OFFICE O/P EST MOD 30 MIN: CPT | Performed by: FAMILY MEDICINE

## 2018-05-10 PROCEDURE — 84443 ASSAY THYROID STIM HORMONE: CPT | Performed by: SURGERY

## 2018-05-10 RX ORDER — OMEPRAZOLE 40 MG/1
40 CAPSULE, DELAYED RELEASE ORAL DAILY
Qty: 30 CAPSULE | Refills: 1 | Status: SHIPPED | OUTPATIENT
Start: 2018-05-10 | End: 2018-06-14

## 2018-05-10 RX ORDER — POTASSIUM CHLORIDE 1500 MG/1
20 TABLET, EXTENDED RELEASE ORAL DAILY
Qty: 180 TABLET | Refills: 3 | COMMUNITY
Start: 2018-05-10 | End: 2018-06-07

## 2018-05-10 NOTE — PROGRESS NOTES
SUBJECTIVE:   Ashley Catherine is a 38 year old female who presents to clinic today for the following health issues:      Surgical follow up--patient that she is having some acid reflux and may need medications.    Patient did have partial thyroidectomy because of persistent thyroid nodule.  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  During the procedure it was noted that she had inflamed and irritated vocal cords.  She has tried over-the-counter omeprazole 20 mg and it helped a little but not much.  She has not noticed rhoda heartburn or symptoms but has noticed a soreness along her neck and throat.  She has never had EGD evaluation before.  She does have a history of gastric sleeve and does feel like symptoms started at some point after this procedure but it was not immediately related.        Problem list and histories reviewed & adjusted, as indicated.  Additional history: as documented    Patient Active Problem List   Diagnosis     OLIGOMENORRHEA, C/W PCOS     HX ABNL PAP SMEAR OF CERVIX       Flat feet     Sciatica     S/P gastrectomy     Occupational problem     Elevated parathyroid hormone     Multiple thyroid nodules     Abnormal thyroid cytology-follicular neoplasm     Chronic pain syndrome     Major depressive disorder, recurrent episode, in full remission (H)     Bilateral low back pain with sciatica, sciatica laterality unspecified     Throat symptom     Psychological factor affecting physical condition     Hx of Guillain-Blythe syndrome     Adjustment disorder with anxious mood     Thyroid nodule     Benign essential hypertension     Past Surgical History:   Procedure Laterality Date     BIOPSY  03/13/2015    Thyroid nodule     ESOPHAGOSCOPY, GASTROSCOPY, DUODENOSCOPY (EGD), COMBINED  5/28/2013    Procedure: COMBINED ESOPHAGOSCOPY, GASTROSCOPY, DUODENOSCOPY (EGD);;  Surgeon: Best Willett MD;  Location:  GI     LAPAROSCOPIC GASTRIC SLEEVE  5/13/2013    Procedure: LAPAROSCOPIC GASTRIC  SLEEVE;  Laparoscopic Sleeve Gastrectomy ;  Surgeon: Best Willett MD;  Location: [facility] OR     LEEP TX, CERVICAL  1995    age 15     SEPTOPLASTY       THYROIDECTOMY Left 4/3/2018    Procedure: THYROIDECTOMY;  Left Thyroid Lobectomy And Ishtmusectomy;  Surgeon: Delia Harper MD;  Location: [facility] OR     TONSILLECTOMY  age 20s     TONSILLECTOMY  2004?     wisdom teeth extraction         Social History   Substance Use Topics     Smoking status: Never Smoker     Smokeless tobacco: Never Used     Alcohol use 2.4 - 3.0 oz/week      Comment: 3 drinks/week     Family History   Problem Relation Age of Onset     Hypertension Sister      Hypertension Father      Allergies Father      seasonal     Lipids Father      Obesity Father      Arthritis Mother      Gynecology Mother      problems with menopause     Hypertension Mother      Other Cancer Mother      Endometrial     OSTEOPOROSIS Mother      Obesity Mother      Parathyroid Disorders Mother      3 operations     Obesity Sister      DIABETES Maternal Aunt      x several w. DM     Breast Cancer Maternal Aunt      Cancer - colorectal Maternal Uncle      60ish     Hypertension Sister      Obesity Sister      Nephrolithiasis No family hx of          Current Outpatient Prescriptions   Medication Sig Dispense Refill     Calcium Carbonate-Vitamin D (CALCIUM + D PO) Take by mouth daily       cetirizine (ZYRTEC) 10 MG tablet Take 10 mg by mouth daily as needed for allergies       Cholecalciferol (VITAMIN D) 2000 UNITS CAPS        Cyanocobalamin ([employer] B-12) 5000 MCG SUBL        cyclobenzaprine (FLEXERIL) 10 MG tablet TAKE ONE-HALF TABLET BY MOUTH TWICE A DAY AS NEEDED FOR MUSCLE SPASMS 30 tablet 5     Diphenhydramine-APAP, sleep, (TYLENOL PM EXTRA STRENGTH PO)        ferrous sulfate (IRON) 325 (65 FE) MG tablet Take 325 mg by mouth daily (with breakfast)       hydrochlorothiazide (HYDRODIURIL) 25 MG tablet Take 1 tablet (25 mg) by mouth daily 90 tablet 1     hydrOXYzine  "(VISTARIL) 50 MG capsule Take 1-2 capsules ( mg) by mouth nightly as needed for anxiety (sleep) 30 capsule 1     MELATONIN PO Take 3 mg by mouth       metroNIDAZOLE (METROGEL) 0.75 % vaginal gel USE TWICE WEEKLY TO VAGINA FOR RECURRENT BV SYMPTOMS. DO THIS FOR 3 MONTHS AFTER FINISHING ORAL MEDICATIONS. 70 g 3     Multiple Vitamin (MULTIVITAMINS PO) Take by mouth daily       omeprazole (PRILOSEC) 40 MG capsule Take 1 capsule (40 mg) by mouth daily Take 30-60 minutes before a meal. 30 capsule 1     phentermine 30 MG capsule Take 1 capsule (30 mg) by mouth every morning 30 capsule 5     polyethylene glycol (MIRALAX) powder Take 17 g (1 capful) by mouth daily as needed for constipation 510 g 1     potassium chloride SA (KLOR-CON) 20 MEQ CR tablet Take 1 tablet (20 mEq) by mouth daily 180 tablet 3     topiramate (TOPAMAX) 50 MG tablet Take 1 tablet (50 mg) by mouth daily 90 tablet 6       Reviewed and updated as needed this visit by clinical staff       Reviewed and updated as needed this visit by Provider         ROS:  Constitutional, HEENT, cardiovascular, pulmonary, gi and gu systems are negative, except as otherwise noted.    OBJECTIVE:                                                    BP (!) 139/94 (BP Location: Left arm, Patient Position: Sitting, Cuff Size: Adult Regular)  Pulse 100  Temp 100.1  F (37.8  C) (Oral)  Resp 20  Ht 5' 9.75\" (1.772 m)  Wt 193 lb (87.5 kg)  SpO2 98%  Breastfeeding? No  BMI 27.89 kg/m2  Body mass index is 27.89 kg/(m^2).  GENERAL APPEARANCE: healthy, alert and no distress  HENT: oral mucous membranes moist, oropharynx clear and slight erythema is noted along posterior oropharynx but no enlarged tonsils no lymph nodes.  NECK: no adenopathy, no asymmetry, masses, or scars, thyroid normal to palpation and well-healed scar along anterior neck  ABDOMEN: soft, nontender, without hepatosplenomegaly or masses and bowel sounds normal         ASSESSMENT/PLAN:                          "                           1. Gastroesophageal reflux disease with esophagitis   Encouraged her to set up EGD evaluation can try higher dose of omeprazole for the next month and see how she feels    - GASTROENTEROLOGY ADULT REF PROCEDURE ONLY Shanae Stantonierge [phone]; No Provider Preference  - omeprazole (PRILOSEC) 40 MG capsule; Take 1 capsule (40 mg) by mouth daily Take 30-60 minutes before a meal.  Dispense: 30 capsule; Refill: 1    2. Multiple thyroid nodules  Lab today for a repeat thyroid level.  We will keep an eye and parathyroid function calcium balance and vitamin D levels.  Good response to surgery she is going to      3. S/P partial thyroidectomy    - TSH with free T4 reflex      Follow up with Provider - as needed     Violet Humphreys MD  [facility]

## 2018-05-10 NOTE — MR AVS SNAPSHOT
After Visit Summary   5/10/2018    Ashley Catherine    MRN: [id number]           Patient Information     Date Of Birth          1980        Visit Information        Provider Department      5/10/2018 1:30 PM Violet Humphreys MD [facility]        Today's Diagnoses     Gastroesophageal reflux disease with esophagitis    -  1    Multiple thyroid nodules        S/P partial thyroidectomy           Follow-ups after your visit        Additional Services     GASTROENTEROLOGY ADULT REF PROCEDURE ONLY Shanae Enriquez [phone]; No Provider Preference       Last Lab Result: Creatinine (mg/dL)       Date                     Value                 03/26/2018               0.75             ----------  Body mass index is 27.89 kg/(m^2).      Patient will be contacted to schedule procedure.     Please be aware that coverage of these services is subject to the terms and limitations of your health insurance plan.  Call member services at your health plan with any benefit or coverage questions.  Any procedures must be performed at a [facility] facility OR coordinated by your clinic's referral office.    Please bring the following with you to your appointment:    (1) Any X-Rays, CTs or MRIs which have been performed.  Contact the facility where they were done to arrange for  prior to your scheduled appointment.    (2) List of current medications   (3) This referral request   (4) Any documents/labs given to you for this referral                  Your next 10 appointments already scheduled     Jun 27, 2018 11:00 AM CDT   (Arrive by 10:45 AM)   Return Visit with Jayro Elias, PhD [facility] ([facility])    [address] [place] [address]  3rd [facility] [address] [phone]            Aug 20, 2018 11:00 AM CDT   (Arrive by 10:45 AM)   Return Visit with Hector Justice MD   [facility] ([facility]    [address] [facility]  4th [facility] [address] [phone]              Future tests that were ordered for you today     Open Future Orders        Priority Expected Expires Ordered    Phosphorus Routine 5/31/2018 5/10/2019 5/10/2018    **Parathyroid Hormone Intact FUTURE 2mo Routine 5/31/2018 5/10/2019 5/10/2018    Vitamin D deficiency screening Routine 5/31/2018 5/10/2019 5/10/2018    Comprehensive metabolic panel Routine 5/31/2018 5/10/2019 5/10/2018            Who to contact     If you have questions or need follow up information about today's clinic visit or your schedule please contact [facility] directly at [phone].  Normal or non-critical lab and imaging results will be communicated to you by MyChart, letter or phone within 4 business days after the clinic has received the results. If you do not hear from us within 7 days, please contact the clinic through Trippinghart or phone. If you have a critical or abnormal lab result, we will notify you by phone as soon as possible.  Submit refill requests through Adim8 or call your pharmacy and they will forward the refill request to us. Please allow 3 business days for your refill to be completed.          Additional Information About Your Visit        Adim8 Information     Adim8 gives you secure access to your electronic health record. If you see a primary care provider, you can also send messages to your care team and make appointments. If you have questions, please call your primary care clinic.  If you do not have a primary care provider, please call [phone] and they will assist you.        Care EveryWhere ID     This is your Care EveryWhere ID. This could be used by other organizations to access your [facility] medical records  [id number]        Your Vitals Were     Pulse Temperature Respirations Height Pulse Oximetry Breastfeeding?    100 100.1  F (37.8  C) (Oral) 20 5' 9.75\" (1.772 m) 98% No    BMI (Body " Mass Index)                   27.89 kg/m2            Blood Pressure from Last 3 Encounters:   05/10/18 (!) 139/94   04/23/18 139/87   04/03/18 (!) 138/96    Weight from Last 3 Encounters:   05/10/18 193 lb (87.5 kg)   05/02/18 193 lb 3.2 oz (87.6 kg)   04/23/18 197 lb 8 oz (89.6 kg)              We Performed the Following     GASTROENTEROLOGY ADULT REF PROCEDURE ONLY [facility] [phone]; No Provider Preference     TSH with free T4 reflex          Today's Medication Changes          These changes are accurate as of 5/10/18  3:16 PM.  If you have any questions, ask your nurse or doctor.               Start taking these medicines.        Dose/Directions    omeprazole 40 MG capsule   Commonly known as:  priLOSEC   Used for:  Gastroesophageal reflux disease with esophagitis   Started by:  Violet Humphreys MD        Dose:  40 mg   Take 1 capsule (40 mg) by mouth daily Take 30-60 minutes before a meal.   Quantity:  30 capsule   Refills:  1            Where to get your medicines      These medications were sent to [facility] [address], [address]  [address], Suite [facility], [facility] [address]     Phone:  [phone]     omeprazole 40 MG capsule                Primary Care Provider Office Phone # Fax #    Violet Humphryes [phone] [address] [address]        Equal Access to Services     MABEL BAILEY AH: Hadii sabas lundberg hadasho Soselvinali, waaxda luqadaha, qaybta kaalmada adeegyada, carmen weston. So [facility] [phone].    ATENCIÓN: Si habla español, tiene a gibbons disposición servicios gratuitos de asistencia lingüística. Llame al [phone].    We comply with applicable federal civil rights laws and [place] laws. We do not discriminate on the basis of race, color, national origin, age, disability, sex, sexual orientation, or gender identity.            Thank you!     Thank you for  choosing [facility]  for your care. Our goal is always to provide you with excellent care. Hearing back from our patients is one way we can continue to improve our services. Please take a few minutes to complete the written survey that you may receive in the mail after your visit with us. Thank you!             Your Updated Medication List - Protect others around you: Learn how to safely use, store and throw away your medicines at www.disposemymeds.org.          This list is accurate as of 5/10/18  3:16 PM.  Always use your most recent med list.                   Brand Name Dispense Instructions for use Diagnosis    CALCIUM + D PO      Take by mouth daily        cetirizine 10 MG tablet    zyrTEC     Take 10 mg by mouth daily as needed for allergies        CVS B-12 5000 MCG Subl   Generic drug:  Cyanocobalamin       Multiple thyroid nodules       cyclobenzaprine 10 MG tablet    FLEXERIL    30 tablet    TAKE ONE-HALF TABLET BY MOUTH TWICE A DAY AS NEEDED FOR MUSCLE SPASMS    Sciatica, unspecified laterality       ferrous sulfate 325 (65 Fe) MG tablet    IRON     Take 325 mg by mouth daily (with breakfast)        hydrochlorothiazide 25 MG tablet    HYDRODIURIL    90 tablet    Take 1 tablet (25 mg) by mouth daily    Essential hypertension with goal blood pressure less than 140/90       hydrOXYzine 50 MG capsule    VISTARIL    30 capsule    Take 1-2 capsules ( mg) by mouth nightly as needed for anxiety (sleep)    Adjustment disorder with anxious mood       KLOR-CON 20 MEQ CR tablet   Generic drug:  potassium chloride SA     180 tablet    Take 1 tablet (20 mEq) by mouth daily        MELATONIN PO      Take 3 mg by mouth        metroNIDAZOLE 0.75 % vaginal gel    METROGEL    70 g    USE TWICE WEEKLY TO VAGINA FOR RECURRENT BV SYMPTOMS. DO THIS FOR 3 MONTHS AFTER FINISHING ORAL MEDICATIONS.    Abnormal vaginal fluids       MULTIVITAMINS PO      Take by mouth daily        omeprazole 40 MG capsule    priLOSEC     30 capsule    Take 1 capsule (40 mg) by mouth daily Take 30-60 minutes before a meal.    Gastroesophageal reflux disease with esophagitis       phentermine 30 MG capsule     30 capsule    Take 1 capsule (30 mg) by mouth every morning    Class 1 obesity due to excess calories without serious comorbidity in adult, unspecified BMI       polyethylene glycol powder    MIRALAX    510 g    Take 17 g (1 capful) by mouth daily as needed for constipation    Constipation, unspecified constipation type       topiramate 50 MG tablet    TOPAMAX    90 tablet    Take 1 tablet (50 mg) by mouth daily    Class 1 obesity due to excess calories without serious comorbidity in adult, unspecified BMI       TYLENOL PM EXTRA STRENGTH PO           vitamin D 2000 units Caps

## 2018-05-11 LAB — TSH SERPL DL<=0.005 MIU/L-ACNC: 1.77 MU/L (ref 0.4–4)

## 2018-05-11 ASSESSMENT — PATIENT HEALTH QUESTIONNAIRE - PHQ9: SUM OF ALL RESPONSES TO PHQ QUESTIONS 1-9: 7

## 2018-06-07 ENCOUNTER — OFFICE VISIT (OUTPATIENT)
Dept: FAMILY MEDICINE | Facility: CLINIC | Age: 38
End: 2018-06-07
Payer: COMMERCIAL

## 2018-06-07 VITALS
DIASTOLIC BLOOD PRESSURE: 80 MMHG | HEIGHT: 70 IN | WEIGHT: 196.5 LBS | OXYGEN SATURATION: 98 % | HEART RATE: 113 BPM | SYSTOLIC BLOOD PRESSURE: 136 MMHG | BODY MASS INDEX: 28.13 KG/M2 | RESPIRATION RATE: 16 BRPM

## 2018-06-07 DIAGNOSIS — I10 BENIGN ESSENTIAL HYPERTENSION: Primary | ICD-10-CM

## 2018-06-07 PROCEDURE — 99214 OFFICE O/P EST MOD 30 MIN: CPT | Performed by: FAMILY MEDICINE

## 2018-06-07 PROCEDURE — 80053 COMPREHEN METABOLIC PANEL: CPT | Performed by: FAMILY MEDICINE

## 2018-06-07 PROCEDURE — 36415 COLL VENOUS BLD VENIPUNCTURE: CPT | Performed by: FAMILY MEDICINE

## 2018-06-07 PROCEDURE — 84443 ASSAY THYROID STIM HORMONE: CPT | Performed by: FAMILY MEDICINE

## 2018-06-07 RX ORDER — POTASSIUM CHLORIDE 1500 MG/1
20 TABLET, EXTENDED RELEASE ORAL DAILY
Qty: 180 TABLET | Refills: 3 | Status: SHIPPED | OUTPATIENT
Start: 2018-06-07 | End: 2018-09-15

## 2018-06-07 NOTE — MR AVS SNAPSHOT
After Visit Summary   6/7/2018    Ashley Catherine    MRN: [id number]           Patient Information     Date Of Birth          1980        Visit Information        Provider Department      6/7/2018 3:45 PM Violet Humphreys MD [facility]        Today's Diagnoses     Benign essential hypertension    -  1       Follow-ups after your visit        Your next 10 appointments already scheduled     Jun 13, 2018   Procedure with Westley Gibbs MD   [facility] ([facility])    [address]  [place] [address]   [phone]           [facility] is located at [address] [place]            Jun 27, 2018 11:00 AM CDT   (Arrive by 10:45 AM)   Return Visit with Jayro Elias, PhD [facility] ([facility])    [address] [place] [address]  3rd [facility] [address] [phone]            Aug 20, 2018 11:00 AM CDT   (Arrive by 10:45 AM)   Return Visit with Hector Justice MD   [facility] ([facility])    [address] [place] [address]  4th [facility] [address] [phone]              Who to contact     If you have questions or need follow up information about today's clinic visit or your schedule please contact [facility] directly at [phone].  Normal or non-critical lab and imaging results will be communicated to you by MyChart, letter or phone within 4 business days after the clinic has received the results. If you do not hear from us within 7 days, please contact the clinic through MyChart or phone. If you have a critical or abnormal lab result, we will notify you by phone as soon as possible.  Submit refill requests through MADS or call your pharmacy and they will forward the refill request to us. Please allow 3 business days for your refill to be completed.          Additional Information About Your  "Visit        MyChart Information     Siminarshart gives you secure access to your electronic health record. If you see a primary care provider, you can also send messages to your care team and make appointments. If you have questions, please call your primary care clinic.  If you do not have a primary care provider, please call [phone] and they will assist you.        Care EveryWhere ID     This is your Care EveryWhere ID. This could be used by other organizations to access your [facility] medical records  [id number]        Your Vitals Were     Pulse Respirations Height Pulse Oximetry BMI (Body Mass Index)       113 16 5' 9.75\" (1.772 m) 98% 28.4 kg/m2        Blood Pressure from Last 3 Encounters:   06/07/18 136/80   05/10/18 (!) 139/94   04/23/18 139/87    Weight from Last 3 Encounters:   06/07/18 196 lb 8 oz (89.1 kg)   05/10/18 193 lb (87.5 kg)   05/02/18 193 lb 3.2 oz (87.6 kg)              We Performed the Following     Comprehensive metabolic panel     TSH with free T4 reflex          Where to get your medicines      These medications were sent to [facility] [address]  [address], [facility] [address], [facility] [address]     Phone:  [phone]     KLOR-CON 20 MEQ CR tablet          Primary Care Provider Office Phone # Fax #    Violet Humphreys [phone] [address] [address]        Equal Access to Services     MABEL BAIELY : Hadii sabas gabrielo Sowaldo, waaxda luqadaha, qaybta kaalmada akanksha, carmen weston. So [facility] [phone].    ATENCIÓN: Si habla español, tiene a gibbons disposición servicios gratuitos de asistencia lingüística. Llame al [phone].    We comply with applicable federal civil rights laws and [place] laws. We do not discriminate on the basis of race, color, national origin, age, disability, sex, sexual orientation, or gender identity.            Thank " you!     Thank you for choosing [facility]  for your care. Our goal is always to provide you with excellent care. Hearing back from our patients is one way we can continue to improve our services. Please take a few minutes to complete the written survey that you may receive in the mail after your visit with us. Thank you!             Your Updated Medication List - Protect others around you: Learn how to safely use, store and throw away your medicines at www.disposemymeds.org.          This list is accurate as of 6/7/18  4:13 PM.  Always use your most recent med list.                   Brand Name Dispense Instructions for use Diagnosis    CALCIUM + D PO      Take by mouth daily        cetirizine 10 MG tablet    zyrTEC     Take 10 mg by mouth daily as needed for allergies        CVS B-12 5000 MCG Subl   Generic drug:  Cyanocobalamin       Multiple thyroid nodules       cyclobenzaprine 10 MG tablet    FLEXERIL    30 tablet    TAKE ONE-HALF TABLET BY MOUTH TWICE A DAY AS NEEDED FOR MUSCLE SPASMS    Sciatica, unspecified laterality       ferrous sulfate 325 (65 Fe) MG tablet    IRON     Take 325 mg by mouth daily (with breakfast)        hydrochlorothiazide 25 MG tablet    HYDRODIURIL    90 tablet    Take 1 tablet (25 mg) by mouth daily    Essential hypertension with goal blood pressure less than 140/90       hydrOXYzine 50 MG capsule    VISTARIL    30 capsule    Take 1-2 capsules ( mg) by mouth nightly as needed for anxiety (sleep)    Adjustment disorder with anxious mood       KLOR-CON 20 MEQ CR tablet   Generic drug:  potassium chloride SA     180 tablet    Take 1 tablet (20 mEq) by mouth daily    Benign essential hypertension       MELATONIN PO      Take 3 mg by mouth        metroNIDAZOLE 0.75 % vaginal gel    METROGEL    70 g    USE TWICE WEEKLY TO VAGINA FOR RECURRENT BV SYMPTOMS. DO THIS FOR 3 MONTHS AFTER FINISHING ORAL MEDICATIONS.    Abnormal vaginal fluids       MULTIVITAMINS PO      Take by  mouth daily        omeprazole 40 MG capsule    priLOSEC    30 capsule    Take 1 capsule (40 mg) by mouth daily Take 30-60 minutes before a meal.    Gastroesophageal reflux disease with esophagitis       phentermine 30 MG capsule     30 capsule    Take 1 capsule (30 mg) by mouth every morning    Class 1 obesity due to excess calories without serious comorbidity in adult, unspecified BMI       polyethylene glycol powder    MIRALAX    510 g    Take 17 g (1 capful) by mouth daily as needed for constipation    Constipation, unspecified constipation type       topiramate 50 MG tablet    TOPAMAX    90 tablet    Take 1 tablet (50 mg) by mouth daily    Class 1 obesity due to excess calories without serious comorbidity in adult, unspecified BMI       TYLENOL PM EXTRA STRENGTH PO           vitamin D 2000 units Caps

## 2018-06-07 NOTE — PROGRESS NOTES
SUBJECTIVE:   Ashley Catherine is a 38 year old female who presents to clinic today for the following health issues:      Swelling -tends to get this with travel but has a routine that she usually follows involving compression socks hydration and movement.    Recently traveled  Was in a tight seat unable to move, did not have compression socks on and had a glass of wine before getting on the plane.  Woke up after sleeping most of the flight and felt swelling all over.  Had swelling of her entire body with   Flew back Monday and still feels puffy  Feels icky and lethargy  Her period started on Sunday - actually felt little better with this    Diet is not new - no changes    Has not had any changes to her medications although she is no longer taking brand version of Qysmia - taking geenrics    Denies any shortness of breath  Does feel that most of the swelling and edema has passed  Is planning on getting another flight about 3 hours and is worried about recurrence.  Wt Readings from Last 4 Encounters:   06/07/18 196 lb 8 oz (89.1 kg)   05/10/18 193 lb (87.5 kg)   05/02/18 193 lb 3.2 oz (87.6 kg)   04/23/18 197 lb 8 oz (89.6 kg)     Is definitely a couple pounds up from her last visit with us a month ago        Problem list and histories reviewed & adjusted, as indicated.  Additional history: as documented    Patient Active Problem List   Diagnosis     OLIGOMENORRHEA, C/W PCOS     HX ABNL PAP SMEAR OF CERVIX       Flat feet     Sciatica     S/P gastrectomy     Occupational problem     Elevated parathyroid hormone     Multiple thyroid nodules     Abnormal thyroid cytology-follicular neoplasm     Chronic pain syndrome     Major depressive disorder, recurrent episode, in full remission (H)     Bilateral low back pain with sciatica, sciatica laterality unspecified     Throat symptom     Psychological factor affecting physical condition     Hx of Guillain-Billings syndrome     Adjustment disorder with anxious mood     Thyroid  nodule     Benign essential hypertension     Past Surgical History:   Procedure Laterality Date     BIOPSY  03/13/2015    Thyroid nodule     ESOPHAGOSCOPY, GASTROSCOPY, DUODENOSCOPY (EGD), COMBINED  5/28/2013    Procedure: COMBINED ESOPHAGOSCOPY, GASTROSCOPY, DUODENOSCOPY (EGD);;  Surgeon: Best Willett MD;  Location: [facility] GI     LAPAROSCOPIC GASTRIC SLEEVE  5/13/2013    Procedure: LAPAROSCOPIC GASTRIC SLEEVE;  Laparoscopic Sleeve Gastrectomy ;  Surgeon: Best Willett MD;  Location: [facility] OR     LEEP TX, CERVICAL  1995    age 15     SEPTOPLASTY       THYROIDECTOMY Left 4/3/2018    Procedure: THYROIDECTOMY;  Left Thyroid Lobectomy And Ishtmusectomy;  Surgeon: Delia Harper MD;  Location: [facility] OR     TONSILLECTOMY  age 20s     TONSILLECTOMY  2004?     wisdom teeth extraction         Social History   Substance Use Topics     Smoking status: Never Smoker     Smokeless tobacco: Never Used     Alcohol use 2.4 - 3.0 oz/week      Comment: 3 drinks/week     Family History   Problem Relation Age of Onset     Hypertension Sister      Hypertension Father      Allergies Father      seasonal     Lipids Father      Obesity Father      Arthritis Mother      Gynecology Mother      problems with menopause     Hypertension Mother      Other Cancer Mother      Endometrial     OSTEOPOROSIS Mother      Obesity Mother      Parathyroid Disorders Mother      3 operations     Obesity Sister      DIABETES Maternal Aunt      x several w. DM     Breast Cancer Maternal Aunt      Cancer - colorectal Maternal Uncle      60ish     Hypertension Sister      Obesity Sister      Nephrolithiasis No family hx of          Current Outpatient Prescriptions   Medication Sig Dispense Refill     KLOR-CON 20 MEQ CR tablet Take 1 tablet (20 mEq) by mouth daily 180 tablet 3     Calcium Carbonate-Vitamin D (CALCIUM + D PO) Take by mouth daily       cetirizine (ZYRTEC) 10 MG tablet Take 10 mg by mouth daily as needed for allergies       Cholecalciferol  (VITAMIN D) 2000 UNITS CAPS        Cyanocobalamin (CVS B-12) 5000 MCG SUBL        cyclobenzaprine (FLEXERIL) 10 MG tablet TAKE ONE-HALF TABLET BY MOUTH TWICE A DAY AS NEEDED FOR MUSCLE SPASMS 30 tablet 5     Diphenhydramine-APAP, sleep, (TYLENOL PM EXTRA STRENGTH PO)        ferrous sulfate (IRON) 325 (65 FE) MG tablet Take 325 mg by mouth daily (with breakfast)       hydrochlorothiazide (HYDRODIURIL) 25 MG tablet Take 1 tablet (25 mg) by mouth daily 90 tablet 1     hydrOXYzine (VISTARIL) 50 MG capsule Take 1-2 capsules ( mg) by mouth nightly as needed for anxiety (sleep) 30 capsule 1     MELATONIN PO Take 3 mg by mouth       metroNIDAZOLE (METROGEL) 0.75 % vaginal gel USE TWICE WEEKLY TO VAGINA FOR RECURRENT BV SYMPTOMS. DO THIS FOR 3 MONTHS AFTER FINISHING ORAL MEDICATIONS. 70 g 3     Multiple Vitamin (MULTIVITAMINS PO) Take by mouth daily       omeprazole (PRILOSEC) 40 MG capsule Take 1 capsule (40 mg) by mouth daily Take 30-60 minutes before a meal. 30 capsule 1     phentermine 30 MG capsule Take 1 capsule (30 mg) by mouth every morning 30 capsule 5     polyethylene glycol (MIRALAX) powder Take 17 g (1 capful) by mouth daily as needed for constipation 510 g 1     topiramate (TOPAMAX) 50 MG tablet Take 1 tablet (50 mg) by mouth daily 90 tablet 6     Recent Labs   Lab Test  05/10/18   1411  03/26/18   0918  01/17/18   1129   08/02/17   1227  07/13/17   1412  05/15/17   1056   12/23/14   1613   08/08/13   1456   07/12/12   0820   A1C   --    --    --    --    --    --    --    --   5.0   --   4.6   --   5.4   LDL   --    --    --    --    --    --   52   --   67   --   120   < >  108   HDL   --    --    --    --    --    --   81   --   77   --   44*   < >  55   TRIG   --    --    --    --    --    --   120   --   91   --   80   < >  86   ALT   --   16  16   --    --   22  20   < >  20   < >  29   < >  12   CR   --   0.75  0.82   < >   --   0.72  0.54   < >  0.78   < >  0.72   < >  0.60   GFRESTIMATED   --   86  " 78   < >   --   >90  Non  GFR Calc    >90  Non  GFR Calc     < >  84   < >  >90   < >  >90   GFRESTBLACK   --   >90  >90   < >   --   >90   GFR Calc    >90   GFR Calc     < >  >90   GFR Calc     < >  >90   < >  >90   POTASSIUM   --   3.8  3.7   < >  3.7  3.0*  3.8   < >  3.8   < >  3.7   < >  3.8   TSH  1.77   --    --    --   0.56   --    --    < >   --    < >   --    < >  1.05    < > = values in this interval not displayed.      BP Readings from Last 3 Encounters:   06/07/18 136/80   05/10/18 (!) 139/94   04/23/18 139/87    Wt Readings from Last 3 Encounters:   06/07/18 196 lb 8 oz (89.1 kg)   05/10/18 193 lb (87.5 kg)   05/02/18 193 lb 3.2 oz (87.6 kg)                  Labs reviewed in EPIC    Reviewed and updated as needed this visit by clinical staff  Tobacco  Allergies  Meds       Reviewed and updated as needed this visit by Provider         ROS:  Constitutional, HEENT, cardiovascular, pulmonary, gi and gu systems are negative, except as otherwise noted.    OBJECTIVE:                                                    /80 (BP Location: Left arm, Cuff Size: Adult Large)  Pulse 113  Resp 16  Ht 5' 9.75\" (1.772 m)  Wt 196 lb 8 oz (89.1 kg)  SpO2 98%  BMI 28.4 kg/m2  Body mass index is 28.4 kg/(m^2).  GENERAL APPEARANCE: healthy, alert and no distress  RESP: lungs clear to auscultation - no rales, rhonchi or wheezes  CV: regular rates and rhythm, normal S1 S2, no S3 or S4 and no murmur, click or rub  MS: extremities normal- no gross deformities noted    Labs are pending     ASSESSMENT/PLAN:                                                    1. Benign essential hypertension  Refilling medications which are needed  Discussed that with traveling on planes and pressure changes this likely contributed to a lot of her symptoms.  This coupled with immobility and having alcohol on board and not using her typical routine is " likely the cause.  Discussed that if she does have similar symptoms can try an extra dose of her diuretic with flying  - KLOR-CON 20 MEQ CR tablet; Take 1 tablet (20 mEq) by mouth daily  Dispense: 180 tablet; Refill: 3  - Comprehensive metabolic panel  - TSH with free T4 reflex      Follow up with Provider -I will contact her with results    Violet Humphreys MD  [facility]

## 2018-06-07 NOTE — NURSING NOTE
"Chief Complaint   Patient presents with     Swelling     initial /80 (BP Location: Left arm, Cuff Size: Adult Large)  Pulse 113  Resp 16  Ht 5' 9.75\" (1.772 m)  Wt 196 lb 8 oz (89.1 kg)  SpO2 98%  BMI 28.4 kg/m2 Estimated body mass index is 28.4 kg/(m^2) as calculated from the following:    Height as of this encounter: 5' 9.75\" (1.772 m).    Weight as of this encounter: 196 lb 8 oz (89.1 kg).  BP completed using cuff size: large.  L  arm      Health Maintenance that is potentially due pending provider review:  NONE    n/a    Brandon Peñaloza ma  "

## 2018-06-08 DIAGNOSIS — I10 BENIGN ESSENTIAL HYPERTENSION: Primary | ICD-10-CM

## 2018-06-08 LAB
ALBUMIN SERPL-MCNC: 3.6 G/DL (ref 3.4–5)
ALP SERPL-CCNC: 57 U/L (ref 40–150)
ALT SERPL W P-5'-P-CCNC: 19 U/L (ref 0–50)
ANION GAP SERPL CALCULATED.3IONS-SCNC: 8 MMOL/L (ref 3–14)
AST SERPL W P-5'-P-CCNC: 13 U/L (ref 0–45)
BILIRUB SERPL-MCNC: 0.5 MG/DL (ref 0.2–1.3)
BUN SERPL-MCNC: 9 MG/DL (ref 7–30)
CALCIUM SERPL-MCNC: 9.2 MG/DL (ref 8.5–10.1)
CHLORIDE SERPL-SCNC: 104 MMOL/L (ref 94–109)
CO2 SERPL-SCNC: 28 MMOL/L (ref 20–32)
CREAT SERPL-MCNC: 0.68 MG/DL (ref 0.52–1.04)
GFR SERPL CREATININE-BSD FRML MDRD: >90 ML/MIN/1.7M2
GLUCOSE SERPL-MCNC: 102 MG/DL (ref 70–99)
POTASSIUM SERPL-SCNC: 3.7 MMOL/L (ref 3.4–5.3)
PROT SERPL-MCNC: 7.2 G/DL (ref 6.8–8.8)
SODIUM SERPL-SCNC: 140 MMOL/L (ref 133–144)
TSH SERPL DL<=0.005 MIU/L-ACNC: 1.08 MU/L (ref 0.4–4)

## 2018-06-08 NOTE — TELEPHONE ENCOUNTER
Patient requesting a certain  it must be written at 10 meq because the one she wants doesn't come in 20 meq. Please send new order as requested. Thank you.

## 2018-06-12 RX ORDER — POTASSIUM CHLORIDE 750 MG/1
10 TABLET, FILM COATED, EXTENDED RELEASE ORAL 2 TIMES DAILY
Qty: 180 TABLET | Refills: 3 | Status: SHIPPED | OUTPATIENT
Start: 2018-06-12 | End: 2018-09-15

## 2018-06-13 ENCOUNTER — HOSPITAL ENCOUNTER (OUTPATIENT)
Facility: CLINIC | Age: 38
Discharge: HOME | End: 2018-06-13
Attending: INTERNAL MEDICINE | Admitting: INTERNAL MEDICINE
Payer: COMMERCIAL

## 2018-06-13 VITALS
HEIGHT: 70 IN | OXYGEN SATURATION: 98 % | SYSTOLIC BLOOD PRESSURE: 118 MMHG | RESPIRATION RATE: 12 BRPM | BODY MASS INDEX: 28.63 KG/M2 | WEIGHT: 200 LBS | DIASTOLIC BLOOD PRESSURE: 81 MMHG

## 2018-06-13 LAB — UPPER GI ENDOSCOPY: NORMAL

## 2018-06-13 PROCEDURE — 25000125 ZZHC RX 250: Performed by: INTERNAL MEDICINE

## 2018-06-13 PROCEDURE — 25000128 H RX IP 250 OP 636: Performed by: INTERNAL MEDICINE

## 2018-06-13 PROCEDURE — 88305 TISSUE EXAM BY PATHOLOGIST: CPT | Performed by: INTERNAL MEDICINE

## 2018-06-13 PROCEDURE — 88305 TISSUE EXAM BY PATHOLOGIST: CPT | Mod: 26 | Performed by: INTERNAL MEDICINE

## 2018-06-13 PROCEDURE — G0500 MOD SEDAT ENDO SERVICE >5YRS: HCPCS | Performed by: INTERNAL MEDICINE

## 2018-06-13 PROCEDURE — 43239 EGD BIOPSY SINGLE/MULTIPLE: CPT | Performed by: INTERNAL MEDICINE

## 2018-06-13 RX ORDER — LIDOCAINE 40 MG/G
CREAM TOPICAL
Status: DISCONTINUED | OUTPATIENT
Start: 2018-06-13 | End: 2018-06-13 | Stop reason: HOSPADM

## 2018-06-13 RX ORDER — ONDANSETRON 2 MG/ML
4 INJECTION INTRAMUSCULAR; INTRAVENOUS
Status: DISCONTINUED | OUTPATIENT
Start: 2018-06-13 | End: 2018-06-13 | Stop reason: HOSPADM

## 2018-06-13 RX ORDER — FENTANYL CITRATE 50 UG/ML
INJECTION, SOLUTION INTRAMUSCULAR; INTRAVENOUS PRN
Status: DISCONTINUED | OUTPATIENT
Start: 2018-06-13 | End: 2018-06-13 | Stop reason: HOSPADM

## 2018-06-13 RX ORDER — BIOTIN 10 MG
10000 TABLET ORAL DAILY
Status: ON HOLD | COMMUNITY
End: 2019-06-25

## 2018-06-14 DIAGNOSIS — K21.00 GASTROESOPHAGEAL REFLUX DISEASE WITH ESOPHAGITIS: ICD-10-CM

## 2018-06-14 LAB — COPATH REPORT: NORMAL

## 2018-06-14 NOTE — TELEPHONE ENCOUNTER
Reason for Call:  Medication or medication refill:    Do you use a [facility]?  Name of the pharmacy and phone number for the current request:  [facility], [place] - [address], [address]      Name of the medication requested: omeprazole (PRILOSEC) 40 MG capsule     Other request: Pt states she needs rx written differently. Written as take 1 every morning with a qty. Of 90.     Can we leave a detailed message on this number? YES    Phone number patient can be reached at: Home number on file [phone] (home)    Best Time: anytime     Call taken on 6/14/2018 at 10:40 AM by Zohreh Bhatia

## 2018-06-14 NOTE — TELEPHONE ENCOUNTER
SN  Patient had EGD yesterday. Results in.  Requesting 90 day supply of Omeprazole.  Rx from 5/10/18 was for #30 with 1 refill    Due to you being out of office pharmacy fill the remaining Rx for #30 so patient doesn't run out.  Please advise.  Thanks, Carolyn James RN

## 2018-06-18 RX ORDER — OMEPRAZOLE 40 MG/1
40 CAPSULE, DELAYED RELEASE ORAL DAILY
Qty: 90 CAPSULE | Refills: 3 | Status: ON HOLD | OUTPATIENT
Start: 2018-06-18 | End: 2019-06-25

## 2018-06-27 ENCOUNTER — OFFICE VISIT (OUTPATIENT)
Dept: PSYCHOLOGY | Facility: CLINIC | Age: 38
End: 2018-06-27
Payer: COMMERCIAL

## 2018-06-27 VITALS — BODY MASS INDEX: 28.64 KG/M2 | WEIGHT: 199.6 LBS

## 2018-06-27 DIAGNOSIS — E66.01 PSYCHOLOGICAL FACTORS AFFECTING MORBID OBESITY (H): Primary | ICD-10-CM

## 2018-06-27 DIAGNOSIS — F33.0 MAJOR DEPRESSIVE DISORDER, RECURRENT EPISODE, MILD (H): ICD-10-CM

## 2018-06-27 DIAGNOSIS — F54 PSYCHOLOGICAL FACTORS AFFECTING MORBID OBESITY (H): Primary | ICD-10-CM

## 2018-06-27 DIAGNOSIS — Z56.9 OCCUPATIONAL PROBLEM: ICD-10-CM

## 2018-06-27 SDOH — ECONOMIC STABILITY - INCOME SECURITY: UNSPECIFIED PROBLEMS RELATED TO EMPLOYMENT: Z56.9

## 2018-06-27 NOTE — PROGRESS NOTES
Health Psychology                  Clinic    Department of Medicine  Lis Chavez, Ph.D., L.P. [phone]                          [facility] Earline Anderson, Ph.D.,  L.P. [phone]                 3rd Floor, Clinic 3A  [place] Mail [address] 536   Jayro Elias, Ph.D., A.B.P.P., L.P. [phone]     [address] Melissa Stokes, Ph.D., L.P. [phone]  [place] [id number]  [place] [address]    Health Psychology Follow-Up Note    Ashley Catherine is a 38-year-old woman referred initially  for psychological consultation for workup for a gastric sleeve procedure who now returns following the surgery, with concerns about weight management.  She initiated topirimate and is making great progress.    We reviewed in detail the changes she is making with regard to nutrional intake. She is cooking more of her own food.  She is exercising with 10,000 or more steps per day almost every day.  She is reinforced for the changes and can see the pattern of not doing as well when travelling.  She recently has been travelling but feeling unfulfilled, purposeless.      She is up to 199 from 193.2 lbs.  She has stated she plans to exercise at an hour most days, with some days 1.5 hours.  She had a lobectomy of her thyroid 4/3/18 and dropped off exercise for a few weeks afterwards.   She hasn't resumed.  She actually has more time to exercise as she is doing less overtime currently.     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14    hospitalized at [facility] x 1 week     History  of steroid therapy      HX ABNL PAP SMEAR OF CERVIX   1995    LEEP AT 15 yo     Hypertension      Hypertrophy of breast      Hypertrophy of tonsils alone      Hypovitaminosis D     with secondary high PTH     Irregular heart beat     palpitations     LBP (low back pain)      LSIL (low grade squamous intraepithelial lesion) on Pap smear 5/2006     Lumbago     RESOLVED     Major depressive disorder, recurrent episode, in partial or unspecified remission 4/1/2013     Morbid obesity (H)     bariatric surgery 5/13/2013     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)      OLIGOMENORRHEA, C/W PCOS      Sciatica      SLEEP APNEA 6/2002    No longer has since tonsillectomy and septoplasty     Past Surgical History:   Procedure Laterality Date     BIOPSY  03/13/2015    Thyroid nodule     ESOPHAGOSCOPY, GASTROSCOPY, DUODENOSCOPY (EGD), COMBINED  5/28/2013    Procedure: COMBINED ESOPHAGOSCOPY, GASTROSCOPY, DUODENOSCOPY (EGD);;  Surgeon: Best Willett MD;  Location:  GI     ESOPHAGOSCOPY, GASTROSCOPY, DUODENOSCOPY (EGD), COMBINED N/A 6/13/2018    Procedure: COMBINED ESOPHAGOSCOPY, GASTROSCOPY, DUODENOSCOPY (EGD), BIOPSY SINGLE OR MULTIPLE;  gastroscopy;  Surgeon: Westley Gibbs MD;  Location: [facility]     LAPAROSCOPIC GASTRIC SLEEVE  5/13/2013    Procedure: LAPAROSCOPIC GASTRIC SLEEVE;  Laparoscopic Sleeve Gastrectomy ;  Surgeon: Best Willett MD;  Location:  OR     LEEP TX, CERVICAL  1995    age 15     SEPTOPLASTY       THYROIDECTOMY Left 4/3/2018    Procedure: THYROIDECTOMY;  Left Thyroid Lobectomy And Ishtmusectomy;  Surgeon: Delia Harper MD;  Location:  OR     TONSILLECTOMY  age 20s     TONSILLECTOMY  2004?     wisdom teeth extraction         She is  5 foot 11 inches.  Her goal is 175.    Her mood is low today.  She feels her future is bleak as she realized she can't afford to move to another city.  She feels stuck.  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  She has been  participating in various activities. We discussed relationship, communication and other issues.     Affect is positive despite her frustrations with her weight and work.  Rapport is excellent.   Extended session due to complexity of case and length of interval.    A treatment plan was completed.        Time in:  11:09  Time out:  12:0    Diagnosis:   Psychological factors affecting obesity (F54).   Major depression, recurrent, mild (F3e3.0).   Occupational Problems (Z56.9)    PLAN/RECOMMENDATIONS:   1. Ms. Catherine will return 7/31 @ 1:00 to address weight loss and social issues with problem solving therapy. .  2.  Resume schedule of regular exercise and  decrease grazing eating.      Tx plan due 6/27/2018

## 2018-06-27 NOTE — MR AVS SNAPSHOT
After Visit Summary   6/27/2018    Ashley Catherien    MRN: [id number]           Patient Information     Date Of Birth          1980        Visit Information        Provider Department      6/27/2018 11:00 AM Jayro Elias, PhD [facility]        Today's Diagnoses     Psychological factors affecting morbid obesity (H)    -  1    Major depressive disorder, recurrent episode, mild (H)        Occupational problem           Follow-ups after your visit        Your next 10 appointments already scheduled     Jul 31, 2018  1:00 PM CDT   (Arrive by 12:45 PM)   Return Visit with Jayro Elias, PhD [facility] ([facility])    [address] [place] [address] [phone]            Aug 20, 2018 11:00 AM CDT   (Arrive by 10:45 AM)   Return Visit with Hector Justice MD   [facility] ([facility])    [address] [place] [address] [phone]              Who to contact     Please call your clinic at [phone] to:    Ask questions about your health    Make or cancel appointments    Discuss your medicines    Learn about your test results    Speak to your doctor            Additional Information About Your Visit        Recommend[facility]alarium Information     Native gives you secure access to your electronic health record. If you see a primary care provider, you can also send messages to your care team and make appointments. If you have questions, please call your primary care clinic.  If you do not have a primary care provider, please call [phone] and they will assist you.      Native is an electronic gateway that provides easy, online access to your medical records. With Native, you can request a clinic appointment, read your test results, renew a prescription or communicate with your care team.     To access your existing account, please  contact your [facility] or call [phone] for assistance.        Care EveryWhere ID     This is your Care EveryWhere ID. This could be used by other organizations to access your [place] medical records  [id number]        Your Vitals Were     BMI (Body Mass Index)                   28.64 kg/m2            Blood Pressure from Last 3 Encounters:   06/13/18 118/81   06/07/18 136/80   05/10/18 (!) 139/94    Weight from Last 3 Encounters:   06/27/18 90.5 kg (199 lb 9.6 oz)   06/13/18 90.7 kg (200 lb)   06/07/18 89.1 kg (196 lb 8 oz)              Today, you had the following     No orders found for display       Primary Care Provider Office Phone # Fax #    Violet Humphreys [phone] [address] [address]        Equal Access to Services     JOSSE [facility] SystemSARAH : Hadii aad tamika gabrielo Sowaldo, waaxda luqadaha, qaybta kaalmada adeegscarlettda, carmen arteaga . So [facility] [phone].    ATENCIÓN: Si habla español, tiene a gibbons disposición servicios gratuitos de asistencia lingüística. Llame al [phone].    We comply with applicable federal civil rights laws and [place] laws. We do not discriminate on the basis of race, color, national origin, age, disability, sex, sexual orientation, or gender identity.            Thank you!     Thank you for choosing [facility]  for your care. Our goal is always to provide you with excellent care. Hearing back from our patients is one way we can continue to improve our services. Please take a few minutes to complete the written survey that you may receive in the mail after your visit with us. Thank you!             Your Updated Medication List - Protect others around you: Learn how to safely use, store and throw away your medicines at www.disposemymeds.org.          This list is accurate as of 6/27/18 12:11 PM.  Always use your most recent med list.                    Brand Name Dispense Instructions for use Diagnosis    Biotin 10 MG Tabs tablet      Take 10,000 mcg by mouth daily        CALCIUM + D PO      Take by mouth daily        cetirizine 10 MG tablet    zyrTEC     Take 10 mg by mouth daily as needed for allergies        [employer] B-12 5000 MCG Subl   Generic drug:  Cyanocobalamin       Multiple thyroid nodules       cyclobenzaprine 10 MG tablet    FLEXERIL    30 tablet    TAKE ONE-HALF TABLET BY MOUTH TWICE A DAY AS NEEDED FOR MUSCLE SPASMS    Sciatica, unspecified laterality       ferrous sulfate 325 (65 Fe) MG tablet    IRON     Take 325 mg by mouth daily (with breakfast)        hydrochlorothiazide 25 MG tablet    HYDRODIURIL    90 tablet    Take 1 tablet (25 mg) by mouth daily    Essential hypertension with goal blood pressure less than 140/90       hydrOXYzine 50 MG capsule    VISTARIL    30 capsule    Take 1-2 capsules ( mg) by mouth nightly as needed for anxiety (sleep)    Adjustment disorder with anxious mood       KLOR-CON 10 MEQ tablet   Generic drug:  potassium chloride     180 tablet    Take 1 tablet (10 mEq) by mouth 2 times daily    Benign essential hypertension       KLOR-CON 20 MEQ CR tablet   Generic drug:  potassium chloride SA     180 tablet    Take 1 tablet (20 mEq) by mouth daily    Benign essential hypertension       MELATONIN PO      Take 3 mg by mouth        metroNIDAZOLE 0.75 % vaginal gel    METROGEL    70 g    USE TWICE WEEKLY TO VAGINA FOR RECURRENT BV SYMPTOMS. DO THIS FOR 3 MONTHS AFTER FINISHING ORAL MEDICATIONS.    Abnormal vaginal fluids       MULTIVITAMINS PO      Take by mouth daily        omeprazole 40 MG capsule    priLOSEC    90 capsule    Take 1 capsule (40 mg) by mouth daily Take 30-60 minutes before a meal.    Gastroesophageal reflux disease with esophagitis       phentermine 30 MG capsule     30 capsule    Take 1 capsule (30 mg) by mouth every morning    Class 1 obesity due to excess calories without serious comorbidity in adult,  unspecified BMI       polyethylene glycol powder    MIRALAX    510 g    Take 17 g (1 capful) by mouth daily as needed for constipation    Constipation, unspecified constipation type       topiramate 50 MG tablet    TOPAMAX    90 tablet    Take 1 tablet (50 mg) by mouth daily    Class 1 obesity due to excess calories without serious comorbidity in adult, unspecified BMI       TYLENOL PM EXTRA STRENGTH PO           vitamin D 2000 units Caps

## 2018-07-09 ENCOUNTER — TELEPHONE (OUTPATIENT)
Dept: ENDOCRINOLOGY | Facility: CLINIC | Age: 38
End: 2018-07-09

## 2018-07-31 ENCOUNTER — OFFICE VISIT (OUTPATIENT)
Dept: PSYCHOLOGY | Facility: CLINIC | Age: 38
End: 2018-07-31
Payer: COMMERCIAL

## 2018-07-31 VITALS — WEIGHT: 206.1 LBS | BODY MASS INDEX: 29.57 KG/M2

## 2018-07-31 DIAGNOSIS — F54 PSYCHOLOGICAL FACTORS AFFECTING MORBID OBESITY (H): Primary | ICD-10-CM

## 2018-07-31 DIAGNOSIS — F54 PSYCHOLOGICAL FACTORS AFFECTING MEDICAL CONDITION: ICD-10-CM

## 2018-07-31 DIAGNOSIS — E66.01 PSYCHOLOGICAL FACTORS AFFECTING MORBID OBESITY (H): Primary | ICD-10-CM

## 2018-07-31 DIAGNOSIS — Z56.9 OCCUPATIONAL PROBLEM: ICD-10-CM

## 2018-07-31 SDOH — ECONOMIC STABILITY - INCOME SECURITY: UNSPECIFIED PROBLEMS RELATED TO EMPLOYMENT: Z56.9

## 2018-07-31 NOTE — PROGRESS NOTES
Health Psychology                  Clinic    Department of Medicine  Lis Chavez, Ph.D., L.P. [phone]                          [facility] Earline Anderson, Ph.D.,  L.P. [phone]                 3rd Floor, Clinic 3A  [place] Mail Code 086   Jayro Elias, Ph.D., A.B.P.P., L.P. [phone]     [address] [place] [address] Melissa Stokes, Ph.D., L.P. [phone]  [place] [address]  [place] [address]    Health Psychology Follow-Up Note    Ashley Catherine is a 38-year-old woman referred initially  for psychological consultation for workup for a gastric sleeve procedure who now returns following the surgery, with concerns about weight management.  She initiated topirimate and is making great progress.    We reviewed in detail the changes she is making with regard to nutrional intake. She is cooking more of her own food.  She is exercising with 10,000 or more steps per day almost every day.  She is reinforced for the changes and can see the pattern of not doing as well when travelling.  She recently has been travelling but feeling unfulfilled, purposeless.      She is up to 206.9  from 199.2 lbs.  She has stated she plans to exercise at an hour most days, with some days 1.5 hours.  She had a lobectomy of her thyroid 4/3/18 and dropped off exercise for a few weeks afterwards.   She hasn't resumed.  She actually has more time to exercise as she is doing less overtime currently.     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14    hospitalized at [facility] x 1 week      History of steroid therapy      HX ABNL PAP SMEAR OF CERVIX   1995    LEEP AT 15 yo     Hypertension      Hypertrophy of breast      Hypertrophy of tonsils alone      Hypovitaminosis D     with secondary high PTH     Irregular heart beat     palpitations     LBP (low back pain)      LSIL (low grade squamous intraepithelial lesion) on Pap smear 5/2006     Lumbago     RESOLVED     Major depressive disorder, recurrent episode, in partial or unspecified remission 4/1/2013     Morbid obesity (H)     bariatric surgery 5/13/2013     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)      OLIGOMENORRHEA, C/W PCOS      Sciatica      SLEEP APNEA 6/2002    No longer has since tonsillectomy and septoplasty     Past Surgical History:   Procedure Laterality Date     BIOPSY  03/13/2015    Thyroid nodule     ESOPHAGOSCOPY, GASTROSCOPY, DUODENOSCOPY (EGD), COMBINED  5/28/2013    Procedure: COMBINED ESOPHAGOSCOPY, GASTROSCOPY, DUODENOSCOPY (EGD);;  Surgeon: Best Willett MD;  Location:  GI     ESOPHAGOSCOPY, GASTROSCOPY, DUODENOSCOPY (EGD), COMBINED N/A 6/13/2018    Procedure: COMBINED ESOPHAGOSCOPY, GASTROSCOPY, DUODENOSCOPY (EGD), BIOPSY SINGLE OR MULTIPLE;  gastroscopy;  Surgeon: Westley Gibbs MD;  Location: [facility]     LAPAROSCOPIC GASTRIC SLEEVE  5/13/2013    Procedure: LAPAROSCOPIC GASTRIC SLEEVE;  Laparoscopic Sleeve Gastrectomy ;  Surgeon: Best Willett MD;  [facility]     LEEP TX, CERVICAL  1995    age 15     SEPTOPLASTY       THYROIDECTOMY Left 4/3/2018    Procedure: THYROIDECTOMY;  Left Thyroid Lobectomy And Ishtmusectomy;  Surgeon: Delia Harper MD;  Location:  OR     TONSILLECTOMY  age 20s     TONSILLECTOMY  2004?     wisdom teeth extraction         She is  5 foot 11 inches.  Her goal is 175.    Her mood is low today. We focused on her eating and exercise.  She has been eating a bit more fesively with others.  ETOH use is 1 bottle of wine per  Week plus 6 mixed drinks.  We  discussed cutitng it out, at least in part..  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   She spends a lot on travel, gambliing, and shopping.   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  She has been  participating in various activities. We discussed relationship, communication and other issues.     Affect is positive despite her frustrations with her weight and work.  Rapport is excellent.   Extended session due to complexity of case and length of interval.    A treatment plan was completed.        Time in:  1:00  Time out:  1:54    Diagnosis:   Psychological factors affecting obesity (F54).   Major depression, recurrent, mild (F3e3.0).   Occupational Problems (Z56.9)    PLAN/RECOMMENDATIONS:   1. Ms. Catherine will return 9/12 @ 11:00 to address weight loss and social issues with problem solving therapy. .  2.  Resume schedule of regular exercise and  decrease grazing eating.      Tx plan due 6/27/2018

## 2018-07-31 NOTE — MR AVS SNAPSHOT
After Visit Summary   7/31/2018    Ashley Catherine    MRN: [id number]           Patient Information     Date Of Birth          1980        Visit Information        Provider Department      7/31/2018 1:00 PM Jayro Elias, PhD [facility]        Today's Diagnoses     Psychological factors affecting morbid obesity (H)    -  1    Psychological factors affecting medical condition        Occupational problem           Follow-ups after your visit        Your next 10 appointments already scheduled     Aug 27, 2018 11:20 AM CDT   (Arrive by 11:05 AM)   Return Visit with Hector Justice MD   [facility] ([facility])    [address]  [address] [facility] [address] [phone]              Who to contact     Please call your clinic at [phone] to:    Ask questions about your health    Make or cancel appointments    Discuss your medicines    Learn about your test results    Speak to your doctor            Additional Information About Your Visit        MyChart Information     [employer] gives you secure access to your electronic health record. If you see a primary care provider, you can also send messages to your care team and make appointments. If you have questions, please call your primary care clinic.  If you do not have a primary care provider, please call [phone] and they will assist you.      [employer] is an electronic gateway that provides easy, online access to your medical records. With [employer], you can request a clinic appointment, read your test results, renew a prescription or communicate with your care team.     To access your existing account, please contact your [facility] or call [phone] for assistance.        Care EveryWhere ID     This is your Care EveryWhere ID. This could be used by other organizations to access your [facility] medical records  [id number]         Your Vitals Were     BMI (Body Mass Index)                   29.57 kg/m2            Blood Pressure from Last 3 Encounters:   06/13/18 118/81   06/07/18 136/80   05/10/18 (!) 139/94    Weight from Last 3 Encounters:   07/31/18 93.5 kg (206 lb 1.6 oz)   06/27/18 90.5 kg (199 lb 9.6 oz)   06/13/18 90.7 kg (200 lb)              Today, you had the following     No orders found for display       Primary Care Provider Office Phone # Fax #    BullardTanja Humphreys [phone] [address] [address]        Equal Access to Services     MABEL BAILEY : Hadii sabas Alejandre, wafracisco bravo, qarenettata kaalmadariel vale, carmen weston. So [facility] [phone].    ATENCIÓN: Si habla español, tiene a gibbons disposición servicios gratuitos de asistencia lingüística. Ll[facility] [phone].    We comply with applicable federal civil rights laws and [place] laws. We do not discriminate on the basis of race, color, national origin, age, disability, sex, sexual orientation, or gender identity.            Thank you!     Thank you for choosing [facility]  for your care. Our goal is always to provide you with excellent care. Hearing back from our patients is one way we can continue to improve our services. Please take a few minutes to complete the written survey that you may receive in the mail after your visit with us. Thank you!             Your Updated Medication List - Protect others around you: Learn how to safely use, store and throw away your medicines at www.disposemymeds.org.          This list is accurate as of 7/31/18  1:58 PM.  Always use your most recent med list.                   Brand Name Dispense Instructions for use Diagnosis    Biotin 10 MG Tabs tablet      Take 10,000 mcg by mouth daily        CALCIUM + D PO      Take by mouth daily        cetirizine 10 MG tablet    zyrTEC     Take 10 mg by mouth daily as needed for  allergies        [employer] B-12 5000 MCG Subl   Generic drug:  Cyanocobalamin       Multiple thyroid nodules       cyclobenzaprine 10 MG tablet    FLEXERIL    30 tablet    TAKE ONE-HALF TABLET BY MOUTH TWICE A DAY AS NEEDED FOR MUSCLE SPASMS    Sciatica, unspecified laterality       ferrous sulfate 325 (65 Fe) MG tablet    IRON     Take 325 mg by mouth daily (with breakfast)        hydrochlorothiazide 25 MG tablet    HYDRODIURIL    90 tablet    Take 1 tablet (25 mg) by mouth daily    Essential hypertension with goal blood pressure less than 140/90       hydrOXYzine 50 MG capsule    VISTARIL    30 capsule    Take 1-2 capsules ( mg) by mouth nightly as needed for anxiety (sleep)    Adjustment disorder with anxious mood       KLOR-CON 10 MEQ tablet   Generic drug:  potassium chloride     180 tablet    Take 1 tablet (10 mEq) by mouth 2 times daily    Benign essential hypertension       KLOR-CON 20 MEQ CR tablet   Generic drug:  potassium chloride SA     180 tablet    Take 1 tablet (20 mEq) by mouth daily    Benign essential hypertension       MELATONIN PO      Take 3 mg by mouth        metroNIDAZOLE 0.75 % vaginal gel    METROGEL    70 g    USE TWICE WEEKLY TO VAGINA FOR RECURRENT BV SYMPTOMS. DO THIS FOR 3 MONTHS AFTER FINISHING ORAL MEDICATIONS.    Abnormal vaginal fluids       MULTIVITAMINS PO      Take by mouth daily        omeprazole 40 MG capsule    priLOSEC    90 capsule    Take 1 capsule (40 mg) by mouth daily Take 30-60 minutes before a meal.    Gastroesophageal reflux disease with esophagitis       phentermine 30 MG capsule     30 capsule    Take 1 capsule (30 mg) by mouth every morning    Class 1 obesity due to excess calories without serious comorbidity in adult, unspecified BMI       polyethylene glycol powder    MIRALAX    510 g    Take 17 g (1 capful) by mouth daily as needed for constipation    Constipation, unspecified constipation type       topiramate 50 MG tablet    TOPAMAX    90 tablet    Take 1  tablet (50 mg) by mouth daily    Class 1 obesity due to excess calories without serious comorbidity in adult, unspecified BMI       TYLENOL PM EXTRA STRENGTH PO           vitamin D 2000 units Caps

## 2018-08-01 ENCOUNTER — OFFICE VISIT (OUTPATIENT)
Dept: FAMILY MEDICINE | Facility: CLINIC | Age: 38
End: 2018-08-01
Payer: COMMERCIAL

## 2018-08-01 VITALS
DIASTOLIC BLOOD PRESSURE: 81 MMHG | WEIGHT: 206.5 LBS | HEIGHT: 70 IN | OXYGEN SATURATION: 100 % | TEMPERATURE: 97.6 F | SYSTOLIC BLOOD PRESSURE: 124 MMHG | HEART RATE: 79 BPM | BODY MASS INDEX: 29.56 KG/M2

## 2018-08-01 DIAGNOSIS — S06.0X0A CONCUSSION WITHOUT LOSS OF CONSCIOUSNESS, INITIAL ENCOUNTER: Primary | ICD-10-CM

## 2018-08-01 DIAGNOSIS — T14.8XXA MUSCLE STRAIN: ICD-10-CM

## 2018-08-01 DIAGNOSIS — I10 BENIGN ESSENTIAL HYPERTENSION: ICD-10-CM

## 2018-08-01 PROCEDURE — 99214 OFFICE O/P EST MOD 30 MIN: CPT | Performed by: FAMILY MEDICINE

## 2018-08-01 NOTE — MR AVS SNAPSHOT
"              After Visit Summary   8/1/2018    Ashley Catherine    MRN: [id number]           Patient Information     Date Of Birth          1980        Visit Information        Provider Department      8/1/2018 9:40 AM Jackeline Leyva MD [facility]        Today's Diagnoses     Concussion without loss of consciousness, initial encounter    -  1    Muscle strain        Benign essential hypertension          Care Instructions      Concussion    A concussion can be caused by a direct blow to the head, neck, face, or somewhere else on the body with the force being transmitted to the head. This may cause you to lose consciousness - be \"knocked out\" - but not always. Depending on the severity of the blow, it will take from a few hours up to a few days to get better. Sometimes symptoms may last a few months or longer. This is called post-concussion syndrome.  At first, you may have a headache, nausea, vomiting, or dizziness. You may also have problems concentrating or remembering things. This is normal.  Symptoms should get better as the hours and days go by. Symptoms that get worse could be a sign of a more serious injury. This might be a bruise or bleeding in the brain. That s why it s important to watch for the warning signs listed below.  Home care  If your injury is mild and there are no serious signs or symptoms, your healthcare provider may recommend that you be monitored at home. If there is evidence that the injury is more serious, you will be monitored in the hospital. Follow these tips to help care for yourself at home:    After a concussion, your healthcare provider may recommend that a family member or friend monitor you for 12 to 24 hours. They may be told to wake you every few hours during sleep to check for the signs below.    If your face or scalp swells, apply an ice pack for 20 minutes every 1 to 2 hours. Do this until the swelling starts to go down. You can make an ice pack by putting " ice cubes in a plastic bag and wrapping the bag in a towel.    You may use acetaminophen to control pain, unless another pain medicine was prescribed. Do not use aspirin or ibuprofen after a head injury. If you have chronic liver or kidney disease, talk with your doctor before using these medicines. Also talk with your doctor if you ever had a stomach ulcer or gastrointestinal bleeding.    For the next 24 hours:  ? Don t drink alcohol or take sedatives or medicines that make you sleepy.  ? Don t drive or operate machinery.  ? Avoid doing anything strenuous. Don t lift or strain.    Don t return to sports or any activity that could cause you to hit your head until all symptoms are gone and you have been cleared by your doctor. A second head injury before fully recovering from the first one can lead to serious brain injury.    Avoid doing activities that require a lot of concentration or a lot of attention. This will allow your brain to rest and heal quicker.  Follow-up care  Follow up with your doctor in 1 week, or as directed.  Note: A radiologist will review any X-rays or CT scans that were taken. You will be told of any new findings that may affect your care.  When to seek medical advice  Call your healthcare provider right away if any of these occur:    Repeated vomiting    Headache or dizziness that is severe or gets worse    Loss of consciousness    Unusual drowsiness, or unable to wake up as usual    Weakness or decreased ability to walk or move any limb    Confusion, agitation, or change in behavior or speech, or memory loss    Blurred vision    Convulsion (seizure)    Swelling on the scalp or face that gets worse    Changes in pupil size (the black part of the eye)    Redness, warmth, or pus from the swollen area    Fluid draining from or bleeding from the nose or ears     Date Last Reviewed: 8/14/2015 2000-2017 [employer]. [address], [place] [address]. All rights reserved.  This information is not intended as a substitute for professional medical care. Always follow your healthcare professional's instructions.                Follow-ups after your visit        Your next 10 appointments already scheduled     Aug 06, 2018  2:45 PM CDT   MyChart Injury Follow Up with Violet Humphreys MD   [facility] ([facility])    [address]  [facility] [id number] [phone]            Aug 27, 2018 11:20 AM CDT   (Arrive by 11:05 AM)   Return Visit with Hector Justice MD   [facility] ([facility])    [address]  4th Floor  [facility] [id number] [phone]            Sep 12, 2018 11:00 AM CDT   (Arrive by 10:45 AM)   Return Visit with Jayro Elias, PhD [facility] ([facility])    [address]  3rd Floor  [facility] [id number] [phone]              Who to contact     If you have questions or need follow up information about today's clinic visit or your schedule please contact [facility] directly at [phone].  Normal or non-critical lab and imaging results will be communicated to you by [employer], letter or phone within 4 business days after the clinic has received the results. If you do not hear from us within 7 days, please contact the clinic through [employer] or phone. If you have a critical or abnormal lab result, we will notify you by phone as soon as possible.  Submit refill requests through [employer] or call your pharmacy and they will forward the refill request to us. Please allow 3 business days for your refill to be completed.          Additional Information About Your Visit        [employer] Information     [employer] gives you secure access to your electronic health record. If you see a primary care provider, you can also send messages to your care team and make appointments. If you have questions,  "please call your primary care clinic.  If you do not have a primary care provider, please call [phone] and they will assist you.        Care EveryWhere ID     This is your Care EveryWhere ID. This could be used by other organizations to access your [facility] medical records  [id number]        Your Vitals Were     Pulse Temperature Height Last Period Pulse Oximetry Breastfeeding?    79 97.6  F (36.4  C) (Oral) 5' 10\" (1.778 m) (LMP Unknown) 100% No    BMI (Body Mass Index)                   29.63 kg/m2            Blood Pressure from Last 3 Encounters:   08/01/18 124/81   06/13/18 118/81   06/07/18 136/80    Weight from Last 3 Encounters:   08/01/18 206 lb 8 oz (93.7 kg)   06/13/18 200 lb (90.7 kg)   06/07/18 196 lb 8 oz (89.1 kg)              Today, you had the following     No orders found for display       Primary Care Provider Office Phone # Fax #    AddisonTanja Humphreys [phone]       [address] [address]        Equal Access to Services     [facility]: Hadii aad ku hadasho Soomaali, waaxda luqadaha, qaybta kaalmada adeegyada, carmen arteaga . So [facility] [phone].    ATENCIÓN: Si habla español, tiene a gibbons disposición servicios gratuitos de asistencia lingüística. Llame al [phone].    We comply with applicable federal civil rights laws and [place] laws. We do not discriminate on the basis of race, color, national origin, age, disability, sex, sexual orientation, or gender identity.            Thank you!     Thank you for choosing [facility]  for your care. Our goal is always to provide you with excellent care. Hearing back from our patients is one way we can continue to improve our services. Please take a few minutes to complete the written survey that you may receive in the mail after your visit with us. Thank you!             Your Updated Medication List - Protect others around you: Learn how to safely use, store " and throw away your medicines at www.disposemymeds.org.          This list is accurate as of 8/1/18 11:59 PM.  Always use your most recent med list.                   Brand Name Dispense Instructions for use Diagnosis    Biotin 10 MG Tabs tablet      Take 10,000 mcg by mouth daily        CALCIUM + D PO      Take by mouth daily        cetirizine 10 MG tablet    zyrTEC     Take 10 mg by mouth daily as needed for allergies        CONTRAVE 8-90 MG per 12 hr tablet   Generic drug:  naltrexone-bupropion           CVS B-12 5000 MCG Subl   Generic drug:  Cyanocobalamin       Multiple thyroid nodules       cyclobenzaprine 10 MG tablet    FLEXERIL    30 tablet    TAKE ONE-HALF TABLET BY MOUTH TWICE A DAY AS NEEDED FOR MUSCLE SPASMS    Sciatica, unspecified laterality       ferrous sulfate 325 (65 Fe) MG tablet    IRON     Take 325 mg by mouth daily (with breakfast)        hydrochlorothiazide 25 MG tablet    HYDRODIURIL    90 tablet    Take 1 tablet (25 mg) by mouth daily    Essential hypertension with goal blood pressure less than 140/90       hydrOXYzine 50 MG capsule    VISTARIL    30 capsule    Take 1-2 capsules ( mg) by mouth nightly as needed for anxiety (sleep)    Adjustment disorder with anxious mood       KLOR-CON 10 MEQ tablet   Generic drug:  potassium chloride     180 tablet    Take 1 tablet (10 mEq) by mouth 2 times daily    Benign essential hypertension       KLOR-CON 20 MEQ CR tablet   Generic drug:  potassium chloride SA     180 tablet    Take 1 tablet (20 mEq) by mouth daily    Benign essential hypertension       MELATONIN PO      Take 3 mg by mouth        metroNIDAZOLE 0.75 % vaginal gel    METROGEL    70 g    USE TWICE WEEKLY TO VAGINA FOR RECURRENT BV SYMPTOMS. DO THIS FOR 3 MONTHS AFTER FINISHING ORAL MEDICATIONS.    Abnormal vaginal fluids       MULTIVITAMINS PO      Take by mouth daily        omeprazole 40 MG capsule    priLOSEC    90 capsule    Take 1 capsule (40 mg) by mouth daily Take 30-60  minutes before a meal.    Gastroesophageal reflux disease with esophagitis       polyethylene glycol powder    MIRALAX    510 g    Take 17 g (1 capful) by mouth daily as needed for constipation    Constipation, unspecified constipation type       TYLENOL PM EXTRA STRENGTH PO           vitamin D 2000 units Caps

## 2018-08-01 NOTE — PROGRESS NOTES
SUBJECTIVE:   Ashley Catherine is a 38 year old female who presents to clinic today for the following health issues:    Fall      Duration: Sunday    Description (location/character/radiation): pt slipped outside the shower on wet tile floor of friends cabin.    She was dazed.no loss of concisouness . Not sure which part of the body hit hardest- mostly right side. Some bruises, mild headache no vomiting or vision changes    Sore neck and left upper back    Intensity:  Moderate, has not taken any medications     Accompanying signs and symptoms: headache,pain on Lt upper back side area and has a sore neck    History (similar episodes/previous evaluation): None         PROBLEMS TO ADD ON...    Problem list and histories reviewed & adjusted, as indicated.  Additional history: as documented    Patient Active Problem List   Diagnosis     OLIGOMENORRHEA, C/W PCOS     HX ABNL PAP SMEAR OF CERVIX       Flat feet     Sciatica     S/P gastrectomy     Occupational problem     Elevated parathyroid hormone     Multiple thyroid nodules     Abnormal thyroid cytology-follicular neoplasm     Chronic pain syndrome     Major depressive disorder, recurrent episode, in full remission (H)     Bilateral low back pain with sciatica, sciatica laterality unspecified     Throat symptom     Psychological factor affecting physical condition     Hx of Guillain-Sterling syndrome     Adjustment disorder with anxious mood     Thyroid nodule     Benign essential hypertension     Concussion without loss of consciousness, initial encounter     Past Surgical History:   Procedure Laterality Date     BIOPSY  03/13/2015    Thyroid nodule     ESOPHAGOSCOPY, GASTROSCOPY, DUODENOSCOPY (EGD), COMBINED  5/28/2013    Procedure: COMBINED ESOPHAGOSCOPY, GASTROSCOPY, DUODENOSCOPY (EGD);;  Surgeon: Best Willett MD;  Location:  GI     ESOPHAGOSCOPY, GASTROSCOPY, DUODENOSCOPY (EGD), COMBINED N/A 6/13/2018    Procedure: COMBINED ESOPHAGOSCOPY, GASTROSCOPY,  "DUODENOSCOPY (EGD), BIOPSY SINGLE OR MULTIPLE;  gastroscopy;  Surgeon: Westley Gibbs MD;  Location:  GI     LAPAROSCOPIC GASTRIC SLEEVE  5/13/2013    Procedure: LAPAROSCOPIC GASTRIC SLEEVE;  Laparoscopic Sleeve Gastrectomy ;  Surgeon: Best Willett MD;  Location: [facility] OR     LEEP TX, CERVICAL  1995    age 15     SEPTOPLASTY       THYROIDECTOMY Left 4/3/2018    Procedure: THYROIDECTOMY;  Left Thyroid Lobectomy And Ishtmusectomy;  Surgeon: Delia Harper MD;  Location: [facility] OR     TONSILLECTOMY  age 20s     TONSILLECTOMY  2004?     wisdom teeth extraction         Social History   Substance Use Topics     Smoking status: Never Smoker     Smokeless tobacco: Never Used     Alcohol use 2.4 - 3.0 oz/week      Comment: 3 drinks/week     Family History   Problem Relation Age of Onset     Hypertension Sister      Hypertension Father      Allergies Father      seasonal     Lipids Father      Obesity Father      Arthritis Mother      Gynecology Mother      problems with menopause     Hypertension Mother      Other Cancer Mother      Endometrial     Osteoperosis Mother      Obesity Mother      Parathyroid Disorders Mother      3 operations     Obesity Sister      Diabetes Maternal Aunt      x several w. DM     Breast Cancer Maternal Aunt      Cancer - colorectal Maternal Uncle      60ish     Hypertension Sister      Obesity Sister      Nephrolithiasis No family hx of            Reviewed and updated as needed this visit by clinical staff  Tobacco  Meds       Reviewed and updated as needed this visit by Provider         ROS:  Constitutional, HEENT, cardiovascular, pulmonary, gi and gu systems are negative, except as otherwise noted.    OBJECTIVE:     /81  Pulse 79  Temp 97.6  F (36.4  C) (Oral)  Ht 5' 10\" (1.778 m)  Wt 206 lb 8 oz (93.7 kg)  LMP  (LMP Unknown)  SpO2 100%  Breastfeeding? No  BMI 29.63 kg/m2  Body mass index is 29.63 kg/(m^2).  GENERAL: healthy, alert and no distress  Head : " normocephalic atraumatic. Eyes: PERRLA.   Bilateral Shoulder full ROM  NECK:Good ROM. Spine non tender   no adenopathy, no asymmetry, masses, or scars and thyroid normal to palpation  RESP: lungs clear to auscultation - no rales, rhonchi or wheezes  CV: regular rate and rhythm, normal S1 S2, no S3 or S4, no murmur, click or rub, no peripheral edema and peripheral pulses strong  MS: no gross musculoskeletal defects noted, no edema  Neuro: Cranial nerves and fundi are normal. AVTAR. EOM's intact. No papilledema. Neck supple. No bruits. Normal deep tendon reflexes.    Diagnostic Test Results:  none     ASSESSMENT/PLAN:   1. Concussion without loss of consciousness, initial encounter  Fell on wet tile floor in friends bathroom  encouraged hydration, relative rest and return to activities as tolerated      2. Muscular strain  symptomatic treatment  Warm or cold pack as needed   Over the counter NSAID wit meals up to three times daily as needed  For next 3 days  Follow up as needed      3.Benign essential hypertension  Chronic & controlled  No medications changes       Jackeline Leyva MD  [facility]

## 2018-08-01 NOTE — PATIENT INSTRUCTIONS
"  Concussion    A concussion can be caused by a direct blow to the head, neck, face, or somewhere else on the body with the force being transmitted to the head. This may cause you to lose consciousness - be \"knocked out\" - but not always. Depending on the severity of the blow, it will take from a few hours up to a few days to get better. Sometimes symptoms may last a few months or longer. This is called post-concussion syndrome.  At first, you may have a headache, nausea, vomiting, or dizziness. You may also have problems concentrating or remembering things. This is normal.  Symptoms should get better as the hours and days go by. Symptoms that get worse could be a sign of a more serious injury. This might be a bruise or bleeding in the brain. That s why it s important to watch for the warning signs listed below.  Home care  If your injury is mild and there are no serious signs or symptoms, your healthcare provider may recommend that you be monitored at home. If there is evidence that the injury is more serious, you will be monitored in the hospital. Follow these tips to help care for yourself at home:    After a concussion, your healthcare provider may recommend that a family member or friend monitor you for 12 to 24 hours. They may be told to wake you every few hours during sleep to check for the signs below.    If your face or scalp swells, apply an ice pack for 20 minutes every 1 to 2 hours. Do this until the swelling starts to go down. You can make an ice pack by putting ice cubes in a plastic bag and wrapping the bag in a towel.    You may use acetaminophen to control pain, unless another pain medicine was prescribed. Do not use aspirin or ibuprofen after a head injury. If you have chronic liver or kidney disease, talk with your doctor before using these medicines. Also talk with your doctor if you ever had a stomach ulcer or gastrointestinal bleeding.    For the next 24 hours:  ? Don t drink alcohol or take " sedatives or medicines that make you sleepy.  ? Don t drive or operate machinery.  ? Avoid doing anything strenuous. Don t lift or strain.    Don t return to sports or any activity that could cause you to hit your head until all symptoms are gone and you have been cleared by your doctor. A second head injury before fully recovering from the first one can lead to serious brain injury.    Avoid doing activities that require a lot of concentration or a lot of attention. This will allow your brain to rest and heal quicker.  Follow-up care  Follow up with your doctor in 1 week, or as directed.  Note: A radiologist will review any X-rays or CT scans that were taken. You will be told of any new findings that may affect your care.  When to seek medical advice  Call your healthcare provider right away if any of these occur:    Repeated vomiting    Headache or dizziness that is severe or gets worse    Loss of consciousness    Unusual drowsiness, or unable to wake up as usual    Weakness or decreased ability to walk or move any limb    Confusion, agitation, or change in behavior or speech, or memory loss    Blurred vision    Convulsion (seizure)    Swelling on the scalp or face that gets worse    Changes in pupil size (the black part of the eye)    Redness, warmth, or pus from the swollen area    Fluid draining from or bleeding from the nose or ears     Date Last Reviewed: 8/14/2015 2000-2017 [employer]. [address] [place] [address], [place] [address]. All rights reserved. This information is not intended as a substitute for professional medical care. Always follow your healthcare professional's instructions.

## 2018-08-08 ENCOUNTER — OFFICE VISIT (OUTPATIENT)
Dept: FAMILY MEDICINE | Facility: CLINIC | Age: 38
End: 2018-08-08
Payer: COMMERCIAL

## 2018-08-08 VITALS
HEART RATE: 88 BPM | OXYGEN SATURATION: 98 % | WEIGHT: 203.2 LBS | DIASTOLIC BLOOD PRESSURE: 87 MMHG | TEMPERATURE: 98.1 F | HEIGHT: 70 IN | SYSTOLIC BLOOD PRESSURE: 130 MMHG | BODY MASS INDEX: 29.09 KG/M2

## 2018-08-08 DIAGNOSIS — S06.0X0D CONCUSSION WITHOUT LOSS OF CONSCIOUSNESS, SUBSEQUENT ENCOUNTER: Primary | ICD-10-CM

## 2018-08-08 PROCEDURE — 99214 OFFICE O/P EST MOD 30 MIN: CPT | Performed by: FAMILY MEDICINE

## 2018-08-08 NOTE — PROGRESS NOTES
SUBJECTIVE:   Ashley Catherine is a 38 year old female who presents to clinic today for the following health issues:    Follow up concussion and RTW form.  Had a fall 7/29  Still having headaches and vision irritation with screens.  Thinking and scrolling on the computer are triggers.  Had sound sensitivity and this improved.    Based on her her history it sounds like she slipped and fell hard when getting out of the shower and may have had brief LOC of a few seconds.  Not observed.    Had right foot injury, bruising on her upper left back.    Has hisotry of remote headaches with GB syndrome when she was younger but no regular headaches since.     Has used nsaids and tylenol    Sleep helps her headaches  Yesterday tried to do more work - cleaning her home laundry, etc  Had increased poor sleep as result            Problem list and histories reviewed & adjusted, as indicated.  Additional history: as documented    Patient Active Problem List   Diagnosis     OLIGOMENORRHEA, C/W PCOS     HX ABNL PAP SMEAR OF CERVIX       Flat feet     Sciatica     S/P gastrectomy     Occupational problem     Elevated parathyroid hormone     Multiple thyroid nodules     Abnormal thyroid cytology-follicular neoplasm     Chronic pain syndrome     Major depressive disorder, recurrent episode, in full remission (H)     Bilateral low back pain with sciatica, sciatica laterality unspecified     Throat symptom     Psychological factor affecting physical condition     Hx of Guillain-Ulm syndrome     Adjustment disorder with anxious mood     Thyroid nodule     Benign essential hypertension     Concussion without loss of consciousness, initial encounter     Past Surgical History:   Procedure Laterality Date     BIOPSY  03/13/2015    Thyroid nodule     ESOPHAGOSCOPY, GASTROSCOPY, DUODENOSCOPY (EGD), COMBINED  5/28/2013    Procedure: COMBINED ESOPHAGOSCOPY, GASTROSCOPY, DUODENOSCOPY (EGD);;  Surgeon: Best Willett MD;  Location:  GI      ESOPHAGOSCOPY, GASTROSCOPY, DUODENOSCOPY (EGD), COMBINED N/A 6/13/2018    Procedure: COMBINED ESOPHAGOSCOPY, GASTROSCOPY, DUODENOSCOPY (EGD), BIOPSY SINGLE OR MULTIPLE;  gastroscopy;  Surgeon: Westley Gibbs MD;  Location: [facility]     LAPAROSCOPIC GASTRIC SLEEVE  5/13/2013    Procedure: LAPAROSCOPIC GASTRIC SLEEVE;  Laparoscopic Sleeve Gastrectomy ;  Surgeon: Best Willett MD;  Location: [facility] OR     LEEP TX, CERVICAL  1995    age 15     SEPTOPLASTY       THYROIDECTOMY Left 4/3/2018    Procedure: THYROIDECTOMY;  Left Thyroid Lobectomy And Ishtmusectomy;  Surgeon: Delia Harper MD;  Location: [facility]     TONSILLECTOMY  age 20s     TONSILLECTOMY  2004?     wisdom teeth extraction         Social History   Substance Use Topics     Smoking status: Never Smoker     Smokeless tobacco: Never Used     Alcohol use 2.4 - 3.0 oz/week      Comment: 3 drinks/week     Family History   Problem Relation Age of Onset     Hypertension Sister      Hypertension Father      Allergies Father      seasonal     Lipids Father      Obesity Father      Arthritis Mother      Gynecology Mother      problems with menopause     Hypertension Mother      Other Cancer Mother      Endometrial     Osteoperosis Mother      Obesity Mother      Parathyroid Disorders Mother      3 operations     Obesity Sister      Diabetes Maternal Aunt      x several w. DM     Breast Cancer Maternal Aunt      Cancer - colorectal Maternal Uncle      60ish     Hypertension Sister      Obesity Sister      Nephrolithiasis No family hx of          Current Outpatient Prescriptions   Medication Sig Dispense Refill     Biotin 10 MG TABS tablet Take 10,000 mcg by mouth daily       Calcium Carbonate-Vitamin D (CALCIUM + D PO) Take by mouth daily       cetirizine (ZYRTEC) 10 MG tablet Take 10 mg by mouth daily as needed for allergies       Cholecalciferol (VITAMIN D) 2000 UNITS CAPS        Cyanocobalamin (CVS B-12) 5000 MCG SUBL        cyclobenzaprine (FLEXERIL) 10  "MG tablet TAKE ONE-HALF TABLET BY MOUTH TWICE A DAY AS NEEDED FOR MUSCLE SPASMS 30 tablet 5     Diphenhydramine-APAP, sleep, (TYLENOL PM EXTRA STRENGTH PO)        ferrous sulfate (IRON) 325 (65 FE) MG tablet Take 325 mg by mouth daily (with breakfast)       hydrochlorothiazide (HYDRODIURIL) 25 MG tablet Take 1 tablet (25 mg) by mouth daily 90 tablet 1     hydrOXYzine (VISTARIL) 50 MG capsule Take 1-2 capsules ( mg) by mouth nightly as needed for anxiety (sleep) 30 capsule 1     KLOR-CON 10 MEQ tablet Take 1 tablet (10 mEq) by mouth 2 times daily 180 tablet 3     MELATONIN PO Take 3 mg by mouth       Multiple Vitamin (MULTIVITAMINS PO) Take by mouth daily       naltrexone-bupropion (CONTRAVE) 8-90 MG per 12 hr tablet        omeprazole (PRILOSEC) 40 MG capsule Take 1 capsule (40 mg) by mouth daily Take 30-60 minutes before a meal. 90 capsule 3     polyethylene glycol (MIRALAX) powder Take 17 g (1 capful) by mouth daily as needed for constipation 510 g 1     KLOR-CON 20 MEQ CR tablet Take 1 tablet (20 mEq) by mouth daily 180 tablet 3     metroNIDAZOLE (METROGEL) 0.75 % vaginal gel USE TWICE WEEKLY TO VAGINA FOR RECURRENT BV SYMPTOMS. DO THIS FOR 3 MONTHS AFTER FINISHING ORAL MEDICATIONS. 70 g 3       Reviewed and updated as needed this visit by clinical staff  Tobacco  Allergies  Meds       Reviewed and updated as needed this visit by Provider         ROS:  Constitutional, HEENT, cardiovascular, pulmonary, gi and gu systems are negative, except as otherwise noted.    OBJECTIVE:                                                    /87  Pulse 88  Temp 98.1  F (36.7  C) (Oral)  Ht 5' 10\" (1.778 m)  Wt 203 lb 3.2 oz (92.2 kg)  LMP  (LMP Unknown)  SpO2 98%  Breastfeeding? No  BMI 29.16 kg/m2  Body mass index is 29.16 kg/(m^2).  GENERAL APPEARANCE: healthy, alert and no distress  Wearing sunglasses  Slightly fatigued appearing  Normal gross neuro exam  Normal mentation speech and thought processing "     ASSESSMENT/PLAN:                                                    concussion  Still having symptoms  Needs to avoid work and activities until headache free  Return to work forms done and likely can return next Friday 8/17  Follow up with Provider - as needed     Violet Humphreys MD  [facility] [place]

## 2018-08-08 NOTE — MR AVS SNAPSHOT
After Visit Summary   8/8/2018    Ashley Catherine    MRN: [id number]           Patient Information     Date Of Birth          1980        Visit Information        Provider Department      8/8/2018 9:30 AM Violet Humphreys MD [facility]        Today's Diagnoses     Concussion without loss of consciousness, subsequent encounter    -  1       Follow-ups after your visit        Your next 10 appointments already scheduled     Aug 27, 2018 11:20 AM CDT   (Arrive by 11:05 AM)   Return Visit with Hector Justice MD   [facility] ([facility])    [address] [place] [address]  [address] [facility] [id number] [phone]            Sep 12, 2018 11:00 AM CDT   (Arrive by 10:45 AM)   Return Visit with Jayro Elias, PhD [facility] ([facility])    [address] [place] [address] [id number] [phone]              Who to contact     If you have questions or need follow up information about today's clinic visit or your schedule please contact [facility] directly at [phone].  Normal or non-critical lab and imaging results will be communicated to you by MyChart, letter or phone within 4 business days after the clinic has received the results. If you do not hear from us within 7 days, please contact the clinic through MyChart or phone. If you have a critical or abnormal lab result, we will notify you by phone as soon as possible.  Submit refill requests through [employer] or call your pharmacy and they will forward the refill request to us. Please allow 3 business days for your refill to be completed.          Additional Information About Your Visit        MyChart Information     [employer] gives you secure access to your electronic health record. If you see a primary care provider, you can also send messages to your care team and make appointments. If you have  "questions, please call your primary care clinic.  If you do not have a primary care provider, please call [phone] and they will assist you.        Care EveryWhere ID     This is your Care EveryWhere ID. This could be used by other organizations to access your [place] medical records  [id number]        Your Vitals Were     Pulse Temperature Height Last Period Pulse Oximetry Breastfeeding?    88 98.1  F (36.7  C) (Oral) 5' 10\" (1.778 m) (LMP Unknown) 98% No    BMI (Body Mass Index)                   29.16 kg/m2            Blood Pressure from Last 3 Encounters:   08/08/18 130/87   08/01/18 124/81   06/13/18 118/81    Weight from Last 3 Encounters:   08/08/18 203 lb 3.2 oz (92.2 kg)   08/01/18 206 lb 8 oz (93.7 kg)   07/31/18 206 lb 1.6 oz (93.5 kg)              Today, you had the following     No orders found for display       Primary Care Provider Office Phone # Fax #    Violet Humphresy [phone]       [address] [address]        Equal Access to Services     [facility] : Hadii sabas oliveira Sowaldo, waaxda luestephaniaadaha, qaybta kaalmada adephongda, carmen weston. So [facility] [phone].    ATENCIÓN: Si habla español, tiene a gibbons disposición servicios gratuitos de asistencia lingüística. Llame al [phone].    We comply with applicable federal civil rights laws and [place] laws. We do not discriminate on the basis of race, color, national origin, age, disability, sex, sexual orientation, or gender identity.            Thank you!     Thank you for choosing [facility]  for your care. Our goal is always to provide you with excellent care. Hearing back from our patients is one way we can continue to improve our services. Please take a few minutes to complete the written survey that you may receive in the mail after your visit with us. Thank you!             Your Updated Medication List - Protect others around you: Learn how " to safely use, store and throw away your medicines at www.disposemymeds.org.          This list is accurate as of 8/8/18 11:12 AM.  Always use your most recent med list.                   Brand Name Dispense Instructions for use Diagnosis    Biotin 10 MG Tabs tablet      Take 10,000 mcg by mouth daily        CALCIUM + D PO      Take by mouth daily        cetirizine 10 MG tablet    zyrTEC     Take 10 mg by mouth daily as needed for allergies        CONTRAVE 8-90 MG per 12 hr tablet   Generic drug:  naltrexone-bupropion           CVS B-12 5000 MCG Subl   Generic drug:  Cyanocobalamin       Multiple thyroid nodules       cyclobenzaprine 10 MG tablet    FLEXERIL    30 tablet    TAKE ONE-HALF TABLET BY MOUTH TWICE A DAY AS NEEDED FOR MUSCLE SPASMS    Sciatica, unspecified laterality       ferrous sulfate 325 (65 Fe) MG tablet    IRON     Take 325 mg by mouth daily (with breakfast)        hydrochlorothiazide 25 MG tablet    HYDRODIURIL    90 tablet    Take 1 tablet (25 mg) by mouth daily    Essential hypertension with goal blood pressure less than 140/90       hydrOXYzine 50 MG capsule    VISTARIL    30 capsule    Take 1-2 capsules ( mg) by mouth nightly as needed for anxiety (sleep)    Adjustment disorder with anxious mood       KLOR-CON 10 MEQ tablet   Generic drug:  potassium chloride     180 tablet    Take 1 tablet (10 mEq) by mouth 2 times daily    Benign essential hypertension       KLOR-CON 20 MEQ CR tablet   Generic drug:  potassium chloride SA     180 tablet    Take 1 tablet (20 mEq) by mouth daily    Benign essential hypertension       MELATONIN PO      Take 3 mg by mouth        metroNIDAZOLE 0.75 % vaginal gel    METROGEL    70 g    USE TWICE WEEKLY TO VAGINA FOR RECURRENT BV SYMPTOMS. DO THIS FOR 3 MONTHS AFTER FINISHING ORAL MEDICATIONS.    Abnormal vaginal fluids       MULTIVITAMINS PO      Take by mouth daily        omeprazole 40 MG capsule    priLOSEC    90 capsule    Take 1 capsule (40 mg) by mouth  daily Take 30-60 minutes before a meal.    Gastroesophageal reflux disease with esophagitis       polyethylene glycol powder    MIRALAX    510 g    Take 17 g (1 capful) by mouth daily as needed for constipation    Constipation, unspecified constipation type       TYLENOL PM EXTRA STRENGTH PO           vitamin D 2000 units Caps

## 2018-08-10 ENCOUNTER — TELEPHONE (OUTPATIENT)
Dept: FAMILY MEDICINE | Facility: CLINIC | Age: 38
End: 2018-08-10

## 2018-08-10 DIAGNOSIS — S06.0X0D CONCUSSION WITHOUT LOSS OF CONSCIOUSNESS, SUBSEQUENT ENCOUNTER: Primary | ICD-10-CM

## 2018-08-10 NOTE — TELEPHONE ENCOUNTER
Reason for Call: Request for an order or referral:    Order or referral being requested: Referral for Neurologist near [place]     Date needed: as soon as possible    Has the patient been seen by the PCP for this problem? YES    Additional comments:     Phone number Patient can be reached at:  Home number on file [phone] (home)    Best Time:  any    Can we leave a detailed message on this number?  YES    Call taken on 8/10/2018 at 11:41 AM by Sheila Damon

## 2018-08-16 ENCOUNTER — TELEPHONE (OUTPATIENT)
Dept: FAMILY MEDICINE | Facility: CLINIC | Age: 38
End: 2018-08-16

## 2018-08-16 NOTE — TELEPHONE ENCOUNTER
Patient called and asked if the Forms can also be fax to #[phone] her   ELIZABETH # [id number]    Thank You

## 2018-08-16 NOTE — TELEPHONE ENCOUNTER
Received form(s) from pt for Return to Work.  Placed form(s) in/on SN's box.  Forms need to be filled out and signed and faxed to number listed on form..    Call pt to verify form was sent: No  Copy needs to be sent for scanning after completion: Yes    SN- pt requesting return to work Saturday August 18th with no restrictions    Rosita Caraballo CMA

## 2018-08-17 DIAGNOSIS — I10 ESSENTIAL HYPERTENSION WITH GOAL BLOOD PRESSURE LESS THAN 140/90: ICD-10-CM

## 2018-08-17 RX ORDER — HYDROCHLOROTHIAZIDE 25 MG/1
TABLET ORAL
Qty: 90 TABLET | Refills: 2 | Status: SHIPPED | OUTPATIENT
Start: 2018-08-17 | End: 2018-11-08

## 2018-08-17 NOTE — TELEPHONE ENCOUNTER
"Prescription approved per [facility] Refill Protocol.  Laureen SOMERS RN    Requested Prescriptions   Pending Prescriptions Disp Refills     hydrochlorothiazide (HYDRODIURIL) 25 MG tablet [Pharmacy Med Name: HYDROCHLOROTHIAZIDE 25MG TABS] 90 tablet 1     Sig: TAKE ONE TABLET BY MOUTH EVERY DAY    Diuretics (Including Combos) Protocol Passed    8/17/2018  9:14 AM       Passed - Blood pressure under 140/90 in past 12 months    BP Readings from Last 3 Encounters:   08/08/18 130/87   08/01/18 124/81   06/13/18 118/81                Passed - Recent (12 mo) or future (30 days) visit within the authorizing provider's specialty    Patient had office visit in the last 12 months or has a visit in the next 30 days with authorizing provider or within the authorizing provider's specialty.  See \"Patient Info\" tab in inbasket, or \"Choose Columns\" in Meds & Orders section of the refill encounter.           Passed - Patient is age 18 or older       Passed - No active pregancy on record       Passed - Normal serum creatinine on file in past 12 months    Recent Labs   Lab Test  06/07/18   1618   CR  0.68             Passed - Normal serum potassium on file in past 12 months    Recent Labs   Lab Test  06/07/18   1618   POTASSIUM  3.7                   Passed - Normal serum sodium on file in past 12 months    Recent Labs   Lab Test  06/07/18   1618   NA  140             Passed - No positive pregnancy test in past 12 months            "

## 2018-08-20 ENCOUNTER — CARE COORDINATION (OUTPATIENT)
Dept: SURGERY | Facility: CLINIC | Age: 38
End: 2018-08-20

## 2018-08-20 DIAGNOSIS — Z98.84 HISTORY OF BARIATRIC SURGERY: Primary | ICD-10-CM

## 2018-09-11 ENCOUNTER — OFFICE VISIT (OUTPATIENT)
Dept: FAMILY MEDICINE | Facility: CLINIC | Age: 38
End: 2018-09-11
Payer: COMMERCIAL

## 2018-09-11 VITALS
SYSTOLIC BLOOD PRESSURE: 126 MMHG | HEART RATE: 83 BPM | TEMPERATURE: 97.5 F | BODY MASS INDEX: 30.56 KG/M2 | OXYGEN SATURATION: 95 % | RESPIRATION RATE: 16 BRPM | DIASTOLIC BLOOD PRESSURE: 84 MMHG | WEIGHT: 213 LBS

## 2018-09-11 DIAGNOSIS — R10.11 ABDOMINAL PAIN, RIGHT UPPER QUADRANT: ICD-10-CM

## 2018-09-11 DIAGNOSIS — R10.9 FLANK PAIN: Primary | ICD-10-CM

## 2018-09-11 LAB
ALBUMIN SERPL-MCNC: 3.4 G/DL (ref 3.4–5)
ALBUMIN UR-MCNC: NEGATIVE MG/DL
ALP SERPL-CCNC: 52 U/L (ref 40–150)
ALT SERPL W P-5'-P-CCNC: 18 U/L (ref 0–50)
AMYLASE SERPL-CCNC: 60 U/L (ref 30–110)
ANION GAP SERPL CALCULATED.3IONS-SCNC: 8 MMOL/L (ref 3–14)
APPEARANCE UR: CLEAR
AST SERPL W P-5'-P-CCNC: 15 U/L (ref 0–45)
BASOPHILS # BLD AUTO: 0 10E9/L (ref 0–0.2)
BASOPHILS NFR BLD AUTO: 0.5 %
BILIRUB SERPL-MCNC: 0.4 MG/DL (ref 0.2–1.3)
BILIRUB UR QL STRIP: NEGATIVE
BUN SERPL-MCNC: 9 MG/DL (ref 7–30)
CALCIUM SERPL-MCNC: 8.6 MG/DL (ref 8.5–10.1)
CHLORIDE SERPL-SCNC: 104 MMOL/L (ref 94–109)
CO2 SERPL-SCNC: 31 MMOL/L (ref 20–32)
COLOR UR AUTO: YELLOW
CREAT SERPL-MCNC: 0.72 MG/DL (ref 0.52–1.04)
DIFFERENTIAL METHOD BLD: NORMAL
EOSINOPHIL # BLD AUTO: 0.1 10E9/L (ref 0–0.7)
EOSINOPHIL NFR BLD AUTO: 1.6 %
ERYTHROCYTE [DISTWIDTH] IN BLOOD BY AUTOMATED COUNT: 12.6 % (ref 10–15)
GFR SERPL CREATININE-BSD FRML MDRD: >90 ML/MIN/1.7M2
GLUCOSE SERPL-MCNC: 84 MG/DL (ref 70–99)
GLUCOSE UR STRIP-MCNC: NEGATIVE MG/DL
HCT VFR BLD AUTO: 36.5 % (ref 35–47)
HGB BLD-MCNC: 12.1 G/DL (ref 11.7–15.7)
HGB UR QL STRIP: NEGATIVE
KETONES UR STRIP-MCNC: NEGATIVE MG/DL
LEUKOCYTE ESTERASE UR QL STRIP: NEGATIVE
LIPASE SERPL-CCNC: 79 U/L (ref 73–393)
LYMPHOCYTES # BLD AUTO: 2.5 10E9/L (ref 0.8–5.3)
LYMPHOCYTES NFR BLD AUTO: 38.9 %
MCH RBC QN AUTO: 31.7 PG (ref 26.5–33)
MCHC RBC AUTO-ENTMCNC: 33.2 G/DL (ref 31.5–36.5)
MCV RBC AUTO: 96 FL (ref 78–100)
MONOCYTES # BLD AUTO: 0.5 10E9/L (ref 0–1.3)
MONOCYTES NFR BLD AUTO: 8.6 %
NEUTROPHILS # BLD AUTO: 3.2 10E9/L (ref 1.6–8.3)
NEUTROPHILS NFR BLD AUTO: 50.4 %
NITRATE UR QL: NEGATIVE
PH UR STRIP: 6 PH (ref 5–7)
PLATELET # BLD AUTO: 319 10E9/L (ref 150–450)
POTASSIUM SERPL-SCNC: 3.7 MMOL/L (ref 3.4–5.3)
PROT SERPL-MCNC: 6.6 G/DL (ref 6.8–8.8)
RBC # BLD AUTO: 3.82 10E12/L (ref 3.8–5.2)
SODIUM SERPL-SCNC: 143 MMOL/L (ref 133–144)
SOURCE: NORMAL
SP GR UR STRIP: 1.01 (ref 1–1.03)
T3 SERPL-MCNC: 121 NG/DL (ref 60–181)
UROBILINOGEN UR STRIP-ACNC: 1 EU/DL (ref 0.2–1)
WBC # BLD AUTO: 6.3 10E9/L (ref 4–11)

## 2018-09-11 PROCEDURE — 81003 URINALYSIS AUTO W/O SCOPE: CPT | Performed by: FAMILY MEDICINE

## 2018-09-11 PROCEDURE — 85025 COMPLETE CBC W/AUTO DIFF WBC: CPT | Performed by: FAMILY MEDICINE

## 2018-09-11 PROCEDURE — 83690 ASSAY OF LIPASE: CPT | Performed by: FAMILY MEDICINE

## 2018-09-11 PROCEDURE — 36415 COLL VENOUS BLD VENIPUNCTURE: CPT | Performed by: FAMILY MEDICINE

## 2018-09-11 PROCEDURE — 82150 ASSAY OF AMYLASE: CPT | Performed by: FAMILY MEDICINE

## 2018-09-11 PROCEDURE — 84480 ASSAY TRIIODOTHYRONINE (T3): CPT | Performed by: FAMILY MEDICINE

## 2018-09-11 PROCEDURE — 80053 COMPREHEN METABOLIC PANEL: CPT | Performed by: FAMILY MEDICINE

## 2018-09-11 PROCEDURE — 99214 OFFICE O/P EST MOD 30 MIN: CPT | Performed by: FAMILY MEDICINE

## 2018-09-11 NOTE — MR AVS SNAPSHOT
After Visit Summary   9/11/2018    Ashley Catherine    MRN: [id number]           Patient Information     Date Of Birth          1980        Visit Information        Provider Department      9/11/2018 7:45 AM Ct Jameson MD [facility]        Today's Diagnoses     Flank pain    -  1    Abdominal pain, right upper quadrant           Follow-ups after your visit        Your next 10 appointments already scheduled     Sep 12, 2018 11:00 AM CDT   (Arrive by 10:45 AM)   Return Visit with Jayro Elias, PhD [facility] ([facility])    [address] [place] [address]  [address] [facility] [id number] [phone]            Nov 14, 2018  9:40 AM CST   (Arrive by 9:25 AM)   Return Bariatric Visit with Best Willett MD   [facility] ([facility])    [address] [place] [address]  4[address] [facility] [id number] [phone]              Future tests that were ordered for you today     Open Future Orders        Priority Expected Expires Ordered    US Abdomen Limited Routine  9/11/2019 9/11/2018            Who to contact     If you have questions or need follow up information about today's clinic visit or your schedule please contact [facility] directly at [phone].  Normal or non-critical lab and imaging results will be communicated to you by MyChart, letter or phone within 4 business days after the clinic has received the results. If you do not hear from us within 7 days, please contact the clinic through MyChart or phone. If you have a critical or abnormal lab result, we will notify you by phone as soon as possible.  Submit refill requests through [employer] or call your pharmacy and they will forward the refill request to us. Please allow 3 business days for your refill to be completed.          Additional Information About Your Visit        flikdateharAppforma Information     [employer] gives you secure  access to your electronic health record. If you see a primary care provider, you can also send messages to your care team and make appointments. If you have questions, please call your primary care clinic.  If you do not have a primary care provider, please call [phone] and they will assist you.        Care EveryWhere ID     This is your Care EveryWhere ID. This could be used by other organizations to access your [place] medical records  [id number]        Your Vitals Were     Pulse Temperature Respirations Pulse Oximetry Breastfeeding? BMI (Body Mass Index)    83 97.5  F (36.4  C) (Oral) 16 95% No 30.56 kg/m2       Blood Pressure from Last 3 Encounters:   09/11/18 126/84   08/08/18 130/87   08/01/18 124/81    Weight from Last 3 Encounters:   09/11/18 213 lb (96.6 kg)   08/08/18 203 lb 3.2 oz (92.2 kg)   08/01/18 206 lb 8 oz (93.7 kg)              We Performed the Following     *UA reflex to Microscopic and Culture ([place] [facility] (except [place] and [place])     Amylase     CBC with platelets and differential     Comprehensive metabolic panel (BMP + Alb, Alk Phos, ALT, AST, Total. Bili, TP)     Lipase     T3, total        Primary Care Provider Office Phone # Fax #    KeesevilleTanja Humphreys [phone] [address] [address]        Equal Access to Services     MABEL BAILEY : Hadii aad ku hadasho Soomaali, waaxda luqadaha, qaybta kaalmada adeegyada, carmen arteaga . So [facility] [place] [facility] [phone].    ATENCIÓN: Si habla español, tiene a gibbons disposición servicios gratuitos de asistencia lingüística. Llame al [phone].    We comply with applicable federal civil rights laws and [place] laws. We do not discriminate on the basis of race, color, national origin, age, disability, sex, sexual orientation, or gender identity.            Thank you!     Thank you for choosing [facility]  for your care. Our goal is always to  provide you with excellent care. Hearing back from our patients is one way we can continue to improve our services. Please take a few minutes to complete the written survey that you may receive in the mail after your visit with us. Thank you!             Your Updated Medication List - Protect others around you: Learn how to safely use, store and throw away your medicines at www.disposemymeds.org.          This list is accurate as of 9/11/18  8:11 AM.  Always use your most recent med list.                   Brand Name Dispense Instructions for use Diagnosis    Biotin 10 MG Tabs tablet      Take 10,000 mcg by mouth daily        CALCIUM + D PO      Take by mouth daily        cetirizine 10 MG tablet    zyrTEC     Take 10 mg by mouth daily as needed for allergies        CONTRAVE 8-90 MG per 12 hr tablet   Generic drug:  naltrexone-bupropion           CVS B-12 5000 MCG Subl   Generic drug:  Cyanocobalamin       Multiple thyroid nodules       cyclobenzaprine 10 MG tablet    FLEXERIL    30 tablet    TAKE ONE-HALF TABLET BY MOUTH TWICE A DAY AS NEEDED FOR MUSCLE SPASMS    Sciatica, unspecified laterality       ferrous sulfate 325 (65 Fe) MG tablet    IRON     Take 325 mg by mouth daily (with breakfast)        hydrochlorothiazide 25 MG tablet    HYDRODIURIL    90 tablet    TAKE ONE TABLET BY MOUTH EVERY DAY    Essential hypertension with goal blood pressure less than 140/90       hydrOXYzine 50 MG capsule    VISTARIL    30 capsule    Take 1-2 capsules ( mg) by mouth nightly as needed for anxiety (sleep)    Adjustment disorder with anxious mood       KLOR-CON 10 MEQ tablet   Generic drug:  potassium chloride     180 tablet    Take 1 tablet (10 mEq) by mouth 2 times daily    Benign essential hypertension       KLOR-CON 20 MEQ CR tablet   Generic drug:  potassium chloride SA     180 tablet    Take 1 tablet (20 mEq) by mouth daily    Benign essential hypertension       MELATONIN PO      Take 3 mg by mouth         metroNIDAZOLE 0.75 % vaginal gel    METROGEL    70 g    USE TWICE WEEKLY TO VAGINA FOR RECURRENT BV SYMPTOMS. DO THIS FOR 3 MONTHS AFTER FINISHING ORAL MEDICATIONS.    Abnormal vaginal fluids       MULTIVITAMINS PO      Take by mouth daily        omeprazole 40 MG capsule    priLOSEC    90 capsule    Take 1 capsule (40 mg) by mouth daily Take 30-60 minutes before a meal.    Gastroesophageal reflux disease with esophagitis       polyethylene glycol powder    MIRALAX    510 g    Take 17 g (1 capful) by mouth daily as needed for constipation    Constipation, unspecified constipation type       TYLENOL PM EXTRA STRENGTH PO           vitamin D 2000 units Caps

## 2018-09-11 NOTE — PROGRESS NOTES
SUBJECTIVE:   Ashley Catherine is a 38 year old female who presents to clinic today for the following health issues:      Flank Pain - more of an right upper quadrant pain, worse in the past two weeks and seems that it gets worse after meals and drinks, dorotyh last couple of days, she denies any fever no chills , no vomiting some nausea , no diarrhea .      Duration: started two days ago         Specific cause: none    Description:   Location of pain: right side   Character of pain: sharp and intermittent  Pain radiation:none  New numbness or weakness in legs, not attributed to pain:  no     Intensity: Currently 5-6/10    History:   Pain interferes with job: No  History of flank problems: a while ago patient had similar pain and it was related to a fatty liver   Any previous MRI or X-rays: None  Therapies tried without relief: N/A    Alleviating factors:   Improved by: N/A      Precipitating factors:  Worsened by: eating and drinking       Problem list and histories reviewed & adjusted, as indicated.  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     Adjustment disorder with anxious mood     Thyroid nodule     Benign essential hypertension     Concussion without loss of consciousness, initial encounter     Concussion without loss of consciousness, subsequent encounter     Past Surgical History:   Procedure Laterality Date     BIOPSY  03/13/2015    Thyroid nodule      ESOPHAGOSCOPY, GASTROSCOPY, DUODENOSCOPY (EGD), COMBINED  5/28/2013    Procedure: COMBINED ESOPHAGOSCOPY, GASTROSCOPY, DUODENOSCOPY (EGD);;  Surgeon: Best Willett MD;  Location:  GI     ESOPHAGOSCOPY, GASTROSCOPY, DUODENOSCOPY (EGD), COMBINED N/A 6/13/2018    Procedure: COMBINED ESOPHAGOSCOPY, GASTROSCOPY, DUODENOSCOPY (EGD), BIOPSY SINGLE OR MULTIPLE;  gastroscopy;  Surgeon: Westley Gibbs MD;  Location: [facility]     LAPAROSCOPIC GASTRIC SLEEVE  5/13/2013    Procedure: LAPAROSCOPIC GASTRIC SLEEVE;  Laparoscopic Sleeve Gastrectomy ;  Surgeon: Best Willett MD;  [facility]: [facility]     LEEP TX, CERVICAL  1995    age 15     SEPTOPLASTY       THYROIDECTOMY Left 4/3/2018    Procedure: THYROIDECTOMY;  Left Thyroid Lobectomy And Ishtmusectomy;  Surgeon: Delia Harper MD;  Location: [facility] OR     TONSILLECTOMY  age 20s     TONSILLECTOMY  2004?     wisdom teeth extraction         Social History   Substance Use Topics     Smoking status: Never Smoker     Smokeless tobacco: Never Used     Alcohol use 2.4 - 3.0 oz/week      Comment: 3 drinks/week     Family History   Problem Relation Age of Onset     Hypertension Sister      Hypertension Father      Allergies Father      seasonal     Lipids Father      Obesity Father      Arthritis Mother      Gynecology Mother      problems with menopause     Hypertension Mother      Other Cancer Mother      Endometrial     Osteoporosis Mother      Obesity Mother      Parathyroid Disorders Mother      3 operations     Obesity Sister      Diabetes Maternal Aunt      x several w. DM     Breast Cancer Maternal Aunt      Cancer - colorectal Maternal Uncle      60ish     Hypertension Sister      Obesity Sister      Nephrolithiasis No family hx of          Current Outpatient Prescriptions   Medication Sig Dispense Refill     Biotin 10 MG TABS tablet Take 10,000 mcg by mouth daily       Calcium Carbonate-Vitamin D (CALCIUM + D PO) Take by mouth daily       cetirizine (ZYRTEC) 10  MG tablet Take 10 mg by mouth daily as needed for allergies       Cholecalciferol (VITAMIN D) 2000 UNITS CAPS        Cyanocobalamin (CVS B-12) 5000 MCG SUBL        cyclobenzaprine (FLEXERIL) 10 MG tablet TAKE ONE-HALF TABLET BY MOUTH TWICE A DAY AS NEEDED FOR MUSCLE SPASMS 30 tablet 5     Diphenhydramine-APAP, sleep, (TYLENOL PM EXTRA STRENGTH PO)        ferrous sulfate (IRON) 325 (65 FE) MG tablet Take 325 mg by mouth daily (with breakfast)       hydrochlorothiazide (HYDRODIURIL) 25 MG tablet TAKE ONE TABLET BY MOUTH EVERY DAY 90 tablet 2     MELATONIN PO Take 3 mg by mouth       metroNIDAZOLE (METROGEL) 0.75 % vaginal gel USE TWICE WEEKLY TO VAGINA FOR RECURRENT BV SYMPTOMS. DO THIS FOR 3 MONTHS AFTER FINISHING ORAL MEDICATIONS. 70 g 3     Multiple Vitamin (MULTIVITAMINS PO) Take by mouth daily       naltrexone-bupropion (CONTRAVE) 8-90 MG per 12 hr tablet        omeprazole (PRILOSEC) 40 MG capsule Take 1 capsule (40 mg) by mouth daily Take 30-60 minutes before a meal. 90 capsule 3     polyethylene glycol (MIRALAX) powder Take 17 g (1 capful) by mouth daily as needed for constipation 510 g 1     hydrOXYzine (VISTARIL) 50 MG capsule Take 1-2 capsules ( mg) by mouth nightly as needed for anxiety (sleep) (Patient not taking: Reported on 9/11/2018) 30 capsule 1     KLOR-CON 10 MEQ tablet Take 1 tablet (10 mEq) by mouth 2 times daily (Patient not taking: Reported on 9/11/2018) 180 tablet 3     KLOR-CON 20 MEQ CR tablet Take 1 tablet (20 mEq) by mouth daily (Patient not taking: Reported on 9/11/2018) 180 tablet 3     Allergies   Allergen Reactions     Nkda [No Known Drug Allergies]      No Known Allergies      Recent Labs   Lab Test  06/07/18   1618  05/10/18   1411  03/26/18   0918  01/17/18   1129   05/15/17   1056   12/23/14   1613   08/08/13   1456   07/12/12   0820   A1C   --    --    --    --    --    --    --   5.0   --   4.6   --   5.4   LDL   --    --    --    --    --   52   --   67   --   120   < >  108    HDL   --    --    --    --    --   81   --   77   --   44*   < >  55   TRIG   --    --    --    --    --   120   --   91   --   80   < >  86   ALT  19   --   16  16   < >  20   < >  20   < >  29   < >  12   CR  0.68   --   0.75  0.82   < >  0.54   < >  0.78   < >  0.72   < >  0.60   GFRESTIMATED  >90   --   86  78   < >  >90  Non  GFR Calc     < >  84   < >  >90   < >  >90   GFRESTBLACK  >90   --   >90  >90   < >  >90   GFR Calc     < >  >90   GFR Calc     < >  >90   < >  >90   POTASSIUM  3.7   --   3.8  3.7   < >  3.8   < >  3.8   < >  3.7   < >  3.8   TSH  1.08  1.77   --    --    < >   --    < >   --    < >   --    < >  1.05    < > = values in this interval not displayed.      BP Readings from Last 3 Encounters:   09/11/18 126/84   08/08/18 130/87   08/01/18 124/81    Wt Readings from Last 3 Encounters:   09/11/18 213 lb (96.6 kg)   08/08/18 203 lb 3.2 oz (92.2 kg)   08/01/18 206 lb 8 oz (93.7 kg)                  Labs reviewed in EPIC    Reviewed and updated as needed this visit by clinical staff       Reviewed and updated as needed this visit by Provider         ROS:  Constitutional, HEENT, cardiovascular, pulmonary, GI, , musculoskeletal, neuro, skin, endocrine and psych systems are negative, except as otherwise noted.    OBJECTIVE:     /84 (BP Location: Left arm, Patient Position: Sitting, Cuff Size: Adult Large)  Pulse 83  Temp 97.5  F (36.4  C) (Oral)  Resp 16  Wt 213 lb (96.6 kg)  SpO2 95%  Breastfeeding? No  BMI 30.56 kg/m2  Body mass index is 30.56 kg/(m^2).  GENERAL: healthy, alert and no distress  EYES: Eyes grossly normal to inspection, PERRL and conjunctivae and sclerae normal  HENT: ear canals and TM's normal, nose and mouth without ulcers or lesions  NECK: no adenopathy, no asymmetry, masses, or scars and thyroid normal to palpation  RESP: lungs clear to auscultation - no rales, rhonchi or wheezes  CV: regular rate and rhythm, normal  S1 S2, no S3 or S4, no murmur, click or rub, no peripheral edema and peripheral pulses strong  ABDOMEN: soft, without hepatosplenomegaly or masses, tenderness RUQbut no guarding no referred pain no rebound  and bowel sounds normal  MS: no gross musculoskeletal defects noted, no edema    Diagnostic Test Results:  Results for orders placed or performed in visit on 09/11/18 (from the past 24 hour(s))   CBC with platelets and differential   Result Value Ref Range    WBC 6.3 4.0 - 11.0 10e9/L    RBC Count 3.82 3.8 - 5.2 10e12/L    Hemoglobin 12.1 11.7 - 15.7 g/dL    Hematocrit 36.5 35.0 - 47.0 %    MCV 96 78 - 100 fl    MCH 31.7 26.5 - 33.0 pg    MCHC 33.2 31.5 - 36.5 g/dL    RDW 12.6 10.0 - 15.0 %    Platelet Count 319 150 - 450 10e9/L    % Neutrophils 50.4 %    % Lymphocytes 38.9 %    % Monocytes 8.6 %    % Eosinophils 1.6 %    % Basophils 0.5 %    Absolute Neutrophil 3.2 1.6 - 8.3 10e9/L    Absolute Lymphocytes 2.5 0.8 - 5.3 10e9/L    Absolute Monocytes 0.5 0.0 - 1.3 10e9/L    Absolute Eosinophils 0.1 0.0 - 0.7 10e9/L    Absolute Basophils 0.0 0.0 - 0.2 10e9/L    Diff Method Automated Method    *UA reflex to Microscopic and Culture ([place] [facility] (except [place] and [place])   Result Value Ref Range    Color Urine Yellow     Appearance Urine Clear     Glucose Urine Negative NEG^Negative mg/dL    Bilirubin Urine Negative NEG^Negative    Ketones Urine Negative NEG^Negative mg/dL    Specific Gravity Urine 1.015 1.003 - 1.035    Blood Urine Negative NEG^Negative    pH Urine 6.0 5.0 - 7.0 pH    Protein Albumin Urine Negative NEG^Negative mg/dL    Urobilinogen Urine 1.0 0.2 - 1.0 EU/dL    Nitrite Urine Negative NEG^Negative    Leukocyte Esterase Urine Negative NEG^Negative    Source Midstream Urine        ASSESSMENT/PLAN:             1. Flank pain  Seems to be coming from the RUQ and most likely it is the gallbladder, see below   - Comprehensive metabolic panel (BMP + Alb, Alk Phos, ALT, AST, Total. Bili,  TP)    2. Abdominal pain, right upper quadrant  Will check labs but she would need to have liver and gallbladder US done to r/u gallstones .  - T3, total  - *UA reflex to Microscopic and Culture ([place] [facility] (except [place] and [place])  - CBC with platelets and differential  - Comprehensive metabolic panel (BMP + Alb, Alk Phos, ALT, AST, Total. Bili, TP)  - Lipase  - Amylase  - US Abdomen Limited; Future    RTC if no improving or worsening.  Pt is aware  and comfortable with the current plan.      Ct Jameson MD  [facility]

## 2018-09-12 ENCOUNTER — RADIANT APPOINTMENT (OUTPATIENT)
Dept: ULTRASOUND IMAGING | Facility: CLINIC | Age: 38
End: 2018-09-12
Attending: FAMILY MEDICINE
Payer: COMMERCIAL

## 2018-09-12 ENCOUNTER — OFFICE VISIT (OUTPATIENT)
Dept: PSYCHOLOGY | Facility: CLINIC | Age: 38
End: 2018-09-12
Payer: COMMERCIAL

## 2018-09-12 VITALS — BODY MASS INDEX: 30.45 KG/M2 | WEIGHT: 212.2 LBS

## 2018-09-12 DIAGNOSIS — F54 PSYCHOLOGICAL FACTORS AFFECTING MORBID OBESITY (H): Primary | ICD-10-CM

## 2018-09-12 DIAGNOSIS — Z56.9 OCCUPATIONAL PROBLEM: ICD-10-CM

## 2018-09-12 DIAGNOSIS — S06.0X0S CONCUSSION WITHOUT LOSS OF CONSCIOUSNESS, SEQUELA (H): ICD-10-CM

## 2018-09-12 DIAGNOSIS — R10.11 ABDOMINAL PAIN, RIGHT UPPER QUADRANT: ICD-10-CM

## 2018-09-12 DIAGNOSIS — E66.01 PSYCHOLOGICAL FACTORS AFFECTING MORBID OBESITY (H): Primary | ICD-10-CM

## 2018-09-12 SDOH — ECONOMIC STABILITY - INCOME SECURITY: UNSPECIFIED PROBLEMS RELATED TO EMPLOYMENT: Z56.9

## 2018-09-12 NOTE — MR AVS SNAPSHOT
After Visit Summary   9/12/2018    Ashley Catherine    MRN: [id number]           Patient Information     Date Of Birth          1980        Visit Information        Provider Department      9/12/2018 11:00 AM Jayro Elias, PhD [facility]        Today's Diagnoses     Psychological factors affecting morbid obesity (H)    -  1    Occupational problem        Concussion without loss of consciousness, sequela (H)           Follow-ups after your visit        Your next 10 appointments already scheduled     Sep 15, 2018 10:00 AM CDT   (Arrive by 9:45 AM)   NEW CONCUSSION with Angel Marin PA-C   [facility] Concussion ([facility] [place])    [address] [place] [id number] [phone]            Oct 08, 2018  1:40 PM CDT   (Arrive by 1:25 PM)   Return Weight Management Visit with Hector Justice MD   [facility] Medical Weight Management ([facility])    [address] [place] [id number] [phone]            Nov 14, 2018  9:40 AM CST   (Arrive by 9:25 AM)   Return Bariatric Visit with Best Willett MD   [facility] ([facility])    [address] [place] [id number] [phone]              Who to contact     Please call your clinic at [phone] to:    Ask questions about your health    Make or cancel appointments    Discuss your medicines    Learn about your test results    Speak to your doctor            Additional Information About Your Visit        ScheduleSofthart Information     Skype gives you secure access to your electronic health record. If you see a primary care provider, you can also send messages to your care team and make appointments. If you have questions, please call your primary care clinic.  If you do not have a primary care provider, please call [phone] and they will assist you.       Zuffle is an electronic gateway that provides easy, online access to your medical records. With Zuffle, you can request a clinic appointment, read your test results, renew a prescription or communicate with your care team.     To access your existing account, please contact your [facility] or call [phone] for assistance.        Care EveryWhere ID     This is your Care EveryWhere ID. This could be used by other organizations to access your [place] medical records  [id number]        Your Vitals Were     BMI (Body Mass Index)                   30.45 kg/m2            Blood Pressure from Last 3 Encounters:   09/11/18 126/84   08/08/18 130/87   08/01/18 124/81    Weight from Last 3 Encounters:   09/12/18 96.3 kg (212 lb 3.2 oz)   09/11/18 96.6 kg (213 lb)   08/08/18 92.2 kg (203 lb 3.2 oz)              Today, you had the following     No orders found for display       Primary Care Provider Office Phone # Fax #    Cam[place]rista Humphreys [phone]       [address] [place] [address]        Equal Access to Services     [facility]: Hadii aad ku hadasho Sowaldo, waaxda luqadaha, qaybta kaalmada adeegyada, carmen arteaga . So [facility] [phone].    ATENCIÓN: Si habla español, tiene a gibbons disposición servicios gratuitos de asistencia lingüística. Llame al [phone].    We comply with applicable federal civil rights laws and [place] laws. We do not discriminate on the basis of race, color, national origin, age, disability, sex, sexual orientation, or gender identity.            Thank you!     Thank you for choosing [facility]  for your care. Our goal is always to provide you with excellent care. Hearing back from our patients is one way we can continue to improve our services. Please take a few minutes to complete the written survey that you may receive in the mail after your visit with us. Thank you!              Your Updated Medication List - Protect others around you: Learn how to safely use, store and throw away your medicines at www.disposemymeds.org.          This list is accurate as of 9/12/18 12:01 PM.  Always use your most recent med list.                   Brand Name Dispense Instructions for use Diagnosis    Biotin 10 MG Tabs tablet      Take 10,000 mcg by mouth daily        CALCIUM + D PO      Take by mouth daily        cetirizine 10 MG tablet    zyrTEC     Take 10 mg by mouth daily as needed for allergies        CONTRAVE 8-90 MG per 12 hr tablet   Generic drug:  naltrexone-bupropion           CVS B-12 5000 MCG Subl   Generic drug:  Cyanocobalamin       Multiple thyroid nodules       cyclobenzaprine 10 MG tablet    FLEXERIL    30 tablet    TAKE ONE-HALF TABLET BY MOUTH TWICE A DAY AS NEEDED FOR MUSCLE SPASMS    Sciatica, unspecified laterality       ferrous sulfate 325 (65 Fe) MG tablet    IRON     Take 325 mg by mouth daily (with breakfast)        hydrochlorothiazide 25 MG tablet    HYDRODIURIL    90 tablet    TAKE ONE TABLET BY MOUTH EVERY DAY    Essential hypertension with goal blood pressure less than 140/90       hydrOXYzine 50 MG capsule    VISTARIL    30 capsule    Take 1-2 capsules ( mg) by mouth nightly as needed for anxiety (sleep)    Adjustment disorder with anxious mood       KLOR-CON 10 MEQ tablet   Generic drug:  potassium chloride     180 tablet    Take 1 tablet (10 mEq) by mouth 2 times daily    Benign essential hypertension       KLOR-CON 20 MEQ CR tablet   Generic drug:  potassium chloride SA     180 tablet    Take 1 tablet (20 mEq) by mouth daily    Benign essential hypertension       MELATONIN PO      Take 3 mg by mouth        metroNIDAZOLE 0.75 % vaginal gel    METROGEL    70 g    USE TWICE WEEKLY TO VAGINA FOR RECURRENT BV SYMPTOMS. DO THIS FOR 3 MONTHS AFTER FINISHING ORAL MEDICATIONS.    Abnormal vaginal fluids       MULTIVITAMINS PO      Take by mouth daily         omeprazole 40 MG capsule    priLOSEC    90 capsule    Take 1 capsule (40 mg) by mouth daily Take 30-60 minutes before a meal.    Gastroesophageal reflux disease with esophagitis       polyethylene glycol powder    MIRALAX    510 g    Take 17 g (1 capful) by mouth daily as needed for constipation    Constipation, unspecified constipation type       TYLENOL PM EXTRA STRENGTH PO           vitamin D 2000 units Caps

## 2018-09-15 ENCOUNTER — OFFICE VISIT (OUTPATIENT)
Dept: SURGERY | Facility: CLINIC | Age: 38
End: 2018-09-15
Payer: COMMERCIAL

## 2018-09-15 VITALS
HEART RATE: 87 BPM | SYSTOLIC BLOOD PRESSURE: 146 MMHG | DIASTOLIC BLOOD PRESSURE: 95 MMHG | BODY MASS INDEX: 30.49 KG/M2 | WEIGHT: 213 LBS | HEIGHT: 70 IN | OXYGEN SATURATION: 95 %

## 2018-09-15 DIAGNOSIS — R42 DIZZINESS: ICD-10-CM

## 2018-09-15 DIAGNOSIS — M54.2 CERVICALGIA: ICD-10-CM

## 2018-09-15 DIAGNOSIS — S06.0X0A CONCUSSION WITHOUT LOSS OF CONSCIOUSNESS, INITIAL ENCOUNTER: Primary | ICD-10-CM

## 2018-09-15 DIAGNOSIS — H53.149 VISUAL DISCOMFORT, UNSPECIFIED LATERALITY: ICD-10-CM

## 2018-09-15 ASSESSMENT — ANXIETY QUESTIONNAIRES
7. FEELING AFRAID AS IF SOMETHING AWFUL MIGHT HAPPEN: SEVERAL DAYS
4. TROUBLE RELAXING: NOT AT ALL
7. FEELING AFRAID AS IF SOMETHING AWFUL MIGHT HAPPEN: SEVERAL DAYS
2. NOT BEING ABLE TO STOP OR CONTROL WORRYING: NOT AT ALL
3. WORRYING TOO MUCH ABOUT DIFFERENT THINGS: SEVERAL DAYS
GAD7 TOTAL SCORE: 3
GAD7 TOTAL SCORE: 3
6. BECOMING EASILY ANNOYED OR IRRITABLE: NOT AT ALL
1. FEELING NERVOUS, ANXIOUS, OR ON EDGE: SEVERAL DAYS
5. BEING SO RESTLESS THAT IT IS HARD TO SIT STILL: NOT AT ALL

## 2018-09-15 ASSESSMENT — PATIENT HEALTH QUESTIONNAIRE - PHQ9
SUM OF ALL RESPONSES TO PHQ QUESTIONS 1-9: 2
10. IF YOU CHECKED OFF ANY PROBLEMS, HOW DIFFICULT HAVE THESE PROBLEMS MADE IT FOR YOU TO DO YOUR WORK, TAKE CARE OF THINGS AT HOME, OR GET ALONG WITH OTHER PEOPLE: NOT DIFFICULT AT ALL
SUM OF ALL RESPONSES TO PHQ QUESTIONS 1-9: 2

## 2018-09-15 ASSESSMENT — PAIN SCALES - GENERAL: PAINLEVEL: MODERATE PAIN (4)

## 2018-09-15 NOTE — PATIENT INSTRUCTIONS
"~ You WILL get better~    GENERAL ADVICE  ~ Gradually ease back into your usual activities.  ~ Rest (eyes closed, dark room)  as frequently as needed to help your symptoms go away.    SCREENS  ~ Change settings on your phone and computer using the \"Blue Light Filter\" (Night Shift on all Apple products)   ~ The goal is making screens more yellow and less blue.     ~ If this is not an option you can download this program: Karmasphere, to adjust your screen resolution.  ~ Turn screen brightness down  ~ Increase font size    LIGHT SENSITIVITY   ~ Night driving or driving in bad weather can be difficult.  - Yellow or kehinde-tinted lenses may help reduce nighttime driving glare.   - Amazon has a $10 option: Bes[employer]s Yellow Night Vision    NOISE SENSITIVITY  ~ Try high-fidelity ear plugs, designed for musicians. One example is Etymotic ETY-Plugs, can be found on Amazon.com for ~$13    FATIGUE  ~ Daily exercise is strongly encouraged.  Start with a 10 min walk and increase the time as tolerated until you are walking 30 minutes per day.      ANXIETY OR MOOD SWINGS:  ~ Try using the free Calm beatris (see [employer] Beatris store or Google Play Store) for guided breathing and mindfulness/meditation.  ~ Explore [facility] (https://www.headspace.com) for free and easy-to-use meditation guidance.  "

## 2018-09-15 NOTE — NURSING NOTE
"Chief Complaint   Patient presents with     Head Injury     concussion w/o loc - 7/29/2018 - Slip/fall on wet tile.       Vitals:    09/15/18 0956   BP: (!) 146/95   Pulse: 87   SpO2: 95%   Weight: 96.6 kg (213 lb)   Height: 1.778 m (5' 10\")       Body mass index is 30.56 kg/(m^2).    MAXIMO-7 SCORE 9/28/2017 11/9/2017 9/15/2018   Total Score - - -   Total Score - - 3 (minimal anxiety)   Total Score 1 12 3     PHQ-9 SCORE 11/9/2017 5/10/2018 9/15/2018   Total Score - - -   Total Score MyChart - - 2 (Minimal depression)   Total Score 8 7 2        CONCUSSION SYMPTOMS ASSESSMENT 9/15/2018   Headache or Pressure In Head 2 - mild to moderate   Upset Stomach or Throwing Up 1 - mild   Problems with Balance 0 - none   Feeling Dizzy 0 - none   Sensitivity to Light 2 - mild to moderate   Sensitivity to Noise 2 - mild to moderate   Mood Changes 0 - none   Feeling sluggish, hazy, or foggy 0 - none   Trouble Concentrating, Lack of Focus 0 - none   Motion Sickness 1 - mild   Vision Changes 0 - none   Memory Problems 1 - mild   Feeling Confused 0 - none   Neck Pain 0 - none   Trouble Sleeping 3 - moderate   Total Number of Symptoms 7   Symptom Severity Score 12                    "

## 2018-09-15 NOTE — PROGRESS NOTES
"[facility] Admission  September 15, 2018        Assessment:   (S06.0X0A) Concussion without loss of consciousness, initial encounter  (primary encounter diagnosis)  (R42) Dizziness  (M54.2) Cervicalgia  (H53.149) Visual discomfort, unspecified laterality    Ashley Catherine is a 38 year old female who presents for evaluation of concussion without loss of consciousness which occurred on 7/29/2018 patient he slipped and fell on a wet tile floor at a friend's cabin.  She does not remember the details of the fall and is unsure if she hit her head and she had no local head pain but within a few minutes developed headache and cognitive fogginess stating that she felt \"hung over\".  She took Tylenol, ibuprofen, and Flexeril and went to bed the next morning she felt very fatigued and frankly was more concerned about related foot injury and had a lot of calling off work.  She continued with her symptoms including light and noise sensitivity as well as trouble with scrolling on her screens with return to work, ultimately presenting to her primary and being diagnosed with concussion. She was off for 3 weeks and better after after the second week.  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    On RTW intermittent headaches continued but she otherwise was feeling okay until 2 days ago when fogginess and a constant right sided HA returned.  She describes this headache as constant dull pain that starts at  the right occiput and wraps to her forehead with associated light sensitivity and nausea.  2 days ago she made the transition from night shift to day shift in her work schedule.    On exam Ashley shows path deviation on gait testing with head tilt.  She also has a positive vestibular O-oculomotor test with saccades and vestibular-ocular reflex provoking headaches and mental fog.  Her same symptoms were provoked when I showed her my computer screen and scrolled quickly through her note. " " My impression is that she will has ongoing symptoms are related to underlying vestibular dysfunction exacerbated by her recent, sleep disturbance due to shift change, and would be best treated through physical therapy.    Much of the session involved teaching about concussion symptoms, triggers and ways to avoid them.   We discussed other ways that she can adjust the settings on her screen to make this more comfortable, particularly reducing bluelight output.  She could consider high fidelity earplugs to assist with noise sensitivity.  I highly recommend exercise for its benefits to energy, cognition, and mood.  In general Ashley should gradually work back into her former activities, resting as needed when symptom levels begin to rise.  Her CSA score today is 12/90 and is low enough to warrant continuation of work full-time.  I would be happy to see her in follow-up should her symptoms worsen or fail to improve.      Plan:  1. Biggest concern of pt addressed: HA    2. Work restrictions- No restrictions    3. Activity recommendations- moderate, low impact aerobic activty up to 30 mins    4. Referral to:   a. Physical Therapy: Indications/Goals: evaluation and treatment including but not limited to HA, neck pn, dizziness    5. Follow up here in 6 weeks PRN.    AVS Instructions:  ~ You WILL get better~    GENERAL ADVICE  ~ Gradually ease back into your usual activities.  ~ Rest (eyes closed, dark room)  as frequently as needed to help your symptoms go away.    SCREENS  ~ Change settings on your phone and computer using the \"Blue Light Filter\" (Night Shift on all Apple products)   ~ The goal is making screens more yellow and less blue.     ~ If this is not an option you can download this program: Stagee, to adjust your screen resolution.  ~ Turn screen brightness down  ~ Increase font size    LIGHT SENSITIVITY   ~ Night driving or driving in bad weather can be difficult.  - Yellow or kehinde-tinted lenses may help " "reduce nighttime driving glare.   - Amazon has a $10 option: Besgoods Yellow Night Vision    NOISE SENSITIVITY  ~ Try high-fidelity ear plugs, designed for musicians. One example is Etymotic ETY-Plugs, can be found on Amazon.com for ~$13    FATIGUE  ~ Daily exercise is strongly encouraged.  Start with a 10 min walk and increase the time as tolerated until you are walking 30 minutes per day.      ANXIETY OR MOOD SWINGS:  ~ Try using the free Calm beatris (see [employer] Beatris store or Google Play Store) for guided breathing and mindfulness/meditation.  ~ Explore [employer] (https://www.headspace.com) for free and easy-to-use meditation guidance.    HPI  Date of injury: 7/29/2018  Mechanism: slip/fall on wet tile at a friend's cabin.    Does not remember the details of the fall, not sure if hit head- no local head pain.  Developed HA, fogginess within a few minutes.  Felt \"hungover\".  Took tylenol, ibuprofen, and flexeril.    Was very fatigued, more concerned about a related foot injury, called off work.  +light sensitivity the following day.  Day after noise sensitivity.   Presented to PCP and Dx concussion.  Trouble with scrolling and RTW.  Fatigued.  Was off for 3 weeks and better after 2 weeks.    Ibuprofen 600 helped take edge off- recently not helping.    No imaging to date.      On RTW continued with intermittent HA's.  Feeling okay until 2 days ago when fogginess and R sided HA returned.  Starts at R occiput and wraps to forehead.  Constant.  Associated light sensitivity and nausea.    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  Rotates night/days and through charge duties.    Current Symptoms:  CONCUSSION SYMPTOMS ASSESSMENT 9/15/2018   Headache or Pressure In Head 2 - mild to moderate   Upset Stomach or Throwing Up 1 - mild   Problems with Balance 0 - none   Feeling Dizzy 0 - none   Sensitivity to Light 2 - mild to moderate   Sensitivity to Noise 2 - mild to moderate   Mood Changes 0 - none   Feeling sluggish, hazy, or " foggy 0 - none   Trouble Concentrating, Lack of Focus 0 - none   Motion Sickness 1 - mild   Vision Changes 0 - none   Memory Problems 1 - mild   Feeling Confused 0 - none   Neck Pain 0 - none   Trouble Sleeping 3 - moderate   Total Number of Symptoms 7   Symptom Severity Score 12     Exertion:  Symptoms worsen with:    Physical Activity?: no.    Cognitive Activity?: Sx flare with increased stress    Current sleep patterns:  Trouble staying asleep- shift worker    REVIEW OF SYSTEMS:  Refer to DocFlowsheets:  Concussion symptoms  GASTROINTESTINAL: +mild nausea, no vomiting  MUSCULOSKELETAL: +neck pn  NEUROLOGIC: +HA, dizziness. No paresthesia or focal weakness  PSYCHIATRIC: see PHQ-9 and GAD7    PERTINENT PAST MEDICAL HISTORY    Risk Factors for Protracted Recovery:    Prior concussion history:  No      Headache History:     Prior frequency of headache: rare    History of migraine:    Personal?: guillain-barre in high school misdiagnosed as migraine     Family?: No      Mental health and developmental history:    Depression    Sleep issues- shift worker    Past Medical History:   Diagnosis Date     Bariatric surgery status 05/13/2013     Depression      Esophageal reflux      Family history of hyperparathyroidism 3/6/2015     Forearm fracture childhood    jumping fall     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14    hospitalized at [facility] x 1 week     History of steroid therapy      HX ABNL PAP SMEAR OF CERVIX   1995    LEEP AT 15 yo     Hypertension      Hypertrophy of breast      Hypertrophy of tonsils alone      Hypovitaminosis D     with secondary high PTH     Irregular heart beat     palpitations     LBP (low back pain)      LSIL (low grade squamous intraepithelial lesion) on Pap smear 5/2006     Lumbago     RESOLVED     Major depressive disorder, recurrent episode, in partial or unspecified remission 4/1/2013     Morbid obesity (H)     bariatric surgery 5/13/2013     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)      OLIGOMENORRHEA, C/W PCOS      Sciatica      SLEEP APNEA 6/2002    No longer has since tonsillectomy and septoplasty     Patient Active Problem List   Diagnosis     OLIGOMENORRHEA, C/W PCOS     HX ABNL PAP SMEAR OF CERVIX       Flat feet     Sciatica     S/P gastrectomy     Occupational problem     Elevated parathyroid hormone     Multiple thyroid nodules     Abnormal thyroid cytology-follicular neoplasm     Chronic pain syndrome     Major depressive disorder, recurrent episode, in full remission (H)     Bilateral low back pain with sciatica, sciatica laterality unspecified     Throat symptom     Psychological factor affecting physical condition     Hx of Guillain-[place]be[place] syndrome     Adjustment disorder with anxious mood     Thyroid nodule     Benign essential hypertension     Concussion without loss of consciousness, initial encounter     Concussion without loss of consciousness, subsequent encounter       Pertinent social history:  Currently using alcohol: 2 drinks yesterday, no issues  Currently using nicotine: no  Currently using caffeine latte per day    Currently Living at and with: parents    Involved in what sports or activities when healthy: socialzing/happy hour    Currently Working: Yes  Normal job duties entail: RN, [employer].  3x 12's per week, charge RN duties      Current medications:  Reconciled in chart today by clinic staff and reviewed by me.  Current Outpatient Prescriptions   Medication     Biotin 10 MG TABS tablet     Calcium Carbonate-Vitamin D (CALCIUM + D PO)     cetirizine (ZYRTEC) 10 MG tablet     Cholecalciferol (VITAMIN D) 2000 UNITS CAPS     Cyanocobalamin (CVS B-12) 5000 MCG SUBL     cyclobenzaprine (FLEXERIL) 10 MG tablet     ferrous sulfate (IRON) 325 (65 FE) MG tablet     hydrochlorothiazide (HYDRODIURIL) 25 MG tablet     MELATONIN PO     metroNIDAZOLE (METROGEL) 0.75 % vaginal gel     Multiple Vitamin (MULTIVITAMINS PO)     naltrexone-bupropion (CONTRAVE) 8-90  "MG per 12 hr tablet     omeprazole (PRILOSEC) 40 MG capsule     polyethylene glycol (MIRALAX) powder     Diphenhydramine-APAP, sleep, (TYLENOL PM EXTRA STRENGTH PO)     No current facility-administered medications for this visit.          OBJECTIVE:   BP (!) 146/95  Pulse 87  Ht 5' 10\"  Wt 213 lb  LMP 2018  SpO2 95%  Breastfeeding? No  BMI 30.56 kg/m2    Wt Readings from Last 4 Encounters:   09/15/18 213 lb   18 212 lb 3.2 oz   18 213 lb   18 203 lb 3.2 oz       EXAM:  GENERAL: alert, oriented to person, place, time  HEAD: NC/AT  NECK:  Supple, FROM  PSYCHIATRIC:  Normal mood and affect. No SI. Normal speech and thought process  Neuro:  Strength:   Shoulder shrug (C5):5/5   Bicep (C6):5/5   Tricep (C7):5/5  Visual:  ALESHIA: yes  EOMI: yes  Saccades: HA with horiz and vert  VOR: horiz w/ incr HA, mental fog.  Nystagmus: no  Painful eye movements: no  Convergence testing: Normal (</= 6 cm)  Coordination:   Finger to Nose: normal   Rapid Alternating Movements: normal  Balance Testing:   Romberg: normal       Gait:   Walk in hallway at normal speed: Able    Walk in hallway and turn head side to side when asked: Deviation from straight line  Cognitive:  Immediate object recall: 4/4  Recall 4 objects at 5 minutes: 3/4  Reverse months of the year:   Spell world backwards: yes  Backwards number [id number]      Time spent in one-on-one evaluation and discussion with patient regarding nature of problem, course, prior treatments, and therapeutic options; >50% of this 45 minute visit  was spent in counseling, including this patient's personal symptom triggers and education thereof.  "

## 2018-09-15 NOTE — MR AVS SNAPSHOT
"              After Visit Summary   9/15/2018    Ashley Catherine    MRN: [id number]           Patient Information     Date Of Birth          1980        Visit Information        Provider Department      9/15/2018 10:00 AM Angel Marin PA-C [facility]        Today's Diagnoses     Concussion without loss of consciousness, initial encounter    -  1    Dizziness        Cervicalgia        Visual discomfort, unspecified laterality          Care Instructions    ~ You WILL get better~    GENERAL ADVICE  ~ Gradually ease back into your usual activities.  ~ Rest (eyes closed, dark room)  as frequently as needed to help your symptoms go away.    SCREENS  ~ Change settings on your phone and computer using the \"Blue Light Filter\" (Night Shift on all Apple products)   ~ The goal is making screens more yellow and less blue.     ~ If this is not an option you can download this program: [employer], to adjust your screen resolution.  ~ Turn screen brightness down  ~ Increase font size    LIGHT SENSITIVITY   ~ Night driving or driving in bad weather can be difficult.  - Yellow or kehinde-tinted lenses may help reduce nighttime driving glare.   - Amazon has a $10 option: Besgoods Yellow Night Vision    NOISE SENSITIVITY  ~ Try high-fidelity ear plugs, designed for musicians. One example is Etymotic ETY-Plugs, can be found on Amazon.com for ~$13    FATIGUE  ~ Daily exercise is strongly encouraged.  Start with a 10 min walk and increase the time as tolerated until you are walking 30 minutes per day.      ANXIETY OR MOOD SWINGS:  ~ Try using the free Calm beatris (see [employer] Beatris store or Google Play Store) for guided breathing and mindfulness/meditation.  ~ Explore [employer] (https://www.headspace.com) for free and easy-to-use meditation guidance.                        Follow-ups after your visit        Additional Services     CONCUSSION  REFERRAL       You have been referred to [facility]  " service.    The  Representative will assist you in the coordination of your concussion care as prescribed by your provider.    The  Representative will contact you within one business day, or you may contact the  Representative at [phone].    Referral Options:  Non-Sports related concussion management (6 wks PRN)  [facility]  Physical Therapy, Evaluate and Treat    Coverage of these services are subject to the terms and limitations of your health insurance plan.  Please call member services at your health plan with any benefit or coverage questions.     If X-rays, CT or MRI's have been performed, please contact the facility where they were done, to arrange for  prior to your scheduled appointment.  Please bring this referral request to your appointment and present it to your specialist.                  Follow-up notes from your care team     Return in about 6 weeks (around 10/27/2018), or if symptoms worsen or fail to improve.      Your next 10 appointments already scheduled     Oct 08, 2018  1:40 PM CDT   (Arrive by 1:25 PM)   Return Weight Management Visit with Hector Justice MD   [facility] ([facility])    [address] [place] [address] [phone] [phone]            Oct 23, 2018 10:00 AM CDT   (Arrive by 9:45 AM)   Return Visit with Jayro Elias, PhD [facility] ([facility])    [address] [place] [address] [id number] [phone]            Nov 14, 2018  9:40 AM CST   (Arrive by 9:25 AM)   Return Bariatric Visit with Best Willett MD   [facility] ([facility])    [address] [place] [address] [id number] [phone]              Who to contact     Please call your clinic at [phone] to:    Ask questions about your health    Make or  "cancel appointments    Discuss your medicines    Learn about your test results    Speak to your doctor            Additional Information About Your Visit        [email or web address] Information     [employer] gives you secure access to your electronic health record. If you see a primary care provider, you can also send messages to your care team and make appointments. If you have questions, please call your primary care clinic.  If you do not have a primary care provider, please call [phone] and they will assist you.      [employer] is an electronic gateway that provides easy, online access to your medical records. With [employer], you can request a clinic appointment, read your test results, renew a prescription or communicate with your care team.     To access your existing account, please contact your [facility] or call [phone] for assistance.        Care EveryWhere ID     This is your Care EveryWhere ID. This could be used by other organizations to access your [facility] medical records  [id number]        Your Vitals Were     Pulse Height Last Period Pulse Oximetry Breastfeeding? BMI (Body Mass Index)    87 5' 10\" 08/27/2018 95% No 30.56 kg/m2       Blood Pressure from Last 3 Encounters:   09/15/18 (!) 146/95   09/11/18 126/84   08/08/18 130/87    Weight from Last 3 Encounters:   09/15/18 213 lb   09/12/18 212 lb 3.2 oz   09/11/18 213 lb              We Performed the Following     [facility] REFERRAL        Primary Care Provider Office Phone # Fax #    Violet Humphreys [phone] [address] [place] [address]        Equal Access to Services     JOSSE BAILEY : Hadii sabas gabrielo Sowaldo, waaxda luqadaha, qaybta kaalmada carmen vale. So [facility] [phone].    ATENCIÓN: Si habla español, tiene a gibbons disposición servicios gratuitos de asistencia lingüística. Llame al [phone].    We comply with " applicable federal civil rights laws and [place] laws. We do not discriminate on the basis of race, color, national origin, age, disability, sex, sexual orientation, or gender identity.            Thank you!     Thank you for choosing [facility]  for your care. Our goal is always to provide you with excellent care. Hearing back from our patients is one way we can continue to improve our services. Please take a few minutes to complete the written survey that you may receive in the mail after your visit with us. Thank you!             Your Updated Medication List - Protect others around you: Learn how to safely use, store and throw away your medicines at www.disposemymeds.org.          This list is accurate as of 9/15/18 11:12 AM.  Always use your most recent med list.                   Brand Name Dispense Instructions for use Diagnosis    Biotin 10 MG Tabs tablet      Take 10,000 mcg by mouth daily        CALCIUM + D PO      Take by mouth daily        cetirizine 10 MG tablet    zyrTEC     Take 10 mg by mouth daily as needed for allergies        CONTRAVE 8-90 MG per 12 hr tablet   Generic drug:  naltrexone-bupropion           CVS B-12 5000 MCG Subl   Generic drug:  Cyanocobalamin       Multiple thyroid nodules       cyclobenzaprine 10 MG tablet    FLEXERIL    30 tablet    TAKE ONE-HALF TABLET BY MOUTH TWICE A DAY AS NEEDED FOR MUSCLE SPASMS    Sciatica, unspecified laterality       ferrous sulfate 325 (65 Fe) MG tablet    IRON     Take 325 mg by mouth daily (with breakfast)        hydrochlorothiazide 25 MG tablet    HYDRODIURIL    90 tablet    TAKE ONE TABLET BY MOUTH EVERY DAY    Essential hypertension with goal blood pressure less than 140/90       MELATONIN PO      Take 3 mg by mouth        metroNIDAZOLE 0.75 % vaginal gel    METROGEL    70 g    USE TWICE WEEKLY TO VAGINA FOR RECURRENT BV SYMPTOMS. DO THIS FOR 3 MONTHS AFTER FINISHING ORAL MEDICATIONS.    Abnormal vaginal fluids       MULTIVITAMINS PO       Take by mouth daily        omeprazole 40 MG capsule    priLOSEC    90 capsule    Take 1 capsule (40 mg) by mouth daily Take 30-60 minutes before a meal.    Gastroesophageal reflux disease with esophagitis       polyethylene glycol powder    MIRALAX    510 g    Take 17 g (1 capful) by mouth daily as needed for constipation    Constipation, unspecified constipation type       TYLENOL PM EXTRA STRENGTH PO           vitamin D 2000 units Caps

## 2018-09-15 NOTE — LETTER
"9/15/2018       RE: Ashley Catherine  [address]  [facility] [address]     Dear Colleague,    Thank you for referring your patient, Ashley Catherine, to the [facility] at [facility]. Please see a copy of my visit note below.    [facility] Admission  September 15, 2018        Assessment:   (S06.0X0A) Concussion without loss of consciousness, initial encounter  (primary encounter diagnosis)  (R42) Dizziness  (M54.2) Cervicalgia  (H53.149) Visual discomfort, unspecified laterality    Ashley Catherine is a 38 year old female who presents for evaluation of concussion without loss of consciousness which occurred on 7/29/2018 patient he slipped and fell on a wet tile floor at a friend's cabin.  She does not remember the details of the fall and is unsure if she hit her head and she had no local head pain but within a few minutes developed headache and cognitive fogginess stating that she felt \"hung over\".  She took Tylenol, ibuprofen, and Flexeril and went to bed the next morning she felt very fatigued and frankly was more concerned about related foot injury and had a lot of calling off work.  She continued with her symptoms including light and noise sensitivity as well as trouble with scrolling on her screens with return to work, ultimately presenting to her primary and being diagnosed with concussion. She was off for 3 weeks and better after after the second week.  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    On RTW intermittent headaches continued but she otherwise was feeling okay until 2 days ago when fogginess and a constant right sided HA returned.  She describes this headache as constant dull pain that starts at  the right occiput and wraps to her forehead with associated light sensitivity and nausea.  2 days ago she made the transition from night shift to day shift in her work schedule.    On exam Ashley shows path " "deviation on gait testing with head tilt.  She also has a positive vestibular O-oculomotor test with saccades and vestibular-ocular reflex provoking headaches and mental fog.  Her same symptoms were provoked when I showed her my computer screen and scrolled quickly through her note.  My impression is that she will has ongoing symptoms are related to underlying vestibular dysfunction exacerbated by her recent, sleep disturbance due to shift change, and would be best treated through physical therapy.    Much of the session involved teaching about concussion symptoms, triggers and ways to avoid them.   We discussed other ways that she can adjust the settings on her screen to make this more comfortable, particularly reducing bluelight output.  She could consider high fidelity earplugs to assist with noise sensitivity.  I highly recommend exercise for its benefits to energy, cognition, and mood.  In general Ashley should gradually work back into her former activities, resting as needed when symptom levels begin to rise.  Her CSA score today is 12/90 and is low enough to warrant continuation of work full-time.  I would be happy to see her in follow-up should her symptoms worsen or fail to improve.      Plan:  1. Biggest concern of pt addressed: HA    2. Work restrictions- No restrictions    3. Activity recommendations- moderate, low impact aerobic activty up to 30 mins    4. Referral to:   a. Physical Therapy: Indications/Goals: evaluation and treatment including but not limited to HA, neck pn, dizziness    5. Follow up here in 6 weeks PRN.    AVS Instructions:  ~ You WILL get better~    GENERAL ADVICE  ~ Gradually ease back into your usual activities.  ~ Rest (eyes closed, dark room)  as frequently as needed to help your symptoms go away.    SCREENS  ~ Change settings on your phone and computer using the \"Blue Light Filter\" (Night Shift on all Apple products)   ~ The goal is making screens more yellow and less blue.  " "   ~ If this is not an option you can download this program: [employer], to adjust your screen resolution.  ~ Turn screen brightness down  ~ Increase font size    LIGHT SENSITIVITY   ~ Night driving or driving in bad weather can be difficult.  - Yellow or kehinde-tinted lenses may help reduce nighttime driving glare.   - Amazon has a $10 option: [employer] Yellow Night Vision    NOISE SENSITIVITY  ~ Try high-fidelity ear plugs, designed for musicians. One example is Etymotic ETY-Plugs, can be found on Amazon.com for ~$13    FATIGUE  ~ Daily exercise is strongly encouraged.  Start with a 10 min walk and increase the time as tolerated until you are walking 30 minutes per day.      ANXIETY OR MOOD SWINGS:  ~ Try using the free Calm beatris (see [employer] Beatris store or Google Play Store) for guided breathing and mindfulness/meditation.  ~ Explore SpiralFrog (https://www.headspace.com) for free and easy-to-use meditation guidance.    HPI  Date of injury: 7/29/2018  Mechanism: slip/fall on wet tile at a friend's cabin.    Does not remember the details of the fall, not sure if hit head- no local head pain.  Developed HA, fogginess within a few minutes.  Felt \"hungover\".  Took tylenol, ibuprofen, and flexeril.    Was very fatigued, more concerned about a related foot injury, called off work.  +light sensitivity the following day.  Day after noise sensitivity.   Presented to PCP and Dx concussion.  Trouble with scrolling and RTW.  Fatigued.  Was off for 3 weeks and better after 2 weeks.    Ibuprofen 600 helped take edge off- recently not helping.    No imaging to date.      On RTW continued with intermittent HA's.  Feeling okay until 2 days ago when fogginess and R sided HA returned.  Starts at R occiput and wraps to forehead.  Constant.  Associated light sensitivity and nausea.    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  Rotates night/days and through charge duties.    Current Symptoms:  CONCUSSION SYMPTOMS ASSESSMENT 9/15/2018 "   Headache or Pressure In Head 2 - mild to moderate   Upset Stomach or Throwing Up 1 - mild   Problems with Balance 0 - none   Feeling Dizzy 0 - none   Sensitivity to Light 2 - mild to moderate   Sensitivity to Noise 2 - mild to moderate   Mood Changes 0 - none   Feeling sluggish, hazy, or foggy 0 - none   Trouble Concentrating, Lack of Focus 0 - none   Motion Sickness 1 - mild   Vision Changes 0 - none   Memory Problems 1 - mild   Feeling Confused 0 - none   Neck Pain 0 - none   Trouble Sleeping 3 - moderate   Total Number of Symptoms 7   Symptom Severity Score 12     Exertion:  Symptoms worsen with:    Physical Activity?: no.    Cognitive Activity?: Sx flare with increased stress    Current sleep patterns:  Trouble staying asleep- shift worker    REVIEW OF SYSTEMS:  Refer to DocFlowsheets:  Concussion symptoms  GASTROINTESTINAL: +mild nausea, no vomiting  MUSCULOSKELETAL: +neck pn  NEUROLOGIC: +HA, dizziness. No paresthesia or focal weakness  PSYCHIATRIC: see PHQ-9 and GAD7    PERTINENT PAST MEDICAL HISTORY    Risk Factors for Protracted Recovery:    Prior concussion history:  No      Headache History:     Prior frequency of headache: rare    History of migraine:    Personal?: guillain-barre in high school misdiagnosed as migraine     Family?: No      Mental health and developmental history:    Depression    Sleep issues- shift worker    Past Medical History:   Diagnosis Date     Bariatric surgery status 05/13/2013     Depression      Esophageal reflux      Family history of hyperparathyroidism 3/6/2015     Forearm fracture childhood    jumping fall     Guillain-[place] disease (H) age 14    hospitalized at [facility] x 1 week     History of steroid therapy      HX ABNL PAP SMEAR OF CERVIX   1995    LEEP AT 15 yo     Hypertension      Hypertrophy of breast      Hypertrophy of tonsils alone      Hypovitaminosis D     with secondary high PTH     Irregular heart beat     palpitations     LBP (low back pain)      LSIL (low  grade squamous intraepithelial lesion) on Pap smear 5/2006     Lumbago     RESOLVED     Major depressive disorder, recurrent episode, in partial or unspecified remission 4/1/2013     Morbid obesity (H)     bariatric surgery 5/13/2013     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)      OLIGOMENORRHEA, C/W PCOS      Sciatica      SLEEP APNEA 6/2002    No longer has since tonsillectomy and septoplasty     Patient Active Problem List   Diagnosis     OLIGOMENORRHEA, C/W PCOS     HX ABNL PAP SMEAR OF CERVIX       Flat feet     Sciatica     S/P gastrectomy     Occupational problem     Elevated parathyroid hormone     Multiple thyroid nodules     Abnormal thyroid cytology-follicular neoplasm     Chronic pain syndrome     Major depressive disorder, recurrent episode, in full remission (H)     Bilateral low back pain with sciatica, sciatica laterality unspecified     Throat symptom     Psychological factor affecting physical condition     Hx of Guillain-Strawberry syndrome     Adjustment disorder with anxious mood     Thyroid nodule     Benign essential hypertension     Concussion without loss of consciousness, initial encounter     Concussion without loss of consciousness, subsequent encounter       Pertinent social history:  Currently using alcohol: 2 drinks yesterday, no issues  Currently using nicotine: no  Currently using caffeine latte per day    Currently Living at and with: parents    Involved in what sports or activities when healthy: socialzing/happy hour    Currently Working: Yes  Normal job duties entail: RN, [employer].  3x 12's per week, charge RN duties      Current medications:  Reconciled in chart today by clinic staff and reviewed by me.  Current Outpatient Prescriptions   Medication     Biotin 10 MG TABS tablet     Calcium Carbonate-Vitamin D (CALCIUM + D PO)     cetirizine (ZYRTEC) 10 MG tablet     Cholecalciferol (VITAMIN D) 2000 UNITS CAPS     Cyanocobalamin ([employer] B-12) 5000 MCG SUBL      "cyclobenzaprine (FLEXERIL) 10 MG tablet     ferrous sulfate (IRON) 325 (65 FE) MG tablet     hydrochlorothiazide (HYDRODIURIL) 25 MG tablet     MELATONIN PO     metroNIDAZOLE (METROGEL) 0.75 % vaginal gel     Multiple Vitamin (MULTIVITAMINS PO)     naltrexone-bupropion (CONTRAVE) 8-90 MG per 12 hr tablet     omeprazole (PRILOSEC) 40 MG capsule     polyethylene glycol (MIRALAX) powder     Diphenhydramine-APAP, sleep, (TYLENOL PM EXTRA STRENGTH PO)     No current facility-administered medications for this visit.          OBJECTIVE:   BP (!) 146/95  Pulse 87  Ht 5' 10\"  Wt 213 lb  LMP 2018  SpO2 95%  Breastfeeding? No  BMI 30.56 kg/m2    Wt Readings from Last 4 Encounters:   09/15/18 213 lb   18 212 lb 3.2 oz   18 213 lb   18 203 lb 3.2 oz       EXAM:  GENERAL: alert, oriented to person, place, time  HEAD: NC/AT  NECK:  Supple, FROM  PSYCHIATRIC:  Normal mood and affect. No SI. Normal speech and thought process  Neuro:  Strength:   Shoulder shrug (C5):5/5   Bicep (C6):5/5   Tricep (C7):5/5  Visual:  ALESHIA: yes  EOMI: yes  Saccades: HA with horiz and vert  VOR: horiz w/ incr HA, mental fog.  Nystagmus: no  Painful eye movements: no  Convergence testing: Normal (</= 6 cm)  Coordination:   Finger to Nose: normal   Rapid Alternating Movements: normal  Balance Testing:   Romberg: normal       Gait:   Walk in hallway at normal speed: Able    Walk in hallway and turn head side to side when asked: Deviation from straight line  Cognitive:  Immediate object recall: /4  Recall 4 objects at 5 minutes: 3/4  Reverse months of the year:   Spell world backwards: yes  Backwards number [id number]      Time spent in one-on-one evaluation and discussion with patient regarding nature of problem, course, prior treatments, and therapeutic options; >50% of this 45 minute visit  was spent in counseling, including this patient's personal symptom triggers and education thereof.    Again, thank you " for allowing me to participate in the care of your patient.      Sincerely,    Angel Marin PA-C

## 2018-09-16 ASSESSMENT — PATIENT HEALTH QUESTIONNAIRE - PHQ9: SUM OF ALL RESPONSES TO PHQ QUESTIONS 1-9: 2

## 2018-09-16 ASSESSMENT — ANXIETY QUESTIONNAIRES: GAD7 TOTAL SCORE: 3

## 2018-10-01 ENCOUNTER — HOSPITAL ENCOUNTER (OUTPATIENT)
Dept: PHYSICAL THERAPY | Facility: CLINIC | Age: 38
Setting detail: THERAPIES SERIES
End: 2018-10-01
Attending: PHYSICIAN ASSISTANT
Payer: COMMERCIAL

## 2018-10-01 PROCEDURE — 97161 PT EVAL LOW COMPLEX 20 MIN: CPT | Mod: GP | Performed by: PHYSICAL THERAPIST

## 2018-10-01 PROCEDURE — 97112 NEUROMUSCULAR REEDUCATION: CPT | Mod: GP | Performed by: PHYSICAL THERAPIST

## 2018-10-01 PROCEDURE — 40000767 ZZHC STATISTIC PT CONCUSSION VISIT: Performed by: PHYSICAL THERAPIST

## 2018-10-01 NOTE — PROGRESS NOTES
"   10/01/18 1500   Quick Adds   Type of Visit Initial OP PT Evaluation   General Information   Start of Care Date 10/01/18   Referring Physician Angel Marin PAC   Orders Evaluate and Treat as Indicated   Order Date 09/15/18   Medical Diagnosis Concussion without loss of consciousness, Cervicagia, Visual discomfort, Dizziness   Onset of illness/injury or Date of Surgery 07/29/18   Surgical/Medical history reviewed Yes   Pertinent history of current problem (include personal factors and/or comorbidities that impact the POC) Ashley Catherine is a 38 year old female who presents for evaluation of concussion without loss of consciousness which occurred on 7/29/2018 patient he slipped and fell on a wet tile floor at a friend's cabin.  She does not remember the details of the fall and is unsure if she hit her head and she had no local head pain but within a few minutes developed headache and cognitive fogginess stating that she felt \"hung over\".  She took Tylenol, ibuprofen, and Flexeril and went to bed the next morning she felt very fatigued and frankly was more concerned about related foot injury and had a lot of calling off work.  She continued with her symptoms including light and noise sensitivity as well as trouble with scrolling on her screens with return to work, ultimately presenting to her primary and being diagnosed with concussion. She was off for 3 weeks and better after after the second week.  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 Headaches are more intermittant now. Works 3-12 hour shifts but shift times vary night and day which disturbs sleep. HAs are limiting factor. Limits time on computer. Doesn't watch as much TV. Has gained weight due to lack of activity. Neck pain is new since the accident.    Pertinent Visual History  Denies vision changes. Does not wear corrective lenses.    Prior level of function comment Independent with all functional mobility.    Patient " role/Employment history Employed   Living environment [facility]   Home/Community Accessibility Comments Lives with mom and dad.    Patient/Family Goals Statement decrease symptoms and return to PLOF   Fall Risk Screen   Fall screen completed by PT   Have you fallen 2 or more times in the past year? No   Have you fallen and had an injury in the past year? Yes   Timed Up and Go score (seconds) 5.4   Is patient a fall risk? No   Functional Scales   Functional Scales and Outcomes CSA 14   Pain   Pain comments Headache and neck pain vary on day to day basis. Did increase with oculomotor testing.    Cognitive Status Examination   Level of Consciousness alert   Range of Motion (ROM)   ROM Comment CROM WFL    Balance   Balance Comments Tandem stand EC x 25 sec. SLS EC 3-5 seconds on either foot.    Oculomotor Exam   Smooth Pursuit Comment provoked HA, especially looking to the L    Saccades Comments provoked HA especially with saccade movements L , provoked JARAMILLO with vertical head movments   VOR Other   VOR Comments provoked HA with horizontal but did not provoke her with vertical    VOR Cancellation Comments a little dizzy   Convergence Testing Normal   Convergence Testing Comments did not provoke her. all under 3.5 cm   Dynamic Visual Acuity (DVA)   Static Acuity (LogMar) 0   Horizontal Head Movement at 1 Hz (LogMar) 0.1   Horizontal Head Movement at 2 Hz (LogMar) 0.2   DVA Comments Was not provoked with dizziness or increased HA with DVA testing.    Planned Therapy Interventions   Planned Therapy Interventions neuromuscular re-education;stretching   Clinical Impression   Criteria for Skilled Therapeutic Interventions Met yes, treatment indicated   PT Diagnosis Headache, Dizziness, Post concussion syndrome   Influenced by the following impairments dizziness, headache, neck pain, impaired oculomotor exam   Functional limitations due to impairments difficulty with sustained time on computers.    Clinical Presentation  Evolving/Changing   Clinical Presentation Rationale variable headache and symptoms, impaired or symtptomatic with oculomotor exam,  CSA   Clinical Decision Making (Complexity) Low complexity   Therapy Frequency 1 time/week   Predicted Duration of Therapy Intervention (days/wks) 45 days   Risk & Benefits of therapy have been explained Yes   Patient, Family & other staff in agreement with plan of care Yes   GOALS   PT Eval Goals 1;2   Goal 1   Goal Identifier CSA   Goal Description Client will score a 4 or less on CSA to indicate that overall symptoms post concussion are improving.    Target Date 11/14/18   Goal 2   Goal Identifier oculomotor exam   Goal Description Client will tolerate oculomotor exam without being symptomatic to demonstrate improved visual symptoms.    Target Date 11/14/18   Total Evaluation Time   Total Evaluation Time (Minutes) 30

## 2018-10-04 ENCOUNTER — OFFICE VISIT (OUTPATIENT)
Dept: FAMILY MEDICINE | Facility: CLINIC | Age: 38
End: 2018-10-04
Payer: COMMERCIAL

## 2018-10-04 VITALS
SYSTOLIC BLOOD PRESSURE: 129 MMHG | HEART RATE: 81 BPM | TEMPERATURE: 98.4 F | HEIGHT: 70 IN | RESPIRATION RATE: 16 BRPM | BODY MASS INDEX: 31.22 KG/M2 | WEIGHT: 218.1 LBS | DIASTOLIC BLOOD PRESSURE: 89 MMHG | OXYGEN SATURATION: 99 %

## 2018-10-04 DIAGNOSIS — I10 BENIGN ESSENTIAL HYPERTENSION: ICD-10-CM

## 2018-10-04 DIAGNOSIS — Z98.84 GASTRIC BYPASS STATUS FOR OBESITY: ICD-10-CM

## 2018-10-04 DIAGNOSIS — Z00.00 WELL ADULT EXAM: Primary | ICD-10-CM

## 2018-10-04 DIAGNOSIS — Z11.3 SCREEN FOR STD (SEXUALLY TRANSMITTED DISEASE): ICD-10-CM

## 2018-10-04 PROBLEM — S06.0X0A CONCUSSION WITHOUT LOSS OF CONSCIOUSNESS, INITIAL ENCOUNTER: Status: RESOLVED | Noted: 2018-08-01 | Resolved: 2018-10-04

## 2018-10-04 LAB
CHOLEST SERPL-MCNC: 175 MG/DL
DEPRECATED CALCIDIOL+CALCIFEROL SERPL-MC: 47 UG/L (ref 20–75)
FERRITIN SERPL-MCNC: 19 NG/ML (ref 12–150)
GLUCOSE SERPL-MCNC: 80 MG/DL (ref 70–99)
HBA1C MFR BLD: 5 % (ref 0–5.6)
HDLC SERPL-MCNC: 82 MG/DL
HIV 1+2 AB+HIV1 P24 AG SERPL QL IA: NONREACTIVE
LDLC SERPL CALC-MCNC: 82 MG/DL
NONHDLC SERPL-MCNC: 93 MG/DL
PTH-INTACT SERPL-MCNC: 69 PG/ML (ref 18–80)
T PALLIDUM AB SER QL: NONREACTIVE
T4 FREE SERPL-MCNC: 0.92 NG/DL (ref 0.76–1.46)
TRIGL SERPL-MCNC: 56 MG/DL
VIT B12 SERPL-MCNC: 1821 PG/ML (ref 193–986)

## 2018-10-04 PROCEDURE — 87591 N.GONORRHOEAE DNA AMP PROB: CPT | Performed by: FAMILY MEDICINE

## 2018-10-04 PROCEDURE — 82607 VITAMIN B-12: CPT | Performed by: FAMILY MEDICINE

## 2018-10-04 PROCEDURE — 83970 ASSAY OF PARATHORMONE: CPT | Performed by: FAMILY MEDICINE

## 2018-10-04 PROCEDURE — 82947 ASSAY GLUCOSE BLOOD QUANT: CPT | Performed by: FAMILY MEDICINE

## 2018-10-04 PROCEDURE — 86780 TREPONEMA PALLIDUM: CPT | Performed by: FAMILY MEDICINE

## 2018-10-04 PROCEDURE — 99395 PREV VISIT EST AGE 18-39: CPT | Performed by: FAMILY MEDICINE

## 2018-10-04 PROCEDURE — 82728 ASSAY OF FERRITIN: CPT | Performed by: FAMILY MEDICINE

## 2018-10-04 PROCEDURE — 99213 OFFICE O/P EST LOW 20 MIN: CPT | Mod: 25 | Performed by: FAMILY MEDICINE

## 2018-10-04 PROCEDURE — 84439 ASSAY OF FREE THYROXINE: CPT | Performed by: FAMILY MEDICINE

## 2018-10-04 PROCEDURE — 82306 VITAMIN D 25 HYDROXY: CPT | Performed by: FAMILY MEDICINE

## 2018-10-04 PROCEDURE — 83036 HEMOGLOBIN GLYCOSYLATED A1C: CPT | Performed by: FAMILY MEDICINE

## 2018-10-04 PROCEDURE — 87389 HIV-1 AG W/HIV-1&-2 AB AG IA: CPT | Performed by: FAMILY MEDICINE

## 2018-10-04 PROCEDURE — 87491 CHLMYD TRACH DNA AMP PROBE: CPT | Performed by: FAMILY MEDICINE

## 2018-10-04 PROCEDURE — 36415 COLL VENOUS BLD VENIPUNCTURE: CPT | Performed by: FAMILY MEDICINE

## 2018-10-04 PROCEDURE — 80061 LIPID PANEL: CPT | Performed by: FAMILY MEDICINE

## 2018-10-04 NOTE — PATIENT INSTRUCTIONS
HEADACHE PREVENTION:    Migrelief - magnesium, riboflavin and feverfew.  Amazon  Or consider hydroxyzine at night      Preventive Health Recommendations  Female Ages 26 - 39  Yearly exam:   See your health care provider every year in order to    Review health changes.     Discuss preventive care.      Review your medicines if you your doctor has prescribed any.    Until age 30: Get a Pap test every three years (more often if you have had an abnormal result).    After age 30: Talk to your doctor about whether you should have a Pap test every 3 years or have a Pap test with HPV screening every 5 years.   You do not need a Pap test if your uterus was removed (hysterectomy) and you have not had cancer.  You should be tested each year for STDs (sexually transmitted diseases), if you're at risk.   Talk to your provider about how often to have your cholesterol checked.  If you are at risk for diabetes, you should have a diabetes test (fasting glucose).  Shots: Get a flu shot each year. Get a tetanus shot every 10 years.   Nutrition:     Eat at least 5 servings of fruits and vegetables each day.    Eat whole-grain bread, whole-wheat pasta and brown rice instead of white grains and rice.    Get adequate Calcium and Vitamin D.     Lifestyle    Exercise at least 150 minutes a week (30 minutes a day, 5 days of the week). This will help you control your weight and prevent disease.    Limit alcohol to one drink per day.    No smoking.     Wear sunscreen to prevent skin cancer.    See your dentist every six months for an exam and cleaning.

## 2018-10-04 NOTE — PROGRESS NOTES
SUBJECTIVE:   CC: Ashley Catherine is an 38 year old woman who presents for preventive health visit.     Physical   Annual:     Getting at least 3 servings of Calcium per day:  Yes    Bi-annual eye exam:  Yes    Dental care twice a year:  Yes    Sleep apnea or symptoms of sleep apnea:  Daytime drowsiness    Diet:  Regular (no restrictions)    Frequency of exercise:  None    Taking medications regularly:  Yes    Medication side effects:  None    Additional concerns today:  YES    (Z00.00) Well adult exam  (primary encounter diagnosis)  Comment:   Plan: declines flu vaccines due to GB see prob llist    (Z11.3) Screen for STD (sexually transmitted disease)  Comment: would like screens  Plan: NEISSERIA GONORRHOEA PCR, CHLAMYDIA TRACHOMATIS        PCR, HIV Antigen Antibody Combo, Treponema Abs         w Reflex to RPR and Titer        Has no symptoms.    (Z98.84) Gastric bypass status for obesity  Comment: due for labs had recent CMP and CBC  Weight gain noted  Taking stimulants for this but not noting as much weight loss  Had great response to topiramate comboi but stopped this due to some side effects  Plan: Lipid panel reflex to direct LDL Fasting,         Glucose, Hemoglobin A1c, T4, free          Had TSh this past June - had T3 done 1 month ago but would like to know T4 level    HTN:  Tolerating meds well  As weight has risen Bp has as well  Taking hydrochlorothiazide  Has taken lisinopril in the past  Meds might be raising pressures as well           Today's PHQ-2 Score:   PHQ-2 ( 1999 Pf[employer]) 10/4/2018   Q1: Little interest or pleasure in doing things 0   Q2: Feeling down, depressed or hopeless 1   PHQ-2 Score 1   Q1: Little interest or pleasure in doing things Not at all   Q2: Feeling down, depressed or hopeless Several days   PHQ-2 Score 1       Abuse: Current or Past(Physical, Sexual or Emotional)- No  Do you feel safe in your environment - Yes    Social History   Substance Use Topics     Smoking status: Never  Smoker     Smokeless tobacco: Never Used     Alcohol use 2.4 - 3.0 oz/week      Comment: 3 drinks/week     Alcohol Use 10/4/2018   If you drink alcohol do you typically have greater than 3 drinks per day OR greater than 7 drinks per week? Yes   AUDIT SCORE  10     AUDIT - Alcohol Use Disorders Identification Test - Reproduced from the World Health Organization Audit 2001 (Second Edition) 10/4/2018   1.  How often do you have a drink containing alcohol? 4 or more times a week   2.  How many drinks containing alcohol do you have on a typical day when you are drinking? 1 or 2   3.  How often do you have five or more drinks on one occasion? Less than monthly   4.  How often during the last year have you found that you were not able to stop drinking once you had started? Never   5.  How often during the last year have you failed to do what was normally expected of you because of drinking? Never   6.  How often during the last year have you needed a first drink in the morning to get yourself going after a heavy drinking session? Never   7.  How often during the last year have you had a feeling of guilt or remorse after drinking? Less than monthly   8.  How often during the last year have you been unable to remember what happened the night before because of your drinking? Never   9.  Have you or someone else been injured because of your drinking? No   10. Has a relative, friend, doctor or other health care worker been concerned about your drinking or suggested you cut down? Yes, during the last year   TOTAL SCORE 10       Reviewed orders with patient.  Reviewed health maintenance and updated orders accordingly - Yes  Labs reviewed in EPIC  BP Readings from Last 3 Encounters:   10/04/18 129/89   09/15/18 (!) 146/95   09/11/18 126/84    Wt Readings from Last 3 Encounters:   10/04/18 218 lb 1.6 oz (98.9 kg)   09/15/18 213 lb (96.6 kg)   09/12/18 212 lb 3.2 oz (96.3 kg)                  Current Outpatient Prescriptions    Medication Sig Dispense Refill     Biotin 10 MG TABS tablet Take 10,000 mcg by mouth daily       Calcium Carbonate-Vitamin D (CALCIUM + D PO) Take by mouth daily       cetirizine (ZYRTEC) 10 MG tablet Take 10 mg by mouth daily as needed for allergies       Cholecalciferol (VITAMIN D) 2000 UNITS CAPS        Cyanocobalamin ([employer]S B-12) 5000 MCG SUBL        cyclobenzaprine (FLEXERIL) 10 MG tablet TAKE ONE-HALF TABLET BY MOUTH TWICE A DAY AS NEEDED FOR MUSCLE SPASMS 30 tablet 5     Diphenhydramine-APAP, sleep, (TYLENOL PM EXTRA STRENGTH PO)        ferrous sulfate (IRON) 325 (65 FE) MG tablet Take 325 mg by mouth daily (with breakfast)       hydrochlorothiazide (HYDRODIURIL) 25 MG tablet TAKE ONE TABLET BY MOUTH EVERY DAY 90 tablet 2     MELATONIN PO Take 3 mg by mouth       metroNIDAZOLE (METROGEL) 0.75 % vaginal gel USE TWICE WEEKLY TO VAGINA FOR RECURRENT BV SYMPTOMS. DO THIS FOR 3 MONTHS AFTER FINISHING ORAL MEDICATIONS. 70 g 3     Multiple Vitamin (MULTIVITAMINS PO) Take by mouth daily       naltrexone-bupropion (CONTRAVE) 8-90 MG per 12 hr tablet        omeprazole (PRILOSEC) 40 MG capsule Take 1 capsule (40 mg) by mouth daily Take 30-60 minutes before a meal. 90 capsule 3     polyethylene glycol (MIRALAX) powder Take 17 g (1 capful) by mouth daily as needed for constipation 510 g 1     Recent Labs   Lab Test  10/04/18   0929  09/11/18   0813  06/07/18   1618  05/10/18   1411  03/26/18   0918   05/15/17   1056   12/23/14   1613   08/08/13   1456   A1C  5.0   --    --    --    --    --    --    --   5.0   --   4.6   LDL   --    --    --    --    --    --   52   --   67   --   120   HDL   --    --    --    --    --    --   81   --   77   --   44*   TRIG   --    --    --    --    --    --   120   --   91   --   80   ALT   --   18  19   --   16   < >  20   < >  20   < >  29   CR   --   0.72  0.68   --   0.75   < >  0.54   < >  0.78   < >  0.72   GFRESTIMATED   --   >90  >90   --   86   < >  >90  Non  GFR  Calc     < >  84   < >  >90   GFRESTBLACK   --   >90  >90   --   >90   < >  >90   GFR Calc     < >  >90   GFR Calc     < >  >90   POTASSIUM   --   3.7  3.7   --   3.8   < >  3.8   < >  3.8   < >  3.7   TSH   --    --   1.08  1.77   --    < >   --    < >   --    < >   --     < > = values in this interval not displayed.        Mammogram not appropriate for this patient based on age.    Pertinent mammograms are reviewed under the imaging tab.  History of abnormal Pap smear: NO - age 30-65 PAP every 5 years with negative HPV co-testing recommended  PAP / HPV Latest Ref Rng & Units 8/12/2016 10/18/2013 10/12/2012   PAP - NIL NIL NIL   HPV 16 DNA NEG Negative - -   HPV 18 DNA NEG Negative - -   OTHER HR HPV NEG Negative - -     Reviewed and updated as needed this visit by clinical staff  Tobacco  Allergies  Meds  Med Hx  Surg Hx  Fam Hx  Soc Hx        Reviewed and updated as needed this visit by Provider        Past Medical History:   Diagnosis Date     Bariatric surgery status 05/13/2013     Depression      Esophageal reflux      Family history of hyperparathyroidism 3/6/2015     Forearm fracture childhood    jumping fall     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14    hospitalized at [facility] x 1 week     History of steroid therapy      HX ABNL PAP SMEAR OF CERVIX   1995    LEEP AT 15 yo     Hypertension      Hypertrophy of breast      Hypertrophy of tonsils alone      Hypovitaminosis D     with secondary high PTH     Irregular heart beat     palpitations     LBP (low back pain)      LSIL (low grade squamous intraepithelial lesion) on Pap smear 5/2006     Lumbago     RESOLVED     Major depressive disorder, recurrent episode, in partial or unspecified remission 4/1/2013     Morbid obesity (H)     bariatric surgery 5/13/2013     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)      OLIGOMENORRHEA, C/W PCOS      Sciatica      SLEEP APNEA 6/2002    No longer has since tonsillectomy and  "septoplasty      Past Surgical History:   Procedure Laterality Date     BIOPSY  03/13/2015    Thyroid nodule     ESOPHAGOSCOPY, GASTROSCOPY, DUODENOSCOPY (EGD), COMBINED  5/28/2013    Procedure: COMBINED ESOPHAGOSCOPY, GASTROSCOPY, DUODENOSCOPY (EGD);;  Surgeon: Best Willett MD;  Location:  GI     ESOPHAGOSCOPY, GASTROSCOPY, DUODENOSCOPY (EGD), COMBINED N/A 6/13/2018    Procedure: COMBINED ESOPHAGOSCOPY, GASTROSCOPY, DUODENOSCOPY (EGD), BIOPSY SINGLE OR MULTIPLE;  gastroscopy;  Surgeon: Westley Gibbs MD;  Location:  GI     LAPAROSCOPIC GASTRIC SLEEVE  5/13/2013    Procedure: LAPAROSCOPIC GASTRIC SLEEVE;  Laparoscopic Sleeve Gastrectomy ;  Surgeon: Best Willett MD;  [facility]     LEEP TX, CERVICAL  1995    age 15     SEPTOPLASTY       THYROIDECTOMY Left 4/3/2018    Procedure: THYROIDECTOMY;  Left Thyroid Lobectomy And Ishtmusectomy;  Surgeon: Delia Harper MD;  Location: [facility]     TONSILLECTOMY  age 20s     TONSILLECTOMY  2004?     wisdom teeth extraction         Review of Systems  CONSTITUTIONAL: NEGATIVE for fever, chills, change in weight  INTEGUMENTARU/SKIN: NEGATIVE for worrisome rashes, moles or lesions  EYES: NEGATIVE for vision changes or irritation  ENT: NEGATIVE for ear, mouth and throat problems  RESP: NEGATIVE for significant cough or SOB  BREAST: NEGATIVE for masses, tenderness or discharge  CV: NEGATIVE for chest pain, palpitations or peripheral edema  GI: NEGATIVE for nausea, abdominal pain, heartburn, or change in bowel habits  : NEGATIVE for unusual urinary or vaginal symptoms. Periods are regular.  MUSCULOSKELETAL: NEGATIVE for significant arthralgias or myalgia  NEURO: NEGATIVE for weakness, dizziness or paresthesias  PSYCHIATRIC: NEGATIVE for changes in mood or affect     OBJECTIVE:   /89  Pulse 81  Temp 98.4  F (36.9  C) (Oral)  Resp 16  Ht 5' 10\" (1.778 m)  Wt 218 lb 1.6 oz (98.9 kg)  LMP 09/28/2018 (Exact Date)  SpO2 99%  Breastfeeding? " No  BMI 31.29 kg/m2  Physical Exam  GENERAL: healthy, alert and no distress  EYES: Eyes grossly normal to inspection, PERRL and conjunctivae and sclerae normal  HENT: ear canals and TM's normal, nose and mouth without ulcers or lesions  NECK: no adenopathy, no asymmetry, masses, or scars and thyroid normal to palpation  RESP: lungs clear to auscultation - no rales, rhonchi or wheezes  BREAST: normal without masses, tenderness or nipple discharge and no palpable axillary masses or adenopathy  CV: regular rate and rhythm, normal S1 S2, no S3 or S4, no murmur, click or rub, no peripheral edema and peripheral pulses strong  ABDOMEN: soft, nontender, no hepatosplenomegaly, no masses and bowel sounds normal  MS: no gross musculoskeletal defects noted, no edema  SKIN: no suspicious lesions or rashes  NEURO: Normal strength and tone, mentation intact and speech normal  PSYCH: mentation appears normal, affect normal/bright    Diagnostic Test Results:  Results for orders placed or performed in visit on 10/04/18 (from the past 24 hour(s))   Hemoglobin A1c   Result Value Ref Range    Hemoglobin A1C 5.0 0 - 5.6 %       ASSESSMENT/PLAN:   1. Well adult exam  See AVS    2. Screen for STD (sexually transmitted disease)  Pt request no symptoms   - NEISSERIA GONORRHOEA PCR  - CHLAMYDIA TRACHOMATIS PCR  - HIV Antigen Antibody Combo  - Treponema Abs w Reflex to RPR and Titer    3. Gastric bypass status for obesity  Due for labs  risingin weight noted  Will talk to her roxann management team about this  Might need to switch back to topiramate combo  - Parathyroid Hormone Intact [VVM835]  - Vitamin D [PVS889]  - Vitamin B12 [LAB67]  - Ferritin [LAB68]  - Lipid panel reflex to direct LDL Fasting  - Glucose  - Hemoglobin A1c  - T4, free    4. HTN - stable systolic but diastolic is still high - working on exercise and diet and weight loss    recheck with next visit  Meds could be contributing to some of this    In addition to the preventive  "visit, 15 minutes was spent face to face with (50% or 8 min) counseling and/or coordination of care regarding addition concerns and symptoms.     COUNSELING:  Reviewed preventive health counseling, as reflected in patient instructions       Regular exercise       Healthy diet/nutrition       Safe sex practices/STD prevention    BP Readings from Last 1 Encounters:   10/04/18 137/89     Estimated body mass index is 31.29 kg/(m^2) as calculated from the following:    Height as of this encounter: 5' 10\" (1.778 m).    Weight as of this encounter: 218 lb 1.6 oz (98.9 kg).      Weight management plan: Patient referred to endocrine and/or weight management specialty     reports that she has never smoked. She has never used smokeless tobacco.      Counseling Resources:  ATP IV Guidelines  Pooled Cohorts Equation Calculator  Breast Cancer Risk Calculator  FRAX Risk Assessment  ICSI Preventive Guidelines  Dietary Guidelines for Americans, 2010  USDA's MyPlate  ASA Prophylaxis  Lung CA Screening    Violet Humphreys MD  [facility]  Answers for HPI/ROS submitted by the patient on 10/4/2018   PHQ-2 Score: 1    "

## 2018-10-04 NOTE — MR AVS SNAPSHOT
After Visit Summary   10/4/2018    Ashley Catherine    MRN: [id number]           Patient Information     Date Of Birth          1980        Visit Information        Provider Department      10/4/2018 8:30 AM Violet Humphreys MD [facility]        Today's Diagnoses     Well adult exam    -  1    Screen for STD (sexually transmitted disease)        Gastric bypass status for obesity        Benign essential hypertension          Care Instructions    HEADACHE PREVENTION:    Migrelief - magnesium, riboflavin and feverfew.  Amazon  Or consider hydroxyzine at night      Preventive Health Recommendations  Female Ages 26 - 39  Yearly exam:   See your health care provider every year in order to    Review health changes.     Discuss preventive care.      Review your medicines if you your doctor has prescribed any.    Until age 30: Get a Pap test every three years (more often if you have had an abnormal result).    After age 30: Talk to your doctor about whether you should have a Pap test every 3 years or have a Pap test with HPV screening every 5 years.   You do not need a Pap test if your uterus was removed (hysterectomy) and you have not had cancer.  You should be tested each year for STDs (sexually transmitted diseases), if you're at risk.   Talk to your provider about how often to have your cholesterol checked.  If you are at risk for diabetes, you should have a diabetes test (fasting glucose).  Shots: Get a flu shot each year. Get a tetanus shot every 10 years.   Nutrition:     Eat at least 5 servings of fruits and vegetables each day.    Eat whole-grain bread, whole-wheat pasta and brown rice instead of white grains and rice.    Get adequate Calcium and Vitamin D.     Lifestyle    Exercise at least 150 minutes a week (30 minutes a day, 5 days of the week). This will help you control your weight and prevent disease.    Limit alcohol to one drink per day.    No smoking.     Wear sunscreen to  prevent skin cancer.    See your dentist every six months for an exam and cleaning.            Follow-ups after your visit        Your next 10 appointments already scheduled     Oct 08, 2018  1:40 PM CDT   (Arrive by 1:25 PM)   Return Weight Management Visit with Hector Justice MD   [facility] ([facility])    [address]  [address] [facility] [address]   [phone]            Oct 11, 2018  2:15 PM CDT   Concussion Treatment with Ashley Aguayo, PT   [facility] ([facility])    [address]  [address]  [facility] [address]   [phone]            Oct 23, 2018 10:00 AM CDT   (Arrive by 9:45 AM)   Return Visit with Jayro Elias, PhD [facility] ([facility])    [address]  [address] [facility] [address]   [phone]            Oct 23, 2018  2:15 PM CDT   Concussion Treatment with Ashley Aguayo, PT   [facility] ([facility])    [address]  [address]  [facility] [address]   [phone]            Oct 24, 2018 11:00 AM CDT   (Arrive by 10:45 AM)   RETURN CONCUSSION with Angel Marin PA-C   [facility] Concussion ([facility])    [address]  [address] [place] [facility] [address]   [phone]            Nov 14, 2018  9:40 AM CST   (Arrive by 9:25 AM)   Return Bariatric Visit with Best Willett MD   [facility] ([facility])    [address]  [address] [facility] [address]   [phone]            Nov 15, 2018  8:00 AM CST   Concussion Treatment with Ashley Aguayo, PT   [facility] ([facility])    [address]  [address]  [place] [address]   [phone]              Who to contact     If you have questions or need follow up information about today's clinic visit  "or your schedule please contact [facility] directly at [phone].  Normal or non-critical lab and imaging results will be communicated to you by MyChart, letter or phone within 4 business days after the clinic has received the results. If you do not hear from us within 7 days, please contact the clinic through Gaelect[employer] or phone. If you have a critical or abnormal lab result, we will notify you by phone as soon as possible.  Submit refill requests through Ready or call your pharmacy and they will forward the refill request to us. Please allow 3 business days for your refill to be completed.          Additional Information About Your Visit        G[employer]lect[employer] Software Information     Ready gives you secure access to your electronic health record. If you see a primary care provider, you can also send messages to your care team and make appointments. If you have questions, please call your primary care clinic.  If you do not have a primary care provider, please call [phone] and they will assist you.        Care EveryWhere ID     This is your Care EveryWhere ID. This could be used by other organizations to access your [facility] medical records  [id number]        Your Vitals Were     Pulse Temperature Respirations Height Last Period Pulse Oximetry    81 98.4  F (36.9  C) (Oral) 16 5' 10\" (1.778 m) 09/28/2018 (Exact Date) 99%    Breastfeeding? BMI (Body Mass Index)                No 31.29 kg/m2           Blood Pressure from Last 3 Encounters:   10/04/18 129/89   09/15/18 (!) 146/95   09/11/18 126/84    Weight from Last 3 Encounters:   10/04/18 218 lb 1.6 oz (98.9 kg)   09/15/18 213 lb (96.6 kg)   09/12/18 212 lb 3.2 oz (96.3 kg)              We Performed the Following     CHLAMYDIA TRACHOMATIS PCR     Ferritin [LAB68]     Glucose     Hemoglobin A1c     HIV Antigen Antibody Combo     Lipid panel reflex to direct LDL Fasting     NEISSERIA GONORRHOEA PCR     OFFICE/OUTPT VISIT,EST,LEVL III     Parathyroid Hormone " Intact [AQL715]     T4, free     Treponema Abs w Reflex to RPR and Titer     Vitamin B12 [LAB67]     Vitamin D [OHJ875]        Primary Care Provider Office Phone # Fax #    [place] Humphreys [phone] [address] [address]        Equal Access to Services     [facility]: Hadii aad ku hadasho Soomaali, waaxda luqadaha, qaybta kaalmada adeegyada, waxay idiin hayaan adeeg lorie laJarrodaan . So [facility] [phone].    ATENCIÓN: Si habla español, tiene a gibbons disposición servicios gratuitos de asistencia lingüística. Faby al [phone].    We comply with applicable federal civil rights laws and [place] laws. We do not discriminate on the basis of race, color, national origin, age, disability, sex, sexual orientation, or gender identity.            Thank you!     Thank you for choosing [facility]  for your care. Our goal is always to provide you with excellent care. Hearing back from our patients is one way we can continue to improve our services. Please take a few minutes to complete the written survey that you may receive in the mail after your visit with us. Thank you!             Your Updated Medication List - Protect others around you: Learn how to safely use, store and throw away your medicines at www.disposemymeds.org.          This list is accurate as of 10/4/18 10:32 AM.  Always use your most recent med list.                   Brand Name Dispense Instructions for use Diagnosis    Biotin 10 MG Tabs tablet      Take 10,000 mcg by mouth daily        CALCIUM + D PO      Take by mouth daily        cetirizine 10 MG tablet    zyrTEC     Take 10 mg by mouth daily as needed for allergies        CONTRAVE 8-90 MG per 12 hr tablet   Generic drug:  naltrexone-bupropion           CVS B-12 5000 MCG Subl   Generic drug:  Cyanocobalamin       Multiple thyroid nodules       cyclobenzaprine 10 MG tablet    FLEXERIL    30 tablet    TAKE ONE-HALF TABLET BY MOUTH  TWICE A DAY AS NEEDED FOR MUSCLE SPASMS    Sciatica, unspecified laterality       ferrous sulfate 325 (65 Fe) MG tablet    IRON     Take 325 mg by mouth daily (with breakfast)        hydrochlorothiazide 25 MG tablet    HYDRODIURIL    90 tablet    TAKE ONE TABLET BY MOUTH EVERY DAY    Essential hypertension with goal blood pressure less than 140/90       MELATONIN PO      Take 3 mg by mouth        metroNIDAZOLE 0.75 % vaginal gel    METROGEL    70 g    USE TWICE WEEKLY TO VAGINA FOR RECURRENT BV SYMPTOMS. DO THIS FOR 3 MONTHS AFTER FINISHING ORAL MEDICATIONS.    Abnormal vaginal fluids       MULTIVITAMINS PO      Take by mouth daily        omeprazole 40 MG capsule    priLOSEC    90 capsule    Take 1 capsule (40 mg) by mouth daily Take 30-60 minutes before a meal.    Gastroesophageal reflux disease with esophagitis       polyethylene glycol powder    MIRALAX    510 g    Take 17 g (1 capful) by mouth daily as needed for constipation    Constipation, unspecified constipation type       TYLENOL PM EXTRA STRENGTH PO           vitamin D 2000 units Caps

## 2018-10-04 NOTE — LETTER
October 5, 2018      TO: Ashley Catherine  [address] [facility] [address]         Dear Ms. Ashley Catherine,    We received and reviewed your test results.  You may receive more than one letter if we receive the results on multiple days.  Please share all lab and test results with your primary care provider and keep a copy for your own records.        Your test results are normal except for the following addressed below:    B12 was high but stable over the last two years. Make sure you are only taking the recommended 500 mcg under the tongue daily or 1000 mcg inj monthly.     If you have any questions, feel free contact us at the Call Center [phone].                                                Resulted Orders   Parathyroid Hormone Intact [PZS527]   Result Value Ref Range    Parathyroid Hormone Intact 69 18 - 80 pg/mL   Vitamin D [IQJ667]   Result Value Ref Range    Vitamin D Deficiency screening 47 20 - 75 ug/L      Comment:      Season, race, dietary intake, and treatment affect the concentration of   25-hydroxy-Vitamin D. Values may decrease during winter months and increase   during summer months. Values 20-29 ug/L may indicate Vitamin D insufficiency   and values <20 ug/L may indicate Vitamin D deficiency.  Vitamin D determination is routinely performed by an immunoassay specific for   25 hydroxyvitamin D3.  If an individual is on vitamin D2 (ergocalciferol)   supplementation, please specify 25 OH vitamin D2 and D3 level determination by   LCMSMS test VITD23.     Vitamin B12 [LAB67]   Result Value Ref Range    Vitamin B12 1821 (H) 193 - 986 pg/mL   Ferritin [LAB68]   Result Value Ref Range    Ferritin 19 12 - 150 ng/mL        Sincerely,    Dr. Iraida Bacon RN

## 2018-10-05 ENCOUNTER — MYC MEDICAL ADVICE (OUTPATIENT)
Dept: SURGERY | Facility: CLINIC | Age: 38
End: 2018-10-05

## 2018-10-05 DIAGNOSIS — Z98.84 STATUS POST BARIATRIC SURGERY: Primary | ICD-10-CM

## 2018-10-08 ENCOUNTER — OFFICE VISIT (OUTPATIENT)
Dept: ENDOCRINOLOGY | Facility: CLINIC | Age: 38
End: 2018-10-08
Payer: COMMERCIAL

## 2018-10-08 ENCOUNTER — OFFICE VISIT (OUTPATIENT)
Dept: SURGERY | Facility: CLINIC | Age: 38
End: 2018-10-08
Payer: COMMERCIAL

## 2018-10-08 VITALS
HEART RATE: 99 BPM | BODY MASS INDEX: 30.85 KG/M2 | DIASTOLIC BLOOD PRESSURE: 96 MMHG | WEIGHT: 215.5 LBS | HEIGHT: 70 IN | SYSTOLIC BLOOD PRESSURE: 135 MMHG | OXYGEN SATURATION: 95 %

## 2018-10-08 DIAGNOSIS — E66.9 OBESITY (BMI 30-39.9): Primary | ICD-10-CM

## 2018-10-08 ASSESSMENT — ENCOUNTER SYMPTOMS
RESPIRATORY PAIN: 0
PANIC: 0
ARTHRALGIAS: 0
COUGH: 0
DISTURBANCES IN COORDINATION: 0
BREAST PAIN: 0
SYNCOPE: 0
POLYPHAGIA: 0
NECK MASS: 0
DECREASED APPETITE: 0
FLANK PAIN: 0
SNORES LOUDLY: 0
EXERCISE INTOLERANCE: 0
BREAST MASS: 0
PALPITATIONS: 0
HOARSE VOICE: 0
NAUSEA: 0
DYSURIA: 0
BRUISES/BLEEDS EASILY: 0
MYALGIAS: 0
TREMORS: 0
DIZZINESS: 0
MUSCLE WEAKNESS: 0
HYPOTENSION: 0
HYPERTENSION: 0
POOR WOUND HEALING: 0
SORE THROAT: 0
DECREASED LIBIDO: 0
DIFFICULTY URINATING: 0
TASTE DISTURBANCE: 0
MEMORY LOSS: 0
NAIL CHANGES: 0
EYE REDNESS: 0
INCREASED ENERGY: 0
ORTHOPNEA: 0
INSOMNIA: 1
BOWEL INCONTINENCE: 0
DOUBLE VISION: 0
BACK PAIN: 1
HALLUCINATIONS: 0
SLEEP DISTURBANCES DUE TO BREATHING: 0
RECTAL BLEEDING: 0
EYE WATERING: 0
MUSCLE CRAMPS: 1
POLYDIPSIA: 1
DECREASED CONCENTRATION: 1
DIARRHEA: 0
SWOLLEN GLANDS: 0
WEIGHT LOSS: 0
NIGHT SWEATS: 0
ABDOMINAL PAIN: 0
SKIN CHANGES: 0
CHILLS: 0
WEIGHT GAIN: 0
POSTURAL DYSPNEA: 0
SHORTNESS OF BREATH: 0
WHEEZING: 0
ALTERED TEMPERATURE REGULATION: 0
SINUS PAIN: 0
DYSPNEA ON EXERTION: 0
HOT FLASHES: 0
EYE PAIN: 0
TROUBLE SWALLOWING: 0
NERVOUS/ANXIOUS: 1
HEARTBURN: 0
HEMATURIA: 0
BLOATING: 0
RECTAL PAIN: 0
LIGHT-HEADEDNESS: 0
SMELL DISTURBANCE: 0
COUGH DISTURBING SLEEP: 0
WEAKNESS: 0
DEPRESSION: 1
SPEECH CHANGE: 0
NECK PAIN: 0
PARALYSIS: 0
EYE IRRITATION: 0
BLOOD IN STOOL: 0
NUMBNESS: 1
STIFFNESS: 0
LOSS OF CONSCIOUSNESS: 0
FEVER: 0
HEADACHES: 0
VOMITING: 0
TINGLING: 1
JAUNDICE: 0
FATIGUE: 1
CONSTIPATION: 0
SEIZURES: 0
HEMOPTYSIS: 0
JOINT SWELLING: 0
LEG PAIN: 0
SPUTUM PRODUCTION: 0
SINUS CONGESTION: 0

## 2018-10-08 NOTE — MR AVS SNAPSHOT
MRN:[id number]                      After Visit Summary   10/8/2018    Ashley Catherine    MRN: [id number]           Visit Information        Provider Department      10/8/2018 2:00 PM Ioana Pandey RD [facility] Management        Your next 10 appointments already scheduled     Oct 11, 2018  2:15 PM CDT   Concussion Treatment with Ashley Aguayo, PT   [facility] ([facility])    [address]  [facility] [address]   [phone]            Oct 23, 2018 10:00 AM CDT   (Arrive by 9:45 AM)   Return Visit with Jayro Elias, PhD [facility] ([facility])    [address] [facility] [address]   [phone]            Oct 23, 2018  2:15 PM CDT   Concussion Treatment with Ashley Aguayo, PT   [facility] ([facility])    [address]  [facility] [address]   [phone]            Oct 24, 2018 11:00 AM CDT   (Arrive by 10:45 AM)   RETURN CONCUSSION with Angel Marin PA-C   [facility] ([facility] [place])    [address] [facility] [address]   [phone]            Nov 14, 2018  9:40 AM CST   (Arrive by 9:25 AM)   Return Bariatric Visit with Best Willett MD   [facility] ([place])    [address] [facility] [address]   [phone]            Nov 15, 2018  8:00 AM CST   Concussion Treatment with Ashley Aguayo, PT   [facility] ([facility])    [address]  [address]  [facility] [address]   [phone]              Care Instructions    Goals  1. Take iron separate from calcium chews   2. Decrease calorie containing beverages: decrease regular mochas and lattes and decrease wine consumption.   3. Try tracking calories: 3646-6095 calories   4.  Exercise 1x per week 30-60     Follow up with RD in two months    Ioana Pandey, MS, RD, LD  If you need to schedule or reschedule with a dietitian please call [phone].         [employer] Information     [employer] gives you secure access to your electronic health record. If you see a primary care provider, you can also send messages to your care team and make appointments. If you have questions, please call your primary care clinic.  If you do not have a primary care provider, please call [phone] and they will assist you.      [employer] is an electronic gateway that provides easy, online access to your medical records. With [employer], you can request a clinic appointment, read your test results, renew a prescription or communicate with your care team.     To access your existing account, please contact your [facility] or call [phone] for assistance.        Care EveryWhere ID     This is your Care EveryWhere ID. This could be used by other organizations to access your [facility] medical records  [id number]        Equal Access to Services     MABEL BAILEY : Hadii sabas gabrielo Sowaldo, waaxda luqadaha, qaybta kaalmada adeblossom, carmen weston. So [facility] [phone].    ATENCIÓN: Si habla español, tiene a gibbons disposición servicios gratuitos de asistencia lingüística. Llame al [phone].    We comply with applicable federal civil rights laws and [place] laws. We do not discriminate on the basis of race, color, national origin, age, disability, sex, sexual orientation, or gender identity.

## 2018-10-08 NOTE — NURSING NOTE
"  Chief Complaint   Patient presents with     Weight Problem     RMWM     Vitals:    10/08/18 1336   BP: (!) 135/96   Pulse: 99   SpO2: 95%   Weight: 215 lb 8 oz   Height: 5' 10\"     Body mass index is 30.92 kg/(m^2).  Liya Curran CMA    "

## 2018-10-08 NOTE — LETTER
"10/8/2018       RE: Ashley Catherine  [address]  [facility] [address]     Dear Colleague,    Thank you for referring your patient, Ashley Catherine, to the [facility] MEDICAL WEIGHT MANAGEMENT at [facility]. Please see a copy of my visit note below.    Return Medical Weight Management Note     Ashley Catherine  MRN:  [id number]  :  1980  RAKEL:  10/08/18    Dear Violet Humphreys,    I had the pleasure of seeing your patient Ashley Catherine.  She is a 37 year old female who I am continuing to see for treatment of obesity related to:       2017   I have the following co-morbidities associated with obesity: -   Are you taking daily medication for heartburn, acid reflux, or GERD (acid reflux disease)? No     She was started on Qsymia by Dr. De Los Santos with great response of weight loss. However, s developed side effects including dry mouth, thirsty, little bit of tingling, and a depressed mood.  She stopped Qsymia,she gained her weight back.     INTERVAL HISTORY:  2018: she had thyroid surgery. Stopped weight loss meds (phentermine, top) before and for a couple of weeks after,   Then when she restarted she had side effects (mood), so she stopped.   Then tried contrave again, then stopped due to headaches after concussion.   Now back on contrave but poor compliance with BID dosing    She is working Night shifts.    Having trouble with her appetite, cravings.     CURRENT WEIGHT:   215 lbs 8 oz    Wt Readings from Last 4 Encounters:   10/08/18 97.8 kg (215 lb 8 oz)   10/04/18 98.9 kg (218 lb 1.6 oz)   09/15/18 96.6 kg (213 lb)   18 96.6 kg (213 lb)       Height:  5' 10\"  Body Mass Index:  Body mass index is 30.92 kg/(m^2).  Vitals:  B/P: 137/90, P: 98, R: Data Unavailable     Initial consult weight was 222 on 5/15/2017.  Weight change since last seen on 18 is up 18 pounds.   Total loss is 7 pounds.    Diet and Activity Changes Since Last Visit Reviewed With " Patient 10/7/2018   I have made the following changes to my diet since my last visit: Eating more   With regards to my diet, I am still struggling with: Sugar   For breakfast, I typically eat: -   For lunch, I typically eat: -   For supper, I typically eat: -   For snack(s), I typically eat: -   I have made the following changes to my activity/exercise since my last visit: Gone to gym    With regards to my activity/exercise, I am still struggling with: consistent activity       Review of Systems     Constitutional:  Positive for fatigue and recent stressors. Negative for fever, chills, weight loss, weight gain, decreased appetite, night sweats, height loss, post-operative complications, incisional pain, hallucinations, increased energy, hyperactivity and confused.   HENT:  Negative for ear pain, hearing loss, tinnitus, nosebleeds, trouble swallowing, hoarse voice, mouth sores, sore throat, ear discharge, tooth pain, gum tenderness, taste disturbance, smell disturbance, hearing aid, bleeding gums, dry mouth, sinus pain, sinus congestion and neck mass.    Eyes:  Negative for double vision, pain, redness, eye pain, decreased vision, eye watering, eye bulging, eye dryness, flashing lights, spots, floaters, strabismus, tunnel vision, jaundice and eye irritation.   Respiratory:   Negative for cough, hemoptysis, sputum production, shortness of breath, wheezing, sleep disturbances due to breathing, snores loudly, respiratory pain, dyspnea on exertion, cough disturbing sleep and postural dyspnea.    Cardiovascular:  Positive for few scattered varicosities. Negative for chest pain, dyspnea on exertion, palpitations, orthopnea, fingers/toes turn blue, hypertension, hypotension, syncope, history of heart murmur, pacemaker, leg pain, sleep disturbances due to breathing, light-headedness, exercise intolerance and edema.   Gastrointestinal:  Negative for heartburn, nausea, vomiting, abdominal pain, diarrhea, constipation, blood in  stool, melena, rectal pain, bloating, hemorrhoids, bowel incontinence, jaundice, rectal bleeding, coffee ground emesis and change in stool.   Genitourinary:  Negative for bladder incontinence, dysuria, urgency, hematuria, flank pain, vaginal discharge, difficulty urinating, genital sores, dyspareunia, decreased libido, nocturia, voiding less frequently, arousal difficulty, abnormal vaginal bleeding, excessive menstruation, menstrual changes, hot flashes, vaginal dryness and postmenopausal bleeding.   Musculoskeletal:  Positive for back pain and muscle cramps. Negative for myalgias, joint swelling, arthralgias, stiffness, neck pain, bone pain, muscle weakness and fracture.   Skin:  Negative for nail changes, itching, poor wound healing, rash, hair changes, skin changes, acne, warts, poor wound healing, scarring, flaky skin, Raynaud's phenomenon, sensitivity to sunlight and skin thickening.   Neurological:  Positive for tingling and numbness. Negative for dizziness, tremors, speech change, seizures, loss of consciousness, weakness, light-headedness, headaches, disturbances in coordination, memory loss, difficulty walking and paralysis.   Endo/Heme:  Negative for anemia, swollen glands and bruises/bleeds easily.   Psychiatric/Behavioral:  Positive for depression, decreased concentration and mood swings. Negative for hallucinations, memory loss and panic attacks.    Breast:  Negative for breast discharge, breast mass, breast pain and nipple retraction.   Endocrine:  Positive for polydipsia.Negative for altered temperature regulation, polyphagia, unwanted hair growth and change in facial hair.      MEDICATIONS:   Current Outpatient Prescriptions   Medication     Biotin 10 MG TABS tablet     Calcium Carbonate-Vitamin D (CALCIUM + D PO)     cetirizine (ZYRTEC) 10 MG tablet     Cholecalciferol (VITAMIN D) 2000 UNITS CAPS     Cyanocobalamin ([employer] B-12) 5000 MCG SUBL     cyclobenzaprine (FLEXERIL) 10 MG tablet      Diphenhydramine-APAP, sleep, (TYLENOL PM EXTRA STRENGTH PO)     ferrous sulfate (IRON) 325 (65 FE) MG tablet     hydrochlorothiazide (HYDRODIURIL) 25 MG tablet     MELATONIN PO     metroNIDAZOLE (METROGEL) 0.75 % vaginal gel     Multiple Vitamin (MULTIVITAMINS PO)     naltrexone-bupropion (CONTRAVE) 8-90 MG per 12 hr tablet     omeprazole (PRILOSEC) 40 MG capsule     polyethylene glycol (MIRALAX) powder     No current facility-administered medications for this visit.        Weight Loss Medication History Reviewed With Patient 10/7/2018   Which weight loss medications are you currently taking on a regular basis?  Contrave (naltrexone/bupropion)   Are you having any side effects from the weight loss medication that we have prescribed you? No   If you are having side effects please describe: -       ASSESSMENT:   Obesity, with good weight loss result on medium dose QSYMIA.     However, she has side effects to QSYMIA  Is on and off Contrave, poor compliance with dosing regimen, and inadequate response.     PLAN:   - Start Saxenda  - Dietitian visits    8 weeks.    Time: 20 min spent on evaluation, management, counseling, education, & motivational interviewing with greater than 50 % of the total time was spent on counseling and coordinating care      Hector Justice MD

## 2018-10-08 NOTE — PATIENT INSTRUCTIONS
Follow up with Dr. Justice in 2 months.   Dietitian today.     MEDICATION STARTED AT THIS APPOINTMENT    We are starting a GLP-1 (Glucagon-like Peptide-1) medication called Saxenda. One of the ways it works is by slowing down the rate that food leaves your stomach. You feel wilde and will eat less. It also helps regulate hormones that can help improve your blood sugars.    If you are a patient that checks blood sugars, continue to check as instructed by your doctor. Low blood sugars are rare but can happen if patients are on insulin or other oral agents. If you notice consistent low sugars or high sugars, your medication may need to be adjusted after your appointment. If this is the case, please call RN and provide her your blood sugar record from the last 3-4 days. The RN will get in touch with the doctor and call you back/WhereverTVt message with recommendations. We tolerate high sugars for a bit, so if sugars are running 180-200, this is ok. As weight starts dropping the blood sugars should too. If readings are consistently over 200 for 1-2 weeks, then you should call the doctor/nurse.    Dosing for this medication:   Week 1- Inject 0.6 mg daily  Week 2- Inject 1.2 mg daily  Week 3- Inject 1.8 mg daily  Week 4- Inject 2.4 mg daily  Week 5 and thereafter- Inject 3.0 mg daily    Side effects of GLP- Medications include: The most common side effects are all GI related and consist of: nausea, constipation, diarrhea, burping, or gassiness. Patients are advised to eat slowly and less, and nausea typically passes if people can stick it out.     The risk of pancreatitis (inflammation of the pancreas) has been associated with this type of medication, but is very rare.  If you have had pancreatitis in the past, this medication may not be for you. Please let us know about any past history of pancreas problems.    Symptoms of pancreatitis include: Pain in your upper stomach area which may travel to your back and be worse after  eating. Your stomach area may be tender to the touch.  You may have vomiting or nausea and/or have a fever. If you should develop any of these symptoms, stop the medication and contact your primary care doctor. They will do a blood test to check for pancreatitis.         There is a small chance you may have some low blood sugar after taking the medication.   The signs of low blood sugar are:  o Weakness  o Shaky   o Hungry  o Sweating  o Confusion      See below for ways to treat low blood sugar without adding in lots of extra calories.      Treating Low Blood Sugar    If you have symptoms of low blood sugar (sweating, shaking, dizzy, confused) eat 15 grams of carbs and wait 15 minutes:      Glucose Tabs are best for sugars under 70 -  Dex4 or BD Glucose tablets are good, you will need to take 3-4 of these to equal 15 grams.       One small box of raisins    4 oz fruit juice box or   cup fruit juice    1 small apple    1 small banana      cup canned fruit in water      English muffin or a slice of bread with jelly     1 low fat frozen waffle with sugar-free syrup      cup cottage cheese with   cup frozen or fresh blueberries    1 cup skim or low-fat milk      cup whole grain cereal    4-6 crackers such as Triscuits      This medication is usually not covered by insurance and can be quite expensive. Sometimes a prior authorization is required, which may take up to 1-2 weeks for an insurance company to make a decision if they will cover the medication. Please be patient, you will be notified after a decision has been made.    Call the nurse at [phone] if you have any questions or concerns. (Do not stop taking it if you don't think it's working. For some people it works without them knowing it.)     In order to get refills of this or any medication we prescribe you must be seen in the medical weight mgmt clinic every 2-4 months. Please have your pharmacy fax a refill request to [phone].

## 2018-10-08 NOTE — PROGRESS NOTES
"Ashley Catherine is a 38 year old female presents today for new weight management nutrition consultation.    Admit Height:   Ht Readings from Last 2 Encounters:   10/08/18 1.778 m (5' 10\")   10/04/18 1.778 m (5' 10\")     Admit Weight:   Wt Readings from Last 2 Encounters:   10/08/18 97.8 kg (215 lb 8 oz)   10/04/18 98.9 kg (218 lb 1.6 oz)       Admit BMI: 30.99 kg/m^2 - calculated from the height and weight on 10/8/18    Nutrition history:  Pt had a sleeve gastrectomy in may 2013 with Dr. Willett. She was last seen by an RD in 6/2017. She states that her portion sizes are still small from the surgery, but she states that she is an emotional eater and will eat when she is happy, sad or stressed. Explaining that she sees a therapist for her emotional eating.    Diet Recall:   ,   B: 2 slices of myles, egg with cheese some times  L: spring, soup (creamer)   D: Homemade nachos, beef, lettuce, onion, tomato sour cream and cheese  Snack: peanut butter M&M's   Beverage: 1-2 red wine, water with lime and lemon, crystal, tall mocha or latte (only when she works x3 days per week)    Supplements: 1 multi- vit (Centrum), 2 calcium chews, vit D 1,000 international unit, iron and B12 5000 mcg sublingual.    - Pt was previously taking 2 tabs of the 5000 mcg B12 sublingual. Her labs were drawn and she was contacted to reduce her dose to 1 tab daily.      Exercise: Last year pt was working out almost every day however, she got a concusion at the end of January and has not work out much since January 2018. Pt expresses that she will like to start exercising again but is having trouble finding the motivation.       See MD note for details.    Nutrition Prescription  Volumetric diet/Portion sizes     Nutrition Diagnosis  Food and nutrition related knowledge deficit r/t lack of prior exposure to calorie counting and nutrition education aeb pt unable to verbalize understanding of Volumetric diet/Portion sizes diet for weight " loss.    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 Focused on decreasing calorie containing beverages, discussed option to her mocha's and wine, pt plans to try regular coffee with artificial sweetener.   - Pt expressed interest in tracking her calories, though she admits that she has had trouble with that in the past. Pt expressed interest trying to track calories.   - Pt was concerned with her iron level, Ferritin: 10/4/18: 19 (WNL). Spent time discussing her supplements and found that she was taking the iron and calcium chews at the same time. Explained and educated on  iron and calcium intake for better iron absorption.   - Briefly discussed emotional eating, suggested writing down emotions or stories or notes     Patient Understanding: fair- good  Expected Compliance: fair- good  Follow-Up Plans: 2 months     Nutrition Goals  1. Take iron separate from calcium chews   2. Decrease calorie containing beverages: decrease regular mochas and lattes and decrease wine consumption.   3. Try tracking calories: 4907-6612 calories : calculated from taking pt's BMR and subtracting 250-500 kcals/day  4. Exercise 1x per week 30-60     Follow-Up:  PRN or 2 Months     Time spent with patient: 30 minutes.  Ioana Pandey, MS, RD, LD

## 2018-10-08 NOTE — LETTER
"10/8/2018       RE: Ashley Catherine  [address]  [facility] [address]     Dear Colleague,    Thank you for referring your patient, Ashley Catherine, to the [facility] SURGICAL WEIGHT MANAGEMENT at [facility]. Please see a copy of my visit note below.    Ashley Catherine is a 38 year old female presents today for new weight management nutrition consultation.    Admit Height:   Ht Readings from Last 2 Encounters:   10/08/18 1.778 m (5' 10\")   10/04/18 1.778 m (5' 10\")     Admit Weight:   Wt Readings from Last 2 Encounters:   10/08/18 97.8 kg (215 lb 8 oz)   10/04/18 98.9 kg (218 lb 1.6 oz)       Admit BMI: 30.99 kg/m^2 - calculated from the height and weight on 10/8/18    Nutrition history:  Pt had a sleeve gastrectomy in may 2013 with Dr. Willett. She was last seen by an RD in 6/2017. She states that her portion sizes are still small from the surgery, but she states that she is an emotional eater and will eat when she is happy, sad or stressed. Explaining that she sees a therapist for her emotional eating.    Diet Recall:   ,   B: 2 slices of myles, egg with cheese some times  L: spring, soup (creamer)   D: Homemade nachos, beef, lettuce, onion, tomato sour cream and cheese  Snack: peanut butter M&M's   Beverage: 1-2 red wine, water with lime and lemon, crystal, tall mocha or latte (only when she works x3 days per week)    Supplements: 1 multi- vit (Centrum), 2 calcium chews, vit D 1,000 international unit, iron and B12 5000 mcg sublingual.    - Pt was previously taking 2 tabs of the 5000 mcg B12 sublingual. Her labs were drawn and she was contacted to reduce her dose to 1 tab daily.      Exercise: Last year pt was working out almost every day however, she got a concusion at the end of January and has not work out much since January 2018. Pt expresses that she will like to start exercising again but is having trouble finding the motivation.       See MD note for " details.    Nutrition Prescription  Volumetric diet/Portion sizes     Nutrition Diagnosis  Food and nutrition related knowledge deficit r/t lack of prior exposure to calorie counting and nutrition education aeb pt unable to verbalize understanding of Volumetric diet/Portion sizes diet for weight loss.    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 Focused on decreasing calorie containing beverages, discussed option to her mocha's and wine, pt plans to try regular coffee with artificial sweetener.   - Pt expressed interest in tracking her calories, though she admits that she has had trouble with that in the past. Pt expressed interest trying to track calories.   - Pt was concerned with her iron level, Ferritin: 10/4/18: 19 (WNL). Spent time discussing her supplements and found that she was taking the iron and calcium chews at the same time. Explained and educated on  iron and calcium intake for better iron absorption.   - Briefly discussed emotional eating, suggested writing down emotions or stories or notes     Patient Understanding: fair- good  Expected Compliance: fair- good  Follow-Up Plans: 2 months     Nutrition Goals  1. Take iron separate from calcium chews   2. Decrease calorie containing beverages: decrease regular mochas and lattes and decrease wine consumption.   3. Try tracking calories: 5980-6547 calories : calculated from taking pt's BMR and subtracting 250-500 kcals/day  4. Exercise 1x per week 30-60     Follow-Up:  PRN or 2 Months     Time spent with patient: 30 minutes.  Ioana Pandey, MS, RD, LD

## 2018-10-08 NOTE — PATIENT INSTRUCTIONS
Goals  1. Take iron separate from calcium chews   2. Decrease calorie containing beverages: decrease regular mochas and lattes and decrease wine consumption.   3. Try tracking calories: 6251-0710 calories   4. Exercise 1x per week 30-60     Follow up with RD in two months    Ioana Pandey, MS, RD, LD  If you need to schedule or reschedule with a dietitian please call [phone].

## 2018-10-08 NOTE — MR AVS SNAPSHOT
After Visit Summary   10/8/2018    Ashley Catherine    MRN: [id number]           Patient Information     Date Of Birth          1980        Visit Information        Provider Department      10/8/2018 1:40 PM Hector Justice MD [facility] Medical Weight Management        Care Instructions    Follow up with Dr. Justice in 2 months.   Dietitian today.     MEDICATION STARTED AT THIS APPOINTMENT    We are starting a GLP-1 (Glucagon-like Peptide-1) medication called Saxenda. One of the ways it works is by slowing down the rate that food leaves your stomach. You feel wilde and will eat less. It also helps regulate hormones that can help improve your blood sugars.    If you are a patient that checks blood sugars, continue to check as instructed by your doctor. Low blood sugars are rare but can happen if patients are on insulin or other oral agents. If you notice consistent low sugars or high sugars, your medication may need to be adjusted after your appointment. If this is the case, please call RN and provide her your blood sugar record from the last 3-4 days. The RN will get in touch with the doctor and call you back/[employer] message with recommendations. We tolerate high sugars for a bit, so if sugars are running 180-200, this is ok. As weight starts dropping the blood sugars should too. If readings are consistently over 200 for 1-2 weeks, then you should call the doctor/nurse.    Dosing for this medication:   Week 1- Inject 0.6 mg daily  Week 2- Inject 1.2 mg daily  Week 3- Inject 1.8 mg daily  Week 4- Inject 2.4 mg daily  Week 5 and thereafter- Inject 3.0 mg daily    Side effects of GLP- Medications include: The most common side effects are all GI related and consist of: nausea, constipation, diarrhea, burping, or gassiness. Patients are advised to eat slowly and less, and nausea typically passes if people can stick it out.     The risk of pancreatitis (inflammation of the pancreas) has been associated  with this type of medication, but is very rare.  If you have had pancreatitis in the past, this medication may not be for you. Please let us know about any past history of pancreas problems.    Symptoms of pancreatitis include: Pain in your upper stomach area which may travel to your back and be worse after eating. Your stomach area may be tender to the touch.  You may have vomiting or nausea and/or have a fever. If you should develop any of these symptoms, stop the medication and contact your primary care doctor. They will do a blood test to check for pancreatitis.         There is a small chance you may have some low blood sugar after taking the medication.   The signs of low blood sugar are:  o Weakness  o Shaky   o Hungry  o Sweating  o Confusion      See below for ways to treat low blood sugar without adding in lots of extra calories.      Treating Low Blood Sugar    If you have symptoms of low blood sugar (sweating, shaking, dizzy, confused) eat 15 grams of carbs and wait 15 minutes:      Glucose Tabs are best for sugars under 70 -  Dex4 or BD Glucose tablets are good, you will need to take 3-4 of these to equal 15 grams.       One small box of raisins    4 oz fruit juice box or   cup fruit juice    1 small apple    1 small banana      cup canned fruit in water      English muffin or a slice of bread with jelly     1 low fat frozen waffle with sugar-free syrup      cup cottage cheese with   cup frozen or fresh blueberries    1 cup skim or low-fat milk      cup whole grain cereal    4-6 crackers such as Triscuits      This medication is usually not covered by insurance and can be quite expensive. Sometimes a prior authorization is required, which may take up to 1-2 weeks for an insurance company to make a decision if they will cover the medication. Please be patient, you will be notified after a decision has been made.    Call the nurse at [phone] if you have any questions or concerns. (Do not stop taking  it if you don't think it's working. For some people it works without them knowing it.)     In order to get refills of this or any medication we prescribe you must be seen in the medical weight mgmt clinic every 2-4 months. Please have your pharmacy fax a refill request to [phone].            Follow-ups after your visit        Your next 10 appointments already scheduled     Oct 11, 2018  2:15 PM CDT   Concussion Treatment with Ashley Aguayo, PT   [facility] ([facility])    [address] [place] [address]  [facility] [id number] [phone]            Oct 23, 2018 10:00 AM CDT   (Arrive by 9:45 AM)   Return Visit with Jayro Elias, PhD [facility] ([facility])    [address] [place] [id number] [phone]            Oct 23, 2018  2:15 PM CDT   Concussion Treatment with Ashley Aguayo, PT   [facility] ([facility])    [address] [place] [address]  [facility] [id number] [phone]            Oct 24, 2018 11:00 AM CDT   (Arrive by 10:45 AM)   RETURN CONCUSSION with Angel Marin PA-C   [facility] Conc[facility] ([facility])    [address] [place] [id number] [phone]            Nov 14, 2018  9:40 AM CST   (Arrive by 9:25 AM)   Return Bariatric Visit with Best Willett MD   [facility] Surgical Weight Management ([facility])    [address] [place] [id number] [phone]            Nov 15, 2018  8:00 AM CST   Concussion Treatment with Ashley Aguayo, PT   [facility] ([facility])    [address] [place] [address]  [facility] [id number] [phone]              Who to contact     Please call your clinic at [phone] to:    Ask questions about your health    Make or cancel appointments    Discuss your medicines    Learn  "about your test results    Speak to your doctor            Additional Information About Your Visit        [employer] Information     [employer] gives you secure access to your electronic health record. If you see a primary care provider, you can also send messages to your care team and make appointments. If you have questions, please call your primary care clinic.  If you do not have a primary care provider, please call [phone] and they will assist you.      [employer] is an electronic gateway that provides easy, online access to your medical records. With [employer], you can request a clinic appointment, read your test results, renew a prescription or communicate with your care team.     To access your existing account, please contact your [facility] or call [phone] for assistance.        Care EveryWhere ID     This is your Care EveryWhere ID. This could be used by other organizations to access your [place] medical records  [id number]        Your Vitals Were     Pulse Height Last Period Pulse Oximetry BMI (Body Mass Index)       99 1.778 m (5' 10\") 09/28/2018 (Exact Date) 95% 30.92 kg/m2        Blood Pressure from Last 3 Encounters:   10/08/18 (!) 135/96   10/04/18 129/89   09/15/18 (!) 146/95    Weight from Last 3 Encounters:   10/08/18 97.8 kg (215 lb 8 oz)   10/04/18 98.9 kg (218 lb 1.6 oz)   09/15/18 96.6 kg (213 lb)              Today, you had the following     No orders found for display       Primary Care Provider Office Phone # Fax #    SalemTanja Humphreys [phone] [address] [address]        Equal Access to Services     JOSSE [facility] : Hadii sabas Alejandre, paulo bravo, carmen cameron. So [facility] [phone].    ATENCIÓN: Si habla español, tiene a gibbons disposición servicios gratuitos de asistencia lingüística. Llame al [phone].    We comply with applicable " federal civil rights laws and [place] laws. We do not discriminate on the basis of race, color, national origin, age, disability, sex, sexual orientation, or gender identity.            Thank you!     Thank you for choosing [facility] MEDICAL WEIGHT MANAGEMENT  for your care. Our goal is always to provide you with excellent care. Hearing back from our patients is one way we can continue to improve our services. Please take a few minutes to complete the written survey that you may receive in the mail after your visit with us. Thank you!             Your Updated Medication List - Protect others around you: Learn how to safely use, store and throw away your medicines at www.disposemymeds.org.          This list is accurate as of 10/8/18  2:00 PM.  Always use your most recent med list.                   Brand Name Dispense Instructions for use Diagnosis    Biotin 10 MG Tabs tablet      Take 10,000 mcg by mouth daily        CALCIUM + D PO      Take by mouth daily        cetirizine 10 MG tablet    zyrTEC     Take 10 mg by mouth daily as needed for allergies        CONTRAVE 8-90 MG per 12 hr tablet   Generic drug:  naltrexone-bupropion           CVS B-12 5000 MCG Subl   Generic drug:  Cyanocobalamin       Multiple thyroid nodules       cyclobenzaprine 10 MG tablet    FLEXERIL    30 tablet    TAKE ONE-HALF TABLET BY MOUTH TWICE A DAY AS NEEDED FOR MUSCLE SPASMS    Sciatica, unspecified laterality       ferrous sulfate 325 (65 Fe) MG tablet    IRON     Take 325 mg by mouth daily (with breakfast)        hydrochlorothiazide 25 MG tablet    HYDRODIURIL    90 tablet    TAKE ONE TABLET BY MOUTH EVERY DAY    Essential hypertension with goal blood pressure less than 140/90       MELATONIN PO      Take 3 mg by mouth        metroNIDAZOLE 0.75 % vaginal gel    METROGEL    70 g    USE TWICE WEEKLY TO VAGINA FOR RECURRENT BV SYMPTOMS. DO THIS FOR 3 MONTHS AFTER FINISHING ORAL MEDICATIONS.    Abnormal vaginal fluids       MULTIVITAMINS  PO      Take by mouth daily        omeprazole 40 MG capsule    priLOSEC    90 capsule    Take 1 capsule (40 mg) by mouth daily Take 30-60 minutes before a meal.    Gastroesophageal reflux disease with esophagitis       polyethylene glycol powder    MIRALAX    510 g    Take 17 g (1 capful) by mouth daily as needed for constipation    Constipation, unspecified constipation type       TYLENOL PM EXTRA STRENGTH PO           vitamin D 2000 units Caps

## 2018-10-08 NOTE — PROGRESS NOTES
"Return Medical Weight Management Note     Ashley Catherine  MRN:  [id number]  :  1980  RAKEL:  10/08/18    Dear Juliann, Violet Agrawal,    I had the pleasure of seeing your patient Ashley Catherine.  She is a 37 year old female who I am continuing to see for treatment of obesity related to:       2017   I have the following co-morbidities associated with obesity: -   Are you taking daily medication for heartburn, acid reflux, or GERD (acid reflux disease)? No     She was started on Qsymia by Dr. De Los Santos with great response of weight loss. However, s developed side effects including dry mouth, thirsty, little bit of tingling, and a depressed mood.  She stopped Qsymia,she gained her weight back.     INTERVAL HISTORY:  2018: she had thyroid surgery. Stopped weight loss meds (phentermine, top) before and for a couple of weeks after,   Then when she restarted she had side effects (mood), so she stopped.   Then tried contrave again, then stopped due to headaches after concussion.   Now back on contrave but poor compliance with BID dosing    She is working Night shifts.    Having trouble with her appetite, cravings.     CURRENT WEIGHT:   215 lbs 8 oz    Wt Readings from Last 4 Encounters:   10/08/18 97.8 kg (215 lb 8 oz)   10/04/18 98.9 kg (218 lb 1.6 oz)   09/15/18 96.6 kg (213 lb)   18 96.6 kg (213 lb)       Height:  5' 10\"  Body Mass Index:  Body mass index is 30.92 kg/(m^2).  Vitals:  B/P: 137/90, P: 98, R: Data Unavailable     Initial consult weight was 222 on 5/15/2017.  Weight change since last seen on 18 is up 18 pounds.   Total loss is 7 pounds.    Diet and Activity Changes Since Last Visit Reviewed With Patient 10/7/2018   I have made the following changes to my diet since my last visit: Eating more   With regards to my diet, I am still struggling with: Sugar   For breakfast, I typically eat: -   For lunch, I typically eat: -   For supper, I typically eat: -   For snack(s), I typically eat: - "   I have made the following changes to my activity/exercise since my last visit: Gone to gym    With regards to my activity/exercise, I am still struggling with: consistent activity       Review of Systems     Constitutional:  Positive for fatigue and recent stressors. Negative for fever, chills, weight loss, weight gain, decreased appetite, night sweats, height loss, post-operative complications, incisional pain, hallucinations, increased energy, hyperactivity and confused.   HENT:  Negative for ear pain, hearing loss, tinnitus, nosebleeds, trouble swallowing, hoarse voice, mouth sores, sore throat, ear discharge, tooth pain, gum tenderness, taste disturbance, smell disturbance, hearing aid, bleeding gums, dry mouth, sinus pain, sinus congestion and neck mass.    Eyes:  Negative for double vision, pain, redness, eye pain, decreased vision, eye watering, eye bulging, eye dryness, flashing lights, spots, floaters, strabismus, tunnel vision, jaundice and eye irritation.   Respiratory:   Negative for cough, hemoptysis, sputum production, shortness of breath, wheezing, sleep disturbances due to breathing, snores loudly, respiratory pain, dyspnea on exertion, cough disturbing sleep and postural dyspnea.    Cardiovascular:  Positive for few scattered varicosities. Negative for chest pain, dyspnea on exertion, palpitations, orthopnea, fingers/toes turn blue, hypertension, hypotension, syncope, history of heart murmur, pacemaker, leg pain, sleep disturbances due to breathing, light-headedness, exercise intolerance and edema.   Gastrointestinal:  Negative for heartburn, nausea, vomiting, abdominal pain, diarrhea, constipation, blood in stool, melena, rectal pain, bloating, hemorrhoids, bowel incontinence, jaundice, rectal bleeding, coffee ground emesis and change in stool.   Genitourinary:  Negative for bladder incontinence, dysuria, urgency, hematuria, flank pain, vaginal discharge, difficulty urinating, genital sores,  dyspareunia, decreased libido, nocturia, voiding less frequently, arousal difficulty, abnormal vaginal bleeding, excessive menstruation, menstrual changes, hot flashes, vaginal dryness and postmenopausal bleeding.   Musculoskeletal:  Positive for back pain and muscle cramps. Negative for myalgias, joint swelling, arthralgias, stiffness, neck pain, bone pain, muscle weakness and fracture.   Skin:  Negative for nail changes, itching, poor wound healing, rash, hair changes, skin changes, acne, warts, poor wound healing, scarring, flaky skin, Raynaud's phenomenon, sensitivity to sunlight and skin thickening.   Neurological:  Positive for tingling and numbness. Negative for dizziness, tremors, speech change, seizures, loss of consciousness, weakness, light-headedness, headaches, disturbances in coordination, memory loss, difficulty walking and paralysis.   Endo/Heme:  Negative for anemia, swollen glands and bruises/bleeds easily.   Psychiatric/Behavioral:  Positive for depression, decreased concentration and mood swings. Negative for hallucinations, memory loss and panic attacks.    Breast:  Negative for breast discharge, breast mass, breast pain and nipple retraction.   Endocrine:  Positive for polydipsia.Negative for altered temperature regulation, polyphagia, unwanted hair growth and change in facial hair.      MEDICATIONS:   Current Outpatient Prescriptions   Medication     Biotin 10 MG TABS tablet     Calcium Carbonate-Vitamin D (CALCIUM + D PO)     cetirizine (ZYRTEC) 10 MG tablet     Cholecalciferol (VITAMIN D) 2000 UNITS CAPS     Cyanocobalamin (CVS B-12) 5000 MCG SUBL     cyclobenzaprine (FLEXERIL) 10 MG tablet     Diphenhydramine-APAP, sleep, (TYLENOL PM EXTRA STRENGTH PO)     ferrous sulfate (IRON) 325 (65 FE) MG tablet     hydrochlorothiazide (HYDRODIURIL) 25 MG tablet     MELATONIN PO     metroNIDAZOLE (METROGEL) 0.75 % vaginal gel     Multiple Vitamin (MULTIVITAMINS PO)     naltrexone-bupropion (CONTRAVE)  8-90 MG per 12 hr tablet     omeprazole (PRILOSEC) 40 MG capsule     polyethylene glycol (MIRALAX) powder     No current facility-administered medications for this visit.        Weight Loss Medication History Reviewed With Patient 10/7/2018   Which weight loss medications are you currently taking on a regular basis?  Contrave (naltrexone/bupropion)   Are you having any side effects from the weight loss medication that we have prescribed you? No   If you are having side effects please describe: -       ASSESSMENT:   Obesity, with good weight loss result on medium dose QSYMIA.     However, she has side effects to QSYMIA  Is on and off Contrave, poor compliance with dosing regimen, and inadequate response.     PLAN:   - Start Saxenda  - Dietitian visits    8 weeks.    Time: 20 min spent on evaluation, management, counseling, education, & motivational interviewing with greater than 50 % of the total time was spent on counseling and coordinating care    Sincerely,    Hector Justice MD

## 2018-10-09 ENCOUNTER — TELEPHONE (OUTPATIENT)
Dept: ENDOCRINOLOGY | Facility: CLINIC | Age: 38
End: 2018-10-09

## 2018-10-09 NOTE — TELEPHONE ENCOUNTER
Please do not close this encounter until this has been addressed.  (prior auth approved/denied, prescriber refusal to complete prior auth or medication changed/discontinued)    Prior Authorization needed on: Saxenda  Drug NDC: [address]-15     Insurance: [employer]  Member ID: [id number]   Insurance phone #: [phone]    Pharmacy NPI: [id number]  Pharmacy Phone #: [phone]  Pharmacy Fax #: [phone]    Please let us know if the PA gets approved or denied or if medication is changed

## 2018-10-09 NOTE — TELEPHONE ENCOUNTER
PA Initiation    Medication: Saxenda -   Insurance Company: [employer]Â° - Phone [phone] Fax [phone]  Pharmacy Filling the Rx: [place] [address], [facility] [address]  Filling Pharmacy Phone: [phone]  Filling Pharmacy Fax:    Start Date: 10/9/2018

## 2018-10-10 NOTE — TELEPHONE ENCOUNTER
Medication Appeal Initiation    We have initiated an appeal for the requested medication:  Medication: Saxenda - DENIED, Appeal-   Appeal Start Date:  10/10/2018  Insurance Company:    Comments:  Insurance didn't review correctly, patient has met all coverage criteria. Re-submitted indicating that.        .

## 2018-10-10 NOTE — TELEPHONE ENCOUNTER
PRIOR AUTHORIZATION DENIED    Medication: Saxenda - DENIED, Appeal-     Denial Date: 10/9/2018    Denial Rational:     **Insurance didn't review correctly, patient has met all coverage criteria. Re-submitted indicating that.    Coverage Criteria:      Appeal Information: Already initiated via CMM

## 2018-10-12 NOTE — TELEPHONE ENCOUNTER
Insurance Rep Jemal called to verify appeal information.    - Determination to be made within 30 days. Will follow up in 7 days. To appeals dept at [phone]

## 2018-10-15 NOTE — TELEPHONE ENCOUNTER
MEDICATION APPEAL APPROVED    Medication: Saxenda - DENIED, Appeal- APPROVED  Authorization Effective Date: 9/12/2018  Authorization Expiration Date: 1/12/2019  Approved Dose/Quantity:   Reference #: [id number]   Insurance Company:    Expected CoPay: $29.99     CoPay Card Available:      Foundation Assistance Needed:    Which Pharmacy is filling the prescription (Not needed for infusion/clinic administered): [facility], [place] - [address], [address]

## 2018-10-23 ENCOUNTER — OFFICE VISIT (OUTPATIENT)
Dept: PSYCHOLOGY | Facility: CLINIC | Age: 38
End: 2018-10-23
Payer: COMMERCIAL

## 2018-10-23 VITALS — BODY MASS INDEX: 30.71 KG/M2 | WEIGHT: 214 LBS

## 2018-10-23 DIAGNOSIS — F33.0 MAJOR DEPRESSIVE DISORDER, RECURRENT EPISODE, MILD (H): ICD-10-CM

## 2018-10-23 DIAGNOSIS — E66.01 PSYCHOLOGICAL FACTORS AFFECTING MORBID OBESITY (H): Primary | ICD-10-CM

## 2018-10-23 DIAGNOSIS — F54 PSYCHOLOGICAL FACTORS AFFECTING MORBID OBESITY (H): Primary | ICD-10-CM

## 2018-10-23 NOTE — PROGRESS NOTES
Health Psychology                  Clinic    Department of Medicine  Lis Chavez, Ph.D., L.P. [phone]                          [facility] Earline Anderson, Ph.D.,  L.P. [phone]                 3rd Floor, Clinic 3A  [place] Mail Code 74   Jayro Elias, Ph.D., A.B.P.P., L.P. [phone]     [address] Melissa Stokes, Ph.D., L.P. [phone]  [place] [id number]  [place] [address]    Health Psychology Follow-Up Note    Ashley Catherine is a 38-year-old single, Black woman referred initially  for psychological consultation for workup for a gastric sleeve procedure who is seen for CBT-oriented therapy regardingt weight management, work stresses, and family and social matters..      She thinks she is otherwise functioning mostly better than she had over the past few months due to her recent concussioand death of a friend.    She is willing to start walking/exercising more as her energy allows.  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  She is trying to make changes, e.g., decreasing snacking, to which she is most vulnerable when doing  Double shifts.  We discussed coffee drings (lattee with syrup and almond mild) and explored ways of decreasing calories via drinks.     She began Saxenda last week per Dr. Justice.  She feels mostly over her concussion from July.    Wt Readings from Last 4 Encounters:   10/23/18 97.1 kg (214 lb)   10/08/18 97.8 kg (215 lb 8 oz)   10/04/18 98.9 kg (218 lb 1.6 oz)   09/15/18 96.6 kg (213 lb)     Body mass index is 30.71 kg/(m^2).     Current Outpatient Prescriptions   Medication     Biotin 10 MG TABS tablet     Calcium Carbonate-Vitamin D (CALCIUM + D PO)     cetirizine (ZYRTEC) 10 MG tablet     Cholecalciferol (VITAMIN D) 2000 UNITS CAPS     Cyanocobalamin (CVS B-12) 5000 MCG SUBL     cyclobenzaprine (FLEXERIL) 10 MG tablet     Diphenhydramine-APAP, sleep, (TYLENOL  PM EXTRA STRENGTH PO)     ferrous sulfate (IRON) 325 (65 FE) MG tablet     hydrochlorothiazide (HYDRODIURIL) 25 MG tablet     insulin pen needle 31G X 5 MM     Liraglutide -Weight Management (SAXENDA) 18 MG/3ML pen     MELATONIN PO     metroNIDAZOLE (METROGEL) 0.75 % vaginal gel     Multiple Vitamin (MULTIVITAMINS PO)     naltrexone-bupropion (CONTRAVE) 8-90 MG per 12 hr tablet     omeprazole (PRILOSEC) 40 MG capsule     polyethylene glycol (MIRALAX) powder     No current facility-administered medications for this visit.          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14    hospitalized at [facility] x 1 week     History of steroid therapy      HX ABNL PAP SMEAR OF CERVIX   1995    LEEP AT 15 yo     Hypertension      Hypertrophy of breast      Hypertrophy of tonsils alone      Hypovitaminosis D     with secondary high PTH     Irregular heart beat     palpitations     LBP (low back pain)      LSIL (low grade squamous intraepithelial lesion) on Pap smear 5/2006     Lumbago     RESOLVED     Major depressive disorder, recurrent episode, in partial or unspecified remission 4/1/2013     Morbid obesity (H)     bariatric surgery 5/13/2013     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)      OLIGOMENORRHEA, C/W PCOS      Sciatica      SLEEP APNEA 6/2002    No longer has since tonsillectomy and septoplasty     Past Surgical History:   Procedure Laterality Date     BIOPSY  03/13/2015    Thyroid nodule     ESOPHAGOSCOPY, GASTROSCOPY, DUODENOSCOPY (EGD), COMBINED  5/28/2013    Procedure: COMBINED ESOPHAGOSCOPY, GASTROSCOPY, DUODENOSCOPY (EGD);;  Surgeon: Best Willett MD;  Location:  GI     ESOPHAGOSCOPY, GASTROSCOPY, DUODENOSCOPY (EGD), COMBINED N/A 6/13/2018    Procedure: COMBINED ESOPHAGOSCOPY,  GASTROSCOPY, DUODENOSCOPY (EGD), BIOPSY SINGLE OR MULTIPLE;  gastroscopy;  Surgeon: Westley Gibbs MD;  Location:  GI     LAPAROSCOPIC GASTRIC SLEEVE  5/13/2013    Procedure: LAPAROSCOPIC GASTRIC SLEEVE;  Laparoscopic Sleeve Gastrectomy ;  Surgeon: Best Willett MD;  [facility]: [facility]     LEEP TX, CERVICAL  1995    age 15     SEPTOPLASTY       THYROIDECTOMY Left 4/3/2018    Procedure: THYROIDECTOMY;  Left Thyroid Lobectomy And Ishtmusectomy;  Surgeon: Delia Harper MD;  [facility]     TONSILLECTOMY  age 20s     TONSILLECTOMY  2004?     wisdom teeth extraction           PHQ-9 SCORE 11/9/2017 5/10/2018 9/15/2018   Total Score - - -   Total Score MyChart - - 2 (Minimal depression)   Total Score 8 7 2     MAXIMO-7 SCORE 9/28/2017 11/9/2017 9/15/2018   Total Score - - -   Total Score - - 3 (minimal anxiety)   Total Score 1 12 3     WHODAS 2.0 Total Score 8/1/2017 9/26/2017   Total Score - 14   Total Score MyChart Incomplete 14       She is  5 foot 11 inches.  Her goal is 175.   She is currently in the obese range.  Her mood is a bit better today.  We discussed health, relationship, communication and other issues.     She participates fully. Rapport is excellent.   Extended session due to complexity of case and length of interval.    Time in:  10:02  Time out:  10:56    Diagnosis:   Psychological factors affecting obesity (F54).   Major depression, recurrent, mild (F33.0).   Occupational Problems (Z56.9)    PLAN/RECOMMENDATIONS:   1. Ms. Catherine will return 12/4 @ 10:00 to address weight loss and social issues with problem solving therapy. .  2.  Resume schedule of regular exercise and  decrease grazing eating and snacking.      Tx plan due 6/27/2019

## 2018-10-23 NOTE — MR AVS SNAPSHOT
After Visit Summary   10/23/2018    Ashley Catherine    MRN: [id number]           Patient Information     Date Of Birth          1980        Visit Information        Provider Department      10/23/2018 10:00 AM Jayro Elias, PhD [facility]        Today's Diagnoses     Psychological factors affecting morbid obesity (H)    -  1    Major depressive disorder, recurrent episode, mild (H)           Follow-ups after your visit        Your next 10 appointments already scheduled     Nov 14, 2018  9:40 AM CST   (Arrive by 9:25 AM)   Return Bariatric Visit with Best Willett MD   [facility] ([facility])    [address] [facility] [address] [phone]            Nov 15, 2018  8:00 AM CST   Concussion Treatment with Ashley Aguayo, PT   [facility] ([facility])    [address]  [facility] [address]   [phone]            Dec 03, 2018  3:40 PM CST   (Arrive by 3:25 PM)   Return Weight Management Visit with Hector Justice MD   [facility] ([facility])    [address] [id number] [phone]              Who to contact     Please call your clinic at [phone] to:    Ask questions about your health    Make or cancel appointments    Discuss your medicines    Learn about your test results    Speak to your doctor            Additional Information About Your Visit        [employer] Information     [employer] gives you secure access to your electronic health record. If you see a primary care provider, you can also send messages to your care team and make appointments. If you have questions, please call your primary care clinic.  If you do not have a primary care provider, please call [phone] and they will assist you.      [employer] is an electronic gateway that provides easy,  online access to your medical records. With [employer], you can request a clinic appointment, read your test results, renew a prescription or communicate with your care team.     To access your existing account, please contact your [facility] or call [phone] for assistance.        Care EveryWhere ID     This is your Care EveryWhere ID. This could be used by other organizations to access your [place] medical records  [id number]        Your Vitals Were     Last Period BMI (Body Mass Index)                09/28/2018 (Exact Date) 30.71 kg/m2           Blood Pressure from Last 3 Encounters:   10/08/18 (!) 135/96   10/04/18 129/89   09/15/18 (!) 146/95    Weight from Last 3 Encounters:   10/23/18 97.1 kg (214 lb)   10/08/18 97.8 kg (215 lb 8 oz)   10/04/18 98.9 kg (218 lb 1.6 oz)              Today, you had the following     No orders found for display       Primary Care Provider Office Phone # Fax #    Violet Humphreys [phone]       [address] [address]        Equal Access to Services     [facility] Center[facility] : Hadii aad ku hadasho Soselvinali, waaxda luqadaha, qaybta kaalmada adeegyada, acrmen weston. So [facility] [phone].    ATENCIÓN: Si habla español, tiene a gibbons disposición servicios gratuitos de asistencia lingüística. Llame al [phone].    We comply with applicable federal civil rights laws and [place] laws. We do not discriminate on the basis of race, color, national origin, age, disability, sex, sexual orientation, or gender identity.            Thank you!     Thank you for choosing [facility]  for your care. Our goal is always to provide you with excellent care. Hearing back from our patients is one way we can continue to improve our services. Please take a few minutes to complete the written survey that you may receive in the mail after your visit with us. Thank you!             Your  Updated Medication List - Protect others around you: Learn how to safely use, store and throw away your medicines at www.disposemymeds.org.          This list is accurate as of 10/23/18 11:01 AM.  Always use your most recent med list.                   Brand Name Dispense Instructions for use Diagnosis    Biotin 10 MG Tabs tablet      Take 10,000 mcg by mouth daily        CALCIUM + D PO      Take by mouth daily        cetirizine 10 MG tablet    zyrTEC     Take 10 mg by mouth daily as needed for allergies        CONTRAVE 8-90 MG per 12 hr tablet   Generic drug:  naltrexone-bupropion           CVS B-12 5000 MCG Subl   Generic drug:  Cyanocobalamin       Multiple thyroid nodules       cyclobenzaprine 10 MG tablet    FLEXERIL    30 tablet    TAKE ONE-HALF TABLET BY MOUTH TWICE A DAY AS NEEDED FOR MUSCLE SPASMS    Sciatica, unspecified laterality       ferrous sulfate 325 (65 Fe) MG tablet    IRON     Take 325 mg by mouth daily (with breakfast)        hydrochlorothiazide 25 MG tablet    HYDRODIURIL    90 tablet    TAKE ONE TABLET BY MOUTH EVERY DAY    Essential hypertension with goal blood pressure less than 140/90       insulin pen needle 31G X 5 MM     100 each    Use 1 pen needles daily or as directed.    Obesity (BMI 30-39.9)       liraglutide - Weight Management 18 MG/3ML pen    SAXENDA    15 mL    Inject 3 mg Subcutaneous daily    Obesity (BMI 30-39.9)       MELATONIN PO      Take 3 mg by mouth        metroNIDAZOLE 0.75 % vaginal gel    METROGEL    70 g    USE TWICE WEEKLY TO VAGINA FOR RECURRENT BV SYMPTOMS. DO THIS FOR 3 MONTHS AFTER FINISHING ORAL MEDICATIONS.    Abnormal vaginal fluids       MULTIVITAMINS PO      Take by mouth daily        omeprazole 40 MG capsule    priLOSEC    90 capsule    Take 1 capsule (40 mg) by mouth daily Take 30-60 minutes before a meal.    Gastroesophageal reflux disease with esophagitis       polyethylene glycol powder    MIRALAX    510 g    Take 17 g (1 capful) by mouth daily as  needed for constipation    Constipation, unspecified constipation type       TYLENOL PM EXTRA STRENGTH PO           vitamin D 2000 units Caps

## 2018-11-08 ENCOUNTER — OFFICE VISIT (OUTPATIENT)
Dept: FAMILY MEDICINE | Facility: CLINIC | Age: 38
End: 2018-11-08
Payer: COMMERCIAL

## 2018-11-08 VITALS
RESPIRATION RATE: 18 BRPM | DIASTOLIC BLOOD PRESSURE: 88 MMHG | SYSTOLIC BLOOD PRESSURE: 140 MMHG | TEMPERATURE: 98.1 F | BODY MASS INDEX: 31.32 KG/M2 | WEIGHT: 218.25 LBS | OXYGEN SATURATION: 100 % | HEART RATE: 79 BPM

## 2018-11-08 DIAGNOSIS — Z23 NEEDS FLU SHOT: ICD-10-CM

## 2018-11-08 DIAGNOSIS — N91.2 AMENORRHEA: Primary | ICD-10-CM

## 2018-11-08 DIAGNOSIS — Z32.01 PREGNANCY EXAMINATION OR TEST, POSITIVE RESULT: ICD-10-CM

## 2018-11-08 DIAGNOSIS — I10 BENIGN ESSENTIAL HYPERTENSION: ICD-10-CM

## 2018-11-08 LAB — BETA HCG QUAL IFA URINE: POSITIVE

## 2018-11-08 PROCEDURE — 84703 CHORIONIC GONADOTROPIN ASSAY: CPT | Performed by: FAMILY MEDICINE

## 2018-11-08 PROCEDURE — 90686 IIV4 VACC NO PRSV 0.5 ML IM: CPT | Performed by: FAMILY MEDICINE

## 2018-11-08 PROCEDURE — 90471 IMMUNIZATION ADMIN: CPT | Performed by: FAMILY MEDICINE

## 2018-11-08 PROCEDURE — 99214 OFFICE O/P EST MOD 30 MIN: CPT | Mod: 25 | Performed by: FAMILY MEDICINE

## 2018-11-08 RX ORDER — LABETALOL 100 MG/1
100 TABLET, FILM COATED ORAL 2 TIMES DAILY
Qty: 180 TABLET | Refills: 3 | Status: ON HOLD | OUTPATIENT
Start: 2018-11-08 | End: 2019-07-07

## 2018-11-08 ASSESSMENT — PAIN SCALES - GENERAL: PAINLEVEL: NO PAIN (0)

## 2018-11-08 NOTE — PROGRESS NOTES
SUBJECTIVE:   Ashley Catherine is a 38 year old female who presents to clinic today for the following health issues:      Pregnancy confirmation   Patients last LMP 09/25/2018  Patient mentions having some discharge for the first couple days beginning of menses and presently no discharge  No pain no spotting    All.MARIANNE Willingham  Provider notes: Patient is here with 2+ pregnancy tests at home.  This was an unplanned pregnancy.    Her last period was September 28 and this would put her at 5 weeks and 6 days today.  It was a slightly shorter period than normal and we discussed having her go for an ultrasound.  We did discuss options for going forward and she would like to proceed with continued pregnancy.  We did review her symptoms she has a little bit of heartburn but has this even prepregnancy associated with gastric bypass surgery.  She has been using omeprazole intermittently but has not tried Zantac or ranitidine.  She is also on multiple additional vitamins which should be safe to continue but we discussed starting a prenatal vitamin as well.  She did have recent labs and she is reducing the dose of her B12 intake as a result of slightly high levels.  Would like to recheck this in the next 6 weeks.    She also has a history of hypertension preceding pregnancy.  She is been controlled on hydrochlorothiazide.  Although there is some data that shows that this might be okay to continue the risk of fluid shifts and hypotension could outweigh the benefits especially in this medicine that does not have as much data or pregnancy related studies behind it.  We talked about looking into alternatives.  She is never been on other medications.  She denies any headaches or swelling of her legs.    Additionally she has been using as needed muscle relaxants for low back pain.  We discussed and reviewed that this is something she could use intermittently although I would prefer that she try something like Tylenol and supportive  cares initially.    She has insurance through [employer].  She will look into where she should get prenatal care based on insurance.        Problem list and histories reviewed & adjusted, as indicated.  Additional history: as documented    Patient Active Problem List   Diagnosis     OLIGOMENORRHEA, C/W PCOS     HX ABNL PAP SMEAR OF CERVIX       Flat feet     Sciatica     S/P gastrectomy     Occupational problem     Elevated parathyroid hormone     Multiple thyroid nodules     Abnormal thyroid cytology-follicular neoplasm     Chronic pain syndrome     Major depressive disorder, recurrent episode, in full remission (H)     Bilateral low back pain with sciatica, sciatica laterality unspecified     Throat symptom     Psychological factor affecting physical condition     Hx of Guillain-[place] syndrome     Adjustment disorder with anxious mood     Thyroid nodule     Benign essential hypertension     Concussion without loss of consciousness, subsequent encounter     Gastric bypass status for obesity     Pregnancy examination or test, positive result     Past Surgical History:   Procedure Laterality Date     BIOPSY  03/13/2015    Thyroid nodule     ESOPHAGOSCOPY, GASTROSCOPY, DUODENOSCOPY (EGD), COMBINED  5/28/2013    Procedure: COMBINED ESOPHAGOSCOPY, GASTROSCOPY, DUODENOSCOPY (EGD);;  Surgeon: Best Willett MD;  Location:  GI     ESOPHAGOSCOPY, GASTROSCOPY, DUODENOSCOPY (EGD), COMBINED N/A 6/13/2018    Procedure: COMBINED ESOPHAGOSCOPY, GASTROSCOPY, DUODENOSCOPY (EGD), BIOPSY SINGLE OR MULTIPLE;  gastroscopy;  Surgeon: Westley Gibbs MD;  Location: [facility]     LAPAROSCOPIC GASTRIC SLEEVE  5/13/2013    Procedure: LAPAROSCOPIC GASTRIC SLEEVE;  Laparoscopic Sleeve Gastrectomy ;  Surgeon: Best Willett MD;  Location:  OR     LEEP TX, CERVICAL  1995    age 15     SEPTOPLASTY       THYROIDECTOMY Left 4/3/2018    Procedure: THYROIDECTOMY;  Left Thyroid Lobectomy And Ishtmusectomy;   Surgeon: Delia Harper MD;  [facility]     TONSILLECTOMY  age 20s     TONSILLECTOMY  2004?     wisdom teeth extraction         Social History   Substance Use Topics     Smoking status: Never Smoker     Smokeless tobacco: Never Used     Alcohol use 2.4 - 3.0 oz/week      Comment: 3 drinks/week     Family History   Problem Relation Age of Onset     Hypertension Sister      Hypertension Father      Allergies Father      seasonal     Lipids Father      Obesity Father      Arthritis Mother      Gynecology Mother      problems with menopause     Hypertension Mother      Other Cancer Mother      Endometrial     Osteoporosis Mother      Obesity Mother      Parathyroid Disorders Mother      3 operations     Obesity Sister      Diabetes Maternal Aunt      x several w. DM     Breast Cancer Maternal Aunt      Cancer - colorectal Maternal Uncle      60ish     Hypertension Sister      Obesity Sister      Nephrolithiasis No family hx of          Current Outpatient Prescriptions   Medication Sig Dispense Refill     Cholecalciferol (VITAMIN D) 2000 UNITS CAPS        Cyanocobalamin (CVS B-12) 5000 MCG SUBL        ferrous sulfate (IRON) 325 (65 FE) MG tablet Take 325 mg by mouth daily (with breakfast)       labetalol (NORMODYNE) 100 MG tablet Take 1 tablet (100 mg) by mouth 2 times daily 180 tablet 3     Multiple Vitamin (MULTIVITAMINS PO) Take by mouth daily       omeprazole (PRILOSEC) 40 MG capsule Take 1 capsule (40 mg) by mouth daily Take 30-60 minutes before a meal. 90 capsule 3     Biotin 10 MG TABS tablet Take 10,000 mcg by mouth daily       cetirizine (ZYRTEC) 10 MG tablet Take 10 mg by mouth daily as needed for allergies       cyclobenzaprine (FLEXERIL) 10 MG tablet TAKE ONE-HALF TABLET BY MOUTH TWICE A DAY AS NEEDED FOR MUSCLE SPASMS (Patient not taking: Reported on 11/8/2018) 30 tablet 5     insulin pen needle 31G X 5 MM Use 1 pen needles daily or as directed. (Patient not taking: Reported on 11/8/2018) 100  each 3     MELATONIN PO Take 3 mg by mouth       metroNIDAZOLE (METROGEL) 0.75 % vaginal gel USE TWICE WEEKLY TO VAGINA FOR RECURRENT BV SYMPTOMS. DO THIS FOR 3 MONTHS AFTER FINISHING ORAL MEDICATIONS. (Patient not taking: Reported on 11/8/2018) 70 g 3     polyethylene glycol (MIRALAX) powder Take 17 g (1 capful) by mouth daily as needed for constipation (Patient not taking: Reported on 11/8/2018) 510 g 1       Reviewed and updated as needed this visit by clinical staff       Reviewed and updated as needed this visit by Provider         ROS:  Constitutional, HEENT, cardiovascular, pulmonary, gi and gu systems are negative, except as otherwise noted.    OBJECTIVE:     /88 (BP Location: Right arm, Patient Position: Sitting, Cuff Size: Adult Large)  Pulse 79  Temp 98.1  F (36.7  C) (Oral)  Resp 18  Wt 218 lb 4 oz (99 kg)  LMP 09/28/2018  SpO2 100%  BMI 31.32 kg/m2  Body mass index is 31.32 kg/(m^2).  GENERAL: healthy, alert and no distress    Diagnostic Test Results:  Results for orders placed or performed in visit on 11/08/18 (from the past 24 hour(s))   Beta HCG Qual, Urine - [facility] [place] ([id number])   Result Value Ref Range    Beta HCG Qual IFA Urine Positive (A) NEG^Negative          ASSESSMENT/PLAN:   (N91.2) Amenorrhea  (primary encounter diagnosis)  Comment:    Plan: DEPRESSION ACTION PLAN (DAP), Beta HCG Qual,         Urine - [facility] [place] ([id number])            (Z32.01) Pregnancy examination or test, positive result  Comment: Pregnancy test positive today and we discussed early pregnancy symptoms signs and management  Reviewed comanagement with our midwife team at [place] also reviewed that she may have better benefits if she works through the [employer] or [employer] system.  We encouraged an ultrasound just because of a slightly irregular last.  And follow-up at 9-10 weeks for her first OB visit  We discussed in detail medications and their effect on pregnancy see notes  above  Plan: US OB < 14 Weeks Single            (Z23) Needs flu shot  Comment:   Plan: HC FLU VAC PRESRV FREE QUAD SPLIT VIR 3+YRS IM,        ADMIN 1st VACCINE            (I10) Benign essential hypertension  Comment: Will transition her to labetalol and recheck blood pressure in a month  Plan: labetalol (NORMODYNE) 100 MG tablet           1 month and for First OB Visit     Total time was over 25 minutes with >50% spent in counseling     Violet Humphreys MD  [facility]    Injectable Influenza Immunization Documentation    1.  Is the person to be vaccinated sick today?   No    2. Does the person to be vaccinated have an allergy to a component   of the vaccine?   No  Egg Allergy Algorithm Link    3. Has the person to be vaccinated ever had a serious reaction   to influenza vaccine in the past?   No    4. Has the person to be vaccinated ever had Guillain-Barré syndrome?   No    Form completed by All.MARIANNE Willingham

## 2018-11-08 NOTE — NURSING NOTE
"Chief Complaint   Patient presents with     Pregnancy Test     /88 (BP Location: Right arm, Patient Position: Sitting, Cuff Size: Adult Large)  Pulse 79  Temp 98.1  F (36.7  C) (Oral)  Resp 18  Wt 218 lb 4 oz (99 kg)  LMP 09/28/2018  SpO2 100%  BMI 31.32 kg/m2 Estimated body mass index is 31.32 kg/(m^2) as calculated from the following:    Height as of 10/8/18: 5' 10\" (1.778 m).    Weight as of this encounter: 218 lb 4 oz (99 kg).  bp completed using cuff size: large      [place].MARIANNE Willingham              "

## 2018-11-08 NOTE — MR AVS SNAPSHOT
After Visit Summary   11/8/2018    Ashley Catherine    MRN: [id number]           Patient Information     Date Of Birth          1980        Visit Information        Provider Department      11/8/2018 8:00 AM Violet Humphreys MD [facility]        Today's Diagnoses     Amenorrhea    -  1    Pregnancy examination or test, positive result        Needs flu shot        Benign essential hypertension           Follow-ups after your visit        Your next 10 appointments already scheduled     Nov 12, 2018  4:15 PM CST   MYCHART TRAVEL EDUCATION with CECI Dave [facility] ([facility]) ([facility])    [address]  [address]  [facility] [id number] [phone]            Nov 14, 2018  9:40 AM CST   (Arrive by 9:25 AM)   Return Bariatric Visit with Best Willett MD   [facility] ([facility])    [address]  4th Floor  [facility] [address]   [phone]            Dec 03, 2018  3:40 PM CST   (Arrive by 3:25 PM)   Return Weight Management Visit with Hector Justice MD   [facility] ([facility])    [address]  4th Floor  [facility] [address]   [phone]            Dec 04, 2018 10:00 AM CST   (Arrive by 9:45 AM)   Return Visit with Jayro Elias, PhD [facility] ([facility])    [address]  3rd Floor  [facility] [id number] [phone]              Future tests that were ordered for you today     Open Future Orders        Priority Expected Expires Ordered    US OB < 14 Weeks Single Routine  11/8/2019 11/8/2018            Who to contact     If you have questions or need follow up information about today's clinic visit or your schedule please contact [facility] directly at [phone].  Normal or non-critical lab and imaging results will be  communicated to you by [facility], letter or phone within 4 business days after the clinic has received the results. If you do not hear from us within 7 days, please contact the clinic through [employer] or phone. If you have a critical or abnormal lab result, we will notify you by phone as soon as possible.  Submit refill requests through [employer] or call your pharmacy and they will forward the refill request to us. Please allow 3 business days for your refill to be completed.          Additional Information About Your Visit        [employer] Information     [employer] gives you secure access to your electronic health record. If you see a primary care provider, you can also send messages to your care team and make appointments. If you have questions, please call your primary care clinic.  If you do not have a primary care provider, please call [phone] and they will assist you.        Care EveryWhere ID     This is your Care EveryWhere ID. This could be used by other organizations to access your [place] medical records  [id number]        Your Vitals Were     Pulse Temperature Respirations Last Period Pulse Oximetry BMI (Body Mass Index)    79 98.1  F (36.7  C) (Oral) 18 09/28/2018 100% 31.32 kg/m2       Blood Pressure from Last 3 Encounters:   11/08/18 140/88   10/08/18 (!) 135/96   10/04/18 129/89    Weight from Last 3 Encounters:   11/08/18 218 lb 4 oz (99 kg)   10/23/18 214 lb (97.1 kg)   10/08/18 215 lb 8 oz (97.8 kg)              We Performed the Following     ADMIN 1st VACCINE     Beta HCG Qual, Urine - [facility] and [place] ([id number])     DEPRESSION ACTION PLAN (DAP)     HC FLU VAC PRESRV FREE QUAD SPLIT VIR 3+YRS IM          Today's Medication Changes          These changes are accurate as of 11/8/18 12:45 PM.  If you have any questions, ask your nurse or doctor.               Start taking these medicines.        Dose/Directions    labetalol 100 MG tablet   Commonly known as:  NORMODYNE   Used for:  Benign essential  hypertension   Started by:  Violet Humphreys MD        Dose:  100 mg   Take 1 tablet (100 mg) by mouth 2 times daily   Quantity:  180 tablet   Refills:  3            Where to get your medicines      These medications were sent to [facility], [place] - [address], [address]  [address], [address], [place] [address]     Phone:  [phone]     labetalol 100 MG tablet                Primary Care Provider Office Phone # Fax #    Violet Humphreys [phone] [address]  [facility] [address]        Equal Access to Services     [facility] Hospital[facility]AH : Hadii sabas Alejandre, waaxda lawrence, qaybta kaalmada akanksha, carmen weston. So [facility] [phone].    ATENCIÓN: Si habla español, tiene a gibbons disposición servicios gratuitos de asistencia lingüística. Llame al [phone].    We comply with applicable federal civil rights laws and [place] laws. We do not discriminate on the basis of race, color, national origin, age, disability, sex, sexual orientation, or gender identity.            Thank you!     Thank you for choosing [facility]  for your care. Our goal is always to provide you with excellent care. Hearing back from our patients is one way we can continue to improve our services. Please take a few minutes to complete the written survey that you may receive in the mail after your visit with us. Thank you!             Your Updated Medication List - Protect others around you: Learn how to safely use, store and throw away your medicines at www.disposemymeds.org.          This list is accurate as of 11/8/18 12:45 PM.  Always use your most recent med list.                   Brand Name Dispense Instructions for use Diagnosis    Biotin 10 MG Tabs tablet      Take 10,000 mcg by mouth daily        cetirizine 10 MG tablet    zyrTEC     Take 10 mg by mouth daily as needed for allergies        [employer]S B-12  5000 MCG Subl   Generic drug:  Cyanocobalamin       Multiple thyroid nodules       cyclobenzaprine 10 MG tablet    FLEXERIL    30 tablet    TAKE ONE-HALF TABLET BY MOUTH TWICE A DAY AS NEEDED FOR MUSCLE SPASMS    Sciatica, unspecified laterality       ferrous sulfate 325 (65 Fe) MG tablet    IRON     Take 325 mg by mouth daily (with breakfast)        insulin pen needle 31G X 5 MM     100 each    Use 1 pen needles daily or as directed.    Obesity (BMI 30-39.9)       labetalol 100 MG tablet    NORMODYNE    180 tablet    Take 1 tablet (100 mg) by mouth 2 times daily    Benign essential hypertension       MELATONIN PO      Take 3 mg by mouth        metroNIDAZOLE 0.75 % vaginal gel    METROGEL    70 g    USE TWICE WEEKLY TO VAGINA FOR RECURRENT BV SYMPTOMS. DO THIS FOR 3 MONTHS AFTER FINISHING ORAL MEDICATIONS.    Abnormal vaginal fluids       MULTIVITAMINS PO      Take by mouth daily        omeprazole 40 MG capsule    priLOSEC    90 capsule    Take 1 capsule (40 mg) by mouth daily Take 30-60 minutes before a meal.    Gastroesophageal reflux disease with esophagitis       polyethylene glycol powder    MIRALAX    510 g    Take 17 g (1 capful) by mouth daily as needed for constipation    Constipation, unspecified constipation type       vitamin D 2000 units Caps

## 2018-11-09 ENCOUNTER — MYC MEDICAL ADVICE (OUTPATIENT)
Dept: FAMILY MEDICINE | Facility: CLINIC | Age: 38
End: 2018-11-09

## 2018-11-13 ENCOUNTER — RADIANT APPOINTMENT (OUTPATIENT)
Dept: ULTRASOUND IMAGING | Facility: CLINIC | Age: 38
End: 2018-11-13
Attending: FAMILY MEDICINE
Payer: COMMERCIAL

## 2018-11-13 DIAGNOSIS — Z32.01 PREGNANCY EXAMINATION OR TEST, POSITIVE RESULT: ICD-10-CM

## 2018-11-13 PROCEDURE — 76815 OB US LIMITED FETUS(S): CPT | Performed by: OBSTETRICS & GYNECOLOGY

## 2018-11-14 ENCOUNTER — OFFICE VISIT (OUTPATIENT)
Dept: SURGERY | Facility: CLINIC | Age: 38
End: 2018-11-14
Payer: COMMERCIAL

## 2018-11-14 VITALS
TEMPERATURE: 98.6 F | OXYGEN SATURATION: 100 % | HEART RATE: 88 BPM | DIASTOLIC BLOOD PRESSURE: 80 MMHG | WEIGHT: 223.2 LBS | BODY MASS INDEX: 31.95 KG/M2 | HEIGHT: 70 IN | SYSTOLIC BLOOD PRESSURE: 134 MMHG

## 2018-11-14 DIAGNOSIS — Z98.84 BARIATRIC SURGERY STATUS: Primary | ICD-10-CM

## 2018-11-14 DIAGNOSIS — Z3A.08 8 WEEKS GESTATION OF PREGNANCY: ICD-10-CM

## 2018-11-14 DIAGNOSIS — Z98.84 BARIATRIC SURGERY STATUS: ICD-10-CM

## 2018-11-14 LAB
ALBUMIN SERPL-MCNC: 3.2 G/DL (ref 3.4–5)
ALP SERPL-CCNC: 49 U/L (ref 40–150)
ALT SERPL W P-5'-P-CCNC: 23 U/L (ref 0–50)
ANION GAP SERPL CALCULATED.3IONS-SCNC: 5 MMOL/L (ref 3–14)
AST SERPL W P-5'-P-CCNC: 16 U/L (ref 0–45)
BILIRUB SERPL-MCNC: 0.4 MG/DL (ref 0.2–1.3)
BUN SERPL-MCNC: 5 MG/DL (ref 7–30)
CALCIUM SERPL-MCNC: 8.4 MG/DL (ref 8.5–10.1)
CHLORIDE SERPL-SCNC: 106 MMOL/L (ref 94–109)
CO2 SERPL-SCNC: 27 MMOL/L (ref 20–32)
CREAT SERPL-MCNC: 0.62 MG/DL (ref 0.52–1.04)
DEPRECATED CALCIDIOL+CALCIFEROL SERPL-MC: 42 UG/L (ref 20–75)
ERYTHROCYTE [DISTWIDTH] IN BLOOD BY AUTOMATED COUNT: 12.6 % (ref 10–15)
GFR SERPL CREATININE-BSD FRML MDRD: >90 ML/MIN/1.7M2
GLUCOSE SERPL-MCNC: 77 MG/DL (ref 70–99)
HBA1C MFR BLD: 5 % (ref 0–5.6)
HCT VFR BLD AUTO: 33.6 % (ref 35–47)
HGB BLD-MCNC: 11.2 G/DL (ref 11.7–15.7)
MCH RBC QN AUTO: 31.3 PG (ref 26.5–33)
MCHC RBC AUTO-ENTMCNC: 33.3 G/DL (ref 31.5–36.5)
MCV RBC AUTO: 94 FL (ref 78–100)
PLATELET # BLD AUTO: 281 10E9/L (ref 150–450)
POTASSIUM SERPL-SCNC: 3.9 MMOL/L (ref 3.4–5.3)
PROT SERPL-MCNC: 6.7 G/DL (ref 6.8–8.8)
PTH-INTACT SERPL-MCNC: 58 PG/ML (ref 18–80)
RBC # BLD AUTO: 3.58 10E12/L (ref 3.8–5.2)
SODIUM SERPL-SCNC: 138 MMOL/L (ref 133–144)
VIT B12 SERPL-MCNC: 1103 PG/ML (ref 193–986)
WBC # BLD AUTO: 5.5 10E9/L (ref 4–11)

## 2018-11-14 NOTE — LETTER
2018       RE: Ashley Catherine  [address]  [facility] [address]     Dear Colleague,    Thank you for referring your patient, Ashley Catherine, to the [facility] SURGICAL WEIGHT MANAGEMENT at [facility]. Please see a copy of my visit note below.        Return Bariatric Surgery Note    RE: Ashley Catherine  MR#: [id number]  : 1980  VISIT DATE: 2018    Dear Juliann, Violet Agrawal,    I had the pleasure of seeing your patient, Ashley Catherine, in my post-bariatric surgery assessment clinic.    CHIEF COMPLAINT: Post-bariatric surgery follow-up    HISTORY OF PRESENT ILLNESS:  Questions Regarding Prior Weight Loss Surgery Reviewed With Patient 2017   I had the following weight loss procedure: Sleeve Gastrectomy   What year was your surgery? 2013   How has your weight changed since your last visit? I have gained weight   Are you currently taking any weight loss medications? Yes   Do you currently have any of the following: Heartburn, acid reflux, or GERD (acid reflux disease)?   Have you been to the Emergency room since your last visit with us? Yes   Were you in the hospital since your last visit with us? No   Do you have any concerns today? Acid reflux     5 y s/p LSG, gained 7 lbs from previous, as she is now ~8 weeks pregnant. Comes to ask about vitamin use.     Weight History:     2017   What is your highest lifetime weight? 284   What is your lowest weight since surgery? (In pounds) 184     Initial Weight: 124.6 kg (274 lb 12.8 oz)  Current Weight: Weight: 101.2 kg (223 lb 3.2 oz)  Cumulative weight loss (lbs): 51.6  Last Visits Weight: 97.8 kg (215 lb 11.2 oz)    Questions Regarding Co-Morbidities and Health Concerns Reviewed With Patient 2017   Pre-diabetes: Never   Diabetes II: Never   High Blood Pressure: Improved   High cholesterol: Never   Heartburn/Reflux: Never   Are you taking daily medication for heartburn, acid reflux, or GERD (acid reflux  disease)? No   Sleep apnea: Gone away   PCOS: Never   Back pain: Improved   Joint pain: Never   Lower leg swelling: Never       Eating Habits 6/1/2017   How many meals do you eat per day? 3   Do you snack between meals? Sometimes   How much food are you eating at each meal? 1/2 cup to 1 cup   Are you able to separate your meals and liquids by at least 30 minutes? Yes   Are you able to avoid liquid calories? Sometimes       Exercise Questions Reviewed With Patient 6/1/2017   How often do you exercise? 1 to 2 times per week   What is the duration of your exercise (in minutes)? 60+ Minutes   What types of exercise do you do? walking, gym membership, swimming, group fitness classes, weightlifting   What keeps you from being more active?  Lack of Time, Too tired       Social History:      6/1/2017   Are you smoking? No   Are you drinking alcohol? Yes   How much alcohol? 2-4       Medications:  Current Outpatient Prescriptions   Medication     Calcium-Vitamin D-Vitamin K 500-100-40 MG-UNT-MCG CHEW     cetirizine (ZYRTEC) 10 MG tablet     Cholecalciferol (VITAMIN D) 2000 UNITS CAPS     Cyanocobalamin (CVS B-12) 5000 MCG SUBL     ferrous sulfate (IRON) 325 (65 FE) MG tablet     labetalol (NORMODYNE) 100 MG tablet     Multiple Vitamin (MULTIVITAMINS PO)     polyethylene glycol (MIRALAX) powder     Biotin 10 MG TABS tablet     cyclobenzaprine (FLEXERIL) 10 MG tablet     insulin pen needle 31G X 5 MM     MELATONIN PO     metroNIDAZOLE (METROGEL) 0.75 % vaginal gel     omeprazole (PRILOSEC) 40 MG capsule     No current facility-administered medications for this visit.          6/1/2017   Do you avoid NSAIDs such as (Ibuprofen, Aleve, Naproxen, Advil)?   No       ROS:  GI:      6/1/2017   Vomiting: Yes   Diarrhea: No   Constipation: Yes   Swallowing trouble: No   Abdominal pain: No   Heartburn: No   Rash in skin folds: No   Depression: Yes   Stress urinary incontinence No     Skin:   BAR RBS ROS - SKIN 6/1/2017   Rash in skin  "folds: No     Psych:      6/1/2017   Depression: Yes   Anxiety: No     Female Only:   SANJAY RBS ROS - FEMALE ONLY 6/1/2017   Female only: Irregular menstrual cycles       LABS/IMAGING/MEDICAL RECORDS REVIEW: n/a    PHYSICAL EXAMINATION:  /80  Pulse 88  Temp 98.6  F (37  C) (Oral)  Ht 1.765 m (5' 9.5\")  Wt 101.2 kg (223 lb 3.2 oz)  LMP 09/28/2018  SpO2 100%  BMI 32.49 kg/m2   aaox3  rrr  ctab  abd s/nt/nd    ASSESSMENT AND PLAN:      1. 5 years status laparoscopic gastric sleeve  2. Morbid Obesity resolved- current BMI: Body mass index is 32.49 kg/(m^2).  3. Post surgical malabsorption:   Labs ordered per protocol-will add Zn and Cu level   Follow food plan per dietitian recommendations.   Continue taking recommended post-op vitamins. Recommend 2 prenatal vitamins  4. Return to clinic in 1y.    Pt seen and evaluated w Dr Willett.    Sincerely,    Elias Fofana MD    I spent a total of 25 minutes face to face with Ashley during today's office visit. Over 50% of this time was spent counseling the patient and/or coordinating care.    I saw and evaluated the patient. I agree with the findings and the plan of care as documented in the resident s note.    Best Willett MD            "

## 2018-11-14 NOTE — PROGRESS NOTES
Return Bariatric Surgery Note    RE: Ashley Catherine  MR#: [id number]  : 1980  VISIT DATE: 2018    Dear Juliann, Violet Agrawal,    I had the pleasure of seeing your patient, Ashley Catherine, in my post-bariatric surgery assessment clinic.    CHIEF COMPLAINT: Post-bariatric surgery follow-up    HISTORY OF PRESENT ILLNESS:  Questions Regarding Prior Weight Loss Surgery Reviewed With Patient 2017   I had the following weight loss procedure: Sleeve Gastrectomy   What year was your surgery? 2013   How has your weight changed since your last visit? I have gained weight   Are you currently taking any weight loss medications? Yes   Do you currently have any of the following: Heartburn, acid reflux, or GERD (acid reflux disease)?   Have you been to the Emergency room since your last visit with us? Yes   Were you in the hospital since your last visit with us? No   Do you have any concerns today? Acid reflux     5 y s/p LSG, gained 7 lbs from previous, as she is now ~8 weeks pregnant. Comes to ask about vitamin use.     Weight History:     2017   What is your highest lifetime weight? 284   What is your lowest weight since surgery? (In pounds) 184     Initial Weight: 124.6 kg (274 lb 12.8 oz)  Current Weight: Weight: 101.2 kg (223 lb 3.2 oz)  Cumulative weight loss (lbs): 51.6  Last Visits Weight: 97.8 kg (215 lb 11.2 oz)    Questions Regarding Co-Morbidities and Health Concerns Reviewed With Patient 2017   Pre-diabetes: Never   Diabetes II: Never   High Blood Pressure: Improved   High cholesterol: Never   Heartburn/Reflux: Never   Are you taking daily medication for heartburn, acid reflux, or GERD (acid reflux disease)? No   Sleep apnea: Gone away   PCOS: Never   Back pain: Improved   Joint pain: Never   Lower leg swelling: Never       Eating Habits 2017   How many meals do you eat per day? 3   Do you snack between meals? Sometimes   How much food are you eating at each meal? 1/2 cup to 1 cup   Are  you able to separate your meals and liquids by at least 30 minutes? Yes   Are you able to avoid liquid calories? Sometimes       Exercise Questions Reviewed With Patient 6/1/2017   How often do you exercise? 1 to 2 times per week   What is the duration of your exercise (in minutes)? 60+ Minutes   What types of exercise do you do? walking, gym membership, swimming, group fitness classes, weightlifting   What keeps you from being more active?  Lack of Time, Too tired       Social History:      6/1/2017   Are you smoking? No   Are you drinking alcohol? Yes   How much alcohol? 2-4       Medications:  Current Outpatient Prescriptions   Medication     Calcium-Vitamin D-Vitamin K 500-100-40 MG-UNT-MCG CHEW     cetirizine (ZYRTEC) 10 MG tablet     Cholecalciferol (VITAMIN D) 2000 UNITS CAPS     Cyanocobalamin (CVS B-12) 5000 MCG SUBL     ferrous sulfate (IRON) 325 (65 FE) MG tablet     labetalol (NORMODYNE) 100 MG tablet     Multiple Vitamin (MULTIVITAMINS PO)     polyethylene glycol (MIRALAX) powder     Biotin 10 MG TABS tablet     cyclobenzaprine (FLEXERIL) 10 MG tablet     insulin pen needle 31G X 5 MM     MELATONIN PO     metroNIDAZOLE (METROGEL) 0.75 % vaginal gel     omeprazole (PRILOSEC) 40 MG capsule     No current facility-administered medications for this visit.          6/1/2017   Do you avoid NSAIDs such as (Ibuprofen, Aleve, Naproxen, Advil)?   No       ROS:  GI:      6/1/2017   Vomiting: Yes   Diarrhea: No   Constipation: Yes   Swallowing trouble: No   Abdominal pain: No   Heartburn: No   Rash in skin folds: No   Depression: Yes   Stress urinary incontinence No     Skin:   BAR RBS ROS - SKIN 6/1/2017   Rash in skin folds: No     Psych:      6/1/2017   Depression: Yes   Anxiety: No     Female Only:   BAR RBS ROS - FEMALE ONLY 6/1/2017   Female only: Irregular menstrual cycles       LABS/IMAGING/MEDICAL RECORDS REVIEW: n/a    PHYSICAL EXAMINATION:  /80  Pulse 88  Temp 98.6  F (37  C) (Oral)  Ht 1.765 m  "(5' 9.5\")  Wt 101.2 kg (223 lb 3.2 oz)  LMP 09/28/2018  SpO2 100%  BMI 32.49 kg/m2   aaox3  rrr  ctab  abd s/nt/nd    ASSESSMENT AND PLAN:      1. 5 years status laparoscopic gastric sleeve  2. Morbid Obesity resolved- current BMI: Body mass index is 32.49 kg/(m^2).  3. Post surgical malabsorption:   Labs ordered per protocol-will add Zn and Cu level   Follow food plan per dietitian recommendations.   Continue taking recommended post-op vitamins. Recommend 2 prenatal vitamins  4. Return to clinic in 1y.    Pt seen and evaluated w Dr Willett.    Sincerely,    Elias Fofana MD    I spent a total of 25 minutes face to face with Ashley during today's office visit. Over 50% of this time was spent counseling the patient and/or coordinating care.    I saw and evaluated the patient. I agree with the findings and the plan of care as documented in the resident s note.    Best Willett MD    "

## 2018-11-14 NOTE — MR AVS SNAPSHOT
After Visit Summary   11/14/2018    Ashley Catherine    MRN: [id number]           Patient Information     Date Of Birth          1980        Visit Information        Provider Department      11/14/2018 9:40 AM Best Willett MD [facility] Weight Management        Today's Diagnoses     Bariatric surgery status    -  1    8 weeks gestation of pregnancy           Follow-ups after your visit        Your next 10 appointments already scheduled     Nov 14, 2018 11:15 AM CST   LAB with  LAB   [facility] Lab ([facility])    [address] [place] [address]  [address] [facility] [id number] [phone]           Please do not eat 10-12 hours before your appointment if you are coming in fasting for labs on lipids, cholesterol, or glucose (sugar). This does not apply to pregnant women. Water, hot tea and black coffee (with nothing added) are okay. Do not drink other fluids, diet soda or chew gum.            Nov 15, 2018 10:45 AM CST   MYCHART TRAVEL EDUCATION with CECI Dave [facility] ([facility])    [address]  [address]  [facility] [address] [phone]            Dec 03, 2018  3:40 PM CST   (Arrive by 3:25 PM)   Return Weight Management Visit with Hector Justice MD   [facility] ([facility])    [address] [place] [address]  [address] [facility] [id number] [phone]            Dec 04, 2018 10:00 AM CST   (Arrive by 9:45 AM)   Return Visit with Jayro Elias, PhD [facility] ([facility])    [address] [place] [address]  [address] [facility] [id number] [phone]              Who to contact     Please call your clinic at [phone] to:    Ask questions about your health    Make or cancel appointments    Discuss your medicines    Learn about your test results    Speak to your doctor            Additional  "Information About Your Visit        MyChart Information     [employer] gives you secure access to your electronic health record. If you see a primary care provider, you can also send messages to your care team and make appointments. If you have questions, please call your primary care clinic.  If you do not have a primary care provider, please call [phone] and they will assist you.      [employer] is an electronic gateway that provides easy, online access to your medical records. With [employer], you can request a clinic appointment, read your test results, renew a prescription or communicate with your care team.     To access your existing account, please contact your [facility] or call [phone] for assistance.        Care EveryWhere ID     This is your Care EveryWhere ID. This could be used by other organizations to access your [place] medical records  [id number]        Your Vitals Were     Pulse Temperature Height Last Period Pulse Oximetry BMI (Body Mass Index)    88 98.6  F (37  C) (Oral) 5' 9.5\" 09/28/2018 100% 32.49 kg/m2       Blood Pressure from Last 3 Encounters:   11/14/18 134/80   11/08/18 140/88   10/08/18 (!) 135/96    Weight from Last 3 Encounters:   11/14/18 223 lb 3.2 oz   11/08/18 218 lb 4 oz   10/08/18 215 lb 8 oz               Primary Care Provider Office Phone # Fax #    [place] [phone] [address] [address]        Equal Access to Services     MABEL BAILEY AH: Hadii aad ku hadasho Soomaali, waaxda luqadaha, qaybta kaalmada adeegyada, carmen weston. So [facility] [phone].    ATENCIÓN: Si habla español, tiene a gibbons disposición servicios gratuitos de asistencia lingüística. Llame al [phone].    We comply with applicable federal civil rights laws and [place] laws. We do not discriminate on the basis of race, color, national origin, age, disability, sex, sexual " orientation, or gender identity.            Thank you!     Thank you for choosing [facility] SURGICAL WEIGHT MANAGEMENT  for your care. Our goal is always to provide you with excellent care. Hearing back from our patients is one way we can continue to improve our services. Please take a few minutes to complete the written survey that you may receive in the mail after your visit with us. Thank you!             Your Updated Medication List - Protect others around you: Learn how to safely use, store and throw away your medicines at www.disposemymeds.org.          This list is accurate as of 11/14/18 11:13 AM.  Always use your most recent med list.                   Brand Name Dispense Instructions for use Diagnosis    Biotin 10 MG Tabs tablet      Take 10,000 mcg by mouth daily        Calcium-Vitamin D-Vitamin K 500-100-40 MG-UNT-MCG Chew           cetirizine 10 MG tablet    zyrTEC     Take 10 mg by mouth daily as needed for allergies        [employer]S B-12 5000 MCG Subl   Generic drug:  Cyanocobalamin       Multiple thyroid nodules       cyclobenzaprine 10 MG tablet    FLEXERIL    30 tablet    TAKE ONE-HALF TABLET BY MOUTH TWICE A DAY AS NEEDED FOR MUSCLE SPASMS    Sciatica, unspecified laterality       ferrous sulfate 325 (65 Fe) MG tablet    IRON     Take 325 mg by mouth daily (with breakfast)        insulin pen needle 31G X 5 MM     100 each    Use 1 pen needles daily or as directed.    Obesity (BMI 30-39.9)       labetalol 100 MG tablet    NORMODYNE    180 tablet    Take 1 tablet (100 mg) by mouth 2 times daily    Benign essential hypertension       MELATONIN PO      Take 3 mg by mouth        metroNIDAZOLE 0.75 % vaginal gel    METROGEL    70 g    USE TWICE WEEKLY TO VAGINA FOR RECURRENT BV SYMPTOMS. DO THIS FOR 3 MONTHS AFTER FINISHING ORAL MEDICATIONS.    Abnormal vaginal fluids       MULTIVITAMINS PO      Take by mouth daily        omeprazole 40 MG capsule    priLOSEC    90 capsule    Take 1 capsule (40 mg) by mouth  daily Take 30-60 minutes before a meal.    Gastroesophageal reflux disease with esophagitis       polyethylene glycol powder    MIRALAX    510 g    Take 17 g (1 capful) by mouth daily as needed for constipation    Constipation, unspecified constipation type       vitamin D 2000 units Caps

## 2018-11-14 NOTE — NURSING NOTE
"(   Chief Complaint   Patient presents with     RECHECK     RBS, S/P LSG 5/13/13    )    ( Weight: 223 lb 3.2 oz )  ( Height: 5' 9.5\" )  ( BMI (Calculated): 32.56 )  ( Initial Weight: 274 lb 12.8 oz )  ( Cumulative weight loss (lbs): 51.6 )  ( Last Visits Weight: 215 lb 11.2 oz )  ( Wt change since last visit (lbs): 7.5 )  (   )  (   )    ( BP: 134/80 )  (   )  ( Temp: 98.6  F (37  C) )  ( Temp src: Oral )  ( Pulse: 88 )  (   )  ( SpO2: 100 % )    (   Patient Active Problem List   Diagnosis     OLIGOMENORRHEA, C/W PCOS     HX ABNL PAP SMEAR OF CERVIX       Flat feet     Sciatica     S/P gastrectomy     Occupational problem     Elevated parathyroid hormone     Multiple thyroid nodules     Abnormal thyroid cytology-follicular neoplasm     Chronic pain syndrome     Major depressive disorder, recurrent episode, in full remission (H)     Bilateral low back pain with sciatica, sciatica laterality unspecified     Throat symptom     Psychological factor affecting physical condition     Hx of Guillain-[place] syndrome     Adjustment disorder with anxious mood     Thyroid nodule     Benign essential hypertension     Concussion without loss of consciousness, subsequent encounter     Gastric bypass status for obesity     Pregnancy examination or test, positive result    )  (   Current Outpatient Prescriptions   Medication Sig Dispense Refill     Calcium-Vitamin D-Vitamin K 500-100-40 MG-UNT-MCG CHEW        cetirizine (ZYRTEC) 10 MG tablet Take 10 mg by mouth daily as needed for allergies       Cholecalciferol (VITAMIN D) 2000 UNITS CAPS        Cyanocobalamin (CVS B-12) 5000 MCG SUBL        ferrous sulfate (IRON) 325 (65 FE) MG tablet Take 325 mg by mouth daily (with breakfast)       labetalol (NORMODYNE) 100 MG tablet Take 1 tablet (100 mg) by mouth 2 times daily 180 tablet 3     Multiple Vitamin (MULTIVITAMINS PO) Take by mouth daily       polyethylene glycol (MIRALAX) powder Take 17 g (1 capful) by mouth daily as needed for " constipation 510 g 1     Biotin 10 MG TABS tablet Take 10,000 mcg by mouth daily       cyclobenzaprine (FLEXERIL) 10 MG tablet TAKE ONE-HALF TABLET BY MOUTH TWICE A DAY AS NEEDED FOR MUSCLE SPASMS (Patient not taking: Reported on 11/8/2018) 30 tablet 5     insulin pen needle 31G X 5 MM Use 1 pen needles daily or as directed. (Patient not taking: Reported on 11/8/2018) 100 each 3     MELATONIN PO Take 3 mg by mouth       metroNIDAZOLE (METROGEL) 0.75 % vaginal gel USE TWICE WEEKLY TO VAGINA FOR RECURRENT BV SYMPTOMS. DO THIS FOR 3 MONTHS AFTER FINISHING ORAL MEDICATIONS. (Patient not taking: Reported on 11/8/2018) 70 g 3     omeprazole (PRILOSEC) 40 MG capsule Take 1 capsule (40 mg) by mouth daily Take 30-60 minutes before a meal. (Patient not taking: Reported on 11/14/2018) 90 capsule 3    )  ( Diabetes Eval:    )    ( Pain Eval:  Data Unavailable )    ( Wound Eval:       )    (   History   Smoking Status     Never Smoker   Smokeless Tobacco     Never Used    )    ( Signed By:  Liya Curran; November 14, 2018; 9:52 AM )

## 2018-11-15 ENCOUNTER — OFFICE VISIT (OUTPATIENT)
Dept: FAMILY MEDICINE | Facility: CLINIC | Age: 38
End: 2018-11-15
Payer: COMMERCIAL

## 2018-11-15 VITALS — SYSTOLIC BLOOD PRESSURE: 123 MMHG | TEMPERATURE: 98.7 F | DIASTOLIC BLOOD PRESSURE: 74 MMHG

## 2018-11-15 DIAGNOSIS — Z71.84 TRAVEL ADVICE ENCOUNTER: ICD-10-CM

## 2018-11-15 PROCEDURE — 99402 PREV MED CNSL INDIV APPRX 30: CPT | Mod: GA | Performed by: NURSE PRACTITIONER

## 2018-11-15 NOTE — MR AVS SNAPSHOT
After Visit Summary   11/15/2018    Ashley Catherine    MRN: [id number]           Patient Information     Date Of Birth          1980        Visit Information        Provider Department      11/15/2018 10:45 AM Karly Coats APRN [facility]        Today's Diagnoses     Travel advice encounter           Follow-ups after your visit        Your next 10 appointments already scheduled     Dec 03, 2018  3:40 PM CST   (Arrive by 3:25 PM)   Return Weight Management Visit with Hector Justice MD   [facility] ([facility])    [address]  [address] [facility] [id number] [phone]            Dec 04, 2018 10:00 AM CST   (Arrive by 9:45 AM)   Return Visit with Jayro Elias, PhD [facility] ([facility])    [address] [place] [address]  3rd [facility] [id number] [phone]              Who to contact     If you have questions or need follow up information about today's clinic visit or your schedule please contact [facility] directly at [phone].  Normal or non-critical lab and imaging results will be communicated to you by MyChart, letter or phone within 4 business days after the clinic has received the results. If you do not hear from us within 7 days, please contact the clinic through MyChart or phone. If you have a critical or abnormal lab result, we will notify you by phone as soon as possible.  Submit refill requests through [employer] or call your pharmacy and they will forward the refill request to us. Please allow 3 business days for your refill to be completed.          Additional Information About Your Visit        MyChart Information     [employer] gives you secure access to your electronic health record. If you see a primary care provider, you can also send messages to your care team and make appointments. If you have questions, please call your  primary care clinic.  If you do not have a primary care provider, please call [phone] and they will assist you.        Care EveryWhere ID     This is your Care EveryWhere ID. This could be used by other organizations to access your [place] medical records  [id number]        Your Vitals Were     Temperature Last Period                98.7  F (37.1  C) (Oral) 09/28/2018           Blood Pressure from Last 3 Encounters:   11/15/18 123/74   11/14/18 134/80   11/08/18 140/88    Weight from Last 3 Encounters:   11/14/18 223 lb 3.2 oz (101.2 kg)   11/08/18 218 lb 4 oz (99 kg)   10/08/18 215 lb 8 oz (97.8 kg)              Today, you had the following     No orders found for display       Primary Care Provider Office Phone # Fax #    Violet Humphreys [phone] [address] [address]        Equal Access to Services     [facility] Hospital[facility] : Hadii aad ku hadasho Soomaali, waaxda luqadaha, qaybta kaalmada adeegyada, waxay idiin hayaan riri arteaga . So [facility] [phone].    ATENCIÓN: Si habla español, tiene a gibbons disposición servicios gratuitos de asistencia lingüística. [facility] [phone].    We comply with applicable federal civil rights laws and [place] laws. We do not discriminate on the basis of race, color, national origin, age, disability, sex, sexual orientation, or gender identity.            Thank you!     Thank you for choosing [facility]  for your care. Our goal is always to provide you with excellent care. Hearing back from our patients is one way we can continue to improve our services. Please take a few minutes to complete the written survey that you may receive in the mail after your visit with us. Thank you!             Your Updated Medication List - Protect others around you: Learn how to safely use, store and throw away your medicines at www.disposemymeds.org.          This list is accurate as of 11/15/18 11:59 PM.  Always use  your most recent med list.                   Brand Name Dispense Instructions for use Diagnosis    Biotin 10 MG Tabs tablet      Take 10,000 mcg by mouth daily        Calcium-Vitamin D-Vitamin K 500-100-40 MG-UNT-MCG Chew           cetirizine 10 MG tablet    zyrTEC     Take 10 mg by mouth daily as needed for allergies        CVS B-12 5000 MCG Subl   Generic drug:  Cyanocobalamin       Multiple thyroid nodules       cyclobenzaprine 10 MG tablet    FLEXERIL    30 tablet    TAKE ONE-HALF TABLET BY MOUTH TWICE A DAY AS NEEDED FOR MUSCLE SPASMS    Sciatica, unspecified laterality       ferrous sulfate 325 (65 Fe) MG tablet    IRON     Take 325 mg by mouth daily (with breakfast)        insulin pen needle 31G X 5 MM miscellaneous    31G X 5 MM    100 each    Use 1 pen needles daily or as directed.    Obesity (BMI 30-39.9)       labetalol 100 MG tablet    NORMODYNE    180 tablet    Take 1 tablet (100 mg) by mouth 2 times daily    Benign essential hypertension       MELATONIN PO      Take 3 mg by mouth        metroNIDAZOLE 0.75 % vaginal gel    METROGEL    70 g    USE TWICE WEEKLY TO VAGINA FOR RECURRENT BV SYMPTOMS. DO THIS FOR 3 MONTHS AFTER FINISHING ORAL MEDICATIONS.    Abnormal vaginal fluids       MULTIVITAMINS PO      Take by mouth daily        omeprazole 40 MG capsule    priLOSEC    90 capsule    Take 1 capsule (40 mg) by mouth daily Take 30-60 minutes before a meal.    Gastroesophageal reflux disease with esophagitis       polyethylene glycol powder    MIRALAX    510 g    Take 17 g (1 capful) by mouth daily as needed for constipation    Constipation, unspecified constipation type       vitamin D 2000 units Caps

## 2018-11-15 NOTE — PROGRESS NOTES
Hello,    Great news, your results were normal.  The pregnancy dating corresponds to your last period which gives you a due date of July 5th, 2019.  Make a first OB appointment at 9-10 weeks along.  Let us know if you have questions or symptoms that are hard to manage.  If you have any bleeding or pain let us know.    Take care,      Violet Humphreys MD

## 2018-11-15 NOTE — PROGRESS NOTES
Nurse Note      Itinerary:  [place], [place]       Departure Date: 12/15/18      Return Date: 12/20/18      Length of Trip 5 days       Reason for Travel: Tourism           Urban or rural: urban      Accommodations: Cruise Ship        IMMUNIZATION HISTORY  Have you received any immunizations within the past 4 weeks?  Yes  Have you ever fainted from having your blood drawn or from an injection?  No  Have you ever had a fever reaction to vaccination?  No  Have you ever had any bad reaction or side effect from any vaccination?  No  Have you ever had hepatitis A or B vaccine?  Yes  Do you live (or work closely) with anyone who has AIDS, an AIDS-like condition, any other immune disorder or who is on chemotherapy for cancer?  Yes  Do you have a family history of immunodeficiency?  No  Have you received any injection of immune globulin or any blood products during the past 12 months?  No    Patient roomed by MARIANNE Smith  Ashley Catherine is a 38 year old female seen today alone for counsultation for international travel to [facility] and [facility] for Tourism.  Patient will be departing in  1 month(s) and staying for   5 day(s) and  traveling with organized tour group.      Patient itinerary :  [place] be in the [place] which presents risk for Dengue Fever and food borne illnesses. exposure.      Patient's activities will include sightseeing and cruise.    Patient's country of birth is [place]    Special medical concerns: Pregnancy  Pre-travel questionnaire was completed by patient and reviewed by provider.     Vitals: LMP 09/28/2018  BMI= There is no height or weight on file to calculate BMI.    EXAM:  General:  Well-nourished, well-developed in no acute distress.  Appears to be stated age, interacts appropriately and expresses understanding of information given to patient.    Current Outpatient Prescriptions   Medication Sig Dispense Refill     Biotin 10 MG TABS tablet Take 10,000 mcg by mouth  daily       Calcium-Vitamin D-Vitamin K 500-100-40 MG-UNT-MCG CHEW        cetirizine (ZYRTEC) 10 MG tablet Take 10 mg by mouth daily as needed for allergies       Cholecalciferol (VITAMIN D) 2000 UNITS CAPS        Cyanocobalamin (CVS B-12) 5000 MCG SUBL        cyclobenzaprine (FLEXERIL) 10 MG tablet TAKE ONE-HALF TABLET BY MOUTH TWICE A DAY AS NEEDED FOR MUSCLE SPASMS (Patient not taking: Reported on 11/8/2018) 30 tablet 5     ferrous sulfate (IRON) 325 (65 FE) MG tablet Take 325 mg by mouth daily (with breakfast)       insulin pen needle 31G X 5 MM Use 1 pen needles daily or as directed. (Patient not taking: Reported on 11/8/2018) 100 each 3     labetalol (NORMODYNE) 100 MG tablet Take 1 tablet (100 mg) by mouth 2 times daily 180 tablet 3     MELATONIN PO Take 3 mg by mouth       metroNIDAZOLE (METROGEL) 0.75 % vaginal gel USE TWICE WEEKLY TO VAGINA FOR RECURRENT BV SYMPTOMS. DO THIS FOR 3 MONTHS AFTER FINISHING ORAL MEDICATIONS. (Patient not taking: Reported on 11/8/2018) 70 g 3     Multiple Vitamin (MULTIVITAMINS PO) Take by mouth daily       omeprazole (PRILOSEC) 40 MG capsule Take 1 capsule (40 mg) by mouth daily Take 30-60 minutes before a meal. (Patient not taking: Reported on 11/14/2018) 90 capsule 3     polyethylene glycol (MIRALAX) powder Take 17 g (1 capful) by mouth daily as needed for constipation 510 g 1     Patient Active Problem List   Diagnosis     OLIGOMENORRHEA, C/W PCOS     HX ABNL PAP SMEAR OF CERVIX       Flat feet     Sciatica     S/P gastrectomy     Occupational problem     Elevated parathyroid hormone     Multiple thyroid nodules     Abnormal thyroid cytology-follicular neoplasm     Chronic pain syndrome     Major depressive disorder, recurrent episode, in full remission (H)     Bilateral low back pain with sciatica, sciatica laterality unspecified     Throat symptom     Psychological factor affecting physical condition     Hx of Guillain-[place] syndrome     Adjustment disorder with anxious  mood     Thyroid nodule     Benign essential hypertension     Concussion without loss of consciousness, subsequent encounter     Gastric bypass status for obesity     Pregnancy examination or test, positive result     Allergies   Allergen Reactions     Nkda [No Known Drug Allergies]      No Known Allergies          Immunizations discussed include:   Hepatitis A:  Up to date  Hepatitis B: Up to date  Influenza: Up to date  Typhoid: Not indicated  Rabies: Not indicated  Yellow Fever: Not indicated  Japanese Encephalitis: Not indicated  Meningococcus: Not indicated  Tetanus/Diphtheria: Up to date  Measles/Mumps/Rubella: pregnant  Cholera: Not needed  Polio: Up to date  Pneumococcal: Under age of 65  Varicella: Immune by disease history per patient report  Zostavax:  Not indicated  Shingrix: Not indicated  HPV:  Not indicated  TB:  nA    Altitude Exposure on this trip: no  Past tolerance to Altitude: na    ASSESSMENT/PLAN:  No diagnosis found.  I have reviewed general recommendations for safe travel   including: food/water precautions, insect precautions, safer sex   practices given high prevalence of  ZiKA, HIV and other STDs,   roadway safety. Educational materials and [employer] report provided.  Patient is warned of Zika virus..  States she will stay on ship and use precautions.     Malaraia prophylaxis recommended: none  Symptomatic treatment for traveler's diarrhea: loperamide/diphenoxylate  Altitude illness prevention and treatment: none  Motion sickness: Meclizine ( Cat B)      Evacuation insurance advised and resources were provided to patient.    Total visit time 30 minutes  with over 50% of time spent counseling patient as detailed above.    Karly Coats CNP

## 2018-11-16 PROBLEM — Z71.84 TRAVEL ADVICE ENCOUNTER: Status: ACTIVE | Noted: 2018-11-16

## 2018-11-16 LAB
COPPER SERPL-MCNC: 146 UG/DL (ref 80–155)
ZINC SERPL-MCNC: 79 UG/DL (ref 60–120)

## 2018-11-17 LAB
ANNOTATION COMMENT IMP: NORMAL
RETINYL PALMITATE SERPL-MCNC: 0.03 MG/L (ref 0–0.1)
VIT A SERPL-MCNC: 0.59 MG/L (ref 0.3–1.2)

## 2018-11-29 ENCOUNTER — OFFICE VISIT (OUTPATIENT)
Dept: FAMILY MEDICINE | Facility: CLINIC | Age: 38
End: 2018-11-29
Payer: COMMERCIAL

## 2018-11-29 VITALS
OXYGEN SATURATION: 99 % | BODY MASS INDEX: 31.46 KG/M2 | TEMPERATURE: 98 F | WEIGHT: 216.13 LBS | HEART RATE: 79 BPM | RESPIRATION RATE: 14 BRPM | DIASTOLIC BLOOD PRESSURE: 84 MMHG | SYSTOLIC BLOOD PRESSURE: 134 MMHG

## 2018-11-29 DIAGNOSIS — J06.9 VIRAL URI: Primary | ICD-10-CM

## 2018-11-29 PROCEDURE — 99214 OFFICE O/P EST MOD 30 MIN: CPT | Performed by: FAMILY MEDICINE

## 2018-11-29 ASSESSMENT — PAIN SCALES - GENERAL: PAINLEVEL: NO PAIN (0)

## 2018-11-29 NOTE — PROGRESS NOTES
SUBJECTIVE:   Ashley Catherine is a 38 year old female who presents to clinic today for the following health issues:      RESPIRATORY SYMPTOMS      Duration: Sunday    Description  nasal congestion    Severity: none    Accompanying signs and symptoms:  Neck is a little stiff  no fever    History (predisposing factors):  none    Precipitating or alleviating factors: None    Therapies tried and outcome:  oral decongestant  And mist  All.Tarsha MARIANNE    Travelled to [place] for thanks giving   Was around a chlid sick with looked like having flu- on Saturday and started getting sick with nasal congestion, mouth breathing. Mild headache and it goes away.  She is nonsmoker, no asthma  She is 8 week pregnant. Expected date of delivery confirmed by ultrasound July 5th 2019        PROBLEMS TO ADD ON...    Problem list and histories reviewed & adjusted, as indicated.  Additional history: as documented    Patient Active Problem List   Diagnosis     OLIGOMENORRHEA, C/W PCOS     HX ABNL PAP SMEAR OF CERVIX       Flat feet     Sciatica     S/P gastrectomy     Occupational problem     Elevated parathyroid hormone     Multiple thyroid nodules     Abnormal thyroid cytology-follicular neoplasm     Chronic pain syndrome     Major depressive disorder, recurrent episode, in full remission (H)     Bilateral low back pain with sciatica, sciatica laterality unspecified     Throat symptom     Psychological factor affecting physical condition     Hx of Guillain-[place] syndrome     Adjustment disorder with anxious mood     Thyroid nodule     Benign essential hypertension     Concussion without loss of consciousness, subsequent encounter     Gastric bypass status for obesity     Pregnancy examination or test, positive result     Travel advice encounter     Past Surgical History:   Procedure Laterality Date     BIOPSY  03/13/2015    Thyroid nodule     ESOPHAGOSCOPY, GASTROSCOPY, DUODENOSCOPY (EGD), COMBINED  5/28/2013    Procedure: COMBINED  ESOPHAGOSCOPY, GASTROSCOPY, DUODENOSCOPY (EGD);;  Surgeon: Best Willett MD;  [facility]: [facility]     ESOPHAGOSCOPY, GASTROSCOPY, DUODENOSCOPY (EGD), COMBINED N/A 6/13/2018    Procedure: COMBINED ESOPHAGOSCOPY, GASTROSCOPY, DUODENOSCOPY (EGD), BIOPSY SINGLE OR MULTIPLE;  gastroscopy;  Surgeon: Westley Gibbs MD;  Location:  GI     LAPAROSCOPIC GASTRIC SLEEVE  5/13/2013    Procedure: LAPAROSCOPIC GASTRIC SLEEVE;  Laparoscopic Sleeve Gastrectomy ;  Surgeon: Best Willett MD;  [facility]     LEEP TX, CERVICAL  1995    age 15     SEPTOPLASTY       THYROIDECTOMY Left 4/3/2018    Procedure: THYROIDECTOMY;  Left Thyroid Lobectomy And Ishtmusectomy;  Surgeon: Delia Harper MD;  Location: [facility]     TONSILLECTOMY  age 20s     TONSILLECTOMY  2004?     wisdom teeth extraction         Social History     Tobacco Use     Smoking status: Never Smoker     Smokeless tobacco: Never Used   Substance Use Topics     Alcohol use: Yes     Alcohol/week: 2.4 - 3.0 oz     Comment: 3 drinks/week     Family History   Problem Relation Age of Onset     Hypertension Sister      Hypertension Father      Allergies Father         seasonal     Lipids Father      Obesity Father      Arthritis Mother      Gynecology Mother         problems with menopause     Hypertension Mother      Other Cancer Mother         Endometrial     Osteoporosis Mother      Obesity Mother      Parathyroid Disorders Mother         3 operations     Obesity Sister      Diabetes Maternal Aunt         x several w. DM     Breast Cancer Maternal Aunt      Cancer - colorectal Maternal Uncle         60ish     Hypertension Sister      Obesity Sister      Nephrolithiasis No family hx of            Reviewed and updated as needed this visit by clinical staff       Reviewed and updated as needed this visit by Provider         ROS:  Constitutional, HEENT, cardiovascular, pulmonary, gi and gu systems are negative, except as otherwise noted.    OBJECTIVE:     BP  134/84 (BP Location: Right arm, Patient Position: Sitting, Cuff Size: Adult Large)   Pulse 79   Temp 98  F (36.7  C) (Oral)   Resp 14   Wt 98 kg (216 lb 2 oz)   LMP 2018   SpO2 99%   Breastfeeding? No   BMI 31.46 kg/m    Body mass index is 31.46 kg/m .  GENERAL: healthy, alert and no distress  EYES: Eyes grossly normal to inspection, PERRL and conjunctivae and sclerae normal  HENT: ear canals and TM's normal, nose and mouth without ulcers or lesions  NECK: no adenopathy, no asymmetry, masses, or scars and thyroid normal to palpation  RESP: lungs clear to auscultation - no rales, rhonchi or wheezes  CV: regular rate and rhythm, normal S1 S2, no S3 or S4, no murmur, click or rub, no peripheral edema and peripheral pulses strong  ABDOMEN: soft, nontender, without hepatosplenomegaly or masses  MS: no gross musculoskeletal defects noted, no edema    Diagnostic Test Results:    ASSESSMENT/PLAN:     37yo  at 8 weeks GA based on edc of 2019.    Viral URI  Plan: Discussed symptomatic treatment for viral URI.  Encouraged relative rest, adequate hydration, okay to take over-the-counter Tylenol 1-2 tabs 3 times daily as needed pain.  Reassured that there is no indication for antibiotic.  Follow-up as needed.    She is at 8 weeks of gestational age, encouraged her to continue with prenatal vitamins and routine follow-up with her OB  Jackeline Leyva MD  [facility]

## 2018-11-29 NOTE — NURSING NOTE
"Chief Complaint   Patient presents with     Cold Symptoms     /84 (BP Location: Right arm, Patient Position: Sitting, Cuff Size: Adult Large)  Pulse 79  Temp 98  F (36.7  C) (Oral)  Resp 14  Wt 216 lb 2 oz (98 kg)  LMP 09/28/2018  SpO2 99%  Breastfeeding? No  BMI 31.46 kg/m2 Estimated body mass index is 31.46 kg/(m^2) as calculated from the following:    Height as of 11/14/18: 5' 9.5\" (1.765 m).    Weight as of this encounter: 216 lb 2 oz (98 kg).  bp completed using cuff size: large      All.MARIANNE Willingham                "

## 2018-11-29 NOTE — LETTER
FOR:Work: Human resources        RE: Ashley Catherine  : 1980       Ashley  is seen in the clinic 18    She may be off work  18 & 18.     Please contact me for questions or concerns.        Sincerely,      Jackeline Leyva MD

## 2018-12-04 ENCOUNTER — OFFICE VISIT (OUTPATIENT)
Dept: PSYCHOLOGY | Facility: CLINIC | Age: 38
End: 2018-12-04
Payer: COMMERCIAL

## 2018-12-04 VITALS — WEIGHT: 217.8 LBS | BODY MASS INDEX: 31.7 KG/M2

## 2018-12-04 DIAGNOSIS — Z56.9 OCCUPATIONAL PROBLEM: ICD-10-CM

## 2018-12-04 DIAGNOSIS — F54 PSYCHOLOGICAL FACTORS AFFECTING MORBID OBESITY (H): Primary | ICD-10-CM

## 2018-12-04 DIAGNOSIS — F33.0 MAJOR DEPRESSIVE DISORDER, RECURRENT EPISODE, MILD (H): ICD-10-CM

## 2018-12-04 DIAGNOSIS — E66.01 PSYCHOLOGICAL FACTORS AFFECTING MORBID OBESITY (H): Primary | ICD-10-CM

## 2018-12-04 SDOH — ECONOMIC STABILITY - INCOME SECURITY: UNSPECIFIED PROBLEMS RELATED TO EMPLOYMENT: Z56.9

## 2018-12-04 NOTE — MR AVS SNAPSHOT
After Visit Summary   12/4/2018    Ashley Catherine    MRN: [id number]           Patient Information     Date Of Birth          1980        Visit Information        Provider Department      12/4/2018 10:00 AM Jayro Elias, PhD [facility]        Today's Diagnoses     Psychological factors affecting morbid obesity (H)    -  1    Major depressive disorder, recurrent episode, mild (H)        Occupational problem           Follow-ups after your visit        Your next 10 appointments already scheduled     Jan 16, 2019 10:00 AM CST   (Arrive by 9:45 AM)   Return Visit with Jayro Elias, PhD [facility] ([facility])    [address]  [address]rd [facility] [address] [phone]              Who to contact     Please call your clinic at [phone] to:    Ask questions about your health    Make or cancel appointments    Discuss your medicines    Learn about your test results    Speak to your doctor            Additional Information About Your Visit        MyChart Information     [employer] gives you secure access to your electronic health record. If you see a primary care provider, you can also send messages to your care team and make appointments. If you have questions, please call your primary care clinic.  If you do not have a primary care provider, please call [phone] and they will assist you.      [employer] is an electronic gateway that provides easy, online access to your medical records. With [employer], you can request a clinic appointment, read your test results, renew a prescription or communicate with your care team.     To access your existing account, please contact your [facility] or call [phone] for assistance.        Care EveryWhere ID     This is your Care EveryWhere ID. This could be used by other organizations to access your Campus medical records  [id number]         Your Vitals Were     Last Period BMI (Body Mass Index)                09/28/2018 31.7 kg/m2           Blood Pressure from Last 3 Encounters:   11/29/18 134/84   11/15/18 123/74   11/14/18 134/80    Weight from Last 3 Encounters:   12/04/18 98.8 kg (217 lb 12.8 oz)   11/29/18 98 kg (216 lb 2 oz)   11/14/18 101.2 kg (223 lb 3.2 oz)              Today, you had the following     No orders found for display       Primary Care Provider Office Phone # Fax #    [place]Tanja Humphreys [phone]       [address] [address]        Equal Access to Services     MABEL BAILEY : Nati Alejandre, waaxda luqadaha, qaybta kaalmada akanksha, carmen arteaga . So [facility] [phone].    ATENCIÓN: Si habla español, tiene a gibbons disposición servicios gratuitos de asistencia lingüística. Faby al [phone].    We comply with applicable federal civil rights laws and [place] laws. We do not discriminate on the basis of race, color, national origin, age, disability, sex, sexual orientation, or gender identity.            Thank you!     Thank you for choosing [facility]  for your care. Our goal is always to provide you with excellent care. Hearing back from our patients is one way we can continue to improve our services. Please take a few minutes to complete the written survey that you may receive in the mail after your visit with us. Thank you!             Your Updated Medication List - Protect others around you: Learn how to safely use, store and throw away your medicines at www.disposemymeds.org.          This list is accurate as of 12/4/18 11:03 AM.  Always use your most recent med list.                   Brand Name Dispense Instructions for use Diagnosis    Biotin 10 MG Tabs tablet      Take 10,000 mcg by mouth daily        Calcium-Vitamin D-Vitamin K 500-100-40 MG-UNT-MCG Chew           cetirizine 10 MG tablet    zyrTEC     Take 10 mg  by mouth daily as needed for allergies        CVS B-12 5000 MCG Subl   Generic drug:  Cyanocobalamin       Multiple thyroid nodules       cyclobenzaprine 10 MG tablet    FLEXERIL    30 tablet    TAKE ONE-HALF TABLET BY MOUTH TWICE A DAY AS NEEDED FOR MUSCLE SPASMS    Sciatica, unspecified laterality       ferrous sulfate 325 (65 Fe) MG tablet    FEROSUL     Take 325 mg by mouth daily (with breakfast)        insulin pen needle 31G X 5 MM miscellaneous    31G X 5 MM    100 each    Use 1 pen needles daily or as directed.    Obesity (BMI 30-39.9)       labetalol 100 MG tablet    NORMODYNE    180 tablet    Take 1 tablet (100 mg) by mouth 2 times daily    Benign essential hypertension       MELATONIN PO      Take 3 mg by mouth        metroNIDAZOLE 0.75 % vaginal gel    METROGEL    70 g    USE TWICE WEEKLY TO VAGINA FOR RECURRENT BV SYMPTOMS. DO THIS FOR 3 MONTHS AFTER FINISHING ORAL MEDICATIONS.    Abnormal vaginal fluids       MULTIVITAMINS PO      Take by mouth daily        omeprazole 40 MG DR capsule    priLOSEC    90 capsule    Take 1 capsule (40 mg) by mouth daily Take 30-60 minutes before a meal.    Gastroesophageal reflux disease with esophagitis       polyethylene glycol powder    MIRALAX    510 g    Take 17 g (1 capful) by mouth daily as needed for constipation    Constipation, unspecified constipation type       vitamin D 2000 units Caps

## 2018-12-04 NOTE — PROGRESS NOTES
Health Psychology                  Clinic    Department of Medicine  Lis Chavez, Ph.D., L.P. [phone]                          [facility] Earline Anderson, Ph.D.,  L.P. [phone]                 3rd Floor, Clinic 3A  [place] Mail Code 977   Jayro Elias, Ph.D., A.B.IDALIA.P., L.P. [phone]     [address] [place] [address] Melissa Stokes, Ph.D., L.P. [phone]  [place] [address]    Health Psychology Follow-Up Note    Ashley Catherien is a 38-year-old single, Black woman referred initially  for psychological consultation for workup for a gastric sleeve procedure who is seen for CBT-oriented therapy regardingt weight management, work stresses, and family and social matters.    She announced that she had gained weight as she is now pregnant with her first child, with  A man she has been dating since August. She is nursing a cold and having morning sickness secondary to the pregnancy  She is upbeat about this development.  She hadn't planned to get pregnant, but has support of her family friends, and the father  He is  In the divorce process with his wife of > 20 years and has 6 kids.    She thinks she is otherwise functioning mostly better.  She feels essentially recovered from her recent concussion in July.     She is willing to start walking/exercising more as her energy allows.  We dicussed damage control on weight gain as a current goal instead of weight loss given the pregnancy.he is dong more charge nursing which means less walking.     She began Saxenda last month per Dr. Justice.      Wt Readings from Last 4 Encounters:   12/04/18 98.8 kg (217 lb 12.8 oz)   11/29/18 98 kg (216 lb 2 oz)   11/14/18 101.2 kg (223 lb 3.2 oz)   11/08/18 99 kg (218 lb 4 oz)     Body mass index is 31.7 kg/(m^2).     Current Outpatient Prescriptions   Medication     Biotin 10 MG TABS tablet     Calcium-Vitamin D-Vitamin K  500-100-40 MG-UNT-MCG CHEW     cetirizine (ZYRTEC) 10 MG tablet     Cholecalciferol (VITAMIN D) 2000 UNITS CAPS     Cyanocobalamin (CVS B-12) 5000 MCG SUBL     cyclobenzaprine (FLEXERIL) 10 MG tablet     ferrous sulfate (IRON) 325 (65 FE) MG tablet     insulin pen needle 31G X 5 MM     labetalol (NORMODYNE) 100 MG tablet     MELATONIN PO     metroNIDAZOLE (METROGEL) 0.75 % vaginal gel     Multiple Vitamin (MULTIVITAMINS PO)     omeprazole (PRILOSEC) 40 MG capsule     polyethylene glycol (MIRALAX) powder     No current facility-administered medications for this visit.          Past Medical History:   Diagnosis Date     Bariatric surgery status 05/13/2013     Depression      Esophageal reflux      Family history of hyperparathyroidism 3/6/2015     Forearm fracture childhood    jumping fall     Guillain-Keller disease (H) age 14    hospitalized at [facility] x 1 week     History of steroid therapy      HX ABNL PAP SMEAR OF CERVIX   1995    LEEP AT 15 yo     Hypertension      Hypertrophy of breast      Hypertrophy of tonsils alone      Hypovitaminosis D     with secondary high PTH     Irregular heart beat     palpitations     LBP (low back pain)      LSIL (low grade squamous intraepithelial lesion) on Pap smear 5/2006     Lumbago     RESOLVED     Major depressive disorder, recurrent episode, in partial or unspecified remission 4/1/2013     Morbid obesity (H)     bariatric surgery 5/13/2013     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)      OLIGOMENORRHEA, C/W PCOS      Sciatica      SLEEP APNEA 6/2002    No longer has since tonsillectomy and septoplasty     Past Surgical History:   Procedure Laterality Date     BIOPSY  03/13/2015    Thyroid nodule     ESOPHAGOSCOPY, GASTROSCOPY, DUODENOSCOPY (EGD), COMBINED  5/28/2013    Procedure: COMBINED ESOPHAGOSCOPY, GASTROSCOPY, DUODENOSCOPY (EGD);;  Surgeon: Best Willett MD;  Location:  GI     ESOPHAGOSCOPY, GASTROSCOPY, DUODENOSCOPY (EGD), COMBINED N/A  6/13/2018    Procedure: COMBINED ESOPHAGOSCOPY, GASTROSCOPY, DUODENOSCOPY (EGD), BIOPSY SINGLE OR MULTIPLE;  gastroscopy;  Surgeon: Westley Gibbs MD;  Location:  GI     LAPAROSCOPIC GASTRIC SLEEVE  5/13/2013    Procedure: LAPAROSCOPIC GASTRIC SLEEVE;  Laparoscopic Sleeve Gastrectomy ;  Surgeon: Best Willett MD;  Location: [facility] OR     LEEP TX, CERVICAL  1995    age 15     SEPTOPLASTY       THYROIDECTOMY Left 4/3/2018    Procedure: THYROIDECTOMY;  Left Thyroid Lobectomy And Ishtmusectomy;  Surgeon: Delia Harper MD;  Location: [facility] OR     TONSILLECTOMY  age 20s     TONSILLECTOMY  2004?     wisdom teeth extraction           PHQ-9 SCORE 11/9/2017 5/10/2018 9/15/2018   PHQ-9 Total Score - - -   PHQ-9 Total Score MyChart - - 2 (Minimal depression)   PHQ-9 Total Score 8 7 2     MAXIMO-7 SCORE 9/28/2017 11/9/2017 9/15/2018   Total Score - - -   Total Score - - 3 (minimal anxiety)   Total Score 1 12 3     WHODAS 2.0 Total Score 9/26/2017 12/4/2018   Total Score 14 18   Total Score MyChart 14 18       She is  5 foot 11 inches.  Her overall goal is 175.   She is currently in the obese range.  Her mood is good today.  We discussed health, relationship, communication and other issues.     She participates fully. Rapport is excellent.   Extended session due to complexity of case and length of interval.    Time in:  10:01  Time out:  10:56    Diagnosis:   Psychological factors affecting obesity (F54).   Major depression, recurrent, mild (F33.0).   Occupational Problems (Z56.9)    PLAN/RECOMMENDATIONS:   1. Ms. Catherine will return 1/16 @ 10:00 to address weight loss and social issues with problem solving therapy.   She wishes to continue to get support here with her life changes.  2.  Resume schedule of regular exercise to the level that is possible.        Tx plan due 6/27/2019

## 2018-12-15 LAB
ABO + RH BLD: NORMAL
ABO + RH BLD: NORMAL
BLD GP AB SCN SERPL QL: NORMAL
HBV SURFACE AG SERPL QL IA: NORMAL
HIV 1+2 AB+HIV1 P24 AG SERPL QL IA: NON REACTIVE
RUBELLA ANTIBODY IGG QUANTITATIVE: NORMAL IU/ML
TREPONEMA ANTIBODIES: NORMAL

## 2018-12-17 ENCOUNTER — HOSPITAL ENCOUNTER (OUTPATIENT)
Dept: LAB | Facility: CLINIC | Age: 38
Discharge: HOME | End: 2018-12-17
Attending: OBSTETRICS & GYNECOLOGY | Admitting: OBSTETRICS & GYNECOLOGY
Payer: COMMERCIAL

## 2018-12-17 ENCOUNTER — OFFICE VISIT (OUTPATIENT)
Dept: MATERNAL FETAL MEDICINE | Facility: CLINIC | Age: 38
End: 2018-12-17
Attending: OBSTETRICS & GYNECOLOGY
Payer: COMMERCIAL

## 2018-12-17 ENCOUNTER — PRE VISIT (OUTPATIENT)
Dept: MATERNAL FETAL MEDICINE | Facility: CLINIC | Age: 38
End: 2018-12-17

## 2018-12-17 DIAGNOSIS — O09.511 SUPERVISION OF ELDERLY PRIMIGRAVIDA IN FIRST TRIMESTER: ICD-10-CM

## 2018-12-17 DIAGNOSIS — O26.90 PREGNANCY RELATED CONDITION, ANTEPARTUM: ICD-10-CM

## 2018-12-17 DIAGNOSIS — O09.511 SUPERVISION OF ELDERLY PRIMIGRAVIDA IN FIRST TRIMESTER: Primary | ICD-10-CM

## 2018-12-17 PROCEDURE — 36415 COLL VENOUS BLD VENIPUNCTURE: CPT | Performed by: OBSTETRICS & GYNECOLOGY

## 2018-12-17 PROCEDURE — 40000791 ZZHCL STATISTIC VERIFI PRENATAL TRISOMY 21,18,13: Performed by: OBSTETRICS & GYNECOLOGY

## 2018-12-17 PROCEDURE — 96040 ZZH GENETIC COUNSELING, EACH 30 MINUTES: CPT | Mod: ZF | Performed by: GENETIC COUNSELOR, MS

## 2018-12-17 NOTE — PROGRESS NOTES
[facility]  Genetic Counseling Consult    Patient: Ashley Catherine YOB: 1980   Date of Service: 18      Ashley Catherine was seen at [facility] for genetic consultation to discuss the options for screening and testing for fetal chromosome abnormalities.  The indication for genetic counseling is advanced maternal age. She was unaccompanied to today's visit.       Impression/Plan:   1.  Ashley had a blood draw for NIPT (Innatal test through [employer]).  Results are expected within 7-10 days, and will be available in [facility].  We will contact her to discuss the results, and a copy will be forwarded to the office of the referring OB provider.    2.  Maternal serum AFP (single marker screen) is recommended after 15 weeks to screen for open neural tube defects. A quad screen should not be performed.    3.  An 18-20 week comprehensive ultrasound is standard of care for all women 35 or older at delivery.    Pregnancy History:   /Parity:    Age at Delivery: 39 year old  ILIA: 2019, by Last Menstrual Period  Gestational Age: 11w3d    No significant complications or exposures were reported in the current pregnancy.    Ashley has chronic hypertension and is taking labetalol.     Medical History:   Ashley nolen reported medical history is not expected to impact pregnancy management or risks to fetal development.       Family History:   A three-generation pedigree was obtained, and is scanned under the  Media  tab.   The reported family history is negative for multiple miscarriages, stillbirths, birth defects, mental retardation, known genetic conditions, and consanguinity. Limited information is known regarding the father of this pregnancy's family history.       Carrier Screening:   The patient reports that she is of  ancestry:      We reviewed the clinical features, autosomal recessive inheritance, and  options for carrier screening and  screening for hemoglobinopathies.      Expanded carrier screening for mutations in a large panel of genes associated with autosomal recessive conditions including cystic fibrosis, spinal muscular atrophy, and others, is now available.      Ashley reports recently having her blood drawn for a hemoglobin electrophoresis, however, there is no recent order for this lab in the [facility] [employer] system.        Risk Assessment for Chromosome Conditions:   We explained that the risk for fetal chromosome abnormalities increases with maternal age. We discussed specific features of common chromosome abnormalities, including Down syndrome, trisomy 13, trisomy 18, and sex chromosome trisomies.      - At age 39 at midtrimester, the risk to have a baby with Down syndrome is 1 in 98.     - At age 39 at midtrimester, the risk to have a baby with any chromosome abnormality is 1 in 51.        Testing Options:   We discussed the following options:   First trimester screening    First trimester ultrasound with nuchal translucency and nasal bone assessments, maternal plasma hCG, JUNE-A, and AFP measurement    Screens for fetal trisomy 21, trisomy 13, and trisomy 18    Cannot screen for open neural tube defects; maternal serum AFP after 15 weeks is recommended     Non-invasive Prenatal Testing (NIPT)    Maternal plasma cell-free DNA testing; first trimester ultrasound with nuchal translucency and nasal bone assessment is recommended, when appropriate    Screens for fetal trisomy 21, trisomy 13, trisomy 18, and sex chromosome aneuploidy    Cannot screen for open neural tube defects; maternal serum AFP after 15 weeks is recommended     Chorionic villus sampling (CVS)    Invasive procedure typically performed in the first trimester by which placental villi are obtained for the purpose of chromosome analysis and/or other prenatal genetic analysis    Diagnostic results; >99% sensitivity for fetal  chromosome abnormalities    Cannot test for open neural tube defects; maternal serum AFP after 15 weeks is recommended     Genetic Amniocentesis    Invasive procedure typically performed in the second trimester by which amniotic fluid is obtained for the purpose of chromosome analysis and/or other prenatal genetic analysis    Diagnostic results; >99% sensitivity for fetal chromosome abnormalities    AFAFP measurement tests for open neural tube defects     Comprehensive (Level II) ultrasound: Detailed ultrasound performed between 18-22 weeks gestation to screen for major birth defects and markers for aneuploidy.      We reviewed the benefits and limitations of this testing.  Screening tests provide a risk assessment specific to the pregnancy for certain fetal chromosome abnormalities, but cannot definitively diagnose or exclude a fetal chromosome abnormality.  Follow-up genetic counseling and consideration of diagnostic testing is recommended with any abnormal screening result. Diagnostic tests carry inherent risks- including risk of miscarriage- that require careful consideration.  These tests can detect fetal chromosome abnormalities with greater than 99% certainty.  Results can be compromised by maternal cell contamination or mosaicism, and are limited by the resolution of cytogenetic G-banding technology.  There is no screening nor diagnostic test that can detect all forms of birth defects or mental disability.     It was a pleasure to be involved with Ashley s care. Face-to-face time of the meeting was 30 minutes.    Lizz Simmons MS, [facility]  Maternal Fetal Medicine  [facility]  Ph: [phone]  [email or web address]

## 2018-12-21 ENCOUNTER — TELEPHONE (OUTPATIENT)
Dept: MATERNAL FETAL MEDICINE | Facility: CLINIC | Age: 38
End: 2018-12-21

## 2018-12-21 LAB — LAB SCANNED RESULT: ABNORMAL

## 2018-12-21 NOTE — TELEPHONE ENCOUNTER
"2018: I called Ashley to discuss her \"Innatal\" cell-free fetal DNA screening results. Per her request, I left her a voice message with this information. I informed her that we received unexpected results that indicated a higher suspicion for Trisomy 21 in this pregnancy. [facility] report quotes a positive predictive value of 87%. The positive predictive value calculated by using stated sensitivities and specificities (perinatalquality.org) is 69%.      Results came back negative for other chromosome abnormalities in chromosomes 18, & 13, as well as the sex chromosomes.      I requested that Ashley return my call to discuss these results in greater detail and discuss possible next steps.     2018: Ashley returned my call. We discussed the positive predictive values for this result as well as fetal sex (XY, male). We discussed the option of continuing with ultrasound monitoring and risks and benefits of chorionic villus sampling (CVS) and amniocentesis to obtain diagnostic results. She also inquired about pregnancy termination laws in the state [facility], therefore, this was also reviewed.     Ashley opted to pursue a CVS for diagnostic testing at [place]  [facility]. PATRIC Alvarado at [facility], is aware of this referral and Ashley will be contacted either today or  to schedule this procedure.    nurse Raina at Dr. Harris's office was informed of this screening result as well as Ashley's plan to complete a CVS at [facility].    ADDENDUM: Per Jenny at [facility], Ashley will be scheduled for her CVS procedure on .    Lizz Simmons MS, [facility]  Licensed Genetic Counselor  Phone: [phone]  Pager: [phone]  "

## 2018-12-28 ENCOUNTER — TRANSFERRED RECORDS (OUTPATIENT)
Dept: HEALTH INFORMATION MANAGEMENT | Facility: CLINIC | Age: 38
End: 2018-12-28

## 2019-01-08 ENCOUNTER — TELEPHONE (OUTPATIENT)
Dept: MATERNAL FETAL MEDICINE | Facility: CLINIC | Age: 39
End: 2019-01-08

## 2019-01-08 NOTE — TELEPHONE ENCOUNTER
Per Jenny at [place]  [facility], final microarray results from the CVS procedure were negative/normal noting 46 chromosomes and male sex. Jenny will be faxing these results to [facility].     I called Ashley today to confirm the plan moving forward. She is currently scheduled for a comprehensive ultrasound on . Ashley was encouraged to contact me with any questions or concerns prior to that scheduled appointment.    Lizz Simmons MS, [facility]  Maternal Fetal Medicine  [facility]  Ph: [phone]  [email or web address]

## 2019-01-16 ENCOUNTER — OFFICE VISIT (OUTPATIENT)
Dept: PSYCHOLOGY | Facility: CLINIC | Age: 39
End: 2019-01-16
Payer: COMMERCIAL

## 2019-01-16 VITALS — BODY MASS INDEX: 32.98 KG/M2 | WEIGHT: 226.6 LBS

## 2019-01-16 DIAGNOSIS — E66.01 PSYCHOLOGICAL FACTORS AFFECTING MORBID OBESITY (H): ICD-10-CM

## 2019-01-16 DIAGNOSIS — F33.0 MAJOR DEPRESSIVE DISORDER, RECURRENT EPISODE, MILD (H): Primary | ICD-10-CM

## 2019-01-16 DIAGNOSIS — Z56.9 OCCUPATIONAL PROBLEM: ICD-10-CM

## 2019-01-16 DIAGNOSIS — F54 PSYCHOLOGICAL FACTORS AFFECTING MORBID OBESITY (H): ICD-10-CM

## 2019-01-16 SDOH — ECONOMIC STABILITY - INCOME SECURITY: UNSPECIFIED PROBLEMS RELATED TO EMPLOYMENT: Z56.9

## 2019-01-16 NOTE — PROGRESS NOTES
Health Psychology                  Clinic    Department of Medicine  Lis Chavez, Ph.D., L.P. [phone]                          [facility] Earline Anderson, Ph.D.,  L.P. [phone]                 3rd Floor, Clinic 3A  [place] Mail Code 746   Jayro Elias, Ph.D., AMITA.ÁLVARO.ROB., L.P. [phone]     [address] Melissa Stokes, Ph.D., L.P. [phone]  [place] [address]    Health Psychology Follow-Up Note    Ashley Catherine is a 38-year-old single, Black woman referred initially for psychological consultation for workup for a gastric sleeve procedure who is seen for CBT-oriented therapy regardingt weight management, work stresses, and family and social matters.    She continues to gained weight as she is now pregnant with her first child.  She has had concerns about genetic issues and will see a genetic counselor.  She is experiencing fatigue, nausea, so it has been hard.  Socially, her relationship with the father has deteriorated.  She had been been dating since August, but is not sure of the status.  He has been cold toward her since early December, which she is having difficulty forgiving.  He is going through a divorce, started a new job, is described as an introvert.   She hadn't planned to get pregnant, but has support of her family friends.  She finds the situation complex and she is having difficulty speaking about it with others and being happy about it.  The father has been with his wife of > 20 years and has 6 or 7 kids, 4 or 5 biological.     She is willing to start walking/exercising more as her energy allows.  We dicussed damage control on weight gain as a current goal instead of weight loss given the pregnancy.  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     She began Saxenda last month per Dr. Justice.      Wt Readings from Last 4 Encounters:   01/16/19 102.8 kg  (226 lb 9.6 oz)   12/04/18 98.8 kg (217 lb 12.8 oz)   11/29/18 98 kg (216 lb 2 oz)   11/14/18 101.2 kg (223 lb 3.2 oz)     Body mass index is 32.98 kg/m .     Current Outpatient Medications   Medication     Biotin 10 MG TABS tablet     Calcium-Vitamin D-Vitamin K 500-100-40 MG-UNT-MCG CHEW     cetirizine (ZYRTEC) 10 MG tablet     Cholecalciferol (VITAMIN D) 2000 UNITS CAPS     Cyanocobalamin ([employer]S B-12) 5000 MCG SUBL     cyclobenzaprine (FLEXERIL) 10 MG tablet     ferrous sulfate (IRON) 325 (65 FE) MG tablet     insulin pen needle 31G X 5 MM     labetalol (NORMODYNE) 100 MG tablet     MELATONIN PO     metroNIDAZOLE (METROGEL) 0.75 % vaginal gel     Multiple Vitamin (MULTIVITAMINS PO)     omeprazole (PRILOSEC) 40 MG capsule     polyethylene glycol (MIRALAX) powder     No current facility-administered medications for this visit.        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14    hospitalized at [facility] x 1 week     History of steroid therapy      HX ABNL PAP SMEAR OF CERVIX   1995    LEEP AT 15 yo     Hypertension      Hypertrophy of breast      Hypertrophy of tonsils alone      Hypovitaminosis D     with secondary high PTH     Irregular heart beat     palpitations     LBP (low back pain)      LSIL (low grade squamous intraepithelial lesion) on Pap smear 5/2006     Lumbago     RESOLVED     Major depressive disorder, recurrent episode, in partial or unspecified remission 4/1/2013     Morbid obesity (H)     bariatric surgery 5/13/2013     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)      OLIGOMENORRHEA, C/W PCOS      Sciatica      SLEEP APNEA 6/2002    No longer has since tonsillectomy and septoplasty     Past Surgical History:   Procedure Laterality Date     BIOPSY  03/13/2015    Thyroid nodule      ESOPHAGOSCOPY, GASTROSCOPY, DUODENOSCOPY (EGD), COMBINED  5/28/2013    Procedure: COMBINED ESOPHAGOSCOPY, GASTROSCOPY, DUODENOSCOPY (EGD);;  Surgeon: Best Willett MD;  Location:  GI     ESOPHAGOSCOPY, GASTROSCOPY, DUODENOSCOPY (EGD), COMBINED N/A 6/13/2018    Procedure: COMBINED ESOPHAGOSCOPY, GASTROSCOPY, DUODENOSCOPY (EGD), BIOPSY SINGLE OR MULTIPLE;  gastroscopy;  Surgeon: Westley Gibbs MD;  Location:  GI     LAPAROSCOPIC GASTRIC SLEEVE  5/13/2013    Procedure: LAPAROSCOPIC GASTRIC SLEEVE;  Laparoscopic Sleeve Gastrectomy ;  Surgeon: Best Willett MD;  [facility] OR     LEEP TX, CERVICAL  1995    age 15     SEPTOPLASTY       THYROIDECTOMY Left 4/3/2018    Procedure: THYROIDECTOMY;  Left Thyroid Lobectomy And Ishtmusectomy;  Surgeon: Delia Harper MD;  [facility]     TONSILLECTOMY  age 20s     TONSILLECTOMY  2004?     wisdom teeth extraction       PHQ-9 SCORE 11/9/2017 5/10/2018 9/15/2018   PHQ-9 Total Score - - -   PHQ-9 Total Score MyChart - - 2 (Minimal depression)   PHQ-9 Total Score 8 7 2     MAXIMO-7 SCORE 9/28/2017 11/9/2017 9/15/2018   Total Score - - -   Total Score - - 3 (minimal anxiety)   Total Score 1 12 3     WHODAS 2.0 Total Score 9/26/2017 12/4/2018   Total Score 14 18   Total Score MyChart 14 18       She is  5 foot 11 inches.  Her overall goal is 175, obviously on hold until after the pregnancy.   She is currently in the obese range.  Her mood is good today.  We discussed health, relationship, communication and other issues.     She participates fully. Rapport is excellent.       Extended session due to complexity of case and length of interval.    Time in:  10:01  Time out:  10:54    Diagnosis:   Psychological factors affecting obesity (F54).   Major depression, recurrent, mild (F33.0).   Occupational Problems (Z56.9)    PLAN/RECOMMENDATIONS:   1. Ms. Catherine will return 2/21 @ 10:00 to address weight loss and social issues with problem solving therapy.   She  wishes to continue to get support here with her life changes.  2.  Resume schedule of regular exercise to the level that is possible.    3.  I gave her a referral for Dr. Smith for the father, who she thinks is depressed and overwhelmed.    Tx plan due 6/27/2019

## 2019-02-04 ENCOUNTER — HOSPITAL ENCOUNTER (OUTPATIENT)
Dept: ULTRASOUND IMAGING | Facility: CLINIC | Age: 39
Discharge: HOME | End: 2019-02-04
Attending: OBSTETRICS & GYNECOLOGY | Admitting: OBSTETRICS & GYNECOLOGY
Payer: COMMERCIAL

## 2019-02-04 ENCOUNTER — OFFICE VISIT (OUTPATIENT)
Dept: MATERNAL FETAL MEDICINE | Facility: CLINIC | Age: 39
End: 2019-02-04
Attending: OBSTETRICS & GYNECOLOGY
Payer: COMMERCIAL

## 2019-02-04 DIAGNOSIS — O09.522 ELDERLY MULTIGRAVIDA IN SECOND TRIMESTER: Primary | ICD-10-CM

## 2019-02-04 DIAGNOSIS — O26.90 PREGNANCY RELATED CONDITION, ANTEPARTUM: ICD-10-CM

## 2019-02-04 PROCEDURE — 76811 OB US DETAILED SNGL FETUS: CPT

## 2019-02-04 NOTE — PROGRESS NOTES
Please see full imaging report from ViewPoint program under imaging tab.      Frankie Torres MD  Maternal Fetal Medicine

## 2019-02-21 ENCOUNTER — OFFICE VISIT (OUTPATIENT)
Dept: PSYCHOLOGY | Facility: CLINIC | Age: 39
End: 2019-02-21
Payer: COMMERCIAL

## 2019-02-21 VITALS — BODY MASS INDEX: 33.45 KG/M2 | WEIGHT: 229.8 LBS

## 2019-02-21 DIAGNOSIS — F33.0 MAJOR DEPRESSIVE DISORDER, RECURRENT EPISODE, MILD (H): Primary | ICD-10-CM

## 2019-02-21 DIAGNOSIS — F54 PSYCHOLOGICAL FACTORS AFFECTING MORBID OBESITY (H): ICD-10-CM

## 2019-02-21 DIAGNOSIS — Z56.9 OCCUPATIONAL PROBLEM: ICD-10-CM

## 2019-02-21 DIAGNOSIS — E66.01 PSYCHOLOGICAL FACTORS AFFECTING MORBID OBESITY (H): ICD-10-CM

## 2019-02-21 SDOH — ECONOMIC STABILITY - INCOME SECURITY: UNSPECIFIED PROBLEMS RELATED TO EMPLOYMENT: Z56.9

## 2019-02-21 NOTE — PROGRESS NOTES
Health Psychology                  Clinic    Department of Medicine  Lis Chavez, Ph.D., L.P. [phone]                          [facility] Earline Anderson, Ph.D.,  L.P. [phone]                 3rd Floor, Clinic 3A  [place] Mail Code 744   Jayro Elias, Ph.D., A.B.IDALIA.P., L.P. [phone]     [address] Melissa Stokes, Ph.D., L.P. [phone]  [place] [address]    Health Psychology Follow-Up Note    Ashley Catherine is a 38-year-old single, Black woman referred initially for psychological consultation for workup for a gastric sleeve procedure who is seen for CBT-oriented therapy regardingt weight management, work stresses, and family and social matters.    She continues to gain weight as she is now pregnant with her first child.  She had concerns about genetic issues and saw a genetic counselor, and is now relieved.  She is feeling better physically..  She is experiencing fatigue, less nausea, and is still fairly mobile, but can only eat smaller amounts at a time.     Socially, her relationship with the father has deteriorated.  She learned he was recently diagnosed with Asperger's, which was informative.  She feels it is unlikely she wants to have a relationship with him.  He is still going through a very contentious divorce, and is depressed, with less access to his children.   She had been been dating since August, but is not sure of the status.    Hestarted a new job, is described as an introvert.   She felt that his diagnosis helped solve her confusion about him, like a missing piece of a puzzle.  She wants him to be involved in the child's life.  She is planning to name her son, Rivera.   She is getting support from her parents and plans to continue to live with them.  She is starting to tell others about her pregnancy  She went on another date with another man.     She is willing  to continue  walking/exercising more as her energy allows.    She began Saxenda last month per Dr. Justice.        Today's weight is 229.8    Wt Readings from Last 4 Encounters:   02/21/19 104.2 kg (229 lb 12.8 oz)   01/16/19 102.8 kg (226 lb 9.6 oz)   12/04/18 98.8 kg (217 lb 12.8 oz)   11/29/18 98 kg (216 lb 2 oz)     Body mass index is 33.45 kg/m .     Current Outpatient Medications   Medication     Biotin 10 MG TABS tablet     Calcium-Vitamin D-Vitamin K 500-100-40 MG-UNT-MCG CHEW     cetirizine (ZYRTEC) 10 MG tablet     Cholecalciferol (VITAMIN D) 2000 UNITS CAPS     Cyanocobalamin (CVS B-12) 5000 MCG SUBL     cyclobenzaprine (FLEXERIL) 10 MG tablet     ferrous sulfate (IRON) 325 (65 FE) MG tablet     insulin pen needle 31G X 5 MM     labetalol (NORMODYNE) 100 MG tablet     MELATONIN PO     metroNIDAZOLE (METROGEL) 0.75 % vaginal gel     Multiple Vitamin (MULTIVITAMINS PO)     omeprazole (PRILOSEC) 40 MG capsule     polyethylene glycol (MIRALAX) powder     No current facility-administered medications for this visit.        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14    hospitalized at [facility] x 1 week     History of steroid therapy      HX ABNL PAP SMEAR OF CERVIX   1995    LEEP AT 15 yo     Hypertension      Hypertrophy of breast      Hypertrophy of tonsils alone      Hypovitaminosis D     with secondary high PTH     Irregular heart beat     palpitations     LBP (low back pain)      LSIL (low grade squamous intraepithelial lesion) on Pap smear 5/2006     Lumbago     RESOLVED     Major depressive disorder, recurrent episode, in partial or unspecified remission 4/1/2013     Morbid obesity (H)     bariatric surgery 5/13/2013     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)      OLIGOMENORRHEA, C/W  PCOS      Sciatica      SLEEP APNEA 6/2002    No longer has since tonsillectomy and septoplasty     Past Surgical History:   Procedure Laterality Date     BIOPSY  03/13/2015    Thyroid nodule     ESOPHAGOSCOPY, GASTROSCOPY, DUODENOSCOPY (EGD), COMBINED  5/28/2013    Procedure: COMBINED ESOPHAGOSCOPY, GASTROSCOPY, DUODENOSCOPY (EGD);;  Surgeon: Best Willett MD;  [facility]     ESOPHAGOSCOPY, GASTROSCOPY, DUODENOSCOPY (EGD), COMBINED N/A 6/13/2018    Procedure: COMBINED ESOPHAGOSCOPY, GASTROSCOPY, DUODENOSCOPY (EGD), BIOPSY SINGLE OR MULTIPLE;  gastroscopy;  Surgeon: Westley Gibbs MD;  Location:  GI     LAPAROSCOPIC GASTRIC SLEEVE  5/13/2013    Procedure: LAPAROSCOPIC GASTRIC SLEEVE;  Laparoscopic Sleeve Gastrectomy ;  Surgeon: Best Willett MD;  [facility]     LEEP TX, CERVICAL  1995    age 15     SEPTOPLASTY       THYROIDECTOMY Left 4/3/2018    Procedure: THYROIDECTOMY;  Left Thyroid Lobectomy And Ishtmusectomy;  Surgeon: Delia Harper MD;  [facility]     TONSILLECTOMY  age 20s     TONSILLECTOMY  2004?     wisdom teeth extraction       PHQ-9 SCORE 11/9/2017 5/10/2018 9/15/2018   PHQ-9 Total Score - - -   PHQ-9 Total Score MyChart - - 2 (Minimal depression)   PHQ-9 Total Score 8 7 2     MAXIMO-7 SCORE 9/28/2017 11/9/2017 9/15/2018   Total Score - - -   Total Score - - 3 (minimal anxiety)   Total Score 1 12 3     WHODAS 2.0 Total Score 9/26/2017 12/4/2018   Total Score 14 18   Total Score MyChart 14 18       She is  5 foot 11 inches.  Her overall goal is 175, obviously on hold until after the pregnancy.   She is currently in the obese range.  Her mood is good today.  We discussed health, relationship, communication and other issues.     She participates fully. Rapport is excellent.       Extended session due to complexity of case and length of interval.    Time in:  10:01  Time out:  10:55    Diagnosis:   Psychological factors affecting obesity (F54).   Major depression,  recurrent, mild (F33.0).   Occupational Problems (Z56.9)    PLAN/RECOMMENDATIONS:   1. Ms. Catherine will return 4/5 @ 10:00 to address weight loss and social issues with problem solving therapy.   She wishes to continue to get support here with her life changes.  2.  Resume schedule of regular exercise to the level that is possible.    3.  I gave her a referral for Dr. Smith for the father, who she thinks is depressed and overwhelmed.    Tx plan due 6/27/2019

## 2019-03-13 ENCOUNTER — THERAPY VISIT (OUTPATIENT)
Dept: PHYSICAL THERAPY | Facility: CLINIC | Age: 39
End: 2019-03-13
Payer: COMMERCIAL

## 2019-03-13 DIAGNOSIS — G89.29 CHRONIC BILATERAL BACK PAIN: ICD-10-CM

## 2019-03-13 DIAGNOSIS — M54.6 ACUTE LEFT-SIDED THORACIC BACK PAIN: ICD-10-CM

## 2019-03-13 DIAGNOSIS — M54.9 CHRONIC BILATERAL BACK PAIN: ICD-10-CM

## 2019-03-13 PROCEDURE — 97161 PT EVAL LOW COMPLEX 20 MIN: CPT | Mod: GP | Performed by: PHYSICAL THERAPIST

## 2019-03-13 PROCEDURE — 97110 THERAPEUTIC EXERCISES: CPT | Mod: GP | Performed by: PHYSICAL THERAPIST

## 2019-03-13 PROCEDURE — 97535 SELF CARE MNGMENT TRAINING: CPT | Mod: GP | Performed by: PHYSICAL THERAPIST

## 2019-03-13 NOTE — LETTER
[facility]  [address]  [place] [id number] [phone]    2019    Re: Ashley Catherine   :   1980  MRN:  [id number]   REFERRING PHYSICIAN:   Fili Londono    [facility]    Date of Initial Evaluation: 2019  Visits:  Rxs Used: 1  Reason for Referral:     Acute left-sided thoracic back pain  Chronic bilateral back pain    EVALUATION SUMMARY    [facility]    Subjective:  First pregnancy.  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  Had bariatric surgery in .  Mostly having upper back pain.  Breasts have grown to a size of 42M.  Had been at a 36.  Has not been exercising.  Has been working 12 hour shifts, usually 3 days.  [place]  [place] a gym membership to [employer].  Has a history of low back pain with HNP at L2, L3.  Also has numbness in left LE that has been there since before the pregnancy. Using heat on upper back.  Gets some relief lying down.    The history is provided by the patient. No  was used.   Ashley Catherine is a 38 year old female with a thoracic spine condition.           Patient reports pain:  Thoracic left side.  and is intermittent and reported as 5/10.   Pain is worse during the day.  Symptoms are exacerbated by change of position Relieved by: lying down.  Since onset symptoms are gradually worsening.                        Pertinent medical history includes:  Currently pregnant, depression, high blood pressure and overweight (Numbness/tingling). Other surgeries include: Other (Myradectomy, gastrectomy, tonsilectomy, ). Current medications:  High blood pressure medication and pain medication (Vitamins). Employment status: RN. Primary job tasks include:  Lifting, prolonged sitting and prolonged standing (Computer work, pushing/pulling).  Red flags:  None as  reported by the patient.    Objective:  Standing Alignment:    Cervical/Thoracic:  Forward head  Pelvic:  Iliac crest high L       Lumbar/SI Evaluation  ROM:    AROM Lumbar:   Flexion:        Hands to lower legs not pain today  Ext:                    50%   Side Bend:        Left:     Right:   Re: Ashley Catherine   :   1980      Rotation:           Left:     Right:   Side Glide:        Left:     Right:      Cervical/Thoracic Evaluation  Cervical AROM: normal     AROM:  AROM Thoracic:  Flexion:            Mild pain on the left  Extension:           Rotation:            Left:     Right:    Headaches: none  Cervical Myotomes:  normal  Cervical Dermatomes:  normal  Cervical Palpation:    Tenderness present at Left:    Rhomboids and Erector Spinae  Functional Tests:  not assessed    Assessment/Plan:    Patient is a 38 year old female with thoracic and lumbar complaints.    Patient has the following significant findings with corresponding treatment plan.                Diagnosis 1:  Thoracic back pain  Pain -  manual therapy, self management, education and home program  Decreased ROM/flexibility - manual therapy, therapeutic exercise and home program  Decreased strength - therapeutic exercise, therapeutic activities and home program  Decreased function - therapeutic activities and home program  Diagnosis 2:  Low back pain   Pain -  manual therapy, self management, education and home program  Decreased ROM/flexibility - manual therapy, therapeutic exercise and home program  Decreased strength - therapeutic exercise, therapeutic activities and home program  Decreased function - therapeutic activities and home program    Therapy Evaluation Codes:   1) History comprised of:   Personal factors that impact the plan of care:      None.    Comorbidity factors that impact the plan of care are:      Overweight.     Medications impacting care: None.  2) Examination of Body Systems comprised of:   Body structures and functions  that impact the plan of care:      Lumbar spine and Thoracic Spine.   Activity limitations that impact the plan of care are:      Bending, Lifting, Standing and Working.  3) Clinical presentation characteristics are:   Stable/Uncomplicated.  4) Decision-Making    Low complexity using standardized patient assessment instrument and/or measureable assessment of functional outcome.  Cumulative Therapy Evaluation is: Low complexity.  Re: Ashley Catherine   :   1980        Previous and current functional limitations:  (See Goal Flow Sheet for this information)    Short term and Long term goals: (See Goal Flow Sheet for this information)     Communication ability:  Patient appears to be able to clearly communicate and understand verbal and written communication and follow directions correctly.  Treatment Explanation - The following has been discussed with the patient:   RX ordered/plan of care  Anticipated outcomes  Possible risks and side effects  This patient would benefit from PT intervention to resume normal activities.   Rehab potential is good.    Frequency:  2 X a month, once daily  Duration:  for 2 months  Discharge Plan:  Achieve all LTG.  Independent in home treatment program.  Reach maximal therapeutic benefit.    Thank you for your referral.    INQUIRIES  Therapist: Mirna Rizvi, PT, [facility]   [facility] PHYSICAL THERAPY  [address] [place] [address] [phone]  [facility] [address]  Phone: [phone]  Fax: [phone]

## 2019-03-13 NOTE — PROGRESS NOTES
[place] [facility] Initial Evaluation  Subjective:  First pregnancy.  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  Had bariatric surgery in 2013.  Mostly having upper back pain.  Breasts have grown to a size of 42M.  Had been at a 36.  Has not been exercising.  Has been working 12 hour shifts, usually 3 days.  [place]  Has a gym membership to [employer].  Has a history of low back pain with HNP at L2, L3.  Also has numbness in left LE that has been there since before the pregnancy.  Using heat on upper back.  Gets some relief lying down.        The history is provided by the patient. No  was used.   Ashley Catherine is a 38 year old female with a thoracic spine condition.           Patient reports pain:  Thoracic left side.     and is intermittent and reported as 5/10.   Pain is worse during the day.  Symptoms are exacerbated by change of position Relieved by: lying down.  Since onset symptoms are gradually worsening.                              Red flags:  None as reported by the patient.                        Objective:  Standing Alignment:    Cervical/Thoracic:  Forward head      Pelvic:  Iliac crest high L                         Lumbar/SI Evaluation  ROM:    AROM Lumbar:   Flexion:        Hands to lower legs not pain today  Ext:                    50%   Side Bend:        Left:     Right:   Rotation:           Left:     Right:   Side Glide:        Left:     Right:                                  Cervical/Thoracic Evaluation  Cervical AROM: normal     AROM:    AROM Thoracic:    Flexion:            Mild pain on the left  Extension:           Rotation:            Left:     Right:      Headaches: none  Cervical Myotomes:  normal                      Cervical Dermatomes:  normal                    Cervical Palpation:    Tenderness present at Left:    Rhomboids and Erector Spinae    Functional Tests:  not assessed                                                   General     ROS    Assessment/Plan:    Patient is a 38 year old female with thoracic and lumbar complaints.    Patient has the following significant findings with corresponding treatment plan.                Diagnosis 1:  Thoracic back pain  Pain -  manual therapy, self management, education and home program  Decreased ROM/flexibility - manual therapy, therapeutic exercise and home program  Decreased strength - therapeutic exercise, therapeutic activities and home program  Decreased function - therapeutic activities and home program  Diagnosis 2:  Low back pain   Pain -  manual therapy, self management, education and home program  Decreased ROM/flexibility - manual therapy, therapeutic exercise and home program  Decreased strength - therapeutic exercise, therapeutic activities and home program  Decreased function - therapeutic activities and home program    Therapy Evaluation Codes:   1) History comprised of:   Personal factors that impact the plan of care:      None.    Comorbidity factors that impact the plan of care are:      Overweight.     Medications impacting care: None.  2) Examination of Body Systems comprised of:   Body structures and functions that impact the plan of care:      Lumbar spine and Thoracic Spine.   Activity limitations that impact the plan of care are:      Bending, Lifting, Standing and Working.  3) Clinical presentation characteristics are:   Stable/Uncomplicated.  4) Decision-Making    Low complexity using standardized patient assessment instrument and/or measureable assessment of functional outcome.  Cumulative Therapy Evaluation is: Low complexity.    Previous and current functional limitations:  (See Goal Flow Sheet for this information)    Short term and Long term goals: (See Goal Flow Sheet for this information)     Communication ability:  Patient appears to be able to clearly communicate and understand verbal and written communication and follow directions  correctly.  Treatment Explanation - The following has been discussed with the patient:   RX ordered/plan of care  Anticipated outcomes  Possible risks and side effects  This patient would benefit from PT intervention to resume normal activities.   Rehab potential is good.    Frequency:  2 X a month, once daily  Duration:  for 2 months  Discharge Plan:  Achieve all LTG.  Independent in home treatment program.  Reach maximal therapeutic benefit.    Please refer to the daily flowsheet for treatment today, total treatment time and time spent performing 1:1 timed codes.

## 2019-03-13 NOTE — PROGRESS NOTES
[place] [facility] Initial Evaluation  Subjective:                                       Pertinent medical history includes:  Currently pregnant, depression, high blood pressure and overweight (Numbness/tingling).    Other surgeries include:  Other (Myradectomy, gastrectomy, tonsilectomy, ).  Current medications:  High blood pressure medication and pain medication (Vitamins).    Employment status: RN.  Primary job tasks include:  Lifting, prolonged sitting and prolonged standing (Computer work, pushing/pulling).                                Objective:  System    Physical Exam    General     ROS    Assessment/Plan:

## 2019-03-25 ENCOUNTER — THERAPY VISIT (OUTPATIENT)
Dept: PHYSICAL THERAPY | Facility: CLINIC | Age: 39
End: 2019-03-25
Payer: COMMERCIAL

## 2019-03-25 DIAGNOSIS — M54.9 CHRONIC BILATERAL BACK PAIN: ICD-10-CM

## 2019-03-25 DIAGNOSIS — M54.6 ACUTE LEFT-SIDED THORACIC BACK PAIN: ICD-10-CM

## 2019-03-25 DIAGNOSIS — G89.29 CHRONIC BILATERAL BACK PAIN: ICD-10-CM

## 2019-03-25 PROCEDURE — 97140 MANUAL THERAPY 1/> REGIONS: CPT | Mod: GP | Performed by: PHYSICAL THERAPIST

## 2019-03-25 PROCEDURE — 97110 THERAPEUTIC EXERCISES: CPT | Mod: GP | Performed by: PHYSICAL THERAPIST

## 2019-03-25 PROCEDURE — 97535 SELF CARE MNGMENT TRAINING: CPT | Mod: GP | Performed by: PHYSICAL THERAPIST

## 2019-04-04 ENCOUNTER — THERAPY VISIT (OUTPATIENT)
Dept: PHYSICAL THERAPY | Facility: CLINIC | Age: 39
End: 2019-04-04
Payer: COMMERCIAL

## 2019-04-04 DIAGNOSIS — M54.6 ACUTE LEFT-SIDED THORACIC BACK PAIN: ICD-10-CM

## 2019-04-04 DIAGNOSIS — M54.6 CHRONIC BILATERAL THORACIC BACK PAIN: ICD-10-CM

## 2019-04-04 DIAGNOSIS — G89.29 CHRONIC BILATERAL THORACIC BACK PAIN: ICD-10-CM

## 2019-04-04 PROCEDURE — 97140 MANUAL THERAPY 1/> REGIONS: CPT | Mod: GP | Performed by: PHYSICAL THERAPIST

## 2019-04-04 PROCEDURE — 97110 THERAPEUTIC EXERCISES: CPT | Mod: GP | Performed by: PHYSICAL THERAPIST

## 2019-04-04 NOTE — PROGRESS NOTES
Subjective:  HPI                    Objective:  System    Physical Exam    General     ROS    Assessment/Plan:    PROGRESS  REPORT    Progress reporting period is from 3/13/2019 to 4/4/2019.       SUBJECTIVE  Subjective changes noted by patient:  .  Subjective: Left sided thoracic pain continues and trying to do stretches while at work.  Wearing a back brace intermittently at work as well.  Pain usually begins after a few hours.  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     Current pain level is 5/10  .     Previous pain level was   Initial Pain level: 5/10.   Changes in function:  No significant change.  Adverse reaction to treatment or activity: None    OBJECTIVE  Changes noted in objective findings:    Objective: Increased tone left thoracic paraspinals and left rhomboid.  Able to get cavitation with upper thoracic manipulation.  Reviewed exercises and possibly use of upper back brace.       ASSESSMENT/PLAN  Updated problem list and treatment plan: Diagnosis 1:  Back pain  Pain -  manual therapy, self management, education and home program  Decreased ROM/flexibility - manual therapy, therapeutic exercise and home program  Decreased strength - therapeutic exercise, therapeutic activities and home program  Decreased function - therapeutic activities and home program  STG/LTGs have been met or progress has been made towards goals:  Yes (See Goal flow sheet completed today.)  Assessment of Progress: The patient's condition has potential to improve.  The patient's condition is unchanged.  Self Management Plans:  Patient has been instructed in a home treatment program.    Ashley continues to require the following intervention to meet STG and LTG's:  Patient needs to continue to work on the home exercise program.  Also encouraged patient to try pool for exercise to unload weight from upper back    Recommendations:  Work on home ex program, try upper back brace, and try pool for  exercise    Please refer to the daily flowsheet for treatment today, total treatment time and time spent performing 1:1 timed codes.

## 2019-04-04 NOTE — LETTER
[facility] ATHLETIC [facility] PHYSICAL [facility]  [address]  [facility] [id number] [phone]    2019    Re: Ashley Catherine   :   1980  MRN:  [id number]   REFERRING PHYSICIAN:   Fili Londono    [facility]    Date of Initial Evaluation:  2019  Visits:  Rxs Used: 3  Reason for Referral:     Acute left-sided thoracic back pain  Chronic bilateral thoracic back pain    Assessment/Plan:    PROGRESS  REPORT    Progress reporting period is from 3/13/2019 to 2019.       SUBJECTIVE  Subjective changes noted by patient:  .  Subjective: Left sided thoracic pain continues and trying to do stretches while at work.  Wearing a back brace intermittently at work as well.  Pain usually begins after a few hours.  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     Current pain level is 5/10  .     Previous pain level was   Initial Pain level: 5/10.   Changes in function:  No significant change.  Adverse reaction to treatment or activity: None    OBJECTIVE  Changes noted in objective findings:    Objective: Increased tone left thoracic paraspinals and left rhomboid.  Able to get cavitation with upper thoracic manipulation.  Reviewed exercises and possibly use of upper back brace.       ASSESSMENT/PLAN  Updated problem list and treatment plan: Diagnosis 1:  Back pain  Pain -  manual therapy, self management, education and home program  Decreased ROM/flexibility - manual therapy, therapeutic exercise and home program  Decreased strength - therapeutic exercise, therapeutic activities and home program  Decreased function - therapeutic activities and home program  STG/LTGs have been met or progress has been made towards goals:  Yes (See Goal flow sheet completed today.)  Assessment of Progress: The patient's condition has potential to improve.  The patient's condition is unchanged.  Self Management Plans:   Patient has been instructed in a home treatment program.    Ashley continues to require the following intervention to meet STG and LTG's:  Patient needs to continue to work on the home exercise program.  Also encouraged patient to try pool for exercise to unload weight from upper back    Recommendations:  Work on home ex program, try upper back brace, and try pool for exercise          Thank you for your referral.    INQUIRIES  Therapist:Mirna Rizvi   [facility] PHYSICAL THERAPY  [address] [place] [address] [address]  [facility] [address]  Phone: [phone]  Fax: [phone]

## 2019-04-05 ENCOUNTER — OFFICE VISIT (OUTPATIENT)
Dept: PSYCHOLOGY | Facility: CLINIC | Age: 39
End: 2019-04-05
Payer: COMMERCIAL

## 2019-04-05 DIAGNOSIS — F54 PSYCHOLOGICAL FACTORS AFFECTING MORBID OBESITY (H): ICD-10-CM

## 2019-04-05 DIAGNOSIS — F33.0 MAJOR DEPRESSIVE DISORDER, RECURRENT EPISODE, MILD (H): Primary | ICD-10-CM

## 2019-04-05 DIAGNOSIS — E66.01 PSYCHOLOGICAL FACTORS AFFECTING MORBID OBESITY (H): ICD-10-CM

## 2019-04-05 NOTE — PROGRESS NOTES
Health Psychology                  Clinic    Department of Medicine  Lis Chavez, Ph.D., L.P. [phone]                          [facility] Earline Anderson, Ph.D.,  L.P. [phone]                 3rd Floor, Clinic 3A  [place] Mail Code 746   Jayro Elias, Ph.D., A.B.IDALIA.P., L.P. [phone]     [address] Melissa Stokes, Ph.D., L.P. [phone]  [place] [address]    Health Psychology Follow-Up Note    Ashley Catherine is a 38-year-old single, Black woman referred initially for psychological consultation for workup for a gastric sleeve procedure who is seen for CBT-oriented therapy regardingt weight management, work stresses, and family and social matters.    She continues to gain weight as she is now pregnant with her first child.  She had concerns about genetic issues and saw a genetic counselor, and is now relieved.  She is feeling better physically..  She is experiencing fatigue, less nausea, and is still fairly mobile, but can only eat smaller amounts at a time.     Socially, her relationship with the father has deteriorated.  She learned he was recently diagnosed with Asperger's, which was informative.  She feels it is unlikely she wants to have a relationship with him.  He is still going through a very contentious divorce, and is depressed, with less access to his children.   She had been been dating since August, but is not sure of the status.    Hestarted a new job, is described as an introvert.   She felt that his diagnosis helped solve her confusion about him, like a missing piece of a puzzle.  She wants him to be involved in the child's life.  She is planning to name her son, Rivera.   She is getting support from her parents and plans to continue to live with them.  She is starting to tell others about her pregnancy  She went on another date with another man.     She is willing  to continue  walking/exercising more as her energy allows.    She began Saxenda last month per Dr. Justice.        Today's weight is 229.8    Wt Readings from Last 4 Encounters:   02/21/19 104.2 kg (229 lb 12.8 oz)   01/16/19 102.8 kg (226 lb 9.6 oz)   12/04/18 98.8 kg (217 lb 12.8 oz)   11/29/18 98 kg (216 lb 2 oz)     There is no height or weight on file to calculate BMI.     Current Outpatient Medications   Medication     Biotin 10 MG TABS tablet     Calcium-Vitamin D-Vitamin K 500-100-40 MG-UNT-MCG CHEW     cetirizine (ZYRTEC) 10 MG tablet     Cholecalciferol (VITAMIN D) 2000 UNITS CAPS     Cyanocobalamin ([employer] B-12) 5000 MCG SUBL     cyclobenzaprine (FLEXERIL) 10 MG tablet     ferrous sulfate (IRON) 325 (65 FE) MG tablet     insulin pen needle 31G X 5 MM     labetalol (NORMODYNE) 100 MG tablet     MELATONIN PO     metroNIDAZOLE (METROGEL) 0.75 % vaginal gel     Multiple Vitamin (MULTIVITAMINS PO)     omeprazole (PRILOSEC) 40 MG capsule     polyethylene glycol (MIRALAX) powder     No current facility-administered medications for this visit.        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14    hospitalized at [facility] x 1 week     History of steroid therapy      HX ABNL PAP SMEAR OF CERVIX   1995    LEEP AT 15 yo     Hypertension      Hypertrophy of breast      Hypertrophy of tonsils alone      Hypovitaminosis D     with secondary high PTH     Irregular heart beat     palpitations     LBP (low back pain)      LSIL (low grade squamous intraepithelial lesion) on Pap smear 5/2006     Lumbago     RESOLVED     Major depressive disorder, recurrent episode, in partial or unspecified remission 4/1/2013     Morbid obesity (H)     bariatric surgery 5/13/2013     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)       OLIGOMENORRHEA, C/W PCOS      Sciatica      SLEEP APNEA 6/2002    No longer has since tonsillectomy and septoplasty     Past Surgical History:   Procedure Laterality Date     BIOPSY  03/13/2015    Thyroid nodule     ESOPHAGOSCOPY, GASTROSCOPY, DUODENOSCOPY (EGD), COMBINED  5/28/2013    Procedure: COMBINED ESOPHAGOSCOPY, GASTROSCOPY, DUODENOSCOPY (EGD);;  Surgeon: Best Willett MD;  Location:  GI     ESOPHAGOSCOPY, GASTROSCOPY, DUODENOSCOPY (EGD), COMBINED N/A 6/13/2018    Procedure: COMBINED ESOPHAGOSCOPY, GASTROSCOPY, DUODENOSCOPY (EGD), BIOPSY SINGLE OR MULTIPLE;  gastroscopy;  Surgeon: Westley Gibbs MD;  Location:  GI     LAPAROSCOPIC GASTRIC SLEEVE  5/13/2013    Procedure: LAPAROSCOPIC GASTRIC SLEEVE;  Laparoscopic Sleeve Gastrectomy ;  Surgeon: Best Willett MD;  [facility]     LEEP TX, CERVICAL  1995    age 15     SEPTOPLASTY       THYROIDECTOMY Left 4/3/2018    Procedure: THYROIDECTOMY;  Left Thyroid Lobectomy And Ishtmusectomy;  Surgeon: Delia Harper MD;  [facility]     TONSILLECTOMY  age 20s     TONSILLECTOMY  2004?     wisdom teeth extraction       PHQ-9 SCORE 11/9/2017 5/10/2018 9/15/2018   PHQ-9 Total Score - - -   PHQ-9 Total Score MyChart - - 2 (Minimal depression)   PHQ-9 Total Score 8 7 2     MAXIMO-7 SCORE 9/28/2017 11/9/2017 9/15/2018   Total Score - - -   Total Score - - 3 (minimal anxiety)   Total Score 1 12 3     WHODAS 2.0 Total Score 9/26/2017 12/4/2018   Total Score 14 18   Total Score MyChart 14 18       She is  5 foot 11 inches.  Her long-term overall goal is 175, obviously on hold until after the pregnancy.   She is getting PT due to discomfort carrying the weight.We discussed pool therapy or exercise.  She is currently in the obese range. She feels bad about gaining weight given how much she has struggled with the weight.  She isn't eating much.  She reports a lot of heartburn.    She continues to work, noticing it is getting harder and  wondering how difficult it will be.  She  Is trying to have minimal contact with the father.  She is frustrated by his bad communication and dislikes him.  He is still going through a divorce and she doesn't trust him to take good care of the child.  She is hoping he wants to have nothing to do with the baby at this point.  At the end of the session we discussed dating.  She is feeling hopeless at this time about finding a mate, whereas that h is what she has wanted the most.      Her mood is a bit offtoday.  We discussed health, relationship, communication and other issues.     She participates fully. Rapport is excellent.       Extended session due to complexity of case and length of interval.    Time in:  10:01  Time out:  10:55    Diagnosis:   Psychological factors affecting obesity (F54).   Major depression, recurrent, mild (F33.0).   Occupational Problems (Z56.9)    PLAN/RECOMMENDATIONS:   1. Ms. Catherine will return 5/13 @ 1:00 to address weight loss and social issues with problem solving therapy.   She wishes to continue to get support here with her life changes.  2.  Resume schedule of regular exercise to the level that is possible.    3.  I gave her a referral for Dr. Smith for the father, who she thinks is depressed and overwhelmed.    Tx plan due 6/27/2019

## 2019-05-12 ASSESSMENT — PATIENT HEALTH QUESTIONNAIRE - PHQ9
SUM OF ALL RESPONSES TO PHQ QUESTIONS 1-9: 7
SUM OF ALL RESPONSES TO PHQ QUESTIONS 1-9: 7
10. IF YOU CHECKED OFF ANY PROBLEMS, HOW DIFFICULT HAVE THESE PROBLEMS MADE IT FOR YOU TO DO YOUR WORK, TAKE CARE OF THINGS AT HOME, OR GET ALONG WITH OTHER PEOPLE: SOMEWHAT DIFFICULT

## 2019-05-13 ENCOUNTER — OFFICE VISIT (OUTPATIENT)
Dept: PSYCHOLOGY | Facility: CLINIC | Age: 39
End: 2019-05-13
Payer: COMMERCIAL

## 2019-05-13 DIAGNOSIS — E66.01 PSYCHOLOGICAL FACTORS AFFECTING MORBID OBESITY (H): ICD-10-CM

## 2019-05-13 DIAGNOSIS — F33.0 MAJOR DEPRESSIVE DISORDER, RECURRENT EPISODE, MILD (H): Primary | ICD-10-CM

## 2019-05-13 DIAGNOSIS — F54 PSYCHOLOGICAL FACTORS AFFECTING MORBID OBESITY (H): ICD-10-CM

## 2019-05-13 DIAGNOSIS — Z56.9 OCCUPATIONAL PROBLEM: ICD-10-CM

## 2019-05-13 SDOH — ECONOMIC STABILITY - INCOME SECURITY: UNSPECIFIED PROBLEMS RELATED TO EMPLOYMENT: Z56.9

## 2019-05-13 ASSESSMENT — PATIENT HEALTH QUESTIONNAIRE - PHQ9: SUM OF ALL RESPONSES TO PHQ QUESTIONS 1-9: 7

## 2019-05-13 NOTE — PROGRESS NOTES
Health Psychology                  Clinic    Department of Medicine  Lis Chavez, Ph.D., L.P. [phone]                          [facility] Earline Anderson, Ph.D.,  L.P. [phone]                 3rd Floor, Clinic 3A  [place] Mail Code 748   Jayro Elias, Ph.D., A.ÁLVARO.ROB., L.P. [phone]     [address] [place] [address] Melissa Stokes, Ph.D., L.P. [phone]  [place] [address]    Health Psychology Follow-Up Note    Ashley Catherine is a 38-year-old single, Black woman referred initially for psychological consultation for workup for a gastric sleeve procedure who is seen for CBT-oriented therapy regardingt weight management, work stresses, and family and social matters.    She continues to gain weight as she is now pregnant with her first child.  She is feeling basically okay, but is getting frequent ultrasounds as she enters the final phase of the pregnancy.  Her due date is July..  She is planing on taking 6 weeks after the baby arrives.  She has been planning for his arrival, feeling she will be single-parenting  To the extent she can.  Her mother will be providing childcare.    We discussed her views of the father.  She is upset with his telegraphic communications  She has felt he is in the drier's seat.  We discussed strategies for her to feel less frustrated.   Socially, her relationship with the father has deteriorated.  She learned he wasdiagnosed with Asperger's, but has difficulty making allowances. She feels it is unlikely she wants to have a relationship with him.  He is still going through a very contentious divorce, and is depressed, with less access to his children.      Not weighed today.  Wt Readings from Last 4 Encounters:   02/21/19 104.2 kg (229 lb 12.8 oz)   01/16/19 102.8 kg (226 lb 9.6 oz)   12/04/18 98.8 kg (217 lb 12.8 oz)   11/29/18 98 kg (216 lb 2 oz)     There is no height or  weight on file to calculate BMI.     Current Outpatient Medications   Medication     Biotin 10 MG TABS tablet     Calcium-Vitamin D-Vitamin K 500-100-40 MG-UNT-MCG CHEW     cetirizine (ZYRTEC) 10 MG tablet     Cholecalciferol (VITAMIN D) 2000 UNITS CAPS     Cyanocobalamin ([employer]S B-12) 5000 MCG SUBL     cyclobenzaprine (FLEXERIL) 10 MG tablet     ferrous sulfate (IRON) 325 (65 FE) MG tablet     insulin pen needle 31G X 5 MM     labetalol (NORMODYNE) 100 MG tablet     MELATONIN PO     metroNIDAZOLE (METROGEL) 0.75 % vaginal gel     Multiple Vitamin (MULTIVITAMINS PO)     omeprazole (PRILOSEC) 40 MG capsule     polyethylene glycol (MIRALAX) powder     No current facility-administered medications for this visit.        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14    hospitalized at [facility] x 1 week     History of steroid therapy      HX ABNL PAP SMEAR OF CERVIX   1995    LEEP AT 15 yo     Hypertension      Hypertrophy of breast      Hypertrophy of tonsils alone      Hypovitaminosis D     with secondary high PTH     Irregular heart beat     palpitations     LBP (low back pain)      LSIL (low grade squamous intraepithelial lesion) on Pap smear 5/2006     Lumbago     RESOLVED     Major depressive disorder, recurrent episode, in partial or unspecified remission 4/1/2013     Morbid obesity (H)     bariatric surgery 5/13/2013     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)      OLIGOMENORRHEA, C/W PCOS      Sciatica      SLEEP APNEA 6/2002    No longer has since tonsillectomy and septoplasty     Past Surgical History:   Procedure Laterality Date     BIOPSY  03/13/2015    Thyroid nodule     ESOPHAGOSCOPY, GASTROSCOPY, DUODENOSCOPY (EGD), COMBINED  5/28/2013    Procedure: COMBINED ESOPHAGOSCOPY, GASTROSCOPY,  DUODENOSCOPY (EGD);;  Surgeon: Best Willett MD;  Location:  GI     ESOPHAGOSCOPY, GASTROSCOPY, DUODENOSCOPY (EGD), COMBINED N/A 6/13/2018    Procedure: COMBINED ESOPHAGOSCOPY, GASTROSCOPY, DUODENOSCOPY (EGD), BIOPSY SINGLE OR MULTIPLE;  gastroscopy;  Surgeon: Westley Gibbs MD;  Location: [facility]     LAPAROSCOPIC GASTRIC SLEEVE  5/13/2013    Procedure: LAPAROSCOPIC GASTRIC SLEEVE;  Laparoscopic Sleeve Gastrectomy ;  Surgeon: Best Willett MD;  [facility]     LEEP TX, CERVICAL  1995    age 15     SEPTOPLASTY       THYROIDECTOMY Left 4/3/2018    Procedure: THYROIDECTOMY;  Left Thyroid Lobectomy And Ishtmusectomy;  Surgeon: Delia Harper MD;  Location:  OR     TONSILLECTOMY  age 20s     TONSILLECTOMY  2004?     wisdom teeth extraction       PHQ-9 SCORE 5/10/2018 9/15/2018 5/12/2019   PHQ-9 Total Score - - -   PHQ-9 Total Score MyChart - 2 (Minimal depression) 7 (Mild depression)   PHQ-9 Total Score 7 2 7     MAXIMO-7 SCORE 9/28/2017 11/9/2017 9/15/2018   Total Score - - -   Total Score - - 3 (minimal anxiety)   Total Score 1 12 3     WHODAS 2.0 Total Score 9/26/2017 12/4/2018   Total Score 14 18   Total Score MyChart 14 18       She is  5 foot 11 inches.  Her long-term overall goal is 175, obviously on hold until after the pregnancy.       She continues to work, noticing it is getting harder.  Her mood is a bad low (see PHQ-9).  We discussed health, relationship, communication and other issues.     She participates fully. Rapport is excellent.       Extended session due to complexity of case and length of interval.    Time in:  1:01  Time out:  1:54    Diagnosis:   Psychological factors affecting obesity (F54).   Major depression, recurrent, mild (F33.0).   Occupational Problems (Z56.9)    PLAN/RECOMMENDATIONS:   1. Ms. Catherine will return 6/4 @ 2:00 to address weight loss and social issues with problem solving therapy.   She wishes to continue to get support here with her life  changes.  2.  Resume schedule of regular exercise to the level that is possible.    3.  I gave her a referral for Dr. Smith for the father, who she thinks is depressed and overwhelmed.    Tx plan due 6/27/2019

## 2019-05-23 PROBLEM — M54.9 CHRONIC BILATERAL BACK PAIN: Status: RESOLVED | Noted: 2019-03-13 | Resolved: 2019-05-23

## 2019-05-23 PROBLEM — M54.6 LEFT-SIDED THORACIC BACK PAIN: Status: RESOLVED | Noted: 2019-03-13 | Resolved: 2019-05-23

## 2019-05-23 PROBLEM — G89.29 CHRONIC BILATERAL BACK PAIN: Status: RESOLVED | Noted: 2019-03-13 | Resolved: 2019-05-23

## 2019-05-31 ENCOUNTER — MEDICAL CORRESPONDENCE (OUTPATIENT)
Dept: HEALTH INFORMATION MANAGEMENT | Facility: CLINIC | Age: 39
End: 2019-05-31

## 2019-06-04 ENCOUNTER — OFFICE VISIT (OUTPATIENT)
Dept: PSYCHOLOGY | Facility: CLINIC | Age: 39
End: 2019-06-04
Payer: COMMERCIAL

## 2019-06-04 DIAGNOSIS — F54 PSYCHOLOGICAL FACTORS AFFECTING MORBID OBESITY (H): ICD-10-CM

## 2019-06-04 DIAGNOSIS — F33.0 MAJOR DEPRESSIVE DISORDER, RECURRENT EPISODE, MILD (H): Primary | ICD-10-CM

## 2019-06-04 DIAGNOSIS — E66.01 PSYCHOLOGICAL FACTORS AFFECTING MORBID OBESITY (H): ICD-10-CM

## 2019-06-04 NOTE — PROGRESS NOTES
Health Psychology                  Clinic    Department of Medicine  Lis Chavez, Ph.D., L.P. [phone]                          [facility] Earline Anderson, Ph.D.,  L.P. [phone]                 3rd Floor, Clinic 3A  [place] Mail Code 740   Jayro Elias, Ph.D., A.B.IDALIA.P., L.P. [phone]     [address] [place] [address] Melissa Stokes, Ph.D., L.P. [phone]  [place] [address]    Health Psychology Follow-Up Note    Ashley Catherine is a 39-year-old single, Black woman referred initially for psychological consultation for workup for a gastric sleeve procedure who is seen for CBT-oriented therapy regardingt weight management, work stresses, and family and social matters.    She continues to gain weight as she is now 35 weeks pregnant with her first child, Rivera.  She is feeling basically okay, but is getting twice weekly ultrasounds as she enters the final phase of the pregnancy.  Her due date is July 5, but she anticipates she might have a caesarian section as early as June 21..  She is planing on taking 6 weeks after the baby arrives.  She has been planning for his arrival, feeling she will be single-parenting  To the extent she can.  Her mother will be providing childcare.    We discussed her views of the father.  She is upset with his telegraphic communications and lack of empathy.  She last texted him in April and has reservations about his involvement with their son given his being on the autism spectrum.  He is also avoiding her in that he is embroiled in a contentious divorce.  He has 6 other kids.   We discussed strategies for her to feel less frustrated.       Not weighed today.  She expresses concern about being socially isolated.  We called [employer] to see if there were any classes/groups for single mothers.  She is considering taking a new mother class.  Wt Readings from Last 4 Encounters:    02/21/19 104.2 kg (229 lb 12.8 oz)   01/16/19 102.8 kg (226 lb 9.6 oz)   12/04/18 98.8 kg (217 lb 12.8 oz)   11/29/18 98 kg (216 lb 2 oz)     There is no height or weight on file to calculate BMI.     Current Outpatient Medications   Medication     Biotin 10 MG TABS tablet     Calcium-Vitamin D-Vitamin K 500-100-40 MG-UNT-MCG CHEW     cetirizine (ZYRTEC) 10 MG tablet     Cholecalciferol (VITAMIN D) 2000 UNITS CAPS     Cyanocobalamin (CVS B-12) 5000 MCG SUBL     cyclobenzaprine (FLEXERIL) 10 MG tablet     ferrous sulfate (IRON) 325 (65 FE) MG tablet     insulin pen needle 31G X 5 MM     labetalol (NORMODYNE) 100 MG tablet     MELATONIN PO     metroNIDAZOLE (METROGEL) 0.75 % vaginal gel     Multiple Vitamin (MULTIVITAMINS PO)     omeprazole (PRILOSEC) 40 MG capsule     polyethylene glycol (MIRALAX) powder     No current facility-administered medications for this visit.        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14    hospitalized at [facility] x 1 week     History of steroid therapy      HX ABNL PAP SMEAR OF CERVIX   1995    LEEP AT 15 yo     Hypertension      Hypertrophy of breast      Hypertrophy of tonsils alone      Hypovitaminosis D     with secondary high PTH     Irregular heart beat     palpitations     LBP (low back pain)      LSIL (low grade squamous intraepithelial lesion) on Pap smear 5/2006     Lumbago     RESOLVED     Major depressive disorder, recurrent episode, in partial or unspecified remission 4/1/2013     Morbid obesity (H)     bariatric surgery 5/13/2013     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)      OLIGOMENORRHEA, C/W PCOS      Sciatica      SLEEP APNEA 6/2002    No longer has since tonsillectomy and septoplasty     Past Surgical History:   Procedure Laterality Date      BIOPSY  03/13/2015    Thyroid nodule     ESOPHAGOSCOPY, GASTROSCOPY, DUODENOSCOPY (EGD), COMBINED  5/28/2013    Procedure: COMBINED ESOPHAGOSCOPY, GASTROSCOPY, DUODENOSCOPY (EGD);;  Surgeon: Best Willett MD;  [facility]     ESOPHAGOSCOPY, GASTROSCOPY, DUODENOSCOPY (EGD), COMBINED N/A 6/13/2018    Procedure: COMBINED ESOPHAGOSCOPY, GASTROSCOPY, DUODENOSCOPY (EGD), BIOPSY SINGLE OR MULTIPLE;  gastroscopy;  Surgeon: Westley Gibbs MD;  Location:  GI     LAPAROSCOPIC GASTRIC SLEEVE  5/13/2013    Procedure: LAPAROSCOPIC GASTRIC SLEEVE;  Laparoscopic Sleeve Gastrectomy ;  Surgeon: Best Willett MD;  [facility]     LEEP TX, CERVICAL  1995    age 15     SEPTOPLASTY       THYROIDECTOMY Left 4/3/2018    Procedure: THYROIDECTOMY;  Left Thyroid Lobectomy And Ishtmusectomy;  Surgeon: Delia Harper MD;  [facility]     TONSILLECTOMY  age 20s     TONSILLECTOMY  2004?     wisdom teeth extraction       PHQ-9 SCORE 5/10/2018 9/15/2018 5/12/2019   PHQ-9 Total Score - - -   PHQ-9 Total Score MyChart - 2 (Minimal depression) 7 (Mild depression)   PHQ-9 Total Score 7 2 7     MAXIMO-7 SCORE 9/28/2017 11/9/2017 9/15/2018   Total Score - - -   Total Score - - 3 (minimal anxiety)   Total Score 1 12 3     WHODAS 2.0 Total Score 9/26/2017 12/4/2018   Total Score 14 18   Total Score MyChart 14 18       She is  5 foot 11 inches.  Her long-term overall goal is 175, obviously on hold until after the pregnancy.       She continues to work, noticing it is getting harder.      She participates fully. Rapport is excellent.       Extended session due to complexity of case and length of interval.    Time in:  2:04  Time out:  2:57    Diagnosis:   Psychological factors affecting obesity (F54).   Major depression, recurrent, mild (F33.0).   Occupational Problems (Z56.9)    PLAN/RECOMMENDATIONS:   1. Ms. Catherine will return 7/26 @ 1:00 to address weight loss and social issues with problem solving therapy.   She wishes  to continue to get support here with her life changes.  2.  Resume schedule of regular exercise to the level that is possible.    3.  I gave her a referral for Dr. Smith for the father, who she thinks is depressed and overwhelmed.    Tx plan due 6/27/2019   (next session)

## 2019-06-07 ENCOUNTER — MEDICAL CORRESPONDENCE (OUTPATIENT)
Dept: HEALTH INFORMATION MANAGEMENT | Facility: CLINIC | Age: 39
End: 2019-06-07

## 2019-06-07 LAB — GROUP B STREP PCR: NORMAL

## 2019-06-25 ENCOUNTER — HOSPITAL ENCOUNTER (INPATIENT)
Facility: CLINIC | Age: 39
LOS: 3 days | Discharge: HOME | End: 2019-06-28
Attending: OBSTETRICS & GYNECOLOGY | Admitting: OBSTETRICS & GYNECOLOGY
Payer: COMMERCIAL

## 2019-06-25 LAB
ABO + RH BLD: NORMAL
ABO + RH BLD: NORMAL
BASOPHILS # BLD AUTO: 0 10E9/L (ref 0–0.2)
BASOPHILS NFR BLD AUTO: 0.2 %
BLD GP AB SCN SERPL QL: NORMAL
BLOOD BANK CMNT PATIENT-IMP: NORMAL
DIFFERENTIAL METHOD BLD: ABNORMAL
EOSINOPHIL # BLD AUTO: 0.1 10E9/L (ref 0–0.7)
EOSINOPHIL NFR BLD AUTO: 0.7 %
ERYTHROCYTE [DISTWIDTH] IN BLOOD BY AUTOMATED COUNT: 15.7 % (ref 10–15)
HCT VFR BLD AUTO: 32.5 % (ref 35–47)
HGB BLD-MCNC: 11 G/DL (ref 11.7–15.7)
IMM GRANULOCYTES # BLD: 0.1 10E9/L (ref 0–0.4)
IMM GRANULOCYTES NFR BLD: 1 %
LYMPHOCYTES # BLD AUTO: 1.4 10E9/L (ref 0.8–5.3)
LYMPHOCYTES NFR BLD AUTO: 16.4 %
MCH RBC QN AUTO: 31.4 PG (ref 26.5–33)
MCHC RBC AUTO-ENTMCNC: 33.8 G/DL (ref 31.5–36.5)
MCV RBC AUTO: 93 FL (ref 78–100)
MONOCYTES # BLD AUTO: 0.8 10E9/L (ref 0–1.3)
MONOCYTES NFR BLD AUTO: 9.7 %
NEUTROPHILS # BLD AUTO: 6.2 10E9/L (ref 1.6–8.3)
NEUTROPHILS NFR BLD AUTO: 72 %
NRBC # BLD AUTO: 0 10*3/UL
NRBC BLD AUTO-RTO: 0 /100
PLATELET # BLD AUTO: 248 10E9/L (ref 150–450)
RBC # BLD AUTO: 3.5 10E12/L (ref 3.8–5.2)
SPECIMEN EXP DATE BLD: NORMAL
WBC # BLD AUTO: 8.6 10E9/L (ref 4–11)

## 2019-06-25 PROCEDURE — 86850 RBC ANTIBODY SCREEN: CPT | Performed by: OBSTETRICS & GYNECOLOGY

## 2019-06-25 PROCEDURE — 86900 BLOOD TYPING SEROLOGIC ABO: CPT | Performed by: OBSTETRICS & GYNECOLOGY

## 2019-06-25 PROCEDURE — 85025 COMPLETE CBC W/AUTO DIFF WBC: CPT | Performed by: OBSTETRICS & GYNECOLOGY

## 2019-06-25 PROCEDURE — 86901 BLOOD TYPING SEROLOGIC RH(D): CPT | Performed by: OBSTETRICS & GYNECOLOGY

## 2019-06-25 PROCEDURE — 36415 COLL VENOUS BLD VENIPUNCTURE: CPT | Performed by: OBSTETRICS & GYNECOLOGY

## 2019-06-25 PROCEDURE — 12000000 ZZH R&B MED SURG/OB

## 2019-06-25 PROCEDURE — 86780 TREPONEMA PALLIDUM: CPT | Performed by: OBSTETRICS & GYNECOLOGY

## 2019-06-25 PROCEDURE — 25000132 ZZH RX MED GY IP 250 OP 250 PS 637: Performed by: OBSTETRICS & GYNECOLOGY

## 2019-06-25 RX ORDER — METHYLERGONOVINE MALEATE 0.2 MG/ML
200 INJECTION INTRAVENOUS
Status: DISCONTINUED | OUTPATIENT
Start: 2019-06-25 | End: 2019-06-28 | Stop reason: HOSPADM

## 2019-06-25 RX ORDER — KETOROLAC TROMETHAMINE 30 MG/ML
INJECTION, SOLUTION INTRAMUSCULAR; INTRAVENOUS
Status: DISCONTINUED
Start: 2019-06-25 | End: 2019-06-26 | Stop reason: WASHOUT

## 2019-06-25 RX ORDER — OXYCODONE AND ACETAMINOPHEN 5; 325 MG/1; MG/1
1 TABLET ORAL
Status: DISCONTINUED | OUTPATIENT
Start: 2019-06-25 | End: 2019-06-28 | Stop reason: HOSPADM

## 2019-06-25 RX ORDER — NALOXONE HYDROCHLORIDE 0.4 MG/ML
.1-.4 INJECTION, SOLUTION INTRAMUSCULAR; INTRAVENOUS; SUBCUTANEOUS
Status: DISCONTINUED | OUTPATIENT
Start: 2019-06-25 | End: 2019-06-28 | Stop reason: HOSPADM

## 2019-06-25 RX ORDER — ZOLPIDEM TARTRATE 5 MG/1
5 TABLET ORAL
Status: DISCONTINUED | OUTPATIENT
Start: 2019-06-25 | End: 2019-06-28 | Stop reason: HOSPADM

## 2019-06-25 RX ORDER — SODIUM CHLORIDE, SODIUM LACTATE, POTASSIUM CHLORIDE, CALCIUM CHLORIDE 600; 310; 30; 20 MG/100ML; MG/100ML; MG/100ML; MG/100ML
INJECTION, SOLUTION INTRAVENOUS CONTINUOUS
Status: DISCONTINUED | OUTPATIENT
Start: 2019-06-25 | End: 2019-06-28 | Stop reason: HOSPADM

## 2019-06-25 RX ORDER — ACETAMINOPHEN 325 MG/1
650 TABLET ORAL EVERY 4 HOURS PRN
Status: DISCONTINUED | OUTPATIENT
Start: 2019-06-25 | End: 2019-06-28 | Stop reason: HOSPADM

## 2019-06-25 RX ORDER — IBUPROFEN 400 MG/1
800 TABLET, FILM COATED ORAL
Status: DISCONTINUED | OUTPATIENT
Start: 2019-06-25 | End: 2019-06-28 | Stop reason: HOSPADM

## 2019-06-25 RX ORDER — CARBOPROST TROMETHAMINE 250 UG/ML
250 INJECTION, SOLUTION INTRAMUSCULAR
Status: DISCONTINUED | OUTPATIENT
Start: 2019-06-25 | End: 2019-06-28 | Stop reason: HOSPADM

## 2019-06-25 RX ORDER — ONDANSETRON 2 MG/ML
4 INJECTION INTRAMUSCULAR; INTRAVENOUS EVERY 6 HOURS PRN
Status: DISCONTINUED | OUTPATIENT
Start: 2019-06-25 | End: 2019-06-28 | Stop reason: HOSPADM

## 2019-06-25 RX ORDER — LIDOCAINE 40 MG/G
CREAM TOPICAL
Status: DISCONTINUED | OUTPATIENT
Start: 2019-06-25 | End: 2019-06-28 | Stop reason: HOSPADM

## 2019-06-25 RX ORDER — FENTANYL CITRATE 50 UG/ML
INJECTION, SOLUTION INTRAMUSCULAR; INTRAVENOUS
Status: COMPLETED
Start: 2019-06-25 | End: 2019-06-26

## 2019-06-25 RX ORDER — OXYTOCIN/0.9 % SODIUM CHLORIDE 30/500 ML
100-340 PLASTIC BAG, INJECTION (ML) INTRAVENOUS CONTINUOUS PRN
Status: COMPLETED | OUTPATIENT
Start: 2019-06-25 | End: 2019-06-27

## 2019-06-25 RX ORDER — OXYTOCIN 10 [USP'U]/ML
10 INJECTION, SOLUTION INTRAMUSCULAR; INTRAVENOUS
Status: DISCONTINUED | OUTPATIENT
Start: 2019-06-25 | End: 2019-06-28 | Stop reason: HOSPADM

## 2019-06-25 RX ORDER — PRENATAL VIT/IRON FUM/FOLIC AC 27MG-0.8MG
1 TABLET ORAL DAILY
COMMUNITY
End: 2019-07-05

## 2019-06-25 RX ORDER — LABETALOL 100 MG/1
100 TABLET, FILM COATED ORAL EVERY 12 HOURS SCHEDULED
Status: DISCONTINUED | OUTPATIENT
Start: 2019-06-25 | End: 2019-06-28 | Stop reason: HOSPADM

## 2019-06-25 RX ADMIN — RANITIDINE 150 MG: 150 TABLET ORAL at 20:47

## 2019-06-25 RX ADMIN — LABETALOL HYDROCHLORIDE 100 MG: 100 TABLET, FILM COATED ORAL at 20:46

## 2019-06-25 ASSESSMENT — MIFFLIN-ST. JEOR: SCORE: 1829.71

## 2019-06-25 NOTE — H&P
Ashley Catherine is a 39 year old  @ 38w4d who presents for IOL. PMH significant for chronic HTN well controlled on labetalol 100 mg bid. IOL was initially planned for 37-38 weeks, but due to excellent control of blood pressures and unfavorable cervix, induction was delayed to 39 weeks. In the last day, she has noted some decreased fetal movement, therefore decision made to proceed with induction.     FHT: 150s, mod variability, no decels, +accels  SVE 1/60/-2, soft, mid position  EFW 8.5#  GBS negative    Cook balloon placement: Speculum placed, cervix prepped with betadine. Cook catheter advanced into cervix. Uterine and vaginal balloons inflated to 70 ml sterile saline. Pt tolerated without difficulty.    A/P: 39 year old  @ 38w4d for IOL due to cHTN and decreased FM. Cook cervical ripening balloon placed.   - Regular diet  - Ambien 5 mg at HS prn  - Remove balloon at 0630, begin pitocin    Zhane N. Cho

## 2019-06-26 ENCOUNTER — ANESTHESIA EVENT (OUTPATIENT)
Dept: OBGYN | Facility: CLINIC | Age: 39
End: 2019-06-26
Payer: COMMERCIAL

## 2019-06-26 ENCOUNTER — ANESTHESIA (OUTPATIENT)
Dept: OBGYN | Facility: CLINIC | Age: 39
End: 2019-06-26
Payer: COMMERCIAL

## 2019-06-26 LAB — T PALLIDUM AB SER QL: NONREACTIVE

## 2019-06-26 PROCEDURE — 00HU33Z INSERTION OF INFUSION DEVICE INTO SPINAL CANAL, PERCUTANEOUS APPROACH: ICD-10-PCS | Performed by: ANESTHESIOLOGY

## 2019-06-26 PROCEDURE — 25000132 ZZH RX MED GY IP 250 OP 250 PS 637: Performed by: OBSTETRICS & GYNECOLOGY

## 2019-06-26 PROCEDURE — 3E0R3BZ INTRODUCTION OF ANESTHETIC AGENT INTO SPINAL CANAL, PERCUTANEOUS APPROACH: ICD-10-PCS | Performed by: ANESTHESIOLOGY

## 2019-06-26 PROCEDURE — 25000128 H RX IP 250 OP 636: Performed by: ANESTHESIOLOGY

## 2019-06-26 PROCEDURE — 25800030 ZZH RX IP 258 OP 636: Performed by: OBSTETRICS & GYNECOLOGY

## 2019-06-26 PROCEDURE — 25000128 H RX IP 250 OP 636: Performed by: OBSTETRICS & GYNECOLOGY

## 2019-06-26 PROCEDURE — 12000000 ZZH R&B MED SURG/OB

## 2019-06-26 PROCEDURE — 37000011 ZZH ANESTHESIA WARD SERVICE

## 2019-06-26 RX ORDER — BUPIVACAINE HYDROCHLORIDE 2.5 MG/ML
INJECTION, SOLUTION EPIDURAL; INFILTRATION; INTRACAUDAL PRN
Status: DISCONTINUED | OUTPATIENT
Start: 2019-06-26 | End: 2019-06-27 | Stop reason: HOSPADM

## 2019-06-26 RX ORDER — FENTANYL CITRATE 50 UG/ML
100 INJECTION, SOLUTION INTRAMUSCULAR; INTRAVENOUS ONCE
Status: COMPLETED | OUTPATIENT
Start: 2019-06-26 | End: 2019-06-26

## 2019-06-26 RX ORDER — ROPIVACAINE HYDROCHLORIDE 2 MG/ML
10 INJECTION, SOLUTION EPIDURAL; INFILTRATION; PERINEURAL ONCE
Status: DISCONTINUED | OUTPATIENT
Start: 2019-06-26 | End: 2019-06-28 | Stop reason: HOSPADM

## 2019-06-26 RX ORDER — BUPIVACAINE HYDROCHLORIDE AND EPINEPHRINE 2.5; 5 MG/ML; UG/ML
10 INJECTION, SOLUTION EPIDURAL; INFILTRATION; INTRACAUDAL; PERINEURAL ONCE
Status: DISCONTINUED | OUTPATIENT
Start: 2019-06-26 | End: 2019-06-26

## 2019-06-26 RX ORDER — ONDANSETRON 4 MG/1
4 TABLET, ORALLY DISINTEGRATING ORAL EVERY 6 HOURS PRN
Status: DISCONTINUED | OUTPATIENT
Start: 2019-06-26 | End: 2019-06-28 | Stop reason: HOSPADM

## 2019-06-26 RX ORDER — NALOXONE HYDROCHLORIDE 0.4 MG/ML
.1-.4 INJECTION, SOLUTION INTRAMUSCULAR; INTRAVENOUS; SUBCUTANEOUS
Status: DISCONTINUED | OUTPATIENT
Start: 2019-06-26 | End: 2019-06-28 | Stop reason: HOSPADM

## 2019-06-26 RX ORDER — ONDANSETRON 2 MG/ML
4 INJECTION INTRAMUSCULAR; INTRAVENOUS EVERY 6 HOURS PRN
Status: DISCONTINUED | OUTPATIENT
Start: 2019-06-26 | End: 2019-06-28 | Stop reason: HOSPADM

## 2019-06-26 RX ORDER — EPHEDRINE SULFATE 50 MG/ML
5 INJECTION, SOLUTION INTRAMUSCULAR; INTRAVENOUS; SUBCUTANEOUS
Status: DISCONTINUED | OUTPATIENT
Start: 2019-06-26 | End: 2019-06-28 | Stop reason: HOSPADM

## 2019-06-26 RX ORDER — LIDOCAINE 40 MG/G
CREAM TOPICAL
Status: DISCONTINUED | OUTPATIENT
Start: 2019-06-26 | End: 2019-06-28 | Stop reason: HOSPADM

## 2019-06-26 RX ORDER — NALBUPHINE HYDROCHLORIDE 10 MG/ML
2.5-5 INJECTION, SOLUTION INTRAMUSCULAR; INTRAVENOUS; SUBCUTANEOUS EVERY 6 HOURS PRN
Status: DISCONTINUED | OUTPATIENT
Start: 2019-06-26 | End: 2019-06-28 | Stop reason: HOSPADM

## 2019-06-26 RX ORDER — SODIUM CHLORIDE, SODIUM LACTATE, POTASSIUM CHLORIDE, CALCIUM CHLORIDE 600; 310; 30; 20 MG/100ML; MG/100ML; MG/100ML; MG/100ML
INJECTION, SOLUTION INTRAVENOUS CONTINUOUS
Status: DISCONTINUED | OUTPATIENT
Start: 2019-06-26 | End: 2019-06-28 | Stop reason: HOSPADM

## 2019-06-26 RX ORDER — ACETAMINOPHEN 325 MG/1
TABLET ORAL
Status: DISCONTINUED
Start: 2019-06-26 | End: 2019-06-26 | Stop reason: HOSPADM

## 2019-06-26 RX ORDER — OXYTOCIN/0.9 % SODIUM CHLORIDE 30/500 ML
1-24 PLASTIC BAG, INJECTION (ML) INTRAVENOUS CONTINUOUS
Status: DISCONTINUED | OUTPATIENT
Start: 2019-06-26 | End: 2019-06-28 | Stop reason: HOSPADM

## 2019-06-26 RX ORDER — BUPIVACAINE HYDROCHLORIDE 2.5 MG/ML
INJECTION, SOLUTION EPIDURAL; INFILTRATION; INTRACAUDAL
Status: COMPLETED
Start: 2019-06-26 | End: 2019-06-26

## 2019-06-26 RX ADMIN — ACETAMINOPHEN 650 MG: 325 TABLET, FILM COATED ORAL at 18:02

## 2019-06-26 RX ADMIN — SODIUM CHLORIDE, POTASSIUM CHLORIDE, SODIUM LACTATE AND CALCIUM CHLORIDE: 600; 310; 30; 20 INJECTION, SOLUTION INTRAVENOUS at 06:48

## 2019-06-26 RX ADMIN — BUPIVACAINE HYDROCHLORIDE 10 ML: 2.5 INJECTION, SOLUTION EPIDURAL; INFILTRATION; INTRACAUDAL at 21:34

## 2019-06-26 RX ADMIN — OXYTOCIN 2 MILLI-UNITS/MIN: 10 INJECTION INTRAVENOUS at 06:48

## 2019-06-26 RX ADMIN — Medication 12 ML/HR: at 10:40

## 2019-06-26 RX ADMIN — RANITIDINE 150 MG: 150 TABLET ORAL at 08:00

## 2019-06-26 RX ADMIN — RANITIDINE 150 MG: 150 TABLET ORAL at 20:50

## 2019-06-26 RX ADMIN — LABETALOL HYDROCHLORIDE 100 MG: 100 TABLET, FILM COATED ORAL at 08:00

## 2019-06-26 RX ADMIN — LABETALOL HYDROCHLORIDE 100 MG: 100 TABLET, FILM COATED ORAL at 20:50

## 2019-06-26 RX ADMIN — SODIUM CHLORIDE, POTASSIUM CHLORIDE, SODIUM LACTATE AND CALCIUM CHLORIDE: 600; 310; 30; 20 INJECTION, SOLUTION INTRAVENOUS at 09:51

## 2019-06-26 RX ADMIN — FENTANYL CITRATE 100 MCG: 50 INJECTION, SOLUTION INTRAMUSCULAR; INTRAVENOUS at 21:30

## 2019-06-26 RX ADMIN — SODIUM CHLORIDE, POTASSIUM CHLORIDE, SODIUM LACTATE AND CALCIUM CHLORIDE: 600; 310; 30; 20 INJECTION, SOLUTION INTRAVENOUS at 16:40

## 2019-06-26 ASSESSMENT — ACTIVITIES OF DAILY LIVING (ADL)
SWALLOWING: 0-->SWALLOWS FOODS/LIQUIDS WITHOUT DIFFICULTY
TRANSFERRING: 0-->INDEPENDENT
TOILETING: 0-->INDEPENDENT
RETIRED_EATING: 0-->INDEPENDENT
FALL_HISTORY_WITHIN_LAST_SIX_MONTHS: NO
BATHING: 0-->INDEPENDENT
AMBULATION: 0-->INDEPENDENT
COGNITION: 0 - NO COGNITION ISSUES REPORTED
DRESS: 0-->INDEPENDENT
RETIRED_COMMUNICATION: 0-->UNDERSTANDS/COMMUNICATES WITHOUT DIFFICULTY

## 2019-06-26 NOTE — PLAN OF CARE
Assumed care at 0400.  Pt remained sleeping and comfortable.  Plan to remove Cooks catheter at 0630 and to start pitocin.      Cooks catheter removed at 0640.  Pt tolerated well.  Pitocin started at 0648.

## 2019-06-26 NOTE — ANESTHESIA PROCEDURE NOTES
Peripheral nerve/Neuraxial procedure note : epidural catheter  Pre-Procedure  Performed by Joshua Navarro MD  Referred by OB  Location: OB      Pre-Anesthestic Checklist: patient identified, IV checked, risks and benefits discussed, informed consent, monitors and equipment checked, pre-op evaluation and at physician/surgeon's request    Timeout  Correct Patient: Yes   Correct Procedure: Yes   Correct Site: Yes   Correct Laterality: N/A   Correct Position: Yes   Site Marked: N/A   .   Procedure Documentation    .    Procedure:    Epidural catheter.  Insertion Site:L2-3  (midline approach) Injection technique: LORT saline   Local skin infiltrated with mL of 1% lidocaine.  RAYMON at 5 cm     Patient Prep;mask, sterile gloves, povidone-iodine 7.5% surgical scrub, patient draped.  .  Needle: Touhy needle Needle Gauge: 17.    Needle Length (Inches) 3.5  # of attempts: 1 and # of redirects:  .   . .  Catheter threaded easily  3 cm epidural space.  .   .    Assessment/Narrative  Paresthesias: No.  .  .  Aspiration negative for heme or CSF  . Test dose of 3 mL lidocaine 1.5% w/ 1:200,000 epinephrine at. Test dose negative for signs of intravascular, subdural or intrathecal injection. Comments:  No complications. Sterile Dressing. Discussed increased risk of worsening back pain or radiculopathy with her history. She had GB at age 14 which totally resolved. Placement of catheter was easy and no radicular symptoms or back pain.

## 2019-06-26 NOTE — PROGRESS NOTES
"Patient comfortable, minimal contractions, normal FM.     O:/63   Pulse 99   Temp 98.3  F (36.8  C) (Axillary)   Resp 16   Ht 1.784 m (5' 10.25\")   Wt 107 kg (236 lb)   LMP 09/28/2018   BMI 33.62 kg/m    FHT--135, moderate LTV, accels present.   SVE--3/70/-2. AROM clear fluid. FSE placed per RN request.   EFW 8#    Continue oxytocin induction. Epidural as needed.     Sara Cummins MD  "

## 2019-06-26 NOTE — PLAN OF CARE
0200  Report received will assume care.      0221  RN at bedside adjusting External US for 11 minutes fetal movement auscultated and reported by patient.  FHT audible more that what would trace with monitor.  Patient in right lateral position.  FHT traced for 30 seconds and range 120-135.

## 2019-06-26 NOTE — PROGRESS NOTES
Patient comfortable, no pain with contractions, no headache, no vision changes.     O: BP: 110/56 Systolic (24hrs), Av , Min:101 , Max:137   Diastolic (24hrs), Av, Min:50, Max:87    Gen--NAD  CV--RRR  SVE--5/70/-2, fetal caput noted, no descent of head  FHT--130, positive LTV, accels present, no decels  [place]--ctx every 1-4 minutes, MVUs >200. Oxytocin 14 mu/min now.    No significant cervical change, with adequate contractions for >1.5 hours now. Discussed labor pattern, concern for minimal change. If adequate MVUs >4-6 hours would recommend  section given suspected CPD and suspected large fetal head on US.    Sara Cummins MD

## 2019-06-26 NOTE — PLAN OF CARE
Patient arrives to labor and delivery for a cervical ripening due to chronic hypertension and with c/o decreased fetal movement.  Dr. SARAH Harris arrives at the bedside at 1820 and at 1830 she places a Cooks catheter cervically and places 70 ml into the uterus and 70 ml vagina.  The patient tolerated it well.  The patient wants a aqua K pad due to her chronic back pain.  The patient states her breast have enlarged and have become so heavy that her upper back is now affected by the enlargement.  Report is given to Archana Jacques Rn at 1920.

## 2019-06-26 NOTE — ANESTHESIA PREPROCEDURE EVALUATION
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14    hospitalized at [facility] x 1 week     History of steroid therapy      HX ABNL PAP SMEAR OF CERVIX       LEEP AT 15 yo     Hypertension      Hypertrophy of breast      Hypertrophy of tonsils alone      Hypovitaminosis D     with secondary high PTH     Irregular heart beat     palpitations     LBP (low back pain)      LSIL (low grade squamous intraepithelial lesion) on Pap smear 2006     Lumbago     RESOLVED     Major depressive disorder, recurrent episode, in partial or unspecified remission 2013     Morbid obesity (H)     bariatric surgery 2013     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)      OLIGOMENORRHEA, C/W PCOS      Sciatica      SLEEP APNEA 2002    No longer has since tonsillectomy and septoplasty     Past Surgical History:   Procedure Laterality Date     BIOPSY  2015    Thyroid nodule     ESOPHAGOSCOPY, GASTROSCOPY, DUODENOSCOPY (EGD), COMBINED  2013    Procedure: COMBINED ESOPHAGOSCOPY, GASTROSCOPY, DUODENOSCOPY (EGD);;  Surgeon: Best Willett MD;  Location: [facility]     ESOPHAGOSCOPY, GASTROSCOPY, DUODENOSCOPY (EGD), COMBINED N/A 2018    Procedure: COMBINED ESOPHAGOSCOPY, GASTROSCOPY, DUODENOSCOPY (EGD), BIOPSY SINGLE OR MULTIPLE;  gastroscopy;  Surgeon: Westley Gibbs MD;  Location: [facility]     LAPAROSCOPIC GASTRIC SLEEVE  2013    Procedure: LAPAROSCOPIC GASTRIC SLEEVE;  Laparoscopic Sleeve Gastrectomy ;  Surgeon: Best Willett MD;  Location: [facility] OR     LEEP TX, CERVICAL      age 15     partial  thyroidectomy  2018     SEPTOPLASTY       THYROIDECTOMY Left 4/3/2018    Procedure: THYROIDECTOMY;  Left Thyroid Lobectomy And Ishtmusectomy;  Surgeon: Delia Harper MD;  Location: [facility] OR     TONSILLECTOMY  age 20s     TONSILLECTOMY  2004?     wisdom teeth extraction         Anesthesia Evaluation     .             ROS/MED HX    ENT/Pulmonary:       Neurologic:     (+)other neuro [place]    Cardiovascular:     (+) hypertension----. : . . . :. Irregular Heartbeat/Palpitations, .       METS/Exercise Tolerance:     Hematologic:         Musculoskeletal: Comment: Low back pain with sciatica        GI/Hepatic:     (+) GERD       Renal/Genitourinary:         Endo:     (+) thyroid problem Obesity, .      Psychiatric:         Infectious Disease:         Malignancy:         Other:                                 Lab Results   Component Value Date    WBC 8.6 06/25/2019    HGB 11.0 (L) 06/25/2019    HCT 32.5 (L) 06/25/2019     06/25/2019    CRP <2.9 02/01/2016    SED 17 02/08/2016     11/14/2018    POTASSIUM 3.9 11/14/2018    CHLORIDE 106 11/14/2018    CO2 27 11/14/2018    BUN 5 (L) 11/14/2018    CR 0.62 11/14/2018    GLC 77 11/14/2018    PORFIRIO 8.4 (L) 11/14/2018    PHOS 3.2 01/20/2015    MAG 2.1 02/08/2016    ALBUMIN 3.2 (L) 11/14/2018    PROTTOTAL 6.7 (L) 11/14/2018    ALT 23 11/14/2018    AST 16 11/14/2018    ALKPHOS 49 11/14/2018    BILITOTAL 0.4 11/14/2018    LIPASE 79 09/11/2018    AMYLASE 60 09/11/2018    PTT 38 (H) 05/29/2013    INR 1.08 06/07/2013    TSH 1.08 06/07/2018    T4 0.92 10/04/2018    T3 121 09/11/2018    HCG Negative 04/03/2018    HCGS Negative 09/04/2010       Preop Vitals  BP Readings from Last 3 Encounters:   06/26/19 120/63   11/29/18 134/84   11/15/18 123/74    Pulse Readings from Last 3 Encounters:   06/25/19 99   11/29/18 79   11/14/18 88      Resp Readings from Last 3 Encounters:   06/26/19 16   11/29/18 14   11/08/18 18    SpO2 Readings from Last 3 Encounters:   11/29/18 99%  "  11/14/18 100%   11/08/18 100%      Temp Readings from Last 1 Encounters:   06/26/19 36.9  C (98.4  F) (Axillary)    Ht Readings from Last 1 Encounters:   06/25/19 1.784 m (5' 10.25\")      Wt Readings from Last 1 Encounters:   06/25/19 107 kg (236 lb)    Estimated body mass index is 33.62 kg/m  as calculated from the following:    Height as of this encounter: 1.784 m (5' 10.25\").    Weight as of this encounter: 107 kg (236 lb).       Anesthesia Plan      History & Physical Review      ASA Status:  2 .        Plan for     Discussed increased risk of pain or radiculopathy with her hx of low back pain.      Postoperative Care      Consents                 Joshua Navarro MD  "

## 2019-06-26 NOTE — PLAN OF CARE
1920  Report received will assume care.     Per report patient has chronic back-pain and an aqua K pad has been ordered - awaiting its delivery to the unit.    2120  Report to Nusrat HODGE RN.  Care transferred.

## 2019-06-26 NOTE — PLAN OF CARE
Dr Cummins at bedside at 1630 to evaluate pt.  No cervical change noted at this time.  Adequate contractions noted with IUPC in place.  Pitocin at 14 mu.   Baby heart rate is 130 with accels noted.  Pt repositioned at least every hour and using peanut ball.  Pt is comfortable with epidural.  No concerns at this time, will continue to monitor.

## 2019-06-27 ENCOUNTER — TELEPHONE (OUTPATIENT)
Dept: FAMILY MEDICINE | Facility: CLINIC | Age: 39
End: 2019-06-27

## 2019-06-27 LAB — HGB BLD-MCNC: 10.5 G/DL (ref 11.7–15.7)

## 2019-06-27 PROCEDURE — 72200001 ZZH LABOR CARE VAGINAL DELIVERY SINGLE

## 2019-06-27 PROCEDURE — 36415 COLL VENOUS BLD VENIPUNCTURE: CPT | Performed by: OBSTETRICS & GYNECOLOGY

## 2019-06-27 PROCEDURE — 25000128 H RX IP 250 OP 636: Performed by: OBSTETRICS & GYNECOLOGY

## 2019-06-27 PROCEDURE — 12000035 ZZH R&B POSTPARTUM

## 2019-06-27 PROCEDURE — 25000132 ZZH RX MED GY IP 250 OP 250 PS 637: Performed by: OBSTETRICS & GYNECOLOGY

## 2019-06-27 PROCEDURE — 85018 HEMOGLOBIN: CPT | Performed by: OBSTETRICS & GYNECOLOGY

## 2019-06-27 PROCEDURE — 10907ZC DRAINAGE OF AMNIOTIC FLUID, THERAPEUTIC FROM PRODUCTS OF CONCEPTION, VIA NATURAL OR ARTIFICIAL OPENING: ICD-10-PCS | Performed by: OBSTETRICS & GYNECOLOGY

## 2019-06-27 PROCEDURE — 10H07YZ INSERTION OF OTHER DEVICE INTO PRODUCTS OF CONCEPTION, VIA NATURAL OR ARTIFICIAL OPENING: ICD-10-PCS | Performed by: OBSTETRICS & GYNECOLOGY

## 2019-06-27 PROCEDURE — 25800030 ZZH RX IP 258 OP 636: Performed by: OBSTETRICS & GYNECOLOGY

## 2019-06-27 PROCEDURE — 0KQM0ZZ REPAIR PERINEUM MUSCLE, OPEN APPROACH: ICD-10-PCS | Performed by: OBSTETRICS & GYNECOLOGY

## 2019-06-27 PROCEDURE — 88307 TISSUE EXAM BY PATHOLOGIST: CPT | Mod: 26 | Performed by: OBSTETRICS & GYNECOLOGY

## 2019-06-27 PROCEDURE — 88307 TISSUE EXAM BY PATHOLOGIST: CPT | Performed by: OBSTETRICS & GYNECOLOGY

## 2019-06-27 RX ORDER — HYDROCORTISONE 2.5 %
CREAM (GRAM) TOPICAL 3 TIMES DAILY PRN
Status: DISCONTINUED | OUTPATIENT
Start: 2019-06-27 | End: 2019-06-28 | Stop reason: HOSPADM

## 2019-06-27 RX ORDER — POLYETHYLENE GLYCOL 3350 17 G/17G
17 POWDER, FOR SOLUTION ORAL DAILY PRN
Status: DISCONTINUED | OUTPATIENT
Start: 2019-06-27 | End: 2019-06-28 | Stop reason: HOSPADM

## 2019-06-27 RX ORDER — OXYTOCIN 10 [USP'U]/ML
10 INJECTION, SOLUTION INTRAMUSCULAR; INTRAVENOUS
Status: DISCONTINUED | OUTPATIENT
Start: 2019-06-27 | End: 2019-06-28 | Stop reason: HOSPADM

## 2019-06-27 RX ORDER — LANOLIN 100 %
OINTMENT (GRAM) TOPICAL
Status: DISCONTINUED | OUTPATIENT
Start: 2019-06-27 | End: 2019-06-28 | Stop reason: HOSPADM

## 2019-06-27 RX ORDER — CALCIUM CARBONATE 500 MG/1
500 TABLET, CHEWABLE ORAL DAILY PRN
Status: DISCONTINUED | OUTPATIENT
Start: 2019-06-27 | End: 2019-06-28 | Stop reason: HOSPADM

## 2019-06-27 RX ORDER — AMOXICILLIN 250 MG
1 CAPSULE ORAL 2 TIMES DAILY
Status: DISCONTINUED | OUTPATIENT
Start: 2019-06-27 | End: 2019-06-28 | Stop reason: HOSPADM

## 2019-06-27 RX ORDER — OXYTOCIN/0.9 % SODIUM CHLORIDE 30/500 ML
340 PLASTIC BAG, INJECTION (ML) INTRAVENOUS CONTINUOUS PRN
Status: DISCONTINUED | OUTPATIENT
Start: 2019-06-27 | End: 2019-06-28 | Stop reason: HOSPADM

## 2019-06-27 RX ORDER — OXYTOCIN/0.9 % SODIUM CHLORIDE 30/500 ML
100 PLASTIC BAG, INJECTION (ML) INTRAVENOUS CONTINUOUS
Status: DISCONTINUED | OUTPATIENT
Start: 2019-06-27 | End: 2019-06-28 | Stop reason: HOSPADM

## 2019-06-27 RX ORDER — PRENATAL VIT/IRON FUM/FOLIC AC 27MG-0.8MG
1 TABLET ORAL DAILY
Status: DISCONTINUED | OUTPATIENT
Start: 2019-06-27 | End: 2019-06-28 | Stop reason: HOSPADM

## 2019-06-27 RX ORDER — ACETAMINOPHEN 325 MG/1
650 TABLET ORAL EVERY 4 HOURS PRN
Status: DISCONTINUED | OUTPATIENT
Start: 2019-06-27 | End: 2019-06-28 | Stop reason: HOSPADM

## 2019-06-27 RX ORDER — MISOPROSTOL 200 UG/1
800 TABLET ORAL
Status: DISCONTINUED | OUTPATIENT
Start: 2019-06-27 | End: 2019-06-28 | Stop reason: HOSPADM

## 2019-06-27 RX ORDER — CARBOPROST TROMETHAMINE 250 UG/ML
250 INJECTION, SOLUTION INTRAMUSCULAR
Status: DISCONTINUED | OUTPATIENT
Start: 2019-06-27 | End: 2019-06-28 | Stop reason: HOSPADM

## 2019-06-27 RX ORDER — LABETALOL 100 MG/1
100 TABLET, FILM COATED ORAL EVERY 12 HOURS SCHEDULED
Status: DISCONTINUED | OUTPATIENT
Start: 2019-06-27 | End: 2019-06-28 | Stop reason: HOSPADM

## 2019-06-27 RX ORDER — IBUPROFEN 400 MG/1
800 TABLET, FILM COATED ORAL EVERY 6 HOURS PRN
Status: DISCONTINUED | OUTPATIENT
Start: 2019-06-27 | End: 2019-06-28 | Stop reason: HOSPADM

## 2019-06-27 RX ORDER — BISACODYL 10 MG
10 SUPPOSITORY, RECTAL RECTAL DAILY PRN
Status: DISCONTINUED | OUTPATIENT
Start: 2019-06-29 | End: 2019-06-28 | Stop reason: HOSPADM

## 2019-06-27 RX ORDER — AMOXICILLIN 250 MG
2 CAPSULE ORAL 2 TIMES DAILY
Status: DISCONTINUED | OUTPATIENT
Start: 2019-06-27 | End: 2019-06-28 | Stop reason: HOSPADM

## 2019-06-27 RX ORDER — NALOXONE HYDROCHLORIDE 0.4 MG/ML
.1-.4 INJECTION, SOLUTION INTRAMUSCULAR; INTRAVENOUS; SUBCUTANEOUS
Status: DISCONTINUED | OUTPATIENT
Start: 2019-06-27 | End: 2019-06-28 | Stop reason: HOSPADM

## 2019-06-27 RX ADMIN — ACETAMINOPHEN 650 MG: 325 TABLET, FILM COATED ORAL at 14:15

## 2019-06-27 RX ADMIN — RANITIDINE 150 MG: 150 TABLET ORAL at 08:04

## 2019-06-27 RX ADMIN — SENNOSIDES AND DOCUSATE SODIUM 2 TABLET: 8.6; 5 TABLET ORAL at 19:34

## 2019-06-27 RX ADMIN — Medication 500 MCG: at 08:02

## 2019-06-27 RX ADMIN — ACETAMINOPHEN 650 MG: 325 TABLET, FILM COATED ORAL at 08:02

## 2019-06-27 RX ADMIN — OXYTOCIN 340 ML/HR: 10 INJECTION INTRAVENOUS at 01:04

## 2019-06-27 RX ADMIN — LABETALOL HYDROCHLORIDE 100 MG: 100 TABLET, FILM COATED ORAL at 19:34

## 2019-06-27 RX ADMIN — PRENATAL VIT W/ FE FUMARATE-FA TAB 27-0.8 MG 1 TABLET: 27-0.8 TAB at 08:03

## 2019-06-27 RX ADMIN — IBUPROFEN 800 MG: 400 TABLET ORAL at 14:15

## 2019-06-27 RX ADMIN — LABETALOL HYDROCHLORIDE 100 MG: 100 TABLET, FILM COATED ORAL at 08:03

## 2019-06-27 RX ADMIN — ACETAMINOPHEN 650 MG: 325 TABLET, FILM COATED ORAL at 19:34

## 2019-06-27 RX ADMIN — IBUPROFEN 800 MG: 400 TABLET ORAL at 19:34

## 2019-06-27 RX ADMIN — ACETAMINOPHEN 650 MG: 325 TABLET, FILM COATED ORAL at 02:04

## 2019-06-27 RX ADMIN — SENNOSIDES AND DOCUSATE SODIUM 1 TABLET: 8.6; 5 TABLET ORAL at 08:04

## 2019-06-27 RX ADMIN — IBUPROFEN 800 MG: 400 TABLET ORAL at 02:04

## 2019-06-27 RX ADMIN — IBUPROFEN 800 MG: 400 TABLET ORAL at 08:02

## 2019-06-27 RX ADMIN — RANITIDINE 150 MG: 150 TABLET ORAL at 19:34

## 2019-06-27 NOTE — TELEPHONE ENCOUNTER
Spoke with mom and let her know that hospital will give directions at discharge based on what is going on with the baby and how soon they need to be seen for follow up.

## 2019-06-27 NOTE — PROGRESS NOTES
#1  Postpartum. .    Pt states doing well.  Pain well controlled.  Nursing.    Afebrile. VSS. HGB 10.5  Fundus firm. Scant flow.  Voiding w/o problems.  Passing flatus.  Ext: w/o edema or pain.    Normal postpartum course.    Plan routine postpartum care.

## 2019-06-27 NOTE — TELEPHONE ENCOUNTER
Spoke with Ashley and she will call in once they a discharged to schedule appointment for baby.      Carolyn MONTOYA

## 2019-06-27 NOTE — PROVIDER NOTIFICATION
06/26/19 2120   Provider Notification   Provider Name/Title Dr Cummins   Method of Notification Electronic Page;Phone   Request Evaluate - Remote   Notification Reason SVE;Status Update   Dr Cummins updated regarding patient SVE at 2100 as well as current measures to attempt for pain control. Per Dr Cummins to check in 2 hours or if warranted earlier.

## 2019-06-27 NOTE — PLAN OF CARE
Data: Ashley Catherine transferred to [facility] via wheelchair at 0320. Baby transferred via parent's arms.  Action: Receiving unit notified of transfer: Yes. Patient and family notified of room change. Report given to Nadja RUIZ RN. Belongings sent to receiving unit. Accompanied by Registered Nurse. Oriented patient to surroundings. Call light within reach. ID bands double-checked with receiving RN.  Response: Patient tolerated transfer and is stable.

## 2019-06-27 NOTE — PROVIDER NOTIFICATION
06/26/19 2154   Provider Notification   Provider Name/Title Dr Cummins   Method of Notification Electronic Page;Phone   Request Evaluate - Remote   Notification Reason SVE;Status Update   Provider updated regarding SVE performed at 2145. Per provider to re check patient at 2300 unless needing to earlier and to update provider as needed.

## 2019-06-27 NOTE — L&D DELIVERY NOTE
Ashley Catherine is a 39 year old  female,  1 para 0 with EDC 19, who was admitted for induction of labor at 38.5 weeks gestation due to chronic hypertension. Vaginal Group B Streptococcus culture was negative.  The estimated fetal weight was 8-8.5#.  She had a Cook balloon placed on 19, and this was removed on 19. AROM was performed after oxytocin augmentation at 3cm dilated. Labor progressed normally with IUPC placed at 5cm to confirm adequate MVUs.   Patient did receive an epidural for pain relief.  The patient achieved complete dilation at 2315. She went on to deliver a vigorous male infant at 0100 on  by . Apgars were  9 at one minute and 9 at five minutes. There was a tight nuchal cord reduced after delivery of fetal head. The placenta delivered spontaneously and intact, with a 3VC.  The patient had a second degree perineal laceration that was repaired with 3-0 vicryl.  QBL for the delivery was 227cc.  Sara Cummins MD

## 2019-06-27 NOTE — PLAN OF CARE
Patient, patient's mom, and infant transferred to room 410 accompanied by Elzbieta ARREAGA RN. Report received at bedside. ID bands double verified at this time. Patient and patient's mom oriented to room, call light, and plan of care for the night. Safety protocols including safe sleep and bulb syringe use reviewed. Feeding log reviewed as well. Questions and concerns addressed at this time. Encouraged to call for help/latch checks. Will continue to monitor.

## 2019-06-27 NOTE — PLAN OF CARE
Pt reports no relief from epidural bolus from CADD. MDA notified, and to re dose with different medication.

## 2019-06-27 NOTE — PROVIDER NOTIFICATION
06/26/19 3036   Provider Notification   Provider Name/Title Dr Cummins   Method of Notification Electronic Page;Phone   Request Evaluate - Remote   Notification Reason SVE;Status Update   Provider aware of patient SVE and fetal station. Per provider to allow pt to labor down for 1 hour and then start pushing unless warranted earlier.

## 2019-06-27 NOTE — PLAN OF CARE
Vitals within defined limits. Fundus firm. Lochia scant. PIV saline locked. Up ad marcus. Voiding without issues. Using Tylenol/Motrin for perineal soreness with relief, using ice packs and tucks for comfort as well. Working on breastfeeding with nipple shield. Will continue to monitor.

## 2019-06-27 NOTE — PLAN OF CARE
The infant has been disinterested at the breast, the pt is performing skin to skin stimulation, and pumping was initiated.  Will continue to assist

## 2019-06-27 NOTE — PLAN OF CARE
Pt has made some cervical change, Dr Cummins notified and plan to recheck pt at 2100.  Pt remains comfortable with epidural, pt mother at bedside.  Report given to Lora REA RN.

## 2019-06-27 NOTE — PROVIDER NOTIFICATION
06/27/19 0020   Provider Notification   Provider Name/Title Dr Cummins   Method of Notification At Bedside   Request Evaluate in Person   Notification Reason Labor Status   Dr Cummins to bedside after RN requests for evaluation of pushing and evaluation of size of head vs. Pelvis. RN notices caput forming.

## 2019-06-27 NOTE — PLAN OF CARE
Report received from AVANI Clay RN. Pt care assumed. Will continue to monitor and re check cervix at 2100. Pt and pt mother aware of plan, and deny any questions.

## 2019-06-27 NOTE — TELEPHONE ENCOUNTER
Reason for Call:  Other Delivery     Detailed comments: Pt called to report that baby maría elena Catherine was born 06/27/2019 at [facility]. Please call to discuss.     Phone Number Patient can be reached at: Cell number on file:    Telephone Information:   Mobile [phone]       Best Time: Anytime     Can we leave a detailed message on this number? YES    Call taken on 6/27/2019 at 12:02 PM by Kyung Barr

## 2019-06-27 NOTE — PLAN OF CARE
VSS, pain adequately-controlled, and the pt is ambulating independently in the room.  She's working on breastfeeding with the shield, her son is intermittently spitty, and she's performing skin to skin stimulation. The pt also required a full staff assist for correct positioning and latch verified (with the shield).  Will continue to assist

## 2019-06-28 VITALS
OXYGEN SATURATION: 100 % | SYSTOLIC BLOOD PRESSURE: 115 MMHG | DIASTOLIC BLOOD PRESSURE: 71 MMHG | HEIGHT: 70 IN | RESPIRATION RATE: 16 BRPM | TEMPERATURE: 98.1 F | BODY MASS INDEX: 33.79 KG/M2 | HEART RATE: 97 BPM | WEIGHT: 236 LBS

## 2019-06-28 LAB — COPATH REPORT: NORMAL

## 2019-06-28 PROCEDURE — 25000132 ZZH RX MED GY IP 250 OP 250 PS 637: Performed by: OBSTETRICS & GYNECOLOGY

## 2019-06-28 RX ADMIN — IBUPROFEN 800 MG: 400 TABLET ORAL at 08:14

## 2019-06-28 RX ADMIN — ACETAMINOPHEN 650 MG: 325 TABLET, FILM COATED ORAL at 01:49

## 2019-06-28 RX ADMIN — RANITIDINE 150 MG: 150 TABLET ORAL at 08:13

## 2019-06-28 RX ADMIN — CALCIUM CARBONATE (ANTACID) CHEW TAB 500 MG 500 MG: 500 CHEW TAB at 13:26

## 2019-06-28 RX ADMIN — ACETAMINOPHEN 650 MG: 325 TABLET, FILM COATED ORAL at 08:14

## 2019-06-28 RX ADMIN — SENNOSIDES AND DOCUSATE SODIUM 1 TABLET: 8.6; 5 TABLET ORAL at 08:13

## 2019-06-28 RX ADMIN — LABETALOL HYDROCHLORIDE 100 MG: 100 TABLET, FILM COATED ORAL at 08:13

## 2019-06-28 RX ADMIN — Medication 500 MCG: at 08:13

## 2019-06-28 RX ADMIN — PRENATAL VIT W/ FE FUMARATE-FA TAB 27-0.8 MG 1 TABLET: 27-0.8 TAB at 08:13

## 2019-06-28 RX ADMIN — IBUPROFEN 800 MG: 400 TABLET ORAL at 01:49

## 2019-06-28 NOTE — PLAN OF CARE
Mom and baby discharge instructions discussed with pt who verbalizes understanding. No discharge prescriptions. Mom and baby ID bands matched. Security alarms removed. Mom and baby ready for discharge to home.

## 2019-06-28 NOTE — LACTATION NOTE
Initial visit.   Breastfeeding general information reviewed.   Advised to breastfeed exclusively, on demand, avoid pacifiers, bottles and formula unless medically indicated.  Encouraged rooming in, skin to skin, feeding on demand 8-12x/day or sooner if baby cues.  Explained benefits of holding and skin to skin.  Encouraged lots of skin to skin. Instructed on hand expression.   Continues to nurse well per mom.  Baby latched onto the right breast with shield and colostrum seen in shield.  Mother has pumped once and has a Spectra pump for home.  Outpatient resource phone numbers given. Getting ready for discharge.  Plan: Watch for feeding cues and feed every 2-3 hours and/or on demand. Continue to use feeding log to track intake and appropriate voids and stools. Take feeding log to first follow up appointment or weight check. Encourage skin to skin to promote frequent feedings, thermoregulation and bonding. Follow-up with healthcare provider or lactation consultant for questions or concerns.     No further questions at this time.   Will follow as needed.   Yamilet Dunbar BSN, RN, PHN, RNC-MNN, IBCLC

## 2019-06-28 NOTE — ANESTHESIA POSTPROCEDURE EVALUATION
Patient: Ashley Catherine    * No procedures listed *    Diagnosis:* No pre-op diagnosis entered *  Diagnosis Additional Information: No value filed.    Anesthesia Type:  No value filed.    Note:  Anesthesia Post Evaluation    Patient location during evaluation: Bedside (post partum)  Patient participation: Able to fully participate in evaluation  Level of consciousness: awake and alert  Pain management: adequate  Airway patency: patent  Cardiovascular status: acceptable  Respiratory status: acceptable  Hydration status: acceptable  PONV: none       Comments: Labor epidural no complications.        Last vitals:  Vitals:    06/27/19 0803 06/27/19 0830 06/27/19 1415   BP: 124/59  132/76   Pulse: 97     Resp: 18 16 18   Temp: 36.8  C (98.3  F)  36.9  C (98.4  F)   SpO2:            Electronically Signed By: Rolando Ferraro MD  June 27, 2019  8:24 PM

## 2019-06-28 NOTE — PLAN OF CARE
Vitals stable. Up ad marcus well. Voiding without difficulty. Pain controlled with tylenol and ibuprofen. Breastfeeding going well with shield. Would like to discharge home today with infant.

## 2019-06-28 NOTE — PLAN OF CARE
VSS ex hypertension at start of shift, resolved with scheduled evening dose of Labetalol. Bleeding minimal. Fundus firm, midline, U/1. Depression screen completed, score of 5. Breastfeeding going well with shield, needs some help with positioning. Pumping after feeds, getting drops. Pain controlled with Tylenol, Ibuprofen. Tdap cancelled d/t Guillain-[place]. Encouraged to call with questions/concerns/needs. Continue to monitor.

## 2019-07-02 ENCOUNTER — DOCUMENTATION ONLY (OUTPATIENT)
Dept: CARE COORDINATION | Facility: CLINIC | Age: 39
End: 2019-07-02

## 2019-07-02 NOTE — PROGRESS NOTES
[facility] will be sharing updates with you on Maternal Child Health Referral requests for home care services.  This is for care coordination purposes and alert you to referral status.  We received the referral for  Ashley Catherine; MRN [id number] and want to update you:      [facility] has made two attempts to contact patient by phone and text message over the last four days.   We have not had any response from patient.  Final message was left advising patient to follow up with Primary Care Providers for mom and baby.  Ordering MD and Primary Care Providers for mom and baby notified.     Sincerely [facility]  Tanya Yu  [phone]

## 2019-07-03 ENCOUNTER — TELEPHONE (OUTPATIENT)
Dept: NURSING | Facility: CLINIC | Age: 39
End: 2019-07-03

## 2019-07-03 DIAGNOSIS — O92.70 LACTATION DISORDER: Primary | ICD-10-CM

## 2019-07-03 NOTE — TELEPHONE ENCOUNTER
Reason for call:  Symptom   Symptom or request:Having trouble breast feeding. Would like to consult with a lactation specialist.    Duration (how long have symptoms been present): Approximately a 1 1/2 weeks.  Have you been treated for this before? No    Additional comments: Please call mom due to being unable to schedule until next week.    Phone number to reach patient:  Cell number on file:    Telephone Information:   Mobile [phone]       Best Time:  ASAP    Can we leave a detailed message on this number?  YES

## 2019-07-03 NOTE — TELEPHONE ENCOUNTER
Could you call and see where she delivered?  They usually have a lactation person affiliated.  If not I know there are a few providers ar the [facility] medicine group who specialize in this OR we could have her see me.  OR daniel might have some folks who can help her  I have a list we can provide as well - will place with triage    Violet Humphreys MD

## 2019-07-03 NOTE — TELEPHONE ENCOUNTER
Spoke with pt and gave numbers to a few places within [facility] and also for [facility]. Recommended to also explore [employer] online for other resources.    Can also see Dr. Lazo     If still can't find anything then call back to clinic.

## 2019-07-05 ENCOUNTER — HOSPITAL ENCOUNTER (INPATIENT)
Facility: CLINIC | Age: 39
LOS: 2 days | Discharge: HOME | DRG: 776 | End: 2019-07-07
Attending: EMERGENCY MEDICINE | Admitting: OBSTETRICS & GYNECOLOGY
Payer: COMMERCIAL

## 2019-07-05 ENCOUNTER — APPOINTMENT (OUTPATIENT)
Dept: ULTRASOUND IMAGING | Facility: CLINIC | Age: 39
DRG: 776 | End: 2019-07-05
Attending: EMERGENCY MEDICINE
Payer: COMMERCIAL

## 2019-07-05 DIAGNOSIS — I10 HYPERTENSION, UNSPECIFIED TYPE: ICD-10-CM

## 2019-07-05 DIAGNOSIS — O14.93 PRE-ECLAMPSIA IN THIRD TRIMESTER: Primary | ICD-10-CM

## 2019-07-05 PROBLEM — O14.90 PRE-ECLAMPSIA: Status: ACTIVE | Noted: 2019-07-05

## 2019-07-05 LAB
ALBUMIN SERPL-MCNC: 2.9 G/DL (ref 3.4–5)
ALBUMIN UR-MCNC: 10 MG/DL
ALP SERPL-CCNC: 72 U/L (ref 40–150)
ALT SERPL W P-5'-P-CCNC: 49 U/L (ref 0–50)
ANION GAP SERPL CALCULATED.3IONS-SCNC: 4 MMOL/L (ref 3–14)
APPEARANCE UR: CLEAR
AST SERPL W P-5'-P-CCNC: 30 U/L (ref 0–45)
BASOPHILS # BLD AUTO: 0 10E9/L (ref 0–0.2)
BASOPHILS NFR BLD AUTO: 0.3 %
BILIRUB SERPL-MCNC: 0.4 MG/DL (ref 0.2–1.3)
BILIRUB UR QL STRIP: NEGATIVE
BUN SERPL-MCNC: 9 MG/DL (ref 7–30)
CALCIUM SERPL-MCNC: 8.6 MG/DL (ref 8.5–10.1)
CHLORIDE SERPL-SCNC: 109 MMOL/L (ref 94–109)
CO2 SERPL-SCNC: 29 MMOL/L (ref 20–32)
COLOR UR AUTO: YELLOW
CREAT SERPL-MCNC: 0.59 MG/DL (ref 0.52–1.04)
DIFFERENTIAL METHOD BLD: NORMAL
EOSINOPHIL # BLD AUTO: 0.1 10E9/L (ref 0–0.7)
EOSINOPHIL NFR BLD AUTO: 0.9 %
ERYTHROCYTE [DISTWIDTH] IN BLOOD BY AUTOMATED COUNT: 14.5 % (ref 10–15)
GFR SERPL CREATININE-BSD FRML MDRD: >90 ML/MIN/{1.73_M2}
GLUCOSE SERPL-MCNC: 98 MG/DL (ref 70–99)
GLUCOSE UR STRIP-MCNC: NEGATIVE MG/DL
HCT VFR BLD AUTO: 35.1 % (ref 35–47)
HGB BLD-MCNC: 11.8 G/DL (ref 11.7–15.7)
HGB UR QL STRIP: ABNORMAL
IMM GRANULOCYTES # BLD: 0.1 10E9/L (ref 0–0.4)
IMM GRANULOCYTES NFR BLD: 1.1 %
KETONES UR STRIP-MCNC: 10 MG/DL
LEUKOCYTE ESTERASE UR QL STRIP: ABNORMAL
LYMPHOCYTES # BLD AUTO: 1.3 10E9/L (ref 0.8–5.3)
LYMPHOCYTES NFR BLD AUTO: 18.8 %
MCH RBC QN AUTO: 30.9 PG (ref 26.5–33)
MCHC RBC AUTO-ENTMCNC: 33.6 G/DL (ref 31.5–36.5)
MCV RBC AUTO: 92 FL (ref 78–100)
MONOCYTES # BLD AUTO: 0.4 10E9/L (ref 0–1.3)
MONOCYTES NFR BLD AUTO: 5.7 %
MUCOUS THREADS #/AREA URNS LPF: PRESENT /LPF
NEUTROPHILS # BLD AUTO: 4.9 10E9/L (ref 1.6–8.3)
NEUTROPHILS NFR BLD AUTO: 73.2 %
NITRATE UR QL: NEGATIVE
NRBC # BLD AUTO: 0 10*3/UL
NRBC BLD AUTO-RTO: 0 /100
PH UR STRIP: 6 PH (ref 5–7)
PLATELET # BLD AUTO: 308 10E9/L (ref 150–450)
POTASSIUM SERPL-SCNC: 3.4 MMOL/L (ref 3.4–5.3)
PROT SERPL-MCNC: 6.6 G/DL (ref 6.8–8.8)
RBC # BLD AUTO: 3.82 10E12/L (ref 3.8–5.2)
RBC #/AREA URNS AUTO: 5 /HPF (ref 0–2)
SODIUM SERPL-SCNC: 142 MMOL/L (ref 133–144)
SOURCE: ABNORMAL
SP GR UR STRIP: 1.01 (ref 1–1.03)
SQUAMOUS #/AREA URNS AUTO: 1 /HPF (ref 0–1)
TROPONIN I SERPL-MCNC: <0.015 UG/L (ref 0–0.04)
UROBILINOGEN UR STRIP-MCNC: NORMAL MG/DL (ref 0–2)
WBC # BLD AUTO: 6.7 10E9/L (ref 4–11)
WBC #/AREA URNS AUTO: 112 /HPF (ref 0–5)

## 2019-07-05 PROCEDURE — 81001 URINALYSIS AUTO W/SCOPE: CPT | Performed by: EMERGENCY MEDICINE

## 2019-07-05 PROCEDURE — 25000132 ZZH RX MED GY IP 250 OP 250 PS 637: Performed by: EMERGENCY MEDICINE

## 2019-07-05 PROCEDURE — 99285 EMERGENCY DEPT VISIT HI MDM: CPT | Mod: 25

## 2019-07-05 PROCEDURE — 25000128 H RX IP 250 OP 636: Performed by: OBSTETRICS & GYNECOLOGY

## 2019-07-05 PROCEDURE — 93971 EXTREMITY STUDY: CPT | Mod: RT

## 2019-07-05 PROCEDURE — 80053 COMPREHEN METABOLIC PANEL: CPT | Performed by: EMERGENCY MEDICINE

## 2019-07-05 PROCEDURE — 85025 COMPLETE CBC W/AUTO DIFF WBC: CPT | Performed by: EMERGENCY MEDICINE

## 2019-07-05 PROCEDURE — 84484 ASSAY OF TROPONIN QUANT: CPT | Performed by: EMERGENCY MEDICINE

## 2019-07-05 PROCEDURE — 12000035 ZZH R&B POSTPARTUM

## 2019-07-05 PROCEDURE — 25000132 ZZH RX MED GY IP 250 OP 250 PS 637: Performed by: OBSTETRICS & GYNECOLOGY

## 2019-07-05 RX ORDER — MAGNESIUM SULFATE HEPTAHYDRATE 500 MG/ML
4 INJECTION, SOLUTION INTRAMUSCULAR; INTRAVENOUS
Status: DISCONTINUED | OUTPATIENT
Start: 2019-07-05 | End: 2019-07-07 | Stop reason: HOSPADM

## 2019-07-05 RX ORDER — MAGNESIUM SULFATE IN WATER 40 MG/ML
2 INJECTION, SOLUTION INTRAVENOUS CONTINUOUS
Status: DISCONTINUED | OUTPATIENT
Start: 2019-07-06 | End: 2019-07-07 | Stop reason: HOSPADM

## 2019-07-05 RX ORDER — LORAZEPAM 2 MG/ML
2 INJECTION INTRAMUSCULAR
Status: DISCONTINUED | OUTPATIENT
Start: 2019-07-05 | End: 2019-07-07 | Stop reason: HOSPADM

## 2019-07-05 RX ORDER — MAGNESIUM SULFATE IN WATER 40 MG/ML
2 INJECTION, SOLUTION INTRAVENOUS CONTINUOUS
Status: DISCONTINUED | OUTPATIENT
Start: 2019-07-05 | End: 2019-07-07 | Stop reason: HOSPADM

## 2019-07-05 RX ORDER — MAGNESIUM SULFATE HEPTAHYDRATE 40 MG/ML
2 INJECTION, SOLUTION INTRAVENOUS
Status: DISCONTINUED | OUTPATIENT
Start: 2019-07-05 | End: 2019-07-07 | Stop reason: HOSPADM

## 2019-07-05 RX ORDER — AMOXICILLIN 250 MG
1 CAPSULE ORAL 2 TIMES DAILY
Status: DISCONTINUED | OUTPATIENT
Start: 2019-07-05 | End: 2019-07-07 | Stop reason: HOSPADM

## 2019-07-05 RX ORDER — LABETALOL 100 MG/1
100 TABLET, FILM COATED ORAL ONCE
Status: COMPLETED | OUTPATIENT
Start: 2019-07-05 | End: 2019-07-05

## 2019-07-05 RX ORDER — NIFEDIPINE 10 MG/1
10 CAPSULE ORAL
Status: DISCONTINUED | OUTPATIENT
Start: 2019-07-05 | End: 2019-07-07 | Stop reason: HOSPADM

## 2019-07-05 RX ORDER — AMOXICILLIN 250 MG
1 CAPSULE ORAL DAILY PRN
COMMUNITY
End: 2019-10-28

## 2019-07-05 RX ORDER — CALCIUM GLUCONATE 94 MG/ML
1 INJECTION, SOLUTION INTRAVENOUS
Status: DISCONTINUED | OUTPATIENT
Start: 2019-07-05 | End: 2019-07-07 | Stop reason: HOSPADM

## 2019-07-05 RX ORDER — LABETALOL 100 MG/1
200 TABLET, FILM COATED ORAL EVERY 8 HOURS SCHEDULED
Status: DISCONTINUED | OUTPATIENT
Start: 2019-07-05 | End: 2019-07-05

## 2019-07-05 RX ORDER — CETIRIZINE HYDROCHLORIDE 10 MG/1
10 TABLET ORAL DAILY PRN
Status: DISCONTINUED | OUTPATIENT
Start: 2019-07-05 | End: 2019-07-07 | Stop reason: HOSPADM

## 2019-07-05 RX ORDER — LABETALOL 100 MG/1
200 TABLET, FILM COATED ORAL EVERY 8 HOURS SCHEDULED
Status: DISCONTINUED | OUTPATIENT
Start: 2019-07-05 | End: 2019-07-07 | Stop reason: HOSPADM

## 2019-07-05 RX ORDER — MULTIVIT-MIN/FOLIC/VIT K/LYCOP 400-300MCG
2 TABLET ORAL DAILY
COMMUNITY
End: 2020-07-02 | Stop reason: ALTCHOICE

## 2019-07-05 RX ORDER — MAGNESIUM SULFATE HEPTAHYDRATE 40 MG/ML
4 INJECTION, SOLUTION INTRAVENOUS ONCE
Status: DISCONTINUED | OUTPATIENT
Start: 2019-07-06 | End: 2019-07-07 | Stop reason: HOSPADM

## 2019-07-05 RX ORDER — LABETALOL 20 MG/4 ML (5 MG/ML) INTRAVENOUS SYRINGE
20 EVERY 10 MIN PRN
Status: DISCONTINUED | OUTPATIENT
Start: 2019-07-05 | End: 2019-07-07 | Stop reason: HOSPADM

## 2019-07-05 RX ORDER — LABETALOL 20 MG/4 ML (5 MG/ML) INTRAVENOUS SYRINGE
80 EVERY 10 MIN PRN
Status: DISCONTINUED | OUTPATIENT
Start: 2019-07-05 | End: 2019-07-07 | Stop reason: HOSPADM

## 2019-07-05 RX ORDER — MAGNESIUM SULFATE HEPTAHYDRATE 40 MG/ML
4 INJECTION, SOLUTION INTRAVENOUS
Status: DISCONTINUED | OUTPATIENT
Start: 2019-07-05 | End: 2019-07-07 | Stop reason: HOSPADM

## 2019-07-05 RX ORDER — POLYETHYLENE GLYCOL 3350 17 G/17G
17 POWDER, FOR SOLUTION ORAL DAILY PRN
Status: DISCONTINUED | OUTPATIENT
Start: 2019-07-05 | End: 2019-07-07 | Stop reason: HOSPADM

## 2019-07-05 RX ORDER — SODIUM CHLORIDE, SODIUM LACTATE, POTASSIUM CHLORIDE, CALCIUM CHLORIDE 600; 310; 30; 20 MG/100ML; MG/100ML; MG/100ML; MG/100ML
10-125 INJECTION, SOLUTION INTRAVENOUS CONTINUOUS
Status: DISCONTINUED | OUTPATIENT
Start: 2019-07-06 | End: 2019-07-07 | Stop reason: HOSPADM

## 2019-07-05 RX ORDER — LABETALOL 20 MG/4 ML (5 MG/ML) INTRAVENOUS SYRINGE
40 EVERY 10 MIN PRN
Status: DISCONTINUED | OUTPATIENT
Start: 2019-07-05 | End: 2019-07-07 | Stop reason: HOSPADM

## 2019-07-05 RX ORDER — MAGNESIUM SULFATE HEPTAHYDRATE 40 MG/ML
4 INJECTION, SOLUTION INTRAVENOUS ONCE
Status: COMPLETED | OUTPATIENT
Start: 2019-07-05 | End: 2019-07-05

## 2019-07-05 RX ADMIN — SENNOSIDES AND DOCUSATE SODIUM 1 TABLET: 8.6; 5 TABLET ORAL at 18:29

## 2019-07-05 RX ADMIN — MAGNESIUM SULFATE IN WATER 4 G: 40 INJECTION, SOLUTION INTRAVENOUS at 20:44

## 2019-07-05 RX ADMIN — LABETALOL HYDROCHLORIDE 100 MG: 100 TABLET, FILM COATED ORAL at 15:46

## 2019-07-05 RX ADMIN — LABETALOL HYDROCHLORIDE 200 MG: 100 TABLET, FILM COATED ORAL at 18:29

## 2019-07-05 RX ADMIN — LABETALOL 20 MG/4 ML (5 MG/ML) INTRAVENOUS SYRINGE 20 MG: at 17:53

## 2019-07-05 RX ADMIN — ACETAMINOPHEN 1000 MG: 160 SUSPENSION ORAL at 18:28

## 2019-07-05 RX ADMIN — LABETALOL 20 MG/4 ML (5 MG/ML) INTRAVENOUS SYRINGE 20 MG: at 21:48

## 2019-07-05 RX ADMIN — RANITIDINE 150 MG: 150 TABLET ORAL at 20:56

## 2019-07-05 RX ADMIN — LABETALOL 20 MG/4 ML (5 MG/ML) INTRAVENOUS SYRINGE 40 MG: at 22:04

## 2019-07-05 RX ADMIN — MAGNESIUM SULFATE IN WATER 2 G/HR: 40 INJECTION, SOLUTION INTRAVENOUS at 21:19

## 2019-07-05 ASSESSMENT — ACTIVITIES OF DAILY LIVING (ADL)
TRANSFERRING: 0-->INDEPENDENT
AMBULATION: 0-->INDEPENDENT
COGNITION: 0 - NO COGNITION ISSUES REPORTED
SWALLOWING: 0-->SWALLOWS FOODS/LIQUIDS WITHOUT DIFFICULTY
RETIRED_EATING: 0-->INDEPENDENT
PRIOR_FUNCTIONAL_LEVEL_COMMENT: INDEPENDENT
DRESS: 0-->INDEPENDENT
BATHING: 0-->INDEPENDENT
RETIRED_COMMUNICATION: 0-->UNDERSTANDS/COMMUNICATES WITHOUT DIFFICULTY
TOILETING: 0-->INDEPENDENT
FALL_HISTORY_WITHIN_LAST_SIX_MONTHS: NO

## 2019-07-05 ASSESSMENT — ENCOUNTER SYMPTOMS
FEVER: 0
SHORTNESS OF BREATH: 1
HEADACHES: 1

## 2019-07-05 ASSESSMENT — MIFFLIN-ST. JEOR: SCORE: 1793.99

## 2019-07-05 NOTE — PROGRESS NOTES
Pt arrived via cart from ED with one week old baby and mother. Pt settled in room. Mother will be staying in room with pt to care for baby. Hypertensive upon arrival. Dr Colmenares at bedside visiting with pt. Pt states headache with no other areas of pain. Pt aware UA needed. Will continue to monitor.

## 2019-07-05 NOTE — ED NOTES
"[facility]  ED Nurse Handoff Report    ED Chief complaint: Postpartum Complications      ED Diagnosis:   Final diagnoses:   None       Code Status: Full Code    Allergies:   Allergies   Allergen Reactions     Nkda [No Known Drug Allergies]      No Known Allergies        Activity level - Baseline/Home:  Independent  Activity Level - Current:   Independent    Patient's Preferred language: English   Needed?: No    Isolation: No  Infection: Not Applicable  Bariatric?: No    Vital Signs:   Vitals:    07/05/19 1302 07/05/19 1345 07/05/19 1500   BP: 157/64 142/79 (!) 161/97   Pulse: 96  65   Resp: 18     Temp: 98.7  F (37.1  C)     TempSrc: Temporal     SpO2: 97%     Height: 1.778 m (5' 10\")         Cardiac Rhythm: ,        Pain level: 0-10 Pain Scale: 2    Is this patient confused?: No   Does this patient have a guardian?  No         If yes, is there guardianship documents in the Epic \"Code/ACP\" activity?  N/A         Guardian Notified?  N/A  [place] - Suicide Severity Rating Scale Completed?  Yes  If yes, what color did the patient score?  White    Patient Report: Initial Complaint: HTN post-partum  Focused Assessment: pt delivered healthy baby boy on 6/27. Pt was told to come to ED for further evaluation of HTN. Pt did see a lactation consultant today and has been nursing on and off. Pt has mother in room with her and infant.   Tests Performed: US, labs  Abnormal Results:   Labs Ordered and Resulted from Time of ED Arrival Up to the Time of Departure from the ED   COMPREHENSIVE METABOLIC PANEL - Abnormal; Notable for the following components:       Result Value    Albumin 2.9 (*)     Protein Total 6.6 (*)     All other components within normal limits   CBC WITH PLATELETS DIFFERENTIAL   TROPONIN I   ROUTINE UA WITH MICROSCOPIC   PERIPHERAL IV CATHETER   IV ACCESS       Treatments provided: none    Family Comments: mother and infant sonRivera brochure/video discussed/provided to " patient/family: Yes              Name of person given brochure if not patient: pt              Relationship to patient: pt    ED Medications:   Medications   labetalol (NORMODYNE) tablet 100 mg (has no administration in time range)       Drips infusing?:  No    For the majority of the shift this patient was Green.   Interventions performed were none.    Severe Sepsis OR Septic Shock Diagnosis Present: No    To be done/followed up on inpatient unit:  BP  monitoring    ED NURSE PHONE NUMBER: *[phone]

## 2019-07-05 NOTE — ED NOTES
Pt stated that she went to a lactation consultant and is now not having issues with breast feeding.

## 2019-07-05 NOTE — ED PROVIDER NOTES
History     Chief Complaint:  Postpartum Complications    The history is provided by the patient.      Ashley Catherine is a 39 year old female who presents with postpartum complications. The patient is 1 week post partum from an uncomplicated delivery. She reports having on going blood pressure problems prior to pregnancy and states it was well controlled during pregnancy with taking labetalol 100mg x2 per day. She reports that her blood pressures (up to 160s) at home have been high and she has had some increased heart rate, shortness of breath, headache, and her right leg started swelling. She denies a history of blood clots.     Allergies:  No known drug allergies.     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    History of steroid therapy    Hypertension   Hypertrophy of breast   Hypertrophy of tonsils alone   Hypovitaminosis D   Irregular heart beat   LBP (low back pain)   LSIL (low grade squamous intraepithelial lesion) on Pap smear   Lumbago   Major depressive disorder, recurrent episode, in partial or unspecified remission   Morbid obesity   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  "  Respiratory: Positive for shortness of breath.    Cardiovascular: Positive for leg swelling.        Higher than normal heart rate   Neurological: Positive for headaches.   All other systems reviewed and are negative.    Physical Exam   First Vitals:  Patient Vitals for the past 24 hrs:   BP Temp Temp src Pulse Resp SpO2 Height   07/05/19 1546 174/88 -- -- -- -- -- --   07/05/19 1500 (!) 161/97 -- -- 65 -- -- --   07/05/19 1345 142/79 -- -- -- -- -- --   07/05/19 1302 157/64 98.7  F (37.1  C) Temporal 96 18 97 % 1.778 m (5' 10\")     Physical Exam  GENERAL: well developed, pleasant  HEAD: atraumatic  EYES: pupils reactive, extraocular muscles intact, conjunctivae normal  ENT:  mucus membranes moist  NECK:  trachea midline, normal range of motion  RESPIRATORY: no tachypnea, breath sounds clear to auscultation   CVS: normal S1/S2, no murmurs, intact distal pulses  ABDOMEN: soft, nontender, nondistention  MUSCULOSKELETAL: no deformities. Mild swelling without pitting edema to the right leg.  SKIN: warm and dry, no acute rashes or ulceration  NEURO: GCS 15, cranial nerves intact, alert and oriented x3  PSYCH:  Mood/affect normal    Emergency Department Course     Imaging:  Radiographic findings were communicated with the patient who voiced understanding of the findings.  US lower extremity venous duplex right:  There is no evidence of deep venous thrombosis in the  right lower extremity per radiology read.    Laboratory:  CBC:  WBC 6.7, HGB 11.8,   CMP: Albumin 2.9 low, Protein Total 6.6 low, o/w WNL. (Creatinine 0.59)  Troponin: <0.015    Interventions:  1546: Labetalol 100mg PO    Emergency Department Course:  1:22 PM Nursing notes and vitals reviewed.  I performed an exam of the patient as documented above.     3:15 PM I consulted with Dr. Colmenares of OB/GYN regarding the patient. She agreed to admit the patient under an observational bed.     Impression & Plan      Medical Decision Making:    Patient presents " with elevated blood pressure in the setting of recent delivery.  Blood pressures throughout pregnancy were in the 1 20-1 30 with 100 mg of labetalol twice a day.  Patient has not been sleeping very well and is struggling with breast-feeding.  Pressures at home now have been elevated and here with a large cuff are in the 160s.  Preeclampsia testing is negative.  Unclear if this is early preeclampsia versus uncontrolled hypertension with recent delivery and needing medication adjustments with her labetalol.  Spoke with OB and suggested admission for observation and will start with labetalol as opposed to magnesium.    Diagnosis:    ICD-10-CM    1. Hypertension, unspecified type I10        Disposition:  Admitted to Dr. Darrian STRATTON, Bradley Aasen, am serving as a scribe on 7/5/2019 at 1:22 PM to personally document services performed by Bruce Le MD based on my observations and the provider's statements to me.          Bruce Le MD  07/05/19 8808

## 2019-07-05 NOTE — PROGRESS NOTES
RECEIVING UNIT ED HANDOFF REVIEW    ED Nurse Handoff Report was reviewed by: Sharlene Hankins on July 5, 2019 at 3:38 PM

## 2019-07-05 NOTE — ED TRIAGE NOTES
Pt bp 161/93 vag birth June 20th Chronic HTN  Rt leg swelling, breast feeding not going well. Triage call. Sent in by Dr Colmenares.

## 2019-07-05 NOTE — PROGRESS NOTES
Report given to Cynthia TENORIO on 44. Pt given PRN Tylenol for headache and small can of coke. Pt states she does drink caffeine. Pt transferred to 44 via w/c. Pt mother and baby will stay with pt. Labetalol PRN IV given for hypertension. Scheduled Labetalol given.

## 2019-07-05 NOTE — ED TRIAGE NOTES
Pt 1 wk post partum from uncomplicated delivery.  OB told pt to monitor BP over the wkd and the pt reports that BP keeps going up.  This am has swelling to right leg.

## 2019-07-05 NOTE — PHARMACY-ADMISSION MEDICATION HISTORY
Admission medication history interview status for the 7/5/2019  admission is complete. See EPIC admission navigator for prior to admission medications     Medication history source reliability:Good    Actions taken by pharmacist (provider contacted, etc): Chart review and discussed with patient.     Additional medication history information not noted on PTA med list :  - Takes 2 iron supplements everyday (Flinstones multivitamin + Iron and Iron/Ca)    Medication reconciliation/reorder completed by provider prior to medication history? No    Time spent in this activity: 5 minutes    Prior to Admission medications    Medication Sig Last Dose Taking? Auth Provider   acetaminophen (TYLENOL) 32 mg/mL liquid Take 1,000 mg by mouth every 8 hours as needed for fever or mild pain 7/5/2019 at am Yes Reported, Patient   cetirizine (ZYRTEC) 10 MG tablet Take 10 mg by mouth daily as needed for allergies prn Yes Reported, Patient   cyanocobalamin (VITAMIN B-12) 500 MCG SUBL sublingual tablet Place 500 mcg under the tongue daily 7/5/2019 at am Yes Reported, Patient   labetalol (NORMODYNE) 100 MG tablet Take 1 tablet (100 mg) by mouth 2 times daily 7/5/2019 at Unknown time Yes Violet Humphreys MD   Multiple Vitamins-Minerals (ONE DAILY CALCIUM/IRON PO) Take 1 tablet by mouth daily 7/5/2019 at am Yes Unknown, Entered By History   Pediatric Multivitamins-Iron (MULTIVITAMINS PLUS IRON CHILD) 18 MG CHEW Take 2 chew tab by mouth daily 7/5/2019 at am Yes Unknown, Entered By History   polyethylene glycol (MIRALAX) powder Take 17 g (1 capful) by mouth daily as needed for constipation prn Yes Jackeline Leyva MD   ranitidine (ZANTAC) 150 MG tablet Take 150 mg by mouth 2 times daily 7/5/2019 at am Yes Reported, Patient   senna-docusate (SENOKOT-S/PERICOLACE) 8.6-50 MG tablet Take 1 tablet by mouth 2 times daily Am and afternoon 7/5/2019 at am Yes Unknown, Entered By History

## 2019-07-05 NOTE — H&P
Ashley is a 39 year old  who is 1 week post partum. Admitted through the ED for elevated BPs.  She has history of pre pregnancy hypertension. During her pregnancy her medication was switched from hydrochlorothiazide 25 mg to Labetalol 100 mg BID. Blood pressures were under good control throughout the pregnancy.     She was seen in the clinic on 19 with a complaint of increase swelling in her right leg. BP was 130/100. PIH labs returned normal. She was instructed to monitor her BPs and to call the clinic if elevated. BPs over the following 4 days have been ranging from 140/70s- 160/90s. She notes moderate headache and increase swelling in her feet. She called this am and was instructed to go to the ED for evaluation.     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 142/79, 157/64  RR 16 HR 58   afebrile    HEENT  nl  Cardiac  nl  Lungs    clear throughout  Abdomen   soft and non tender  Fundus      firm and non tender  Extremities  Normal, no edema noted    Labs  PIH labs= normal, BMP nl CBC nl  Doppler of lower extremities no DVT noted    A: Pleasant 39 year old  post partum x 1 week, history of hypertension now presents with elevated BP      Patient has mild headache. Headache may be due to lack of sleep.  States that she has not been sleeping very well due to  baby. Breast feeding has not been going well. However after seeing a lactation consultant today baby is feeding better.      P: Admit for serial BPs  Need to determine if patient has  severe pre eclampsia. Will hold off with magnesium sulfate infusion unless BPs increase.      Will increase Labetalol to 200 mg q8 hours

## 2019-07-06 PROCEDURE — 12000035 ZZH R&B POSTPARTUM

## 2019-07-06 PROCEDURE — 25000132 ZZH RX MED GY IP 250 OP 250 PS 637: Performed by: OBSTETRICS & GYNECOLOGY

## 2019-07-06 PROCEDURE — 25000128 H RX IP 250 OP 636: Performed by: OBSTETRICS & GYNECOLOGY

## 2019-07-06 RX ORDER — NALOXONE HYDROCHLORIDE 0.4 MG/ML
.1-.4 INJECTION, SOLUTION INTRAMUSCULAR; INTRAVENOUS; SUBCUTANEOUS
Status: DISCONTINUED | OUTPATIENT
Start: 2019-07-06 | End: 2019-07-07 | Stop reason: HOSPADM

## 2019-07-06 RX ORDER — OXYCODONE HYDROCHLORIDE 5 MG/1
5 TABLET ORAL
Status: DISCONTINUED | OUTPATIENT
Start: 2019-07-06 | End: 2019-07-07 | Stop reason: HOSPADM

## 2019-07-06 RX ORDER — NIFEDIPINE 30 MG/1
30 TABLET, EXTENDED RELEASE ORAL DAILY
Status: DISCONTINUED | OUTPATIENT
Start: 2019-07-06 | End: 2019-07-07 | Stop reason: HOSPADM

## 2019-07-06 RX ADMIN — SENNOSIDES AND DOCUSATE SODIUM 1 TABLET: 8.6; 5 TABLET ORAL at 08:39

## 2019-07-06 RX ADMIN — RANITIDINE 150 MG: 150 TABLET ORAL at 08:39

## 2019-07-06 RX ADMIN — Medication 120 MG: at 11:09

## 2019-07-06 RX ADMIN — Medication 500 MCG: at 11:09

## 2019-07-06 RX ADMIN — LABETALOL HYDROCHLORIDE 200 MG: 100 TABLET, FILM COATED ORAL at 10:34

## 2019-07-06 RX ADMIN — NIFEDIPINE 30 MG: 30 TABLET, FILM COATED, EXTENDED RELEASE ORAL at 08:39

## 2019-07-06 RX ADMIN — ACETAMINOPHEN 1000 MG: 160 SUSPENSION ORAL at 18:36

## 2019-07-06 RX ADMIN — MAGNESIUM SULFATE IN WATER 2 G/HR: 40 INJECTION, SOLUTION INTRAVENOUS at 06:54

## 2019-07-06 RX ADMIN — MAGNESIUM SULFATE IN WATER 2 G/HR: 40 INJECTION, SOLUTION INTRAVENOUS at 17:25

## 2019-07-06 RX ADMIN — OXYCODONE HYDROCHLORIDE 5 MG: 5 TABLET ORAL at 22:35

## 2019-07-06 RX ADMIN — RANITIDINE 150 MG: 150 TABLET ORAL at 20:41

## 2019-07-06 RX ADMIN — LABETALOL HYDROCHLORIDE 200 MG: 100 TABLET, FILM COATED ORAL at 18:37

## 2019-07-06 RX ADMIN — ACETAMINOPHEN 1000 MG: 160 SUSPENSION ORAL at 10:31

## 2019-07-06 RX ADMIN — ACETAMINOPHEN 1000 MG: 160 SUSPENSION ORAL at 01:59

## 2019-07-06 RX ADMIN — LABETALOL HYDROCHLORIDE 200 MG: 100 TABLET, FILM COATED ORAL at 01:58

## 2019-07-06 ASSESSMENT — MIFFLIN-ST. JEOR: SCORE: 1779.02

## 2019-07-06 NOTE — PROVIDER NOTIFICATION
07/05/19 2228   Provider Notification   Provider Name/Title Dr. Colmenares   Method of Notification Phone   Request Evaluate-Remote   Notification Reason Status Update   Dr. Colmenares updated BP remains elevated after 20mg labetalol and 40mg labetalol given per algorithm. Most recent /90. Pt still with headache unchanged from prior to medication administration. Will await return call.     Dr. Colmenares returned page. Notified of continued high blood pressures after labetalol, but now improving to 140s/90s. Headache improving. Recheck BP in 1 hour, notify if >160/110. Place consult for hospitalist to see Pt in AM (TORB). Will continue to monitor patient and update provider as needed.

## 2019-07-06 NOTE — PLAN OF CARE
"BP up to 190s/100s this shift. 60mg IV labetalol administered total, scheduled 200mg dose administered as ordered. Pt c/o headache all shift, posterior \"neck\", improving per pt, taking tylenol as ordered. Trace edema to bilateral feet. Voiding adequately. Magnesium running at 2gm/hour as ordered. Pumping, baby in room with grandmother. Rooming in agreement signed. Up SBA. Will continue to monitor and notify MD as needed.  "

## 2019-07-06 NOTE — PROGRESS NOTES
OB Postpartum Note    S:  Headache improving, but worse when blood pressure increases. No vision changes, no shortness of breath. Has been voiding large volumes, with swelling improved in legs.     O:  Vitals were reviewed  BP: (!) 155/91   Systolic (24hrs), Av , Min:130 , Max:204   Diastolic (24hrs), Av, Min:64, Max:103     CV - RRR         Abdomen - Fundus firm, well below umbilicus nontender         Extremities - No unilateral edema, no calf tenderness, DTRs 1+ bilateral LE and UE    ABO   Date Value Ref Range Status   2019 O  Final     RH(D)   Date Value Ref Range Status   2019 Pos  Final     No components found for: RUBELLA    A:   38yo  with chronic hypertension admitted one week PP for severe pre-eclampsia. Stable.     P:  1)  Pre-eclampsia--BPs mild-range now on labetalol 200mg PO TID, but will add Procardia XL 30mg daily for better control when Magnesium Sulfate is discontinued. Plan to continue Magnesium for 24 hours, until 1800 today.        2)  Labs normal yesterday, repeat only if acute change in status.         3)  Anticipate discharge tomorrow if BPs stable.     MD Sara Diaz

## 2019-07-06 NOTE — PROVIDER NOTIFICATION
Dr. Colmenares paged and updated via phone re: patients blood pressure 170s-180s/90s-100s and will be starting IV labetalol protocol. Dr. Colmenares agrees with starting protocol. Give up to 2 doses per protocol and updated if BP > 160/110. Will continue to monitor patient and update provider as needed.

## 2019-07-06 NOTE — PLAN OF CARE
Vital signs are stable, blood pressures are much better than last night. She is on the Magnesium Sulfate drip. She has a slight headache but has gotten better. Denies vision changes, or epigastric pain. Reflexes are WNL. She is voiding without difficulty and tolerating a regular diet. Her baby is in the room with her along with the patients mother. She was encouraged to call with questions or concerns. Will continue to monitor.

## 2019-07-06 NOTE — PROGRESS NOTES
Due to elevated BP while on the observation unit the hypertensive OB order set for IV Labetalol was activated.     Patient will be transferred to post partum for initiation of IV magnesium sulfate 4 gm loading and 2gm /hr for seizure prophylaxis

## 2019-07-06 NOTE — LACTATION NOTE
Lactation check in. Ashley states that she met with a lactation consultant outpatient and this helped quite a bit. Has had several successful feeding sessions the past day. Supplementing with formula via bottles as needed. Denies having questions or concerns. Made her aware that lactation services available should the need arise.    Darlene Pierce RN, IBCLC

## 2019-07-06 NOTE — PROVIDER NOTIFICATION
Dr. Colmenares notified of patient transfer. Updated BP upon arrival 135/86, headache improved. Verified Magnesium loading and maintenance doses, no LR to be administered with maintenance dose per Dr. Colmenares. Will continue to monitor patient and notify provider as needed.

## 2019-07-06 NOTE — CONSULTS
Hospitals routine consult received for elevated blood pressure.    Patient with history of hypertension and PTA medications include labetalol 100 mg PO BID. She is one week postpartum admitted under OB/GYN for preeclampsia. Her labetalol has been increased to 200 mg Q8 hours and she is also on labetalol PRN. She has also been started on magnesium sulfate infusion.    -with above measures her blood pressure has improved to systolic 130s to 150s  -her blood pressure is being appropriately managed by OB/GYN  - Discussed with nursing; will hold off on formal consult at this time; nursing to check with OB/GYN to see if hospitalist consult is still needed

## 2019-07-07 VITALS
HEIGHT: 70 IN | TEMPERATURE: 98.2 F | OXYGEN SATURATION: 99 % | DIASTOLIC BLOOD PRESSURE: 86 MMHG | RESPIRATION RATE: 16 BRPM | HEART RATE: 71 BPM | WEIGHT: 221.9 LBS | SYSTOLIC BLOOD PRESSURE: 136 MMHG | BODY MASS INDEX: 31.77 KG/M2

## 2019-07-07 PROCEDURE — 25000132 ZZH RX MED GY IP 250 OP 250 PS 637: Performed by: OBSTETRICS & GYNECOLOGY

## 2019-07-07 RX ORDER — NIFEDIPINE 30 MG
30 TABLET, EXTENDED RELEASE ORAL DAILY
Qty: 10 TABLET | Refills: 0 | Status: SHIPPED | OUTPATIENT
Start: 2019-07-07 | End: 2019-08-08

## 2019-07-07 RX ORDER — LABETALOL 200 MG/1
200 TABLET, FILM COATED ORAL EVERY 8 HOURS
Qty: 30 TABLET | Refills: 0 | Status: SHIPPED | OUTPATIENT
Start: 2019-07-07 | End: 2019-08-08

## 2019-07-07 RX ADMIN — NIFEDIPINE 30 MG: 30 TABLET, FILM COATED, EXTENDED RELEASE ORAL at 08:54

## 2019-07-07 RX ADMIN — LABETALOL HYDROCHLORIDE 200 MG: 100 TABLET, FILM COATED ORAL at 02:23

## 2019-07-07 RX ADMIN — OXYCODONE HYDROCHLORIDE 5 MG: 5 TABLET ORAL at 02:23

## 2019-07-07 RX ADMIN — ACETAMINOPHEN 1000 MG: 160 SUSPENSION ORAL at 02:23

## 2019-07-07 RX ADMIN — OXYCODONE HYDROCHLORIDE 5 MG: 5 TABLET ORAL at 06:28

## 2019-07-07 RX ADMIN — Medication 120 MG: at 08:54

## 2019-07-07 RX ADMIN — Medication 500 MCG: at 08:55

## 2019-07-07 RX ADMIN — RANITIDINE 150 MG: 150 TABLET ORAL at 08:54

## 2019-07-07 ASSESSMENT — MIFFLIN-ST. JEOR: SCORE: 1761.78

## 2019-07-07 NOTE — PLAN OF CARE
Vital signs have been stable. Patient denies signs and symptoms of preeclampsia. She is up and moving around without difficulty. Independent with self and baby cares. Discharge instructions and follow up discussed. Questions and concerns addressed.

## 2019-07-07 NOTE — PROGRESS NOTES
Vitals reviewed remotely. Blood pressures stable, all mild range. Magnesium sulfate discontinued at 1800. Per RN, patient more comfortable, no headaches. Plan to discharge in AM if stable.     BP: 142/86 Systolic (24hrs), Av , Min:130 , Max:176   Diastolic (24hrs), Av, Min:81, Max:100    Sara Cummins MD

## 2019-07-07 NOTE — DISCHARGE INSTRUCTIONS
Follow up Instructions:        -   Follow up in 2-3 days for a blood pressure check.    Preeclampsia   Call your doctor right away if you have any of the following:  - Edema (swelling) in your face or hands  - Rapid weight gain-about 1 pound or more in a day  - Headache  - Abdominal pain on your right side  - Vision problems (flashes or spots)  - You have questions or concerns once you return home.

## 2019-07-07 NOTE — PROGRESS NOTES
OB Postpartum Note    S:  Patient feeling better. Headache dull, much better overall. No vision changes, no neurologic symptoms.     O:  Vitals were reviewed  Heart Rate: 74 Heart Rate  Min: 68  Max: 99  BP: 136/86 Systolic (24hrs), Av , Min:128 , Max:146   Diastolic (24hrs), Av, Min:81, Max:91          Gen - NAD, resting        CV - RRR        Abdomen - Fundus firm, below umbilicus, nontender        Extremities - No calf tenderness, no unilateral edema    Hemoglobin   Date Value Ref Range Status   2019 11.8 11.7 - 15.7 g/dL Final   ]  ABO   Date Value Ref Range Status   2019 O  Final     RH(D)   Date Value Ref Range Status   2019 Pos  Final     No components found for: RUBELLA    A:   40yo  now 10 days postpartum readmitted with pre-eclampsia.     P:  1)  BPs stable on labetalol and Procardia. Discharge with both medications.         2)  Return to clinic for BP check in 2-3 days.      Sara Cummins MD

## 2019-07-07 NOTE — PROVIDER NOTIFICATION
07/06/19 2220   Provider Notification   Provider Name/Title Dr. Cummins   Method of Notification Electronic Page   Request Evaluate-Remote   Notification Reason Medication Request   Patient requesting additional medication for headache other than tylenol, possibly ibuprofen. Reports headache is different than prior headache with high blood pressure. VSS. Will await return phone call and continue to monitor.    2225: Dr. Cummins returned phone call. Telephone order obtained for unisom 1 tablet at bedtime PRN for sleep and Oxycodone 5mg Q3H PRN for breakthrough pain. Placed TORB. Notify provider if not effective for pain relief. Will continue to monitor.

## 2019-07-07 NOTE — PLAN OF CARE
VSS, BPs within range all shift. Reflexes +2, no clonus noted. Headache improving with use of tylenol and oxycodone. Up independently in room.  Magnesium stopped at 24 hours as ordered. Voiding adequately. Continue to monitor and notify MD as needed.

## 2019-07-16 NOTE — DISCHARGE SUMMARY
"Discharge Summary    Ashley Catherine MRN# [id number]   YOB: 1980 Age: 39 year old     Date of Admission:  2019  Date of Discharge:  2019 10:13 AM  Admitting Physician:  Anne Colmenares MD  Discharge Physician:  Sara Cummins MD  Discharging Service:  OB/Gyn     Home clinic: [facility]  Primary Provider: Violet Humphreys          Admission Diagnoses:   40yo  with chronic hypertension and superimposed severe postpartum pre-eclampsia.           Discharge Diagnosis:   Same             Discharge Disposition:   Discharged to home           Condition on Discharge:   Discharge condition: Stable   Discharge vitals: Blood pressure 136/86, pulse 71, temperature 98.2  F (36.8  C), resp. rate 16, height 1.778 m (5' 10\"), weight 100.7 kg (221 lb 14.4 oz), last menstrual period 2018, SpO2 99 %, unknown if currently breastfeeding.     Code status on discharge: Full Code           Procedures / Labs / Imaging:   No procedures performed during this admission          Medications Prior to Admission:     No medications prior to admission.             Discharge Medications:     Discharge Medication List as of 2019  9:41 AM      START taking these medications    Details   NIFEdipine ER OSMOTIC (ADALAT CC) 30 MG 24 hr tablet Take 1 tablet (30 mg) by mouth daily, Disp-10 tablet, R-0, E-Prescribe         CONTINUE these medications which have CHANGED    Details   labetalol (NORMODYNE) 200 MG tablet Take 1 tablet (200 mg) by mouth every 8 hours, Disp-30 tablet, R-0, E-Prescribe         CONTINUE these medications which have NOT CHANGED    Details   acetaminophen (TYLENOL) 32 mg/mL liquid Take 1,000 mg by mouth every 8 hours as needed for fever or mild pain, Historical      cetirizine (ZYRTEC) 10 MG tablet Take 10 mg by mouth daily as needed for allergies, Historical      cyanocobalamin (VITAMIN B-12) 500 MCG SUBL sublingual tablet Place 500 mcg under the tongue daily, Historical    "   Multiple Vitamins-Minerals (ONE DAILY CALCIUM/IRON PO) Take 1 tablet by mouth daily, Historical      Pediatric Multivitamins-Iron (MULTIVITAMINS PLUS IRON CHILD) 18 MG CHEW Take 2 chew tab by mouth daily, Historical      polyethylene glycol (MIRALAX) powder Take 17 g (1 capful) by mouth daily as needed for constipation, Disp-510 g, R-1, E-PrescribeOnly as needed      ranitidine (ZANTAC) 150 MG tablet Take 150 mg by mouth 2 times daily, Historical      senna-docusate (SENOKOT-S/PERICOLACE) 8.6-50 MG tablet Take 1 tablet by mouth 2 times daily Am and afternoon, Historical                   Consultations:   No consultations were requested during this admission             Brief History of Illness:   Ashley Catherine is a 39 year old female who was admitted for severe range BPs one week s/p vaginal delivery. She has a history of chronic hypertension, well-managed during pregnancy.           Hospital Course:   She was admitted for V antihypertensive therapy and magnesium sulfate for seizure prophylaxis. Her blood pressures were ultimately controlled on labetalol 300mg PO TID and Procardia XL 30mg daily. Labs were normal during admission. She had a moderate headache, improved after treatment of elevated blood pressured.          Significant Results:   Lab Results   Component Value Date    ALT 49 07/05/2019     AST   Date Value Ref Range Status   07/05/2019 30 0 - 45 U/L Final     Creatinine   Date Value Ref Range Status   07/05/2019 0.59 0.52 - 1.04 mg/dL Final     Lab Results   Component Value Date    WBC 6.7 07/05/2019     Lab Results   Component Value Date    RBC 3.82 07/05/2019     Lab Results   Component Value Date    HGB 11.8 07/05/2019     Lab Results   Component Value Date    HCT 35.1 07/05/2019     No components found for: MCT  Lab Results   Component Value Date    MCV 92 07/05/2019     Lab Results   Component Value Date    MCH 30.9 07/05/2019     Lab Results   Component Value Date    MCHC 33.6 07/05/2019     Lab  Results   Component Value Date    RDW 14.5 07/05/2019     Lab Results   Component Value Date     07/05/2019               Patient will follow-up in clinic in 48-72 hours for BP check and medication adjustment.     Sara Cummins MD

## 2019-07-26 ENCOUNTER — OFFICE VISIT (OUTPATIENT)
Dept: PSYCHOLOGY | Facility: CLINIC | Age: 39
End: 2019-07-26
Payer: COMMERCIAL

## 2019-07-26 VITALS — BODY MASS INDEX: 31.75 KG/M2 | WEIGHT: 221.3 LBS

## 2019-07-26 DIAGNOSIS — E66.01 PSYCHOLOGICAL FACTORS AFFECTING MORBID OBESITY (H): ICD-10-CM

## 2019-07-26 DIAGNOSIS — F54 PSYCHOLOGICAL FACTORS AFFECTING MORBID OBESITY (H): ICD-10-CM

## 2019-07-26 DIAGNOSIS — F33.0 MAJOR DEPRESSIVE DISORDER, RECURRENT EPISODE, MILD (H): Primary | ICD-10-CM

## 2019-07-26 NOTE — PROGRESS NOTES
Health Psychology                  Clinic    Department of Medicine  Lis Chavez, Ph.D., L.P. [phone]                          [facility] Earline Anderson, Ph.D.,  L.P. [phone]                 3rd Floor, Clinic 3A  [place] Mail Code 740   Jayro Elias, Ph.D., AMITA.[email or web address].ROB., L.P. [phone]     [address] [place] [address] Melissa Stokes, Ph.D., L.P. [phone]  [place] [address]    Health Psychology Follow-Up Note    Ashley Catherine is a 39-year-old single, Black woman referred initially for psychological consultation for workup for a gastric sleeve procedure who is seen for CBT-oriented therapy regardingt weight management, work stresses, and family and social matters.    She gave birth to her son, Rivera, 4 weeks ago. He is doing well.  She reports having the baby blues for the first two, but feeling better now.  She was hospitalized  Due to blood pressure earlier, and is still having difficulties regulating it.     She  Has started to lose weight since having Rivera.  She knows she needs to chane her diet and increase her exercise.  She reports feeling guilty about getting so much help from her mother.  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  She has not discussed the arrival of the child with  the father.  We discussed strategies for her to feel less frustrated.       She expresses concern about being socially isolated.  She is reluctant to be inolved with social media.     She weighs 221.3  today..  Wt Readings from Last 4 Encounters:   07/26/19 100.4 kg (221 lb 4.8 oz)   07/07/19 100.7 kg (221 lb 14.4 oz)   06/25/19 107 kg (236 lb)   02/21/19 104.2 kg (229 lb 12.8 oz)     Body mass index is 31.75 kg/m .     Current Outpatient Medications   Medication     acetaminophen (TYLENOL) 32 mg/mL liquid     cetirizine (ZYRTEC) 10 MG tablet     cyanocobalamin (VITAMIN B-12) 500 MCG SUBL  sublingual tablet     labetalol (NORMODYNE) 200 MG tablet     Multiple Vitamins-Minerals (ONE DAILY CALCIUM/IRON PO)     NIFEdipine ER OSMOTIC (ADALAT CC) 30 MG 24 hr tablet     Pediatric Multivitamins-Iron (MULTIVITAMINS PLUS IRON CHILD) 18 MG CHEW     polyethylene glycol (MIRALAX) powder     ranitidine (ZANTAC) 150 MG tablet     senna-docusate (SENOKOT-S/PERICOLACE) 8.6-50 MG tablet     No current facility-administered medications for this visit.        Past Medical History:   Diagnosis Date     Bariatric surgery status 05/13/2013     Depression      Depressive disorder      Esophageal reflux      Family history of hyperparathyroidism 3/6/2015     Forearm fracture childhood    jumping fall     Guillain-Alvo disease (H) age 14    hospitalized at [facility] x 1 week     History of steroid therapy      HX ABNL PAP SMEAR OF CERVIX   1995    LEEP AT 15 yo     Hypertension 2004     Hypertrophy of breast      Hypertrophy of tonsils alone      Hypovitaminosis D     with secondary high PTH     Irregular heart beat     palpitations     LBP (low back pain)      LSIL (low grade squamous intraepithelial lesion) on Pap smear 5/2006     Lumbago     RESOLVED     Major depressive disorder, recurrent episode, in partial or unspecified remission 4/1/2013     Morbid obesity (H)     bariatric surgery 5/13/2013     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)      OLIGOMENORRHEA, C/W PCOS      Sciatica      SLEEP APNEA 6/2002    No longer has since tonsillectomy and septoplasty     Past Surgical History:   Procedure Laterality Date     BIOPSY  03/13/2015    Thyroid nodule     ESOPHAGOSCOPY, GASTROSCOPY, DUODENOSCOPY (EGD), COMBINED  5/28/2013    Procedure: COMBINED ESOPHAGOSCOPY, GASTROSCOPY, DUODENOSCOPY (EGD);;  Surgeon: Best Willett MD;  Location:  GI     ESOPHAGOSCOPY, GASTROSCOPY, DUODENOSCOPY (EGD), COMBINED N/A 6/13/2018    Procedure: COMBINED ESOPHAGOSCOPY, GASTROSCOPY, DUODENOSCOPY (EGD), BIOPSY SINGLE OR  MULTIPLE;  gastroscopy;  Surgeon: Westley Gibbs MD;  Location:  GI     LAPAROSCOPIC GASTRIC SLEEVE  5/13/2013    Procedure: LAPAROSCOPIC GASTRIC SLEEVE;  Laparoscopic Sleeve Gastrectomy ;  Surgeon: Best Willett MD;  Location: [facility] OR     LEEP TX, CERVICAL  1995    age 15     partial thyroidectomy  2018     SEPTOPLASTY       THYROIDECTOMY Left 4/3/2018    Procedure: THYROIDECTOMY;  Left Thyroid Lobectomy And Ishtmusectomy;  Surgeon: Delia Harper MD;  [facility]: [facility] OR     TONSILLECTOMY  age 20s     TONSILLECTOMY  2004?     wisdom teeth extraction       PHQ-9 SCORE 5/10/2018 9/15/2018 5/12/2019   PHQ-9 Total Score - - -   PHQ-9 Total Score MyChart - 2 (Minimal depression) 7 (Mild depression)   PHQ-9 Total Score 7 2 7     MAXIMO-7 SCORE 9/28/2017 11/9/2017 9/15/2018   Total Score - - -   Total Score - - 3 (minimal anxiety)   Total Score 1 12 3     WHODAS 2.0 Total Score 9/26/2017 12/4/2018   Total Score 14 18   Total Score MyChart 14 18       She is  5 foot 11 inches.  Her long-term overall goal is 175, obviously on hold until after the pregnancy.       She participates fully. Rapport is excellent.       Extended session due to complexity of case and length of interval.    Time in:  1:10  Time out:  2:05    Diagnosis:   Psychological factors affecting obesity (F54).   Major depression, recurrent, mild (F33.0).   Occupational Problems (Z56.9)    PLAN/RECOMMENDATIONS:   1. Ms. Catherine will return 9/13 @ 2:00 to address weight loss and social issues with problem solving therapy.   She wishes to continue to get support here with her life changes.  2.  Resume schedule of regular exercise to the level that is possible.      Tx plan due 6/27/2019   (next session)

## 2019-08-08 ENCOUNTER — OFFICE VISIT (OUTPATIENT)
Dept: FAMILY MEDICINE | Facility: CLINIC | Age: 39
End: 2019-08-08
Payer: COMMERCIAL

## 2019-08-08 VITALS
BODY MASS INDEX: 32.07 KG/M2 | HEIGHT: 70 IN | DIASTOLIC BLOOD PRESSURE: 98 MMHG | OXYGEN SATURATION: 97 % | SYSTOLIC BLOOD PRESSURE: 151 MMHG | HEART RATE: 66 BPM | RESPIRATION RATE: 16 BRPM | WEIGHT: 224 LBS | TEMPERATURE: 97.9 F

## 2019-08-08 DIAGNOSIS — I10 BENIGN ESSENTIAL HYPERTENSION: Primary | ICD-10-CM

## 2019-08-08 DIAGNOSIS — O14.93 PRE-ECLAMPSIA IN THIRD TRIMESTER: ICD-10-CM

## 2019-08-08 DIAGNOSIS — Z98.84 GASTRIC BYPASS STATUS FOR OBESITY: ICD-10-CM

## 2019-08-08 LAB
ALBUMIN SERPL-MCNC: 3.4 G/DL (ref 3.4–5)
ALP SERPL-CCNC: 62 U/L (ref 40–150)
ALT SERPL W P-5'-P-CCNC: 25 U/L (ref 0–50)
ANION GAP SERPL CALCULATED.3IONS-SCNC: 6 MMOL/L (ref 3–14)
AST SERPL W P-5'-P-CCNC: 14 U/L (ref 0–45)
BILIRUB SERPL-MCNC: 0.5 MG/DL (ref 0.2–1.3)
BUN SERPL-MCNC: 10 MG/DL (ref 7–30)
CALCIUM SERPL-MCNC: 8.6 MG/DL (ref 8.5–10.1)
CHLORIDE SERPL-SCNC: 110 MMOL/L (ref 94–109)
CO2 SERPL-SCNC: 27 MMOL/L (ref 20–32)
CREAT SERPL-MCNC: 0.74 MG/DL (ref 0.52–1.04)
FERRITIN SERPL-MCNC: 108 NG/ML (ref 12–150)
GFR SERPL CREATININE-BSD FRML MDRD: >90 ML/MIN/{1.73_M2}
GLUCOSE SERPL-MCNC: 86 MG/DL (ref 70–99)
IRON SATN MFR SERPL: 40 % (ref 15–46)
IRON SERPL-MCNC: 113 UG/DL (ref 35–180)
POTASSIUM SERPL-SCNC: 4.1 MMOL/L (ref 3.4–5.3)
PROT SERPL-MCNC: 6.5 G/DL (ref 6.8–8.8)
SODIUM SERPL-SCNC: 143 MMOL/L (ref 133–144)
TIBC SERPL-MCNC: 285 UG/DL (ref 240–430)
TSH SERPL DL<=0.005 MIU/L-ACNC: 1.39 MU/L (ref 0.4–4)
VIT B12 SERPL-MCNC: 1132 PG/ML (ref 193–986)

## 2019-08-08 PROCEDURE — 83540 ASSAY OF IRON: CPT | Performed by: FAMILY MEDICINE

## 2019-08-08 PROCEDURE — 83550 IRON BINDING TEST: CPT | Performed by: FAMILY MEDICINE

## 2019-08-08 PROCEDURE — 80053 COMPREHEN METABOLIC PANEL: CPT | Performed by: FAMILY MEDICINE

## 2019-08-08 PROCEDURE — 82728 ASSAY OF FERRITIN: CPT | Performed by: FAMILY MEDICINE

## 2019-08-08 PROCEDURE — 82306 VITAMIN D 25 HYDROXY: CPT | Performed by: FAMILY MEDICINE

## 2019-08-08 PROCEDURE — 36415 COLL VENOUS BLD VENIPUNCTURE: CPT | Performed by: FAMILY MEDICINE

## 2019-08-08 PROCEDURE — 99214 OFFICE O/P EST MOD 30 MIN: CPT | Performed by: FAMILY MEDICINE

## 2019-08-08 PROCEDURE — 82607 VITAMIN B-12: CPT | Performed by: FAMILY MEDICINE

## 2019-08-08 PROCEDURE — 84443 ASSAY THYROID STIM HORMONE: CPT | Performed by: FAMILY MEDICINE

## 2019-08-08 RX ORDER — LABETALOL 300 MG/1
300 TABLET, FILM COATED ORAL 2 TIMES DAILY
COMMUNITY
End: 2019-08-08

## 2019-08-08 RX ORDER — LABETALOL 300 MG/1
300 TABLET, FILM COATED ORAL 2 TIMES DAILY
Qty: 60 TABLET | Refills: 0 | Status: SHIPPED | OUTPATIENT
Start: 2019-08-08 | End: 2019-09-05

## 2019-08-08 RX ORDER — NIFEDIPINE 30 MG
30 TABLET, EXTENDED RELEASE ORAL DAILY
Qty: 180 TABLET | Refills: 0 | Status: SHIPPED | OUTPATIENT
Start: 2019-08-08 | End: 2019-10-28

## 2019-08-08 ASSESSMENT — MIFFLIN-ST. JEOR: SCORE: 1771.31

## 2019-08-08 NOTE — PROGRESS NOTES
Subjective     Ashley Catherine is a 39 year old female who presents to clinic today for the following health issues:    HPI   Hypertension Follow-up    Is 6 weeks post partum  Had normal pressures during pregnancy ma intanined on labetalol 200 mg bid then 1 week after delivery had readmission for HTN and was started on Nifedipine XR 30 mg daily.  Ran out of this few days ago  Had her PP visit and pressures were still elevated was told to take 300 mg Labetalol tid until she could be seen.     Has no leg swelling headaches chest pain  No longer breast feeding - stopped after 3 weeks  In the past was on hydrochlorothiazide and Lisinopril before this  BP Readings from Last 6 Encounters:   08/08/19 (!) 151/98   07/07/19 136/86   06/28/19 115/71   11/29/18 134/84   11/15/18 123/74   11/14/18 134/80         Do you check your blood pressure regularly outside of the clinic? Yes     Are you following a low salt diet? Yes    Are your blood pressures ever more than 140 on the top number (systolic) OR more   than 90 on the bottom number (diastolic), for example 140/90? Yes    Amount of exercise or physical activity: 4-5 days/week for an average of 15-30 minutes    Problems taking medications regularly: No    Medication side effects: none    Diet: low salt    Gastric bypass status - due for some labs  Had anemia while in pregnancy  Post partum CBC looks good          Patient Active Problem List   Diagnosis     OLIGOMENORRHEA, C/W PCOS     HX ABNL PAP SMEAR OF CERVIX       Flat feet     Sciatica     S/P gastrectomy     Occupational problem     Elevated parathyroid hormone     Multiple thyroid nodules     Abnormal thyroid cytology-follicular neoplasm     Chronic pain syndrome     Major depressive disorder, recurrent episode, in full remission (H)     Bilateral low back pain with sciatica, sciatica laterality unspecified     Throat symptom     Psychological factor affecting physical condition     Hx of Guillain-[place] syndrome      Adjustment disorder with anxious mood     Thyroid nodule     Benign essential hypertension     Concussion without loss of consciousness, subsequent encounter     Gastric bypass status for obesity     Pregnancy examination or test, positive result     Travel advice encounter     Labor and delivery, indication for care     Vaginal delivery     Pre-eclampsia     Past Surgical History:   Procedure Laterality Date     BIOPSY  03/13/2015    Thyroid nodule     ESOPHAGOSCOPY, GASTROSCOPY, DUODENOSCOPY (EGD), COMBINED  5/28/2013    Procedure: COMBINED ESOPHAGOSCOPY, GASTROSCOPY, DUODENOSCOPY (EGD);;  Surgeon: Best Willett MD;  Location:  GI     ESOPHAGOSCOPY, GASTROSCOPY, DUODENOSCOPY (EGD), COMBINED N/A 6/13/2018    Procedure: COMBINED ESOPHAGOSCOPY, GASTROSCOPY, DUODENOSCOPY (EGD), BIOPSY SINGLE OR MULTIPLE;  gastroscopy;  Surgeon: Westley Gibbs MD;  Location: [facility]     LAPAROSCOPIC GASTRIC SLEEVE  5/13/2013    Procedure: LAPAROSCOPIC GASTRIC SLEEVE;  Laparoscopic Sleeve Gastrectomy ;  Surgeon: Best Willett MD;  Location:  OR     LEEP TX, CERVICAL  1995    age 15     partial thyroidectomy  2018     SEPTOPLASTY       THYROIDECTOMY Left 4/3/2018    Procedure: THYROIDECTOMY;  Left Thyroid Lobectomy And Ishtmusectomy;  Surgeon: Delia Harper MD;  [facility]     TONSILLECTOMY  age 20s     TONSILLECTOMY  2004?     wisdom teeth extraction         Social History     Tobacco Use     Smoking status: Never Smoker     Smokeless tobacco: Never Used   Substance Use Topics     Alcohol use: Not Currently     Alcohol/week: 2.4 - 3.0 oz     Comment: 3 drinks/week     Family History   Problem Relation Age of Onset     Hypertension Sister      Hypertension Father      Allergies Father         seasonal     Lipids Father      Obesity Father      Arthritis Mother      Gynecology Mother         problems with menopause     Hypertension Mother      Other Cancer Mother         Endometrial     Osteoporosis  Mother      Obesity Mother      Parathyroid Disorders Mother         3 operations     Obesity Sister      Diabetes Maternal Aunt         x several w. DM     Breast Cancer Maternal Aunt      Cancer - colorectal Maternal Uncle         60ish     Hypertension Sister      Obesity Sister      Nephrolithiasis No family hx of          Current Outpatient Medications   Medication Sig Dispense Refill     acetaminophen (TYLENOL) 32 mg/mL liquid Take 1,000 mg by mouth every 8 hours as needed for fever or mild pain       cetirizine (ZYRTEC) 10 MG tablet Take 10 mg by mouth daily as needed for allergies       cyanocobalamin (VITAMIN B-12) 500 MCG SUBL sublingual tablet Place 500 mcg under the tongue daily       labetalol (NORMODYNE) 300 MG tablet Take 1 tablet (300 mg) by mouth 2 times daily 60 tablet 0     Multiple Vitamins-Minerals (ONE DAILY CALCIUM/IRON PO) Take 1 tablet by mouth daily       NIFEdipine ER (ADALAT CC) 30 MG 24 hr tablet Take 1 tablet (30 mg) by mouth daily Increase to 60 mg daily after 3 days 180 tablet 0     polyethylene glycol (MIRALAX) powder Take 17 g (1 capful) by mouth daily as needed for constipation 510 g 1     ranitidine (ZANTAC) 150 MG tablet Take 150 mg by mouth 2 times daily       senna-docusate (SENOKOT-S/PERICOLACE) 8.6-50 MG tablet Take 1 tablet by mouth 2 times daily Am and afternoon       Pediatric Multivitamins-Iron (MULTIVITAMINS PLUS IRON CHILD) 18 MG CHEW Take 2 chew tab by mouth daily       Recent Labs   Lab Test 07/05/19  1344 11/14/18  1103 10/04/18  0929 09/11/18  0813 06/07/18  1618 05/10/18  1411  05/15/17  1056  12/23/14  1613   A1C  --  5.0 5.0  --   --   --   --   --   --  5.0   LDL  --   --  82  --   --   --   --  52  --  67   HDL  --   --  82  --   --   --   --  81  --  77   TRIG  --   --  56  --   --   --   --  120  --  91   ALT 49 23  --  18 19  --    < > 20   < > 20   CR 0.59 0.62  --  0.72 0.68  --    < > 0.54   < > 0.78   GFRESTIMATED >90 >90  --  >90 >90  --    < > >90  Non  " GFR Calc     < > 84   GFRESTBLACK >90 >90  --  >90 >90  --    < > >90   GFR Calc     < > >90   GFR Calc     POTASSIUM 3.4 3.9  --  3.7 3.7  --    < > 3.8   < > 3.8   TSH  --   --   --   --  1.08 1.77   < >  --    < >  --     < > = values in this interval not displayed.        Reviewed and updated as needed this visit by Provider         Review of Systems   ROS COMP: Constitutional, HEENT, cardiovascular, pulmonary, gi and gu systems are negative, except as otherwise noted.      Objective    BP (!) 151/98   Pulse 66   Temp 97.9  F (36.6  C) (Oral)   Resp 16   Ht 1.778 m (5' 10\")   Wt 101.6 kg (224 lb)   LMP 09/28/2018   SpO2 97%   BMI 32.14 kg/m    Body mass index is 32.14 kg/m .  Physical Exam   GENERAL: healthy, alert and no distress  EYES: Eyes grossly normal to inspection, PERRL and conjunctivae and sclerae normal  CV: regular rate and rhythm, normal S1 S2, no S3 or S4, no murmur, click or rub, no peripheral edema and peripheral pulses strong  MS: no gross musculoskeletal defects noted, no edema  PSYCH: mentation appears normal, affect normal/bright    Diagnostic Test Results:  Labs reviewed in Epic        Assessment & Plan     1. Pre-eclampsia in third trimester  Restarting meds - will go to 60 mg and recheck in 2 weeks  Continue labetalol at 300 TID and taper down once pressures start to drop  - NIFEdipine ER (ADALAT CC) 30 MG 24 hr tablet; Take 1 tablet (30 mg) by mouth daily Increase to 60 mg daily after 3 days  Dispense: 180 tablet; Refill: 0    2. Benign essential hypertension    - labetalol (NORMODYNE) 300 MG tablet; Take 1 tablet (300 mg) by mouth 2 times daily  Dispense: 60 tablet; Refill: 0    3. Gastric bypass status for obesity  Due for labs  - Comprehensive metabolic panel  - TSH with free T4 reflex  - Vitamin B12  - Iron and iron binding capacity  - Ferritin  - Vitamin D Deficiency     BMI:   Estimated body mass index is 32.14 kg/m  as " "calculated from the following:    Height as of this encounter: 1.778 m (5' 10\").    Weight as of this encounter: 101.6 kg (224 lb).   Weight management plan: Discussed healthy diet and exercise guidelines    Mood is good  Baby is well  She has support      See Patient Instructions    No follow-ups on file.    Violet Humphreys MD  [facility]    "

## 2019-08-09 LAB — DEPRECATED CALCIDIOL+CALCIFEROL SERPL-MC: 38 UG/L (ref 20–75)

## 2019-08-13 ENCOUNTER — MYC MEDICAL ADVICE (OUTPATIENT)
Dept: FAMILY MEDICINE | Facility: CLINIC | Age: 39
End: 2019-08-13

## 2019-08-13 NOTE — RESULT ENCOUNTER NOTE
Hello,    Great news, your results were normal. The B12 was a bit high but this could have been due to a recent dose.  Let me know when the last B12 dose was taken.      Violet Humphreys MD

## 2019-08-21 ENCOUNTER — OFFICE VISIT (OUTPATIENT)
Dept: FAMILY MEDICINE | Facility: CLINIC | Age: 39
End: 2019-08-21
Payer: COMMERCIAL

## 2019-08-21 VITALS
BODY MASS INDEX: 32.31 KG/M2 | TEMPERATURE: 98.6 F | WEIGHT: 225.7 LBS | HEIGHT: 70 IN | OXYGEN SATURATION: 97 % | SYSTOLIC BLOOD PRESSURE: 120 MMHG | DIASTOLIC BLOOD PRESSURE: 75 MMHG | HEART RATE: 65 BPM

## 2019-08-21 DIAGNOSIS — I10 BENIGN ESSENTIAL HYPERTENSION: Primary | ICD-10-CM

## 2019-08-21 PROBLEM — S06.0X0D CONCUSSION WITHOUT LOSS OF CONSCIOUSNESS, SUBSEQUENT ENCOUNTER: Status: RESOLVED | Noted: 2018-08-08 | Resolved: 2019-08-21

## 2019-08-21 PROCEDURE — 99214 OFFICE O/P EST MOD 30 MIN: CPT | Performed by: FAMILY MEDICINE

## 2019-08-21 ASSESSMENT — MIFFLIN-ST. JEOR: SCORE: 1779.02

## 2019-08-21 NOTE — PROGRESS NOTES
Subjective     Ashley Catherine is a 39 year old female who presents to clinic today for the following health issues:    HPI   2 wk f/up BP    Much improved with higher dose nifedipine  Tolerating this and labetalol well  Had higher pressures just after delivery  Has HTN in her family  Previously well maintained in hydrochlorothiazide  Feels well no headaches chest pain GAIL  Not breastfeeding          Patient Active Problem List   Diagnosis     OLIGOMENORRHEA, C/W PCOS     HX ABNL PAP SMEAR OF CERVIX       Flat feet     Sciatica     S/P gastrectomy     Occupational problem     Elevated parathyroid hormone     Multiple thyroid nodules     Abnormal thyroid cytology-follicular neoplasm     Chronic pain syndrome     Major depressive disorder, recurrent episode, in full remission (H)     Bilateral low back pain with sciatica, sciatica laterality unspecified     Throat symptom     Psychological factor affecting physical condition     Hx of Guillain-[place] syndrome     Adjustment disorder with anxious mood     Thyroid nodule     Benign essential hypertension     Gastric bypass status for obesity     Pregnancy examination or test, positive result     Travel advice encounter     Labor and delivery, indication for care     Vaginal delivery     Pre-eclampsia     Past Surgical History:   Procedure Laterality Date     BIOPSY  03/13/2015    Thyroid nodule     ESOPHAGOSCOPY, GASTROSCOPY, DUODENOSCOPY (EGD), COMBINED  5/28/2013    Procedure: COMBINED ESOPHAGOSCOPY, GASTROSCOPY, DUODENOSCOPY (EGD);;  Surgeon: Best Willett MD;  Location:  GI     ESOPHAGOSCOPY, GASTROSCOPY, DUODENOSCOPY (EGD), COMBINED N/A 6/13/2018    Procedure: COMBINED ESOPHAGOSCOPY, GASTROSCOPY, DUODENOSCOPY (EGD), BIOPSY SINGLE OR MULTIPLE;  gastroscopy;  Surgeon: Westley Gibbs MD;  Location: [facility]     LAPAROSCOPIC GASTRIC SLEEVE  5/13/2013    Procedure: LAPAROSCOPIC GASTRIC SLEEVE;  Laparoscopic Sleeve Gastrectomy ;  Surgeon: Best Willett MD;   Location: [facility] OR     LEEP TX, CERVICAL  1995    age 15     partial thyroidectomy  2018     SEPTOPLASTY       THYROIDECTOMY Left 4/3/2018    Procedure: THYROIDECTOMY;  Left Thyroid Lobectomy And Ishtmusectomy;  Surgeon: Delia Harper MD;  [facility]     TONSILLECTOMY  age 20s     TONSILLECTOMY  2004?     wisdom teeth extraction         Social History     Tobacco Use     Smoking status: Never Smoker     Smokeless tobacco: Never Used   Substance Use Topics     Alcohol use: Yes     Alcohol/week: 2.4 - 3.0 oz     Comment: 4 drinks/week     Family History   Problem Relation Age of Onset     Hypertension Sister      Hypertension Father      Allergies Father         seasonal     Lipids Father      Obesity Father      Arthritis Mother      Gynecology Mother         problems with menopause     Hypertension Mother      Other Cancer Mother         Endometrial     Osteoporosis Mother      Obesity Mother      Parathyroid Disorders Mother         3 operations     Obesity Sister      Diabetes Maternal Aunt         x several w. DM     Breast Cancer Maternal Aunt      Cancer - colorectal Maternal Uncle         60ish     Hypertension Sister      Obesity Sister      Nephrolithiasis No family hx of          Current Outpatient Medications   Medication Sig Dispense Refill     acetaminophen (TYLENOL) 32 mg/mL liquid Take 1,000 mg by mouth every 8 hours as needed for fever or mild pain       cetirizine (ZYRTEC) 10 MG tablet Take 10 mg by mouth daily as needed for allergies       cyanocobalamin (VITAMIN B-12) 500 MCG SUBL sublingual tablet Place 500 mcg under the tongue every other day        labetalol (NORMODYNE) 300 MG tablet Take 1 tablet (300 mg) by mouth 2 times daily 60 tablet 0     Multiple Vitamins-Minerals (ONE DAILY CALCIUM/IRON PO) Take 1 tablet by mouth daily       NIFEdipine ER (ADALAT CC) 30 MG 24 hr tablet Take 1 tablet (30 mg) by mouth daily Increase to 60 mg daily after 3 days 180 tablet 0     Pediatric  Multivitamins-Iron (MULTIVITAMINS PLUS IRON CHILD) 18 MG CHEW Take 2 chew tab by mouth daily       polyethylene glycol (MIRALAX) powder Take 17 g (1 capful) by mouth daily as needed for constipation 510 g 1     ranitidine (ZANTAC) 150 MG tablet Take 150 mg by mouth 2 times daily       senna-docusate (SENOKOT-S/PERICOLACE) 8.6-50 MG tablet Take 1 tablet by mouth daily as needed Am and afternoon        Recent Labs   Lab Test 08/08/19  0907 07/05/19  1344 11/14/18  1103 10/04/18  0929  06/07/18  1618  05/15/17  1056  12/23/14  1613   A1C  --   --  5.0 5.0  --   --   --   --   --  5.0   LDL  --   --   --  82  --   --   --  52  --  67   HDL  --   --   --  82  --   --   --  81  --  77   TRIG  --   --   --  56  --   --   --  120  --  91   ALT 25 49 23  --    < > 19   < > 20   < > 20   CR 0.74 0.59 0.62  --    < > 0.68   < > 0.54   < > 0.78   GFRESTIMATED >90 >90 >90  --    < > >90   < > >90  Non  GFR Calc     < > 84   GFRESTBLACK >90 >90 >90  --    < > >90   < > >90  African American GFR Calc     < > >90   GFR Calc     POTASSIUM 4.1 3.4 3.9  --    < > 3.7   < > 3.8   < > 3.8   TSH 1.39  --   --   --   --  1.08   < >  --    < >  --     < > = values in this interval not displayed.      BP Readings from Last 3 Encounters:   08/21/19 120/75   08/08/19 (!) 151/98   07/07/19 136/86    Wt Readings from Last 3 Encounters:   08/21/19 102.4 kg (225 lb 11.2 oz)   08/08/19 101.6 kg (224 lb)   07/26/19 100.4 kg (221 lb 4.8 oz)                      Reviewed and updated as needed this visit by Provider         Review of Systems   ROS COMP: Constitutional, HEENT, cardiovascular, pulmonary, gi and gu systems are negative, except as otherwise noted.      Objective    LMP 09/28/2018   There is no height or weight on file to calculate BMI.  Physical Exam   GENERAL: healthy, alert and no distress  RESP: lungs clear to auscultation - no rales, rhonchi or wheezes  CV: regular rate and rhythm, normal S1 S2, no S3  "or S4, no murmur, click or rub, no peripheral edema and peripheral pulses strong    Diagnostic Test Results:  Labs reviewed in Epic        Assessment & Plan     1. Benign essential hypertension  Well controlled  Sill stay on current meds for another 3 months then see if we can reduce and taper off beta blocker       BMI:   Estimated body mass index is 32.38 kg/m  as calculated from the following:    Height as of this encounter: 1.778 m (5' 10\").    Weight as of this encounter: 102.4 kg (225 lb 11.2 oz).   Weight management plan: Discussed healthy diet and exercise guidelines        See Patient Instructions    No follow-ups on file.    Violet Humphreys MD  [facility]      "

## 2019-09-05 ENCOUNTER — MYC REFILL (OUTPATIENT)
Dept: FAMILY MEDICINE | Facility: CLINIC | Age: 39
End: 2019-09-05

## 2019-09-05 DIAGNOSIS — I10 BENIGN ESSENTIAL HYPERTENSION: ICD-10-CM

## 2019-09-06 RX ORDER — LABETALOL 300 MG/1
300 TABLET, FILM COATED ORAL 2 TIMES DAILY
Qty: 180 TABLET | Refills: 0 | Status: SHIPPED | OUTPATIENT
Start: 2019-09-06 | End: 2019-10-28

## 2019-09-06 NOTE — TELEPHONE ENCOUNTER
"Prescription approved per [facility] Refill Protocol.  Laureen SOMERS RN    Last Written Prescription Date:  8/8/2019  Last Fill Quantity: 60,  # refills: 0   Last office visit: 8/21/2019 with prescribing provider:     Future Office Visit:    Requested Prescriptions   Pending Prescriptions Disp Refills     labetalol (NORMODYNE) 300 MG tablet 60 tablet 0     Sig: Take 1 tablet (300 mg) by mouth 2 times daily       Beta-Blockers Protocol Passed - 9/5/2019  9:12 PM        Passed - Blood pressure under 140/90 in past 12 months     BP Readings from Last 3 Encounters:   08/21/19 120/75   08/08/19 (!) 151/98   07/07/19 136/86                 Passed - Patient is age 6 or older        Passed - Recent (12 mo) or future (30 days) visit within the authorizing provider's specialty     Patient had office visit in the last 12 months or has a visit in the next 30 days with authorizing provider or within the authorizing provider's specialty.  See \"Patient Info\" tab in inbasket, or \"Choose Columns\" in Meds & Orders section of the refill encounter.              Passed - Medication is active on med list        "

## 2019-09-23 ENCOUNTER — OFFICE VISIT (OUTPATIENT)
Dept: FAMILY MEDICINE | Facility: CLINIC | Age: 39
End: 2019-09-23
Payer: COMMERCIAL

## 2019-09-23 VITALS
WEIGHT: 231.7 LBS | OXYGEN SATURATION: 95 % | HEART RATE: 90 BPM | TEMPERATURE: 97 F | BODY MASS INDEX: 33.25 KG/M2 | SYSTOLIC BLOOD PRESSURE: 143 MMHG | DIASTOLIC BLOOD PRESSURE: 91 MMHG

## 2019-09-23 DIAGNOSIS — M21.41 FLAT FEET: Primary | ICD-10-CM

## 2019-09-23 DIAGNOSIS — M21.42 FLAT FEET: Primary | ICD-10-CM

## 2019-09-23 DIAGNOSIS — I10 ESSENTIAL HYPERTENSION, BENIGN: ICD-10-CM

## 2019-09-23 PROCEDURE — 99214 OFFICE O/P EST MOD 30 MIN: CPT | Performed by: FAMILY MEDICINE

## 2019-09-23 RX ORDER — LISINOPRIL AND HYDROCHLOROTHIAZIDE 20; 25 MG/1; MG/1
1 TABLET ORAL DAILY
Qty: 90 TABLET | Refills: 1 | Status: SHIPPED | OUTPATIENT
Start: 2019-09-23 | End: 2020-03-12

## 2019-09-23 NOTE — PROGRESS NOTES
Subjective     Ashley Catherine is a 39 year old female who presents to clinic today for the following health issues:    HPI   Hypertension Follow-up      Do you check your blood pressure regularly outside of the clinic? Yes     Are you following a low salt diet? No-     Are your blood pressures ever more than 140 on the top number (systolic) OR more   than 90 on the bottom number (diastolic), for example 140/90? Yes      How many servings of fruits and vegetables do you eat daily?  0-1    On average, how many sweetened beverages do you drink each day (soda, juice, sweet tea, etc)?   0    How many days per week do you miss taking your medication? 0      Was on lisinopril and hydrochlorothiazide in the past - tolerated this well  Is noting more swelling with nifedipine.  Tied half the dose and still no better  hsa used lisinopril hydrochlorothiazide in the past and did well was able to transition off from this after gastric bypass surgery      Reviewed and updated as needed this visit by Provider         Review of Systems   ROS COMP: Constitutional, HEENT, cardiovascular, pulmonary, gi and gu systems are negative, except as otherwise noted.      Objective    BP (!) 143/91   Pulse 90   Temp 97  F (36.1  C)   Wt 105.1 kg (231 lb 11.2 oz)   SpO2 95%   BMI 33.25 kg/m    Body mass index is 33.25 kg/m .  Physical Exam   GENERAL: healthy, alert and no distress    Diagnostic Test Results:  Labs reviewed in Epic        Assessment & Plan     1.  Essential hypertension, benign  Side effects ith current meds  Will stya on labetalol  Reduce CCB nifedipine to half dose for a week as she startes new med below then stop CCb and see what pressures are  - lisinopril-hydrochlorothiazide (PRINZIDE/ZESTORETIC) 20-25 MG tablet; Take 1 tablet by mouth daily  Dispense: 90 tablet; Refill: 1   2. Has history of Guillian barre but this was NOT provoked by influenza vaccine and has tolerated this in the last year twice - Ok to get flu  "vaccine      BMI:   Estimated body mass index is 33.25 kg/m  as calculated from the following:    Height as of 8/21/19: 1.778 m (5' 10\").    Weight as of this encounter: 105.1 kg (231 lb 11.2 oz).           See Patient Instructions    No follow-ups on file.    Violet Humphreys MD  [facility]        "

## 2019-10-03 ENCOUNTER — HEALTH MAINTENANCE LETTER (OUTPATIENT)
Age: 39
End: 2019-10-03

## 2019-10-07 ENCOUNTER — TRANSFERRED RECORDS (OUTPATIENT)
Dept: HEALTH INFORMATION MANAGEMENT | Facility: CLINIC | Age: 39
End: 2019-10-07

## 2019-10-28 ENCOUNTER — OFFICE VISIT (OUTPATIENT)
Dept: FAMILY MEDICINE | Facility: CLINIC | Age: 39
End: 2019-10-28
Payer: COMMERCIAL

## 2019-10-28 VITALS
HEART RATE: 89 BPM | OXYGEN SATURATION: 96 % | WEIGHT: 230 LBS | TEMPERATURE: 97.7 F | BODY MASS INDEX: 32.93 KG/M2 | HEIGHT: 70 IN | SYSTOLIC BLOOD PRESSURE: 125 MMHG | DIASTOLIC BLOOD PRESSURE: 78 MMHG

## 2019-10-28 DIAGNOSIS — K21.9 GASTROESOPHAGEAL REFLUX DISEASE, ESOPHAGITIS PRESENCE NOT SPECIFIED: ICD-10-CM

## 2019-10-28 DIAGNOSIS — I10 BENIGN ESSENTIAL HYPERTENSION: Primary | ICD-10-CM

## 2019-10-28 PROCEDURE — 90471 IMMUNIZATION ADMIN: CPT | Performed by: FAMILY MEDICINE

## 2019-10-28 PROCEDURE — 99214 OFFICE O/P EST MOD 30 MIN: CPT | Mod: 25 | Performed by: FAMILY MEDICINE

## 2019-10-28 PROCEDURE — 90686 IIV4 VACC NO PRSV 0.5 ML IM: CPT | Performed by: FAMILY MEDICINE

## 2019-10-28 RX ORDER — OMEPRAZOLE 40 MG/1
40 CAPSULE, DELAYED RELEASE ORAL DAILY
Qty: 90 CAPSULE | Refills: 3 | Status: SHIPPED | OUTPATIENT
Start: 2019-10-28 | End: 2019-12-17

## 2019-10-28 ASSESSMENT — ANXIETY QUESTIONNAIRES
6. BECOMING EASILY ANNOYED OR IRRITABLE: NOT AT ALL
5. BEING SO RESTLESS THAT IT IS HARD TO SIT STILL: NOT AT ALL
2. NOT BEING ABLE TO STOP OR CONTROL WORRYING: SEVERAL DAYS
GAD7 TOTAL SCORE: 3
1. FEELING NERVOUS, ANXIOUS, OR ON EDGE: NOT AT ALL
3. WORRYING TOO MUCH ABOUT DIFFERENT THINGS: SEVERAL DAYS
7. FEELING AFRAID AS IF SOMETHING AWFUL MIGHT HAPPEN: SEVERAL DAYS

## 2019-10-28 ASSESSMENT — PATIENT HEALTH QUESTIONNAIRE - PHQ9: 5. POOR APPETITE OR OVEREATING: NOT AT ALL

## 2019-10-28 ASSESSMENT — MIFFLIN-ST. JEOR: SCORE: 1798.52

## 2019-10-28 NOTE — PROGRESS NOTES
"Subjective     Ashley Catherine is a 39 year old female who presents to clinic today for the following health issues:    HPI   Hypertension Follow-up      Do you check your blood pressure regularly outside of the clinic? Yes     Are you following a low salt diet? No    Are your blood pressures ever more than 140 on the top number (systolic) OR more   than 90 on the bottom number (diastolic), for example 140/90? Yes      How many servings of fruits and vegetables do you eat daily?  2-3    On average, how many sweetened beverages do you drink each day (soda, juice, sweet tea, etc)?   3    How many days per week do you miss taking your medication? 0      Pressures at home are higher but cuff is a year old  Has tapered off labetalol - stopped this few days ago  Has noted swelling of her legs/thighs since pregnancy  Stopped nifedipine last month  reviewed med ses - CCB can all cause this.  Less with verapamil.      Has had GB NOT associated with flu vaccine - we discussed getting this today  Had a flu vaccine last year in pregnancy and was fine  Her son is now 4 months old        Reviewed and updated as needed this visit by Provider         Review of Systems   ROS COMP: Constitutional, HEENT, cardiovascular, pulmonary, gi and gu systems are negative, except as otherwise noted.      Objective    BP (!) 144/89   Pulse 89   Temp 97.7  F (36.5  C) (Oral)   Ht 1.778 m (5' 10\")   Wt 104.3 kg (230 lb)   SpO2 96%   BMI 33.00 kg/m    Body mass index is 33 kg/m .  Physical Exam   GENERAL: healthy, alert and no distress  EXT: no edema noted    Diagnostic Test Results:  Labs reviewed in Epic  No results found for this or any previous visit (from the past 24 hour(s)).        Assessment & Plan     1. Benign essential hypertension  Checking prtein loss given edema she has noted - NONE noted on exam today  recheck pof BP was at goal!  Discussed staying on ace/diuretic combo tablet  If pressures rise might need to add CCB ? " "Verapamil.  Follow  Had pre-existing HTN before pregnancy  - Protein  random urine with Creat Ratio    2. Gastroesophageal reflux disease, esophagitis presence not specified  She asked what to take in lieu of ranitidine - discussed taking intermittent PPI - she was prescrbined 40 mg in past should try to use 20 mg PRN if able  - omeprazole (PRILOSEC) 40 MG DR capsule; Take 1 capsule (40 mg) by mouth daily  Dispense: 90 capsule; Refill: 3     BMI:   Estimated body mass index is 33 kg/m  as calculated from the following:    Height as of this encounter: 1.778 m (5' 10\").    Weight as of this encounter: 104.3 kg (230 lb).           See Patient Instructions    No follow-ups on file.    Violet Humphreys MD  [facility]        "

## 2019-10-29 ASSESSMENT — ANXIETY QUESTIONNAIRES: GAD7 TOTAL SCORE: 3

## 2019-11-13 ENCOUNTER — OFFICE VISIT (OUTPATIENT)
Dept: PSYCHOLOGY | Facility: CLINIC | Age: 39
End: 2019-11-13
Payer: COMMERCIAL

## 2019-11-13 DIAGNOSIS — F54 PSYCHOLOGICAL FACTORS AFFECTING MORBID OBESITY (H): ICD-10-CM

## 2019-11-13 DIAGNOSIS — E66.01 PSYCHOLOGICAL FACTORS AFFECTING MORBID OBESITY (H): ICD-10-CM

## 2019-11-13 DIAGNOSIS — F33.0 MAJOR DEPRESSIVE DISORDER, RECURRENT EPISODE, MILD (H): Primary | ICD-10-CM

## 2019-11-13 NOTE — PROGRESS NOTES
Health Psychology                  Clinic    Department of Medicine  Lis Chavez, Ph.D., L.P. [phone]                          [facility] Earline Anderson, Ph.D.,  L.P. [phone]                 3rd Floor, Clinic [address]  [place] Mail [address]   Jayro Elias, Ph.D., [email or web address]., L.P. [phone]     [address] [place] [address] Melissa Stokes, Ph.D., L.P. [phone]  [id number]  [place] [address]    Health Psychology Follow-Up Note    Ashley Catherine is a 39-year-old single, Black woman referred initially for psychological consultation for workup for a gastric sleeve procedure who is seen for CBT-oriented therapy regardingt weight management, work stresses, and family and social matters.    She gave birth to her son, Rivera, 4 months ago. He is doing well.  She reports having the baby blues for the first two, but feeling better now at times.  She is feeling more down today given her son is sick and everybody at the house is now sick. She woe a face mask in today.   She is working 12-hour shifts.  Her parents are taking care of Rivera while she is at work.     She had started to lose weight since having Rivera, but more recently is gaining weight as she is not exercising regularly and is not eating as healthily, feeling tired a lot.  We discussed recommitting to taking better care of her self including to change her diet and increase her exercise.      She has not discussed the arrival of the child with  the father and he has not been in touch.  We discussed strategies for her to feel less frustrated.       She  Was not weighed today  today.  Wt Readings from Last 4 Encounters:   10/28/19 104.3 kg (230 lb)   09/23/19 105.1 kg (231 lb 11.2 oz)   08/21/19 102.4 kg (225 lb 11.2 oz)   08/08/19 101.6 kg (224 lb)     There is no height or weight on file to calculate BMI.     Current Outpatient Medications   Medication      acetaminophen (TYLENOL) 32 mg/mL liquid     cyanocobalamin (VITAMIN B-12) 500 MCG SUBL sublingual tablet     lisinopril-hydrochlorothiazide (PRINZIDE/ZESTORETIC) 20-25 MG tablet     Multiple Vitamins-Minerals (ONE DAILY CALCIUM/IRON PO)     omeprazole (PRILOSEC) 40 MG DR capsule     Pediatric Multivitamins-Iron (MULTIVITAMINS PLUS IRON CHILD) 18 MG CHEW     polyethylene glycol (MIRALAX) powder     No current facility-administered medications for this visit.        Past Medical History:   Diagnosis Date     Bariatric surgery status 05/13/2013     Depression      Depressive disorder      Esophageal reflux      Family history of hyperparathyroidism 3/6/2015     Forearm fracture childhood    jumping fall     Guillain-Lynx disease (H) age 14    hospitalized at [facility] x 1 week     History of steroid therapy      HX ABNL PAP SMEAR OF CERVIX   1995    LEEP AT 15 yo     Hypertension 2004     Hypertrophy of breast      Hypertrophy of tonsils alone      Hypovitaminosis D     with secondary high PTH     Irregular heart beat     palpitations     LBP (low back pain)      LSIL (low grade squamous intraepithelial lesion) on Pap smear 5/2006     Lumbago     RESOLVED     Major depressive disorder, recurrent episode, in partial or unspecified remission 4/1/2013     Morbid obesity (H)     bariatric surgery 5/13/2013     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)      OLIGOMENORRHEA, C/W PCOS      Sciatica      SLEEP APNEA 6/2002    No longer has since tonsillectomy and septoplasty     Past Surgical History:   Procedure Laterality Date     BIOPSY  03/13/2015    Thyroid nodule     ESOPHAGOSCOPY, GASTROSCOPY, DUODENOSCOPY (EGD), COMBINED  5/28/2013    Procedure: COMBINED ESOPHAGOSCOPY, GASTROSCOPY, DUODENOSCOPY (EGD);;  Surgeon: Best Willett MD;  Location:  GI     ESOPHAGOSCOPY, GASTROSCOPY, DUODENOSCOPY (EGD), COMBINED N/A 6/13/2018    Procedure: COMBINED ESOPHAGOSCOPY, GASTROSCOPY, DUODENOSCOPY (EGD), BIOPSY  SINGLE OR MULTIPLE;  gastroscopy;  Surgeon: Westley Gibbs MD;  Location:  GI     LAPAROSCOPIC GASTRIC SLEEVE  5/13/2013    Procedure: LAPAROSCOPIC GASTRIC SLEEVE;  Laparoscopic Sleeve Gastrectomy ;  Surgeon: Best Willett MD;  Location: [facility] OR     LEEP TX, CERVICAL  1995    age 15     partial thyroidectomy  2018     SEPTOPLASTY       THYROIDECTOMY Left 4/3/2018    Procedure: THYROIDECTOMY;  Left Thyroid Lobectomy And Ishtmusectomy;  Surgeon: Delia Harper MD;  Location: [facility] OR     TONSILLECTOMY  age 20s     TONSILLECTOMY  2004?     wisdom teeth extraction       PHQ-9 SCORE 5/10/2018 9/15/2018 5/12/2019   PHQ-9 Total Score - - -   PHQ-9 Total Score MyChart - 2 (Minimal depression) 7 (Mild depression)   PHQ-9 Total Score 7 2 7     MAXIMO-7 SCORE 11/9/2017 9/15/2018 10/28/2019   Total Score - - -   Total Score - 3 (minimal anxiety) -   Total Score 12 3 3     WHODAS 2.0 Total Score 9/26/2017 12/4/2018   Total Score 14 18   Total Score MyChart 14 18     She is  5 foot 11 inches.  Her long-term overall goal is 175, obviously on hold until after the pregnancy.   She participates fully. Rapport is excellent.       Extended session due to complexity of case and length of interval.  A treatment plan was completed.    Time in:  11:04  Time out:  11:58  Diagnosis:   Psychological factors affecting obesity (F54).   Major depression, recurrent, mild (F33.0).   Occupational Problems (Z56.9)    PLAN/RECOMMENDATIONS:   1. Ms. Catherine will return 12/20 @ 12:00 to address weight loss and social issues with problem solving therapy.   She wishes to continue to get support here with her life changes.  2.  Resume schedule of regular exercise to the level that is possible.      Last treatment plan signed: 11/13/2019  Treatment plan due:  11/13/2020

## 2019-12-11 ENCOUNTER — TELEPHONE (OUTPATIENT)
Dept: ENDOCRINOLOGY | Facility: CLINIC | Age: 39
End: 2019-12-11

## 2019-12-11 NOTE — TELEPHONE ENCOUNTER
Called and left message for patient to clarify appointment type. Last visit in 2018 with Dr. Justice. Appointment note states patient is following up after giving birth. Direct number left for callback to clarify if patient is following up for bariatric surgery or for medical weight management.

## 2019-12-16 ENCOUNTER — HEALTH MAINTENANCE LETTER (OUTPATIENT)
Age: 39
End: 2019-12-16

## 2019-12-16 NOTE — PROGRESS NOTES
Weight Management Nutrition Consultation  Nutrition Assessment  Reason For Visit:  Ashley Catherine is a 39 year old female presents today for new re-establish nutrition visit. Pt with history of sleeve gastrectomy in 2013.  Patient referred by Jelena Bacon NP on December 17, 2019.    Patient with Co-morbidities of obesity including:  Type II DM no  Renal Failure no  Sleep apnea no  Hypertension yes   Dyslipidemia no  Joint pain no  Back pain no  GERD no     Anthropometrics:  Last Weight (10/8/18): 215.5 lbs  Current Weight: 234.2 lbs (+18.7 lbs from last visit)  Estimated body mass index is 33.6 kg/m  (per wt and ht today)    Nutrition history:  Pt had a sleeve gastrectomy in may 2013 with Dr. Willett. She was last seen by an RD in 10/2018. Pt has had a pregnancy since then. Since pregnancy pt has began drinking soda again, developed a grazing eating pattern, and frequently snacking on high-fat, high-sugar snacks.      Recent Diet Recall:  Breakfast: Coffee, latte, myles and eggs  Lunch 11-12: soup (canned hearty), nachos   PM Snack: candy (PB cup), ice cream  Dinner: Big bowl, cheri chin, pizza luce; soup   Beverages: water, cran juice, soda ( Dr. Pepper or coke daily) -  from meals   Dining Out: 3 days per week  - Still feels restriction from surgery, eats too frequently throughout th day.     Supplements: 1 multi- vit (Centrum), 500 mcg B12 sublingual every other day.     Exercise: Mall walking, outdoors shoveling snow. Started tracking steps recently, avg 10k steps when working, 4-5k steps on days off.    Progress Towards Previous Goals:  1. Take iron separate from calcium chews  - Not currently taking calcium  2. Decrease calorie containing beverages: decrease regular mochas and lattes and decrease wine consumption.  - Not met, increase in soda intake, continues with latte intake  3. Try tracking calories: 2470-4293 calories : calculated from taking pt's BMR and subtracting 250-500 kcals/day  - Not  tracking  4. Exercise 1x per week 30-60  - Not met    Nutrition Prescription  Volumetric diet/Portion sizes   Grams Protein: 50-60 (minimum)  Amount of Fluid: 48-64 oz    Nutrition Diagnosis  Obesity r/t long history of self-monitoring deficit and excessive energy intake aeb BMI >30.    Nutrition Intervention  Materials/education provided on Volumetric diet with portion control and healthy food choices, 100 calorie snack choices, protein needs and protein sources, vitamin/mineral recommendations s/p SG, separation of beverages, limiting add sugar/fat intake, and pace of meal intake. Encouraged pt to consolidate meals and eliminate snacking. Discussed establishing meals TID pattern. Discussed healthy snack options should pt feel hungry between meals. Encouraged to eliminate soda intake, and discussed low-calorie, SF alternatives to latte. Encouraged pt to focus on increase steps on days she is not working. Provided pt with list of goals and RD contact info.     Patient Understanding: fair- good  Expected Compliance: fair- good  Follow-Up Plans: Meal planning    Nutrition Goals  1. Eat 3 meals per day. Avoid grazing throughout the day.  2. Eliminate soda intake. Start by reducing by 50% over next month (1 can every other day).  3. -Take the following after a Sleeve Gastrectomy:    Multivitamin/minerals: adult dose 2 times daily    Iron: 45-60 mg elemental (18-36 mg if low risk) - may partly or fully be covered in multivitamin     Calcium Citrate containing vitamin D: 500 mg 3 times daily or 600 mg 2 times daily    Vitamin B12: sublingual form of at least 500 mcg daily or injection of 1000 mcg monthly   4. Increase exercise as able. On days off, aim for 3047-3110 steps per day.     Follow-Up:  PRN     Time spent with patient: 15 minutes.  Laureen Evans, RD, LD

## 2019-12-16 NOTE — PROGRESS NOTES
Return Bariatric Surgery Note    RE: Ashley Catherine  MR#: [id number]  : 1980  VISIT DATE: Dec 17, 2019    Dear Juliann, Violet Agrawal,    I had the pleasure of seeing your patient, Ashley Catherine, in my post-bariatric surgery assessment clinic.    CHIEF COMPLAINT: Post-bariatric surgery follow-up    HISTORY OF PRESENT ILLNESS:  Questions Regarding Prior Weight Loss Surgery Reviewed With Patient 2019   I had the following weight loss procedure: Sleeve Gastrectomy   What year was your surgery?    How has your weight changed since your last visit? I have gained weight   Are you currently taking any weight loss medications? No   Do you currently have any of the following: Hypertension (high blood pressure)?   Have you been to the Emergency room since your last visit with us? Yes   Were you in the hospital since your last visit with us? Yes   Do you have any concerns today? No     Last seen by Dr. Willett 2018. At that time she was 5 years post sleeve and was 8 weeks pregnant. She was seen by Dr. Justice shortly before that for medical weight management.     Prepregnancy- 213  Gained to 230s with pregnancy, lost after pregnancy back to 218 and the gradual weight regain since then.   Baby is now 6 months old     GERD: none currently, bad during pregnancy   Vomiting: only with eating too much   No dysphasia  No constipation     Not breastfeeding. Would like to continue to work on weight loss- medical weight management. Not currently on birth control. Not currently sexually active. No plans for further pregnancy.     Had started saxenda 3 weeks prior to finding out about being pregnant (was not trying to get pregnant).  Had also taken qsymia in the past for weight loss. Lots of side effects from qsymia. Tried contrave as well, less benefit from this and poor compliance. Phentermine alone not effective.     Feels like she developed poor eating habits with poor appetite during pregnancy. She describes she  ate whatever she could during pregnancy. Notes that now, it is hard to decrease carbohydrates specifically. Continues to have some restriction with sleeve gastrectomy        Weight History:     12/17/2019   What is your highest lifetime weight? 290   What is your lowest weight since surgery? (In pounds) 286     Initial Weight: 124.6 kg (274 lb 12.8 oz)  Current Weight: Weight: 106.2 kg (234 lb 3.2 oz)  Cumulative weight loss (lbs): 40.6  Last Visits Weight: 101.2 kg (223 lb 3.2 oz)    Questions Regarding Co-Morbidities and Health Concerns Reviewed With Patient 12/17/2019   Pre-diabetes: Never   Diabetes II: Never   High Blood Pressure: Worsened   High cholesterol: Never   Heartburn/Reflux: Never   Are you taking daily medication for heartburn, acid reflux, or GERD (acid reflux disease)? -   Sleep apnea: Gone away   PCOS: Never   Back pain: Improved   Joint pain: Never   Lower leg swelling: Never       Eating Habits 12/17/2019   How many meals do you eat per day? 2   Do you snack between meals? Yes   How much food are you eating at each meal? 1/2 cup to 1 cup   Are you able to separate your meals and liquids by at least 30 minutes? Yes   Are you able to avoid liquid calories? No       Exercise Questions Reviewed With Patient 12/17/2019   How often do you exercise? 1 to 2 times per week   What is the duration of your exercise (in minutes)? 20 Minutes   What types of exercise do you do? walking   What keeps you from being more active?  Lack of Time       Social History:      12/17/2019   Are you smoking? No   Are you drinking alcohol? Yes   How much alcohol? 2-3 drinks week       Medications:  Current Outpatient Medications   Medication     acetaminophen (TYLENOL) 32 mg/mL liquid     cyanocobalamin (VITAMIN B-12) 500 MCG SUBL sublingual tablet     lisinopril-hydrochlorothiazide (PRINZIDE/ZESTORETIC) 20-25 MG tablet     Multiple Vitamins-Minerals (ONE DAILY CALCIUM/IRON PO)     Pediatric Multivitamins-Iron  "(MULTIVITAMINS PLUS IRON CHILD) 18 MG CHEW     polyethylene glycol (MIRALAX) powder     No current facility-administered medications for this visit.          12/17/2019   Do you avoid NSAIDs such as (Ibuprofen, Aleve, Naproxen, Advil)?   Yes       ROS:  GI:      12/17/2019   Vomiting: Yes   Diarrhea: No   Constipation: No   Swallowing trouble: No   Abdominal pain: No   Heartburn: No   Rash in skin folds: No   Depression: Yes   Stress urinary incontinence No     Skin:   BAR RBS ROS - SKIN 12/17/2019   Rash in skin folds: No     Psych:      12/17/2019   Depression: Yes   Anxiety: No     Female Only:   BAR RBS ROS - FEMALE ONLY 12/17/2019   Female only: Regular menstrual cycles       LABS/IMAGING/MEDICAL RECORDS REVIEW:     PHYSICAL EXAMINATION:  /74 (BP Location: Left arm, Patient Position: Sitting, Cuff Size: Adult Large)   Pulse 84   Temp 97.5  F (36.4  C) (Oral)   Ht 1.778 m (5' 10\")   Wt 106.2 kg (234 lb 3.2 oz)   SpO2 98%   BMI 33.60 kg/m         ASSESSMENT AND PLAN:      1. 6 years status laparoscopic gastric sleeve  2. Morbid Obesity current BMI: Body mass index is 33.6 kg/m .  3. Post surgical malabsorption:   Labs ordered per protocol.   Follow food plan per dietitian recommendations.   Continue taking recommended post-op vitamins.  4. Return to clinic in 3 months for medical weight management.    Start saxenda  Dietitian visit today and in 1 month  Follow up in 2-3 months     Sincerely,    Jelena Bacon NP    I spent a total of 15 minutes face to face with Ashley during today's office visit. Over 50% of this time was spent counseling the patient and/or coordinating care.  Answers for HPI/ROS submitted by the patient on 12/17/2019   If you checked off any problems, how difficult have these problems made it for you to do your work, take care of things at home, or get along with other people?: Not difficult at all  PHQ9 TOTAL SCORE: 4    "

## 2019-12-17 ENCOUNTER — OFFICE VISIT (OUTPATIENT)
Dept: SURGERY | Facility: CLINIC | Age: 39
End: 2019-12-17
Payer: COMMERCIAL

## 2019-12-17 ENCOUNTER — ALLIED HEALTH/NURSE VISIT (OUTPATIENT)
Dept: SURGERY | Facility: CLINIC | Age: 39
End: 2019-12-17
Payer: COMMERCIAL

## 2019-12-17 ENCOUNTER — TELEPHONE (OUTPATIENT)
Dept: ENDOCRINOLOGY | Facility: CLINIC | Age: 39
End: 2019-12-17

## 2019-12-17 VITALS
BODY MASS INDEX: 33.53 KG/M2 | HEART RATE: 84 BPM | OXYGEN SATURATION: 98 % | SYSTOLIC BLOOD PRESSURE: 131 MMHG | TEMPERATURE: 97.5 F | DIASTOLIC BLOOD PRESSURE: 74 MMHG | WEIGHT: 234.2 LBS | HEIGHT: 70 IN

## 2019-12-17 DIAGNOSIS — E66.811 OBESITY, CLASS I, BMI 30-34.9: Primary | ICD-10-CM

## 2019-12-17 DIAGNOSIS — Z98.84 BARIATRIC SURGERY STATUS: ICD-10-CM

## 2019-12-17 DIAGNOSIS — I10 BENIGN ESSENTIAL HYPERTENSION: ICD-10-CM

## 2019-12-17 LAB
CREAT UR-MCNC: 98 MG/DL
PROT UR-MCNC: 0.1 G/L
PROT/CREAT 24H UR: 0.1 G/G CR (ref 0–0.2)
PTH-INTACT SERPL-MCNC: 54 PG/ML (ref 18–80)

## 2019-12-17 ASSESSMENT — PATIENT HEALTH QUESTIONNAIRE - PHQ9
SUM OF ALL RESPONSES TO PHQ QUESTIONS 1-9: 4
10. IF YOU CHECKED OFF ANY PROBLEMS, HOW DIFFICULT HAVE THESE PROBLEMS MADE IT FOR YOU TO DO YOUR WORK, TAKE CARE OF THINGS AT HOME, OR GET ALONG WITH OTHER PEOPLE: NOT DIFFICULT AT ALL
SUM OF ALL RESPONSES TO PHQ QUESTIONS 1-9: 4
10. IF YOU CHECKED OFF ANY PROBLEMS, HOW DIFFICULT HAVE THESE PROBLEMS MADE IT FOR YOU TO DO YOUR WORK, TAKE CARE OF THINGS AT HOME, OR GET ALONG WITH OTHER PEOPLE: NOT DIFFICULT AT ALL

## 2019-12-17 ASSESSMENT — MIFFLIN-ST. JEOR: SCORE: 1817.57

## 2019-12-17 ASSESSMENT — PAIN SCALES - GENERAL: PAINLEVEL: MILD PAIN (3)

## 2019-12-17 NOTE — PATIENT INSTRUCTIONS
"Welcome to our weight management program!   We are excited to join you on your weight loss journey    Thank you for allowing us the privilege of caring for you. We hope we provided you with the excellent service you deserve.   Please let us know if there is anything else we can do for you so that we can be sure you are leaving completely satisfied with your care experience.    To ensure the quality of our services you may be receiving a patient satisfaction survey from an independent patient satisfaction monitoring company.    The greatest compliment you can give is a \"Likely to Recommend\"    You saw CECI Alvarado CNP  today.    Instructions per today's visit:   - start saxenda   - work with dietitian   - follow up in 2-3 months     Interested in working with a health ?  Health coaches work with you to improve your overall health and wellbeing.  They look at the whole person, and may involve discussion of different areas of life, including, but not limited to the four pillars of health (sleep, exercise, nutrition, and stress management). Discuss with your care team if you would like to start working a health .  Health Coaching-3 Pack:    $99 for three health coaching visits    Visits may be done in person or via phone    Coaching is a partnership between the  and the client; Coaches do not prescribe or diagnose    Coaching helps inspire the client to reach his/her personal goals   10 Tips for Healthy Holidays:   1. Continue to eat 3 meals daily and avoid snacks. Do not skip meals to \"save\" calories for holiday meal, you will likely overeat.   2. Eat slowly and stop as soon as you are satisfied.   3. Stay away from the buffet table or appetizers prior to the meal. This leads to snacking between meals or filling up on snacks prior to the meal, which can lead to overeating.   4. During the meal eat your protein first, then the vegetables and last the starchy dishes. Again remember to stop as soon as " you feel satisfied; it is okay to leave food on the plate.   5. Try water, non-alcoholic wine (does have calories), crystal light, neva and other low caloric beverage, put them in a fancy glass with a slice of lemon or lime to make them look nice.    6. Drink plenty of water.  7. Bring a dish to share, therefore you know that there will be a healthier option to eat.   8. Continue your current exercise routine, it is easier to fall out of your exercise routine than to get back into the habit of exercising after the holidays. Try to do a family activity outside or ask if anyone wants to take a walk pre or post meal.   9. Be kind to yourself, if you over indulged that's okay, all is not lost. Re-commit to healthy eating habits, forgive yourself and move on.   10. Try new traditions like adding in another vegetable dish or trying a healthier version of family favorites.      For any questions/concerns contact Arminda Jackson LPN at [phone] or Liz Calderon RN at [phone]    To schedule appointments with our team, please call [phone]     Please call during clinic hours Monday through Friday 8:00a - 4:00p if you have questions or you can contact us via Close at anytime. ?    Lab results will be communicated through My Chart or letter (if My Chart not used). Please call the clinic if you have not received communication after 1 week or if you have any questions.?      Fax: [phone]    Thank you,  Medical Weight Management Team       MEDICATION STARTED AT THIS APPOINTMENT    We are starting a GLP-1 (Glucagon-like Peptide-1) medication called Saxenda. One of the ways it works is by slowing down the rate that food leaves your stomach. You feel wilde and will eat less. It also helps regulate hormones that can help improve your blood sugars.    If you are a patient that checks blood sugars, continue to check as instructed by your doctor. Low blood sugars are rare but can happen if patients are on insulin or  other oral agents. If you notice consistent low sugars or high sugars, your medication may need to be adjusted after your appointment. If this is the case, please call RN and provide her your blood sugar record from the last 3-4 days. The RN will get in touch with the doctor and call you back/Mychart message with recommendations. We tolerate high sugars for a bit, so if sugars are running 180-200, this is ok. As weight starts dropping the blood sugars should too. If readings are consistently over 200 for 1-2 weeks, then you should call the doctor/nurse.    Dosing for this medication:   Week 1- Inject 0.6 mg daily  Week 2- Inject 1.2 mg daily  Week 3- Inject 1.8 mg daily  Week 4- Inject 2.4 mg daily  Week 5 and thereafter- Inject 3.0 mg daily    Side effects of GLP- Medications include: The most common side effects are all GI related and consist of: nausea, constipation, diarrhea, burping, or gassiness. Patients are advised to eat slowly and less, and nausea typically passes if people can stick it out.     The risk of pancreatitis (inflammation of the pancreas) has been associated with this type of medication, but is very rare.  If you have had pancreatitis in the past, this medication may not be for you. Please let us know about any past history of pancreas problems.    Symptoms of pancreatitis include: Pain in your upper stomach area which may travel to your back and be worse after eating. Your stomach area may be tender to the touch.  You may have vomiting or nausea and/or have a fever. If you should develop any of these symptoms, stop the medication and contact your primary care doctor. They will do a blood test to check for pancreatitis.         There is a small chance you may have some low blood sugar after taking the medication.   The signs of low blood sugar are:  o Weakness  o Shaky   o Hungry  o Sweating  o Confusion      See below for ways to treat low blood sugar without adding in lots of extra  calories.      Treating Low Blood Sugar    If you have symptoms of low blood sugar (sweating, shaking, dizzy, confused) eat 15 grams of carbs and wait 15 minutes:      Glucose Tabs are best for sugars under 70 -  Dex4 or BD Glucose tablets are good, you will need to take 3-4 of these to equal 15 grams.       One small box of raisins    4 oz fruit juice box or   cup fruit juice    1 small apple    1 small banana      cup canned fruit in water      English muffin or a slice of bread with jelly     1 low fat frozen waffle with sugar-free syrup      cup cottage cheese with   cup frozen or fresh blueberries    1 cup skim or low-fat milk      cup whole grain cereal    4-6 crackers such as Triscuits      This medication is usually not covered by insurance and can be quite expensive. Sometimes a prior authorization is required, which may take up to 1-2 weeks for an insurance company to make a decision if they will cover the medication. Please be patient, you will be notified after a decision has been made.    Call the nurse at [phone] if you have any questions or concerns. (Do not stop taking it if you don't think it's working. For some people it works without them knowing it.)     In order to get refills of this or any medication we prescribe you must be seen in the medical weight mgmt clinic every 2-4 months. Please have your pharmacy fax a refill request to [phone].

## 2019-12-17 NOTE — NURSING NOTE
"(   Chief Complaint   Patient presents with     RECHECK     Follow up bariatric.    )    ( Weight: 106.2 kg (234 lb 3.2 oz) )  ( Height: 177.8 cm (5' 10\") )  ( BMI (Calculated): 33.6 )  ( Initial Weight: 124.6 kg (274 lb 12.8 oz) )  ( Cumulative weight loss (lbs): 40.6 )  ( Last Visits Weight: 101.2 kg (223 lb 3.2 oz) )  ( Wt change since last visit (lbs): 11 )  ( Waist Circumference (cm): 111 cm )  (   )    ( BP: 131/74 )  (   )  ( Temp: 97.5  F (36.4  C) )  ( Temp src: Oral )  ( Pulse: 84 )  (   )  ( SpO2: 98 % )    (   Patient Active Problem List   Diagnosis     OLIGOMENORRHEA, C/W PCOS     HX ABNL PAP SMEAR OF CERVIX       Flat feet     Sciatica     S/P gastrectomy     Occupational problem     Elevated parathyroid hormone     Multiple thyroid nodules     Abnormal thyroid cytology-follicular neoplasm     Chronic pain syndrome     Major depressive disorder, recurrent episode, in full remission (H)     Bilateral low back pain with sciatica, sciatica laterality unspecified     Throat symptom     Psychological factor affecting physical condition     Hx of Guillain-[place] syndrome     Adjustment disorder with anxious mood     Thyroid nodule     Benign essential hypertension     Gastric bypass status for obesity     Pregnancy examination or test, positive result     Travel advice encounter     Labor and delivery, indication for care     Vaginal delivery     Pre-eclampsia    )  (   Current Outpatient Medications   Medication Sig Dispense Refill     acetaminophen (TYLENOL) 32 mg/mL liquid Take 1,000 mg by mouth every 8 hours as needed for fever or mild pain       cyanocobalamin (VITAMIN B-12) 500 MCG SUBL sublingual tablet Place 500 mcg under the tongue every other day        lisinopril-hydrochlorothiazide (PRINZIDE/ZESTORETIC) 20-25 MG tablet Take 1 tablet by mouth daily 90 tablet 1     Multiple Vitamins-Minerals (ONE DAILY CALCIUM/IRON PO) Take 1 tablet by mouth daily       Pediatric Multivitamins-Iron (MULTIVITAMINS " PLUS IRON CHILD) 18 MG CHEW Take 2 chew tab by mouth daily       polyethylene glycol (MIRALAX) powder Take 17 g (1 capful) by mouth daily as needed for constipation 510 g 1    )  ( Diabetes Eval:    )    ( Pain Eval:  Mild Pain (3) )    ( Wound Eval:       )    (   History   Smoking Status     Never Smoker   Smokeless Tobacco     Never Used    )    ( Signed By:  Delia Payne, EMT; December 17, 2019; 11:08 AM )

## 2019-12-17 NOTE — TELEPHONE ENCOUNTER
Prior Authorization Retail Medication Request    Medication/Dose: Saxenda  ICD code (if different than what is on RX):    Obesity, Class I, BMI 30-34.9 [E66.9]  - Primary       Bariatric surgery status [Z98.84]           Previously Tried and Failed:  Weight loss surgery, history of diet and exercise, Phentermine, Qsymia, Contrave   Rationale: Had started saxenda 3 weeks prior to finding out about being pregnant (was not trying to get pregnant).  Had also taken qsymia in the past for weight loss. Lots of side effects from qsymia. Tried contrave as well, less benefit from this and poor compliance. Phentermine alone not effective.     Insurance Name:    Insurance ID:        Pharmacy Information (if different than what is on RX)  Name:  [id number]/PHARMACY #[id number] [place], [place] - [address]  Phone:  [phone]

## 2019-12-17 NOTE — PATIENT INSTRUCTIONS
Nutrition Goals  1. Eat 3 meals per day. Avoid grazing throughout the day.  2. Eliminate soda intake. Start by reducing by 50% over next month (1 can every other day).  3. -Take the following after a Sleeve Gastrectomy:    Multivitamin/minerals: adult dose 2 times daily    Iron: 45-60 mg elemental (18-36 mg if low risk) - may partly or fully be covered in multivitamin     Calcium Citrate containing vitamin D: 500 mg 3 times daily or 600 mg 2 times daily    Vitamin B12: sublingual form of at least 500 mcg daily or injection of 1000 mcg monthly   4. Increase exercise as able. On days off, aim for 0545-8745 steps per day.    Follow-up with RD in three months.    Laureen Evans RD, LD  If you would like to schedule or reschedule an appointment with the RD, please call [phone]

## 2019-12-17 NOTE — LETTER
2019       RE: Ashley Catherine  [address]  [facility] [address]     Dear Colleague,    Thank you for referring your patient, Ashley Catherine, to the [facility] SURGICAL WEIGHT MANAGEMENT at [facility]. Please see a copy of my visit note below.  Return Bariatric Surgery Note    RE: Ashley Catherine  MR#: [id number]  : 1980  VISIT DATE: Dec 17, 2019    Dear Violet Humphreys,    I had the pleasure of seeing your patient, Ashley Catherine, in my post-bariatric surgery assessment clinic.    CHIEF COMPLAINT: Post-bariatric surgery follow-up    HISTORY OF PRESENT ILLNESS:  Questions Regarding Prior Weight Loss Surgery Reviewed With Patient 2019   I had the following weight loss procedure: Sleeve Gastrectomy   What year was your surgery? 2013   How has your weight changed since your last visit? I have gained weight   Are you currently taking any weight loss medications? No   Do you currently have any of the following: Hypertension (high blood pressure)?   Have you been to the Emergency room since your last visit with us? Yes   Were you in the hospital since your last visit with us? Yes   Do you have any concerns today? No     Last seen by Dr. Willett 2018. At that time she was 5 years post sleeve and was 8 weeks pregnant. She was seen by Dr. Justice shortly before that for medical weight management.     Prepregnancy- 213  Gained to 230s with pregnancy, lost after pregnancy back to 218 and the gradual weight regain since then.   Baby is now 6 months old     GERD: none currently, bad during pregnancy   Vomiting: only with eating too much   No dysphasia  No constipation     Not breastfeeding. Would like to continue to work on weight loss- medical weight management. Not currently on birth control. Not currently sexually active. No plans for further pregnancy.     Had started saxenda 3 weeks prior to finding out about being pregnant (was not trying to get pregnant).   Had also taken qsymia in the past for weight loss. Lots of side effects from qsymia. Tried contrave as well, less benefit from this and poor compliance. Phentermine alone not effective.     Feels like she developed poor eating habits with poor appetite during pregnancy. She describes she ate whatever she could during pregnancy. Notes that now, it is hard to decrease carbohydrates specifically. Continues to have some restriction with sleeve gastrectomy        Weight History:     12/17/2019   What is your highest lifetime weight? 290   What is your lowest weight since surgery? (In pounds) 286     Initial Weight: 124.6 kg (274 lb 12.8 oz)  Current Weight: Weight: 106.2 kg (234 lb 3.2 oz)  Cumulative weight loss (lbs): 40.6  Last Visits Weight: 101.2 kg (223 lb 3.2 oz)    Questions Regarding Co-Morbidities and Health Concerns Reviewed With Patient 12/17/2019   Pre-diabetes: Never   Diabetes II: Never   High Blood Pressure: Worsened   High cholesterol: Never   Heartburn/Reflux: Never   Are you taking daily medication for heartburn, acid reflux, or GERD (acid reflux disease)? -   Sleep apnea: Gone away   PCOS: Never   Back pain: Improved   Joint pain: Never   Lower leg swelling: Never       Eating Habits 12/17/2019   How many meals do you eat per day? 2   Do you snack between meals? Yes   How much food are you eating at each meal? 1/2 cup to 1 cup   Are you able to separate your meals and liquids by at least 30 minutes? Yes   Are you able to avoid liquid calories? No       Exercise Questions Reviewed With Patient 12/17/2019   How often do you exercise? 1 to 2 times per week   What is the duration of your exercise (in minutes)? 20 Minutes   What types of exercise do you do? walking   What keeps you from being more active?  Lack of Time       Social History:      12/17/2019   Are you smoking? No   Are you drinking alcohol? Yes   How much alcohol? 2-3 drinks week       Medications:  Current Outpatient Medications  "  Medication     acetaminophen (TYLENOL) 32 mg/mL liquid     cyanocobalamin (VITAMIN B-12) 500 MCG SUBL sublingual tablet     lisinopril-hydrochlorothiazide (PRINZIDE/ZESTORETIC) 20-25 MG tablet     Multiple Vitamins-Minerals (ONE DAILY CALCIUM/IRON PO)     Pediatric Multivitamins-Iron (MULTIVITAMINS PLUS IRON CHILD) 18 MG CHEW     polyethylene glycol (MIRALAX) powder     No current facility-administered medications for this visit.          12/17/2019   Do you avoid NSAIDs such as (Ibuprofen, Aleve, Naproxen, Advil)?   Yes       ROS:  GI:      12/17/2019   Vomiting: Yes   Diarrhea: No   Constipation: No   Swallowing trouble: No   Abdominal pain: No   Heartburn: No   Rash in skin folds: No   Depression: Yes   Stress urinary incontinence No     Skin:   BAR RBS ROS - SKIN 12/17/2019   Rash in skin folds: No     Psych:      12/17/2019   Depression: Yes   Anxiety: No     Female Only:   BAR RBS ROS - FEMALE ONLY 12/17/2019   Female only: Regular menstrual cycles       LABS/IMAGING/MEDICAL RECORDS REVIEW:     PHYSICAL EXAMINATION:  /74 (BP Location: Left arm, Patient Position: Sitting, Cuff Size: Adult Large)   Pulse 84   Temp 97.5  F (36.4  C) (Oral)   Ht 1.778 m (5' 10\")   Wt 106.2 kg (234 lb 3.2 oz)   SpO2 98%   BMI 33.60 kg/m          ASSESSMENT AND PLAN:      1. 6 years status laparoscopic gastric sleeve  2. Morbid Obesity current BMI: Body mass index is 33.6 kg/m .  3. Post surgical malabsorption:   Labs ordered per protocol.   Follow food plan per dietitian recommendations.   Continue taking recommended post-op vitamins.  4. Return to clinic in 3 months for medical weight management.    Start saxenda  Dietitian visit today and in 1 month  Follow up in 2-3 months     Sincerely,    Jelena Bacon NP    I spent a total of 15 minutes face to face with Ashley during today's office visit. Over 50% of this time was spent counseling the patient and/or coordinating care.    "

## 2019-12-17 NOTE — TELEPHONE ENCOUNTER
Central Prior Authorization Team   Phone: [phone]      PA Initiation    Medication: Saxenda-PA initiated  Insurance Company: [employer] - Phone [phone] Fax [phone]  Pharmacy Filling the Rx: [id number] - [place] - [address]  Filling Pharmacy Phone: [phone]  Filling Pharmacy Fax:    Start Date: 12/17/2019

## 2019-12-18 ASSESSMENT — PATIENT HEALTH QUESTIONNAIRE - PHQ9: SUM OF ALL RESPONSES TO PHQ QUESTIONS 1-9: 4

## 2019-12-18 NOTE — TELEPHONE ENCOUNTER
Prior Authorization Approval    Authorization Effective Date: 11/17/2019  Authorization Expiration Date: 3/17/2020  Medication: Saxenda-PA approved  Approved Dose/Quantity:   Reference #: [id number]   Insurance Company: "[employer] - Phone [phone] Fax [phone]  Expected CoPay:       CoPay Card Available:      Foundation Assistance Needed:    Which Pharmacy is filling the prescription (Not needed for infusion/clinic administered): [id number] [place], [place] - [address]  Pharmacy Notified: Yes  Patient Notified: No

## 2019-12-20 ENCOUNTER — OFFICE VISIT (OUTPATIENT)
Dept: PSYCHOLOGY | Facility: CLINIC | Age: 39
End: 2019-12-20
Payer: COMMERCIAL

## 2019-12-20 DIAGNOSIS — Z56.9 OCCUPATIONAL PROBLEM: ICD-10-CM

## 2019-12-20 DIAGNOSIS — F54 PSYCHOLOGICAL FACTORS AFFECTING MORBID OBESITY (H): ICD-10-CM

## 2019-12-20 DIAGNOSIS — F33.0 MAJOR DEPRESSIVE DISORDER, RECURRENT EPISODE, MILD (H): Primary | ICD-10-CM

## 2019-12-20 DIAGNOSIS — E66.01 PSYCHOLOGICAL FACTORS AFFECTING MORBID OBESITY (H): ICD-10-CM

## 2019-12-20 SDOH — ECONOMIC STABILITY - INCOME SECURITY: UNSPECIFIED PROBLEMS RELATED TO EMPLOYMENT: Z56.9

## 2019-12-20 NOTE — PROGRESS NOTES
Health Psychology                  Clinic    Department of Medicine  Lis Chavez, Ph.D., L.P. [phone]                          [facility] Earline Anderson, Ph.D.,  L.P. [phone]                 3rd Floor, Clinic 3A  [place] Mail Code 742   Jayro Elias, Ph.D., AMITA.DENISE., L.P. [phone]     [address] Melissa Stokes, Ph.D., L.P. [phone]  [place] [id number]  [place] [address]    Health Psychology Follow-Up Note    Ashley Catherine is a 39-year-old (nearing 40) single, Black woman referred initially for psychological consultation for workup for a gastric sleeve procedure who is seen for CBT-oriented therapy regardingt weight management, work stresses, and family and social matters.    She gave birth to her son, Rivera, 6 months ago. He is doing well.  She reports having the baby blues for the first two, but feeling better now at times.  She is feeling very stuck in her life- her work, this city.     She is working 12-hour shifts.  Her parents are taking care of Rivera while she is at work.     She has returned to weight management for fear ofgaining weight as she is not exercising regularly and is not eating as healthily, feeling tired a lot.  She is frustrated she can't travel.  She isdemoralized that as  She is aging and now has a very young child she doubts she can develop her social life.    She  Reviewed her father's son's divorce decree.  We discussed her sense that she does not wish to have him involved at this point. There has been no communication.     We discussed recommitting to taking better care of herself, and finding ways of enjoying her life more, even if she can't travel, which had been her main outlet in the past.      She was not weighed today  today.  Wt Readings from Last 4 Encounters:   12/17/19 106.2 kg (234 lb 3.2 oz)   10/28/19 104.3 kg (230 lb)   09/23/19 105.1 kg (231 lb 11.2  oz)   08/21/19 102.4 kg (225 lb 11.2 oz)     There is no height or weight on file to calculate BMI.     Current Outpatient Medications   Medication     acetaminophen (TYLENOL) 32 mg/mL liquid     cyanocobalamin (VITAMIN B-12) 500 MCG SUBL sublingual tablet     liraglutide - Weight Management (SAXENDA) 18 MG/3ML pen     lisinopril-hydrochlorothiazide (PRINZIDE/ZESTORETIC) 20-25 MG tablet     Multiple Vitamins-Minerals (ONE DAILY CALCIUM/IRON PO)     Pediatric Multivitamins-Iron (MULTIVITAMINS PLUS IRON CHILD) 18 MG CHEW     polyethylene glycol (MIRALAX) powder     No current facility-administered medications for this visit.        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)      OLIGOMENORRHEA, C/W PCOS      Sciatica      SLEEP APNEA 6/2002    No longer has since tonsillectomy and septoplasty     Past Surgical History:   Procedure Laterality Date     BIOPSY  03/13/2015    Thyroid nodule     ESOPHAGOSCOPY, GASTROSCOPY, DUODENOSCOPY (EGD), COMBINED  5/28/2013    Procedure: COMBINED ESOPHAGOSCOPY, GASTROSCOPY, DUODENOSCOPY (EGD);;  Surgeon: Best Willett MD;   Location:  GI     ESOPHAGOSCOPY, GASTROSCOPY, DUODENOSCOPY (EGD), COMBINED N/A 6/13/2018    Procedure: COMBINED ESOPHAGOSCOPY, GASTROSCOPY, DUODENOSCOPY (EGD), BIOPSY SINGLE OR MULTIPLE;  gastroscopy;  Surgeon: Westley Gibbs MD;  Location:  GI     LAPAROSCOPIC GASTRIC SLEEVE  5/13/2013    Procedure: LAPAROSCOPIC GASTRIC SLEEVE;  Laparoscopic Sleeve Gastrectomy ;  Surgeon: Best Willett MD;  Location: [facility] OR     LEEP TX, CERVICAL  1995    age 15     partial thyroidectomy  2018     SEPTOPLASTY       THYROIDECTOMY Left 4/3/2018    Procedure: THYROIDECTOMY;  Left Thyroid Lobectomy And Ishtmusectomy;  Surgeon: Delia Harper MD;  Location: [facility] OR     TONSILLECTOMY  age 20s     TONSILLECTOMY  2004?     wisdom teeth extraction       PHQ-9 SCORE 5/12/2019 12/17/2019 12/17/2019   PHQ-9 Total Score - - -   PHQ-9 Total Score MyChart 7 (Mild depression) - 4 (Minimal depression)   PHQ-9 Total Score 7 4 4     MAXIMO-7 SCORE 11/9/2017 9/15/2018 10/28/2019   Total Score - - -   Total Score - 3 (minimal anxiety) -   Total Score 12 3 3     WHODAS 2.0 Total Score 9/26/2017 12/4/2018   Total Score 14 18   Total Score MyChart 14 18     She is  5 foot 11 inches.  Her long-term overall goal is 175, obviously on hold until after the pregnancy.   She participates fully. Rapport is excellent.       Extended session due to complexity of case and length of interval.      Time in:  11:56  Time out:  12:562  Diagnosis:   Psychological factors affecting obesity (F54).   Major depression, recurrent, mild (F33.0).   Occupational Problems (Z56.9)    PLAN/RECOMMENDATIONS:   1. Ms. Catherine will return 1/31 @ 12:00 to address weight loss and social issues with problem solving therapy, which is medically necessary.   She wishes to continue to get support here with her life changes.  2.  Resume schedule of regular exercise to the level that is possible.      Last treatment plan signed: 11/13/2019  Treatment plan due:  11/13/2020

## 2020-01-31 ENCOUNTER — OFFICE VISIT (OUTPATIENT)
Dept: PSYCHOLOGY | Facility: CLINIC | Age: 40
End: 2020-01-31
Payer: COMMERCIAL

## 2020-01-31 VITALS — WEIGHT: 229.2 LBS | BODY MASS INDEX: 32.89 KG/M2

## 2020-01-31 DIAGNOSIS — F54 PSYCHOLOGICAL FACTORS AFFECTING MORBID OBESITY (H): ICD-10-CM

## 2020-01-31 DIAGNOSIS — E66.01 PSYCHOLOGICAL FACTORS AFFECTING MORBID OBESITY (H): ICD-10-CM

## 2020-01-31 DIAGNOSIS — F33.0 MAJOR DEPRESSIVE DISORDER, RECURRENT EPISODE, MILD (H): Primary | ICD-10-CM

## 2020-01-31 NOTE — PROGRESS NOTES
Health Psychology                  Clinic    Department of Medicine  Lis Chavez, Ph.D., L.P. [phone]                          [facility] Earline Anderson, Ph.D.,  L.P. [phone]                 3rd Floor, Clinic 3A  [place] Mail Code 743   Jayro Elias, Ph.D., A.ÁVLARO.IDALIA.P., L.P. [phone]     [address] Melissa Stokes, Ph.D., L.P. [phone]  [place] [address]  [place] [address]    Health Psychology Follow-Up Note    Ashley Catherine is a 39-year-old (nearing 40) single, Black woman referred initially for psychological consultation for workup for a gastric sleeve procedure who is seen for CBT-oriented therapy regardingt weight management, work stresses, and family and social matters.    She gave birth to her son, Rivera, 7 months ago. He is doing well.  She is feeling very stuck in her life- her work, this city.     She is working 12-hour shifts.  Her parents are taking care of Rivera while she is at work.   Her mother is having neurological problems, which are limiting some of the support, and she is now taking her mother to various medical appointments.      She has historically travelled a lot as a way of escaping.  Now she realizes she can't but she hasn't effectively replaced her coping strategies.    She has returned to weight management for fear of gaining weight as she is not exercising regularly and is not eating as healthily, feeling tired a lot.  She lost a bit.  She has renewed  counterproductvie habits like frequent lattes.    We discussed basic stressmanagement approaches (e.g,. meditation) and explored steress appraisal.  She tends to catastrophize, which makes her tired and agitated.     She was weighed today  Today 229.2  Wt Readings from Last 4 Encounters:   01/31/20 104 kg (229 lb 3.2 oz)   12/17/19 106.2 kg (234 lb 3.2 oz)   10/28/19 104.3 kg (230 lb)   09/23/19 105.1 kg (231 lb 11.2  oz)     Body mass index is 32.89 kg/m .     Current Outpatient Medications   Medication     acetaminophen (TYLENOL) 32 mg/mL liquid     cyanocobalamin (VITAMIN B-12) 500 MCG SUBL sublingual tablet     liraglutide - Weight Management (SAXENDA) 18 MG/3ML pen     lisinopril-hydrochlorothiazide (PRINZIDE/ZESTORETIC) 20-25 MG tablet     Multiple Vitamins-Minerals (ONE DAILY CALCIUM/IRON PO)     Pediatric Multivitamins-Iron (MULTIVITAMINS PLUS IRON CHILD) 18 MG CHEW     polyethylene glycol (MIRALAX) powder     No current facility-administered medications for this visit.        Past Medical History:   Diagnosis Date     Bariatric surgery status 05/13/2013     Depression      Depressive disorder      Esophageal reflux      Family history of hyperparathyroidism 3/6/2015     Forearm fracture childhood    jumping fall     Guillain-Stuart disease (H) age 14    hospitalized at [facility] x 1 week     History of steroid therapy      HX ABNL PAP SMEAR OF CERVIX   1995    LEEP AT 15 yo     Hypertension 2004     Hypertrophy of breast      Hypertrophy of tonsils alone      Hypovitaminosis D     with secondary high PTH     Irregular heart beat     palpitations     LBP (low back pain)      LSIL (low grade squamous intraepithelial lesion) on Pap smear 5/2006     Lumbago     RESOLVED     Major depressive disorder, recurrent episode, in partial or unspecified remission 4/1/2013     Morbid obesity (H)     bariatric surgery 5/13/2013     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)      OLIGOMENORRHEA, C/W PCOS      Sciatica      SLEEP APNEA 6/2002    No longer has since tonsillectomy and septoplasty     Past Surgical History:   Procedure Laterality Date     BIOPSY  03/13/2015    Thyroid nodule     ESOPHAGOSCOPY, GASTROSCOPY, DUODENOSCOPY (EGD), COMBINED  5/28/2013    Procedure: COMBINED ESOPHAGOSCOPY, GASTROSCOPY, DUODENOSCOPY (EGD);;  Surgeon: Best Willett MD;  Location:  GI     ESOPHAGOSCOPY, GASTROSCOPY, DUODENOSCOPY  (EGD), COMBINED N/A 6/13/2018    Procedure: COMBINED ESOPHAGOSCOPY, GASTROSCOPY, DUODENOSCOPY (EGD), BIOPSY SINGLE OR MULTIPLE;  gastroscopy;  Surgeon: Westley Gibbs MD;  Location:  GI     LAPAROSCOPIC GASTRIC SLEEVE  5/13/2013    Procedure: LAPAROSCOPIC GASTRIC SLEEVE;  Laparoscopic Sleeve Gastrectomy ;  Surgeon: Best Willett MD;  Location: [facility] OR     LEEP TX, CERVICAL  1995    age 15     partial thyroidectomy  2018     SEPTOPLASTY       THYROIDECTOMY Left 4/3/2018    Procedure: THYROIDECTOMY;  Left Thyroid Lobectomy And Ishtmusectomy;  Surgeon: Delia Harper MD;  Location: [facility] OR     TONSILLECTOMY  age 20s     TONSILLECTOMY  2004?     wisdom teeth extraction       PHQ-9 SCORE 5/12/2019 12/17/2019 12/17/2019   PHQ-9 Total Score - - -   PHQ-9 Total Score MyChart 7 (Mild depression) - 4 (Minimal depression)   PHQ-9 Total Score 7 4 4     MAXIMO-7 SCORE 11/9/2017 9/15/2018 10/28/2019   Total Score - - -   Total Score - 3 (minimal anxiety) -   Total Score 12 3 3     WHODAS 2.0 Total Score 12/4/2018 1/31/2020   Total Score 18 15   Total Score MyChart 18 15     She is  5 foot 11 inches.  Her long-term overall goal is 175, obviously on hold until after the pregnancy.   She participates fully. Rapport is excellent.       Extended session due to complexity of case and length of interval.      Time in:  12:03  Time out:  12:56  Diagnosis:   Psychological factors affecting obesity (F54).   Major depression, recurrent, mild (F33.0).   Occupational Problems (Z56.9)    PLAN/RECOMMENDATIONS:   1. Ms. Catherine will return 3/11 @ 11:00 to address weight loss and social issues with problem solving therapy, which is medically necessary.   She wishes to continue to get support here with her life changes.  2.  Resume schedule of regular exercise to the level that is possible.      Last treatment plan signed: 11/13/2019  Treatment plan due:  11/13/2020

## 2020-02-18 ENCOUNTER — MYC MEDICAL ADVICE (OUTPATIENT)
Dept: SURGERY | Facility: CLINIC | Age: 40
End: 2020-02-18

## 2020-02-18 DIAGNOSIS — E66.811 OBESITY, CLASS I, BMI 30-34.9: Primary | ICD-10-CM

## 2020-02-19 RX ORDER — BLOOD PRESSURE TEST KIT
1 KIT MISCELLANEOUS PRN
Qty: 100 EACH | Refills: 3 | Status: SHIPPED | OUTPATIENT
Start: 2020-02-19 | End: 2021-07-13

## 2020-03-11 ENCOUNTER — VIRTUAL VISIT (OUTPATIENT)
Dept: FAMILY MEDICINE | Facility: OTHER | Age: 40
End: 2020-03-11

## 2020-03-11 DIAGNOSIS — I10 ESSENTIAL HYPERTENSION, BENIGN: ICD-10-CM

## 2020-03-11 NOTE — PROGRESS NOTES
"Date: 2020 17:40:26  Clinician: Antwan Galvez  Clinician NPI: [id number]  Patient: Ashley Catherine  Patient : 1980  Patient Address: [address] [place] [address]  Patient Phone: [phone]  Visit Protocol: URI  Patient Summary:  Ashley is a 39 year old ( : 1980 ) female who initiated a Visit for cold, sinus infection, or influenza. When asked the question \"Please sign me up to receive news, health information and promotions from [employer].\", Ashley responded \"No\".    Ashley states her symptoms started gradually 10-13 days ago.   Her symptoms consist of rhinitis, malaise, and a cough.   Symptom details     Nasal secretions: The color of her mucus is clear.    Cough: Ashley coughs a few times an hour and her cough is more bothersome at night. Phlegm does not come into her throat when she coughs. She does not believe her cough is caused by post-nasal drip.      Ashley denies having wheezing, ear pain, sore throat, nasal congestion, fever, headache, teeth pain, enlarged lymph nodes, chills, facial pain or pressure, and myalgias. She also denies double sickening (worsening symptoms after initial improvement), taking antibiotic medication for the symptoms, and having recent facial or sinus surgery in the past 60 days. She is not experiencing dyspnea.   Precipitating events  She has not recently been exposed to someone with influenza. Ashley has been in close contact with the following high risk individuals: adults 65 or older and children under the age of 5.   Pertinent COVID-19 (Coronavirus) information  Ashley has not traveled internationally in the last 14 days before the start of her symptoms.   Ashley has not had close contact with a laboratory confirmed positive COVID-19 patient within 14 days of symptom onset.   Pertinent medical history  Ashley does not get yeast infections when she takes antibiotics.   Ashley does not need a return to work/school note.   Weight: 222 lbs   Ashley " does not smoke or use smokeless tobacco.   She denies pregnancy and denies breastfeeding. She has menstruated in the past month.   Additional information as reported by the patient (free text): Main concern is lingering cough. It is wet but nonproductive and sounds like a dog bark. I have a 8 month old baby that I am around.   Weight: 222 lbs    MEDICATIONS: Calcium Soft Chew oral, vitamin B12-folic acid oral, Saxenda subcutaneous, Complete Multivitamin-Multimineral oral, lisinopril-hydrochlorothiazide oral, ALLERGIES: NKDA  Clinician Response:  Dear Ashley Ramirez,  Based on your current assessment I believe you have a bacterial infection. I have prescribed Zithromax take 2 tablets on day 1 and then 1 tablet daily on days 2-5. can also do benzonatate 1-2 capsules three times a day as needed for cough. Drink plenty of fluids. Get plenty of rest. Can do Tylenol and ibuprofen to help with pain or fever. can do an over the counter Flonase nasal spray to help with sinus congestion.&nbsp;  If symptoms do not improve in the next 5-7 days or if symptoms worsen then follow up with your primary care provider.&nbsp;    Diagnosis: Cough  Diagnosis ICD: R05  Prescription: benzonatate (Tessalon Perles) 100 mg oral capsule 30 capsule, 10 days supply. Take 1-2 capsules three times a day as needed for cough.. Refills: 0, Refill as needed: no, Allow substitutions: yes  Prescription: azithromycin (Zithromax Z-Caesar) 250 mg oral tablet 6 tablet, 5 days supply. Take 2 tablets by mouth on day 1, then take 1 tablet daily on days 2-5.. Refills: 0, Refill as needed: no, Allow substitutions: yes  Pharmacy: [id number] - [phone] - [address] [place] [address]

## 2020-03-12 RX ORDER — LISINOPRIL AND HYDROCHLOROTHIAZIDE 20; 25 MG/1; MG/1
TABLET ORAL
Qty: 90 TABLET | Refills: 0 | Status: SHIPPED | OUTPATIENT
Start: 2020-03-12 | End: 2020-06-19

## 2020-03-12 NOTE — TELEPHONE ENCOUNTER
"Prescription approved per [facility].  SARAH Garza    Last Written Prescription Date:  9/23/2019  Last Fill Quantity: 90,  # refills: 1   Last office visit: 10/28/2019 with prescribing provider:     Future Office Visit:    Requested Prescriptions   Pending Prescriptions Disp Refills     lisinopril-hydrochlorothiazide (ZESTORETIC) 20-25 MG tablet [Pharmacy Med Name: LISINOPRIL-HCTZ 20-25 MG TAB] 90 tablet 1     Sig: TAKE 1 TABLET BY MOUTH EVERY DAY       Diuretics (Including Combos) Protocol Passed - 3/11/2020  6:10 PM        Passed - Blood pressure under 140/90 in past 12 months     BP Readings from Last 3 Encounters:   12/17/19 131/74   10/28/19 125/78   09/23/19 (!) 143/91                 Passed - Recent (12 mo) or future (30 days) visit within the authorizing provider's specialty     Patient has had an office visit with the authorizing provider or a provider within the authorizing providers department within the previous 12 mos or has a future within next 30 days. See \"Patient Info\" tab in inbasket, or \"Choose Columns\" in Meds & Orders section of the refill encounter.              Passed - Medication is active on med list        Passed - Patient is age 18 or older        Passed - No active pregancy on record        Passed - Normal serum creatinine on file in past 12 months     Recent Labs   Lab Test 08/08/19  0907   CR 0.74              Passed - Normal serum potassium on file in past 12 months     Recent Labs   Lab Test 08/08/19  0907   POTASSIUM 4.1                    Passed - Normal serum sodium on file in past 12 months     Recent Labs   Lab Test 08/08/19  0907                 Passed - No positive pregnancy test in past 12 months       ACE Inhibitors (Including Combos) Protocol Passed - 3/11/2020  6:10 PM        Passed - Blood pressure under 140/90 in past 12 months     BP Readings from Last 3 Encounters:   12/17/19 131/74   10/28/19 125/78   09/23/19 (!) 143/91                 Passed - Recent " "(12 mo) or future (30 days) visit within the authorizing provider's specialty     Patient has had an office visit with the authorizing provider or a provider within the authorizing providers department within the previous 12 mos or has a future within next 30 days. See \"Patient Info\" tab in inbasket, or \"Choose Columns\" in Meds & Orders section of the refill encounter.              Passed - Medication is active on med list        Passed - Patient is age 18 or older        Passed - No active pregnancy on record        Passed - Normal serum creatinine on file in past 12 months     Recent Labs   Lab Test 08/08/19  0907   CR 0.74             Passed - Normal serum potassium on file in past 12 months     Recent Labs   Lab Test 08/08/19  0907   POTASSIUM 4.1             Passed - No positive pregnancy test within past 12 months           "

## 2020-03-19 ENCOUNTER — MYC MEDICAL ADVICE (OUTPATIENT)
Dept: FAMILY MEDICINE | Facility: CLINIC | Age: 40
End: 2020-03-19

## 2020-03-19 ENCOUNTER — VIRTUAL VISIT (OUTPATIENT)
Dept: FAMILY MEDICINE | Facility: CLINIC | Age: 40
End: 2020-03-19
Payer: COMMERCIAL

## 2020-03-19 DIAGNOSIS — J06.9 VIRAL URI WITH COUGH: Primary | ICD-10-CM

## 2020-03-19 PROCEDURE — 99441 ZZC PHYSICIAN TELEPHONE EVALUATION 5-10 MIN: CPT | Performed by: FAMILY MEDICINE

## 2020-03-19 RX ORDER — BENZONATATE 200 MG/1
200 CAPSULE ORAL 2 TIMES DAILY PRN
Qty: 20 CAPSULE | Refills: 0 | Status: SHIPPED | OUTPATIENT
Start: 2020-03-19 | End: 2020-06-19

## 2020-03-19 NOTE — PROGRESS NOTES
"Ashley Catherine is a 40 year old female who is being evaluated via a telephone visit.      The patient has been notified of following (by ROBLES Hinson MA      \"We have found that certain health care needs can be provided without the need for a physical exam.  This service lets us provide the care you need with a short phone conversation.  If a prescription is necessary we can send it directly to your pharmacy.  If lab work is needed we can place an order for that and you can then stop by our lab to have the test done at a later time.    This telephone visit will be conducted via 3 way call with the you (the patient) , the physician/provider, and a me all on the line at the same time.  This allows your physician/provider to have the phone conversation with you while I will be taking notes for your medical record.  We will have full access to your [facility] medical record during this entire phone call.    Since this is like an office visit,  will bill your insurance company for this service.  Please check with your medical insurance if this type of telephone/virtual is covered . You may be responsible for the cost of this service if insurance coverage is denied.  The typical cost is $30 (10min), $59(11-20min) and $85 (21-30min)     If during the course of the call the physician/provider feels a telephone visit is not appropriate, you will not be charged for this service\"    Consent has been obtained for this service by care team member: yes.  See the scanned image in the medical record.    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  Taking 200 mg bid  Has a barky cough occasional dry  No fevers    Her infant is stick now too - " runny nose occasional cough dry low grade fevers 100 but seems fine  Previously he had slight congestion but now fevers that are low grade  Advidsed to use [facility] for the infant  He is eating well and staying hydrated  They are staying home  She is not working now    Pertinent parts of the the patient's medical history reviewed and confirmed by the provider included :      Total time of call between patient and provider was 8 minutes     Violet Humphreys MD (MD signature)  ===================================================    I have reviewed the note as documented above.  This accurately captures the substance of my conversation with the patient,    Additional provider notes:    Assessment/Plan:  (J06.9,  B97.89) Viral URI with cough  (primary encounter diagnosis)  Comment: no longer febrile  Cough Resolving  Still used cough meds BID  Is staying home with her infant  He is not ill - advised [facility] and ongoing quarantine  Plan: benzonatate (TESSALON) 200 MG capsule

## 2020-03-26 ENCOUNTER — TELEPHONE (OUTPATIENT)
Dept: ENDOCRINOLOGY | Facility: CLINIC | Age: 40
End: 2020-03-26

## 2020-03-31 ENCOUNTER — VIRTUAL VISIT (OUTPATIENT)
Dept: SURGERY | Facility: CLINIC | Age: 40
End: 2020-03-31
Payer: COMMERCIAL

## 2020-03-31 DIAGNOSIS — Z98.84 S/P LAPAROSCOPIC SLEEVE GASTRECTOMY: Primary | ICD-10-CM

## 2020-03-31 NOTE — PATIENT INSTRUCTIONS
Thanks for completing visit by telephone today.    Follow up by telephone visit with Elizabeth in 1-2 months to follow up Saxenda    Increase Saxenda to 2.4mg and then 3.0mg    Lets us know when you need refill.       For any questions or concerns call Arminda Jackson LPN at [phone]         Contact center [phone]

## 2020-03-31 NOTE — PROGRESS NOTES
RETURN [facility] VISIT  Return Medical Weight Management Note     Ashley Catherine  MRN:  [id number]  :  1980  RAKEL:  3/31/2020    Dear Primary Care Provider,    Ashley Catherine s a 40 year old female who I am continuing to see for treatment of obesity related to:       5/15/2017   I have the following health issues associated with obesity: High Blood Pressure, GERD (Reflux)   I have the following symptoms associated with obesity: GERD (Reflux)       MEDICATIONS:   Current Outpatient Medications   Medication Sig Dispense Refill     acetaminophen (TYLENOL) 32 mg/mL liquid Take 1,000 mg by mouth every 8 hours as needed for fever or mild pain       Alcohol Swabs XX PADS 1 each as needed (injections) 100 each 3     benzonatate (TESSALON) 200 MG capsule Take 1 capsule (200 mg) by mouth 2 times daily as needed for cough 20 capsule 0     cyanocobalamin (VITAMIN B-12) 500 MCG SUBL sublingual tablet Place 500 mcg under the tongue every other day        insulin pen needle 31G X 5 MM XX miscellaneous Use 1 pen needles daily or as directed. 100 each 1     liraglutide - Weight Management (SAXENDA) 18 MG/3ML pen Week 1: 0.6 mg subcutaneous daily, Week 2: 1.2 mg daily, Week 3: 1.8 mg daily, Week 4: 2.4 mg daily, Week 5 & on: 3 mg daily 15 mL 3     lisinopril-hydrochlorothiazide (ZESTORETIC) 20-25 MG tablet TAKE 1 TABLET BY MOUTH EVERY DAY 90 tablet 0     Multiple Vitamins-Minerals (ONE DAILY CALCIUM/IRON PO) Take 1 tablet by mouth daily       Pediatric Multivitamins-Iron (MULTIVITAMINS PLUS IRON CHILD) 18 MG CHEW Take 2 chew tab by mouth daily       polyethylene glycol (MIRALAX) powder Take 17 g (1 capful) by mouth daily as needed for constipation 510 g 1       Weight Loss Medication History Reviewed With Patient 3/31/2020   Which weight loss medications are you currently taking on a regular basis?  Saxenda (liraglutide)   Are you having any side effects from the weight loss medication that we have prescribed you? Yes   If you are having  side effects please describe: Changes in menstrual cycle        VITALS:  There were no vitals taken for this visit.      Patient opted to conduct today's return visit via telephone vs an in person visit to the clinic.    Criteria for telephone visit: (criteria may vary: due to COVID 19 we are conducting follow up visits via telephone for patients safety)  An E/M was not performed in the last 7 days  It is not related to a procedure within the global period  This will not be able to be billed if it results in an E/M visit in the next 24 hours.    I spoke with: Ashley Catherine    The reason for the telephone visit was: Follow up weight management visit and Follow up bariatric surgery visit      Last visit weight: 234 Date 12/17/19  Current weight (reported home wt) 222.8 pounds      Pertinent history and review of systems:     Hx of sleeve in 2013 with Dr Willett  Last saw Jelena Bacon in clinic 12/17/19  Taking Saxenda 1.8mg and doing well except menstrual cycle longer than normal   Highest weight in life: 290 lbs  Lowest weight after sleeve: 184 lbs    Anti obesity meds hx: Qsymia worked well but had multiple side effect  Contrave: didn't like BID dosing      Assessment: MWM follow up.  Hx of sleeve in 2013. Doing well with Saxenda 1.8mg dose, plans to increase dose to 2.4 and then 3.0 next.       Plan:    Advice/instructions given to patient including prescriptions, follow up appointment or orders for diagnostic testing:     Follow up: 1-2 months by virtual visit  Labs: last labs 8/8/19, due again 8/2020  Refills: refill Saxenda when needed, will need PA again. She will send My Chart message    Phone call contact time    Call Started at: 1210    Call Ended at: [phone] 5-10 min of medical discussion      Elizabeth Park PA-C

## 2020-04-22 ENCOUNTER — VIRTUAL VISIT (OUTPATIENT)
Dept: PSYCHOLOGY | Facility: CLINIC | Age: 40
End: 2020-04-22
Payer: COMMERCIAL

## 2020-04-22 DIAGNOSIS — F54 PSYCHOLOGICAL FACTORS AFFECTING MORBID OBESITY (H): ICD-10-CM

## 2020-04-22 DIAGNOSIS — Z56.9 OCCUPATIONAL PROBLEM: ICD-10-CM

## 2020-04-22 DIAGNOSIS — E66.01 PSYCHOLOGICAL FACTORS AFFECTING MORBID OBESITY (H): ICD-10-CM

## 2020-04-22 DIAGNOSIS — F33.0 MAJOR DEPRESSIVE DISORDER, RECURRENT EPISODE, MILD (H): Primary | ICD-10-CM

## 2020-04-22 SDOH — ECONOMIC STABILITY - INCOME SECURITY: UNSPECIFIED PROBLEMS RELATED TO EMPLOYMENT: Z56.9

## 2020-04-22 NOTE — PROGRESS NOTES
Health Psychology                     Department of Medicine  Julianne Moreno, Ph.D., L.P. [phone]                          [facility] Lis Chavez, Ph.D., L.P. [phone]                   [place] Mail Code 336   Dang Ifeanyi, Ph.D.  [phone]       [address] [place] [address] Earline Anderson, Ph.D.,  L.P. [phone]        [place] [address]           Donald Hills, Ph.D. [phone]      Susana Ugarte, Ph.D., L.P. [phone]    [facility]   Jayro Elias, Ph.D., A.B.P.P., L.P. [phone]  [address] [place] [address] Melissa Stokes, Ph.D., L.P. [phone]     Health Psychology Follow-Up Telemental Health Note    Ashley Catherine is a 40-year-old single, Black woman referred initially for psychological consultation for workup for a gastric sleeve procedure who is seen for CBT-oriented therapy regardingt weight management, work stresses, and family and social matters.    This is our first video session and session since the COVID-19 paneic arrived.  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  She feels good to do the work, while taking care of her parents.     She gave birth to her son, Rivera, 9 months ago. He is doing well, but chokes up solid foods, so she has some worries aboutit.  She is feelingstuck in her life- her work, this city.     She is working .9FTE 12-hour shifts.  Her parents are taking care of Rivera while she is at work.   Her mother was having neurological problems, which are limiting some of the support, and had surgery.  Ashley took off considerable time to help her mother recover, and only recently returned to work.    We did not disss weight today.       She wasnot  weighed today.  Last weight was 229.2  Wt Readings from Last 4 Encounters:   01/31/20 104 kg (229 lb 3.2 oz)   12/17/19 106.2 kg (234 lb 3.2 oz)   10/28/19 104.3 kg (230 lb)   09/23/19 105.1 kg (231 lb 11.2 oz)     There is no height or weight on file to calculate BMI.      Current Outpatient Medications   Medication     acetaminophen (TYLENOL) 32 mg/mL liquid     Alcohol Swabs XX PADS     benzonatate (TESSALON) 200 MG capsule     cyanocobalamin (VITAMIN B-12) 500 MCG SUBL sublingual tablet     insulin pen needle 31G X 5 MM XX miscellaneous     liraglutide - Weight Management (SAXENDA) 18 MG/3ML pen     lisinopril-hydrochlorothiazide (ZESTORETIC) 20-25 MG tablet     Multiple Vitamins-Minerals (ONE DAILY CALCIUM/IRON PO)     Pediatric Multivitamins-Iron (MULTIVITAMINS PLUS IRON CHILD) 18 MG CHEW     polyethylene glycol (MIRALAX) powder     No current facility-administered medications for this visit.        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14    hospitalized at [facility] x 1 week     History of steroid therapy      HX ABNL PAP SMEAR OF CERVIX   1995    LEEP AT 15 yo     Hypertension 2004     Hypertrophy of breast      Hypertrophy of tonsils alone      Hypovitaminosis D     with secondary high PTH     Irregular heart beat     palpitations     LBP (low back pain)      LSIL (low grade squamous intraepithelial lesion) on Pap smear 5/2006     Lumbago     RESOLVED     Major depressive disorder, recurrent episode, in partial or unspecified remission 4/1/2013     Morbid obesity (H)     bariatric surgery 5/13/2013     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)      OLIGOMENORRHEA, C/W PCOS      Sciatica      SLEEP APNEA 6/2002    No longer has since tonsillectomy and septoplasty     Past Surgical History:   Procedure Laterality Date     BIOPSY  03/13/2015    Thyroid nodule     ESOPHAGOSCOPY, GASTROSCOPY, DUODENOSCOPY (EGD), COMBINED  5/28/2013    Procedure: COMBINED ESOPHAGOSCOPY, GASTROSCOPY, DUODENOSCOPY (EGD);;  Surgeon: Best Willett,  MD;  Location:  GI     ESOPHAGOSCOPY, GASTROSCOPY, DUODENOSCOPY (EGD), COMBINED N/A 6/13/2018    Procedure: COMBINED ESOPHAGOSCOPY, GASTROSCOPY, DUODENOSCOPY (EGD), BIOPSY SINGLE OR MULTIPLE;  gastroscopy;  Surgeon: Westley Gibbs MD;  Location: [facility]     LAPAROSCOPIC GASTRIC SLEEVE  5/13/2013    Procedure: LAPAROSCOPIC GASTRIC SLEEVE;  Laparoscopic Sleeve Gastrectomy ;  Surgeon: Best Willett MD;  Location: [facility] OR     LEEP TX, CERVICAL  1995    age 15     partial thyroidectomy  2018     SEPTOPLASTY       THYROIDECTOMY Left 4/3/2018    Procedure: THYROIDECTOMY;  Left Thyroid Lobectomy And Ishtmusectomy;  Surgeon: Delia Harper MD;  [facility]     TONSILLECTOMY  age 20s     TONSILLECTOMY  2004?     wisdom teeth extraction       PHQ-9 SCORE 5/12/2019 12/17/2019 12/17/2019   PHQ-9 Total Score - - -   PHQ-9 Total Score MyChart 7 (Mild depression) - 4 (Minimal depression)   PHQ-9 Total Score 7 4 4     MAXIMO-7 SCORE 11/9/2017 9/15/2018 10/28/2019   Total Score - - -   Total Score - 3 (minimal anxiety) -   Total Score 12 3 3     WHODAS 2.0 Total Score 12/4/2018 1/31/2020   Total Score 18 15   Total Score MyChart 18 15     She is  5 foot 11 inches.  Her long-term overall goal is 175, obviously on hold until after the pregnancy.   She participates fully. Rapport is excellent.       This telehealth service is appropriate and effective for delivering services in light of the necessity for social distancing to mitigate the COVID-19 epidemic. Patient has agreed to receiving telehealth services after being informed about it.    Patient prefers video invitation/information to be sent by:   email    Time service started: 11:13  Time service ended:11:54    Mode of transmission: Doxy.me    Location of originating:  Home of the patient    Distance site:  Home office of provider    Diagnosis:   Psychological factors affecting obesity (F54).   Major depression, recurrent, mild (F33.0).   Occupational Problems  (Z56.9)    PLAN/RECOMMENDATIONS:   1. Ms. Catherine will return 5/22 @ 11:00 to address weight loss and social issues with problem solving therapy, which is medically necessary.   She wishes to continue to get support here with her life changes.  2.  Resume schedule of regular exercise to the level that is possible.      Last treatment plan signed: 11/13/2019  Treatment plan due:  11/13/2020

## 2020-04-27 ENCOUNTER — TELEPHONE (OUTPATIENT)
Dept: SURGERY | Facility: CLINIC | Age: 40
End: 2020-04-27

## 2020-04-27 NOTE — TELEPHONE ENCOUNTER
Central Prior Authorization Team   Phone: [phone]      PA Initiation    Medication: Saxenda 18MG/3ML - PA initiated  Insurance Company: [email or web address] - Phone [phone] Fax [phone]  Pharmacy Filling the Rx: [id number] - [place], [place] - [address]  Filling Pharmacy Phone: [phone]  Filling Pharmacy Fax:    Start Date: 4/27/2020

## 2020-04-27 NOTE — TELEPHONE ENCOUNTER
Prior Authorization Retail Medication Request    Medication/Dose: Saxenda 18MG/3ML   ICD code (if different than what is on RX):   Previously Tried and Failed: Watching Portions or Calories, Exercise, Medications, Weight Loss Surgery  Rationale: High Blood Pressure, GERD (Reflux)      Insurance Name: [address] [place] [address] Ph: [phone]   Insurance ID: [id number]       Pharmacy Information (if different than what is on RX)  Name: [id number] [place], [place] - [address]   Phone: [phone]

## 2020-04-29 NOTE — TELEPHONE ENCOUNTER
Prior Authorization Approval    Authorization Effective Date: 3/28/2020  Authorization Expiration Date: 4/28/2021  Medication: Saxenda 18MG/3ML - PA approved  Approved Dose/Quantity:   Reference #: [id number]   Insurance Company: [employer] - Phone [phone] Fax [phone]  Expected CoPay:       CoPay Card Available:      Foundation Assistance Needed:    Which Pharmacy is filling the prescription (Not needed for infusion/clinic administered): [id number] - [place], [place] - [address]  Pharmacy Notified: Yes  Patient Notified: No-Pharmacy will contact

## 2020-05-22 ENCOUNTER — VIRTUAL VISIT (OUTPATIENT)
Dept: PSYCHOLOGY | Facility: CLINIC | Age: 40
End: 2020-05-22
Payer: COMMERCIAL

## 2020-05-22 DIAGNOSIS — Z56.9 OCCUPATIONAL PROBLEM: ICD-10-CM

## 2020-05-22 DIAGNOSIS — F54 PSYCHOLOGICAL FACTORS AFFECTING MORBID OBESITY (H): ICD-10-CM

## 2020-05-22 DIAGNOSIS — E66.01 PSYCHOLOGICAL FACTORS AFFECTING MORBID OBESITY (H): ICD-10-CM

## 2020-05-22 DIAGNOSIS — F33.0 MAJOR DEPRESSIVE DISORDER, RECURRENT EPISODE, MILD (H): Primary | ICD-10-CM

## 2020-05-22 SDOH — ECONOMIC STABILITY - INCOME SECURITY: UNSPECIFIED PROBLEMS RELATED TO EMPLOYMENT: Z56.9

## 2020-05-22 NOTE — PROGRESS NOTES
Health Psychology                     Department of Medicine  Julianne Moreno, Ph.D., L.P. [phone]                          [facility] Lis Chavze, Ph.D., L.P. [phone]                   [place] Mail Code 953   Dang Ifeanyi, Ph.D.  [phone]       [address] [place] [address] Earline Anderson, Ph.D.,  L.P. [phone]        [place] [address]           Donald Hills, Ph.D. [phone]      Susana Ugarte, Ph.D., L.P. [phone]    [facility]   Jayro Elias, Ph.D., A.B.P.P., L.P. [phone]  [address] [place] [address] Melissa Stokes, Ph.D., L.P. [phone]     Health Psychology Follow-Up Telemental Health Note    Ashley Catherine is a 40-year-old single, Black woman referred initially for psychological consultation for workup for a gastric sleeve procedure who is seen for CBT-oriented therapy regardingt weight management, work stresses, and family and social matters.     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  She feels good to do the work, while taking care of her parents.   She is mainly doing oncology, but has floated to the COVID unit and is concerned about the potential for a surge.  Her hospital is getting more full of COVID patients.    She gave birth to her son, Rivera, 11 months ago. He is doing well, but chokes up solid foods, so she has some worries about it.  She is feeling stuck in her life- her work, this city.     She is working .9FTE 12-hour shifts.      Her parents are taking care of Rivera while she is at work.   Her mother was having neurological problems, which are limiting some of the support, and had surgery.  Her father is currently hospitalized ab [facility] with  CHF.  She is relieved he is there as she feels she is taking care of him a lot when he is home.     She participated fully and appeared to derive benefit.  Affect is positive. Mood is overall positive as she adapts to the current situation. Rapport was excellent.        She was  not  weighed today as it is a virtual call. She believes she is down to 219.   She thinks the Saxenda is helping.      Wt Readings from Last 4 Encounters:   01/31/20 104 kg (229 lb 3.2 oz)   12/17/19 106.2 kg (234 lb 3.2 oz)   10/28/19 104.3 kg (230 lb)   09/23/19 105.1 kg (231 lb 11.2 oz)     There is no height or weight on file to calculate BMI.     Current Outpatient Medications   Medication     acetaminophen (TYLENOL) 32 mg/mL liquid     Alcohol Swabs XX PADS     benzonatate (TESSALON) 200 MG capsule     cyanocobalamin (VITAMIN B-12) 500 MCG SUBL sublingual tablet     insulin pen needle 31G X 5 MM XX miscellaneous     liraglutide - Weight Management (SAXENDA) 18 MG/3ML pen     lisinopril-hydrochlorothiazide (ZESTORETIC) 20-25 MG tablet     Multiple Vitamins-Minerals (ONE DAILY CALCIUM/IRON PO)     Pediatric Multivitamins-Iron (MULTIVITAMINS PLUS IRON CHILD) 18 MG CHEW     polyethylene glycol (MIRALAX) powder     No current facility-administered medications for this visit.        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)      OLIGOMENORRHEA, C/W PCOS       Sciatica      SLEEP APNEA 6/2002    No longer has since tonsillectomy and septoplasty     Past Surgical History:   Procedure Laterality Date     BIOPSY  03/13/2015    Thyroid nodule     ESOPHAGOSCOPY, GASTROSCOPY, DUODENOSCOPY (EGD), COMBINED  5/28/2013    Procedure: COMBINED ESOPHAGOSCOPY, GASTROSCOPY, DUODENOSCOPY (EGD);;  Surgeon: Best Willett MD;  Location:  GI     ESOPHAGOSCOPY, GASTROSCOPY, DUODENOSCOPY (EGD), COMBINED N/A 6/13/2018    Procedure: COMBINED ESOPHAGOSCOPY, GASTROSCOPY, DUODENOSCOPY (EGD), BIOPSY SINGLE OR MULTIPLE;  gastroscopy;  Surgeon: Westley Gibbs MD;  Location:  GI     LAPAROSCOPIC GASTRIC SLEEVE  5/13/2013    Procedure: LAPAROSCOPIC GASTRIC SLEEVE;  Laparoscopic Sleeve Gastrectomy ;  Surgeon: Best Willett MD;  Location: [facility] OR     LEEP TX, CERVICAL  1995    age 15     partial thyroidectomy  2018     SEPTOPLASTY       THYROIDECTOMY Left 4/3/2018    Procedure: THYROIDECTOMY;  Left Thyroid Lobectomy And Ishtmusectomy;  Surgeon: Delia Harper MD;  [facility]     TONSILLECTOMY  age 20s     TONSILLECTOMY  2004?     wisdom teeth extraction       PHQ-9 SCORE 5/12/2019 12/17/2019 12/17/2019   PHQ-9 Total Score - - -   PHQ-9 Total Score MyChart 7 (Mild depression) - 4 (Minimal depression)   PHQ-9 Total Score 7 4 4     MAXIMO-7 SCORE 11/9/2017 9/15/2018 10/28/2019   Total Score - - -   Total Score - 3 (minimal anxiety) -   Total Score 12 3 3     WHODAS 2.0 Total Score 12/4/2018 1/31/2020   Total Score 18 15   Total Score MyChart 18 15     She is  5 foot 11 inches.  Her long-term overall goal is 175, obviously on hold until after the pregnancy.   She participates fully. Rapport is excellent.       This telehealth service is appropriate and effective for delivering services in light of the necessity for social distancing to mitigate the COVID-19 epidemic and for conservation of PPE.     Patient has agreed to receiving telehealth services after being informed about  it: Yes    Patient prefers video invitation/information to be sent by:   email    Time service started: 11:00  Time service ended:  11:53    Mode of transmission: Doxy.me    Location of originating:  Home of the patient    Distance site:  Home office of provider for [facility]    The patient has been notified that:  Video visits will be conducted via a call with their psychologist to provide the care they need with a video conversation. Video visits may be billed at different rates depending on insurance coverage.  Patients are advised to please contact their insurance provider with any questions about their health insurance coverage. If during the course of a call the psychologist feels a video visit is not appropriate, patients will not be charged for this service.  Diagnosis:   Psychological factors affecting obesity (F54).   Major depression, recurrent, mild (F33.0).   Occupational Problems (Z56.9)    PLAN/RECOMMENDATIONS:   1. Ms. Catherine will return 6/22 @ 11:00 to address weight loss and social issues with problem solving therapy, which is medically necessary.   She wishes to continue to get support here with her life changes.  2.  Resume schedule of regular exercise to the level that is possible.      Last treatment plan signed: 11/13/2019  Treatment plan due:  11/13/2020

## 2020-05-29 DIAGNOSIS — Z98.84 BARIATRIC SURGERY STATUS: ICD-10-CM

## 2020-05-29 DIAGNOSIS — E66.811 OBESITY, CLASS I, BMI 30-34.9: ICD-10-CM

## 2020-05-29 NOTE — TELEPHONE ENCOUNTER
Refill request from pharmacy for Saxenda. Last had virtual visit with [place] PA-C in March 2020 with plan to refill Saxenda when needed, will need PA again. Sending refill to pharmacy and [facility] message to patient to schedule virtual follow up.

## 2020-06-19 ENCOUNTER — VIRTUAL VISIT (OUTPATIENT)
Dept: FAMILY MEDICINE | Facility: CLINIC | Age: 40
End: 2020-06-19
Payer: COMMERCIAL

## 2020-06-19 DIAGNOSIS — E66.811 OBESITY, CLASS I, BMI 30-34.9: ICD-10-CM

## 2020-06-19 DIAGNOSIS — Z98.84 BARIATRIC SURGERY STATUS: ICD-10-CM

## 2020-06-19 DIAGNOSIS — Z98.84 GASTRIC BYPASS STATUS FOR OBESITY: ICD-10-CM

## 2020-06-19 DIAGNOSIS — I10 BENIGN ESSENTIAL HYPERTENSION: Primary | ICD-10-CM

## 2020-06-19 PROCEDURE — 99214 OFFICE O/P EST MOD 30 MIN: CPT | Mod: TEL | Performed by: PHYSICIAN ASSISTANT

## 2020-06-19 RX ORDER — LISINOPRIL 20 MG/1
20 TABLET ORAL DAILY
Qty: 90 TABLET | Refills: 1 | Status: SHIPPED | OUTPATIENT
Start: 2020-06-19 | End: 2020-10-22

## 2020-06-19 RX ORDER — HYDROCHLOROTHIAZIDE 25 MG/1
25 TABLET ORAL DAILY
Qty: 90 TABLET | Refills: 1 | Status: SHIPPED | OUTPATIENT
Start: 2020-06-19 | End: 2020-10-22

## 2020-06-19 ASSESSMENT — PATIENT HEALTH QUESTIONNAIRE - PHQ9: SUM OF ALL RESPONSES TO PHQ QUESTIONS 1-9: 2

## 2020-06-19 NOTE — PROGRESS NOTES
"Ashley Catherine is a 40 year old female who is being evaluated via a billable telephone visit.      The patient has been notified of following:     \"This telephone visit will be conducted via a call between you and your physician/provider. We have found that certain health care needs can be provided without the need for a physical exam.  This service lets us provide the care you need with a short phone conversation.  If a prescription is necessary we can send it directly to your pharmacy.  If lab work is needed we can place an order for that and you can then stop by our lab to have the test done at a later time.    Telephone visits are billed at different rates depending on your insurance coverage. During this emergency period, for some insurers they may be billed the same as an in-person visit.  Please reach out to your insurance provider with any questions.    If during the course of the call the physician/provider feels a telephone visit is not appropriate, you will not be charged for this service.\"    Patient has given verbal consent for Telephone visit?  Yes    What phone number would you like to be contacted at? [phone]    How would you like to obtain your AVS? Camacho Rod     Ashley Catherine is a 40 year old female who presents via phone visit today for the following health issues:    HPI  Hypertension Follow-up      Do you check your blood pressure regularly outside of the clinic? Yes     Are you following a low salt diet? No    Are your blood pressures ever more than 140 on the top number (systolic) OR more   than 90 on the bottom number (diastolic), for example 140/90? No      How many servings of fruits and vegetables do you eat daily?  2-3    On average, how many sweetened beverages do you drink each day (Examples: soda, juice, sweet tea, etc.  Do NOT count diet or artificially sweetened beverages)?   2    How many days per week do you exercise enough to make your heart beat faster? 3 or " less    How many minutes a day do you exercise enough to make your heart beat faster? 60 or more    How many days per week do you miss taking your medication? 0    Has been feeling pretty good overall.  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     Blood pressure been at goal, she does request to split up combo pill.  Denies any side effects.    Continues liraglutide to help with weight.  Seems to be working well, happy with effects.         BP Readings from Last 3 Encounters:   12/17/19 131/74   10/28/19 125/78   09/23/19 (!) 143/91    Wt Readings from Last 3 Encounters:   12/17/19 106.2 kg (234 lb 3.2 oz)   10/28/19 104.3 kg (230 lb)   09/23/19 105.1 kg (231 lb 11.2 oz)                    Reviewed and updated as needed this visit by Provider         Review of Systems   Constitutional, HEENT, cardiovascular, pulmonary, gi and gu systems are negative, except as otherwise noted.       Objective   Reported vitals:  There were no vitals taken for this visit.   alert and no distress  PSYCH: Alert and oriented times 3; coherent speech, normal   rate and volume, able to articulate logical thoughts, able   to abstract reason, no tangential thoughts, no hallucinations   or delusions  Her affect is normal  RESP: No cough, no audible wheezing, able to talk in full sentences  Remainder of exam unable to be completed due to telephone visits    Diagnostic Test Results:  Labs reviewed in Epic        Assessment/Plan:  1. Benign essential hypertension  At goal.    - Comprehensive metabolic panel; Future  - lisinopril (ZESTRIL) 20 MG tablet; Take 1 tablet (20 mg) by mouth daily  Dispense: 90 tablet; Refill: 1  - hydrochlorothiazide (HYDRODIURIL) 25 MG tablet; Take 1 tablet (25 mg) by mouth daily  Dispense: 90 tablet; Refill: 1    2. Gastric bypass status for obesity  Due for some labs, may get a BP and a weight when she is in clinic for labs.  -  Comprehensive metabolic panel; Future  - Vitamin D Deficiency; Future  - Vitamin B12; Future  - CBC with platelets differential; Future  - Ferritin; Future    3. Bariatric surgery status    - liraglutide - Weight Management (SAXENDA) 18 MG/3ML pen; Inject 3 mg Subcutaneous daily  Dispense: 15 mL; Refill: 1    4. Obesity, Class I, BMI 30-34.9    - liraglutide - Weight Management (SAXENDA) 18 MG/3ML pen; Inject 3 mg Subcutaneous daily  Dispense: 15 mL; Refill: 1    No follow-ups on file.      Phone call duration:  8 minutes    Sanjay Bhat PA-C

## 2020-06-22 ENCOUNTER — VIRTUAL VISIT (OUTPATIENT)
Dept: PSYCHOLOGY | Facility: CLINIC | Age: 40
End: 2020-06-22
Payer: COMMERCIAL

## 2020-06-22 DIAGNOSIS — F54 PSYCHOLOGICAL FACTORS AFFECTING MORBID OBESITY (H): ICD-10-CM

## 2020-06-22 DIAGNOSIS — F33.0 MAJOR DEPRESSIVE DISORDER, RECURRENT EPISODE, MILD (H): Primary | ICD-10-CM

## 2020-06-22 DIAGNOSIS — E66.01 PSYCHOLOGICAL FACTORS AFFECTING MORBID OBESITY (H): ICD-10-CM

## 2020-06-22 DIAGNOSIS — Z56.9 OCCUPATIONAL PROBLEM: ICD-10-CM

## 2020-06-22 SDOH — ECONOMIC STABILITY - INCOME SECURITY: UNSPECIFIED PROBLEMS RELATED TO EMPLOYMENT: Z56.9

## 2020-06-22 NOTE — PROGRESS NOTES
Health Psychology                     Department of Medicine  Julianne Moreno, Ph.D., L.P. [phone]                          [facility] Lis Chavez, Ph.D., L.P. [phone]                   [place] Mail Code 575   Dang Ifeanyi, Ph.D.  [phone]       [address] [place] [address] Earline Andesron, Ph.D.,  L.P. [phone]        [place] [address]           Donald Hlils, Ph.D. [phone]      Susana Ugarte, Ph.D., L.P. [phone]    [facility]   Jayro Elias, Ph.D., A.B.P.P., L.P. [phone]  [address] [place] [address] Melissa Stokes, Ph.D., L.P. [phone]     Health Psychology Follow-Up Telemental Health Note    Ashley Catherine is a 40-year-old single, Black woman referred initially for psychological consultation for workup for a gastric sleeve procedure who is seen for CBT-oriented therapy regardingt weight management, work stresses, and family and social matters.    She works as a nurse at [employer] and shares some anxieties related to it  The COVID-19  Work has improved with  More PPE and fewer patients but she is concerned about an increase as the state opens up further.   She feels good to do the work, while taking care of her parents.   She is mainly doing oncology, but sometimes floats to the [employer].      She gave birth to her son, Rivera, a year ago. He is doing well, but chokes up solid foods, so she has some worries about it.  She is feeling their bonding is going well and she is amused by him and impressed by him.    She is working .9FTE 12-hour shifts.      Her parents are taking care of Rivera while she is at work.   Her mother was having neurological problems, which are limiting some of the support, and had surgery, and seems to be dong better.  Her father was recently hospitalized ab [facility] with CHF.  She  is taking care of him a lot when he is home.   She is frustrated that he is not taking the heart issues seriously  He has open wounds on his lower  extremities, which she doesn't think will heal unless he takes better care of his heart issues.   She is concerned he is going to the  [facility] despite the pandemic.    She has been losing weight.  She is inconsistent with diet and exercise and still wants to lose weight.    We discussed her pessimism about dating.     She participated fully and appeared to derive benefit.  Affect is positive. Mood is overall positive as she adapts to the current situation. Rapport was excellent.    She was not  weighed today as it is a virtual session. She believes she is down to 217.   She thinks the Saxenda is helping.      Wt Readings from Last 4 Encounters:   01/31/20 104 kg (229 lb 3.2 oz)   12/17/19 106.2 kg (234 lb 3.2 oz)   10/28/19 104.3 kg (230 lb)   09/23/19 105.1 kg (231 lb 11.2 oz)     There is no height or weight on file to calculate BMI.     Current Outpatient Medications   Medication     acetaminophen (TYLENOL) 32 mg/mL liquid     Alcohol Swabs XX PADS     cyanocobalamin (VITAMIN B-12) 500 MCG SUBL sublingual tablet     hydrochlorothiazide (HYDRODIURIL) 25 MG tablet     insulin pen needle 31G X 5 MM XX miscellaneous     liraglutide - Weight Management (SAXENDA) 18 MG/3ML pen     lisinopril (ZESTRIL) 20 MG tablet     Multiple Vitamins-Minerals (ONE DAILY CALCIUM/IRON PO)     Pediatric Multivitamins-Iron (MULTIVITAMINS PLUS IRON CHILD) 18 MG CHEW     polyethylene glycol (MIRALAX) powder     No current facility-administered medications for this visit.        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14    hospitalized at [facility] x 1 week     History of steroid therapy      HX ABNL PAP SMEAR OF CERVIX   1995    LEEP AT 15 yo     Hypertension 2004     Hypertrophy of breast      Hypertrophy of  tonsils alone      Hypovitaminosis D     with secondary high PTH     Irregular heart beat     palpitations     LBP (low back pain)      LSIL (low grade squamous intraepithelial lesion) on Pap smear 5/2006     Lumbago     RESOLVED     Major depressive disorder, recurrent episode, in partial or unspecified remission 4/1/2013     Morbid obesity (H)     bariatric surgery 5/13/2013     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)      OLIGOMENORRHEA, C/W PCOS      Sciatica      SLEEP APNEA 6/2002    No longer has since tonsillectomy and septoplasty     Past Surgical History:   Procedure Laterality Date     BIOPSY  03/13/2015    Thyroid nodule     ESOPHAGOSCOPY, GASTROSCOPY, DUODENOSCOPY (EGD), COMBINED  5/28/2013    Procedure: COMBINED ESOPHAGOSCOPY, GASTROSCOPY, DUODENOSCOPY (EGD);;  Surgeon: Best Willett MD;  Location:  GI     ESOPHAGOSCOPY, GASTROSCOPY, DUODENOSCOPY (EGD), COMBINED N/A 6/13/2018    Procedure: COMBINED ESOPHAGOSCOPY, GASTROSCOPY, DUODENOSCOPY (EGD), BIOPSY SINGLE OR MULTIPLE;  gastroscopy;  Surgeon: Westley Gibbs MD;  Location: [facility]     LAPAROSCOPIC GASTRIC SLEEVE  5/13/2013    Procedure: LAPAROSCOPIC GASTRIC SLEEVE;  Laparoscopic Sleeve Gastrectomy ;  Surgeon: Best Willett MD;  Location:  OR     LEEP TX, CERVICAL  1995    age 15     partial thyroidectomy  2018     SEPTOPLASTY       THYROIDECTOMY Left 4/3/2018    Procedure: THYROIDECTOMY;  Left Thyroid Lobectomy And Ishtmusectomy;  Surgeon: Delia Harper MD;  Location: [facility] OR     TONSILLECTOMY  age 20s     TONSILLECTOMY  2004?     wisdom teeth extraction       PHQ-9 SCORE 12/17/2019 12/17/2019 6/19/2020   PHQ-9 Total Score - - -   PHQ-9 Total Score MyChart - 4 (Minimal depression) -   PHQ-9 Total Score 4 4 2     MAXIMO-7 SCORE 11/9/2017 9/15/2018 10/28/2019   Total Score - - -   Total Score - 3 (minimal anxiety) -   Total Score 12 3 3     WHODAS 2.0 Total Score 12/4/2018 1/31/2020   Total Score 18 15    Total Score MyChart 18 15     She is  5 foot 11 inches.  Her long-term overall goal is 175, obviously on hold until after the pregnancy.   She participates fully. Rapport is excellent.       This telehealth service is appropriate and effective for delivering services in light of the necessity for social distancing to mitigate the COVID-19 epidemic and for conservation of PPE.     Patient has agreed to receiving telehealth services after being informed about it: Yes    Patient prefers video invitation/information to be sent by:   email    Time service started: 11:01  Time service ended:  11:54  Extended session due to complexity of case and length of interval.    Mode of transmission: Doxy.me    Location of originating:  Home of the patient    Distance site:  Home office of provider for [facility]    The patient has been notified that:  Video visits will be conducted via a call with their psychologist to provide the care they need with a video conversation. Video visits may be billed at different rates depending on insurance coverage.  Patients are advised to please contact their insurance provider with any questions about their health insurance coverage. If during the course of a call the psychologist feels a video visit is not appropriate, patients will not be charged for this service.  Diagnosis:   Psychological factors affecting obesity (F54).   Major depression, recurrent, mild (F33.0).   Occupational Problems (Z56.9)    PLAN/RECOMMENDATIONS:   1. Ms. Catherine will return 7/22 @ 9:00 to address weight loss and social issues with problem solving therapy, which is medically necessary.   She wishes to continue to get support here with her life changes.  2.  Resume schedule of regular exercise to the level that is possible.      Last treatment plan signed: 11/13/2019  Treatment plan due:  11/13/2020

## 2020-07-02 ENCOUNTER — VIRTUAL VISIT (OUTPATIENT)
Dept: SURGERY | Facility: CLINIC | Age: 40
End: 2020-07-02
Payer: COMMERCIAL

## 2020-07-02 VITALS — BODY MASS INDEX: 31.35 KG/M2 | HEIGHT: 70 IN | WEIGHT: 219 LBS

## 2020-07-02 DIAGNOSIS — Z98.84 BARIATRIC SURGERY STATUS: ICD-10-CM

## 2020-07-02 DIAGNOSIS — Z98.84 S/P LAPAROSCOPIC SLEEVE GASTRECTOMY: Primary | ICD-10-CM

## 2020-07-02 DIAGNOSIS — E66.811 OBESITY, CLASS I, BMI 30-34.9: ICD-10-CM

## 2020-07-02 RX ORDER — NALTREXONE HYDROCHLORIDE 50 MG/1
TABLET, FILM COATED ORAL
Qty: 30 TABLET | Refills: 1 | Status: SHIPPED | OUTPATIENT
Start: 2020-07-02 | End: 2020-10-28

## 2020-07-02 SDOH — HEALTH STABILITY: MENTAL HEALTH: HOW MANY STANDARD DRINKS CONTAINING ALCOHOL DO YOU HAVE ON A TYPICAL DAY?: 3 OR 4

## 2020-07-02 SDOH — HEALTH STABILITY: MENTAL HEALTH: HOW OFTEN DO YOU HAVE A DRINK CONTAINING ALCOHOL?: 4 OR MORE TIMES A WEEK

## 2020-07-02 ASSESSMENT — PAIN SCALES - GENERAL: PAINLEVEL: NO PAIN (0)

## 2020-07-02 ASSESSMENT — MIFFLIN-ST. JEOR: SCORE: 1743.63

## 2020-07-02 NOTE — LETTER
"2020       RE: Ashley Catherine  [address]  [facility] [address]     Dear Colleague,    Thank you for referring your patient, Ashley Catherine, to the [facility] SURGICAL WEIGHT MANAGEMENT at [facility]. Please see a copy of my visit note below.    Ashley Catherine is a 40 year old female who is being evaluated via a billable video visit.          Return Medical Weight Management Note     Ashley Catherine  MRN:  [id number]  :  1980  RAKEL:  2020    Dear Violet Humphreys MD,    I had the pleasure of seeing your patient Ashley Catherine. She is a 40 year old female who I am continuing to see for treatment of obesity related to:       5/15/2017   I have the following health issues associated with obesity: High Blood Pressure, GERD (Reflux)   I have the following symptoms associated with obesity: GERD (Reflux)       INTERVAL HISTORY:  Hx of sleeve in  with some weight regain.  Lost from 290 to 186 after surgery  Weight today 219 lbs  Taking Saxenda 3mg dose and feels it is helping  Weight stable since last visit  Qsymia caused tingling and mood changes, Contrave she didn't like BID dosing      Wt Readings from Last 4 Encounters:   20 99.3 kg (219 lb)   19 106.2 kg (234 lb 3.2 oz)   10/28/19 104.3 kg (230 lb)   19 105.1 kg (231 lb 11.2 oz)         CURRENT WEIGHT:   219 lbs 0 oz    Initial Weight (lbs): 274 lbs  Last Visits Weight: 100.7 kg (222 lb)  Cumulative weight loss (lbs): 55  Weight Loss Percentage: 20.07%    Changes and Difficulties 2020   I have made the following changes to my diet since my last visit: Less soda   With regards to my diet, I am still struggling with: Balancing   I have made the following changes to my activity/exercise since my last visit: Walk more/more steps   With regards to my activity/exercise, I am still struggling with: Daily activity       VITALS:  Ht 1.778 m (5' 10\")   Wt 99.3 kg (219 lb)   BMI 31.42 kg/m  " "    MEDICATIONS:   Current Outpatient Medications   Medication Sig Dispense Refill     acetaminophen (TYLENOL) 32 mg/mL liquid Take 1,000 mg by mouth every 8 hours as needed for fever or mild pain       Alcohol Swabs XX PADS 1 each as needed (injections) 100 each 3     Cholecalciferol (VITAMIN D3) 25 MCG (1000 UT) CAPS        hydrochlorothiazide (HYDRODIURIL) 25 MG tablet Take 1 tablet (25 mg) by mouth daily 90 tablet 1     insulin pen needle 31G X 5 MM XX miscellaneous Use 1 pen needles daily or as directed. 100 each 1     liraglutide - Weight Management (SAXENDA) 18 MG/3ML pen Inject 3 mg Subcutaneous daily 15 mL 1     lisinopril (ZESTRIL) 20 MG tablet Take 1 tablet (20 mg) by mouth daily 90 tablet 1     Multiple Vitamins-Minerals (ONE DAILY CALCIUM/IRON PO) Take 1 tablet by mouth daily       polyethylene glycol (MIRALAX) powder Take 17 g (1 capful) by mouth daily as needed for constipation 510 g 1     cyanocobalamin (VITAMIN B-12) 500 MCG SUBL sublingual tablet Place 500 mcg under the tongue every other day          Weight Loss Medication History Reviewed With Patient 7/2/2020   Which weight loss medications are you currently taking on a regular basis?  Saxenda (liraglutide)   Are you having any side effects from the weight loss medication that we have prescribed you? No   If you are having side effects please describe: -     Ht 1.778 m (5' 10\")   Wt 99.3 kg (219 lb)   BMI 31.42 kg/m      ASSESSMENT/PLAN:    MW follow up.  Hx of sleeve in 2013 with some weight regain    Continue Saxenda  Start naltrexone 50mg start with 1/2 tab and then increase to full tab  Bariatric labs    FOLLOW-UP:    2 months return MWM with Lauren Bloch [facility] pharmacist   6 months follow up Elizabeth return MWM      Sincerely,    Elizabeth Park PA-C    Video-Visit Details    Type of service:  Video Visit    Video Start Time: 1008  Video End Time: 1019    Originating Location (pt. Location): Home    Distant Location (provider " location):  [facility] SURGICAL WEIGHT MANAGEMENT     Platform used for Video Visit: Natividad

## 2020-07-02 NOTE — PATIENT INSTRUCTIONS
"  MEDICATION STARTED AT THIS APPOINTMENT  We are starting Naltrexone. Start with 1/2 tab 1-2 hours prior to the time you have the most trouble with cravings or extra hunger. If you are doing well you may switch to a whole tablet taken at the same time period.     WARNING: This medication blocks the action of opioid type pain medications. If you routinely take any medication like Codeine, Oxycontin,Percocet,Morphine,Dilaudid or Methodone, do not take this until you have talked with weight management staff. If you are planning surgery you should stop Naltrexone 4 days prior to the surgery. If you have an injury that requires pain medication, make sure the health care staff knows you take Naltrexone.     Call the nurse at [phone] if you have any questions or concerns. (Do not stop taking it if you don't think it's working. For some people it works without them knowing it.)    Naltrexone is a medication that is used most often to help people who are troubled by dependence on prescription pain killers or alcohol. It has also been found to help with weight loss. Although it's not currently FDA approved for weight loss, it has been used safely for a number of years to help people who are carrying extra weight.     Just how Naltrexone helps with weight loss has not been exactly determined.  It seems to work by quieting down brain signals related to strong food cravings. Many of our patients use the word \"addiction\" to describe their feelings and constant thoughts about food. It makes sense then to treat the feeling of dependence on food, outside of real hunger, with a medication designed to help with other sorts of dependence.     Our patients on Naltrexone find that they:    >feel less interest in food   >think less about food and eating and have more time to think of other things   >find it easier to push the plate away   >have an easier time eating less    For some of our patients, these feelings are very immediate. " Other patients, don't feel much of a change but find they've lost weight. Like all weight loss medications, Naltrexone works best when you help it work. This means:  1. Having less tempting high calorie (fattening) food around the house or office. (For people with strong cravings this is very important.)   2. Staying away from situations or people that may trigger your cravings .   3. Eating out only one time or less each week.  4. Eating your meals at a table with the TV or computer off.    Side-effects. Naltrexone is generally well tolerated. The main side-effect we see is  nausea or a woozy feeling. A small number of people feel quite ill. Most people have a mild reaction and some people have no reaction at all.  The good news is that this feeling does go away.     In order to avoid nausea, please start the medication with half a pill for the first few days. Go on to a full pill if you are feeling well.      If you  are nauseated on 1/2 a pill it is okay to cut back to 1/4 pill ( a very small amount). Take this for a couple of days and work your way back up to a 1/2 pill and then a whole pill. Taking the medication at night or with food  to start also may help prevent the feeling of nausea.         Please refer to the pharmacy insert for more information on side-effects. Since many pharmacists are not familiar with the use of naltrexone in weight loss, calling the nurse at [phone] will get you the most accurate information.  In order to get refills of this or any medication we prescribe you must be seen in the medical weight mgmt clinic every 2-3 months. Please have your pharmacy fax a refill request to [phone].

## 2020-07-02 NOTE — PROGRESS NOTES
"Ashley Catherine is a 40 year old female who is being evaluated via a billable video visit.      The patient has been notified of following:     \"This video visit will be conducted via a call between you and your physician/provider. We have found that certain health care needs can be provided without the need for an in-person physical exam.  This service lets us provide the care you need with a video conversation.  If a prescription is necessary we can send it directly to your pharmacy.  If lab work is needed we can place an order for that and you can then stop by our lab to have the test done at a later time.    Video visits are billed at different rates depending on your insurance coverage.  Please reach out to your insurance provider with any questions.    If during the course of the call the physician/provider feels a video visit is not appropriate, you will not be charged for this service.\"    Patient has given verbal consent for Video visit? Yes  How would you like to obtain your AVS? MyChart  Patient would like the video invitation sent by: Send to e-mail at: [email or web address]  Will anyone else be joining your video visit? No      Return Medical Weight Management Note     Ashley Catherine  MRN:  [id number]  :  1980  RAKEL:  2020    Dear Violet Humphreys MD,    I had the pleasure of seeing your patient Ashley Catherine. She is a 40 year old female who I am continuing to see for treatment of obesity related to:       5/15/2017   I have the following health issues associated with obesity: High Blood Pressure, GERD (Reflux)   I have the following symptoms associated with obesity: GERD (Reflux)       INTERVAL HISTORY:  Hx of sleeve in  with some weight regain.  Lost from 290 to 186 after surgery  Weight today 219 lbs  Taking Saxenda 3mg dose and feels it is helping  Weight stable since last visit  Qsymia caused tingling and mood changes, Contrave she didn't like BID dosing      Wt Readings from Last 4 " "Encounters:   07/02/20 99.3 kg (219 lb)   12/17/19 106.2 kg (234 lb 3.2 oz)   10/28/19 104.3 kg (230 lb)   09/23/19 105.1 kg (231 lb 11.2 oz)         CURRENT WEIGHT:   219 lbs 0 oz    Initial Weight (lbs): 274 lbs  Last Visits Weight: 100.7 kg (222 lb)  Cumulative weight loss (lbs): 55  Weight Loss Percentage: 20.07%    Changes and Difficulties 7/2/2020   I have made the following changes to my diet since my last visit: Less soda   With regards to my diet, I am still struggling with: Balancing   I have made the following changes to my activity/exercise since my last visit: Walk more/more steps   With regards to my activity/exercise, I am still struggling with: Daily activity       VITALS:  Ht 1.778 m (5' 10\")   Wt 99.3 kg (219 lb)   BMI 31.42 kg/m      MEDICATIONS:   Current Outpatient Medications   Medication Sig Dispense Refill     acetaminophen (TYLENOL) 32 mg/mL liquid Take 1,000 mg by mouth every 8 hours as needed for fever or mild pain       Alcohol Swabs XX PADS 1 each as needed (injections) 100 each 3     Cholecalciferol (VITAMIN D3) 25 MCG (1000 UT) CAPS        hydrochlorothiazide (HYDRODIURIL) 25 MG tablet Take 1 tablet (25 mg) by mouth daily 90 tablet 1     insulin pen needle 31G X 5 MM XX miscellaneous Use 1 pen needles daily or as directed. 100 each 1     liraglutide - Weight Management (SAXENDA) 18 MG/3ML pen Inject 3 mg Subcutaneous daily 15 mL 1     lisinopril (ZESTRIL) 20 MG tablet Take 1 tablet (20 mg) by mouth daily 90 tablet 1     Multiple Vitamins-Minerals (ONE DAILY CALCIUM/IRON PO) Take 1 tablet by mouth daily       polyethylene glycol (MIRALAX) powder Take 17 g (1 capful) by mouth daily as needed for constipation 510 g 1     cyanocobalamin (VITAMIN B-12) 500 MCG SUBL sublingual tablet Place 500 mcg under the tongue every other day          Weight Loss Medication History Reviewed With Patient 7/2/2020   Which weight loss medications are you currently taking on a regular basis?  Saxenda " "(liraglutide)   Are you having any side effects from the weight loss medication that we have prescribed you? No   If you are having side effects please describe: -     Ht 1.778 m (5' 10\")   Wt 99.3 kg (219 lb)   BMI 31.42 kg/m      ASSESSMENT/PLAN:    MWM follow up.  Hx of sleeve in 2013 with some weight regain    Continue Saxenda  Start naltrexone 50mg start with 1/2 tab and then increase to full tab  Bariatric labs    FOLLOW-UP:    2 months return MW with Lauren Bloch [facility] pharmacist   6 months follow up Elizabeth return MW      Sincerely,    Elizabeth Park PA-OSWALDO    Video-Visit Details    Type of service:  Video Visit    Video Start Time: 1008  Video End Time: 1019    Originating Location (pt. Location): Home    Distant Location (provider location):  [facility] SURGICAL WEIGHT MANAGEMENT     Platform used for Video Visit: Natividad [place] PAIRIS        "

## 2020-07-02 NOTE — NURSING NOTE
"Chief Complaint   Patient presents with     RECHECK     F/U; Hx of Gastric Sleeve 2013       Vitals:    07/02/20 0950   Weight: 99.3 kg (219 lb)   Height: 1.778 m (5' 10\")       Body mass index is 31.42 kg/m .      Tonie Pascual LPN                          "

## 2020-07-22 ENCOUNTER — VIRTUAL VISIT (OUTPATIENT)
Dept: PSYCHOLOGY | Facility: CLINIC | Age: 40
End: 2020-07-22
Payer: COMMERCIAL

## 2020-07-22 DIAGNOSIS — E66.01 PSYCHOLOGICAL FACTORS AFFECTING MORBID OBESITY (H): ICD-10-CM

## 2020-07-22 DIAGNOSIS — Z56.9 OCCUPATIONAL PROBLEM: ICD-10-CM

## 2020-07-22 DIAGNOSIS — F54 PSYCHOLOGICAL FACTORS AFFECTING MORBID OBESITY (H): ICD-10-CM

## 2020-07-22 DIAGNOSIS — F33.0 MAJOR DEPRESSIVE DISORDER, RECURRENT EPISODE, MILD (H): Primary | ICD-10-CM

## 2020-07-22 DIAGNOSIS — F54 PSYCHOLOGICAL FACTORS AFFECTING MEDICAL CONDITION: ICD-10-CM

## 2020-07-22 SDOH — ECONOMIC STABILITY - INCOME SECURITY: UNSPECIFIED PROBLEMS RELATED TO EMPLOYMENT: Z56.9

## 2020-07-22 NOTE — PROGRESS NOTES
Health Psychology                     Department of Medicine  Julianne Moreno, Ph.D., L.P. [phone]                          [facility] Lis Chavez, Ph.D., L.P. [phone]                   [place] Mail Code 309   Dang Ifeanyi, Ph.D.  [phone]       [address] [place] [address] Earline Anderson, Ph.D.,  L.P. [phone]        [place] [address]           Donald Hills, Ph.D. [phone]      Susana Ugarte, Ph.D., L.P. [phone]    [facility]   Jayro Elias, Ph.D., A.B.P.P., L.P. [phone]  [address] [place] [address] Melissa Stokes, Ph.D., L.P. [phone]     Health Psychology Follow-Up Telemental Health Note    Ashley Catherine is a 40-year-old single, Black woman referred initially for psychological consultation for workup for a gastric sleeve procedure who is seen for CBT-oriented therapy regardingt weight management, work stresses, and family and social matters.    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   She feels good to do the work, while taking care of her parents.   She is mainly doing oncology, but sometimes floats to the COVID [facility].      She gave birth to her son, Rivera, a year ago. He is doing well, but chokes up solid foods, so she has some worries about it.  She is feeling their bonding is going well and she is amused by him and impressed by him.    She is working .9FTE 12-hour shifts.    He is starting a program at [employer].    Her parents are taking care of Rivera while she is at work.   Her mother was having neurological problems, which are limiting some of the support, and had surgery, and seems to be dong better.  Her father has been avoiding his cardiologist.  She is frustrated by his non-compliance.  She is frustrated that he is not taking the heart issues  seriously.    She focused initially on the frustrations of her work as a nurse.   There was a COVID patient who was identified late, and as the charge nurse, she had extra responsiblities of contact staff about possible risk.    She has been losing weight.  She is inconsistent with diet and exercise and still wants to lose weight. We did not discuss today.     She participated fully and appeared to derive benefit.  Affect is positive. Mood is overall positive as she adapts to the current situation. Rapport was excellent.    She was not  weighed today as it is a virtual session. She believes she is down to 217.   She thinks the Saxenda is helping.      Wt Readings from Last 4 Encounters:   07/02/20 99.3 kg (219 lb)   01/31/20 104 kg (229 lb 3.2 oz)   12/17/19 106.2 kg (234 lb 3.2 oz)   10/28/19 104.3 kg (230 lb)     There is no height or weight on file to calculate BMI.     Current Outpatient Medications   Medication     acetaminophen (TYLENOL) 32 mg/mL liquid     Alcohol Swabs XX PADS     Cholecalciferol (VITAMIN D3) 25 MCG (1000 UT) CAPS     cyanocobalamin (VITAMIN B-12) 500 MCG SUBL sublingual tablet     hydrochlorothiazide (HYDRODIURIL) 25 MG tablet     insulin pen needle (31G X 5 MM) 31G X 5 MM miscellaneous     liraglutide - Weight Management (SAXENDA) 18 MG/3ML pen     lisinopril (ZESTRIL) 20 MG tablet     Multiple Vitamins-Minerals (ONE DAILY CALCIUM/IRON PO)     naltrexone (DEPADE/REVIA) 50 MG tablet     polyethylene glycol (MIRALAX) powder     No current facility-administered medications for this visit.        Past Medical History:   Diagnosis Date     Bariatric surgery status 05/13/2013     Depression      Depressive disorder      Esophageal reflux      Family history of hyperparathyroidism 3/6/2015     Forearm fracture childhood    jumping fall     Guillain-Cope disease (H) age 14    hospitalized at [facility] x 1 week     History of steroid therapy      HX ABNL PAP SMEAR OF CERVIX   1995    LEEP AT 15 yo      Hypertension 2004     Hypertrophy of breast      Hypertrophy of tonsils alone      Hypovitaminosis D     with secondary high PTH     Irregular heart beat     palpitations     LBP (low back pain)      LSIL (low grade squamous intraepithelial lesion) on Pap smear 5/2006     Lumbago     RESOLVED     Major depressive disorder, recurrent episode, in partial or unspecified remission 4/1/2013     Morbid obesity (H)     bariatric surgery 5/13/2013     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)      OLIGOMENORRHEA, C/W PCOS      Sciatica      SLEEP APNEA 6/2002    No longer has since tonsillectomy and septoplasty     Past Surgical History:   Procedure Laterality Date     BIOPSY  03/13/2015    Thyroid nodule     ESOPHAGOSCOPY, GASTROSCOPY, DUODENOSCOPY (EGD), COMBINED  5/28/2013    Procedure: COMBINED ESOPHAGOSCOPY, GASTROSCOPY, DUODENOSCOPY (EGD);;  Surgeon: Best Willett MD;  Location:  GI     ESOPHAGOSCOPY, GASTROSCOPY, DUODENOSCOPY (EGD), COMBINED N/A 6/13/2018    Procedure: COMBINED ESOPHAGOSCOPY, GASTROSCOPY, DUODENOSCOPY (EGD), BIOPSY SINGLE OR MULTIPLE;  gastroscopy;  Surgeon: Westley Gibbs MD;  Location: [facility]     LAPAROSCOPIC GASTRIC SLEEVE  5/13/2013    Procedure: LAPAROSCOPIC GASTRIC SLEEVE;  Laparoscopic Sleeve Gastrectomy ;  Surgeon: Best Willett MD;  Location:  OR     LEEP TX, CERVICAL  1995    age 15     partial thyroidectomy  2018     SEPTOPLASTY       THYROIDECTOMY Left 4/3/2018    Procedure: THYROIDECTOMY;  Left Thyroid Lobectomy And Ishtmusectomy;  Surgeon: Delia Harper MD;  [facility]     TONSILLECTOMY  age 20s     TONSILLECTOMY  2004?     wisdom teeth extraction       PHQ-9 SCORE 12/17/2019 12/17/2019 6/19/2020   PHQ-9 Total Score - - -   PHQ-9 Total Score MyChart - 4 (Minimal depression) -   PHQ-9 Total Score 4 4 2     MAXIMO-7 SCORE 11/9/2017 9/15/2018 10/28/2019   Total Score - - -   Total Score - 3 (minimal anxiety) -   Total Score 12 3 3      WHODAS 2.0 Total Score 12/4/2018 1/31/2020   Total Score 18 15   Total Score MyChart 18 15     She is  5 foot 11 inches.  Her long-term overall goal is 175, obviously on hold until after the pregnancy.   She participates fully. Rapport is excellent.       This telehealth service is appropriate and effective for delivering services in light of the necessity for social distancing to mitigate the COVID-19 epidemic and for conservation of PPE.     Patient has agreed to receiving telehealth services after being informed about it: Yes    Patient prefers video invitation/information to be sent by:   email    Time service started: 9:01  Time service ended:  9:54  Extended session due to complexity of case and length of interval.    Mode of transmission: Doxy.me    Location of originating:  Home of the patient    Distance site:  Home office of provider for [facility]    The patient has been notified that:  Video visits will be conducted via a call with their psychologist to provide the care they need with a video conversation. Video visits may be billed at different rates depending on insurance coverage.  Patients are advised to please contact their insurance provider with any questions about their health insurance coverage. If during the course of a call the psychologist feels a video visit is not appropriate, patients will not be charged for this service.  Diagnosis:   Psychological factors affecting obesity (F54).   Major depression, recurrent, mild (F33.0).   Occupational Problems (Z56.9)    PLAN/RECOMMENDATIONS:   1. Ms. Catherine will return 8/26 @ 11:00 to address weight loss and social issues with problem solving therapy, which is medically necessary.   She wishes to continue to get support here with her life changes.  2.  Resume schedule of regular exercise to the level that is possible.      Last treatment plan signed: 11/13/2019  Treatment plan due:  11/13/2020

## 2020-07-28 ENCOUNTER — VIRTUAL VISIT (OUTPATIENT)
Dept: FAMILY MEDICINE | Facility: OTHER | Age: 40
End: 2020-07-28
Payer: COMMERCIAL

## 2020-07-28 PROCEDURE — 99421 OL DIG E/M SVC 5-10 MIN: CPT | Performed by: PHYSICIAN ASSISTANT

## 2020-07-28 NOTE — PROGRESS NOTES
"Date: 2020 12:28:50  Clinician: Jonathan Lopez  Clinician NPI: [id number]  Patient: Ashley Catherine  Patient : 1980  Patient Address: [facility] [place] [address]  Patient Phone: [phone]  Visit Protocol: Low back pain  Patient Summary:  Ashley is a 40 year old ( : 1980 ) female who initiated a Visit for evaluation of Low Back Pain. When asked the question \"Please sign me up to receive news, health information and promotions from [employer].\", Ashley responded \"No\".    Her back pain began suddenly today. The pain is present on both sides and is located in the low back area.   The pain varies depending on the activity and goes away when resting.   She is able to walk on her toes and is able to walk on her heels with her toes lifted off the ground.   Ashley is experiencing back muscle spasms.    Symptom Details   Pain: The pain is moderate (4-6 on a 10 point pain scale).    Ashley denies loss of bowel control, saddle anesthesia, numbness or tingling in the legs, dysuria, hematuria, feeling feverish, loss of bladder control, a rash in the same area as the back pain, abdominal pain, shooting pain, urinary frequency, vomiting, urinary retention, and leg weakness. Her pain does not decrease when bending forward.   Precipitating events  The back pain did not begin in response to an injury that happened at her work. Her back pain began after sudden bending or lifting.   Pertinent medical history  Ashley has had similar episodes of back pain in the past. She has not had unintended weight loss in the last three months, cancer, back surgery, and kidney stones.   She has seen a provider to treat this episode of low back pain. Additional information about recent treatment as reported by the patient (free text): Atleast 7 years ago. This always happened before I lost weight in . I used to go to my primary care physician Dr. Humphreys at [facility] or the ER at [facility]. Sudden low back muscle sprain from doing a regular activity. Usually prescribe cyclobenzaprine, prednisone pack and hydrocone (before opioid epidemic).   Treatments  Ashley tried using therapeutic remedies (such as cold pack, heat, chiropractic treatment, physical therapy) and over-the-counter medications to manage her low back pain.   Additional details about medications tried as reported by the patient (free text): Acetaminophen 1000 mg x 1, ibuprofen 600 mg x 1   Other approaches used to manage the back pain as reported by the patient (free text): heat pack, stretches   Ashley has not tried using prescription medications to manage her back pain.   Ashley does not need a return to work/school note.   She denies pregnancy and denies breastfeeding. She is currently menstruating.   Ashley does not smoke or use smokeless tobacco.   Additional information as reported by the patient (free text): hx: herniated discs. No back issues since gastric sleeve in 2013. Feels like I strained lower back muscles when bending over changing trash bag. Would like muscle relaxant.     MEDICATIONS: vitamin B12-folic acid oral, Saxenda subcutaneous, lisinopril-hydrochlorothiazide oral, Calcium Soft Chew oral, Complete Multivitamin-Multimineral oral, ALLERGIES: NKDA  Clinician Response:  Dear Ashley,  Based on the information you provided, you likely have Mechanical Low Back Pain.   Low back pain that is caused by placing abnormal stress and muscle or soft tissue strain (also known as mechanical low back pain) is the most common form of back pain. The majority of cases are not related with a specific disease or abnormal structure of the spine and do not require diagnostic imaging (such as an X-ray, MRI or CAT scan). Heating, cooling, back exercises and stretches should reduce your back pain within 3-4 weeks. During this time, remain active.   Medication information  I am prescribing:     Methocarbamol (Robaxin-750) 750 mg oral  tablet. Take 2 tablets by mouth every 6 hours for 2 days, then 1 tablet every 6 hours for 5 days. There are no refills with this prescription.   Unless you are allergic to the over-the-counter medication(s) below, I recommend using:     Lidocaine (Aspercreme) 4% patch. Apply the patch to the painful area. Patch may remain in place for up to 12 hours. No more than 1 patch should be used in a 24-hour period.   Over-the-counter medications do not require a prescription. Ask the pharmacist if you have any questions.  Self care  The following tips will help prevent and reduce back pain:     Apply heating pads on the sore area for a short duration (10 minutes per hour and as needed). A hot bath or a heating pad on your lower back may also help reduce pain and stiffness.    Maintaining a good posture reduces stress on the back muscles. To help reduce the stress on your back:    Stand and sit up straight.    Try not to slouch when standing or sitting.    Try not to stay in the same position for a long time.    Switch positions every 20 to 30 minutes if possible.    When lifting heavy objects, squat down and use your legs rather than bending at the waist.          Stress can make your back pain worse. For this reason, take time to relax and try some relaxation techniques when you feel stressed.    Click the following link for more information: Prevent back pain.     When to seek care  Please make an appointment to be seen in a clinic or urgent care if any of the following occur:     Pain that doesn't seem to be getting better after 3 to 4 weeks    You develop new symptoms or your symptoms becomes worse    A painful rash that appears as a stripe along one side of the body    Unexplained fever    Unbearable pain    Unrelenting night pain     Seek immediate medical care if you have problems with bowel or bladder control, worsening weakness or numbness in your legs, or numbness in the inner thighs, groin, or buttocks.    Diagnosis: Mechanical low back pain  Diagnosis ICD: M54.5  Prescription: methocarbamol (Robaxin-750) 750 mg oral tablet 36 tablet, 7 days supply. Take 2 tablets by mouth every 6 hours for 2 days, then 1 tablet every 6 hours for 5 days. Refills: 0, Refill as needed: no, Allow substitutions: yes  Pharmacy: [id number] - [phone] - [address] [place] [address]

## 2020-08-07 DIAGNOSIS — I10 BENIGN ESSENTIAL HYPERTENSION: ICD-10-CM

## 2020-08-07 DIAGNOSIS — Z98.84 S/P LAPAROSCOPIC SLEEVE GASTRECTOMY: ICD-10-CM

## 2020-08-07 DIAGNOSIS — Z98.84 GASTRIC BYPASS STATUS FOR OBESITY: ICD-10-CM

## 2020-08-07 LAB
ALBUMIN SERPL-MCNC: 3.2 G/DL (ref 3.4–5)
ALP SERPL-CCNC: 56 U/L (ref 40–150)
ALT SERPL W P-5'-P-CCNC: 19 U/L (ref 0–50)
ANION GAP SERPL CALCULATED.3IONS-SCNC: 8 MMOL/L (ref 3–14)
AST SERPL W P-5'-P-CCNC: 15 U/L (ref 0–45)
BASOPHILS # BLD AUTO: 0 10E9/L (ref 0–0.2)
BASOPHILS NFR BLD AUTO: 0.3 %
BILIRUB SERPL-MCNC: 0.3 MG/DL (ref 0.2–1.3)
BUN SERPL-MCNC: 11 MG/DL (ref 7–30)
CALCIUM SERPL-MCNC: 8.3 MG/DL (ref 8.5–10.1)
CHLORIDE SERPL-SCNC: 106 MMOL/L (ref 94–109)
CO2 SERPL-SCNC: 27 MMOL/L (ref 20–32)
CREAT SERPL-MCNC: 0.59 MG/DL (ref 0.52–1.04)
DIFFERENTIAL METHOD BLD: ABNORMAL
EOSINOPHIL # BLD AUTO: 0.1 10E9/L (ref 0–0.7)
EOSINOPHIL NFR BLD AUTO: 1.1 %
ERYTHROCYTE [DISTWIDTH] IN BLOOD BY AUTOMATED COUNT: 13 % (ref 10–15)
FERRITIN SERPL-MCNC: 39 NG/ML (ref 12–150)
GFR SERPL CREATININE-BSD FRML MDRD: >90 ML/MIN/{1.73_M2}
GLUCOSE SERPL-MCNC: 93 MG/DL (ref 70–99)
HCT VFR BLD AUTO: 35.6 % (ref 35–47)
HGB BLD-MCNC: 11.6 G/DL (ref 11.7–15.7)
LYMPHOCYTES # BLD AUTO: 1.8 10E9/L (ref 0.8–5.3)
LYMPHOCYTES NFR BLD AUTO: 28.5 %
MCH RBC QN AUTO: 30.8 PG (ref 26.5–33)
MCHC RBC AUTO-ENTMCNC: 32.6 G/DL (ref 31.5–36.5)
MCV RBC AUTO: 94 FL (ref 78–100)
MONOCYTES # BLD AUTO: 0.4 10E9/L (ref 0–1.3)
MONOCYTES NFR BLD AUTO: 6.6 %
NEUTROPHILS # BLD AUTO: 4.1 10E9/L (ref 1.6–8.3)
NEUTROPHILS NFR BLD AUTO: 63.5 %
PLATELET # BLD AUTO: 380 10E9/L (ref 150–450)
POTASSIUM SERPL-SCNC: 3.4 MMOL/L (ref 3.4–5.3)
PROT SERPL-MCNC: 6.9 G/DL (ref 6.8–8.8)
PTH-INTACT SERPL-MCNC: 104 PG/ML (ref 18–80)
RBC # BLD AUTO: 3.77 10E12/L (ref 3.8–5.2)
SODIUM SERPL-SCNC: 141 MMOL/L (ref 133–144)
VIT B12 SERPL-MCNC: 926 PG/ML (ref 193–986)
WBC # BLD AUTO: 6.4 10E9/L (ref 4–11)

## 2020-08-07 PROCEDURE — 80053 COMPREHEN METABOLIC PANEL: CPT | Performed by: PHYSICIAN ASSISTANT

## 2020-08-07 PROCEDURE — 82728 ASSAY OF FERRITIN: CPT | Performed by: PHYSICIAN ASSISTANT

## 2020-08-07 PROCEDURE — 36415 COLL VENOUS BLD VENIPUNCTURE: CPT | Performed by: PHYSICIAN ASSISTANT

## 2020-08-07 PROCEDURE — 82607 VITAMIN B-12: CPT | Performed by: PHYSICIAN ASSISTANT

## 2020-08-07 PROCEDURE — 83970 ASSAY OF PARATHORMONE: CPT | Performed by: PHYSICIAN ASSISTANT

## 2020-08-07 PROCEDURE — 85025 COMPLETE CBC W/AUTO DIFF WBC: CPT | Performed by: PHYSICIAN ASSISTANT

## 2020-08-07 PROCEDURE — 82306 VITAMIN D 25 HYDROXY: CPT | Performed by: PHYSICIAN ASSISTANT

## 2020-08-10 LAB — DEPRECATED CALCIDIOL+CALCIFEROL SERPL-MC: 37 UG/L (ref 20–75)

## 2020-08-10 NOTE — RESULT ENCOUNTER NOTE
Dear Ashley    Your test results are attached, feel free to contact me via [employer]     Overall labs look pretty stable.  Your iron is a bit low, would recommend ferrous sulfate 325 mg every other day if you are not already doing so.    Baudilio Bhat PA-C

## 2020-08-12 ENCOUNTER — MYC MEDICAL ADVICE (OUTPATIENT)
Dept: FAMILY MEDICINE | Facility: CLINIC | Age: 40
End: 2020-08-12

## 2020-08-12 DIAGNOSIS — Z98.84 GASTRIC BYPASS STATUS FOR OBESITY: ICD-10-CM

## 2020-08-12 DIAGNOSIS — M54.40 CHRONIC BILATERAL LOW BACK PAIN WITH SCIATICA, SCIATICA LATERALITY UNSPECIFIED: Primary | ICD-10-CM

## 2020-08-12 DIAGNOSIS — R89.6 ABNORMAL CYTOLOGY: ICD-10-CM

## 2020-08-12 DIAGNOSIS — G89.29 CHRONIC BILATERAL LOW BACK PAIN WITH SCIATICA, SCIATICA LATERALITY UNSPECIFIED: Primary | ICD-10-CM

## 2020-08-12 NOTE — TELEPHONE ENCOUNTER
SN  Please see [email or web address] message  It looks like surgeon's office ordered the parathyroid test  Have her contact them?  Thank you,  Ena Winslow RN

## 2020-08-13 DIAGNOSIS — Z98.84 BARIATRIC SURGERY STATUS: Primary | ICD-10-CM

## 2020-08-13 DIAGNOSIS — E66.811 OBESITY, CLASS I, BMI 30-34.9: ICD-10-CM

## 2020-08-13 RX ORDER — PHENTERMINE HYDROCHLORIDE 15 MG/1
15 CAPSULE ORAL EVERY MORNING
Qty: 30 CAPSULE | Refills: 3 | Status: SHIPPED | OUTPATIENT
Start: 2020-08-13 | End: 2020-10-22

## 2020-08-13 NOTE — TELEPHONE ENCOUNTER
Per [place] PA-C, patient will continue taking Saxenda and start Phentermine 15mg. Patient agrees with this plan. Routing to provider for sign off.

## 2020-08-17 NOTE — TELEPHONE ENCOUNTER
I called to review this - her specialist advised increased calcium and vitamin D    I agree and advised boosting ferritin and protein and  rechecking TSH as well as parathyroid in a few months    SN

## 2020-08-26 ENCOUNTER — VIRTUAL VISIT (OUTPATIENT)
Dept: PSYCHOLOGY | Facility: CLINIC | Age: 40
End: 2020-08-26
Payer: COMMERCIAL

## 2020-08-26 DIAGNOSIS — F54 PSYCHOLOGICAL FACTORS AFFECTING MORBID OBESITY (H): ICD-10-CM

## 2020-08-26 DIAGNOSIS — Z56.9 OCCUPATIONAL PROBLEM: ICD-10-CM

## 2020-08-26 DIAGNOSIS — E66.01 PSYCHOLOGICAL FACTORS AFFECTING MORBID OBESITY (H): ICD-10-CM

## 2020-08-26 DIAGNOSIS — F33.0 MAJOR DEPRESSIVE DISORDER, RECURRENT EPISODE, MILD (H): Primary | ICD-10-CM

## 2020-08-26 SDOH — ECONOMIC STABILITY - INCOME SECURITY: UNSPECIFIED PROBLEMS RELATED TO EMPLOYMENT: Z56.9
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
## 2020-09-25 ENCOUNTER — VIRTUAL VISIT (OUTPATIENT)
Dept: PSYCHOLOGY | Facility: CLINIC | Age: 40
End: 2020-09-25
Payer: COMMERCIAL

## 2020-09-25 DIAGNOSIS — F33.0 MAJOR DEPRESSIVE DISORDER, RECURRENT EPISODE, MILD (H): Primary | ICD-10-CM

## 2020-09-25 DIAGNOSIS — E66.01 PSYCHOLOGICAL FACTORS AFFECTING MORBID OBESITY (H): ICD-10-CM

## 2020-09-25 DIAGNOSIS — F54 PSYCHOLOGICAL FACTORS AFFECTING MORBID OBESITY (H): ICD-10-CM

## 2020-09-25 DIAGNOSIS — Z56.9 OCCUPATIONAL PROBLEM: ICD-10-CM

## 2020-09-25 SDOH — ECONOMIC STABILITY - INCOME SECURITY: UNSPECIFIED PROBLEMS RELATED TO EMPLOYMENT: Z56.9

## 2020-09-25 NOTE — PROGRESS NOTES
Health Psychology                     Department of Medicine  Julianne Moreno, Ph.D., L.P. [phone]                          [facility] Lis Chavez, Ph.D., L.P. [phone]                   [place] Mail Code 748   Dang Ifeanyi, Ph.D.  [phone]       [address] [place] [address] Earline Anderson, Ph.D.,  L.P. [phone]        [place] [address]           Donald Hills, Ph.D. [phone]      Susana Ugarte, Ph.D., L.P. [phone]    [facility]   Jayro Elias, Ph.D., A.B.P.P., L.P. [phone]  [address] [place] [address] Melissa Stokes, Ph.D., L.P. [phone]     Health Psychology Follow-Up Telemental Health Note    Ashley Catherine is a 40-year-old single, Black woman referred initially for psychological consultation for workup for a gastric sleeve procedure who is seen for CBT-oriented therapy regardingt weight management, work stresses, and family and social matters. She is seen for problem-solving and supportive counseling.  Intake was 4/1/13.    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    She feels good to be working, but is fairly constantly exhausted by also helping with care of her parents and her son.   Rivera is > a year old, with feeding problems.  He has food aversion.  She sought consultation x1 so far at [facility], will be seen again, but this time at [place] because she got frustrated with billing/coding issues, and anticipates there will be a team approach to addressing his aversion.   He otherwise is doing well.  Her also has tactile sensitivities, handles change poorly (e.g, different color nipple for bottle).  She wonders if her may be o the spectrum in that the biological father was.    She feels more aware of sadness around her patients, and has had to limit her social contacts.  She laments not being betzaida to resume her pre-COVID and pre-[place] activities, such as travel. We discussed trying to figure out what to do next  year to take better care of herself.  She is venturing to an apple orchard tomorrow, which has been out of her COVID comfort zone so far.      She participates fully. Rapport was excellent.    She was not  weighed today as it is a virtual session and we did not address it.      Wt Readings from Last 4 Encounters:   07/02/20 99.3 kg (219 lb)   01/31/20 104 kg (229 lb 3.2 oz)   12/17/19 106.2 kg (234 lb 3.2 oz)   10/28/19 104.3 kg (230 lb)     There is no height or weight on file to calculate BMI.     Current Outpatient Medications   Medication     acetaminophen (TYLENOL) 32 mg/mL liquid     Alcohol Swabs XX PADS     Cholecalciferol (VITAMIN D3) 25 MCG (1000 UT) CAPS     cyanocobalamin (VITAMIN B-12) 500 MCG SUBL sublingual tablet     hydrochlorothiazide (HYDRODIURIL) 25 MG tablet     insulin pen needle (31G X 5 MM) 31G X 5 MM miscellaneous     liraglutide - Weight Management (SAXENDA) 18 MG/3ML pen     lisinopril (ZESTRIL) 20 MG tablet     Multiple Vitamins-Minerals (ONE DAILY CALCIUM/IRON PO)     naltrexone (DEPADE/REVIA) 50 MG tablet     phentermine (ADIPEX-P) 15 MG capsule     polyethylene glycol (MIRALAX) powder     No current facility-administered medications for this visit.      Past Medical History:   Diagnosis Date     Bariatric surgery status 05/13/2013     Depression      Depressive disorder      Esophageal reflux      Family history of hyperparathyroidism 3/6/2015     Forearm fracture childhood    jumping fall     Guillain-Cement disease (H) age 14    hospitalized at [facility] x 1 week     History of steroid therapy      HX ABNL PAP SMEAR OF CERVIX   1995    LEEP AT 15 yo     Hypertension 2004     Hypertrophy of breast      Hypertrophy of tonsils alone      Hypovitaminosis D     with secondary high PTH     Irregular heart beat     palpitations     LBP (low back pain)      LSIL (low grade squamous intraepithelial lesion) on Pap smear 5/2006     Lumbago     RESOLVED     Major depressive disorder, recurrent episode,  in partial or unspecified remission 4/1/2013     Morbid obesity (H)     bariatric surgery 5/13/2013     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)      OLIGOMENORRHEA, C/W PCOS      Sciatica      SLEEP APNEA 6/2002    No longer has since tonsillectomy and septoplasty     Past Surgical History:   Procedure Laterality Date     BIOPSY  03/13/2015    Thyroid nodule     ESOPHAGOSCOPY, GASTROSCOPY, DUODENOSCOPY (EGD), COMBINED  5/28/2013    Procedure: COMBINED ESOPHAGOSCOPY, GASTROSCOPY, DUODENOSCOPY (EGD);;  Surgeon: Best Willett MD;  Location:  GI     ESOPHAGOSCOPY, GASTROSCOPY, DUODENOSCOPY (EGD), COMBINED N/A 6/13/2018    Procedure: COMBINED ESOPHAGOSCOPY, GASTROSCOPY, DUODENOSCOPY (EGD), BIOPSY SINGLE OR MULTIPLE;  gastroscopy;  Surgeon: Westley Gibbs MD;  Location:  GI     LAPAROSCOPIC GASTRIC SLEEVE  5/13/2013    Procedure: LAPAROSCOPIC GASTRIC SLEEVE;  Laparoscopic Sleeve Gastrectomy ;  Surgeon: Best Willett MD;  Location:  OR     LEEP TX, CERVICAL  1995    age 15     partial thyroidectomy  2018     SEPTOPLASTY       THYROIDECTOMY Left 4/3/2018    Procedure: THYROIDECTOMY;  Left Thyroid Lobectomy And Ishtmusectomy;  Surgeon: Delia Harper MD;  [facility]     TONSILLECTOMY  age 20s     TONSILLECTOMY  2004?     wisdom teeth extraction       PHQ-9 SCORE 12/17/2019 12/17/2019 6/19/2020   PHQ-9 Total Score - - -   PHQ-9 Total Score MyChart - 4 (Minimal depression) -   PHQ-9 Total Score 4 4 2     MAXIMO-7 SCORE 11/9/2017 9/15/2018 10/28/2019   Total Score - - -   Total Score - 3 (minimal anxiety) -   Total Score 12 3 3     WHODAS 2.0 Total Score 12/4/2018 1/31/2020   Total Score 18 15   Total Score MyChart 18 15     She is  5 foot 11 inches.  Her long-term overall goal is 175, obviously on hold until after the pregnancy.   She participates fully. Rapport is excellent.       This telehealth service is appropriate and effective for delivering services in light of the  necessity for social distancing to mitigate the COVID-19 epidemic and for conservation of PPE.     Patient has agreed to receiving telehealth services after being informed about it: Yes    Patient prefers video invitation/information to be sent by:   email    Time service started: 11:00  Time service ended:  11:54  Extended session due to complexity of case and length of interval.    Mode of transmission: [email or web address].me    Location of originating:  Home of the patient    Distance site:  Home office of provider for [facility]    The patient has been notified that:  Video visits will be conducted via a call with their psychologist to provide the care they need with a video conversation. Video visits may be billed at different rates depending on insurance coverage.  Patients are advised to please contact their insurance provider with any questions about their health insurance coverage. If during the course of a call the psychologist feels a video visit is not appropriate, patients will not be charged for this service.  Diagnosis:   Psychological factors affecting obesity (F54).   Major depression, recurrent, mild (F33.0).   Occupational Problems (Z56.9)    PLAN/RECOMMENDATIONS:   1. Ms. Catherine will [id number] @ 10:00 to address weight loss and social issues with problem solving therapy, which is medically necessary.   She wishes to continue to get support here with her life changes and her son's behavioral/developmental challenges.  2.  Resume schedule of regular exercise to the level that is possible.      Last treatment plan signed: 11/13/2019  Treatment plan due:  11/13/2020

## 2020-10-07 ENCOUNTER — TELEPHONE (OUTPATIENT)
Dept: FAMILY MEDICINE | Facility: CLINIC | Age: 40
End: 2020-10-07

## 2020-10-07 DIAGNOSIS — N64.4 BREAST PAIN, LEFT: Primary | ICD-10-CM

## 2020-10-07 NOTE — TELEPHONE ENCOUNTER
SN,   Patient requesting order for diagnostic mammogram           Having left breast pain  Denies discharge, lumps, etc  Just having pain like back in 2017 she said  Only left breast affected  Please advise on order  Thanks,  Laureen SOMERS RN

## 2020-10-07 NOTE — TELEPHONE ENCOUNTER
Reason for Call: Request for an order or referral:    Order or referral being requested: diagnostic mammo    Date needed: as soon as possible    Has the patient been seen by the PCP for this problem? YES    Additional comments: pt needs this due to pain in L breast    Phone number Patient can be reached at:  Home number on file [phone] (home)    Best Time:  any    Can we leave a detailed message on this number?  YES    Call taken on 10/7/2020 at 3:56 PM by Jamie Chicas

## 2020-10-22 ENCOUNTER — OFFICE VISIT (OUTPATIENT)
Dept: FAMILY MEDICINE | Facility: CLINIC | Age: 40
End: 2020-10-22
Payer: COMMERCIAL

## 2020-10-22 VITALS
HEART RATE: 91 BPM | WEIGHT: 215.8 LBS | BODY MASS INDEX: 30.9 KG/M2 | HEIGHT: 70 IN | TEMPERATURE: 96.8 F | DIASTOLIC BLOOD PRESSURE: 75 MMHG | OXYGEN SATURATION: 96 % | SYSTOLIC BLOOD PRESSURE: 116 MMHG

## 2020-10-22 DIAGNOSIS — M62.830 BACK MUSCLE SPASM: Primary | ICD-10-CM

## 2020-10-22 DIAGNOSIS — I10 BENIGN ESSENTIAL HYPERTENSION: ICD-10-CM

## 2020-10-22 DIAGNOSIS — R89.6 ABNORMAL CYTOLOGY: ICD-10-CM

## 2020-10-22 DIAGNOSIS — Z98.84 GASTRIC BYPASS STATUS FOR OBESITY: ICD-10-CM

## 2020-10-22 LAB — PTH-INTACT SERPL-MCNC: 46 PG/ML (ref 18–80)

## 2020-10-22 PROCEDURE — 99396 PREV VISIT EST AGE 40-64: CPT | Performed by: FAMILY MEDICINE

## 2020-10-22 PROCEDURE — 83970 ASSAY OF PARATHORMONE: CPT | Performed by: FAMILY MEDICINE

## 2020-10-22 PROCEDURE — 82040 ASSAY OF SERUM ALBUMIN: CPT | Performed by: FAMILY MEDICINE

## 2020-10-22 PROCEDURE — 82310 ASSAY OF CALCIUM: CPT | Performed by: FAMILY MEDICINE

## 2020-10-22 PROCEDURE — 36415 COLL VENOUS BLD VENIPUNCTURE: CPT | Performed by: FAMILY MEDICINE

## 2020-10-22 PROCEDURE — 84443 ASSAY THYROID STIM HORMONE: CPT | Performed by: FAMILY MEDICINE

## 2020-10-22 RX ORDER — HYDROCHLOROTHIAZIDE 25 MG/1
25 TABLET ORAL DAILY
Qty: 90 TABLET | Refills: 3 | Status: SHIPPED | OUTPATIENT
Start: 2020-10-22 | End: 2021-11-29

## 2020-10-22 RX ORDER — CYCLOBENZAPRINE HCL 10 MG
10 TABLET ORAL DAILY PRN
Qty: 45 TABLET | Refills: 4 | Status: SHIPPED | OUTPATIENT
Start: 2020-10-22

## 2020-10-22 RX ORDER — LISINOPRIL 20 MG/1
20 TABLET ORAL DAILY
Qty: 90 TABLET | Refills: 3 | Status: SHIPPED | OUTPATIENT
Start: 2020-10-22 | End: 2021-11-29

## 2020-10-22 SDOH — HEALTH STABILITY: MENTAL HEALTH: HOW OFTEN DO YOU HAVE 6 OR MORE DRINKS ON ONE OCCASION?: NOT ASKED

## 2020-10-22 ASSESSMENT — MIFFLIN-ST. JEOR: SCORE: 1721.17

## 2020-10-22 ASSESSMENT — PATIENT HEALTH QUESTIONNAIRE - PHQ9: SUM OF ALL RESPONSES TO PHQ QUESTIONS 1-9: 5

## 2020-10-22 NOTE — PROGRESS NOTES
SUBJECTIVE:   CC: Ashley Catherine is an 40 year old woman who presents for preventive health visit.       Patient has been advised of split billing requirements and indicates understanding: Yes  Healthy Habits:     Getting at least 3 servings of Calcium per day:  NO    Bi-annual eye exam:  NO    Dental care twice a year:  Yes    Sleep apnea or symptoms of sleep apnea:  None    Diet:  Regular (no restrictions)    Frequency of exercise:  None    Taking medications regularly:  Yes    PHQ-2 Total Score: 2    Additional concerns today:  No    Well adult cares: She is up-to-date on routine screenings.  Did get a flu vaccine already this year.    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  We discussed yoga and Pilates and stretching but sometimes Flexeril has been helpful as well she finds that she uses this up to 3 times a week  Plan: cyclobenzaprine (FLEXERIL) 10 MG tablet             (I10) Benign essential hypertension  Comment: Excellent blood pressure refilled medications today she has had recent labs for renal function  Plan: hydrochlorothiazide (HYDRODIURIL) 25 MG tablet,        lisinopril (ZESTRIL) 20 MG tablet              (Z98.84) Gastric bypass status for obesity  Comment: Up-to-date with labs some of these are being released today and will discuss results once they are up  Plan: Continues to work with weight management clinic  Having lots of side effects with topiramate.  Dark thoughts feeling really down.  All of this correlated with starting topiramate alone.  She stopped this as a result and will see her weight management clinic for follow-up       Today's PHQ-2 Score:   PHQ-2 ( 1999 Pfizer) 8/21/2019   Q1: Little interest or pleasure in doing things 0   Q2: Feeling down, depressed or hopeless 1   PHQ-2 Score 1   Q1: Little interest or pleasure in doing things -   Q2: Feeling down, depressed or  hopeless -   PHQ-2 Score -       Abuse: Current or Past (Physical, Sexual or Emotional) - No  Do you feel safe in your environment? Yes        Social History     Tobacco Use     Smoking status: Never Smoker     Smokeless tobacco: Never Used   Substance Use Topics     Alcohol use: Yes     Alcohol/week: 1.0 - 4.0 standard drinks     Types: 1 - 4 Shots of liquor per week     Frequency: 4 or more times a week     Drinks per session: 3 or 4     Comment: 7 drinks/week     If you drink alcohol do you typically have >3 drinks per day or >7 drinks per week? No    Alcohol Use 10/4/2018   Prescreen: >3 drinks/day or >7 drinks/week? Yes   Prescreen: >3 drinks/day or >7 drinks/week? -   AUDIT SCORE  10       Reviewed orders with patient.  Reviewed health maintenance and updated orders accordingly - Yes  Lab work is in process  Labs reviewed in EPIC  BP Readings from Last 3 Encounters:   10/22/20 116/75   12/17/19 131/74   10/28/19 125/78    Wt Readings from Last 3 Encounters:   10/22/20 97.9 kg (215 lb 12.8 oz)   07/02/20 99.3 kg (219 lb)   01/31/20 104 kg (229 lb 3.2 oz)                  Patient Active Problem List   Diagnosis     OLIGOMENORRHEA, C/W PCOS     HX ABNL PAP SMEAR OF CERVIX       Flat feet     Sciatica     S/P gastrectomy     Occupational problem     Elevated parathyroid hormone     Multiple thyroid nodules     Abnormal thyroid cytology-follicular neoplasm     Chronic pain syndrome     Major depressive disorder, recurrent episode, in full remission (H)     Bilateral low back pain with sciatica, sciatica laterality unspecified     Throat symptom     Psychological factor affecting physical condition     Hx of Guillain-Roscoe syndrome     Adjustment disorder with anxious mood     Thyroid nodule     Benign essential hypertension     Gastric bypass status for obesity     Pregnancy examination or test, positive result     Travel advice encounter     Labor and delivery, indication for care     Vaginal delivery      Pre-eclampsia     Past Surgical History:   Procedure Laterality Date     BIOPSY  03/13/2015    Thyroid nodule     ESOPHAGOSCOPY, GASTROSCOPY, DUODENOSCOPY (EGD), COMBINED  5/28/2013    Procedure: COMBINED ESOPHAGOSCOPY, GASTROSCOPY, DUODENOSCOPY (EGD);;  Surgeon: Best Willett MD;  Location:  GI     ESOPHAGOSCOPY, GASTROSCOPY, DUODENOSCOPY (EGD), COMBINED N/A 6/13/2018    Procedure: COMBINED ESOPHAGOSCOPY, GASTROSCOPY, DUODENOSCOPY (EGD), BIOPSY SINGLE OR MULTIPLE;  gastroscopy;  Surgeon: Westley Gibbs MD;  Location: [facility]     LAPAROSCOPIC GASTRIC SLEEVE  5/13/2013    Procedure: LAPAROSCOPIC GASTRIC SLEEVE;  Laparoscopic Sleeve Gastrectomy ;  Surgeon: Best Willett MD;  Location:  OR     LEEP TX, CERVICAL  1995    age 15     partial thyroidectomy  2018     SEPTOPLASTY       THYROIDECTOMY Left 4/3/2018    Procedure: THYROIDECTOMY;  Left Thyroid Lobectomy And Ishtmusectomy;  Surgeon: Delia Harper MD;  Location:  OR     TONSILLECTOMY  age 20s     TONSILLECTOMY  2004?     wisdom teeth extraction         Social History     Tobacco Use     Smoking status: Never Smoker     Smokeless tobacco: Never Used   Substance Use Topics     Alcohol use: Yes     Alcohol/week: 1.0 - 4.0 standard drinks     Types: 1 - 4 Shots of liquor per week     Frequency: 4 or more times a week     Drinks per session: 3 or 4     Comment: 6 drinks/week     Family History   Problem Relation Age of Onset     Hypertension Sister      Hypertension Father      Allergies Father         seasonal     Lipids Father      Obesity Father      Arthritis Mother      Gynecology Mother         problems with menopause     Hypertension Mother      Other Cancer Mother         Endometrial     Osteoporosis Mother      Obesity Mother      Parathyroid Disorders Mother         3 operations     Obesity Sister      Diabetes Maternal Aunt         x several w. DM     Breast Cancer Maternal Aunt      Cancer - colorectal Maternal Uncle          60ish     Hypertension Sister      Obesity Sister      Nephrolithiasis No family hx of            Mammogram not appropriate for this patient based on age.    Pertinent mammograms are reviewed under the imaging tab.  History of abnormal Pap smear: NO - age 30-65 PAP every 5 years with negative HPV co-testing recommended  PAP / HPV Latest Ref Rng & Units 8/12/2016 10/18/2013 10/12/2012   PAP - NIL NIL NIL   HPV 16 DNA NEG Negative - -   HPV 18 DNA NEG Negative - -   OTHER HR HPV NEG Negative - -     Reviewed and updated as needed this visit by clinical staff                 Reviewed and updated as needed this visit by Provider                Past Medical History:   Diagnosis Date     Bariatric surgery status 05/13/2013     Depression      Depressive disorder      Esophageal reflux      Family history of hyperparathyroidism 3/6/2015     Forearm fracture childhood    jumping fall     Guillain-Day[place] disease (H) age 14    hospitalized at [facility] x 1 week     History of steroid therapy      HX ABNL PAP SMEAR OF CERVIX   1995    LEEP AT 15 yo     Hypertension 2004     Hypertrophy of breast      Hypertrophy of tonsils alone      Hypovitaminosis D     with secondary high PTH     Irregular heart beat     palpitations     LBP (low back pain)      LSIL (low grade squamous intraepithelial lesion) on Pap smear 5/2006     Lumbago     RESOLVED     Major depressive disorder, recurrent episode, in partial or unspecified remission 4/1/2013     Morbid obesity (H)     bariatric surgery 5/13/2013     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)      OLIGOMENORRHEA, C/W PCOS      Sciatica      SLEEP APNEA 6/2002    No longer has since tonsillectomy and septoplasty      Past Surgical History:   Procedure Laterality Date     BIOPSY  03/13/2015    Thyroid nodule     ESOPHAGOSCOPY, GASTROSCOPY, DUODENOSCOPY (EGD), COMBINED  5/28/2013    Procedure: COMBINED ESOPHAGOSCOPY, GASTROSCOPY, DUODENOSCOPY (EGD);;  Surgeon: Best Willett,  "MD;  Location:  GI     ESOPHAGOSCOPY, GASTROSCOPY, DUODENOSCOPY (EGD), COMBINED N/A 6/13/2018    Procedure: COMBINED ESOPHAGOSCOPY, GASTROSCOPY, DUODENOSCOPY (EGD), BIOPSY SINGLE OR MULTIPLE;  gastroscopy;  Surgeon: Westley Gibbs MD;  Location: [facility]     LAPAROSCOPIC GASTRIC SLEEVE  5/13/2013    Procedure: LAPAROSCOPIC GASTRIC SLEEVE;  Laparoscopic Sleeve Gastrectomy ;  Surgeon: Best Willett MD;  [facility]     LEEP TX, CERVICAL  1995    age 15     partial thyroidectomy  2018     SEPTOPLASTY       THYROIDECTOMY Left 4/3/2018    Procedure: THYROIDECTOMY;  Left Thyroid Lobectomy And Ishtmusectomy;  Surgeon: Delia Harper MD;  [facility]     TONSILLECTOMY  age 20s     TONSILLECTOMY  2004?     wisdom teeth extraction         Review of Systems  CONSTITUTIONAL: NEGATIVE for fever, chills, change in weight  INTEGUMENTARU/SKIN: NEGATIVE for worrisome rashes, moles or lesions  EYES: NEGATIVE for vision changes or irritation  ENT: NEGATIVE for ear, mouth and throat problems  RESP: NEGATIVE for significant cough or SOB  BREAST: NEGATIVE for masses, tenderness or discharge  CV: NEGATIVE for chest pain, palpitations or peripheral edema  GI: NEGATIVE for nausea, abdominal pain, heartburn, or change in bowel habits  : NEGATIVE for unusual urinary or vaginal symptoms. Periods are regular.  MUSCULOSKELETAL: NEGATIVE for significant arthralgias or myalgia  NEURO: NEGATIVE for weakness, dizziness or paresthesias  PSYCHIATRIC: NEGATIVE for changes in mood or affect     OBJECTIVE:   /75 (BP Location: Right arm, Cuff Size: Adult Large)   Pulse 91   Temp 96.8  F (36  C) (Tympanic)   Ht 1.765 m (5' 9.5\")   Wt 97.9 kg (215 lb 12.8 oz)   LMP 10/22/2020 (Exact Date)   SpO2 96%   Breastfeeding No   BMI 31.41 kg/m    Physical Exam  GENERAL: healthy, alert and no distress  EYES: Eyes grossly normal to inspection, PERRL and conjunctivae and sclerae normal  HENT: ear canals and TM's normal, nose " "and mouth without ulcers or lesions  NECK: no adenopathy, no asymmetry, masses, or scars and thyroid normal to palpation  RESP: lungs clear to auscultation - no rales, rhonchi or wheezes  BREAST: normal without masses, tenderness or nipple discharge and no palpable axillary masses or adenopathy  CV: regular rate and rhythm, normal S1 S2, no S3 or S4, no murmur, click or rub, no peripheral edema and peripheral pulses strong  ABDOMEN: soft, nontender, no hepatosplenomegaly, no masses and bowel sounds normal  MS: no gross musculoskeletal defects noted, no edema  SKIN: no suspicious lesions or rashes  NEURO: Normal strength and tone, mentation intact and speech normal  PSYCH: mentation appears normal, affect normal/bright    Diagnostic Test Results:  Labs reviewed in Epic    ASSESSMENT/PLAN:   1.  Well adult cares: Up-to-date  Is opting to do a baseline mammogram we will set this up.   Benign essential hypertension  Refills  - hydrochlorothiazide (HYDRODIURIL) 25 MG tablet; Take 1 tablet (25 mg) by mouth daily  Dispense: 90 tablet; Refill: 3  - lisinopril (ZESTRIL) 20 MG tablet; Take 1 tablet (20 mg) by mouth daily  Dispense: 90 tablet; Refill: 3    2. Back muscle spasm  Refills  - cyclobenzaprine (FLEXERIL) 10 MG tablet; Take 1 tablet (10 mg) by mouth daily as needed for muscle spasms  Dispense: 45 tablet; Refill: 4    3. Abnormal thyroid cytology-follicular neoplasm  Labs  - Parathyroid Hormone Intact  - TSH with free T4 reflex  - Calcium  - Albumin level    4. Gastric bypass status for obesity  Labs  - Calcium  - Albumin level    Patient has been advised of split billing requirements and indicates understanding: Yes  COUNSELING:  Reviewed preventive health counseling, as reflected in patient instructions       Regular exercise       Healthy diet/nutrition    Estimated body mass index is 31.42 kg/m  as calculated from the following:    Height as of 7/2/20: 1.778 m (5' 10\").    Weight as of 7/2/20: 99.3 kg (219 " lb).    Weight management plan: Patient referred to endocrine and/or weight management specialty    She reports that she has never smoked. She has never used smokeless tobacco.      Counseling Resources:  ATP IV Guidelines  Pooled Cohorts Equation Calculator  Breast Cancer Risk Calculator  BRCA-Related Cancer Risk Assessment: FHS-7 Tool  FRAX Risk Assessment  ICSI Preventive Guidelines  Dietary Guidelines for Americans, 2010  USDA's MyPlate  ASA Prophylaxis  Lung CA Screening    Violet Humphreys MD  [facility]

## 2020-10-23 LAB
ALBUMIN SERPL-MCNC: 3.7 G/DL (ref 3.4–5)
CALCIUM SERPL-MCNC: 9.6 MG/DL (ref 8.5–10.1)
TSH SERPL DL<=0.005 MIU/L-ACNC: 0.96 MU/L (ref 0.4–4)

## 2020-10-26 ENCOUNTER — VIRTUAL VISIT (OUTPATIENT)
Dept: PSYCHOLOGY | Facility: CLINIC | Age: 40
End: 2020-10-26
Payer: COMMERCIAL

## 2020-10-26 DIAGNOSIS — Z56.9 OCCUPATIONAL PROBLEM: ICD-10-CM

## 2020-10-26 DIAGNOSIS — F33.0 MAJOR DEPRESSIVE DISORDER, RECURRENT EPISODE, MILD (H): Primary | ICD-10-CM

## 2020-10-26 DIAGNOSIS — F54 PSYCHOLOGICAL FACTORS AFFECTING MORBID OBESITY (H): ICD-10-CM

## 2020-10-26 DIAGNOSIS — E66.01 PSYCHOLOGICAL FACTORS AFFECTING MORBID OBESITY (H): ICD-10-CM

## 2020-10-26 PROCEDURE — 90837 PSYTX W PT 60 MINUTES: CPT | Mod: GT | Performed by: PSYCHOLOGIST

## 2020-10-26 SDOH — ECONOMIC STABILITY - INCOME SECURITY: UNSPECIFIED PROBLEMS RELATED TO EMPLOYMENT: Z56.9

## 2020-10-26 NOTE — PROGRESS NOTES
Health Psychology                     Department of Medicine  Julianne Moreno, Ph.D., L.P. [phone]                          [facility] Lis Chavez, Ph.D., L.P. [phone]                   [place] [address]   Dang Ifeanyi, Ph.D.  [phone]       [address] [place] [address] Earline Anderson, Ph.D.,  L.P. [phone]        [place] [address]           Donald Hills, Ph.D. [phone]      Susana Ugarte, Ph.D., L.P. [phone]    [facility]   Jayro Elias, Ph.D., A.B.P.P., L.P. [phone]  [address] [place] [address] Melissa Stokes, Ph.D., L.P. [phone]     Health Psychology Follow-Up Telemental Health Note    Ashley Catherine is a 40-year-old single, Black woman referred initially for psychological consultation for workup for a gastric sleeve procedure who is seen for CBT-oriented therapy regardingt weight management, work stresses, and family and social matters. She is seen for problem-solving and supportive counseling.  Intake was 4/1/13.    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    She feels good to be working, but is fairly constantly exhausted by it.  She wonders about job switches (e.g., to the cancer center), but isnt finding anyhting and isn't really sure what she would want.  She is nervous about the pandemic surging.    She also has responsibilities helping with care of her parents and her son.   Rivera is > a year old, with feeding problems. He started therapy at [facility] near [facility], and she is optimistic it will help.  He has food aversion.  He otherwise is doing well.  Her father was hospitalized x2 days with weeping leg wounds.    She realized she can't take Phentermine as it causes her to feel depressed.    She would like to lose weight.  She is aware of drinking lattes 3 days/week with> 300 calories  We discussed cutting back to 1/week. We explored her eating and didn't identify other changes.  She is not  exercising (6k steps when not working), but gets in 1800 steps when working.  We discussed setting up her treadmill at home or gettng other apparatus..    She feels more aware of sadness around her patients, and has had to limit her social contacts.  She laments not being betzaida to resume her pre-COVID and pre-Ashkum activities, such as travel.   She participates fully. Rapport was excellent.    She was not  weighed today as it is a virtual session and we did not address it.      Wt Readings from Last 4 Encounters:   10/22/20 97.9 kg (215 lb 12.8 oz)   07/02/20 99.3 kg (219 lb)   01/31/20 104 kg (229 lb 3.2 oz)   12/17/19 106.2 kg (234 lb 3.2 oz)     There is no height or weight on file to calculate BMI.     Current Outpatient Medications   Medication     acetaminophen (TYLENOL) 32 mg/mL liquid     Alcohol Swabs XX PADS     Cholecalciferol (VITAMIN D3) 25 MCG (1000 UT) CAPS     cyanocobalamin (VITAMIN B-12) 500 MCG SUBL sublingual tablet     cyclobenzaprine (FLEXERIL) 10 MG tablet     hydrochlorothiazide (HYDRODIURIL) 25 MG tablet     insulin pen needle (31G X 5 MM) 31G X 5 MM miscellaneous     liraglutide - Weight Management (SAXENDA) 18 MG/3ML pen     lisinopril (ZESTRIL) 20 MG tablet     Multiple Vitamins-Minerals (ONE DAILY CALCIUM/IRON PO)     naltrexone (DEPADE/REVIA) 50 MG tablet     polyethylene glycol (MIRALAX) powder     No current facility-administered medications for this visit.      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14    hospitalized at [facility] [place] x 1 week     History of steroid therapy      HX ABNL PAP SMEAR OF CERVIX   1995    LEEP AT 15 yo     Hypertension 2004     Hypertrophy of breast      Hypertrophy of tonsils alone      Hypovitaminosis D     with secondary high PTH      Irregular heart beat     palpitations     LBP (low back pain)      LSIL (low grade squamous intraepithelial lesion) on Pap smear 5/2006     Lumbago     RESOLVED     Major depressive disorder, recurrent episode, in partial or unspecified remission 4/1/2013     Morbid obesity (H)     bariatric surgery 5/13/2013     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)      OLIGOMENORRHEA, C/W PCOS      Sciatica      SLEEP APNEA 6/2002    No longer has since tonsillectomy and septoplasty     Past Surgical History:   Procedure Laterality Date     BIOPSY  03/13/2015    Thyroid nodule     ESOPHAGOSCOPY, GASTROSCOPY, DUODENOSCOPY (EGD), COMBINED  5/28/2013    Procedure: COMBINED ESOPHAGOSCOPY, GASTROSCOPY, DUODENOSCOPY (EGD);;  Surgeon: Best Willett MD;  Location:  GI     ESOPHAGOSCOPY, GASTROSCOPY, DUODENOSCOPY (EGD), COMBINED N/A 6/13/2018    Procedure: COMBINED ESOPHAGOSCOPY, GASTROSCOPY, DUODENOSCOPY (EGD), BIOPSY SINGLE OR MULTIPLE;  gastroscopy;  Surgeon: Westley Gibbs MD;  [facility]     LAPAROSCOPIC GASTRIC SLEEVE  5/13/2013    Procedure: LAPAROSCOPIC GASTRIC SLEEVE;  Laparoscopic Sleeve Gastrectomy ;  Surgeon: Best Willett MD;  Location:  OR     LEEP TX, CERVICAL  1995    age 15     partial thyroidectomy  2018     SEPTOPLASTY       THYROIDECTOMY Left 4/3/2018    Procedure: THYROIDECTOMY;  Left Thyroid Lobectomy And Ishtmusectomy;  Surgeon: Delia Harper MD;  Location: [facility] OR     TONSILLECTOMY  age 20s     TONSILLECTOMY  2004?     wisdom teeth extraction       PHQ-9 SCORE 12/17/2019 6/19/2020 10/22/2020   PHQ-9 Total Score - - -   PHQ-9 Total Score MyChart 4 (Minimal depression) - -   PHQ-9 Total Score 4 2 5     MAXIMO-7 SCORE 11/9/2017 9/15/2018 10/28/2019   Total Score - - -   Total Score - 3 (minimal anxiety) -   Total Score 12 3 3     WHODAS 2.0 Total Score 12/4/2018 1/31/2020   Total Score 18 15   Total Score MyChart 18 15     She is  5 foot 11 inches.  Her  long-term overall goal is 175, obviously on hold until after the pregnancy.   She participates fully. Rapport is excellent.       This telehealth service is appropriate and effective for delivering services in light of the necessity for social distancing to mitigate the COVID-19 epidemic and for conservation of PPE.     Patient has agreed to receiving telehealth services after being informed about it: Yes    Patient prefers video invitation/information to be sent by:   email    Time service started: 9:59  Time service ended:  10:53  Extended session due to complexity of case and length of interval.    Mode of transmission: [email or web address].me    Location of originating:  Home of the patient    Distance site:  Home office of provider for [facility]    The patient has been notified that:  Video visits will be conducted via a call with their psychologist to provide the care they need with a video conversation. Video visits may be billed at different rates depending on insurance coverage.  Patients are advised to please contact their insurance provider with any questions about their health insurance coverage. If during the course of a call the psychologist feels a video visit is not appropriate, patients will not be charged for this service.  Diagnosis:   Psychological factors affecting obesity (F54).   Major depression, recurrent, mild (F33.0).   Occupational Problems (Z56.9)    PLAN/RECOMMENDATIONS:   1. Ms. Catherine will [id number] @ 11:00 to address weight loss and social issues with problem solving therapy, which is medically necessary.   She wishes to continue to get support here with her life changes and her son's behavioral/developmental challenges.  2.  Resume schedule of regular exercise to the level that is possible.      Last treatment plan signed: 11/13/2019  Treatment plan due:  11/13/2020

## 2020-10-27 ENCOUNTER — HOSPITAL ENCOUNTER (OUTPATIENT)
Dept: MAMMOGRAPHY | Facility: CLINIC | Age: 40
End: 2020-10-27
Attending: FAMILY MEDICINE
Payer: COMMERCIAL

## 2020-10-27 DIAGNOSIS — N64.4 BREAST PAIN, LEFT: ICD-10-CM

## 2020-10-27 PROCEDURE — 77066 DX MAMMO INCL CAD BI: CPT

## 2020-10-27 PROCEDURE — 76642 ULTRASOUND BREAST LIMITED: CPT | Mod: LT

## 2020-10-27 NOTE — LETTER
Ashley Catherine  [address]  [facility] [address]              October 27, 2020  Date of Exam:     Dear Ashley:    Thank you for your recent visit.  Breast Imaging Result: We are pleased to inform you that the results of your recent breast imaging show no evidence of malignancy (cancer).    If you are experiencing any breast problems such as a lump or localized pain we request that you discuss this with your health care team if you haven t already done so, as additional testing may be necessary.    As you know, early detection of cancer is very important. Although not all cancers are found through breast imaging, it is the most accurate method for early detection. A thorough examination includes a combination of breast imaging, physical examination and breast self-examination. Currently the American College of Radiology and the Society of Breast Imaging recommend breast imaging beginning at the age of 40.    A report of your breast imaging results was sent to: Violet Humphreys    Your breast imaging will become part of your medical file here at [facility] for at least 10 years. You are responsible for informing any new health care team or breast imaging facility of the date and location of this examination.    We appreciate the opportunity to participate in your health care.    Sincerely,  Alfreda Beaulieu MD  [facility]

## 2020-10-28 ENCOUNTER — VIRTUAL VISIT (OUTPATIENT)
Dept: ENDOCRINOLOGY | Facility: CLINIC | Age: 40
End: 2020-10-28
Payer: COMMERCIAL

## 2020-10-28 VITALS — HEIGHT: 70 IN | BODY MASS INDEX: 31.21 KG/M2 | WEIGHT: 218 LBS

## 2020-10-28 DIAGNOSIS — Z98.84 S/P LAPAROSCOPIC SLEEVE GASTRECTOMY: ICD-10-CM

## 2020-10-28 DIAGNOSIS — Z98.84 BARIATRIC SURGERY STATUS: ICD-10-CM

## 2020-10-28 DIAGNOSIS — E66.811 OBESITY, CLASS I, BMI 30-34.9: ICD-10-CM

## 2020-10-28 PROCEDURE — 99212 OFFICE O/P EST SF 10 MIN: CPT | Mod: GT | Performed by: PHYSICIAN ASSISTANT

## 2020-10-28 RX ORDER — OMEGA-3 FATTY ACIDS/FISH OIL 300-1000MG
400 CAPSULE ORAL PRN
COMMUNITY

## 2020-10-28 RX ORDER — NALTREXONE HYDROCHLORIDE 50 MG/1
TABLET, FILM COATED ORAL
Qty: 30 TABLET | Refills: 1 | COMMUNITY
Start: 2020-10-28 | End: 2021-05-18

## 2020-10-28 ASSESSMENT — MIFFLIN-ST. JEOR: SCORE: 1739.09

## 2020-10-28 ASSESSMENT — PAIN SCALES - GENERAL: PAINLEVEL: NO PAIN (0)

## 2020-10-28 NOTE — NURSING NOTE
"Chief Complaint   Patient presents with     Follow Up     [facility] follow up       Vitals:    10/28/20 0912   Weight: 98.9 kg (218 lb)   Height: 1.778 m (5' 10\")       Body mass index is 31.28 kg/m .                            "

## 2020-10-28 NOTE — PROGRESS NOTES
"Ashley Catherine is a 40 year old female who is being evaluated via a billable video visit.      The patient has been notified of following:     \"This video visit will be conducted via a call between you and your physician/provider. We have found that certain health care needs can be provided without the need for an in-person physical exam.  This service lets us provide the care you need with a video conversation.  If a prescription is necessary we can send it directly to your pharmacy.  If lab work is needed we can place an order for that and you can then stop by our lab to have the test done at a later time.    Video visits are billed at different rates depending on your insurance coverage.  Please reach out to your insurance provider with any questions.    If during the course of the call the physician/provider feels a video visit is not appropriate, you will not be charged for this service.\"    Patient has given verbal consent for Video visit? Yes  How would you like to obtain your AVS? MyChart  If you are dropped from the video visit, the video invite should be resent to: Send to e-mail at: [email or web address]  Will anyone else be joining your video visit? No    During this virtual visit the patient is located in [place], patient verifies this as the location during the entirety of this visit.         Return Medical Weight Management Note     Ashley Catherine  MRN:  [id number]  :  1980  RAKEL:  10/28/2020    Dear Violet Humphreys MD,    I had the pleasure of seeing your patient Ashley Catherine. She is a 40 year old female who I am continuing to see for treatment of obesity related to:       5/15/2017   I have the following health issues associated with obesity: High Blood Pressure, GERD (Reflux)   I have the following symptoms associated with obesity: GERD (Reflux)       INTERVAL HISTORY:  [facility] follow up. Hx of sleeve in  with some weight regain.  Lost from 290 to 186 after surgery  Weight today 218 lbs  Started " "phentermine and tried for 2 months and had \"dark thoughts\". She did lose 7 lbs while taking it  She has never tried the naltrexone   She is still taking Saxenda 3mg dose and she did lose 12 lbs and feels it is helping maintain weight    Lowest weight was 184 lbs when taking Qsymia (tingling and mood changes)  Contrave she didn't like BID dosing and always forgot 2nd dose      CURRENT WEIGHT:   218 lbs 0 oz    Highest wt in life: 293 lbs  Initial Weight (lbs): 274 lbs  Last Visits Weight: 99.3 kg (219 lb)  Cumulative weight loss (lbs): 56  Weight Loss Percentage: 20.44%    Changes and Difficulties 10/28/2020   I have made the following changes to my diet since my last visit: -   With regards to my diet, I am still struggling with: Sugar   I have made the following changes to my activity/exercise since my last visit: Taking stairs more   With regards to my activity/exercise, I am still struggling with: Daily exercise       VITALS:  Ht 1.778 m (5' 10\")   Wt 98.9 kg (218 lb)   LMP 10/22/2020 (Exact Date)   BMI 31.28 kg/m      MEDICATIONS:   Current Outpatient Medications   Medication Sig Dispense Refill     acetaminophen (TYLENOL) 32 mg/mL liquid Take 1,000 mg by mouth every 8 hours as needed for fever or mild pain       Alcohol Swabs XX PADS 1 each as needed (injections) 100 each 3     Cholecalciferol (VITAMIN D3) 25 MCG (1000 UT) CAPS        cyanocobalamin (VITAMIN B-12) 500 MCG SUBL sublingual tablet Place 500 mcg under the tongue every other day        cyclobenzaprine (FLEXERIL) 10 MG tablet Take 1 tablet (10 mg) by mouth daily as needed for muscle spasms 45 tablet 4     hydrochlorothiazide (HYDRODIURIL) 25 MG tablet Take 1 tablet (25 mg) by mouth daily 90 tablet 3     ibuprofen (ADVIL/MOTRIN) 200 MG capsule Take 400 mg by mouth as needed for fever       insulin pen needle (31G X 5 MM) 31G X 5 MM miscellaneous Use 1 pen needles daily or as directed. 100 each 1     liraglutide - Weight Management (SAXENDA) 18 " MG/3ML pen Inject 3 mg Subcutaneous daily 15 mL 3     lisinopril (ZESTRIL) 20 MG tablet Take 1 tablet (20 mg) by mouth daily 90 tablet 3     Multiple Vitamins-Minerals (ONE DAILY CALCIUM/IRON PO) Take 1 tablet by mouth daily       polyethylene glycol (MIRALAX) powder Take 17 g (1 capful) by mouth daily as needed for constipation 510 g 1     naltrexone (DEPADE/REVIA) 50 MG tablet Take 1/2 tablet once daily 1-2 hours prior to worst cravings for 1 week, then increase to 1 tablet daily as directed if tolerating (Patient not taking: Reported on 10/28/2020) 30 tablet 1       Weight Loss Medication History Reviewed With Patient 10/28/2020   Which weight loss medications are you currently taking on a regular basis?  Saxenda (liraglutide)   Are you having any side effects from the weight loss medication that we have prescribed you? No   If you are having side effects please describe: -       ASSESSMENT/PLAN: MWM follow up. Hx of sleeve with some weight regain. Doing well maintaining current weight and Saxenda is helping.     Start to take naltrexone 1/2 tab in the morning to see if this helps with sugar intake  Continue Saxenda 3mg daily      FOLLOW-UP:    6 months.        Sincerely,    Elizabeth Park PA-C  Video-Visit Details    Type of service:  Video Visit    Video Start Time: 943  Video End Time: 955    Originating Location (pt. Location): Home    Distant Location (provider location):  [facility] [place]     Platform used for Video Visit: Natividad Park PA-C

## 2020-10-28 NOTE — RESULT ENCOUNTER NOTE
Hello,    Great news, your results were normal.  Mammogram looks very stable and unchanged compared with the last one very good    Violet Humphreys MD

## 2020-10-28 NOTE — LETTER
"10/28/2020       RE: Ashley Catherine  [address]  [place] [facility] [address]     Dear Colleague,    Thank you for referring your patient, Ashley Catherine, to the [facility] [place] at [facility]. Please see a copy of my visit note below.    Ashley Catherine is a 40 year old female who is being evaluated via a billable video visit.      The patient has been notified of following:     \"This video visit will be conducted via a call between you and your physician/provider. We have found that certain health care needs can be provided without the need for an in-person physical exam.  This service lets us provide the care you need with a video conversation.  If a prescription is necessary we can send it directly to your pharmacy.  If lab work is needed we can place an order for that and you can then stop by our lab to have the test done at a later time.    Video visits are billed at different rates depending on your insurance coverage.  Please reach out to your insurance provider with any questions.    If during the course of the call the physician/provider feels a video visit is not appropriate, you will not be charged for this service.\"    Patient has given verbal consent for Video visit? Yes  How would you like to obtain your AVS? MyChart  If you are dropped from the video visit, the video invite should be resent to: Send to e-mail at: [email or web address]  Will anyone else be joining your video visit? No    During this virtual visit the patient is located in [place], patient verifies this as the location during the entirety of this visit.         Return Medical Weight Management Note     Ashley Catherine  MRN:  [id number]  :  1980  RAKEL:  10/28/2020    Dear Violet Humphreys MD,    I had the pleasure of seeing your patient Ashley Catherine. She is a 40 year old female who I am continuing to see for treatment of obesity related to:       5/15/2017   I have the " "following health issues associated with obesity: High Blood Pressure, GERD (Reflux)   I have the following symptoms associated with obesity: GERD (Reflux)       INTERVAL HISTORY:  MWM follow up. Hx of sleeve in 2013 with some weight regain.  Lost from 290 to 186 after surgery  Weight today 218 lbs  Started phentermine and tried for 2 months and had \"dark thoughts\". She did lose 7 lbs while taking it  She has never tried the naltrexone   She is still taking Saxenda 3mg dose and she did lose 12 lbs and feels it is helping maintain weight    Lowest weight was 184 lbs when taking Qsymia (tingling and mood changes)  Contrave she didn't like BID dosing and always forgot 2nd dose      CURRENT WEIGHT:   218 lbs 0 oz    Highest wt in life: 293 lbs  Initial Weight (lbs): 274 lbs  Last Visits Weight: 99.3 kg (219 lb)  Cumulative weight loss (lbs): 56  Weight Loss Percentage: 20.44%    Changes and Difficulties 10/28/2020   I have made the following changes to my diet since my last visit: -   With regards to my diet, I am still struggling with: Sugar   I have made the following changes to my activity/exercise since my last visit: Taking stairs more   With regards to my activity/exercise, I am still struggling with: Daily exercise       VITALS:  Ht 1.778 m (5' 10\")   Wt 98.9 kg (218 lb)   LMP 10/22/2020 (Exact Date)   BMI 31.28 kg/m      MEDICATIONS:   Current Outpatient Medications   Medication Sig Dispense Refill     acetaminophen (TYLENOL) 32 mg/mL liquid Take 1,000 mg by mouth every 8 hours as needed for fever or mild pain       Alcohol Swabs XX PADS 1 each as needed (injections) 100 each 3     Cholecalciferol (VITAMIN D3) 25 MCG (1000 UT) CAPS        cyanocobalamin (VITAMIN B-12) 500 MCG SUBL sublingual tablet Place 500 mcg under the tongue every other day        cyclobenzaprine (FLEXERIL) 10 MG tablet Take 1 tablet (10 mg) by mouth daily as needed for muscle spasms 45 tablet 4     hydrochlorothiazide (HYDRODIURIL) 25 MG " tablet Take 1 tablet (25 mg) by mouth daily 90 tablet 3     ibuprofen (ADVIL/MOTRIN) 200 MG capsule Take 400 mg by mouth as needed for fever       insulin pen needle (31G X 5 MM) 31G X 5 MM miscellaneous Use 1 pen needles daily or as directed. 100 each 1     liraglutide - Weight Management (SAXENDA) 18 MG/3ML pen Inject 3 mg Subcutaneous daily 15 mL 3     lisinopril (ZESTRIL) 20 MG tablet Take 1 tablet (20 mg) by mouth daily 90 tablet 3     Multiple Vitamins-Minerals (ONE DAILY CALCIUM/IRON PO) Take 1 tablet by mouth daily       polyethylene glycol (MIRALAX) powder Take 17 g (1 capful) by mouth daily as needed for constipation 510 g 1     naltrexone (DEPADE/REVIA) 50 MG tablet Take 1/2 tablet once daily 1-2 hours prior to worst cravings for 1 week, then increase to 1 tablet daily as directed if tolerating (Patient not taking: Reported on 10/28/2020) 30 tablet 1       Weight Loss Medication History Reviewed With Patient 10/28/2020   Which weight loss medications are you currently taking on a regular basis?  Saxenda (liraglutide)   Are you having any side effects from the weight loss medication that we have prescribed you? No   If you are having side effects please describe: -       ASSESSMENT/PLAN: MWM follow up. Hx of sleeve with some weight regain. Doing well maintaining current weight and Saxenda is helping.     Start to take naltrexone 1/2 tab in the morning to see if this helps with sugar intake  Continue Saxenda 3mg daily      FOLLOW-UP:    6 months.        Sincerely,    Elizabeth Park PA-C  Video-Visit Details    Type of service:  Video Visit    Video Start Time: 943  Video End Time: 955    Originating Location (pt. Location): Home    Distant Location (provider location):  [facility] [place]     Platform used for Video Visit: Natividad Park PA-C

## 2020-12-11 ENCOUNTER — VIRTUAL VISIT (OUTPATIENT)
Dept: PSYCHOLOGY | Facility: CLINIC | Age: 40
End: 2020-12-11
Payer: COMMERCIAL

## 2020-12-11 DIAGNOSIS — E66.01 PSYCHOLOGICAL FACTORS AFFECTING MORBID OBESITY (H): ICD-10-CM

## 2020-12-11 DIAGNOSIS — F33.0 MAJOR DEPRESSIVE DISORDER, RECURRENT EPISODE, MILD (H): Primary | ICD-10-CM

## 2020-12-11 DIAGNOSIS — F54 PSYCHOLOGICAL FACTORS AFFECTING MORBID OBESITY (H): ICD-10-CM

## 2020-12-11 PROCEDURE — 90837 PSYTX W PT 60 MINUTES: CPT | Mod: GT | Performed by: PSYCHOLOGIST

## 2020-12-11 NOTE — PROGRESS NOTES
Health Psychology                     Department of Medicine  Julianne Moreno, Ph.D., L.P. [phone]                          [facility] Lis Chavez, Ph.D., L.P. [phone]                   [place] Mail Code 741   Earline Justin, Ph.D.,  L.P. [phone]        [address] [place] [address] Donald Hills, Ph.D. [phone]       [place] [address]           Susana Ugarte, Ph.D., L.P. [phone]    [facility]   Jayro Elias, Ph.D., AMITA.B.ROB., L.P. [phone]  [address] [place] [address] Melissa Stokes, Ph.D., L.P. [phone]     Health Psychology Follow-Up Telemental Health Note    Ashley Catherine is a 40-year-old single, Black woman referred initially for psychological consultation for workup for a gastric sleeve procedure who is seen for CBT-oriented therapy regardingt weight management, work stresses, and family and social matters. She is seen for problem-solving and supportive counseling.  Intake was 4/1/13.    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   She is currently on leave to deal with her father's medical problems, hoping to return 1/4.    She also has responsibilities helping with care of her parents and her son.   Rivera is > a year old, with feeding problems. He started therapy at [facility] near [facility], and she is optimistic it will help.  He has food aversion.  He also seems to have language delays.    Her father has been hospitalized x5 and been to an ER this past year.  He was in for 12 days at [facility] recently and has been diagnosed with dementia. He still drives and was gone for 6 hours at a casino recently raising questions about judgment and financial management.. He has been confused. Shehad to call the police because he was confused.  He was taken by ambualnce to hospital for 12 days.  He had low BG, His leg wounds are healing once they decreased fluids..  He has been eating and drinking less.    Did not  discuss weight management today.    She feels more aware of sadness around her patients, and has had to limit her social contacts.  She laments not being betzaida to resume her pre-COVID and pre-Rivera activities, such as travel.   She apears tired.  She participates fully. Rapport was excellent.    She was not  weighed today as it is a virtual session and we did not address it.      Wt Readings from Last 4 Encounters:   10/28/20 98.9 kg (218 lb)   10/22/20 97.9 kg (215 lb 12.8 oz)   07/02/20 99.3 kg (219 lb)   01/31/20 104 kg (229 lb 3.2 oz)     There is no height or weight on file to calculate BMI.     Current Outpatient Medications   Medication     acetaminophen (TYLENOL) 32 mg/mL liquid     Alcohol Swabs XX PADS     Cholecalciferol (VITAMIN D3) 25 MCG (1000 UT) CAPS     cyanocobalamin (VITAMIN B-12) 500 MCG SUBL sublingual tablet     cyclobenzaprine (FLEXERIL) 10 MG tablet     hydrochlorothiazide (HYDRODIURIL) 25 MG tablet     ibuprofen (ADVIL/MOTRIN) 200 MG capsule     insulin pen needle (31G X 5 MM) 31G X 5 MM miscellaneous     liraglutide - Weight Management (SAXENDA) 18 MG/3ML pen     lisinopril (ZESTRIL) 20 MG tablet     Multiple Vitamins-Minerals (ONE DAILY CALCIUM/IRON PO)     naltrexone (DEPADE/REVIA) 50 MG tablet     polyethylene glycol (MIRALAX) powder     No current facility-administered medications for this visit.      Past Medical History:   Diagnosis Date     Bariatric surgery status 05/13/2013     Depression      Depressive disorder      Esophageal reflux      Family history of hyperparathyroidism 3/6/2015     Forearm fracture childhood    jumping fall     Guillain-Spearman disease (H) age 14    hospitalized at [facility] x 1 week     History of steroid therapy      HX ABNL PAP SMEAR OF CERVIX   1995    LEEP AT 15 yo     Hypertension 2004     Hypertrophy of breast      Hypertrophy of tonsils alone      Hypovitaminosis D     with secondary high PTH     Irregular heart beat     palpitations     LBP (low back  pain)      LSIL (low grade squamous intraepithelial lesion) on Pap smear 5/2006     Lumbago     RESOLVED     Major depressive disorder, recurrent episode, in partial or unspecified remission 4/1/2013     Morbid obesity (H)     bariatric surgery 5/13/2013     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)      OLIGOMENORRHEA, C/W PCOS      Sciatica      SLEEP APNEA 6/2002    No longer has since tonsillectomy and septoplasty     Past Surgical History:   Procedure Laterality Date     BIOPSY  03/13/2015    Thyroid nodule     ESOPHAGOSCOPY, GASTROSCOPY, DUODENOSCOPY (EGD), COMBINED  5/28/2013    Procedure: COMBINED ESOPHAGOSCOPY, GASTROSCOPY, DUODENOSCOPY (EGD);;  Surgeon: Best Willett MD;  Location:  GI     ESOPHAGOSCOPY, GASTROSCOPY, DUODENOSCOPY (EGD), COMBINED N/A 6/13/2018    Procedure: COMBINED ESOPHAGOSCOPY, GASTROSCOPY, DUODENOSCOPY (EGD), BIOPSY SINGLE OR MULTIPLE;  gastroscopy;  Surgeon: Westley Gibbs MD;  Location: [facility]     LAPAROSCOPIC GASTRIC SLEEVE  5/13/2013    Procedure: LAPAROSCOPIC GASTRIC SLEEVE;  Laparoscopic Sleeve Gastrectomy ;  Surgeon: Best Willett MD;  Location:  OR     LEEP TX, CERVICAL  1995    age 15     partial thyroidectomy  2018     SEPTOPLASTY       THYROIDECTOMY Left 4/3/2018    Procedure: THYROIDECTOMY;  Left Thyroid Lobectomy And Ishtmusectomy;  Surgeon: Delia Harper MD;  Location:  OR     TONSILLECTOMY  age 20s     TONSILLECTOMY  2004?     wisdom teeth extraction       PHQ-9 SCORE 12/17/2019 6/19/2020 10/22/2020   PHQ-9 Total Score - - -   PHQ-9 Total Score MyChart 4 (Minimal depression) - -   PHQ-9 Total Score 4 2 5     MAXIMO-7 SCORE 11/9/2017 9/15/2018 10/28/2019   Total Score - - -   Total Score - 3 (minimal anxiety) -   Total Score 12 3 3     WHODAS 2.0 Total Score 12/4/2018 1/31/2020   Total Score 18 15   Total Score MyChart 18 15     She is  5 foot 11 inches.  Her long-term overall goal is 175, obviously on hold until after the  pregnancy.   She participates fully. Rapport is excellent.       This telehealth service is appropriate and effective for delivering services in light of the necessity for social distancing to mitigate the COVID-19 epidemic and for conservation of PPE.     Patient has agreed to receiving telehealth services after being informed about it: Yes    Patient prefers video invitation/information to be sent by:   email    Time service started: 12:05  Time service ended:  12:58  Extended session due to complexity of case and length of interval.    Mode of transmission: [email or web address].me    Location of originating:  Home of the patient    Distance site:  Home office of provider for [facility]    The patient has been notified that:  Video visits will be conducted via a call with their psychologist to provide the care they need with a video conversation. Video visits may be billed at different rates depending on insurance coverage.  Patients are advised to please contact their insurance provider with any questions about their health insurance coverage. If during the course of a call the psychologist feels a video visit is not appropriate, patients will not be charged for this service.  Diagnosis:   Psychological factors affecting obesity (F54).   Major depression, recurrent, mild (F33.0).   Occupational Problems (Z56.9)    PLAN/RECOMMENDATIONS:   1. Ms. Catherine will return 1/14 @ 10:00 to address weight loss and social issues with problem solving therapy, which is medically necessary.   She wishes to continue to get support here with her life changes and her son's behavioral/developmental challenges.  2.  Resume schedule of regular exercise to the level that is possible.      Last treatment plan signed: 11/13/2019  Treatment plan due:  11/13/2020 (was sent a copy to complete for next session)

## 2020-12-16 ENCOUNTER — TELEPHONE (OUTPATIENT)
Dept: FAMILY MEDICINE | Facility: CLINIC | Age: 40
End: 2020-12-16

## 2020-12-16 NOTE — TELEPHONE ENCOUNTER
Received form(s) from [employer] for Attending Physician statement .  Placed form(s) in/on SN's box.  Forms need to be signed and faxed to number listed on form..    Call pt to verify form was sent: No  Copy needs to be sent for scanning after completion: Yes

## 2020-12-17 ENCOUNTER — VIRTUAL VISIT (OUTPATIENT)
Dept: FAMILY MEDICINE | Facility: CLINIC | Age: 40
End: 2020-12-17
Payer: COMMERCIAL

## 2020-12-17 DIAGNOSIS — Z98.84 GASTRIC BYPASS STATUS FOR OBESITY: ICD-10-CM

## 2020-12-17 DIAGNOSIS — L65.9 HAIR LOSS: ICD-10-CM

## 2020-12-17 DIAGNOSIS — F43.21 ADJUSTMENT DISORDER WITH DEPRESSED MOOD: Primary | ICD-10-CM

## 2020-12-17 PROCEDURE — 99214 OFFICE O/P EST MOD 30 MIN: CPT | Mod: GT | Performed by: FAMILY MEDICINE

## 2020-12-17 RX ORDER — SERTRALINE HYDROCHLORIDE 25 MG/1
25 TABLET, FILM COATED ORAL DAILY
Qty: 30 TABLET | Refills: 1 | Status: SHIPPED | OUTPATIENT
Start: 2020-12-17 | End: 2021-05-18

## 2020-12-17 ASSESSMENT — PATIENT HEALTH QUESTIONNAIRE - PHQ9: SUM OF ALL RESPONSES TO PHQ QUESTIONS 1-9: 6

## 2020-12-17 NOTE — TELEPHONE ENCOUNTER
Forms Stamped, Faxed, copied and sent for scanning mailed originals to Patient  Elvia Rust  Care Unit Coordinator  \

## 2020-12-17 NOTE — PROGRESS NOTES
"Ashley Catherine is a 40 year old female who is being evaluated via a billable video visit.      The patient has been notified of following:     \"This video visit will be conducted via a call between you and your physician/provider. We have found that certain health care needs can be provided without the need for an in-person physical exam.  This service lets us provide the care you need with a video conversation.  If a prescription is necessary we can send it directly to your pharmacy.  If lab work is needed we can place an order for that and you can then stop by our lab to have the test done at a later time.    Video visits are billed at different rates depending on your insurance coverage.  Please reach out to your insurance provider with any questions.    If during the course of the call the physician/provider feels a video visit is not appropriate, you will not be charged for this service.\"    Patient has given verbal consent for Video visit? Yes  How would you like to obtain your AVS? MyChart  If you are dropped from the video visit, the video invite should be resent to: Send to e-mail at: [email or web address]  Will anyone else be joining your video visit? No     Subjective     Ashley Catherine is a 40 year old female who presents today via video visit for the following health issues:    HPI     Depression Followup    How are you doing with your depression since your last visit? Worsened pt states she feels she went thru a mini break down     Dad is sick    Struggling with this    Father has signs of dementia    Really hard to deal with    Has used celexa in the past not very helpful    Has a therapist that she works with usually addresses     Has tried wellbutrin in the past for weight loss not really tested for mood    Has an 18 month old at home - sleep sometimes interrupted as a result    Are you having other symptoms that might be associated with depression? No    Have you had a significant life event?  OTHER: a lot of " different life events      Are you feeling anxious or having panic attacks?   No    Do you have any concerns with your use of alcohol or other drugs? No    Social History     Tobacco Use     Smoking status: Never Smoker     Smokeless tobacco: Never Used   Substance Use Topics     Alcohol use: Yes     Alcohol/week: 1.0 - 4.0 standard drinks     Types: 1 - 4 Shots of liquor per week     Frequency: 4 or more times a week     Drinks per session: 3 or 4     Comment: 6 drinks/week     Drug use: No     PHQ 6/19/2020 10/22/2020 12/17/2020   PHQ-9 Total Score 2 5 6   Q9: Thoughts of better off dead/self-harm past 2 weeks Not at all Not at all Not at all     MAXIMO-7 SCORE 11/9/2017 9/15/2018 10/28/2019   Total Score - - -   Total Score - 3 (minimal anxiety) -   Total Score 12 3 3       Hair loss:  She does report that she had a section of hair that broke off about an inch from the scalp.  She does have history of gastric bypass and has been compliant with medications and vitamin supplementation.  Last labs have been normal.  She also has a thyroid neoplasm which has been followed closely but her last thyroid levels have been fine.  This is never happened before she wonders if labs are needed.    Suicide Assessment Five-step Evaluation and Treatment (SAFE-T)      How many servings of fruits and vegetables do you eat daily?  2-3    On average, how many sweetened beverages do you drink each day (Examples: soda, juice, sweet tea, etc.  Do NOT count diet or artificially sweetened beverages)?   5    How many days per week do you exercise enough to make your heart beat faster? 3 or less    How many minutes a day do you exercise enough to make your heart beat faster? 9 or less    How many days per week do you miss taking your medication? 0         Video Start Time: 7:22        Review of Systems   Constitutional, HEENT, cardiovascular, pulmonary, gi and gu systems are negative, except as otherwise noted.      Objective            Vitals:  No vitals were obtained today due to virtual visit.    Physical Exam     GENERAL: Healthy, alert and no distress  EYES: Eyes grossly normal to inspection.  No discharge or erythema, or obvious scleral/conjunctival abnormalities.  RESP: No audible wheeze, cough, or visible cyanosis.  No visible retractions or increased work of breathing.    SKIN: Visible skin clear. No significant rash, abnormal pigmentation or lesions.  NEURO: Cranial nerves grossly intact.  Mentation and speech appropriate for age.  PSYCH: Mentation appears normal, affect normal/bright, judgement and insight intact, normal speech and appearance well-groomed.      No results found for this or any previous visit (from the past 24 hour(s)).        Assessment & Plan     Adjustment disorder with depressed mood  Discussed trying a new medication  Working with therapy  Working with her father's provider  - sertraline (ZOLOFT) 25 MG tablet  Dispense: 30 tablet; Refill: 1        Lab only for hair breakage  tsh  cmp  b1 b12 vit d  Iron stores      See me if no better    Would like to have an update in 1 month to see how she is doing      Violet Humphreys MD  [facility]      Video-Visit Details    Type of service:  Video Visit    Video End Time:7:43    Originating Location (pt. Location): Home    Distant Location (provider location):  [facility]     Platform used for Video Visit: Mar[employer]Well

## 2020-12-17 NOTE — TELEPHONE ENCOUNTER
Thanks I signed forms and placed in tower  Please stamp and fax forms  Mail originals to pt  thanks    SN

## 2020-12-23 DIAGNOSIS — L65.9 HAIR LOSS: ICD-10-CM

## 2020-12-23 DIAGNOSIS — Z98.84 GASTRIC BYPASS STATUS FOR OBESITY: ICD-10-CM

## 2020-12-23 LAB
ALBUMIN SERPL-MCNC: 3.5 G/DL (ref 3.4–5)
ALP SERPL-CCNC: 58 U/L (ref 40–150)
ALT SERPL W P-5'-P-CCNC: 21 U/L (ref 0–50)
ANION GAP SERPL CALCULATED.3IONS-SCNC: 4 MMOL/L (ref 3–14)
AST SERPL W P-5'-P-CCNC: 17 U/L (ref 0–45)
BILIRUB SERPL-MCNC: 0.5 MG/DL (ref 0.2–1.3)
BUN SERPL-MCNC: 14 MG/DL (ref 7–30)
CALCIUM SERPL-MCNC: 9 MG/DL (ref 8.5–10.1)
CHLORIDE SERPL-SCNC: 104 MMOL/L (ref 94–109)
CO2 SERPL-SCNC: 30 MMOL/L (ref 20–32)
CREAT SERPL-MCNC: 0.68 MG/DL (ref 0.52–1.04)
ERYTHROCYTE [DISTWIDTH] IN BLOOD BY AUTOMATED COUNT: 12.9 % (ref 10–15)
FERRITIN SERPL-MCNC: 50 NG/ML (ref 12–150)
FOLATE SERPL-MCNC: >100 NG/ML
GFR SERPL CREATININE-BSD FRML MDRD: >90 ML/MIN/{1.73_M2}
GLUCOSE SERPL-MCNC: 87 MG/DL (ref 70–99)
HCT VFR BLD AUTO: 35.9 % (ref 35–47)
HGB BLD-MCNC: 11.9 G/DL (ref 11.7–15.7)
IRON SATN MFR SERPL: 26 % (ref 15–46)
IRON SERPL-MCNC: 86 UG/DL (ref 35–180)
MCH RBC QN AUTO: 31.4 PG (ref 26.5–33)
MCHC RBC AUTO-ENTMCNC: 33.1 G/DL (ref 31.5–36.5)
MCV RBC AUTO: 95 FL (ref 78–100)
PLATELET # BLD AUTO: 417 10E9/L (ref 150–450)
POTASSIUM SERPL-SCNC: 3.4 MMOL/L (ref 3.4–5.3)
PROT SERPL-MCNC: 7.1 G/DL (ref 6.8–8.8)
RBC # BLD AUTO: 3.79 10E12/L (ref 3.8–5.2)
SODIUM SERPL-SCNC: 138 MMOL/L (ref 133–144)
TIBC SERPL-MCNC: 329 UG/DL (ref 240–430)
TSH SERPL DL<=0.005 MIU/L-ACNC: 1.49 MU/L (ref 0.4–4)
VIT B12 SERPL-MCNC: 1366 PG/ML (ref 193–986)
WBC # BLD AUTO: 6.6 10E9/L (ref 4–11)

## 2020-12-23 PROCEDURE — 36415 COLL VENOUS BLD VENIPUNCTURE: CPT | Performed by: FAMILY MEDICINE

## 2020-12-23 PROCEDURE — 84590 ASSAY OF VITAMIN A: CPT | Mod: 90 | Performed by: FAMILY MEDICINE

## 2020-12-23 PROCEDURE — 82728 ASSAY OF FERRITIN: CPT | Performed by: FAMILY MEDICINE

## 2020-12-23 PROCEDURE — 83550 IRON BINDING TEST: CPT | Performed by: FAMILY MEDICINE

## 2020-12-23 PROCEDURE — 80053 COMPREHEN METABOLIC PANEL: CPT | Performed by: FAMILY MEDICINE

## 2020-12-23 PROCEDURE — 99000 SPECIMEN HANDLING OFFICE-LAB: CPT | Performed by: FAMILY MEDICINE

## 2020-12-23 PROCEDURE — 84425 ASSAY OF VITAMIN B-1: CPT | Mod: 90 | Performed by: FAMILY MEDICINE

## 2020-12-23 PROCEDURE — 84630 ASSAY OF ZINC: CPT | Mod: 90 | Performed by: FAMILY MEDICINE

## 2020-12-23 PROCEDURE — 85027 COMPLETE CBC AUTOMATED: CPT | Performed by: FAMILY MEDICINE

## 2020-12-23 PROCEDURE — 84443 ASSAY THYROID STIM HORMONE: CPT | Performed by: FAMILY MEDICINE

## 2020-12-23 PROCEDURE — 82525 ASSAY OF COPPER: CPT | Mod: 90 | Performed by: FAMILY MEDICINE

## 2020-12-23 PROCEDURE — 84255 ASSAY OF SELENIUM: CPT | Mod: 90 | Performed by: FAMILY MEDICINE

## 2020-12-23 PROCEDURE — 83540 ASSAY OF IRON: CPT | Performed by: FAMILY MEDICINE

## 2020-12-23 PROCEDURE — 82746 ASSAY OF FOLIC ACID SERUM: CPT | Performed by: FAMILY MEDICINE

## 2020-12-23 PROCEDURE — 82607 VITAMIN B-12: CPT | Performed by: FAMILY MEDICINE

## 2020-12-26 LAB
ANNOTATION COMMENT IMP: NORMAL
COPPER SERPL-MCNC: 147.6 UG/DL (ref 80–155)
RETINYL PALMITATE SERPL-MCNC: <0.02 MG/L (ref 0–0.1)
SELENIUM SERPL-MCNC: 157.7 UG/L (ref 23–190)
VIT A SERPL-MCNC: 0.71 MG/L (ref 0.3–1.2)
ZINC SERPL-MCNC: 80.5 UG/DL (ref 60–120)

## 2020-12-26 NOTE — RESULT ENCOUNTER NOTE
Dear Ashley,   Here are your recent results. Your  iron and iron binding capacity, ferritin, folate, thyroid-stimulating hormone, copper, zinc, selenium, and vitamin A were normal. Your vitamin B12 was elevated. I would recommend decreasing your Vitamin B 12 intake to 3 days per week    Best regards  Cross-covering Provider  Denice Brown MD

## 2020-12-27 LAB — VIT B1 SERPL-MCNC: 14 NMOL/L (ref 4–15)

## 2020-12-28 NOTE — RESULT ENCOUNTER NOTE
Hello,    Great news, the last of your results did return normal    Let me know if you have questions,    Violet Humphreys MD

## 2021-01-14 ENCOUNTER — VIRTUAL VISIT (OUTPATIENT)
Dept: PSYCHOLOGY | Facility: CLINIC | Age: 41
End: 2021-01-14
Payer: COMMERCIAL

## 2021-01-14 DIAGNOSIS — E66.01 PSYCHOLOGICAL FACTORS AFFECTING MORBID OBESITY (H): ICD-10-CM

## 2021-01-14 DIAGNOSIS — Z56.9 OCCUPATIONAL PROBLEM: ICD-10-CM

## 2021-01-14 DIAGNOSIS — F54 PSYCHOLOGICAL FACTORS AFFECTING MORBID OBESITY (H): ICD-10-CM

## 2021-01-14 DIAGNOSIS — F33.0 MAJOR DEPRESSIVE DISORDER, RECURRENT EPISODE, MILD (H): Primary | ICD-10-CM

## 2021-01-14 PROCEDURE — 90837 PSYTX W PT 60 MINUTES: CPT | Mod: GT | Performed by: PSYCHOLOGIST

## 2021-01-14 SDOH — ECONOMIC STABILITY - INCOME SECURITY: UNSPECIFIED PROBLEMS RELATED TO EMPLOYMENT: Z56.9

## 2021-01-14 NOTE — PROGRESS NOTES
Health Psychology                     Department of Medicine  Julianne Moreno, Ph.D., L.P. [phone]                          [facility] Lis Chavez, Ph.D., L.P. [phone]                   [place] Mail Code 745   Earline Justin, Ph.D.,  L.P. [phone]        [address] [place] [address] Donald Hills, Ph.D. [phone]       [place] [address]           Susana Ugarte, Ph.D., L.P. [phone]    [facility]   Jayro Elias, Ph.D., A.B.IDALIA.IDALIA., L.P. [phone]  [address] [place] [address] Melissa Stokes, Ph.D., L.P. [phone]     Health Psychology Follow-Up Telemental Health Note    Ashley Catherine is a 40-year-old single, Black woman referred initially for psychological consultation for workup for a gastric sleeve procedure who is seen for CBT-oriented therapy regardingt weight management, work stresses, and family and social matters. She is seen for problem-solving and supportive counseling.  Intake was 4/1/13.    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 She got the first vaccination, has been feeling achey since then.     During the interval, her father medical/neurological problems resulted in hospitalization and placement in NH in [place].  She is feeling guilty about it, but realizes he needed it as he was increasingly dangerous (e.g., sleeping with a knife and leaving it where her son plays; not knowing how drive)     Her son started therapy at [facility], and she is optimistic it will help with eating, sensory issues..  He has food aversion.  He also  may have language delays.   She is coping, and he is now getting therapies x3 per week.     Did not discuss weight management today.    She feels more aware of sadness around her patients, and has had to limit her social contacts.  She laments not being betzaida to resume her pre-COVID and pre-Rivera activities, such as travel.   She apears tired.  She participates fully.  Rapport was excellent.    She was not  weighed today as it is a virtual session and we did not address it.      Wt Readings from Last 4 Encounters:   10/28/20 98.9 kg (218 lb)   10/22/20 97.9 kg (215 lb 12.8 oz)   07/02/20 99.3 kg (219 lb)   01/31/20 104 kg (229 lb 3.2 oz)     There is no height or weight on file to calculate BMI.     Current Outpatient Medications   Medication     acetaminophen (TYLENOL) 32 mg/mL liquid     Alcohol Swabs XX PADS     Cholecalciferol (VITAMIN D3) 25 MCG (1000 UT) CAPS     cyanocobalamin (VITAMIN B-12) 500 MCG SUBL sublingual tablet     cyclobenzaprine (FLEXERIL) 10 MG tablet     hydrochlorothiazide (HYDRODIURIL) 25 MG tablet     ibuprofen (ADVIL/MOTRIN) 200 MG capsule     insulin pen needle (31G X 5 MM) 31G X 5 MM miscellaneous     liraglutide - Weight Management (SAXENDA) 18 MG/3ML pen     lisinopril (ZESTRIL) 20 MG tablet     Multiple Vitamins-Minerals (ONE DAILY CALCIUM/IRON PO)     naltrexone (DEPADE/REVIA) 50 MG tablet     polyethylene glycol (MIRALAX) powder     sertraline (ZOLOFT) 25 MG tablet     No current facility-administered medications for this visit.      Past Medical History:   Diagnosis Date     Bariatric surgery status 05/13/2013     Depression      Depressive disorder      Esophageal reflux      Family history of hyperparathyroidism 3/6/2015     Forearm fracture childhood    jumping fall     Guillain-[place] disease (H) age 14    hospitalized at [facility] x 1 week     History of steroid therapy      HX ABNL PAP SMEAR OF CERVIX   1995    LEEP AT 15 yo     Hypertension 2004     Hypertrophy of breast      Hypertrophy of tonsils alone      Hypovitaminosis D     with secondary high PTH     Irregular heart beat     palpitations     LBP (low back pain)      LSIL (low grade squamous intraepithelial lesion) on Pap smear 5/2006     Lumbago     RESOLVED     Major depressive disorder, recurrent episode, in partial or unspecified remission 4/1/2013     Morbid obesity (H)     bariatric  surgery 5/13/2013     Multiple thyroid nodules      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)      OLIGOMENORRHEA, C/W PCOS      Sciatica      SLEEP APNEA 6/2002    No longer has since tonsillectomy and septoplasty     Thyroid nodule      Past Surgical History:   Procedure Laterality Date     BIOPSY  03/13/2015    Thyroid nodule     ESOPHAGOSCOPY, GASTROSCOPY, DUODENOSCOPY (EGD), COMBINED  5/28/2013    Procedure: COMBINED ESOPHAGOSCOPY, GASTROSCOPY, DUODENOSCOPY (EGD);;  Surgeon: Best Willett MD;  Location:  GI     ESOPHAGOSCOPY, GASTROSCOPY, DUODENOSCOPY (EGD), COMBINED N/A 6/13/2018    Procedure: COMBINED ESOPHAGOSCOPY, GASTROSCOPY, DUODENOSCOPY (EGD), BIOPSY SINGLE OR MULTIPLE;  gastroscopy;  Surgeon: Westley Gibbs MD;  Location:  GI     LAPAROSCOPIC GASTRIC SLEEVE  5/13/2013    Procedure: LAPAROSCOPIC GASTRIC SLEEVE;  Laparoscopic Sleeve Gastrectomy ;  Surgeon: Best Willett MD;  Location:  OR     LEEP TX, CERVICAL  1995    age 15     partial thyroidectomy  2018     SEPTOPLASTY       THYROIDECTOMY Left 4/3/2018    Procedure: THYROIDECTOMY;  Left Thyroid Lobectomy And Ishtmusectomy;  Surgeon: Delia Harper MD;  [facility]     TONSILLECTOMY  age 20s     TONSILLECTOMY  2004?     wisdom teeth extraction       PHQ-9 SCORE 6/19/2020 10/22/2020 12/17/2020   PHQ-9 Total Score - - -   PHQ-9 Total Score MyChart - - -   PHQ-9 Total Score 2 5 6     MAXIMO-7 SCORE 11/9/2017 9/15/2018 10/28/2019   Total Score - - -   Total Score - 3 (minimal anxiety) -   Total Score 12 3 3     WHODAS 2.0 Total Score 12/4/2018 1/31/2020   Total Score 18 15   Total Score MyChart 18 15     She is  5 foot 11 inches.  Her long-term overall goal is 175, obviously on hold until after the pregnancy.   She participates fully. Rapport is excellent.       This telehealth service is appropriate and effective for delivering services in light of the necessity for social distancing to mitigate the  COVID-19 epidemic and for conservation of PPE.     Patient has agreed to receiving telehealth services after being informed about it: Yes    Patient prefers video invitation/information to be sent by:   email    Time service started: 10:03  Time service ended:  10:58  Extended session due to complexity of case and length of interval.    Mode of transmission: Doxy.me    Location of originating:  Home of the patient    Distance site:  Home office of provider for [facility]    The patient has been notified that:  Video visits will be conducted via a call with their psychologist to provide the care they need with a video conversation. Video visits may be billed at different rates depending on insurance coverage.  Patients are advised to please contact their insurance provider with any questions about their health insurance coverage. If during the course of a call the psychologist feels a video visit is not appropriate, patients will not be charged for this service.  Diagnosis:   Psychological factors affecting obesity (F54).   Major depression, recurrent, mild (F33.0).   Occupational Problems (Z56.9)    PLAN/RECOMMENDATIONS:   1. Ms. Catherine will return 2/12 @ 12:00 to address weight loss and social issues with problem solving therapy, which is medically necessary.   She wishes to continue to get support here with her life changes and her son's behavioral/developmental challenges.  2.  Resume schedule of regular exercise to the level that is possible.      Last treatment plan signed: 12/13/2019  Treatment plan due:  11/13/2020 (was sent a copy to complete for next session)

## 2021-01-20 ENCOUNTER — VIRTUAL VISIT (OUTPATIENT)
Dept: FAMILY MEDICINE | Facility: CLINIC | Age: 41
End: 2021-01-20
Payer: COMMERCIAL

## 2021-01-20 ENCOUNTER — MYC MEDICAL ADVICE (OUTPATIENT)
Dept: FAMILY MEDICINE | Facility: CLINIC | Age: 41
End: 2021-01-20

## 2021-01-20 DIAGNOSIS — F51.01 PRIMARY INSOMNIA: Primary | ICD-10-CM

## 2021-01-20 DIAGNOSIS — F33.42 MAJOR DEPRESSIVE DISORDER, RECURRENT EPISODE, IN FULL REMISSION (H): ICD-10-CM

## 2021-01-20 PROBLEM — Z32.01 PREGNANCY EXAMINATION OR TEST, POSITIVE RESULT: Status: RESOLVED | Noted: 2018-11-08 | Resolved: 2021-01-20

## 2021-01-20 PROBLEM — Z71.84 TRAVEL ADVICE ENCOUNTER: Status: RESOLVED | Noted: 2018-11-16 | Resolved: 2021-01-20

## 2021-01-20 PROCEDURE — 99213 OFFICE O/P EST LOW 20 MIN: CPT | Mod: GT | Performed by: FAMILY MEDICINE

## 2021-01-20 RX ORDER — TRAZODONE HYDROCHLORIDE 50 MG/1
50-100 TABLET, FILM COATED ORAL AT BEDTIME
Qty: 30 TABLET | Refills: 1 | Status: SHIPPED | OUTPATIENT
Start: 2021-01-20 | End: 2021-05-18

## 2021-01-20 NOTE — PROGRESS NOTES
Ashley is a 40 year old who is being evaluated via a billable video visit.      How would you like to obtain your AVS? Camacho  If the video visit is dropped, the invitation should be resent by: Camacho   Will anyone else be joining your video visit? No      Video Start Time: 5:43  Assessment & Plan     Primary insomnia  Discussed that certainly poor sleep can be manifestation of all the stressors and possibly given that she has had such a stressful year the culmination of all the stressors resulting may be part of her difficulty with sleeping.  She would like to see how she feels if she just gets her sleep back on track.  Feels her mood has been quite good and her last PHQ was at goal she does not feel it is different.  We will start trazodone 50 to 100 mg could increase the dose further to 150 and see if this helps.  Discussed that she also could try Unisom or over-the-counter doxylamine.  - traZODone (DESYREL) 50 MG tablet; Take 1-2 tablets ( mg) by mouth At Bedtime    Major depressive disorder, recurrent episode, in full remission (H)  Discussed if her symptoms are related to more anxiety or depression that we should start the sertraline and see how she feels.  Will reassess in about 2 to 3 weeks    Reviewed her chart and it does not look like she is on anything for.  Control and endometrial protection.  Given her history of PCOS will see if she is interested in either an IUD or some form of birth control for uterine protection                12 minutes spent on the date of the encounter doing patient visit and reviewing her chart               No follow-ups on file.    Violet Humphreys MD  [facility]     Ashley is a 40 year old who presents to clinic today for the following health issues     HPI       Depression Followup    How are you doing with your depression since your last visit? Improved a little     Are you having other symptoms that might be associated with  depression? Yes:  sleep problems      INSOMNIA:  Just can't fall asleep  Started the whole month  Did not start zoloft  Has tried trazodone years  - not sure if this worked thinks maybe the dose was not high enough  She had a lot of stressors which recently have actually lifted and now is feeling that she is having a hard time falling asleep.  Her father is in a nursing home, she is a single mom and her son is improving in terms of sleep and adjustment.  She does have help from her mother.  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  Did try Unisom for pregnancy related nausea but did not notice a lot of fatigue with this    Have you had a significant life event?  No     Are you feeling anxious or having panic attacks?   Yes:  anxious     Do you have any concerns with your use of alcohol or other drugs? No    Social History     Tobacco Use     Smoking status: Never Smoker     Smokeless tobacco: Never Used   Substance Use Topics     Alcohol use: Yes     Alcohol/week: 1.0 - 4.0 standard drinks     Frequency: 4 or more times a week     Drinks per session: 3 or 4     Comment: 6 drinks/week     Drug use: No     PHQ 6/19/2020 10/22/2020 12/17/2020   PHQ-9 Total Score 2 5 6   Q9: Thoughts of better off dead/self-harm past 2 weeks Not at all Not at all Not at all   Some encounter information is confidential and restricted. Go to Review Flowsheets activity to see all data.     MAXIMO-7 SCORE 11/9/2017 9/15/2018 10/28/2019   Total Score - - -   Total Score - 3 (minimal anxiety) -   Total Score 12 3 3     Last PHQ-9 12/17/2020   1.  Little interest or pleasure in doing things 1   2.  Feeling down, depressed, or hopeless 1   3.  Trouble falling or staying asleep, or sleeping too much 2   4.  Feeling tired or having little energy 1   5.  Poor  appetite or overeating 0   6.  Feeling bad about yourself 1   7.  Trouble concentrating 0   8.  Moving slowly or restless 0   Q9: Thoughts of better off dead/self-harm past 2 weeks 0   PHQ-9 Total Score 6   Difficulty at work, home, or with people Somewhat difficult         Suicide Assessment Five-step Evaluation and Treatment (SAFE-T)      How many servings of fruits and vegetables do you eat daily?  2-3    On average, how many sweetened beverages do you drink each day (Examples: soda, juice, sweet tea, etc.  Do NOT count diet or artificially sweetened beverages)?   2    How many days per week do you exercise enough to make your heart beat faster? 3 or less    How many minutes a day do you exercise enough to make your heart beat faster? 9 or less    How many days per week do you miss taking your medication? 0 but patient has not started taking zoloft 25MG but patient did  medication         Review of Systems   Constitutional, HEENT, cardiovascular, pulmonary, gi and gu systems are negative, except as otherwise noted.      Objective           Vitals:  No vitals were obtained today due to virtual visit.    Physical Exam   GENERAL: Healthy, alert and no distress  EYES: Eyes grossly normal to inspection.  No discharge or erythema, or obvious scleral/conjunctival abnormalities.  RESP: No audible wheeze, cough, or visible cyanosis.  No visible retractions or increased work of breathing.    SKIN: Visible skin clear. No significant rash, abnormal pigmentation or lesions.  NEURO: Cranial nerves grossly intact.  Mentation and speech appropriate for age.  PSYCH: Mentation appears normal, affect normal/bright, judgement and insight intact, normal speech and appearance well-groomed.                Video-Visit Details    Type of service:  Video Visit    Video End Time:5:51    Originating Location (pt. Location): Home    Distant Location (provider location):  [facility]     Platform used for Video  Visit: Natividad

## 2021-02-12 ENCOUNTER — VIRTUAL VISIT (OUTPATIENT)
Dept: PSYCHOLOGY | Facility: CLINIC | Age: 41
End: 2021-02-12
Payer: COMMERCIAL

## 2021-02-12 DIAGNOSIS — F33.0 MAJOR DEPRESSIVE DISORDER, RECURRENT EPISODE, MILD (H): Primary | ICD-10-CM

## 2021-02-12 DIAGNOSIS — E66.01 PSYCHOLOGICAL FACTORS AFFECTING MORBID OBESITY (H): ICD-10-CM

## 2021-02-12 DIAGNOSIS — F54 PSYCHOLOGICAL FACTORS AFFECTING MORBID OBESITY (H): ICD-10-CM

## 2021-02-12 PROCEDURE — 90837 PSYTX W PT 60 MINUTES: CPT | Mod: GT | Performed by: PSYCHOLOGIST

## 2021-02-12 NOTE — PROGRESS NOTES
Health Psychology                     Department of Medicine  Julianne Moreno, Ph.D., L.P. [phone]                          [facility] Lis Chavez, Ph.D., L.P. [phone]                   [place] Mail Code 741   Earline Justin, Ph.D.,  L.P. [phone]        [address] [place] [address] Donald Hills, Ph.D. [phone]       [place] [address]           Susana Ugarte, Ph.D., L.P. [phone]    [facility]   Jayro Elias, Ph.D., AMITA.B.ROB., L.P. [phone]  [address] [place] [address] Melissa Stokes, Ph.D., L.P. [phone]     Health Psychology Follow-Up Telemental Health Note    Ashley Catherine is a 40-year-old single, Black woman referred initially for psychological consultation for workup for a gastric sleeve procedure who is seen for CBT-oriented therapy regardingt weight management, work stresses, and family and social matters. She is seen for problem-solving and supportive counseling.  Intake was 4/1/13.    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 She got the second vaccination, has been feeling good.  Her mother got her first.   Her father declines, waiting for the J&J.  He has medical/neurological problems resulted in hospitalization and placement in NH in [place].  She is feeling guilty about it, but realizes he needed it as he was increasingly dangerous (e.g., sleeping with a knife and leaving it where her son plays; not safely driving).  They have had video visits. She is relcutant to visit yet as he adapts to it.  He goes in and out of lucidity.     Her son is getting 3/week therapy at [facility] near [facility], and she is optimistic it will help with eating, sensory issues..  He has food aversion.  He also  may have language delays.   She can see some progress.      Did not focus on  weight management today but did discuss taking good care of herself.  She is concerned about weight gain.    She feels more  aware of sadness around her patients, and has had to limit her social contacts.  She laments not being betzaida to resume her pre-COVID and pre-[place] activities, such as travel.  She has dreams about job change but doesn't see it as realistic as her current schedule allows her to apaicpate heavily in her son's life.  She apears tired.  She participates fully. Rapport was excellent.    She apologized for not returning the tx plan and will for next session.    She was not  weighed today as it is a virtual session and we did not address it.      Wt Readings from Last 4 Encounters:   10/28/20 98.9 kg (218 lb)   10/22/20 97.9 kg (215 lb 12.8 oz)   07/02/20 99.3 kg (219 lb)   01/31/20 104 kg (229 lb 3.2 oz)     There is no height or weight on file to calculate BMI.     Current Outpatient Medications   Medication     acetaminophen (TYLENOL) 32 mg/mL liquid     Alcohol Swabs XX PADS     Cholecalciferol (VITAMIN D3) 25 MCG (1000 UT) CAPS     cyanocobalamin (VITAMIN B-12) 500 MCG SUBL sublingual tablet     cyclobenzaprine (FLEXERIL) 10 MG tablet     hydrochlorothiazide (HYDRODIURIL) 25 MG tablet     ibuprofen (ADVIL/MOTRIN) 200 MG capsule     insulin pen needle (31G X 5 MM) 31G X 5 MM miscellaneous     liraglutide - Weight Management (SAXENDA) 18 MG/3ML pen     lisinopril (ZESTRIL) 20 MG tablet     Multiple Vitamins-Minerals (ONE DAILY CALCIUM/IRON PO)     naltrexone (DEPADE/REVIA) 50 MG tablet     polyethylene glycol (MIRALAX) powder     sertraline (ZOLOFT) 25 MG tablet     traZODone (DESYREL) 50 MG tablet     No current facility-administered medications for this visit.      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14    hospitalized at [facility] x 1 week     History of steroid therapy      HX ABNL PAP  SMEAR OF CERVIX   1995    LEEP AT 15 yo     Hypertension 2004     Hypertrophy of breast      Hypertrophy of tonsils alone      Hypovitaminosis D     with secondary high PTH     Irregular heart beat     palpitations     LBP (low back pain)      LSIL (low grade squamous intraepithelial lesion) on Pap smear 5/2006     Lumbago     RESOLVED     Major depressive disorder, recurrent episode, in partial or unspecified remission 4/1/2013     Morbid obesity (H)     bariatric surgery 5/13/2013     Multiple thyroid nodules      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)      OLIGOMENORRHEA, C/W PCOS      Sciatica      SLEEP APNEA 6/2002    No longer has since tonsillectomy and septoplasty     Thyroid nodule      Past Surgical History:   Procedure Laterality Date     BIOPSY  03/13/2015    Thyroid nodule     ESOPHAGOSCOPY, GASTROSCOPY, DUODENOSCOPY (EGD), COMBINED  5/28/2013    Procedure: COMBINED ESOPHAGOSCOPY, GASTROSCOPY, DUODENOSCOPY (EGD);;  Surgeon: Best Willett MD;  Location:  GI     ESOPHAGOSCOPY, GASTROSCOPY, DUODENOSCOPY (EGD), COMBINED N/A 6/13/2018    Procedure: COMBINED ESOPHAGOSCOPY, GASTROSCOPY, DUODENOSCOPY (EGD), BIOPSY SINGLE OR MULTIPLE;  gastroscopy;  Surgeon: Westley Gibbs MD;  Location: [facility]     LAPAROSCOPIC GASTRIC SLEEVE  5/13/2013    Procedure: LAPAROSCOPIC GASTRIC SLEEVE;  Laparoscopic Sleeve Gastrectomy ;  Surgeon: Best Willett MD;  Location: [facility] OR     LEEP TX, CERVICAL  1995    age 15     partial thyroidectomy  2018     SEPTOPLASTY       THYROIDECTOMY Left 4/3/2018    Procedure: THYROIDECTOMY;  Left Thyroid Lobectomy And Ishtmusectomy;  Surgeon: Delia Harper MD;  [facility]: [facility]     TONSILLECTOMY  age 20s     TONSILLECTOMY  2004?     wisdom teeth extraction       PHQ-9 SCORE 6/19/2020 10/22/2020 12/17/2020   PHQ-9 Total Score - - -   PHQ-9 Total Score MyChart - - -   PHQ-9 Total Score 2 5 6     MAXIMO-7 SCORE 11/9/2017 9/15/2018 10/28/2019   Total Score -  - -   Total Score - 3 (minimal anxiety) -   Total Score 12 3 3     WHODAS 2.0 Total Score 12/4/2018 1/31/2020   Total Score 18 15   Total Score MyChart 18 15     She is  5 foot 11 inches.  Her long-term overall goal is 175, obviously on hold until after the pregnancy.   She participates fully. Rapport is excellent.       This telehealth service is appropriate and effective for delivering services in light of the necessity for social distancing to mitigate the COVID-19 epidemic and for conservation of PPE.     Patient has agreed to receiving telehealth services after being informed about it: Yes    Patient prefers video invitation/information to be sent by:   email    Time service started: 10:03  Time service ended:  10:58  Extended session due to complexity of case and length of interval.    Mode of transmission: [email or web address]    Location of originating:  Home of the patient    Distance site:  Home office of provider for [facility]    The patient has been notified that:  Video visits will be conducted via a call with their psychologist to provide the care they need with a video conversation. Video visits may be billed at different rates depending on insurance coverage.  Patients are advised to please contact their insurance provider with any questions about their health insurance coverage. If during the course of a call the psychologist feels a video visit is not appropriate, patients will not be charged for this service.  Diagnosis:   Psychological factors affecting obesity (F54).   Major depression, recurrent, mild (F33.0).   Occupational Problems (Z56.9)    PLAN/RECOMMENDATIONS:   1. Ms. Catherine will return 3/24  @ 10:00 to address weight loss and social issues with problem solving therapy, which is medically necessary.   She wishes to continue to get support here with her life changes and her son's behavioral/developmental challenges.  2.  Resume schedule of regular exercise to the level that is possible.      Last  treatment plan signed: 12/13/2019  Treatment plan due:  11/13/2020 (was sent a copy to complete for next session)

## 2021-03-23 ENCOUNTER — VIRTUAL VISIT (OUTPATIENT)
Dept: PSYCHOLOGY | Facility: CLINIC | Age: 41
End: 2021-03-23
Payer: COMMERCIAL

## 2021-03-23 DIAGNOSIS — E66.01 PSYCHOLOGICAL FACTORS AFFECTING MORBID OBESITY (H): ICD-10-CM

## 2021-03-23 DIAGNOSIS — F33.0 MAJOR DEPRESSIVE DISORDER, RECURRENT EPISODE, MILD (H): Primary | ICD-10-CM

## 2021-03-23 DIAGNOSIS — Z56.9 OCCUPATIONAL PROBLEM: ICD-10-CM

## 2021-03-23 DIAGNOSIS — F54 PSYCHOLOGICAL FACTORS AFFECTING MORBID OBESITY (H): ICD-10-CM

## 2021-03-23 PROCEDURE — 90837 PSYTX W PT 60 MINUTES: CPT | Mod: GT | Performed by: PSYCHOLOGIST

## 2021-03-23 SDOH — ECONOMIC STABILITY - INCOME SECURITY: UNSPECIFIED PROBLEMS RELATED TO EMPLOYMENT: Z56.9

## 2021-03-23 NOTE — PROGRESS NOTES
Health Psychology                     Department of Medicine  Julianne Moreno, Ph.D., L.P. [phone]                          [facility] Lis Chavez, Ph.D., L.P. [phone]                   [place] Mail Code 418   Donald Hills, Ph.D. [phone]        [address] [place] [address] Susaan Ugarte, Ph.D., L.P. [phone]    [place] [address]           Jayro Elias, Ph.D., A.B.P.P., L.P. [phone]        Melissa Stokes, Ph.D., L.P. [phone]  [address]     Health Psychology Follow-Up [facility] Note    Ashley Catherine is a 41-year-old single, Black woman referred initially for psychological consultation for workup for a gastric sleeve procedure who is seen for CBT-oriented therapy regardingt weight management, work stresses, and family and social matters. She is seen for problem-solving and supportive counseling.  Intake was 4/1/13.    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 She got the second vaccination, has been feeling good.  Her mother got her's  Her father declines, waiting for the J&J.  He has medical/neurological problems resulted in hospitalization and placement in NH in [place].  Since then he has been in a LTCF.  She hasn't seen him.  Today he became agitated about a chair, fell, hit his head so was at [facility] during the session.  We discussed re-pushing for vaccination.     Her son is getting 1/week therapy at [facility] near [facility], and she is optimistic it will help with eating, sensory issues..  He has food aversion.  He also  may have language delays.   She can see some progress with the therapy so they are cutting back on frequency. He is also  Taking swimming lessons.  She is feeling the bond is growing.  We discussed activities that might appeal to him that they could do together, including exercise (e.g., biking with him).     We  addressed but did not focus on  weight management today.  She is concerned about weight gain.  She is willing to increase exercise.  She  Estimates 10,000 to 12,000 steps per day when at work for 12-hour shifts.    She apears tired, but ore open to activity..  She participates fully. Rapport was excellent.    Sent reminder to complete treatment plan.    She was not  weighed today as it is a virtual session and we did not address it.      Wt Readings from Last 4 Encounters:   10/28/20 98.9 kg (218 lb)   10/22/20 97.9 kg (215 lb 12.8 oz)   07/02/20 99.3 kg (219 lb)   01/31/20 104 kg (229 lb 3.2 oz)     There is no height or weight on file to calculate BMI.     Current Outpatient Medications   Medication     acetaminophen (TYLENOL) 32 mg/mL liquid     Alcohol Swabs XX PADS     Cholecalciferol (VITAMIN D3) 25 MCG (1000 UT) CAPS     cyanocobalamin (VITAMIN B-12) 500 MCG SUBL sublingual tablet     cyclobenzaprine (FLEXERIL) 10 MG tablet     hydrochlorothiazide (HYDRODIURIL) 25 MG tablet     ibuprofen (ADVIL/MOTRIN) 200 MG capsule     insulin pen needle (31G X 5 MM) 31G X 5 MM miscellaneous     liraglutide - Weight Management (SAXENDA) 18 MG/3ML pen     lisinopril (ZESTRIL) 20 MG tablet     Multiple Vitamins-Minerals (ONE DAILY CALCIUM/IRON PO)     naltrexone (DEPADE/REVIA) 50 MG tablet     polyethylene glycol (MIRALAX) powder     sertraline (ZOLOFT) 25 MG tablet     traZODone (DESYREL) 50 MG tablet     No current facility-administered medications for this visit.      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14    hospitalized at [facility] x 1 week     History of steroid therapy      HX ABNL PAP SMEAR OF CERVIX   1995    LEEP AT 15 yo     Hypertension 2004     Hypertrophy of breast      Hypertrophy  of tonsils alone      Hypovitaminosis D     with secondary high PTH     Irregular heart beat     palpitations     LBP (low back pain)      LSIL (low grade squamous intraepithelial lesion) on Pap smear 5/2006     Lumbago     RESOLVED     Major depressive disorder, recurrent episode, in partial or unspecified remission 4/1/2013     Morbid obesity (H)     bariatric surgery 5/13/2013     Multiple thyroid nodules      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)      OLIGOMENORRHEA, C/W PCOS      Sciatica      SLEEP APNEA 6/2002    No longer has since tonsillectomy and septoplasty     Thyroid nodule      Past Surgical History:   Procedure Laterality Date     BIOPSY  03/13/2015    Thyroid nodule     ESOPHAGOSCOPY, GASTROSCOPY, DUODENOSCOPY (EGD), COMBINED  5/28/2013    Procedure: COMBINED ESOPHAGOSCOPY, GASTROSCOPY, DUODENOSCOPY (EGD);;  Surgeon: Best Willett MD;  Location:  GI     ESOPHAGOSCOPY, GASTROSCOPY, DUODENOSCOPY (EGD), COMBINED N/A 6/13/2018    Procedure: COMBINED ESOPHAGOSCOPY, GASTROSCOPY, DUODENOSCOPY (EGD), BIOPSY SINGLE OR MULTIPLE;  gastroscopy;  Surgeon: Westley Gibbs MD;  Location: [facility]     LAPAROSCOPIC GASTRIC SLEEVE  5/13/2013    Procedure: LAPAROSCOPIC GASTRIC SLEEVE;  Laparoscopic Sleeve Gastrectomy ;  Surgeon: Best Willett MD;  Location:  OR     LEEP TX, CERVICAL  1995    age 15     partial thyroidectomy  2018     SEPTOPLASTY       THYROIDECTOMY Left 4/3/2018    Procedure: THYROIDECTOMY;  Left Thyroid Lobectomy And Ishtmusectomy;  Surgeon: Delia Harper MD;  [facility]: [facility]     TONSILLECTOMY  age 20s     TONSILLECTOMY  2004?     wisdom teeth extraction       PHQ-9 SCORE 6/19/2020 10/22/2020 12/17/2020   PHQ-9 Total Score - - -   PHQ-9 Total Score MyChart - - -   PHQ-9 Total Score 2 5 6     MAXIMO-7 SCORE 11/9/2017 9/15/2018 10/28/2019   Total Score - - -   Total Score - 3 (minimal anxiety) -   Total Score 12 3 3     WHODAS 2.0 Total Score 12/4/2018  1/31/2020   Total Score 18 15   Total Score MyChart 18 15     She is  5 foot 11 inches.  Her long-term overall goal is 175, obviously on hold until after the pregnancy.   She participates fully. Rapport is excellent.       This telehealth service is appropriate and effective for delivering services in light of the necessity for social distancing to mitigate the COVID-19 epidemic and for conservation of PPE.     Patient has agreed to receiving telehealth services after being informed about it: Yes    Patient prefers video invitation/information to be sent by:   email    Time service started: 5;04  Time service ended:  5:58  Extended session due to complexity of case and length of interval.    Mode of transmission: [email or web address].[email or web address]    Location of originating:  Home of the patient    Distance site:  Home office of provider for [facility]    The patient has been notified that:  Video visits will be conducted via a call with their psychologist to provide the care they need with a video conversation. Video visits may be billed at different rates depending on insurance coverage.  Patients are advised to please contact their insurance provider with any questions about their health insurance coverage. If during the course of a call the psychologist feels a video visit is not appropriate, patients will not be charged for this service.  Diagnosis:   Psychological factors affecting obesity (F54).   Major depression, recurrent, mild (F33.0).   Occupational Problems (Z56.9)    PLAN/RECOMMENDATIONS:   1. Ms. Catherine will return 4/30  @ 11:00 to address weight loss and social issues with problem solving therapy, which is medically necessary.   She wishes to continue to get support here with her life changes and her son's behavioral/developmental challenges.  2.  Resume schedule of regular exercise to the level that is possible.      Last treatment plan signed: 12/13/2019  Treatment plan due:  11/13/2020 (was re-sent a copy to complete for next  session)

## 2021-04-30 ENCOUNTER — VIRTUAL VISIT (OUTPATIENT)
Dept: PSYCHOLOGY | Facility: CLINIC | Age: 41
End: 2021-04-30
Payer: COMMERCIAL

## 2021-04-30 DIAGNOSIS — F33.0 MAJOR DEPRESSIVE DISORDER, RECURRENT EPISODE, MILD (H): Primary | ICD-10-CM

## 2021-04-30 DIAGNOSIS — F54 PSYCHOLOGICAL FACTORS AFFECTING MORBID OBESITY (H): ICD-10-CM

## 2021-04-30 DIAGNOSIS — E66.01 PSYCHOLOGICAL FACTORS AFFECTING MORBID OBESITY (H): ICD-10-CM

## 2021-04-30 PROCEDURE — 90837 PSYTX W PT 60 MINUTES: CPT | Mod: GT | Performed by: PSYCHOLOGIST

## 2021-04-30 NOTE — PROGRESS NOTES
Health Psychology                     Department of Medicine  Julianne Moreno, Ph.D., L.P. [phone]                          [facility] Lis Chavez, Ph.D., L.P. [phone]                   [place] Mail Code 252   Donald Hills, Ph.D. [phone]        [address] [place] [address] Susana Ugarte, Ph.D., L.P. [phone]    [place] [address]           Jayro Elias, Ph.D., A.B.P.P., L.P. [phone]        Melissa Stokes, Ph.D., L.P. [phone]  [address]     Health Psychology Follow-Up [facility] Note    Ashley Catherine is a 41-year-old single woman referred initially for psychological consultation for workup for a gastric sleeve procedure who is seen for CBT-oriented therapy regardingt weight management, work stresses, and family and social matters. She is seen for problem-solving and supportive counseling.  Intake was 4/1/13.    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 She got the second vaccination, has been feeling good.  Her mother got her's  Her father declines, waiting for the J&J.  He is in NH in [place].  She hasn't seen him.  We discussed the vaccination for him as well, though he has been opposed.     Her son is getting 1/week therapy at [facility] near [facility], and she is optimistic it will help with eating, sensory issues.  He has food aversion.  There has been slow but steady progress.   He also  may have language delays.He is also taking swimming lessons, which is good for him physically and socially.   They are going to zoos q 2 weeks, and trying to do more together.  He is curious, takes things apart, is interested in TV.  She is more relaxed about him, but tired due to the extra appointments he needs.    We addressed but did not focus on  weight management today.  She is willing to increase exercise.    She appears less tired She participates fully.  Rapport was excellent.    A treatment plan was completed.    She was not  weighed today as it is a virtual session and we did not address it.      Wt Readings from Last 4 Encounters:   10/28/20 98.9 kg (218 lb)   10/22/20 97.9 kg (215 lb 12.8 oz)   07/02/20 99.3 kg (219 lb)   01/31/20 104 kg (229 lb 3.2 oz)     There is no height or weight on file to calculate BMI.     Current Outpatient Medications   Medication     acetaminophen (TYLENOL) 32 mg/mL liquid     Alcohol Swabs XX PADS     Cholecalciferol (VITAMIN D3) 25 MCG (1000 UT) CAPS     cyanocobalamin (VITAMIN B-12) 500 MCG SUBL sublingual tablet     cyclobenzaprine (FLEXERIL) 10 MG tablet     hydrochlorothiazide (HYDRODIURIL) 25 MG tablet     ibuprofen (ADVIL/MOTRIN) 200 MG capsule     insulin pen needle (31G X 5 MM) 31G X 5 MM miscellaneous     liraglutide - Weight Management (SAXENDA) 18 MG/3ML pen     lisinopril (ZESTRIL) 20 MG tablet     Multiple Vitamins-Minerals (ONE DAILY CALCIUM/IRON PO)     naltrexone (DEPADE/REVIA) 50 MG tablet     polyethylene glycol (MIRALAX) powder     sertraline (ZOLOFT) 25 MG tablet     traZODone (DESYREL) 50 MG tablet     No current facility-administered medications for this visit.      Past Medical History:   Diagnosis Date     Bariatric surgery status 05/13/2013     Depression      Depressive disorder      Esophageal reflux      Family history of hyperparathyroidism 3/6/2015     Forearm fracture childhood    jumping fall     Guillain-Ramsey disease (H) age 14    hospitalized at [facility] x 1 week     History of steroid therapy      HX ABNL PAP SMEAR OF CERVIX   1995    LEEP AT 15 yo     Hypertension 2004     Hypertrophy of breast      Hypertrophy of tonsils alone      Hypovitaminosis D     with secondary high PTH     Irregular heart beat     palpitations     LBP (low back pain)      LSIL (low grade squamous intraepithelial lesion) on Pap smear 5/2006     Lumbago     RESOLVED     Major depressive disorder, recurrent episode, in partial  or unspecified remission 4/1/2013     Morbid obesity (H)     bariatric surgery 5/13/2013     Multiple thyroid nodules      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)      OLIGOMENORRHEA, C/W PCOS      Sciatica      SLEEP APNEA 6/2002    No longer has since tonsillectomy and septoplasty     Thyroid nodule      Past Surgical History:   Procedure Laterality Date     BIOPSY  03/13/2015    Thyroid nodule     ESOPHAGOSCOPY, GASTROSCOPY, DUODENOSCOPY (EGD), COMBINED  5/28/2013    Procedure: COMBINED ESOPHAGOSCOPY, GASTROSCOPY, DUODENOSCOPY (EGD);;  Surgeon: Best Willett MD;  Location:  GI     ESOPHAGOSCOPY, GASTROSCOPY, DUODENOSCOPY (EGD), COMBINED N/A 6/13/2018    Procedure: COMBINED ESOPHAGOSCOPY, GASTROSCOPY, DUODENOSCOPY (EGD), BIOPSY SINGLE OR MULTIPLE;  gastroscopy;  Surgeon: Westley Gibbs MD;  Location:  GI     LAPAROSCOPIC GASTRIC SLEEVE  5/13/2013    Procedure: LAPAROSCOPIC GASTRIC SLEEVE;  Laparoscopic Sleeve Gastrectomy ;  Surgeon: Best Willett MD;  Location:  OR     LEEP TX, CERVICAL  1995    age 15     partial thyroidectomy  2018     SEPTOPLASTY       THYROIDECTOMY Left 4/3/2018    Procedure: THYROIDECTOMY;  Left Thyroid Lobectomy And Ishtmusectomy;  Surgeon: Delia Harper MD;  [facility]     TONSILLECTOMY  age 20s     TONSILLECTOMY  2004?     wisdom teeth extraction       PHQ-9 SCORE 6/19/2020 10/22/2020 12/17/2020   PHQ-9 Total Score - - -   PHQ-9 Total Score MyChart - - -   PHQ-9 Total Score 2 5 6     MAXIMO-7 SCORE 11/9/2017 9/15/2018 10/28/2019   Total Score - - -   Total Score - 3 (minimal anxiety) -   Total Score 12 3 3     WHODAS 2.0 Total Score 12/4/2018 1/31/2020   Total Score 18 15   Total Score MyChart 18 15     She is  5 foot 11 inches.  Her long-term overall goal is 175 She participates fully. Rapport is excellent.       This telehealth service is appropriate and effective for delivering services in light of the necessity for social  distancing to mitigate the COVID-19 epidemic and for conservation of PPE.     Patient has agreed to receiving telehealth services after being informed about it: Yes    Patient prefers video invitation/information to be sent by:   email    Time service started: 11:01  Time service ended: 11:55  Extended session due to complexity of case and length of interval.    Mode of transmission: Doxy.me    Location of originating:  Home of the patient    Distance site:  Home office of provider for [facility]    The patient has been notified that:  Video visits will be conducted via a call with their psychologist to provide the care they need with a video conversation. Video visits may be billed at different rates depending on insurance coverage.  Patients are advised to please contact their insurance provider with any questions about their health insurance coverage. If during the course of a call the psychologist feels a video visit is not appropriate, patients will not be charged for this service.  Diagnosis:   Psychological factors affecting obesity (F54).   Major depression, recurrent, mild (F33.0).   Occupational Problems (Z56.9)    PLAN/RECOMMENDATIONS:   1. Ms. Catherine will return 5/28  @ 11:00 to address weight loss and social issues with problem solving therapy, which is medically necessary.   She wishes to continue to get support here with her life changes and her son's behavioral/developmental challenges.  2.  Resume schedule of regular exercise to the level that is possible.      Last treatment plan signed: 4/30/2021  Treatment plan due:  4/30/2022

## 2021-05-10 ENCOUNTER — TELEPHONE (OUTPATIENT)
Dept: ENDOCRINOLOGY | Facility: CLINIC | Age: 41
End: 2021-05-10

## 2021-05-10 NOTE — TELEPHONE ENCOUNTER
"Prior Authorization Retail Medication Request    Medication/Dose: Saxenda  ICD code (if different than what is on RX):    Bariatric surgery status [Z98.84]       Obesity, Class I, BMI 30-34.9 [E66.9]           Previously Tried and Failed: Phentermine, Qsymia, Contrave, Weight loss surgery, history of diet and exercise  Rationale:  Hx of sleeve in 2013 with some weight regain.  Lost from 290 to 186 after surgery.   Started phentermine and tried for 2 months and had \"dark thoughts\". She did lose 7 lbs while taking it  She has never tried the naltrexone   She is still taking Saxenda 3mg dose and she did lose 12 lbs and feels it is helping maintain weight     Lowest weight was 184 lbs when taking Qsymia (tingling and mood changes)  Contrave she didn't like BID dosing and always forgot 2nd dose    Insurance Name:    Insurance ID:        Pharmacy Information (if different than what is on RX)  Name:  [id number] [address] [facility]  Phone:  [phone]  "

## 2021-05-12 NOTE — TELEPHONE ENCOUNTER
Central Prior Authorization Team   Phone: [phone]      PA Initiation    Medication: Saxenda-PA initiated  Insurance Company: [employer] - Phone [phone] Fax [phone]  Pharmacy Filling the Rx: [id number] - [place] - [address]  Filling Pharmacy Phone: [phone]  Filling Pharmacy Fax:    Start Date: 5/12/2021

## 2021-05-12 NOTE — TELEPHONE ENCOUNTER
I need to have patient's current weight in order to proceed with PA. I left a voicemail for her to call me back with that information.

## 2021-05-17 NOTE — TELEPHONE ENCOUNTER
PRIOR AUTHORIZATION DENIED    Medication: Saxenda-PA denied    Denial Date: 5/16/2021    Denial Rational: Has not demonstrated success on the medication.        Appeal Information:

## 2021-05-18 ENCOUNTER — VIRTUAL VISIT (OUTPATIENT)
Dept: ENDOCRINOLOGY | Facility: CLINIC | Age: 41
End: 2021-05-18
Payer: COMMERCIAL

## 2021-05-18 VITALS — HEIGHT: 71 IN | WEIGHT: 236 LBS | BODY MASS INDEX: 33.04 KG/M2

## 2021-05-18 DIAGNOSIS — E66.811 OBESITY, CLASS I, BMI 30-34.9: Primary | ICD-10-CM

## 2021-05-18 DIAGNOSIS — Z98.84 S/P LAPAROSCOPIC SLEEVE GASTRECTOMY: ICD-10-CM

## 2021-05-18 PROCEDURE — 99214 OFFICE O/P EST MOD 30 MIN: CPT | Mod: GT | Performed by: INTERNAL MEDICINE

## 2021-05-18 RX ORDER — TOPIRAMATE 25 MG/1
TABLET, FILM COATED ORAL
Qty: 90 TABLET | Refills: 2 | Status: SHIPPED | OUTPATIENT
Start: 2021-05-18 | End: 2021-08-03

## 2021-05-18 RX ORDER — NALTREXONE HYDROCHLORIDE 50 MG/1
50 TABLET, FILM COATED ORAL DAILY
Qty: 90 TABLET | Refills: 1 | Status: SHIPPED | OUTPATIENT
Start: 2021-05-18 | End: 2021-10-05

## 2021-05-18 ASSESSMENT — PAIN SCALES - GENERAL: PAINLEVEL: NO PAIN (0)

## 2021-05-18 ASSESSMENT — MIFFLIN-ST. JEOR: SCORE: 1823.68

## 2021-05-18 NOTE — NURSING NOTE
"Chief Complaint   Patient presents with     Follow Up     [facility] follow up       Vitals:    05/18/21 1437   Weight: 107 kg (236 lb)   Height: 1.791 m (5' 10.5\")       Body mass index is 33.38 kg/m .                              "

## 2021-05-18 NOTE — PROGRESS NOTES
"Return Medical Weight Management Note     Ashley Catherine  MRN:  [id number]  :  1980  RAKEL:  2021    Dear Violet Humphreys MD,    I had the pleasure of seeing your patient Ashley Catherine. She is a 40 year old female who I am continuing to see for treatment of obesity related to mild depression, hypertension, GERD       5/15/2017   I have the following health issues associated with obesity: High Blood Pressure, GERD (Reflux)   I have the following symptoms associated with obesity: GERD (Reflux)     She has hx of sleeve gastrectomy in . She lost from 290 to 186 after surgery with subsequent weight regain.  Was on phentermine and tried for 2 months and had \"dark thought\". She did lose 7 lbs while taking it.  Lowest weight was 184 lbs when taking Qsymia (tingling and mood changes)    INTERVAL HISTORY:  [facility] follow up. Last seen 10/28/2020 with [facility].    She said that she had major stressor in Dec 2020. She does not feel that Saxenda is working anymore. She did lose 12 lbs but it did not feel that it is working anymore. She ran out of [employer] PF last month.     She has been on naltrexone 1/2 pill x3 weeks ago and just recently increased to the whole pill     Weight is up from 220 to 236 lbs in the past 6 months.    She does not feel that food choice has changed. She feels more hungry in the evening. She eats more toward the end of the day    Activity -- up a lot during spring and summer, walk daily    CURRENT WEIGHT:   236 lbs 0 oz    Highest wt in life: 293 lbs  Initial Weight (lbs): 274 lbs  Last Visits Weight: 99.3 kg (219 lb)  Cumulative weight loss (lbs): 38  Weight Loss Percentage: 13.87%    Changes and Difficulties 2021   I have made the following changes to my diet since my last visit: Same   With regards to my diet, I am still struggling with: Hunger   I have made the following changes to my activity/exercise since my last visit: Walk more outdoors   With regards to my activity/exercise, " "I am still struggling with: Consistent exercise       VITALS:  Ht 1.791 m (5' 10.5\")   Wt 107 kg (236 lb)   BMI 33.38 kg/m      MEDICATIONS:   Current Outpatient Medications   Medication Sig Dispense Refill     acetaminophen (TYLENOL) 32 mg/mL liquid Take 1,000 mg by mouth every 8 hours as needed for fever or mild pain       Alcohol Swabs XX PADS 1 each as needed (injections) 100 each 3     Cholecalciferol (VITAMIN D3) 25 MCG (1000 UT) CAPS        cyanocobalamin (VITAMIN B-12) 500 MCG SUBL sublingual tablet Place 500 mcg under the tongue every other day        cyclobenzaprine (FLEXERIL) 10 MG tablet Take 1 tablet (10 mg) by mouth daily as needed for muscle spasms 45 tablet 4     hydrochlorothiazide (HYDRODIURIL) 25 MG tablet Take 1 tablet (25 mg) by mouth daily 90 tablet 3     ibuprofen (ADVIL/MOTRIN) 200 MG capsule Take 400 mg by mouth as needed for fever       lisinopril (ZESTRIL) 20 MG tablet Take 1 tablet (20 mg) by mouth daily 90 tablet 3     Multiple Vitamins-Minerals (ONE DAILY CALCIUM/IRON PO) Take 1 tablet by mouth daily       naltrexone (DEPADE/REVIA) 50 MG tablet Take 1/2 tablet once daily 1-2 hours prior to worst cravings for 1 week, then increase to 1 tablet daily as directed if tolerating 30 tablet 1     polyethylene glycol (MIRALAX) powder Take 17 g (1 capful) by mouth daily as needed for constipation 510 g 1     insulin pen needle (31G X 5 MM) 31G X 5 MM miscellaneous Use 1 pen needles daily or as directed. (Patient not taking: Reported on 5/18/2021) 100 each 1     liraglutide - Weight Management (SAXENDA) 18 MG/3ML pen Inject 3 mg Subcutaneous daily (Patient not taking: Reported on 5/18/2021) 15 mL 5     sertraline (ZOLOFT) 25 MG tablet Take 1 tablet (25 mg) by mouth daily (Patient not taking: Reported on 1/20/2021) 30 tablet 1     traZODone (DESYREL) 50 MG tablet Take 1-2 tablets ( mg) by mouth At Bedtime 30 tablet 1       Weight Loss Medication History Reviewed With Patient 5/18/2021 "   Which weight loss medications are you currently taking on a regular basis?  Naltrexone   If you are not taking a weight loss medication that was prescribed to you, please indicate why: My insurance does not cover the cost   Are you having any side effects from the weight loss medication that we have prescribed you? No   If you are having side effects please describe: -       ASSESSMENT/PLAN:   Ashley Catherine is a 40 year old female who I am continuing to see for treatment of obesity    [facility] follow up. Hx of sleeve with some weight regain.   Gained weight in the past 6 months  Used to be on Saxenda and maintained weight -- stopped last month  Also taking naltrexone 50 mg daily  Interested in taking   Continue Saxenda 3mg daily    Plan:  - trial of phentermine 4 mg daily  - trial of topiramate 25 mg daily and titrate up to 50 mg daily  - continue naltrexone 50 mg daily  - encourage diet modification and exercise    FOLLOW-UP:    1 month    Start: 05/18/2021 02:51 pm  Stop: 05/18/2021 03:05 pm  VDO duration: 14 minutes    External notes/medical records independently reviewed, labs and imaging independently reviewed, medical management and tests to be discussed/communicated to patient.    Time: I spent 40 minutes spent on the date of the encounter preparing to see patient (including chart review and preparation), obtaining and or reviewing additional medical history, performing a physical exam and evaluation, documenting clinical information in the electronic health record, independently interpreting results, communicating results to the patient and coordinating care.      Sincerely,    Gagandeep Qureshi MD

## 2021-05-18 NOTE — PATIENT INSTRUCTIONS
Start phentermine 4 mg in the morning  Start Start topiramate: Take 25 mg (one tab) at bedtime for one week, then increase to 50 mg (two tabs) at bedtime thereafter.   Continue naltrexone 50 mg daily  Stop Saxenda    Return in 1 month    If you have any questions, please do not hesitate to call Weight management clinic at [phone] or [phone].  If you need to fax, please fax to [phone].    Sincerely,    Gagandeep Qureshi MD  Endocrinology

## 2021-05-18 NOTE — LETTER
"2021       RE: Ashley Catherine  [address]  [facility] [address]     Dear Colleague,    Thank you for referring your patient, Ashley Catherine, to the [facility] [place] at [facility]. Please see a copy of my visit note below.    Return Medical Weight Management Note     Ashley Catherine  MRN:  [id number]  :  1980  RAKEL:  2021    Dear Violet Humphreys MD,    I had the pleasure of seeing your patient Ashley Catherine. She is a 40 year old female who I am continuing to see for treatment of obesity related to mild depression, hypertension, GERD       5/15/2017   I have the following health issues associated with obesity: High Blood Pressure, GERD (Reflux)   I have the following symptoms associated with obesity: GERD (Reflux)     She has hx of sleeve gastrectomy in . She lost from 290 to 186 after surgery with subsequent weight regain.  Was on phentermine and tried for 2 months and had \"dark thought\". She did lose 7 lbs while taking it.  Lowest weight was 184 lbs when taking Qsymia (tingling and mood changes)    INTERVAL HISTORY:  [facility] follow up. Last seen 10/28/2020 with Elizabeth Park.    She said that she had major stressor in Dec 2020. She does not feel that Saxenda is working anymore. She did lose 12 lbs but it did not feel that it is working anymore. She ran out of [employer] last month.     She has been on naltrexone 1/2 pill x3 weeks ago and just recently increased to the whole pill     Weight is up from 220 to 236 lbs in the past 6 months.    She does not feel that food choice has changed. She feels more hungry in the evening. She eats more toward the end of the day    Activity -- up a lot during spring and summer, walk daily    CURRENT WEIGHT:   236 lbs 0 oz    Highest wt in life: 293 lbs  Initial Weight (lbs): 274 lbs  Last Visits Weight: 99.3 kg (219 lb)  Cumulative weight loss (lbs): 38  Weight Loss Percentage: " "13.87%    Changes and Difficulties 5/18/2021   I have made the following changes to my diet since my last visit: Same   With regards to my diet, I am still struggling with: Hunger   I have made the following changes to my activity/exercise since my last visit: Walk more outdoors   With regards to my activity/exercise, I am still struggling with: Consistent exercise       VITALS:  Ht 1.791 m (5' 10.5\")   Wt 107 kg (236 lb)   BMI 33.38 kg/m      MEDICATIONS:   Current Outpatient Medications   Medication Sig Dispense Refill     acetaminophen (TYLENOL) 32 mg/mL liquid Take 1,000 mg by mouth every 8 hours as needed for fever or mild pain       Alcohol Swabs XX PADS 1 each as needed (injections) 100 each 3     Cholecalciferol (VITAMIN D3) 25 MCG (1000 UT) CAPS        cyanocobalamin (VITAMIN B-12) 500 MCG SUBL sublingual tablet Place 500 mcg under the tongue every other day        cyclobenzaprine (FLEXERIL) 10 MG tablet Take 1 tablet (10 mg) by mouth daily as needed for muscle spasms 45 tablet 4     hydrochlorothiazide (HYDRODIURIL) 25 MG tablet Take 1 tablet (25 mg) by mouth daily 90 tablet 3     ibuprofen (ADVIL/MOTRIN) 200 MG capsule Take 400 mg by mouth as needed for fever       lisinopril (ZESTRIL) 20 MG tablet Take 1 tablet (20 mg) by mouth daily 90 tablet 3     Multiple Vitamins-Minerals (ONE DAILY CALCIUM/IRON PO) Take 1 tablet by mouth daily       naltrexone (DEPADE/REVIA) 50 MG tablet Take 1/2 tablet once daily 1-2 hours prior to worst cravings for 1 week, then increase to 1 tablet daily as directed if tolerating 30 tablet 1     polyethylene glycol (MIRALAX) powder Take 17 g (1 capful) by mouth daily as needed for constipation 510 g 1     insulin pen needle (31G X 5 MM) 31G X 5 MM miscellaneous Use 1 pen needles daily or as directed. (Patient not taking: Reported on 5/18/2021) 100 each 1     liraglutide - Weight Management (SAXENDA) 18 MG/3ML pen Inject 3 mg Subcutaneous daily (Patient not taking: Reported on " 5/18/2021) 15 mL 5     sertraline (ZOLOFT) 25 MG tablet Take 1 tablet (25 mg) by mouth daily (Patient not taking: Reported on 1/20/2021) 30 tablet 1     traZODone (DESYREL) 50 MG tablet Take 1-2 tablets ( mg) by mouth At Bedtime 30 tablet 1       Weight Loss Medication History Reviewed With Patient 5/18/2021   Which weight loss medications are you currently taking on a regular basis?  Naltrexone   If you are not taking a weight loss medication that was prescribed to you, please indicate why: My insurance does not cover the cost   Are you having any side effects from the weight loss medication that we have prescribed you? No   If you are having side effects please describe: -       ASSESSMENT/PLAN:   Ashley Catherine is a 40 year old female who I am continuing to see for treatment of obesity    [facility] follow up. Hx of sleeve with some weight regain.   Gained weight in the past 6 months  Used to be on Saxenda and maintained weight -- stopped last month  Also taking naltrexone 50 mg daily  Interested in taking   Continue Saxenda 3mg daily    Plan:  - trial of phentermine 4 mg daily  - trial of topiramate 25 mg daily and titrate up to 50 mg daily  - continue naltrexone 50 mg daily  - encourage diet modification and exercise    FOLLOW-UP:    1 month    Start: 05/18/2021 02:51 pm  Stop: 05/18/2021 03:05 pm  VDO duration: 14 minutes    External notes/medical records independently reviewed, labs and imaging independently reviewed, medical management and tests to be discussed/communicated to patient.    Time: I spent 40 minutes spent on the date of the encounter preparing to see patient (including chart review and preparation), obtaining and or reviewing additional medical history, performing a physical exam and evaluation, documenting clinical information in the electronic health record, independently interpreting results, communicating results to the patient and coordinating care.      Sincerely,    Gagandeep Qureshi  MD Ramirez is a 41 year old who is being evaluated via a billable video visit.      How would you like to obtain your AVS? MyChart  If the video visit is dropped, the invitation should be resent by: Send to e-mail at: [email or web address]  Will anyone else be joining your video visit? No       During this virtual visit the patient is located in [place], patient verifies this as the location during the entirety of this visit.     Video-Visit Details    Type of service:  Video Visit    Originating Location (pt. Location): Home    Distant Location (provider location):  [facility]NAGEMENT [facility] [place]     Platform used for Video Visit: Glass        Again, thank you for allowing me to participate in the care of your patient.      Sincerely,    Gagandeep Qureshi MD

## 2021-05-18 NOTE — PROGRESS NOTES
Ashley is a 41 year old who is being evaluated via a billable video visit.      How would you like to obtain your AVS? MyChart  If the video visit is dropped, the invitation should be resent by: Send to e-mail at: [email or web address]  Will anyone else be joining your video visit? No       During this virtual visit the patient is located in [place], patient verifies this as the location during the entirety of this visit.     Video-Visit Details    Type of service:  Video Visit    Originating Location (pt. Location): Home    Distant Location (provider location):  [facility] WE[facility]NA[facility] [place]     Platform used for Video Visit: [employer]

## 2021-06-09 DIAGNOSIS — E66.811 OBESITY, CLASS I, BMI 30-34.9: ICD-10-CM

## 2021-06-14 RX ORDER — TOPIRAMATE 25 MG/1
TABLET, FILM COATED ORAL
Qty: 90 TABLET | Refills: 2 | OUTPATIENT
Start: 2021-06-14

## 2021-06-14 NOTE — TELEPHONE ENCOUNTER
Received refill request for  topiramate (TOPAMAX   . Patient needs appointment scheduled prior to any refills. Clinic Coordinator notified and will follow up with the patient as appropriate. The pharmacy has been notified that the medication will not be refilled prior to an appointment being scheduled.

## 2021-06-22 ENCOUNTER — VIRTUAL VISIT (OUTPATIENT)
Dept: PSYCHOLOGY | Facility: CLINIC | Age: 41
End: 2021-06-22
Payer: COMMERCIAL

## 2021-06-22 DIAGNOSIS — F33.0 MAJOR DEPRESSIVE DISORDER, RECURRENT EPISODE, MILD (H): Primary | ICD-10-CM

## 2021-06-22 DIAGNOSIS — F54 PSYCHOLOGICAL FACTORS AFFECTING MORBID OBESITY (H): ICD-10-CM

## 2021-06-22 DIAGNOSIS — E66.01 PSYCHOLOGICAL FACTORS AFFECTING MORBID OBESITY (H): ICD-10-CM

## 2021-06-22 PROCEDURE — 90837 PSYTX W PT 60 MINUTES: CPT | Mod: GT | Performed by: PSYCHOLOGIST

## 2021-06-22 NOTE — PROGRESS NOTES
Health Psychology                     Department of Medicine  Julianne Moreno, Ph.D., L.P. [phone]                          [facility] Lis Chavez, Ph.D., L.P. [phone]                   [facility] Mail Code 623   Donald Hills, Ph.D. [phone]        [address] [place] [address] Susana Ugarte, Ph.D., L.P. [phone]    [place] [address]           Jayro Elias, Ph.D., A.ÁLVARO.ROB., L.P. [phone]        Melissa Stokes, Ph.D., L.P. [phone]  [address]     Health Psychology Follow-Up [facility] Health Note    Ashley Catherine is a 41-year-old single woman referred initially for psychological consultation for workup for a gastric sleeve procedure who is seen for CBT-oriented therapy regardingt weight management, work stresses, and family and social matters. She is seen for problem-solving and supportive counseling.  Intake was 4/1/13.    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 She is vaccinated, staying fairly socially apart from people.  Her mother is too and does childcare for [employer].  Her father declines, waiting for the J&J.  He is in [place] in [place].  She hasn't seen him.  We discussed the vaccination for him as well, though he has been opposed.    Her son was getting 1/week therapy at [facility] near [facility], but hit an impasse, so a new therpay team in-home is starting, [facility].. He continues with OT and speech therapy where he was. He has sensory issues.  He has food aversion. He also  may have language delays.He is also taking swimming lessons, which is good for him physically and socially.   They are going to zoos q 2 weeks, and trying to do more together. He is taking a music class.  He will begin autism evaluation at [facility] this week.     We discussed work.  She has been busy, tired as they are short-staffed and dealing with complex patients.  Her routine is to  deal with dificulties at work and then return home to deal with her son's problems.  She is not dating.       We addressed but did not focus on  weight management today.  She is willing to increase exercise, limit coffee drinks.  She is aware she had gained weight and wants to lose.   She states she lost 10 lbs. recently.   She is working .9FTE,and is  fairly tired between home needs and work.     She participates fully. Rapport was excellent.    She was not  weighed today as it is a virtual session and we did not address it.      Wt Readings from Last 4 Encounters:   05/18/21 107 kg (236 lb)   10/28/20 98.9 kg (218 lb)   10/22/20 97.9 kg (215 lb 12.8 oz)   07/02/20 99.3 kg (219 lb)     There is no height or weight on file to calculate BMI.     Current Outpatient Medications   Medication     acetaminophen (TYLENOL) 32 mg/mL liquid     Alcohol Swabs XX PADS     Cholecalciferol (VITAMIN D3) 25 MCG (1000 UT) CAPS     cyanocobalamin (VITAMIN B-12) 500 MCG SUBL sublingual tablet     cyclobenzaprine (FLEXERIL) 10 MG tablet     hydrochlorothiazide (HYDRODIURIL) 25 MG tablet     ibuprofen (ADVIL/MOTRIN) 200 MG capsule     insulin pen needle (31G X 5 MM) 31G X 5 MM miscellaneous     lisinopril (ZESTRIL) 20 MG tablet     Multiple Vitamins-Minerals (ONE DAILY CALCIUM/IRON PO)     naltrexone (DEPADE/REVIA) 50 MG tablet     phentermine (LOMAIRA) 8 MG tablet     polyethylene glycol (MIRALAX) powder     topiramate (TOPAMAX) 25 MG tablet     No current facility-administered medications for this visit.      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14    hospitalized at [facility] x 1 week     History of steroid therapy      HX ABNL PAP SMEAR OF CERVIX   1995    LEEP AT 15 yo     Hypertension 2004      Hypertrophy of breast      Hypertrophy of tonsils alone      Hypovitaminosis D     with secondary high PTH     Irregular heart beat     palpitations     LBP (low back pain)      LSIL (low grade squamous intraepithelial lesion) on Pap smear 5/2006     Lumbago     RESOLVED     Major depressive disorder, recurrent episode, in partial or unspecified remission 4/1/2013     Morbid obesity (H)     bariatric surgery 5/13/2013     Multiple thyroid nodules      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)      OLIGOMENORRHEA, C/W PCOS      Sciatica      SLEEP APNEA 6/2002    No longer has since tonsillectomy and septoplasty     Thyroid nodule      Past Surgical History:   Procedure Laterality Date     BIOPSY  03/13/2015    Thyroid nodule     ESOPHAGOSCOPY, GASTROSCOPY, DUODENOSCOPY (EGD), COMBINED  5/28/2013    Procedure: COMBINED ESOPHAGOSCOPY, GASTROSCOPY, DUODENOSCOPY (EGD);;  Surgeon: Best Willett MD;  Location:  GI     ESOPHAGOSCOPY, GASTROSCOPY, DUODENOSCOPY (EGD), COMBINED N/A 6/13/2018    Procedure: COMBINED ESOPHAGOSCOPY, GASTROSCOPY, DUODENOSCOPY (EGD), BIOPSY SINGLE OR MULTIPLE;  gastroscopy;  Surgeon: Westley Gibbs MD;  [facility]     LAPAROSCOPIC GASTRIC SLEEVE  5/13/2013    Procedure: LAPAROSCOPIC GASTRIC SLEEVE;  Laparoscopic Sleeve Gastrectomy ;  Surgeon: Best Willett MD;  Location:  OR     LEEP TX, CERVICAL  1995    age 15     partial thyroidectomy  2018     SEPTOPLASTY       THYROIDECTOMY Left 4/3/2018    Procedure: THYROIDECTOMY;  Left Thyroid Lobectomy And Ishtmusectomy;  Surgeon: Delia Harper MD;  Location:  OR     TONSILLECTOMY  age 20s     TONSILLECTOMY  2004?     wisdom teeth extraction       PHQ-9 SCORE 6/19/2020 10/22/2020 12/17/2020   PHQ-9 Total Score - - -   PHQ-9 Total Score MyChart - - -   PHQ-9 Total Score 2 5 6     MAXIMO-7 SCORE 11/9/2017 9/15/2018 10/28/2019   Total Score - - -   Total Score - 3 (minimal anxiety) -   Total Score 12 3 3      WHODAS 2.0 Total Score 12/4/2018 1/31/2020   Total Score 18 15   Total Score MyChart 18 15     She is  5 foot 11 inches.  Her long-term overall goal is 175 She participates fully. Rapport is excellent.       This telehealth service is appropriate and effective for delivering services in light of the necessity for social distancing to mitigate the COVID-19 epidemic and for conservation of PPE.     Patient has agreed to receiving telehealth services after being informed about it: Yes    Patient prefers video invitation/information to be sent by:   email    Time service started: 3:03  Time service ended: 3:56  Extended session due to complexity of case and length of interval.    Mode of transmission: [email or web address]    Location of originating:  Home of the patient    Distance site:  Home office of provider for [facility]    The patient has been notified that:  Video visits will be conducted via a call with their psychologist to provide the care they need with a video conversation. Video visits may be billed at different rates depending on insurance coverage.  Patients are advised to please contact their insurance provider with any questions about their health insurance coverage. If during the course of a call the psychologist feels a video visit is not appropriate, patients will not be charged for this service.  Diagnosis:   Psychological factors affecting obesity (F54).   Major depression, recurrent, mild (F33.0).   Occupational Problems (Z56.9)    PLAN/RECOMMENDATIONS:   1. Ms. Catherine will return 7/26 @ 3   to address weight loss and social issues with problem solving therapy, which is medically necessary.   She wishes to continue to get support here with her life changes and her son's behavioral/developmental challenges.  2.  Resume schedule of regular exercise to the level that is possible.      Last treatment plan signed: 4/30/2021  Treatment plan due:  4/30/2022

## 2021-07-13 ENCOUNTER — VIRTUAL VISIT (OUTPATIENT)
Dept: ENDOCRINOLOGY | Facility: CLINIC | Age: 41
End: 2021-07-13
Payer: COMMERCIAL

## 2021-07-13 VITALS — BODY MASS INDEX: 32.35 KG/M2 | WEIGHT: 226 LBS | HEIGHT: 70 IN

## 2021-07-13 DIAGNOSIS — E66.811 OBESITY, CLASS I, BMI 30-34.9: ICD-10-CM

## 2021-07-13 PROCEDURE — 99212 OFFICE O/P EST SF 10 MIN: CPT | Mod: GT | Performed by: INTERNAL MEDICINE

## 2021-07-13 ASSESSMENT — MIFFLIN-ST. JEOR: SCORE: 1770.38

## 2021-07-13 ASSESSMENT — PAIN SCALES - GENERAL: PAINLEVEL: NO PAIN (0)

## 2021-07-13 NOTE — LETTER
"2021       RE: Ashley Catherine  [address]  [place] [place] [address]     Dear Colleague,    Thank you for referring your patient, Ashley Catherine, to the [facility]NAGEMENT [facility] [place] at [facility]. Please see a copy of my visit note below.    Return Medical Weight Management Note     Ashley Catherine  MRN:  [id number]  :  1980  RAKEL:  2021    Dear Violet Humphreys MD,    I had the pleasure of seeing your patient Ashley Catherine. She is a 41 year old female who I am continuing to see for treatment of obesity related to mild depression, hypertension, GERD       5/15/2017   I have the following health issues associated with obesity: High Blood Pressure, GERD (Reflux)   I have the following symptoms associated with obesity: GERD (Reflux)     She has hx of sleeve gastrectomy in . She lost from 290 to 186 after surgery with subsequent weight regain.  Was on phentermine and tried for 2 months and had \"dark thought\". She did lose 7 lbs while taking it.  Lowest weight was 184 lbs when taking Qsymia (tingling and mood changes)  Was on Saxenda but stopped in 2021. Does not feel it is working anymore.    INTERVAL HISTORY:  [facility] follow up. Last seen 2021    At the last visit, I added phentermine 4 mg daily and topiramate 50 mg in the evening. She is also taking naltrexone 50 mg daily. She stated that she feels that it helped controlling her appetite and craving. She lost 10 lbs in 2 months. She feels that she has been crying a lot more but it was too bad.     Activity -- up a lot during spring and summer, walk daily    CURRENT WEIGHT:   226 lbs 0 oz    Highest wt in life: 293 lbs  Initial Weight (lbs): 274 lbs  Last Visits Weight: 107 kg (236 lb)  Cumulative weight loss (lbs): 48  Weight Loss Percentage: 17.52%    Changes and Difficulties 2021   I have made the following changes to my diet since my last visit: Less sweets "   With regards to my diet, I am still struggling with: Sugary drinks   I have made the following changes to my activity/exercise since my last visit: More walking   With regards to my activity/exercise, I am still struggling with: Daily exercise       VITALS:  There were no vitals taken for this visit.    MEDICATIONS:   Current Outpatient Medications   Medication Sig Dispense Refill     acetaminophen (TYLENOL) 32 mg/mL liquid Take 1,000 mg by mouth every 8 hours as needed for fever or mild pain       Cholecalciferol (VITAMIN D3) 25 MCG (1000 UT) CAPS        cyanocobalamin (VITAMIN B-12) 500 MCG SUBL sublingual tablet Place 500 mcg under the tongue every other day        cyclobenzaprine (FLEXERIL) 10 MG tablet Take 1 tablet (10 mg) by mouth daily as needed for muscle spasms 45 tablet 4     hydrochlorothiazide (HYDRODIURIL) 25 MG tablet Take 1 tablet (25 mg) by mouth daily 90 tablet 3     ibuprofen (ADVIL/MOTRIN) 200 MG capsule Take 400 mg by mouth as needed for fever       lisinopril (ZESTRIL) 20 MG tablet Take 1 tablet (20 mg) by mouth daily 90 tablet 3     Multiple Vitamins-Minerals (ONE DAILY CALCIUM/IRON PO) Take 1 tablet by mouth daily       naltrexone (DEPADE/REVIA) 50 MG tablet Take 1 tablet (50 mg) by mouth daily 90 tablet 1     phentermine (LOMAIRA) 8 MG tablet Take 0.5 tablets (4 mg) by mouth every morning (before breakfast) 30 tablet 1     polyethylene glycol (MIRALAX) powder Take 17 g (1 capful) by mouth daily as needed for constipation 510 g 1     topiramate (TOPAMAX) 25 MG tablet 25 mg at bedtime for 1 week, 50 mg at bedtime for 1 week at bedtime thereafter 90 tablet 2     Alcohol Swabs XX PADS 1 each as needed (injections) 100 each 3     insulin pen needle (31G X 5 MM) 31G X 5 MM miscellaneous Use 1 pen needles daily or as directed. (Patient not taking: Reported on 5/18/2021) 100 each 1       Weight Loss Medication History Reviewed With Patient 7/13/2021   Which weight loss medications are you  currently taking on a regular basis?  Qysmia (phentermine/topiramate)   If you are not taking a weight loss medication that was prescribed to you, please indicate why: -   Are you having any side effects from the weight loss medication that we have prescribed you? No   If you are having side effects please describe: -       ASSESSMENT/PLAN:   Ashley Catherine is a 40 year old female who I am continuing to see for treatment of obesity    MWM follow up. Hx of sleeve with some weight regain.   Gained weight in the past 6 months   Used to be on Saxenda and maintained weight -- stopped last month    Currently phentermine, topiramate and naltrexone   Lost 10 lbs after adding phentermine and topiramate at the last visit  Feels that her appetite is under control    Plan:  - continue phentermine 4 mg daily  - continue topiramate 50 mg daily  - continue naltrexone 50 mg daily  - encourage diet modification and exercise    FOLLOW-UP:    3 months follow up    Start: 05/18/2021 0426 pm  Stop: 05/18/2021 0431 pm  VDO duration: 5 minutes    External notes/medical records independently reviewed, labs and imaging independently reviewed, medical management and tests to be discussed/communicated to patient.    Time: I spent 12 minutes spent on the date of the encounter preparing to see patient (including chart review and preparation), obtaining and or reviewing additional medical history, performing a physical exam and evaluation, documenting clinical information in the electronic health record, independently interpreting results, communicating results to the patient and coordinating care.      Sincerely,    Gagandeep Qureshi MD    Ashley is a 41 year old who is being evaluated via a billable video visit.      How would you like to obtain your AVS? MyChart  If the video visit is dropped, the invitation should be resent by: Send to e-mail at: [email or web address]  Will anyone else be joining your video visit? No      Video-Visit  Details    Type of service:  Video Visit    Originating Location (pt. Location): Home    Distant Location (provider location):  [facility]NAG[facility] [place]     Platform used for Video Visit: [employer]      Again, thank you for allowing me to participate in the care of your patient.      Sincerely,    Gagandeep Qureshi MD

## 2021-07-13 NOTE — PROGRESS NOTES
Ashley is a 41 year old who is being evaluated via a billable video visit.      How would you like to obtain your AVS? MyChart  If the video visit is dropped, the invitation should be resent by: Send to e-mail at: [email or web address]  Will anyone else be joining your video visit? No      Video-Visit Details    Type of service:  Video Visit    Originating Location (pt. Location): Home    Distant Location (provider location):  [facility] WEIGHT MANAGEMENT CLINIC [place]     Platform used for Video Visit: [employer]

## 2021-07-13 NOTE — NURSING NOTE
"Chief Complaint   Patient presents with     Follow Up     Follow-up Weight Management       Vitals:    07/13/21 1614   Weight: 102.5 kg (226 lb)   Height: 1.778 m (5' 10\")       Body mass index is 32.43 kg/m .                              "

## 2021-07-13 NOTE — PATIENT INSTRUCTIONS
- continue phentermine 4 mg daily  - continue topiramate 50 mg daily  - continue naltrexone 50 mg daily  - encourage diet modification and exercise    Return in 2-3 months    If you have any questions, please do not hesitate to call Weight management clinic at [phone] or [phone].  If you need to fax, please fax to [phone].    Sincerely,    Gagandeep Qureshi MD  Endocrinology

## 2021-07-13 NOTE — PROGRESS NOTES
"Return Medical Weight Management Note     Ashley Catherine  MRN:  [id number]  :  1980  RAKEL:  2021    Dear Violet Humphreys MD,    I had the pleasure of seeing your patient Ashley Catherine. She is a 41 year old female who I am continuing to see for treatment of obesity related to mild depression, hypertension, GERD       5/15/2017   I have the following health issues associated with obesity: High Blood Pressure, GERD (Reflux)   I have the following symptoms associated with obesity: GERD (Reflux)     She has hx of sleeve gastrectomy in . She lost from 290 to 186 after surgery with subsequent weight regain.  Was on phentermine and tried for 2 months and had \"dark thought\". She did lose 7 lbs while taking it.  Lowest weight was 184 lbs when taking Qsymia (tingling and mood changes)  Was on Saxenda but stopped in 2021. Does not feel it is working anymore.    INTERVAL HISTORY:  MW follow up. Last seen 2021    At the last visit, I added phentermine 4 mg daily and topiramate 50 mg in the evening. She is also taking naltrexone 50 mg daily. She stated that she feels that it helped controlling her appetite and craving. She lost 10 lbs in 2 months. She feels that she has been crying a lot more but it was too bad.     Activity -- up a lot during spring and summer, walk daily    CURRENT WEIGHT:   226 lbs 0 oz    Highest wt in life: 293 lbs  Initial Weight (lbs): 274 lbs  Last Visits Weight: 107 kg (236 lb)  Cumulative weight loss (lbs): 48  Weight Loss Percentage: 17.52%    Changes and Difficulties 2021   I have made the following changes to my diet since my last visit: Less sweets   With regards to my diet, I am still struggling with: Sugary drinks   I have made the following changes to my activity/exercise since my last visit: More walking   With regards to my activity/exercise, I am still struggling with: Daily exercise       VITALS:  There were no vitals taken for this visit.    MEDICATIONS: "   Current Outpatient Medications   Medication Sig Dispense Refill     acetaminophen (TYLENOL) 32 mg/mL liquid Take 1,000 mg by mouth every 8 hours as needed for fever or mild pain       Cholecalciferol (VITAMIN D3) 25 MCG (1000 UT) CAPS        cyanocobalamin (VITAMIN B-12) 500 MCG SUBL sublingual tablet Place 500 mcg under the tongue every other day        cyclobenzaprine (FLEXERIL) 10 MG tablet Take 1 tablet (10 mg) by mouth daily as needed for muscle spasms 45 tablet 4     hydrochlorothiazide (HYDRODIURIL) 25 MG tablet Take 1 tablet (25 mg) by mouth daily 90 tablet 3     ibuprofen (ADVIL/MOTRIN) 200 MG capsule Take 400 mg by mouth as needed for fever       lisinopril (ZESTRIL) 20 MG tablet Take 1 tablet (20 mg) by mouth daily 90 tablet 3     Multiple Vitamins-Minerals (ONE DAILY CALCIUM/IRON PO) Take 1 tablet by mouth daily       naltrexone (DEPADE/REVIA) 50 MG tablet Take 1 tablet (50 mg) by mouth daily 90 tablet 1     phentermine (LOMAIRA) 8 MG tablet Take 0.5 tablets (4 mg) by mouth every morning (before breakfast) 30 tablet 1     polyethylene glycol (MIRALAX) powder Take 17 g (1 capful) by mouth daily as needed for constipation 510 g 1     topiramate (TOPAMAX) 25 MG tablet 25 mg at bedtime for 1 week, 50 mg at bedtime for 1 week at bedtime thereafter 90 tablet 2     Alcohol Swabs XX PADS 1 each as needed (injections) 100 each 3     insulin pen needle (31G X 5 MM) 31G X 5 MM miscellaneous Use 1 pen needles daily or as directed. (Patient not taking: Reported on 5/18/2021) 100 each 1       Weight Loss Medication History Reviewed With Patient 7/13/2021   Which weight loss medications are you currently taking on a regular basis?  Qysmia (phentermine/topiramate)   If you are not taking a weight loss medication that was prescribed to you, please indicate why: -   Are you having any side effects from the weight loss medication that we have prescribed you? No   If you are having side effects please describe: -        ASSESSMENT/PLAN:   Ashley Catherine is a 40 year old female who I am continuing to see for treatment of obesity    M follow up. Hx of sleeve with some weight regain.   Gained weight in the past 6 months   Used to be on Saxenda and maintained weight -- stopped last month    Currently phentermine, topiramate and naltrexone   Lost 10 lbs after adding phentermine and topiramate at the last visit  Feels that her appetite is under control    Plan:  - continue phentermine 4 mg daily  - continue topiramate 50 mg daily  - continue naltrexone 50 mg daily  - encourage diet modification and exercise    FOLLOW-UP:    3 months follow up    Start: 05/18/2021 0426 pm  Stop: 05/18/2021 0431 pm  VDO duration: 5 minutes    External notes/medical records independently reviewed, labs and imaging independently reviewed, medical management and tests to be discussed/communicated to patient.    Time: I spent 12 minutes spent on the date of the encounter preparing to see patient (including chart review and preparation), obtaining and or reviewing additional medical history, performing a physical exam and evaluation, documenting clinical information in the electronic health record, independently interpreting results, communicating results to the patient and coordinating care.      Sincerely,    Gagandeep Qureshi MD

## 2021-07-14 ENCOUNTER — TELEPHONE (OUTPATIENT)
Dept: ENDOCRINOLOGY | Facility: CLINIC | Age: 41
End: 2021-07-14

## 2021-07-14 NOTE — TELEPHONE ENCOUNTER
LVMx1 to schedule 2 month follow up with Gagandeep Qureshi. Left Weight MGMT call number and sent MyC

## 2021-07-26 ENCOUNTER — VIRTUAL VISIT (OUTPATIENT)
Dept: PSYCHOLOGY | Facility: CLINIC | Age: 41
End: 2021-07-26
Payer: COMMERCIAL

## 2021-07-26 DIAGNOSIS — F33.0 MAJOR DEPRESSIVE DISORDER, RECURRENT EPISODE, MILD (H): Primary | ICD-10-CM

## 2021-07-26 DIAGNOSIS — F54 PSYCHOLOGICAL FACTORS AFFECTING MORBID OBESITY (H): ICD-10-CM

## 2021-07-26 DIAGNOSIS — E66.01 PSYCHOLOGICAL FACTORS AFFECTING MORBID OBESITY (H): ICD-10-CM

## 2021-07-26 PROCEDURE — 90837 PSYTX W PT 60 MINUTES: CPT | Mod: GT | Performed by: PSYCHOLOGIST

## 2021-07-26 NOTE — PROGRESS NOTES
Health Psychology                     Department of Medicine  Julianne Moreno, Ph.D., L.P. [phone]                          [facility] Lis Chavez, Ph.D., L.P. [phone]                   [place] Mail Code 858   Donald Hills, Ph.D. [phone]        [address] [place] [address] Susana gUarte, Ph.D., L.P. [phone]    [place] [address]           Jayro Elias, Ph.D., A.B.P.P., L.P. [phone]        Melissa Stokes, Ph.D., L.P. [phone]  [address]     Health Psychology Follow-Up [facility] Note    Ashley Catherine is a 41-year-old single woman referred initially for psychological consultation for workup for a gastric sleeve procedure who is seen for CBT-oriented therapy regardingt weight management, work stresses, and family and social matters. She is seen for problem-solving and supportive counseling.  Intake was 4/1/13.    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 She is vaccinated, staying fairly socially apart from people.  Her mother is too and does childcare for [employer].  Her father declines, waiting for the J&J.  He is in NH in [place].  She hasn't seen him.  We discussed the vaccination for him as well, though he has been opposed.    Her son was diagnosed with autismdur in the interval. He has new therapy team in-home  starting, [place].  She had learned IBI, TIKI, L[place] are not covered and that he only gets 25 speech visits.  She has been dealing with  Very frustrating systems that make it hard to get the services she seeks.  We problem-solved and she ventilates frustration appropriately.  He continues with OT and speech therapy where he was. He has sensory issues.  He has food aversion. He also has language delays. He is  taking swimming lessons, which is good for him physically and socially.   They are going to zoos q 2 weeks, and trying to do more  together. He is taking a music class.  He will begin autism evaluation at [facility] this week. She is exhausted by the responsiblities    She is also upset about some of the disconnects in her  Eligibility for discharing her student loans due to the nature of her work.  We discussed options including speak to the legal office at the hospital.   .   She is working .9FTE,and is  fairly tired between home needs and work.     She participates fully. Rapport was excellent.    She was not  weighed today as it is a virtual session and we did not address it.      Wt Readings from Last 4 Encounters:   07/13/21 102.5 kg (226 lb)   05/18/21 107 kg (236 lb)   10/28/20 98.9 kg (218 lb)   10/22/20 97.9 kg (215 lb 12.8 oz)     There is no height or weight on file to calculate BMI.     Current Outpatient Medications   Medication     acetaminophen (TYLENOL) 32 mg/mL liquid     Cholecalciferol (VITAMIN D3) 25 MCG (1000 UT) CAPS     cyanocobalamin (VITAMIN B-12) 500 MCG SUBL sublingual tablet     cyclobenzaprine (FLEXERIL) 10 MG tablet     hydrochlorothiazide (HYDRODIURIL) 25 MG tablet     ibuprofen (ADVIL/MOTRIN) 200 MG capsule     lisinopril (ZESTRIL) 20 MG tablet     Multiple Vitamins-Minerals (ONE DAILY CALCIUM/IRON PO)     naltrexone (DEPADE/REVIA) 50 MG tablet     phentermine (LOMAIRA) 8 MG tablet     polyethylene glycol (MIRALAX) powder     topiramate (TOPAMAX) 25 MG tablet     No current facility-administered medications for this visit.     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14    hospitalized at [facility] x 1 week     History of steroid therapy      HX ABNL PAP SMEAR OF CERVIX   1995    LEEP AT 15 yo     Hypertension 2004     Hypertrophy of breast      Hypertrophy of tonsils alone       Hypovitaminosis D     with secondary high PTH     Irregular heart beat     palpitations     LBP (low back pain)      LSIL (low grade squamous intraepithelial lesion) on Pap smear 5/2006     Lumbago     RESOLVED     Major depressive disorder, recurrent episode, in partial or unspecified remission 4/1/2013     Morbid obesity (H)     bariatric surgery 5/13/2013     Multiple thyroid nodules      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)      OLIGOMENORRHEA, C/W PCOS      Sciatica      SLEEP APNEA 6/2002    No longer has since tonsillectomy and septoplasty     Thyroid nodule      Past Surgical History:   Procedure Laterality Date     BIOPSY  03/13/2015    Thyroid nodule     ESOPHAGOSCOPY, GASTROSCOPY, DUODENOSCOPY (EGD), COMBINED  5/28/2013    Procedure: COMBINED ESOPHAGOSCOPY, GASTROSCOPY, DUODENOSCOPY (EGD);;  Surgeon: Best Willett MD;  Location:  GI     ESOPHAGOSCOPY, GASTROSCOPY, DUODENOSCOPY (EGD), COMBINED N/A 6/13/2018    Procedure: COMBINED ESOPHAGOSCOPY, GASTROSCOPY, DUODENOSCOPY (EGD), BIOPSY SINGLE OR MULTIPLE;  gastroscopy;  Surgeon: Westley Gibbs MD;  Location: [facility]     LAPAROSCOPIC GASTRIC SLEEVE  5/13/2013    Procedure: LAPAROSCOPIC GASTRIC SLEEVE;  Laparoscopic Sleeve Gastrectomy ;  Surgeon: Best Willett MD;  Location:  OR     LEEP TX, CERVICAL  1995    age 15     partial thyroidectomy  2018     SEPTOPLASTY       THYROIDECTOMY Left 4/3/2018    Procedure: THYROIDECTOMY;  Left Thyroid Lobectomy And Ishtmusectomy;  Surgeon: Delia Harper MD;  [facility]     TONSILLECTOMY  age 20s     TONSILLECTOMY  2004?     wisdom teeth extraction       PHQ-9 SCORE 6/19/2020 10/22/2020 12/17/2020   PHQ-9 Total Score - - -   PHQ-9 Total Score MyChart - - -   PHQ-9 Total Score 2 5 6     MAXIMO-7 SCORE 11/9/2017 9/15/2018 10/28/2019   Total Score - - -   Total Score - 3 (minimal anxiety) -   Total Score 12 3 3     WHODAS 2.0 Total Score 12/4/2018 1/31/2020   Total Score 18  15   Total Score MyChart 18 15     She is  5 foot 11 inches.  Her long-term overall goal is 175 She participates fully. Rapport is excellent.       This telehealth service is appropriate and effective for delivering services in light of the necessity for social distancing to mitigate the COVID-19 epidemic and for conservation of PPE.     Patient has agreed to receiving telehealth services after being informed about it: Yes    Patient prefers video invitation/information to be sent by:   email    Time service started: 3:03  Time service ended: 3:56  Extended session due to complexity of case and length of interval.    Mode of transmission: [email or web address].[email or web address]    Location of originating:  Home of the patient    Distance site:  Home office of provider for [facility]    The patient has been notified that:  Video visits will be conducted via a call with their psychologist to provide the care they need with a video conversation. Video visits may be billed at different rates depending on insurance coverage.  Patients are advised to please contact their insurance provider with any questions about their health insurance coverage. If during the course of a call the psychologist feels a video visit is not appropriate, patients will not be charged for this service.  Diagnosis:   Psychological factors affecting obesity (F54).   Major depression, recurrent, mild (F33.0).   Occupational Problems (Z56.9)    PLAN/RECOMMENDATIONS:   1. Ms. Catherine will return 9/1 @ 11   to address weight loss and social issues with problem solving therapy, which is medically necessary.   She wishes to continue to get support here with her life changes and her son's behavioral/developmental challenges.  2.  Resume schedule of regular exercise to the level that is possible.      Preference for future meetings:                In-person             x   Remote                Either  Last treatment plan signed: 4/30/2021  Treatment plan due:  4/30/2022

## 2021-07-30 DIAGNOSIS — E66.811 OBESITY, CLASS I, BMI 30-34.9: ICD-10-CM

## 2021-08-03 RX ORDER — TOPIRAMATE 25 MG/1
50 TABLET, FILM COATED ORAL AT BEDTIME
Qty: 60 TABLET | Refills: 3 | Status: SHIPPED | OUTPATIENT
Start: 2021-08-03 | End: 2021-09-14

## 2021-08-03 NOTE — TELEPHONE ENCOUNTER
7/13/2021  [facility] [place]   Gagandeep Qureshi MD  Endocrinology, Diabetes, and Metabolism    topiramate (TOPAMAX) 25 MG table      Last Written Prescription Date:  5/18/21  Last Fill Quantity: 90,   # refills: 2      Routing refill request to provider for review/approval because: abnormal cbc. Per med firing. not on printed protocol. rf?

## 2021-08-03 NOTE — TELEPHONE ENCOUNTER
Patient had appointment with Dr. Donis on 7/13/21 with plan to continue taking Topiramate 50mg daily. Sending refill to pharmacy.

## 2021-09-01 ENCOUNTER — VIRTUAL VISIT (OUTPATIENT)
Dept: PSYCHOLOGY | Facility: CLINIC | Age: 41
End: 2021-09-01
Payer: COMMERCIAL

## 2021-09-01 DIAGNOSIS — E66.01 PSYCHOLOGICAL FACTORS AFFECTING MORBID OBESITY (H): ICD-10-CM

## 2021-09-01 DIAGNOSIS — F33.0 MAJOR DEPRESSIVE DISORDER, RECURRENT EPISODE, MILD (H): Primary | ICD-10-CM

## 2021-09-01 DIAGNOSIS — F54 PSYCHOLOGICAL FACTORS AFFECTING MORBID OBESITY (H): ICD-10-CM

## 2021-09-01 PROCEDURE — 90837 PSYTX W PT 60 MINUTES: CPT | Mod: GT | Performed by: PSYCHOLOGIST

## 2021-09-01 NOTE — PROGRESS NOTES
Health Psychology                     Department of Medicine  Julianne Moreno, Ph.D., L.P. [phone]                          [facility] Lis Chavez, Ph.D., L.P. [phone]                   [place] Mail Code 707   Donald Hills, Ph.D. [phone]        [address] [place] [address] Susana Ugarte, Ph.D., L.P. [phone]    [place] [address]           Jayro Elias, Ph.D., A.B.P.P., L.P. [phone]        Melissa Stokes, Ph.D., L.P. [phone]  [address]     Health Psychology Follow-Up [facility] Note    Ashley Catherine is a 41-year-old single woman referred initially for psychological consultation for workup for a gastric sleeve procedure who is seen for CBT-oriented therapy regardingt weight management, work stresses, and family and social matters. She is seen for problem-solving and supportive counseling.  Intake was 4/1/13.    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 She is vaccinated, staying fairly socially apart from people. She hasn't yet heard when she will get the 3rd vaccination.  She applied to change to weekend shifts so as to be more available for her son.      Her mother is vaccinated x2  too and does childcare for [employer]. She is open to a 3rd vaccine, but may have had some muscle issues.   Her father declines, waiting for the J&J.  He is in NH in [place].  She  sawn him.  He is not taking medications. He refuses to get vaccinated.  He refuses to discuss things with her.  He appears more ill. He still wants to return home.  They are reluctant to take him out of the facility as he would resist returning.     Her son was diagnosed with autism and she is very frustrated working out services and funding for them.  She has applied for MA and TEFRA to insure that he gets appropriate services. It has been very frustrating.  He is  Getting OT and PT and working with  in-home early childhood eduator She worries he needs speech therapy but feel s she can't afford it. We discussed strategies for re-initiating it, speaking with business office at [place], pushing the system to determine his MA/TEFRA status.  She is paying privately for his feeding speech services.    She is working .9FTE,and is  fairly tired between home needs and work.  She laments her responsibilties and not being able to have the distractions from work she could before COVID and the arrival of [place].         She participates fully. Rapport was excellent.    She was not  weighed today as it is a virtual session and we did not address it.      Wt Readings from Last 4 Encounters:   07/13/21 102.5 kg (226 lb)   05/18/21 107 kg (236 lb)   10/28/20 98.9 kg (218 lb)   10/22/20 97.9 kg (215 lb 12.8 oz)     There is no height or weight on file to calculate BMI.     Current Outpatient Medications   Medication     acetaminophen (TYLENOL) 32 mg/mL liquid     Cholecalciferol (VITAMIN D3) 25 MCG (1000 UT) CAPS     cyanocobalamin (VITAMIN B-12) 500 MCG SUBL sublingual tablet     cyclobenzaprine (FLEXERIL) 10 MG tablet     hydrochlorothiazide (HYDRODIURIL) 25 MG tablet     ibuprofen (ADVIL/MOTRIN) 200 MG capsule     lisinopril (ZESTRIL) 20 MG tablet     Multiple Vitamins-Minerals (ONE DAILY CALCIUM/IRON PO)     naltrexone (DEPADE/REVIA) 50 MG tablet     phentermine (LOMAIRA) 8 MG tablet     polyethylene glycol (MIRALAX) powder     topiramate (TOPAMAX) 25 MG tablet     No current facility-administered medications for this visit.     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14    hospitalized at [facility] x 1 week     History of steroid therapy      HX ABNL PAP SMEAR OF CERVIX   1995    LEEP AT 15 yo      Hypertension 2004     Hypertrophy of breast      Hypertrophy of tonsils alone      Hypovitaminosis D     with secondary high PTH     Irregular heart beat     palpitations     LBP (low back pain)      LSIL (low grade squamous intraepithelial lesion) on Pap smear 5/2006     Lumbago     RESOLVED     Major depressive disorder, recurrent episode, in partial or unspecified remission 4/1/2013     Morbid obesity (H)     bariatric surgery 5/13/2013     Multiple thyroid nodules      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)      OLIGOMENORRHEA, C/W PCOS      Sciatica      SLEEP APNEA 6/2002    No longer has since tonsillectomy and septoplasty     Thyroid nodule      Past Surgical History:   Procedure Laterality Date     BIOPSY  03/13/2015    Thyroid nodule     ESOPHAGOSCOPY, GASTROSCOPY, DUODENOSCOPY (EGD), COMBINED  5/28/2013    Procedure: COMBINED ESOPHAGOSCOPY, GASTROSCOPY, DUODENOSCOPY (EGD);;  Surgeon: Best Willett MD;  Location:  GI     ESOPHAGOSCOPY, GASTROSCOPY, DUODENOSCOPY (EGD), COMBINED N/A 6/13/2018    Procedure: COMBINED ESOPHAGOSCOPY, GASTROSCOPY, DUODENOSCOPY (EGD), BIOPSY SINGLE OR MULTIPLE;  gastroscopy;  Surgeon: Westley Gibbs MD;  Location: [facility]     LAPAROSCOPIC GASTRIC SLEEVE  5/13/2013    Procedure: LAPAROSCOPIC GASTRIC SLEEVE;  Laparoscopic Sleeve Gastrectomy ;  Surgeon: Best Willett MD;  Location:  OR     LEEP TX, CERVICAL  1995    age 15     partial thyroidectomy  2018     SEPTOPLASTY       THYROIDECTOMY Left 4/3/2018    Procedure: THYROIDECTOMY;  Left Thyroid Lobectomy And Ishtmusectomy;  Surgeon: Delia Harper MD;  Location:  OR     TONSILLECTOMY  age 20s     TONSILLECTOMY  2004?     wisdom teeth extraction       PHQ-9 SCORE 6/19/2020 10/22/2020 12/17/2020   PHQ-9 Total Score - - -   PHQ-9 Total Score MyChart - - -   PHQ-9 Total Score 2 5 6     MAXIMO-7 SCORE 11/9/2017 9/15/2018 10/28/2019   Total Score - - -   Total Score - 3 (minimal anxiety)  -   Total Score 12 3 3     WHODAS 2.0 Total Score 12/4/2018 1/31/2020   Total Score 18 15   Total Score MyChart 18 15     She is  5 foot 11 inches.  Her long-term overall goal is 175 She participates fully. Rapport is excellent.       This telehealth service is appropriate and effective for delivering services in light of the necessity for social distancing to mitigate the COVID-19 epidemic and for conservation of PPE.     Patient has agreed to receiving telehealth services after being informed about it: Yes    Patient prefers video invitation/information to be sent by:   email    Time service started:  11:00  Time service ended: 11:53  Extended session due to complexity of case and length of interval.    Mode of transmission: [email or web address].me    Location of originating:  Home of the patient    Distance site:  Home office of provider for [facility]    The patient has been notified that:  Video visits will be conducted via a call with their psychologist to provide the care they need with a video conversation. Video visits may be billed at different rates depending on insurance coverage.  Patients are advised to please contact their insurance provider with any questions about their health insurance coverage. If during the course of a call the psychologist feels a video visit is not appropriate, patients will not be charged for this service.  Diagnosis:   Psychological factors affecting obesity (F54).   Major depression, recurrent, mild (F33.0).   Occupational Problems (Z56.9)    PLAN/RECOMMENDATIONS:   1. Ms. Catherine will return 10/1 @ 2   to address weight loss and social issues with problem solving therapy, which is medically necessary.   She wishes to continue to get support here with her life changes and her son's behavioral/developmental challenges.  2.  Resume schedule of regular exercise to the level that is possible.      3.  Consider support group of parents of children with autism.    Preference for future meetings:                 In-person             x   Remote                Either  Last treatment plan signed: 4/30/2021  Treatment plan due:  4/30/2022

## 2021-09-05 ENCOUNTER — HEALTH MAINTENANCE LETTER (OUTPATIENT)
Age: 41
End: 2021-09-05

## 2021-09-11 DIAGNOSIS — E66.811 OBESITY, CLASS I, BMI 30-34.9: ICD-10-CM

## 2021-09-13 NOTE — PROGRESS NOTES
Ashley is a 41 year old who is being evaluated via a billable video visit.      How would you like to obtain your AVS? MyChart  If the video visit is dropped, the invitation should be resent by: Text to cell phone: [phone]  Will anyone else be joining your video visit? No      Video Start Time: 5:18 PM    Assessment & Plan   41 year old female ? Over use injury vx arthritis/artharlgia   Myalgia- of undetermined cause. ? Related to fatigue, situational stress ?arthritis.   2 weeks of body ache- that improved on own and now index finger ? Overuse injury  Plan:     - ESR: Erythrocyte sedimentation rate; Future  - CRP, inflammation; Future  - Basic metabolic panel  (Ca, Cl, CO2, Creat, Gluc, K, Na, BUN); Future  - Vitamin D Deficiency; Future  - TSH with free T4 reflex; Future  -cbc and CK     Advised to make office visit soon  Keep pain dairy and take over the counter acetaminophen as needed          Situational stress  2 yr old son just diagnosed with autism      Benign essential hypertension  Plan: - considered this problem when making today's plans  - no interventions today       -followed by primary care physicain  & report bp is stable on spot check at work at [employer].  .    S/P laparoscopic sleeve gastrectomy  Obesity, Class I, BMI 30-34.9  Plan: - considered this problem when making today's plans  - no interventions today       -followed by specialist/ weight clinic .   She has been on naltrexone and phenetamine         33 minutes spent on the date of the encounter doing chart review, history and exam, documentation and further activities per the note      Return in about 2 weeks (around 9/28/2021) for routine physical, follow up with PCP.    Jackeline Leyva MD  [facility]    Subjective   Ashley is a 41 year old who presents for the following health issues     HPI     Answers for HPI/ROS submitted by the patient on 9/14/2021  If you checked off any problems, how difficult  have these problems made it for you to do your work, take care of things at home, or get along with other people?: Not difficult at all  PHQ9 TOTAL SCORE: 4  Stopped sertraline for 3 days and then stopped on own     Musculoskeletal problem/pain  Onset/Duration: a couple of weeks   Bone pain allover the body  Today on index finger- rest of the body is better.  She is right handed  Pain is like a dull ache/annoying  Description  Location:  Per pt varies can be all over body or specific parts of body   Joint Swelling: no  Redness: no  Pain: YES  Warmth: no  Intensity:  Varies between moderate to severe  Progression of Symptoms:  same  Accompanying signs and symptoms:   Fevers: no  Numbness/tingling/weakness: no  History  Trauma to the area: no  Recent illness:  no  Previous similar problem: no  Previous evaluation:  no  Precipitating or alleviating factors:  Aggravating factors include: none per patient they come and goes   Therapies tried and outcome: nothing  Son diagnosed with autism and that is stressfully but depression is well controlled      Review of Systems   Constitutional, HEENT, cardiovascular, pulmonary, GI, , musculoskeletal, neuro, skin, endocrine and psych systems are negative, except as otherwise noted.      Objective           Vitals:  No vitals were obtained today due to virtual visit.    Physical Exam   GENERAL: Healthy, alert and no distress  EYES: Eyes grossly normal to inspection.  No discharge or erythema, or obvious scleral/conjunctival abnormalities.  RESP: No audible wheeze, cough, or visible cyanosis.  No visible retractions or increased work of breathing.    SKIN: Visible skin clear. No significant rash, abnormal pigmentation or lesions.  NEURO: Cranial nerves grossly intact.  Mentation and speech appropriate for age.  PSYCH: Mentation appears normal, affect normal/bright, judgement and insight intact, normal speech and appearance well-groomed.              Video-Visit Details    Type of  service:  Video Visit    Video End Time:5:37 PM    Originating Location (pt. Location): Home    Distant Location (provider location):  [facility]     Platform used for Video Visit: [email or web address]

## 2021-09-14 ENCOUNTER — VIRTUAL VISIT (OUTPATIENT)
Dept: FAMILY MEDICINE | Facility: CLINIC | Age: 41
End: 2021-09-14
Payer: COMMERCIAL

## 2021-09-14 DIAGNOSIS — I10 BENIGN ESSENTIAL HYPERTENSION: ICD-10-CM

## 2021-09-14 DIAGNOSIS — Z98.84 S/P LAPAROSCOPIC SLEEVE GASTRECTOMY: ICD-10-CM

## 2021-09-14 DIAGNOSIS — F43.9 SITUATIONAL STRESS: ICD-10-CM

## 2021-09-14 DIAGNOSIS — E66.811 OBESITY, CLASS I, BMI 30-34.9: ICD-10-CM

## 2021-09-14 DIAGNOSIS — M79.10 MYALGIA: Primary | ICD-10-CM

## 2021-09-14 PROCEDURE — 99213 OFFICE O/P EST LOW 20 MIN: CPT | Mod: GT | Performed by: FAMILY MEDICINE

## 2021-09-14 ASSESSMENT — PATIENT HEALTH QUESTIONNAIRE - PHQ9
SUM OF ALL RESPONSES TO PHQ QUESTIONS 1-9: 4
10. IF YOU CHECKED OFF ANY PROBLEMS, HOW DIFFICULT HAVE THESE PROBLEMS MADE IT FOR YOU TO DO YOUR WORK, TAKE CARE OF THINGS AT HOME, OR GET ALONG WITH OTHER PEOPLE: NOT DIFFICULT AT ALL
SUM OF ALL RESPONSES TO PHQ QUESTIONS 1-9: 4

## 2021-09-14 NOTE — PATIENT INSTRUCTIONS
? Over use injury vx arthritis  Advised to make office visit soon  Keep pain dairy and take over the counter acetaminophen as needed        - ESR: Erythrocyte sedimentation rate; Future  - CRP, inflammation; Future  - Basic metabolic panel  (Ca, Cl, CO2, Creat, Gluc, K, Na, BUN); Future  - Vitamin D Deficiency; Future  - TSH with free T4 reflex; Future

## 2021-09-14 NOTE — TELEPHONE ENCOUNTER
LOMAIRA 8 MG TABLET  Last Written Prescription Date:  7/13/2021  Last Fill Quantity: 30,   # refills: 1  Last Office Visit : 7/13/2021  Future Office visit:  10/5/2021    Routing refill request to provider for review/approval because:  Drug not on the [facility], [facility] or [facility] refill protocol or controlled substance      Freda Walter RN  Central Triage Red Flags/Med Refills

## 2021-09-18 ENCOUNTER — LAB (OUTPATIENT)
Dept: LAB | Facility: CLINIC | Age: 41
End: 2021-09-18
Payer: COMMERCIAL

## 2021-09-18 DIAGNOSIS — M79.10 MYALGIA: ICD-10-CM

## 2021-09-18 LAB
ANION GAP SERPL CALCULATED.3IONS-SCNC: <1 MMOL/L (ref 3–14)
BUN SERPL-MCNC: 9 MG/DL (ref 7–30)
CALCIUM SERPL-MCNC: 8.2 MG/DL (ref 8.5–10.1)
CHLORIDE BLD-SCNC: 105 MMOL/L (ref 94–109)
CK SERPL-CCNC: 202 U/L (ref 30–225)
CO2 SERPL-SCNC: 33 MMOL/L (ref 20–32)
CREAT SERPL-MCNC: 0.66 MG/DL (ref 0.52–1.04)
CRP SERPL-MCNC: 5.1 MG/L (ref 0–8)
ERYTHROCYTE [DISTWIDTH] IN BLOOD BY AUTOMATED COUNT: 12.7 % (ref 10–15)
ERYTHROCYTE [SEDIMENTATION RATE] IN BLOOD BY WESTERGREN METHOD: 25 MM/HR (ref 0–20)
GFR SERPL CREATININE-BSD FRML MDRD: >90 ML/MIN/1.73M2
GLUCOSE BLD-MCNC: 93 MG/DL (ref 70–99)
HCT VFR BLD AUTO: 35.8 % (ref 35–47)
HGB BLD-MCNC: 12 G/DL (ref 11.7–15.7)
MCH RBC QN AUTO: 31.2 PG (ref 26.5–33)
MCHC RBC AUTO-ENTMCNC: 33.5 G/DL (ref 31.5–36.5)
MCV RBC AUTO: 93 FL (ref 78–100)
PLATELET # BLD AUTO: 395 10E3/UL (ref 150–450)
POTASSIUM BLD-SCNC: 3.6 MMOL/L (ref 3.4–5.3)
RBC # BLD AUTO: 3.85 10E6/UL (ref 3.8–5.2)
SODIUM SERPL-SCNC: 138 MMOL/L (ref 133–144)
TSH SERPL DL<=0.005 MIU/L-ACNC: 0.68 MU/L (ref 0.4–4)
WBC # BLD AUTO: 4.7 10E3/UL (ref 4–11)

## 2021-09-18 PROCEDURE — 86140 C-REACTIVE PROTEIN: CPT

## 2021-09-18 PROCEDURE — 84443 ASSAY THYROID STIM HORMONE: CPT

## 2021-09-18 PROCEDURE — 85027 COMPLETE CBC AUTOMATED: CPT

## 2021-09-18 PROCEDURE — 82306 VITAMIN D 25 HYDROXY: CPT

## 2021-09-18 PROCEDURE — 82550 ASSAY OF CK (CPK): CPT

## 2021-09-18 PROCEDURE — 85652 RBC SED RATE AUTOMATED: CPT

## 2021-09-18 PROCEDURE — 80048 BASIC METABOLIC PNL TOTAL CA: CPT

## 2021-09-18 PROCEDURE — 36415 COLL VENOUS BLD VENIPUNCTURE: CPT

## 2021-09-20 LAB — DEPRECATED CALCIDIOL+CALCIFEROL SERPL-MC: 35 UG/L (ref 20–75)

## 2021-09-23 ENCOUNTER — MYC MEDICAL ADVICE (OUTPATIENT)
Dept: FAMILY MEDICINE | Facility: CLINIC | Age: 41
End: 2021-09-23

## 2021-09-23 NOTE — RESULT ENCOUNTER NOTE
Nice chatting with you on phone   All labs normal.  ESR slightly high & I will let you make a follow up appointment if you having fatigue or joint pain and we will do further exam and tests as needed     Please keep us posted with questions or concerns .      Best Regards,    Jackeline Leyva MD  [facility]  [phone]

## 2021-10-01 ENCOUNTER — VIRTUAL VISIT (OUTPATIENT)
Dept: PSYCHOLOGY | Facility: CLINIC | Age: 41
End: 2021-10-01
Payer: COMMERCIAL

## 2021-10-01 DIAGNOSIS — E66.01 PSYCHOLOGICAL FACTORS AFFECTING MORBID OBESITY (H): ICD-10-CM

## 2021-10-01 DIAGNOSIS — Z56.9 OCCUPATIONAL PROBLEM: ICD-10-CM

## 2021-10-01 DIAGNOSIS — F54 PSYCHOLOGICAL FACTORS AFFECTING MEDICAL CONDITION: ICD-10-CM

## 2021-10-01 DIAGNOSIS — F33.0 MAJOR DEPRESSIVE DISORDER, RECURRENT EPISODE, MILD (H): Primary | ICD-10-CM

## 2021-10-01 DIAGNOSIS — F54 PSYCHOLOGICAL FACTORS AFFECTING MORBID OBESITY (H): ICD-10-CM

## 2021-10-01 PROCEDURE — 90837 PSYTX W PT 60 MINUTES: CPT | Mod: GT | Performed by: PSYCHOLOGIST

## 2021-10-01 SDOH — ECONOMIC STABILITY - INCOME SECURITY: UNSPECIFIED PROBLEMS RELATED TO EMPLOYMENT: Z56.9

## 2021-10-01 NOTE — PROGRESS NOTES
Health Psychology                     Department of Medicine  Julianne Moreno, Ph.D., L.P. [phone]                          [facility] Lis Chavez, Ph.D., L.P. [phone]                   [place] Mail Code 757   Donald Hills, Ph.D. [phone]        [address] [place] [address] Susana Ugarte, Ph.D., L.P. [phone]    [place] [address]           Jayro Elias, Ph.D., A.B.P.P., L.P. [phone]        Melissa Stokes, Ph.D., L.P. [phone]  [address]     Health Psychology Follow-Up [facility] Note    Ashley Catherine is a 41-year-old single woman referred initially for psychological consultation for workup for a gastric sleeve procedure who is seen for CBT-oriented therapy regardingt weight management, work stresses, and family and social matters. She is seen for problem-solving and supportive counseling.  Intake was 4/1/13.    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 She is vaccinated x2 so far, staying fairly socially apart from people. She hasn't yet heard when she will get the 3rd vaccination.  She is now working weekend shifts so as to be more available for her son. We discussed her sense of burnout as the hospital deals with COVID and downstream consequences of it.  She is frustrated with how often she feels like a bouncer, sees administrtion needing to be tighter.     Her mother is vaccinated x3 and does childcare for [place]. Her father declines and is in long term care.  He is in NH in [place].  She  saw him.  He is not taking medications. He refuses to get vaccinated.  He refuses to discuss things with her.  He appears more ill yet wants to return home.  They are reluctant to take him out of the facility as he would resist returning.     Her son was diagnosed with autism and she is very frustrated working out services and funding for them.  Actually, things have  improved somewhat  In terms of her ability to cope with the situation . She mounds the loss of dreams with him. She has applied for MA and TEFRA to insure that he gets appropriate services. It has been very frustrating.  He is  Getting OT and PT and working with in-home early childhood eduator She worries he needs speech therapy but feel s she can't afford it.    She is working .9FTE,and is  fairly tired between home needs and work.  She laments her responsibilties and not being able to have the distractions from work she could before COVID and the arrival of [place].         She participates fully. Rapport was excellent.    She was not  weighed today as it is a virtual session and we did not address it.      Wt Readings from Last 4 Encounters:   07/13/21 102.5 kg (226 lb)   05/18/21 107 kg (236 lb)   10/28/20 98.9 kg (218 lb)   10/22/20 97.9 kg (215 lb 12.8 oz)     There is no height or weight on file to calculate BMI.     Current Outpatient Medications   Medication     acetaminophen (TYLENOL) 32 mg/mL liquid     Cholecalciferol (VITAMIN D3) 25 MCG (1000 UT) CAPS     cyanocobalamin (VITAMIN B-12) 500 MCG SUBL sublingual tablet     cyclobenzaprine (FLEXERIL) 10 MG tablet     hydrochlorothiazide (HYDRODIURIL) 25 MG tablet     ibuprofen (ADVIL/MOTRIN) 200 MG capsule     lisinopril (ZESTRIL) 20 MG tablet     Multiple Vitamins-Minerals (ONE DAILY CALCIUM/IRON PO)     naltrexone (DEPADE/REVIA) 50 MG tablet     phentermine (LOMAIRA) 8 MG tablet     polyethylene glycol (MIRALAX) powder     No current facility-administered medications for this visit.     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14    hospitalized at [facility] x [facility] week     History of steroid therapy      HX ABNL PAP SMEAR OF CERVIX    1995    LEEP AT 15 yo     Hypertension 2004     Hypertrophy of breast      Hypertrophy of tonsils alone      Hypovitaminosis D     with secondary high PTH     Irregular heart beat     palpitations     LBP (low back pain)      LSIL (low grade squamous intraepithelial lesion) on Pap smear 5/2006     Lumbago     RESOLVED     Major depressive disorder, recurrent episode, in partial or unspecified remission 4/1/2013     Morbid obesity (H)     bariatric surgery 5/13/2013     Multiple thyroid nodules      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)      OLIGOMENORRHEA, C/W PCOS      Sciatica      SLEEP APNEA 6/2002    No longer has since tonsillectomy and septoplasty     Thyroid nodule      Past Surgical History:   Procedure Laterality Date     BIOPSY  03/13/2015    Thyroid nodule     ESOPHAGOSCOPY, GASTROSCOPY, DUODENOSCOPY (EGD), COMBINED  5/28/2013    Procedure: COMBINED ESOPHAGOSCOPY, GASTROSCOPY, DUODENOSCOPY (EGD);;  Surgeon: Best Willett MD;  Location:  GI     ESOPHAGOSCOPY, GASTROSCOPY, DUODENOSCOPY (EGD), COMBINED N/A 6/13/2018    Procedure: COMBINED ESOPHAGOSCOPY, GASTROSCOPY, DUODENOSCOPY (EGD), BIOPSY SINGLE OR MULTIPLE;  gastroscopy;  Surgeon: Westley Gibbs MD;  Location:  GI     LAPAROSCOPIC GASTRIC SLEEVE  5/13/2013    Procedure: LAPAROSCOPIC GASTRIC SLEEVE;  Laparoscopic Sleeve Gastrectomy ;  Surgeon: Best Willett MD;  Location: [facility] OR     LEEP TX, CERVICAL  1995    age 15     partial thyroidectomy  2018     SEPTOPLASTY       THYROIDECTOMY Left 4/3/2018    Procedure: THYROIDECTOMY;  Left Thyroid Lobectomy And Ishtmusectomy;  Surgeon: Delia Harper MD;  Location: [facility] OR     TONSILLECTOMY  age 20s     TONSILLECTOMY  2004?     wisdom teeth extraction       PHQ-9 SCORE 10/22/2020 12/17/2020 9/14/2021   PHQ-9 Total Score - - -   PHQ-9 Total Score MyChart - - 4 (Minimal depression)   PHQ-9 Total Score 5 6 4     MAXIMO-7 SCORE 11/9/2017 9/15/2018 10/28/2019   Total  Score - - -   Total Score - 3 (minimal anxiety) -   Total Score 12 3 3     WHODAS 2.0 Total Score 12/4/2018 1/31/2020   Total Score 18 15   Total Score MyChart 18 15     She is  5 foot 11 inches.  Her long-term overall goal is 175 She participates fully. Rapport is excellent.       This telehealth service is appropriate and effective for delivering services in light of the necessity for social distancing to mitigate the COVID-19 epidemic and for conservation of PPE.     Patient has agreed to receiving telehealth services after being informed about it: Yes    Patient prefers video invitation/information to be sent by:   FreeBrie  Time service started:  2:02  Time service ended: 2:58  Extended session due to complexity of case and length of interval.    Mode of transmission: [email or web address]    Location of originating:  Home of the patient    Distance site:  Home office of provider for [facility]    The patient has been notified that:  Video visits will be conducted via a call with their psychologist to provide the care they need with a video conversation. Video visits may be billed at different rates depending on insurance coverage.  Patients are advised to please contact their insurance provider with any questions about their health insurance coverage. If during the course of a call the psychologist feels a video visit is not appropriate, patients will not be charged for this service.  Diagnosis:   Psychological factors affecting obesity (F54).   Major depression, recurrent, mild (F33.0).   Occupational Problems (Z56.9)    PLAN/RECOMMENDATIONS:   1. Ms. Catherine will return 11/1 @ 8   to address weight loss and social issues with problem solving therapy, which is medically necessary.   She wishes to continue to get support here with her life changes and her son's behavioral/developmental challenges.  2.  Resume schedule of regular exercise to the level that is possible.      3.  Consider support group of parents of children with  autism.    Preference for future meetings:                In-person             x   Remote                Either  Last treatment plan signed: 4/30/2021  Treatment plan due:  4/30/2022

## 2021-10-05 ENCOUNTER — VIRTUAL VISIT (OUTPATIENT)
Dept: ENDOCRINOLOGY | Facility: CLINIC | Age: 41
End: 2021-10-05
Payer: COMMERCIAL

## 2021-10-05 VITALS — HEIGHT: 71 IN | BODY MASS INDEX: 30.8 KG/M2 | WEIGHT: 220 LBS

## 2021-10-05 DIAGNOSIS — Z98.84 S/P LAPAROSCOPIC SLEEVE GASTRECTOMY: ICD-10-CM

## 2021-10-05 DIAGNOSIS — E66.811 OBESITY, CLASS I, BMI 30-34.9: ICD-10-CM

## 2021-10-05 PROCEDURE — 99212 OFFICE O/P EST SF 10 MIN: CPT | Mod: GT | Performed by: INTERNAL MEDICINE

## 2021-10-05 RX ORDER — NALTREXONE HYDROCHLORIDE 50 MG/1
50 TABLET, FILM COATED ORAL DAILY
Qty: 90 TABLET | Refills: 2 | Status: SHIPPED | OUTPATIENT
Start: 2021-10-05 | End: 2022-06-21

## 2021-10-05 ASSESSMENT — PAIN SCALES - GENERAL: PAINLEVEL: NO PAIN (0)

## 2021-10-05 ASSESSMENT — MIFFLIN-ST. JEOR: SCORE: 1751.1

## 2021-10-05 NOTE — PROGRESS NOTES
"Return Medical Weight Management Note     Ashley Catherine  MRN:  [id number]  :  1980  RAKEL:  10/5/2021    Dear Violet Humphreys MD,    I had the pleasure of seeing your patient Ashley Catherine. She is a 41 year old female who I am continuing to see for treatment of obesity related to mild depression, hypertension, GERD       5/15/2017   I have the following health issues associated with obesity: High Blood Pressure, GERD (Reflux)   I have the following symptoms associated with obesity: GERD (Reflux)     She has hx of sleeve gastrectomy in . She lost from 290 to 186 after surgery with subsequent weight regain.  Was on phentermine and tried for 2 months and had \"dark thought\". She did lose 7 lbs while taking it.  Lowest weight was 184 lbs when taking Qsymia (tingling and mood changes)  Was on Saxenda but stopped in 2021. Does not feel it is working anymore.    INTERVAL HISTORY:  [facility] follow up. Last seen 2021    She is currently on phentermine 4 mg daily and naltrexone 50 mg daily. She stopped topiramate due to emotional lability. Once stopping topiramate, she is doing much better. She feels that both phentermine and naltrexone helped controlling her appetite and craving. She lost 6 lbs in 2 months with total loss of 54 lbs since starting the program.    Activity -- more outside in the summer, walk daily    CURRENT WEIGHT:   220 lbs 0 oz    Highest wt in life: 293 lbs  Initial Weight (lbs): 274 lbs  Last Visits Weight: 102.5 kg (226 lb)  Cumulative weight loss (lbs): 54  Weight Loss Percentage: 19.71%    Changes and Difficulties 10/5/2021   I have made the following changes to my diet since my last visit: Less icecream   With regards to my diet, I am still struggling with: Soda   I have made the following changes to my activity/exercise since my last visit: Move more outside   With regards to my activity/exercise, I am still struggling with: Daily exercise       VITALS:  There were no vitals taken " for this visit.    MEDICATIONS:   Current Outpatient Medications   Medication Sig Dispense Refill     acetaminophen (TYLENOL) 32 mg/mL liquid Take 1,000 mg by mouth every 8 hours as needed for fever or mild pain       Cholecalciferol (VITAMIN D3) 25 MCG (1000 UT) CAPS        cyanocobalamin (VITAMIN B-12) 500 MCG SUBL sublingual tablet Place 500 mcg under the tongue every other day        cyclobenzaprine (FLEXERIL) 10 MG tablet Take 1 tablet (10 mg) by mouth daily as needed for muscle spasms 45 tablet 4     hydrochlorothiazide (HYDRODIURIL) 25 MG tablet Take 1 tablet (25 mg) by mouth daily 90 tablet 3     ibuprofen (ADVIL/MOTRIN) 200 MG capsule Take 400 mg by mouth as needed for fever       lisinopril (ZESTRIL) 20 MG tablet Take 1 tablet (20 mg) by mouth daily 90 tablet 3     Multiple Vitamins-Minerals (ONE DAILY CALCIUM/IRON PO) Take 1 tablet by mouth daily       naltrexone (DEPADE/REVIA) 50 MG tablet Take 1 tablet (50 mg) by mouth daily 90 tablet 1     phentermine (LOMAIRA) 8 MG tablet Take 1 tablet (8 mg) by mouth every morning (before breakfast) 30 tablet 0     polyethylene glycol (MIRALAX) powder Take 17 g (1 capful) by mouth daily as needed for constipation 510 g 1       Weight Loss Medication History Reviewed With Patient 10/5/2021   Which weight loss medications are you currently taking on a regular basis?  Naltrexone, Phentermine   If you are not taking a weight loss medication that was prescribed to you, please indicate why: I did not like the side effects   Are you having any side effects from the weight loss medication that we have prescribed you? No   If you are having side effects please describe: -       ASSESSMENT/PLAN:   Ashley Catherine is a 41 year old female who I am continuing to see for treatment of obesity    Hx of sleeve with some weight regain.   Used to be on Saxenda and maintained weight -- stopped early 2021    Currently on phentermine and naltrexone   Stopped topiramate due to emotional  lability  Loss of 54 lbs since starting the program.  Feels that her appetite is under control    Plan:  - increase phentermine 8 mg daily  - continue naltrexone 50 mg daily  - encourage diet modification and exercise    FOLLOW-UP:    3 months follow up    Start: 10/05/2021 04:12 pm  Stop: 10/05/2021 04:22 pm  VDO duration: 10 minutes    External notes/medical records independently reviewed, labs and imaging independently reviewed, medical management and tests to be discussed/communicated to patient.    Time: I spent 19 minutes spent on the date of the encounter preparing to see patient (including chart review and preparation), obtaining and or reviewing additional medical history, performing a physical exam and evaluation, documenting clinical information in the electronic health record, independently interpreting results, communicating results to the patient and coordinating care.      Sincerely,    Gagandeep Qureshi MD

## 2021-10-05 NOTE — LETTER
"10/5/2021       RE: Ashley Catherine  [address]  [facility] [address]     Dear Colleague,    Thank you for referring your patient, Ashley Catherine, to the [facility] [place] [facility]. Please see a copy of my visit note below.    Return Medical Weight Management Note     Ashley Catherine  MRN:  [id number]  :  1980  RAKEL:  10/5/2021    Dear Violet Humphreys MD,    I had the pleasure of seeing your patient Ashley Catherine. She is a 41 year old female who I am continuing to see for treatment of obesity related to mild depression, hypertension, GERD       5/15/2017   I have the following health issues associated with obesity: High Blood Pressure, GERD (Reflux)   I have the following symptoms associated with obesity: GERD (Reflux)     She has hx of sleeve gastrectomy in . She lost from 290 to 186 after surgery with subsequent weight regain.  Was on phentermine and tried for 2 months and had \"dark thought\". She did lose 7 lbs while taking it.  Lowest weight was 184 lbs when taking Qsymia (tingling and mood changes)  Was on Saxenda but stopped in 2021. Does not feel it is working anymore.    INTERVAL HISTORY:  [facility] follow up. Last seen 2021    She is currently on phentermine 4 mg daily and naltrexone 50 mg daily. She stopped topiramate due to emotional lability. Once stopping topiramate, she is doing much better. She feels that both phentermine and naltrexone helped controlling her appetite and craving. She lost 6 lbs in 2 months with total loss of 54 lbs since starting the program.    Activity -- more outside in the summer, walk daily    CURRENT WEIGHT:   220 lbs 0 oz    Highest wt in life: 293 lbs  Initial Weight (lbs): 274 lbs  Last Visits Weight: 102.5 kg (226 lb)  Cumulative weight loss (lbs): 54  Weight Loss Percentage: 19.71%    Changes and Difficulties 10/5/2021   I have made the following changes to my diet " since my last visit: Less icecream   With regards to my diet, I am still struggling with: Soda   I have made the following changes to my activity/exercise since my last visit: Move more outside   With regards to my activity/exercise, I am still struggling with: Daily exercise       VITALS:  There were no vitals taken for this visit.    MEDICATIONS:   Current Outpatient Medications   Medication Sig Dispense Refill     acetaminophen (TYLENOL) 32 mg/mL liquid Take 1,000 mg by mouth every 8 hours as needed for fever or mild pain       Cholecalciferol (VITAMIN D3) 25 MCG (1000 UT) CAPS        cyanocobalamin (VITAMIN B-12) 500 MCG SUBL sublingual tablet Place 500 mcg under the tongue every other day        cyclobenzaprine (FLEXERIL) 10 MG tablet Take 1 tablet (10 mg) by mouth daily as needed for muscle spasms 45 tablet 4     hydrochlorothiazide (HYDRODIURIL) 25 MG tablet Take 1 tablet (25 mg) by mouth daily 90 tablet 3     ibuprofen (ADVIL/MOTRIN) 200 MG capsule Take 400 mg by mouth as needed for fever       lisinopril (ZESTRIL) 20 MG tablet Take 1 tablet (20 mg) by mouth daily 90 tablet 3     Multiple Vitamins-Minerals (ONE DAILY CALCIUM/IRON PO) Take 1 tablet by mouth daily       naltrexone (DEPADE/REVIA) 50 MG tablet Take 1 tablet (50 mg) by mouth daily 90 tablet 1     phentermine (LOMAIRA) 8 MG tablet Take 1 tablet (8 mg) by mouth every morning (before breakfast) 30 tablet 0     polyethylene glycol (MIRALAX) powder Take 17 g (1 capful) by mouth daily as needed for constipation 510 g 1     Weight Loss Medication History Reviewed With Patient 10/5/2021   Which weight loss medications are you currently taking on a regular basis?  Naltrexone, Phentermine   If you are not taking a weight loss medication that was prescribed to you, please indicate why: I did not like the side effects   Are you having any side effects from the weight loss medication that we have prescribed you? No   If you are having side effects please  describe: -     ASSESSMENT/PLAN:   Ashley Catherine is a 41 year old female who I am continuing to see for treatment of obesity    Hx of sleeve with some weight regain.   Used to be on Saxenda and maintained weight -- stopped early 2021    Currently on phentermine and naltrexone   Stopped topiramate due to emotional lability  Loss of 54 lbs since starting the program.  Feels that her appetite is under control    Plan:  - increase phentermine 8 mg daily  - continue naltrexone 50 mg daily  - encourage diet modification and exercise    FOLLOW-UP:    3 months follow up    Start: 10/05/2021 04:12 pm  Stop: 10/05/2021 04:22 pm  VDO duration: 10 minutes    External notes/medical records independently reviewed, labs and imaging independently reviewed, medical management and tests to be discussed/communicated to patient.    Time: I spent 19 minutes spent on the date of the encounter preparing to see patient (including chart review and preparation), obtaining and or reviewing additional medical history, performing a physical exam and evaluation, documenting clinical information in the electronic health record, independently interpreting results, communicating results to the patient and coordinating care.    Sincerely,    Gagandeep Qureshi MD

## 2021-10-05 NOTE — PROGRESS NOTES
Ashley is a 41 year old who is being evaluated via a billable video visit.      How would you like to obtain your AVS? MyChart  If the video visit is dropped, the invitation should be resent by: Text to cell phone: [phone]  Will anyone else be joining your video visit? No      Video-Visit Details    Type of service:  Video Visit    Originating Location (pt. Location): Home    Distant Location (provider location):  [facility] [place]     Platform used for Video Visit: BrightSun

## 2021-10-05 NOTE — NURSING NOTE
"Chief Complaint   Patient presents with     Follow Up     Follow-up Weight Management       Vitals:    10/05/21 1558   Weight: 99.8 kg (220 lb)   Height: 1.791 m (5' 10.5\")       Body mass index is 31.12 kg/m .                       "

## 2021-10-21 ENCOUNTER — OFFICE VISIT (OUTPATIENT)
Dept: URGENT CARE | Facility: URGENT CARE | Age: 41
End: 2021-10-21
Payer: COMMERCIAL

## 2021-10-21 VITALS — WEIGHT: 215 LBS | TEMPERATURE: 98.3 F | HEART RATE: 85 BPM | BODY MASS INDEX: 30.41 KG/M2 | OXYGEN SATURATION: 98 %

## 2021-10-21 DIAGNOSIS — L03.032 PARONYCHIA OF TOE, LEFT: Primary | ICD-10-CM

## 2021-10-21 PROCEDURE — 99213 OFFICE O/P EST LOW 20 MIN: CPT

## 2021-10-21 RX ORDER — CEPHALEXIN 500 MG/1
500 CAPSULE ORAL 3 TIMES DAILY
Qty: 21 CAPSULE | Refills: 0 | Status: SHIPPED | OUTPATIENT
Start: 2021-10-21 | End: 2021-10-28

## 2021-10-22 NOTE — PROGRESS NOTES
SUBJECTIVE:  Open sore with increasing pain great toe lt foot.  Worsening over 4 days.    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14    hospitalized at [facility] x 1 week     History of steroid therapy      HX ABNL PAP SMEAR OF CERVIX   1995    LEEP AT 15 yo     Hypertension 2004     Hypertrophy of breast      Hypertrophy of tonsils alone      Hypovitaminosis D     with secondary high PTH     Irregular heart beat     palpitations     LBP (low back pain)      LSIL (low grade squamous intraepithelial lesion) on Pap smear 5/2006     Lumbago     RESOLVED     Major depressive disorder, recurrent episode, in partial or unspecified remission 4/1/2013     Morbid obesity (H)     bariatric surgery 5/13/2013     Multiple thyroid nodules      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)      OLIGOMENORRHEA, C/W PCOS      Sciatica      SLEEP APNEA 6/2002    No longer has since tonsillectomy and septoplasty     Thyroid nodule      Current Outpatient Medications   Medication Sig Dispense Refill     acetaminophen (TYLENOL) 32 mg/mL liquid Take 1,000 mg by mouth every 8 hours as needed for fever or mild pain       cephALEXin (KEFLEX) 500 MG capsule Take 1 capsule (500 mg) by mouth 3 times daily for 7 days 21 capsule 0     Cholecalciferol (VITAMIN D3) 25 MCG (1000 UT) CAPS        cyanocobalamin (VITAMIN B-12) 500 MCG SUBL sublingual tablet Place 500 mcg under the tongue every other day        cyclobenzaprine (FLEXERIL) 10 MG tablet Take 1 tablet (10 mg) by mouth daily as needed for muscle spasms 45 tablet 4     hydrochlorothiazide (HYDRODIURIL) 25 MG tablet Take 1 tablet (25 mg) by mouth daily 90 tablet 3     ibuprofen (ADVIL/MOTRIN) 200 MG capsule Take 400 mg by mouth as needed for fever        lisinopril (ZESTRIL) 20 MG tablet Take 1 tablet (20 mg) by mouth daily 90 tablet 3     Multiple Vitamins-Minerals (ONE DAILY CALCIUM/IRON PO) Take 1 tablet by mouth daily       naltrexone (DEPADE/REVIA) 50 MG tablet Take 1 tablet (50 mg) by mouth daily 90 tablet 2     phentermine (LOMAIRA) 8 MG tablet Take 1 tablet (8 mg) by mouth every morning (before breakfast) 30 tablet 3     History   Smoking Status     Never Smoker   Smokeless Tobacco     Never Used         OBJECITVE;  Pulse 85   Temp 98.3  F (36.8  C) (Oral)   Wt 97.5 kg (215 lb)   SpO2 98%   Breastfeeding No   BMI 30.41 kg/m    Red swollen lt great toe dorothy medial aspect    ASSESSMENT:  paronychia    PLAN:  Cephalexin 500 mg tid for ten days.  Warm soapy soaks.  Printed information    Follow up with primary care provider if no improvement.

## 2021-10-28 ENCOUNTER — OFFICE VISIT (OUTPATIENT)
Dept: PODIATRY | Facility: CLINIC | Age: 41
End: 2021-10-28
Payer: COMMERCIAL

## 2021-10-28 VITALS
DIASTOLIC BLOOD PRESSURE: 70 MMHG | BODY MASS INDEX: 30.1 KG/M2 | SYSTOLIC BLOOD PRESSURE: 122 MMHG | HEIGHT: 71 IN | WEIGHT: 215 LBS

## 2021-10-28 DIAGNOSIS — L03.032 PARONYCHIA OF GREAT TOE OF LEFT FOOT: ICD-10-CM

## 2021-10-28 DIAGNOSIS — L60.0 ONYCHOCRYPTOSIS: ICD-10-CM

## 2021-10-28 DIAGNOSIS — M54.50 CHRONIC LOW BACK PAIN, UNSPECIFIED BACK PAIN LATERALITY, UNSPECIFIED WHETHER SCIATICA PRESENT: ICD-10-CM

## 2021-10-28 DIAGNOSIS — M79.671 FOOT PAIN, BILATERAL: Primary | ICD-10-CM

## 2021-10-28 DIAGNOSIS — G89.29 CHRONIC LOW BACK PAIN, UNSPECIFIED BACK PAIN LATERALITY, UNSPECIFIED WHETHER SCIATICA PRESENT: ICD-10-CM

## 2021-10-28 DIAGNOSIS — M79.672 FOOT PAIN, BILATERAL: Primary | ICD-10-CM

## 2021-10-28 PROCEDURE — 99203 OFFICE O/P NEW LOW 30 MIN: CPT | Mod: 25 | Performed by: PODIATRIST

## 2021-10-28 PROCEDURE — 11730 AVULSION NAIL PLATE SIMPLE 1: CPT | Mod: TA | Performed by: PODIATRIST

## 2021-10-28 ASSESSMENT — MIFFLIN-ST. JEOR: SCORE: 1728.42

## 2021-10-28 NOTE — PATIENT INSTRUCTIONS
Thank you for choosing [facility] / Foot & Ankle Surgery!    DR JARAMILLO'S CLINIC:  [facility]   [address]   [place] [address]   [phone]      SET UP SURGERY: [phone]   RADIOLOGY: [phone]   APPOINTMENTS: [phone]   BILLING QUESTIONS: [phone]   FAX NUMBER: [phone]     [facility]  [facility]  [address]  [place] [address]  Phone: [phone]  Fax: [phone] [facility]  [address]  [place] [address]  Phone: [phone]   Fax: [phone]   [facility]  [address]  [place] [address]  Phone: [phone]  Fax: [phone] [facility]  [address]  [place] [address]  Phone: [phone]   Fax: [phone]   [facility]   [address]  [place] [address]  Phone: [phone]  Fax: [phone] * Please call any location listed to make an appointment for a casting/fitting. Your referral was sent to their central office and they will all have the order on file.       INGROWN TOENAIL REMOVAL AFTERCARE  1. After the procedure, go home and elevate the foot/feet for the remainder of the day/evening as able. This is to minimize swelling, control pain, and limit post-procedural complications. The pre-procedural injection may cause your toe to be numb anywhere from 1-2 hours.    2. You can take Tylenol, Ibuprofen, Advil, etc as needed for pain if tolerated. Follow label instructions.     3. If you have been given a prescription for antibiotics, take them as instructed and complete the entire prescription.    4. Keep dressing intact until the following morning. Then remove the bandage (you may need to soak it in warm soapy water as the bandage will likely adhere to your skin).    5. Start soaking in warm soapy water for 5-10 minutes twice a day.  Wash the toe thoroughly, dry the toe thoroughly. Apply antibiotic wound ointment to base of wound and cover with gauze and Coban dressing (not too tightly) until it stops draining. This may take a few days to weeks, but at that point, you may continue with antibiotic ointment and a band-aid, or you may stop applying a dressing all together. Dressing changes should be done twice daily if you had the permanent/chemical procedure done.    6. You may do activities as tolerated the following day. Find a shoe that is comfortable and minimizes the amount of rubbing on your toe, as this may increase pain, swelling, etc.    7. Monitor for signs of infection. With this procedure, it is common to have mild surrounding redness and drainage. If the redness involves the entire toe or if you notice red streaks on top of your foot, or if you experience any nausea, vomiting, chills, fevers > 101 degrees, call clinic for a quick appointment.

## 2021-10-28 NOTE — PROGRESS NOTES
"Foot & Ankle Surgery  October 28, 2021    CC: \" Left toe pain/inflammation/orthotics\"    I was asked to see Ashley Catherine regarding the chief complaint by: self    HPI:  Pt is a 41 year old female who presents with above complaint.  11-day history of left great toe issues.  She states this started with some peeling of the skin on the side of the toe.  Rather than using a tweezer, she pulled on the skin and started developing pain and inflammation.  She was seen in urgent care and put on 7-day course of antibiotics, as well as using an ointment and soaking.  This has not helped.  Pain 7 out of 10 \"constant\", worse with \"brushing against anything\".  She also is due for new pair orthotics.  She gets these because of foot pain and specifically low back pain    ROS:   Pos for CC.  The patient denies current nausea, vomiting, chills, fevers, belly pain, calf pain, chest pain or SOB.  Complete remainder of ROS is otherwise neg.    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14    hospitalized at [facility] x 1 week     History of steroid therapy      HX ABNL PAP SMEAR OF CERVIX   1995    LEEP AT 15 yo     Hypertension 2004     Hypertrophy of breast      Hypertrophy of tonsils alone      Hypovitaminosis D     with secondary high PTH     Irregular heart beat     palpitations     LBP (low back pain)      LSIL (low grade squamous intraepithelial lesion) on Pap smear 5/2006     Lumbago     RESOLVED     Major depressive disorder, recurrent episode, in partial or unspecified remission 4/1/2013     Morbid obesity (H)     bariatric surgery 5/13/2013     Multiple thyroid nodules      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)      OLIGOMENORRHEA, C/W PCOS      Sciatica      SLEEP APNEA 6/2002    No longer has since tonsillectomy and septoplasty     Thyroid nodule        SXHX:    Past Surgical History:   Procedure Laterality Date     BIOPSY  03/13/2015    Thyroid nodule     ESOPHAGOSCOPY, GASTROSCOPY, DUODENOSCOPY (EGD), COMBINED  5/28/2013    Procedure: COMBINED ESOPHAGOSCOPY, GASTROSCOPY, DUODENOSCOPY (EGD);;  Surgeon: Best Willett MD;  Location:  GI     ESOPHAGOSCOPY, GASTROSCOPY, DUODENOSCOPY (EGD), COMBINED N/A 6/13/2018    Procedure: COMBINED ESOPHAGOSCOPY, GASTROSCOPY, DUODENOSCOPY (EGD), BIOPSY SINGLE OR MULTIPLE;  gastroscopy;  Surgeon: Westley Gibbs MD;  Location: [facility]     LAPAROSCOPIC GASTRIC SLEEVE  5/13/2013    Procedure: LAPAROSCOPIC GASTRIC SLEEVE;  Laparoscopic Sleeve Gastrectomy ;  Surgeon: Best Willett MD;  [facility]     LEEP TX, CERVICAL  1995    age 15     partial thyroidectomy  2018     SEPTOPLASTY       THYROIDECTOMY Left 4/3/2018    Procedure: THYROIDECTOMY;  Left Thyroid Lobectomy And Ishtmusectomy;  Surgeon: Delia Harper MD;  [facility]: [facility]     TONSILLECTOMY  age 20s     TONSILLECTOMY  2004?     wisdom teeth extraction          MEDS:    Current Outpatient Medications   Medication     acetaminophen (TYLENOL) 32 mg/mL liquid     cephALEXin (KEFLEX) 500 MG capsule     Cholecalciferol (VITAMIN D3) 25 MCG (1000 UT) CAPS     cyanocobalamin (VITAMIN B-12) 500 MCG SUBL sublingual tablet     cyclobenzaprine (FLEXERIL) 10 MG tablet     hydrochlorothiazide (HYDRODIURIL) 25 MG tablet     ibuprofen (ADVIL/MOTRIN) 200 MG capsule     lisinopril (ZESTRIL) 20 MG tablet     Multiple Vitamins-Minerals (ONE DAILY CALCIUM/IRON PO)     naltrexone (DEPADE/REVIA) 50 MG tablet     phentermine (LOMAIRA) 8 MG tablet     No current facility-administered medications for this visit.       ALL:     Allergies   Allergen Reactions     Nkda [No Known Drug  Allergies]      No Known Allergies        FMH:    Family History   Problem Relation Age of Onset     Hypertension Sister      Hypertension Father      Allergies Father         seasonal     Lipids Father      Obesity Father      Arthritis Mother      Gynecology Mother         problems with menopause     Hypertension Mother      Other Cancer Mother         Endometrial     Osteoporosis Mother      Obesity Mother      Parathyroid Disorders Mother         3 operations     Obesity Sister      Diabetes Maternal Aunt         x several w. DM     Breast Cancer Maternal Aunt      Cancer - colorectal Maternal Uncle         60ish     Hypertension Sister      Obesity Sister      Nephrolithiasis No family hx of        SocHx:    Social History     Socioeconomic History     Marital status: Single     Spouse name: Not on file     Number of children: 0     Years of education: Not on file     Highest education level: Not on file   Occupational History     Employer: [employer]   Tobacco Use     Smoking status: Never Smoker     Smokeless tobacco: Never Used   Substance and Sexual Activity     Alcohol use: Yes     Alcohol/week: 1.0 - 4.0 standard drinks     Comment: 6 drinks/week     Drug use: No     Sexual activity: Not Currently     Partners: Male     Birth control/protection: Abstinence   Other Topics Concern     Parent/sibling w/ CABG, MI or angioplasty before 65F 55M? No   Social History Narrative    Social Documentation:        Balanced Diet: NO    Calcium intake: more than 2 per day    Caffeine: 3 cups per day    Exercise:  type of activity daily living activites    Sunscreen: Yes    Seatbelts:  Yes    Self Breast Exam:  No    Self Testicular Exam: n/a    Physical/Emotional/Sexual Abuse: No    Do you feel safe in your environment? Yes        Cholesterol screen up to date: Yes    CHOL      171   6/15/09    HDL       54   6/15/09    LDL       89   6/15/09    TRIG      140   6/15/09    CHOLHDLRATIO      3.2   6/15/09         Eye Exam up to date: Yes    Dental Exam up to date: Yes    Pap smear up to date: No: will do today    Mammogram up to date: Does Not Apply    Dexa Scan up to date: Does Not Apply    Colonoscopy up to date: Does Not Apply    Immunizations up to date: No-does not want to do tdap today/does need tb test    Glucose screen if over 40:  No                     Social Determinants of Health     Financial Resource Strain:      Difficulty of Paying Living Expenses:    Food Insecurity:      Worried About Running Out of Food in the Last Year:      Ran Out of Food in the Last Year:    Transportation Needs:      Lack of Transportation (Medical):      Lack of Transportation (Non-Medical):    Physical Activity:      Days of Exercise per Week:      Minutes of Exercise per Session:    Stress:      Feeling of Stress :    Social Connections:      Frequency of Communication with Friends and Family:      Frequency of Social Gatherings with Friends and Family:      Attends Sabianist Services:      Active Member of Clubs or Organizations:      Attends Club or Organization Meetings:      Marital Status:    Intimate Partner Violence:      Fear of Current or Ex-Partner:      Emotionally Abused:      Physically Abused:      Sexually Abused:            EXAMINATION:  Gen:   No apparent distress  Neuro:   A&Ox3, no deficits  Psych:    Answering questions appropriately for age and situation with normal affect  Head:    NCAT  Eye:    Visual scanning without deficit  Ear:    Response to auditory stimuli wnl  Lung:    Non-labored breathing on RA noted  Abd:    NTND per patient report  Lymph:    Neg for pitting/non-pitting edema BLE  Vasc:    Pulses palpable, CFT minimally delayed  Neuro:    Light touch sensation intact to all sensory nerve distributions without paresthesias  Derm:    Paronychia with granuloma medial left hallux.  There is mild inflammation but no cellulitis  MSK:    Pes planus bilateral without acute musculoskeletal pathology  Calf:     Neg for redness, swelling or tenderness      Assessment:  41 year old female with paronychia medial left hallux; pes planus and low back pain      Plan:  Discussed etiologies, anatomy and options  1.  Paronychia of medial left hallux  -I personally reviewed and interpreted the patient's lower extremity history pertinent to today's visit, including imaging/labs, in preparation for initiating a treatment program.  -Regarding the nail, treatment options were discussed.  They elected to proceed with a procedure, Partial temporary avulsion.  See procedure note for details.  Risks that were discussed include but are not limited to infection, wound healing complications, nerve irritation, recurrence of the ingrown nail and the need for further procedures.  Antibiotic:  None needed     After discussing the procedure, as well as risks, complications and post-procedure instructions, informed consent was obtained.    Anesthesia:  5 cc's of  1% lidocaine plain    Procedure:  After adequate prep, and with anesthesia achieved,  attention was directed to the medial border of the L hallux where the nail plate was freed from surrounding soft tissue.  The offending border was  using an English Anvil and then removed in total.  The base of the wound was explored and showed no necrotic tissue, purulence or debris.   A clean dressing was applied loosely to prevent vascular insult.  The patient tolerated the procedure well without complications.    Post-procedural instructions were dispensed and discussed with the patient.  All questions were answered.    2.  Pes planus with foot pain and low back pain  -Orthotic lab referral for custom orthotics.         Follow up:  prn or sooner with acute issues      Patient's medical history was reviewed today      Ivan Ambrosio DPM FACFAS FACFAOM  Podiatric Foot & Ankle Surgeon  [facility]  [phone]    Disclaimer: This note consists of symbols derived from  keyboarding, dictation and/or voice recognition software. As a result, there may be errors in the script that have gone undetected. Please consider this when interpreting information found in this chart.

## 2021-11-01 ENCOUNTER — VIRTUAL VISIT (OUTPATIENT)
Dept: PSYCHOLOGY | Facility: CLINIC | Age: 41
End: 2021-11-01
Payer: COMMERCIAL

## 2021-11-01 DIAGNOSIS — F54 PSYCHOLOGICAL FACTORS AFFECTING MEDICAL CONDITION: ICD-10-CM

## 2021-11-01 DIAGNOSIS — E66.01 PSYCHOLOGICAL FACTORS AFFECTING MORBID OBESITY (H): ICD-10-CM

## 2021-11-01 DIAGNOSIS — Z63.4 BEREAVEMENT: ICD-10-CM

## 2021-11-01 DIAGNOSIS — F33.0 MAJOR DEPRESSIVE DISORDER, RECURRENT EPISODE, MILD (H): Primary | ICD-10-CM

## 2021-11-01 DIAGNOSIS — Z56.9 OCCUPATIONAL PROBLEM: ICD-10-CM

## 2021-11-01 DIAGNOSIS — F54 PSYCHOLOGICAL FACTORS AFFECTING MORBID OBESITY (H): ICD-10-CM

## 2021-11-01 PROCEDURE — 90837 PSYTX W PT 60 MINUTES: CPT | Mod: GT | Performed by: PSYCHOLOGIST

## 2021-11-01 SDOH — ECONOMIC STABILITY - INCOME SECURITY: UNSPECIFIED PROBLEMS RELATED TO EMPLOYMENT: Z56.9

## 2021-11-01 SDOH — SOCIAL STABILITY - SOCIAL INSECURITY: DISSAPEARANCE AND DEATH OF FAMILY MEMBER: Z63.4

## 2021-11-01 NOTE — PROGRESS NOTES
Health Psychology                     Department of Medicine  Julianne Moreno, Ph.D., L.P. [phone]                          [facility] Lis Chavez, Ph.D., L.P. [phone]                   [place] Mail [address]   Donald Hills, Ph.D. [phone]        [address] [place] [address] Susana Ugarte, Ph.D., L.P. [phone]    [place] [address]           Jayro Elias, Ph.D., A.B.P.P., L.P. [phone]        Melissa Stokes, Ph.D., L.P. [phone]  [address]     Health Psychology Follow-Up [facility] Note    Ashley Catherine is a 41-year-old single woman referred initially for psychological consultation for workup for a gastric sleeve procedure who is seen for CBT-oriented therapy regardingt weight management, work stresses, and family and social matters. She is seen for problem-solving and supportive counseling.  Intake was 13.    Son:  Her son was diagnosed with autism and she is very frustrated working out services and funding for them.   [place] got Medicaid,, which is a big relief.  He will be starting speech therapy at [facility] x2/week.    She is having difficulty getting in touch with her , who is apparently not responding to her inquiries.   She describes the isolation of being the parent of a disabed child.  She describes efforts to take him to the zoo for a pumpkin event, but not seeing a single pumpkin due to his behavioral/attentional issues.  He doesn't do well with crowds or sensory overload.      Health:   She got her booster in late September.  She has been off work for 2 weeks.     New Stressors: Her father got ill, was in ICU x 1 week, but then .   The  was this weekend.He became srptic with a gallbladder infection.   The  was in [place]. She didn't go because of needing to take care of [place].  She hasn't had a chance to grieve.    She is tearful discussing his life, death.  We discussed aspects of  grieving. 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 .   She is now working weekend shifts so as to be more available for her son.     Family:   Her mother is vaccinated x3 and does childcare for [employer]. She just returned from [place]    She participates fully. Rapport was excellent.   She is intermittently tearful describing son and bereavement with father  Doesn't feel she has grieved yet.     She was not  weighed today as it is a virtual session and we did not address it.      Wt Readings from Last 4 Encounters:   10/28/21 97.5 kg (215 lb)   10/21/21 97.5 kg (215 lb)   10/05/21 99.8 kg (220 lb)   07/13/21 102.5 kg (226 lb)     There is no height or weight on file to calculate BMI.     Current Outpatient Medications   Medication     acetaminophen (TYLENOL) 32 mg/mL liquid     Cholecalciferol (VITAMIN D3) 25 MCG (1000 UT) CAPS     cyanocobalamin (VITAMIN B-12) 500 MCG SUBL sublingual tablet     cyclobenzaprine (FLEXERIL) 10 MG tablet     hydrochlorothiazide (HYDRODIURIL) 25 MG tablet     ibuprofen (ADVIL/MOTRIN) 200 MG capsule     lisinopril (ZESTRIL) 20 MG tablet     Multiple Vitamins-Minerals (ONE DAILY CALCIUM/IRON PO)     naltrexone (DEPADE/REVIA) 50 MG tablet     phentermine (LOMAIRA) 8 MG tablet     No current facility-administered medications for this visit.     Past Medical History:   Diagnosis Date     Bariatric surgery status 05/13/2013     Depression      Depressive disorder      Esophageal reflux      Family history of hyperparathyroidism 3/6/2015     Forearm fracture childhood    jumping fall     Guillain-Black disease (H) age 14    hospitalized at [facility] x 1 week     History of steroid therapy      HX ABNL PAP SMEAR OF CERVIX   1995    LEEP AT 15 yo     Hypertension 2004     Hypertrophy of breast      Hypertrophy of tonsils alone      Hypovitaminosis D     with secondary high PTH     Irregular heart beat     palpitations      LBP (low back pain)      LSIL (low grade squamous intraepithelial lesion) on Pap smear 5/2006     Lumbago     RESOLVED     Major depressive disorder, recurrent episode, in partial or unspecified remission 4/1/2013     Morbid obesity (H)     bariatric surgery 5/13/2013     Multiple thyroid nodules      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)      OLIGOMENORRHEA, C/W PCOS      Sciatica      SLEEP APNEA 6/2002    No longer has since tonsillectomy and septoplasty     Thyroid nodule      Past Surgical History:   Procedure Laterality Date     BIOPSY  03/13/2015    Thyroid nodule     ESOPHAGOSCOPY, GASTROSCOPY, DUODENOSCOPY (EGD), COMBINED  5/28/2013    Procedure: COMBINED ESOPHAGOSCOPY, GASTROSCOPY, DUODENOSCOPY (EGD);;  Surgeon: Best Willett MD;  Location:  GI     ESOPHAGOSCOPY, GASTROSCOPY, DUODENOSCOPY (EGD), COMBINED N/A 6/13/2018    Procedure: COMBINED ESOPHAGOSCOPY, GASTROSCOPY, DUODENOSCOPY (EGD), BIOPSY SINGLE OR MULTIPLE;  gastroscopy;  Surgeon: Westley Gibbs MD;  [facility]: [facility]     LAPAROSCOPIC GASTRIC SLEEVE  5/13/2013    Procedure: LAPAROSCOPIC GASTRIC SLEEVE;  Laparoscopic Sleeve Gastrectomy ;  Surgeon: Best Willett MD;  Location:  OR     LEEP TX, CERVICAL  1995    age 15     partial thyroidectomy  2018     SEPTOPLASTY       THYROIDECTOMY Left 4/3/2018    Procedure: THYROIDECTOMY;  Left Thyroid Lobectomy And Ishtmusectomy;  Surgeon: Delia Harper MD;  [facility]     TONSILLECTOMY  age 20s     TONSILLECTOMY  2004?     wisdom teeth extraction       PHQ-9 SCORE 10/22/2020 12/17/2020 9/14/2021   PHQ-9 Total Score - - -   PHQ-9 Total Score MyChart - - 4 (Minimal depression)   PHQ-9 Total Score 5 6 4     MAXIMO-7 SCORE 11/9/2017 9/15/2018 10/28/2019   Total Score - - -   Total Score - 3 (minimal anxiety) -   Total Score 12 3 3     WHODAS 2.0 Total Score 12/4/2018 1/31/2020   Total Score 18 15   Total Score MyChart 18 15     She is  5 foot 11 inches.  Her  long-term overall goal is 175 She participates fully. Rapport is excellent.       This telehealth service is appropriate and effective for delivering services in light of the necessity for social distancing to mitigate the COVID-19 epidemic and for conservation of PPE.     Patient has agreed to receiving telehealth services after being informed about it: Yes    Patient prefers video invitation/information to be sent by:   emai  Time service started:  8:05  Time service ended: 8:58  Extended session due to complexity of case and length of interval.    Mode of transmission: [email or web address]    Location of originating:  Home of the patient    Distance site:  Home office of provider for [facility]    The patient has been notified that:  Video visits will be conducted via a call with their psychologist to provide the care they need with a video conversation. Video visits may be billed at different rates depending on insurance coverage.  Patients are advised to please contact their insurance provider with any questions about their health insurance coverage. If during the course of a call the psychologist feels a video visit is not appropriate, patients will not be charged for this service.  Diagnosis:   Psychological factors affecting obesity (F54).   Major depression, recurrent, mild (F33.0).   Occupational Problems (Z56.9)  Bereavement    PLAN/RECOMMENDATIONS:   1. Ms. Catherine will return 12/8 @ 3   to address weight loss and social issues with problem solving therapy, which is medically necessary.   She wishes to continue to get support here with her life changes and her son's behavioral/developmental challenges.  2.  Resume schedule of regular exercise to the level that is possible.    3.  Continuesupport group of parents of children with autism.    Preference for future meetings:                In-person             x   Remote                Either  Last treatment plan signed: 4/30/2021  Treatment plan due:  4/30/2022

## 2021-12-08 ENCOUNTER — VIRTUAL VISIT (OUTPATIENT)
Dept: PSYCHOLOGY | Facility: CLINIC | Age: 41
End: 2021-12-08
Payer: COMMERCIAL

## 2021-12-08 DIAGNOSIS — Z63.4 BEREAVEMENT: ICD-10-CM

## 2021-12-08 DIAGNOSIS — Z56.9 OCCUPATIONAL PROBLEM: ICD-10-CM

## 2021-12-08 DIAGNOSIS — F54 PSYCHOLOGICAL FACTORS AFFECTING MEDICAL CONDITION: ICD-10-CM

## 2021-12-08 DIAGNOSIS — F54 PSYCHOLOGICAL FACTORS AFFECTING MORBID OBESITY (H): ICD-10-CM

## 2021-12-08 DIAGNOSIS — F33.0 MAJOR DEPRESSIVE DISORDER, RECURRENT EPISODE, MILD (H): Primary | ICD-10-CM

## 2021-12-08 DIAGNOSIS — E66.01 PSYCHOLOGICAL FACTORS AFFECTING MORBID OBESITY (H): ICD-10-CM

## 2021-12-08 PROCEDURE — 90837 PSYTX W PT 60 MINUTES: CPT | Mod: GT | Performed by: PSYCHOLOGIST

## 2021-12-08 SDOH — SOCIAL STABILITY - SOCIAL INSECURITY: DISSAPEARANCE AND DEATH OF FAMILY MEMBER: Z63.4

## 2021-12-08 SDOH — ECONOMIC STABILITY - INCOME SECURITY: UNSPECIFIED PROBLEMS RELATED TO EMPLOYMENT: Z56.9

## 2021-12-08 NOTE — PROGRESS NOTES
Health Psychology                     Department of Medicine  Julianne Moreno, Ph.D., L.P. [phone]                          [facility] Lis Chavez, Ph.D., L.P. [phone]                   [place] Mail Code 712   Donald Hills, Ph.D. [phone]        [address] [place] [address] Susana Ugarte, Ph.D., L.P. [phone]    [place] [address]           Jayro Elias, Ph.D., A.B.P.P., L.P. [phone]        Melissa Stokes, Ph.D., L.P. [phone]  [address]     Health Psychology Follow-Up [facility] Health Note     ntake:  13.    Ashley Catherine is a 41-year-old single woman referred initially for psychological consultation for workup for a gastric sleeve procedure who is seen for CBT-oriented therapy regardingtweight management, work stresses, and family and social matters. She is seen for problem-solving and supportive counseling.     Son:  Her son was diagnosed with autism and she is very frustrated working out services and funding for them.   Rivera got Medicaid, which is a big relief.  He will be starting speech therapy at [facility] x2/week in home and is on wait list for the 1/2 day program, getting closer..       Health:   She got her booster in late September.  She has been working 2 days/weekend.  Some is night shift, more are day shifts.    Weight: 237 was recent hiih; now down to 218.   We discussed exercyucle and starting to address this.  New Stressors: Her father got ill, was in ICU x 1 week, but then .   The  was this weekend.He became srptic with a gallbladder infection.   The  was in [place]. She didn't go because of needing to take care of [place]ridge.  She hasn't had a chance to grieve.    She is tearful discussing his life, death.  We discussed aspects of grieving. 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 She is now working weekend  shifts so as to be more available for her son.  The pandemic has hit [facility] hard.  Many nurses are leaving, creating stress for those who remain.    Family:   Her mother is vaccinated x3 and does childcare for [employer]. She just returned from [place]  She developed a blodclot, now on coumadin.  She doesn't feel she has grieved for her father yet.   Social:  A friend visited [facility].  It was good, but depressing that she is so limited socially  Discussed longer term future and need to take  Care of herself.     She participates fully. Rapport was excellent.   She is intermittently tearful describing son and bereavement with father  Doesn't feel she has grieved yet.     She was not  weighed today as it is a virtual session and we did not address it.      Wt Readings from Last 4 Encounters:   10/28/21 97.5 kg (215 lb)   10/21/21 97.5 kg (215 lb)   10/05/21 99.8 kg (220 lb)   07/13/21 102.5 kg (226 lb)     There is no height or weight on file to calculate BMI.     Current Outpatient Medications   Medication     acetaminophen (TYLENOL) 32 mg/mL liquid     Cholecalciferol (VITAMIN D3) 25 MCG (1000 UT) CAPS     cyanocobalamin (VITAMIN B-12) 500 MCG SUBL sublingual tablet     cyclobenzaprine (FLEXERIL) 10 MG tablet     hydrochlorothiazide (HYDRODIURIL) 25 MG tablet     ibuprofen (ADVIL/MOTRIN) 200 MG capsule     lisinopril (ZESTRIL) 20 MG tablet     Multiple Vitamins-Minerals (ONE DAILY CALCIUM/IRON PO)     naltrexone (DEPADE/REVIA) 50 MG tablet     phentermine (LOMAIRA) 8 MG tablet     No current facility-administered medications for this visit.     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14    hospitalized at [facility] x 1 week     History of steroid therapy      HX ABNL PAP SMEAR OF CERVIX    1995    LEEP AT 15 yo     Hypertension 2004     Hypertrophy of breast      Hypertrophy of tonsils alone      Hypovitaminosis D     with secondary high PTH     Irregular heart beat     palpitations     LBP (low back pain)      LSIL (low grade squamous intraepithelial lesion) on Pap smear 5/2006     Lumbago     RESOLVED     Major depressive disorder, recurrent episode, in partial or unspecified remission 4/1/2013     Morbid obesity (H)     bariatric surgery 5/13/2013     Multiple thyroid nodules      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)      OLIGOMENORRHEA, C/W PCOS      Sciatica      SLEEP APNEA 6/2002    No longer has since tonsillectomy and septoplasty     Thyroid nodule      Past Surgical History:   Procedure Laterality Date     BIOPSY  03/13/2015    Thyroid nodule     ESOPHAGOSCOPY, GASTROSCOPY, DUODENOSCOPY (EGD), COMBINED  5/28/2013    Procedure: COMBINED ESOPHAGOSCOPY, GASTROSCOPY, DUODENOSCOPY (EGD);;  Surgeon: Best Willett MD;  Location:  GI     ESOPHAGOSCOPY, GASTROSCOPY, DUODENOSCOPY (EGD), COMBINED N/A 6/13/2018    Procedure: COMBINED ESOPHAGOSCOPY, GASTROSCOPY, DUODENOSCOPY (EGD), BIOPSY SINGLE OR MULTIPLE;  gastroscopy;  Surgeon: Westley Gibbs MD;  Location:  GI     LAPAROSCOPIC GASTRIC SLEEVE  5/13/2013    Procedure: LAPAROSCOPIC GASTRIC SLEEVE;  Laparoscopic Sleeve Gastrectomy ;  Surgeon: Best Willett MD;  Location: [facility] OR     LEEP TX, CERVICAL  1995    age 15     partial thyroidectomy  2018     SEPTOPLASTY       THYROIDECTOMY Left 4/3/2018    Procedure: THYROIDECTOMY;  Left Thyroid Lobectomy And Ishtmusectomy;  Surgeon: Delia Harper MD;  [facility]     TONSILLECTOMY  age 20s     TONSILLECTOMY  2004?     wisdom teeth extraction       PHQ-9 SCORE 10/22/2020 12/17/2020 9/14/2021   PHQ-9 Total Score - - -   PHQ-9 Total Score MyChart - - 4 (Minimal depression)   PHQ-9 Total Score 5 6 4     MAXIMO-7 SCORE 11/9/2017 9/15/2018 10/28/2019   Total  Score - - -   Total Score - 3 (minimal anxiety) -   Total Score 12 3 3     WHODAS 2.0 Total Score 12/4/2018 1/31/2020   Total Score 18 15   Total Score MyChart 18 15     She is  5 foot 11 inches.  Her long-term overall goal is 175 She participates fully. Rapport is excellent.       This telehealth service is appropriate and effective for delivering services in light of the necessity for social distancing to mitigate the COVID-19 epidemic and for conservation of PPE.     Patient has agreed to receiving telehealth services after being informed about it: Yes    Patient prefers video invitation/information to be sent by:   [employer]  Time service started:  3:02  Time service ended: 3:59  Extended session due to complexity of case and length of interval.    Mode of transmission: [email or web address].[email or web address]    Location of originating:  Home of the patient    Distance site:  Home office of provider for [facility]    The patient has been notified that:  Video visits will be conducted via a call with their psychologist to provide the care they need with a video conversation. Video visits may be billed at different rates depending on insurance coverage.  Patients are advised to please contact their insurance provider with any questions about their health insurance coverage. If during the course of a call the psychologist feels a video visit is not appropriate, patients will not be charged for this service.  Diagnosis:   Psychological factors affecting obesity (F54).   Major depression, recurrent, mild (F33.0).   Occupational Problems (Z56.9)  Bereavement    PLAN/RECOMMENDATIONS:   1. Ms. Catherine will return 1/5 @ 3 to address weight loss and social issues with problem solving therapy, which is medically necessary.   She wishes to continue to get support here with her life changes and her son's behavioral/developmental challenges.  2.  Resume schedule of regular exercise to the level that is possible.    3.  Continuesupport group of parents of children with  autism.    Preference for future meetings:                In-person             x    Remote                Either  Last treatment plan signed: 4/30/2021  Treatment plan due:  4/30/2022

## 2021-12-26 ENCOUNTER — HEALTH MAINTENANCE LETTER (OUTPATIENT)
Age: 41
End: 2021-12-26

## 2022-01-05 ENCOUNTER — VIRTUAL VISIT (OUTPATIENT)
Dept: PSYCHOLOGY | Facility: CLINIC | Age: 42
End: 2022-01-05
Payer: COMMERCIAL

## 2022-01-05 DIAGNOSIS — F33.0 MAJOR DEPRESSIVE DISORDER, RECURRENT EPISODE, MILD (H): Primary | ICD-10-CM

## 2022-01-05 DIAGNOSIS — Z56.9 OCCUPATIONAL PROBLEM: ICD-10-CM

## 2022-01-05 DIAGNOSIS — F54 PSYCHOLOGICAL FACTORS AFFECTING MORBID OBESITY (H): ICD-10-CM

## 2022-01-05 DIAGNOSIS — E66.01 PSYCHOLOGICAL FACTORS AFFECTING MORBID OBESITY (H): ICD-10-CM

## 2022-01-05 DIAGNOSIS — F54 PSYCHOLOGICAL FACTORS AFFECTING MEDICAL CONDITION: ICD-10-CM

## 2022-01-05 PROCEDURE — 90837 PSYTX W PT 60 MINUTES: CPT | Mod: GT | Performed by: PSYCHOLOGIST

## 2022-01-05 SDOH — ECONOMIC STABILITY - INCOME SECURITY: UNSPECIFIED PROBLEMS RELATED TO EMPLOYMENT: Z56.9

## 2022-01-05 NOTE — PROGRESS NOTES
Health Psychology                    Department of Medicine  Julianne Moreno, Ph.D., L.P. [phone]                         [facility] Lis Chavez, Ph.D., L.P. [phone]                     Mobile Mail Code 079   Donald Hills, Ph.D. [phone]      [address] [place] [address] Susana Ugarte, Ph.D., A.B.P.P., L.P. [phone]              [place] [address]           Jayro Elias, Ph.D., A.B.P.P., L.P. [phone]      Melissa Stokes, Ph.D., L.P. [phone]  [facility]   Elizabeth Gaytan, Ph.D., A.B.P.P., L.P. [phone]    [address] [place] [address]    Health Psychology  [facility] Health Progress Note    Intake:  4/1/13    Ashley Catherine is a 41-year-old single woman referred initially for psychological consultation for workup for a gastric sleeve procedure who is seen for CBT-oriented therapy regardingtweight management, work stresses, and family and social matters. She is seen for problem-solving and supportive counseling.     Son:  Her son was diagnosed with autism and she is very frustrated working out services and funding for them.   Rivera got Medicaid, which is a big relief.  He will be starting speech therapy at [facility] x2/week in home and is on wait list for the 1/2 day program, getting closer.  He just started at age 2.5 consultation with weight management as he is past the 99%5ile for weight.  He is taking in formula. We discussed strategies for lilmiting availability imposing structure on eating and drinking.  They are in touch with the feeding team and his pediatrician and a nutritionist.      Health:   She got her booster in late September.  She has been working 2 days/weekend.  Some is night shift, more are day shifts.  The new schedule is feeling better to her, though at times hard, e.g., Xmas weekend.  We discussed trying to find time to begin  exercise the 5 days/week she isn't working. She is amenable to it, but currently feels it will be a few weeks til she  can catch up with other tasks.  She purchased   Exercise equipment (kettlebells), but hasn't started to use it yet.   Discussed wlalking with her son who also needs to exercise more.     Weight: was not addressed.     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 She is now working weekend shifts so as to be more available for her son.  The pandemic has hit [facility] hard.  Many nurses are leaving, creating stress for those who remain.    Family:   Her mother is vaccinated x3 and does childcare for [employer]. She just returned from [place]  She developed a blodclot, now on coumadin.      She participates fully. Rapport was excellent.   She is intermittently tearful describing son and bereavement with father  Doesn't feel she has grieved yet.     She was not  weighed today as it is a virtual session and we did not address it.      Wt Readings from Last 4 Encounters:   10/28/21 97.5 kg (215 lb)   10/21/21 97.5 kg (215 lb)   10/05/21 99.8 kg (220 lb)   07/13/21 102.5 kg (226 lb)     There is no height or weight on file to calculate BMI.     Current Outpatient Medications   Medication     acetaminophen (TYLENOL) 32 mg/mL liquid     Cholecalciferol (VITAMIN D3) 25 MCG (1000 UT) CAPS     cyanocobalamin (VITAMIN B-12) 500 MCG SUBL sublingual tablet     cyclobenzaprine (FLEXERIL) 10 MG tablet     hydrochlorothiazide (HYDRODIURIL) 25 MG tablet     ibuprofen (ADVIL/MOTRIN) 200 MG capsule     lisinopril (ZESTRIL) 20 MG tablet     Multiple Vitamins-Minerals (ONE DAILY CALCIUM/IRON PO)     naltrexone (DEPADE/REVIA) 50 MG tablet     phentermine (LOMAIRA) 8 MG tablet     No current facility-administered medications for this visit.     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14    hospitalized at [facility] x 1 week     History of steroid therapy      HX ABNL PAP SMEAR OF CERVIX   1995    LEEP AT 15 yo     Hypertension 2004     Hypertrophy of breast      Hypertrophy of tonsils alone      Hypovitaminosis D     with secondary high PTH     Irregular heart beat     palpitations     LBP (low back pain)      LSIL (low grade squamous intraepithelial lesion) on Pap smear 5/2006     Lumbago     RESOLVED     Major depressive disorder, recurrent episode, in partial or unspecified remission 4/1/2013     Morbid obesity (H)     bariatric surgery 5/13/2013     Multiple thyroid nodules      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)      OLIGOMENORRHEA, C/W PCOS      Sciatica      SLEEP APNEA 6/2002    No longer has since tonsillectomy and septoplasty     Thyroid nodule      Past Surgical History:   Procedure Laterality Date     BIOPSY  03/13/2015    Thyroid nodule     ESOPHAGOSCOPY, GASTROSCOPY, DUODENOSCOPY (EGD), COMBINED  5/28/2013    Procedure: COMBINED ESOPHAGOSCOPY, GASTROSCOPY, DUODENOSCOPY (EGD);;  Surgeon: Best Willett MD;  Location:  GI     ESOPHAGOSCOPY, GASTROSCOPY, DUODENOSCOPY (EGD), COMBINED N/A 6/13/2018    Procedure: COMBINED ESOPHAGOSCOPY, GASTROSCOPY, DUODENOSCOPY (EGD), BIOPSY SINGLE OR MULTIPLE;  gastroscopy;  Surgeon: Westley Gibbs MD;  Location: [facility]     LAPAROSCOPIC GASTRIC SLEEVE  5/13/2013    Procedure: LAPAROSCOPIC GASTRIC SLEEVE;  Laparoscopic Sleeve Gastrectomy ;  Surgeon: Best Willett MD;  Location: [facility] OR     LEEP TX, CERVICAL  1995    age 15     partial thyroidectomy  2018     SEPTOPLASTY       THYROIDECTOMY Left 4/3/2018    Procedure: THYROIDECTOMY;  Left Thyroid Lobectomy And Ishtmusectomy;  Surgeon: Delia Harper MD;  [facility]     TONSILLECTOMY  age 20s     TONSILLECTOMY  2004?     wisdom teeth extraction       PHQ-9 SCORE 10/22/2020 12/17/2020 9/14/2021   PHQ-9 Total Score - - -    PHQ-9 Total Score MyChart - - 4 (Minimal depression)   PHQ-9 Total Score 5 6 4     MAXIMO-7 SCORE 11/9/2017 9/15/2018 10/28/2019   Total Score - - -   Total Score - 3 (minimal anxiety) -   Total Score 12 3 3     WHODAS 2.0 Total Score 12/4/2018 1/31/2020   Total Score 18 15   Total Score MyChart 18 15     She is  5 foot 11 inches.  Her long-term overall goal is 175 She participates fully. Rapport is excellent.       This telehealth service is appropriate and effective for delivering services in light of the necessity for social distancing to mitigate the COVID-19 epidemic and for conservation of PPE.     Patient has agreed to receiving telehealth services after being informed about it: Yes    Patient prefers video invitation/information to be sent by:   [employer])  Time service started:  3:02  Time service ended: 3:55  Extended session due to complexity of case and length of interval.    Mode of transmission: [email or web address].me    Location of originating:  Home of the patient    Distance site:  Home office of provider for [facility]    The patient has been notified that:  Video visits will be conducted via a call with their psychologist to provide the care they need with a video conversation. Video visits may be billed at different rates depending on insurance coverage.  Patients are advised to please contact their insurance provider with any questions about their health insurance coverage. If during the course of a call the psychologist feels a video visit is not appropriate, patients will not be charged for this service.  Diagnosis:   Psychological factors affecting obesity (F54).   Major depression, recurrent, mild (F33.0).   Occupational Problems (Z56.9)  Bereavement    PLAN/RECOMMENDATIONS:   1. Ms. Catherine will return 2/2 @ 3 to address weight loss and social issues with problem solving therapy, which is medically necessary.   She wishes to continue to get support here with her life changes and her son's behavioral/developmental  challenges.  2.  Resume schedule of regular exercise to the level that is possible.    3.  Continuesupport group of parents of children with autism.    Preference for future meetings:                In-person             x    Remote                Either  Last treatment plan signed: 4/30/2021  Treatment plan due:  4/30/2022

## 2022-02-02 ENCOUNTER — VIRTUAL VISIT (OUTPATIENT)
Dept: PSYCHOLOGY | Facility: CLINIC | Age: 42
End: 2022-02-02
Payer: COMMERCIAL

## 2022-02-02 DIAGNOSIS — E66.01 PSYCHOLOGICAL FACTORS AFFECTING MORBID OBESITY (H): ICD-10-CM

## 2022-02-02 DIAGNOSIS — Z56.9 OCCUPATIONAL PROBLEM: ICD-10-CM

## 2022-02-02 DIAGNOSIS — F33.0 MAJOR DEPRESSIVE DISORDER, RECURRENT EPISODE, MILD (H): Primary | ICD-10-CM

## 2022-02-02 DIAGNOSIS — F54 PSYCHOLOGICAL FACTORS AFFECTING MORBID OBESITY (H): ICD-10-CM

## 2022-02-02 DIAGNOSIS — Z63.4 BEREAVEMENT: ICD-10-CM

## 2022-02-02 PROCEDURE — 90837 PSYTX W PT 60 MINUTES: CPT | Mod: GT | Performed by: PSYCHOLOGIST

## 2022-02-02 SDOH — ECONOMIC STABILITY - INCOME SECURITY: UNSPECIFIED PROBLEMS RELATED TO EMPLOYMENT: Z56.9

## 2022-02-02 SDOH — SOCIAL STABILITY - SOCIAL INSECURITY: DISSAPEARANCE AND DEATH OF FAMILY MEMBER: Z63.4

## 2022-02-02 NOTE — PROGRESS NOTES
"  Health Psychology                    Department of Medicine  Julianne Moreno, Ph.D., L.P. [phone]                         [facility] Lis Chavez, Ph.D., L.P. [phone]                     [place] Mail Code 817   Donald Hills, Ph.D. [phone]      [address] [place] [address] Susana Ugarte, Ph.D., A.B.P.P., L.P. [phone]              [place] [address]           Jayro Elias, Ph.D., A.B.P.P., L.P. [phone]      Melissa Stokes, Ph.D., L.P. [phone]  [facility]   Elizabeth Gaytan, Ph.D., A.B.P.P., L.P. [phone] [address] [place] [address]    Health Psychology  [facility] Progress Note    Intake:  4/1/13    Ashley Catherine is a 41-year-old single woman referred initially for psychological consultation for workup for a gastric sleeve procedure who is seen for CBT-oriented therapy regardingtweight management, work stresses, and family and social matters. She is seen for problem-solving and supportive counseling.     Son:  Her autistic son is starting speech therapy at [facility] x2/week in home and is on wait list for the 1/2 day program, getting closer.  Hestarted at age 2.5 consultation with weight management as he is past the 99%5ile for weight.  He is taking in formula. She is lookng forward to him being on a better schedule. .      Health:   She got her booster in late September.  She has been working 2 days/weekend.  Some is night shift, more are day shifts. She is feeling ready to add one more shift/week.   We discussed trying to find time to begin  exercise the 5 days/week she isn't working. She is amenable to it, but currently feels it will be a few weeks til she can catch up with other tasks.  She purchased   Exercise equipment ([employer]),  Discussed previously walking with her son who also needs to exercise more.   Is planning to get invisiline as \"something for myself.\"    Weight: was not addressed.     Work:  She is a nurse on an oncology floor at " [employer] and shares challenges and anxieties related to it. She is now working weekend shifts so as to be more available for her son.  The pandemic has hit [facility] hard.  Many nurses are leaving, creating stress for those who remain.  She discussed frustration at not being recognizes as nurse of the month.  We explored other awards she might work towards, some of which require committee work that she is not feeling ready to take on.     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 \    She participates fully. Rapport was excellent.   She is intermittently tearful describing son and bereavement with father  Doesn't feel she has grieved yet.     She was not  weighed today as it is a virtual session and we did not address it.      Wt Readings from Last 4 Encounters:   10/28/21 97.5 kg (215 lb)   10/21/21 97.5 kg (215 lb)   10/05/21 99.8 kg (220 lb)   07/13/21 102.5 kg (226 lb)     There is no height or weight on file to calculate BMI.     Current Outpatient Medications   Medication     acetaminophen (TYLENOL) 32 mg/mL liquid     Cholecalciferol (VITAMIN D3) 25 MCG (1000 UT) CAPS     cyanocobalamin (VITAMIN B-12) 500 MCG SUBL sublingual tablet     cyclobenzaprine (FLEXERIL) 10 MG tablet     hydrochlorothiazide (HYDRODIURIL) 25 MG tablet     ibuprofen (ADVIL/MOTRIN) 200 MG capsule     lisinopril (ZESTRIL) 20 MG tablet     Multiple Vitamins-Minerals (ONE DAILY CALCIUM/IRON PO)     naltrexone (DEPADE/REVIA) 50 MG tablet     phentermine (LOMAIRA) 8 MG tablet     No current facility-administered medications for this visit.     Past Medical History:   Diagnosis Date     Bariatric surgery status 05/13/2013     Depression      Depressive disorder      Esophageal reflux      Family history of hyperparathyroidism 3/6/2015     Forearm fracture childhood    jumping fall     Guillain-Roberts disease (H) age 14    hospitalized at [facility] x 1 week     History of steroid therapy      HX ABNL PAP SMEAR  OF CERVIX   1995    LEEP AT 15 yo     Hypertension 2004     Hypertrophy of breast      Hypertrophy of tonsils alone      Hypovitaminosis D     with secondary high PTH     Irregular heart beat     palpitations     LBP (low back pain)      LSIL (low grade squamous intraepithelial lesion) on Pap smear 5/2006     Lumbago     RESOLVED     Major depressive disorder, recurrent episode, in partial or unspecified remission 4/1/2013     Morbid obesity (H)     bariatric surgery 5/13/2013     Multiple thyroid nodules      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)      OLIGOMENORRHEA, C/W PCOS      Sciatica      SLEEP APNEA 6/2002    No longer has since tonsillectomy and septoplasty     Thyroid nodule      Past Surgical History:   Procedure Laterality Date     BIOPSY  03/13/2015    Thyroid nodule     ESOPHAGOSCOPY, GASTROSCOPY, DUODENOSCOPY (EGD), COMBINED  5/28/2013    Procedure: COMBINED ESOPHAGOSCOPY, GASTROSCOPY, DUODENOSCOPY (EGD);;  Surgeon: Best Willett MD;  Location:  GI     ESOPHAGOSCOPY, GASTROSCOPY, DUODENOSCOPY (EGD), COMBINED N/A 6/13/2018    Procedure: COMBINED ESOPHAGOSCOPY, GASTROSCOPY, DUODENOSCOPY (EGD), BIOPSY SINGLE OR MULTIPLE;  gastroscopy;  Surgeon: Westley Gibbs MD;  Location: [facility]     LAPAROSCOPIC GASTRIC SLEEVE  5/13/2013    Procedure: LAPAROSCOPIC GASTRIC SLEEVE;  Laparoscopic Sleeve Gastrectomy ;  Surgeon: Best Willett MD;  Location: [facility] OR     LEEP TX, CERVICAL  1995    age 15     partial thyroidectomy  2018     SEPTOPLASTY       THYROIDECTOMY Left 4/3/2018    Procedure: THYROIDECTOMY;  Left Thyroid Lobectomy And Ishtmusectomy;  Surgeon: Delia Harper MD;  Location: [facility] OR     TONSILLECTOMY  age 20s     TONSILLECTOMY  2004?     wisdom teeth extraction       PHQ-9 SCORE 10/22/2020 12/17/2020 9/14/2021   PHQ-9 Total Score - - -   PHQ-9 Total Score MyChart - - 4 (Minimal depression)   PHQ-9 Total Score 5 6 4     MAXIMO-7 SCORE 11/9/2017 9/15/2018 10/28/2019    Total Score - - -   Total Score - 3 (minimal anxiety) -   Total Score 12 3 3     WHODAS 2.0 Total Score 12/4/2018 1/31/2020   Total Score 18 15   Total Score MyChart 18 15     She is  5 foot 11 inches.  Her long-term overall goal is 175 She participates fully. Rapport is excellent.       This telehealth service is appropriate and effective for delivering services in light of the necessity for social distancing to mitigate the COVID-19 epidemic and for conservation of PPE.     Patient has agreed to receiving telehealth services after being informed about it: Yes    Patient prefers video invitation/information to be sent by:   emai  Time service started:  3:02  Time service ended: 3:55  Extended session due to complexity of case and length of interval.    Mode of transmission: [email or web address].me    Location of originating:  Home of the patient    Distance site:  Home office of provider for [facility]    The patient has been notified that:  Video visits will be conducted via a call with their psychologist to provide the care they need with a video conversation. Video visits may be billed at different rates depending on insurance coverage.  Patients are advised to please contact their insurance provider with any questions about their health insurance coverage. If during the course of a call the psychologist feels a video visit is not appropriate, patients will not be charged for this service.  Diagnosis:  Major depression, recurrent, mild (F33.0).   Psychological factors affecting obesity (F54).   Occupational Problems (Z56.9)  Bereavement    PLAN/RECOMMENDATIONS:   1. Ms. Catherine will return 3/9 @ 4 to address weight loss and social issues with problem solving therapy, which is medically necessary.   She wishes to continue to get support here with her life changes and her son's behavioral/developmental challenges.  2.  Resume schedule of regular exercise to the level that is possible.    3.  Continue support group of parents of  children with autism.  4.  Continue long-term planning for possible advanced degree.    Preference for future meetings:                In-person             x    Remote                Either  Last treatment plan signed: 4/30/2021  Treatment plan due:  4/30/2022

## 2022-02-28 ENCOUNTER — OFFICE VISIT (OUTPATIENT)
Dept: FAMILY MEDICINE | Facility: CLINIC | Age: 42
End: 2022-02-28
Payer: COMMERCIAL

## 2022-02-28 VITALS
OXYGEN SATURATION: 97 % | DIASTOLIC BLOOD PRESSURE: 86 MMHG | BODY MASS INDEX: 30.94 KG/M2 | TEMPERATURE: 98.9 F | SYSTOLIC BLOOD PRESSURE: 127 MMHG | HEIGHT: 71 IN | WEIGHT: 221 LBS | HEART RATE: 84 BPM

## 2022-02-28 DIAGNOSIS — A08.4 VIRAL GASTROENTERITIS: Primary | ICD-10-CM

## 2022-02-28 DIAGNOSIS — R10.9 ABDOMINAL CRAMPING: ICD-10-CM

## 2022-02-28 DIAGNOSIS — R11.0 NAUSEA: ICD-10-CM

## 2022-02-28 LAB
FLUAV AG SPEC QL IA: NEGATIVE
FLUBV AG SPEC QL IA: NEGATIVE

## 2022-02-28 PROCEDURE — U0005 INFEC AGEN DETEC AMPLI PROBE: HCPCS | Performed by: PHYSICIAN ASSISTANT

## 2022-02-28 PROCEDURE — 87804 INFLUENZA ASSAY W/OPTIC: CPT

## 2022-02-28 PROCEDURE — 99213 OFFICE O/P EST LOW 20 MIN: CPT

## 2022-02-28 PROCEDURE — U0003 INFECTIOUS AGENT DETECTION BY NUCLEIC ACID (DNA OR RNA); SEVERE ACUTE RESPIRATORY SYNDROME CORONAVIRUS 2 (SARS-COV-2) (CORONAVIRUS DISEASE [COVID-19]), AMPLIFIED PROBE TECHNIQUE, MAKING USE OF HIGH THROUGHPUT TECHNOLOGIES AS DESCRIBED BY CMS-2020-01-R: HCPCS | Performed by: PHYSICIAN ASSISTANT

## 2022-02-28 RX ORDER — CETIRIZINE HYDROCHLORIDE 10 MG/1
10 TABLET ORAL DAILY
COMMUNITY
End: 2023-05-15

## 2022-02-28 RX ORDER — ONDANSETRON 4 MG/1
4 TABLET, ORALLY DISINTEGRATING ORAL EVERY 8 HOURS PRN
Qty: 12 TABLET | Refills: 0 | Status: SHIPPED | OUTPATIENT
Start: 2022-02-28 | End: 2023-05-15

## 2022-02-28 ASSESSMENT — ENCOUNTER SYMPTOMS
APPETITE CHANGE: 1
RESPIRATORY NEGATIVE: 1
VOMITING: 1
NAUSEA: 1
ABDOMINAL DISTENTION: 1
CARDIOVASCULAR NEGATIVE: 1
NEUROLOGICAL NEGATIVE: 1

## 2022-02-28 NOTE — PROGRESS NOTES
"Assessment & Plan     Viral gastroenteritis    Abdominal cramping  - Symptomatic; Yes; 2/26/2022 COVID-19 Virus (Coronavirus) by PCR  - Influenza A/B antigen    Nausea  - ondansetron (ZOFRAN-ODT) 4 MG ODT tab  Dispense: 12 tablet; Refill: 0  - Symptomatic; Yes; 2/26/2022 COVID-19 Virus (Coronavirus) by PCR  - Influenza A/B antigen     GI cocktail given in office.     Flu negative.     COVID pending.    Increase fluids with water, Pedialyte, Gatorade, or rehydrating beverages. Alternate Tylenol and Ibuprofen as needed for aches, pains or fever. Follow clear liquid to BRAT diet (bananas, rice, applesauce, toast) as needed for any upset stomach. Rest as much as possible. Follow up clinic if symptoms persist or worsen or you show signs of dehydration including decreased urination, lack of tears, dry mouth.     Return in about 1 week (around 3/7/2022), or if symptoms worsen or fail to improve, for Follow up.    Subjective     Ashley is a 41 year old female who presents to clinic today for the following health issues:  Chief Complaint   Patient presents with     GI Problem     Vomitting and nausea started Saturday til sunday, but still having stomach cramping. Unable to even drink water. Neg covid test done monday results tuesday      Ashley presents with reports of nausea, vomiting and stomach upset x 2 days. She is able to drink and has problems with solid food because of cramping. She had a COVID test last Monday which was negative. She did get her flu shot and is fully vaccinated and boosted for COVID.     LMP: 2/28/2022          Review of Systems   Constitutional: Positive for appetite change.   Respiratory: Negative.    Cardiovascular: Negative.    Gastrointestinal: Positive for abdominal distention, nausea and vomiting.   Genitourinary: Negative.    Neurological: Negative.            Objective    /86   Pulse 84   Temp 98.9  F (37.2  C) (Oral)   Ht 1.791 m (5' 10.5\")   Wt 100.2 kg (221 lb)   LMP " 02/28/2022   SpO2 97%   BMI 31.26 kg/m    Physical Exam  Constitutional:       Appearance: Normal appearance.   HENT:      Head: Normocephalic and atraumatic.   Cardiovascular:      Rate and Rhythm: Normal rate and regular rhythm.      Heart sounds: Normal heart sounds.   Pulmonary:      Effort: Pulmonary effort is normal.      Breath sounds: Normal breath sounds.   Abdominal:      General: Abdomen is flat. Bowel sounds are normal.      Palpations: Abdomen is soft.      Tenderness: There is no abdominal tenderness. There is no guarding.   Musculoskeletal:         General: Normal range of motion.      Cervical back: Normal range of motion and neck supple.   Skin:     General: Skin is warm and dry.   Neurological:      General: No focal deficit present.      Mental Status: She is alert and oriented to person, place, and time.   Psychiatric:         Mood and Affect: Mood normal.         Behavior: Behavior normal.         Thought Content: Thought content normal.         Judgment: Judgment normal.              Billy Manning PA-C

## 2022-03-01 LAB — SARS-COV-2 RNA RESP QL NAA+PROBE: NEGATIVE

## 2022-03-03 NOTE — PATIENT INSTRUCTIONS
Preventive Health Recommendations  Female Ages 40 to 49    Yearly exam:     See your health care provider every year in order to  1. Review health changes.   2. Discuss preventive care.    3. Review your medicines if your doctor prescribed any.      Get a Pap test every three years (unless you have an abnormal result and your provider advises testing more often).      If you get Pap tests with HPV test, you only need to test every 5 years, unless you have an abnormal result. You do not need a Pap test if your uterus was removed (hysterectomy) and you have not had cancer.      You should be tested each year for STDs (sexually transmitted diseases), if you're at risk.     Ask your doctor if you should have a mammogram.      Have a colonoscopy (test for colon cancer) if someone in your family has had colon cancer or polyps before age 50.       Have a cholesterol test every 5 years.       Have a diabetes test (fasting glucose) after age 45. If you are at risk for diabetes, you should have this test every 3 years.    Shots: Get a flu shot each year. Get a tetanus shot every 10 years.     Nutrition:     Eat at least 5 servings of fruits and vegetables each day.    Eat whole-grain bread, whole-wheat pasta and brown rice instead of white grains and rice.    Get adequate Calcium and Vitamin D.      Lifestyle    Exercise at least 150 minutes a week (an average of 30 minutes a day, 5 days a week). This will help you control your weight and prevent disease.    Limit alcohol to one drink per day.    No smoking.     Wear sunscreen to prevent skin cancer.    See your dentist every six months for an exam and cleaning.    PLEASE CALL TO SCHEDULE YOUR MAMMOGRAM  [facility] [phone]  [facility] [place] [phone]  [facility]   [phone]  Central Scheduling (all locations) [phone]    If you are under/uninsured, we recommend you contact the [facility]. They offer mammograms on  a sliding fee charge. You can schedule with them at [phone].

## 2022-03-03 NOTE — PROGRESS NOTES
SUBJECTIVE:   CC: Ashley Catherine is an 41 year old woman who presents for preventive health visit.       Patient has been advised of split billing requirements and indicates understanding: Yes  Healthy Habits:     Getting at least 3 servings of Calcium per day:  Yes    Bi-annual eye exam:  NO    Dental care twice a year:  Yes    Sleep apnea or symptoms of sleep apnea:  Daytime drowsiness    Diet:  Regular (no restrictions)    Frequency of exercise:  None    Taking medications regularly:  Yes    Medication side effects:  None    PHQ-2 Total Score: 2    Additional concerns today:  Yes    Answers for HPI/ROS submitted by the patient on 3/7/2022  If you checked off any problems, how difficult have these problems made it for you to do your work, take care of things at home, or get along with other people?: Not difficult at all  PHQ9 TOTAL SCORE: 6    (Z90.3) S/P gastrectomy  (primary encounter diagnosis)  Comment: due for routine labs  Plan: Vitamin B12, **CBC with platelets FUTURE 14d,         **Comprehensive metabolic panel FUTURE 14d,         **Iron and iron binding capacity FUTURE 14d,         **TSH with free T4 reflex FUTURE 2mo, **Vitamin        D Deficiency FUTURE 2mo, **Ferritin FUTURE 2mo,        Hemoglobin A1c, Lipid panel reflex to direct         LDL Non-fasting             (Z00.00) Routine general medical examination at a health care facility  Comment: due for pap smear  Plan:          (Z12.4) Cervical cancer screening  Comment:   Plan: Pap Screen with HPV - recommended age 30 - 65         years, lisinopril-hydrochlorothiazide         (ZESTORETIC) 20-25 MG tablet    HTN: well controlled no concerns  Labs due                 Pt would like to discuss about hair loss has restarted biotin - this has helped in the past  We discussed runing other lab as noted above  Today's PHQ-2 Score:   PHQ-2 ( 1999 Pfizer) 3/7/2022   Q1: Little interest or pleasure in doing things 1   Q2: Feeling down, depressed or hopeless 1    PHQ-2 Score 2   PHQ-2 Total Score (12-17 Years)- Positive if 3 or more points; Administer PHQ-A if positive -   Q1: Little interest or pleasure in doing things Several days   Q2: Feeling down, depressed or hopeless Several days   PHQ-2 Score 2       Abuse: Current or Past (Physical, Sexual or Emotional) -   Do you feel safe in your environment? Yes    Have you ever done Advance Care Planning? (For example, a Health Directive, POLST, or a discussion with a medical provider or your loved ones about your wishes):     Social History     Tobacco Use     Smoking status: Never Smoker     Smokeless tobacco: Never Used   Substance Use Topics     Alcohol use: Yes     Alcohol/week: 1.0 - 4.0 standard drink     Comment: 6 drinks/week     If you drink alcohol do you typically have >3 drinks per day or >7 drinks per week? No    Alcohol Use 3/7/2022   Prescreen: >3 drinks/day or >7 drinks/week? No   Prescreen: >3 drinks/day or >7 drinks/week? -   AUDIT SCORE  -     AUDIT - Alcohol Use Disorders Identification Test - Reproduced from the World Health Organization Audit 2001 (Second Edition) 10/4/2018   1.  How often do you have a drink containing alcohol? 4 or more times a week   2.  How many drinks containing alcohol do you have on a typical day when you are drinking? 1 or 2   3.  How often do you have five or more drinks on one occasion? Less than monthly   4.  How often during the last year have you found that you were not able to stop drinking once you had started? Never   5.  How often during the last year have you failed to do what was normally expected of you because of drinking? Never   6.  How often during the last year have you needed a first drink in the morning to get yourself going after a heavy drinking session? Never   7.  How often during the last year have you had a feeling of guilt or remorse after drinking? Less than monthly   8.  How often during the last year have you been unable to remember what happened the  night before because of your drinking? Never   9.  Have you or someone else been injured because of your drinking? No   10. Has a relative, friend, doctor or other health care worker been concerned about your drinking or suggested you cut down? Yes, during the last year   TOTAL SCORE 10       Reviewed orders with patient.  Reviewed health maintenance and updated orders accordingly - Yes  Lab work is in process  Labs reviewed in EPIC  BP Readings from Last 3 Encounters:   03/07/22 116/76   02/28/22 127/86   10/28/21 122/70    Wt Readings from Last 3 Encounters:   03/07/22 100.5 kg (221 lb 8 oz)   02/28/22 100.2 kg (221 lb)   10/28/21 97.5 kg (215 lb)                  Patient Active Problem List   Diagnosis     OLIGOMENORRHEA, C/W PCOS     HX ABNL PAP SMEAR OF CERVIX       Flat feet     Sciatica     S/P gastrectomy     Elevated parathyroid hormone     Multiple thyroid nodules     Abnormal thyroid cytology-follicular neoplasm     Chronic pain syndrome     Major depressive disorder, recurrent episode, in full remission (H)     Bilateral low back pain with sciatica, sciatica laterality unspecified     Psychological factor affecting physical condition     Hx of Guillain-[place] syndrome     Adjustment disorder with anxious mood     Thyroid nodule     Benign essential hypertension     Gastric bypass status for obesity     Pre-eclampsia     Myalgia     Past Surgical History:   Procedure Laterality Date     BIOPSY  03/13/2015    Thyroid nodule     ESOPHAGOSCOPY, GASTROSCOPY, DUODENOSCOPY (EGD), COMBINED  5/28/2013    Procedure: COMBINED ESOPHAGOSCOPY, GASTROSCOPY, DUODENOSCOPY (EGD);;  Surgeon: Best Willett MD;  Location:  GI     ESOPHAGOSCOPY, GASTROSCOPY, DUODENOSCOPY (EGD), COMBINED N/A 6/13/2018    Procedure: COMBINED ESOPHAGOSCOPY, GASTROSCOPY, DUODENOSCOPY (EGD), BIOPSY SINGLE OR MULTIPLE;  gastroscopy;  Surgeon: Westley Gibbs MD;  Location: [facility]     LAPAROSCOPIC GASTRIC SLEEVE  5/13/2013    Procedure:  LAPAROSCOPIC GASTRIC SLEEVE;  Laparoscopic Sleeve Gastrectomy ;  Surgeon: Best Willett MD;  Location: [facility] OR     LEEP TX, CERVICAL  1995    age 15     partial thyroidectomy  2018     SEPTOPLASTY       THYROIDECTOMY Left 4/3/2018    Procedure: THYROIDECTOMY;  Left Thyroid Lobectomy And Ishtmusectomy;  Surgeon: Delia Harper MD;  [facility]: [facility]     TONSILLECTOMY  age 20s     TONSILLECTOMY  2004?     wisdom teeth extraction         Social History     Tobacco Use     Smoking status: Never Smoker     Smokeless tobacco: Never Used   Substance Use Topics     Alcohol use: Yes     Alcohol/week: 1.0 - 4.0 standard drink     Comment: 6 drinks/week     Family History   Problem Relation Age of Onset     Hypertension Sister      Hypertension Father      Allergies Father         seasonal     Lipids Father      Obesity Father      Arthritis Mother      Gynecology Mother         problems with menopause     Hypertension Mother      Other Cancer Mother         Endometrial     Osteoporosis Mother      Obesity Mother      Parathyroid Disorders Mother         3 operations     Obesity Sister      Diabetes Maternal Aunt         x several w. DM     Breast Cancer Maternal Aunt      Cancer - colorectal Maternal Uncle         60ish     Hypertension Sister      Obesity Sister      Nephrolithiasis No family hx of            Breast Cancer Screening:  Any new diagnosis of family breast, ovarian, or bowel cancer? No    FHS-7:   Breast CA Risk Assessment (FHS-7) 3/7/2022   Did any of your first-degree relatives have breast or ovarian cancer? No   Did any of your relatives have bilateral breast cancer? No   Did any man in your family have breast cancer? No   Did any woman in your family have breast and ovarian cancer? No   Did any woman in your family have breast cancer before age 50 y? No   Do you have 2 or more relatives with breast and/or ovarian cancer? No   Do you have 2 or more relatives with breast and/or bowel cancer? No        Mammogram Screening - Offered annual screening and updated Health Maintenance for mutual plan based on risk factor consideration    Pertinent mammograms are reviewed under the imaging tab.    History of abnormal Pap smear: NO - age 30-65 PAP every 5 years with negative HPV co-testing recommended  PAP / HPV Latest Ref Rng & Units 8/12/2016 10/18/2013 10/12/2012   PAP (Historical) - NIL NIL NIL   HPV16 NEG Negative - -   HPV18 NEG Negative - -   HRHPV NEG Negative - -     Reviewed and updated as needed this visit by clinical staff                  Reviewed and updated as needed this visit by Provider                 Past Medical History:   Diagnosis Date     Bariatric surgery status 05/13/2013     Depression      Depressive disorder      Esophageal reflux      Family history of hyperparathyroidism 3/6/2015     Forearm fracture childhood    jumping fall     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14    hospitalized at [facility] x 1 week     History of steroid therapy      HX ABNL PAP SMEAR OF CERVIX   1995    LEEP AT 15 yo     Hypertension 2004     Hypertrophy of breast      Hypertrophy of tonsils alone      Hypovitaminosis D     with secondary high PTH     Irregular heart beat     palpitations     LBP (low back pain)      LSIL (low grade squamous intraepithelial lesion) on Pap smear 5/2006     Lumbago     RESOLVED     Major depressive disorder, recurrent episode, in partial or unspecified remission 4/1/2013     Morbid obesity (H)     bariatric surgery 5/13/2013     Multiple thyroid nodules      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)      OLIGOMENORRHEA, C/W PCOS      Sciatica      SLEEP APNEA 6/2002    No longer has since tonsillectomy and septoplasty     Thyroid nodule       Past Surgical History:   Procedure Laterality Date     BIOPSY  03/13/2015    Thyroid nodule     ESOPHAGOSCOPY, GASTROSCOPY, DUODENOSCOPY (EGD), COMBINED  5/28/2013    Procedure: COMBINED ESOPHAGOSCOPY, GASTROSCOPY, DUODENOSCOPY (EGD);;   Surgeon: Best Willett MD;  Location:  GI     ESOPHAGOSCOPY, GASTROSCOPY, DUODENOSCOPY (EGD), COMBINED N/A 6/13/2018    Procedure: COMBINED ESOPHAGOSCOPY, GASTROSCOPY, DUODENOSCOPY (EGD), BIOPSY SINGLE OR MULTIPLE;  gastroscopy;  Surgeon: Westley Gibbs MD;  Location: [facility]     LAPAROSCOPIC GASTRIC SLEEVE  5/13/2013    Procedure: LAPAROSCOPIC GASTRIC SLEEVE;  Laparoscopic Sleeve Gastrectomy ;  Surgeon: Best Willett MD;  Location:  OR     LEEP TX, CERVICAL  1995    age 15     partial thyroidectomy  2018     SEPTOPLASTY       THYROIDECTOMY Left 4/3/2018    Procedure: THYROIDECTOMY;  Left Thyroid Lobectomy And Ishtmusectomy;  Surgeon: Delia Harper MD;  [facility]     TONSILLECTOMY  age 20s     TONSILLECTOMY  2004?     wisdom teeth extraction         Review of Systems   Constitutional: Negative for chills and fever.   HENT: Negative for congestion, ear pain, hearing loss and sore throat.    Eyes: Negative for pain and visual disturbance.   Respiratory: Negative for cough and shortness of breath.    Cardiovascular: Negative for chest pain, palpitations and peripheral edema.   Gastrointestinal: Negative for abdominal pain, constipation, diarrhea, heartburn, hematochezia and nausea.   Breasts:  Negative for tenderness, breast mass and discharge.   Genitourinary: Negative for dysuria, frequency, genital sores, hematuria, pelvic pain, urgency, vaginal bleeding and vaginal discharge.   Musculoskeletal: Negative for arthralgias, joint swelling and myalgias.   Skin: Negative for rash.   Neurological: Negative for dizziness, weakness, headaches and paresthesias.   Psychiatric/Behavioral: Negative for mood changes. The patient is not nervous/anxious.      CONSTITUTIONAL: NEGATIVE for fever, chills, change in weight  INTEGUMENTARU/SKIN: NEGATIVE for worrisome rashes, moles or lesions  EYES: NEGATIVE for vision changes or irritation  ENT: NEGATIVE for ear, mouth and throat problems  RESP:  "NEGATIVE for significant cough or SOB  BREAST: NEGATIVE for masses, tenderness or discharge  CV: NEGATIVE for chest pain, palpitations or peripheral edema  GI: NEGATIVE for nausea, abdominal pain, heartburn, or change in bowel habits  : NEGATIVE for unusual urinary or vaginal symptoms. Periods are regular.  MUSCULOSKELETAL: NEGATIVE for significant arthralgias or myalgia  NEURO: NEGATIVE for weakness, dizziness or paresthesias  PSYCHIATRIC: NEGATIVE for changes in mood or affect     OBJECTIVE:   /76   Pulse 88   Temp 98.7  F (37.1  C) (Temporal)   Ht 1.765 m (5' 9.49\")   Wt 100.5 kg (221 lb 8 oz)   LMP 02/28/2022   SpO2 97%   BMI 32.25 kg/m    Physical Exam  GENERAL: healthy, alert and no distress  EYES: Eyes grossly normal to inspection, PERRL and conjunctivae and sclerae normal  HENT: ear canals and TM's normal, nose and mouth without ulcers or lesions  NECK: no adenopathy, no asymmetry, masses, or scars and thyroid normal to palpation  RESP: lungs clear to auscultation - no rales, rhonchi or wheezes  BREAST: normal without masses, tenderness or nipple discharge and no palpable axillary masses or adenopathy  CV: regular rate and rhythm, normal S1 S2, no S3 or S4, no murmur, click or rub, no peripheral edema and peripheral pulses strong  ABDOMEN: soft, nontender, no hepatosplenomegaly, no masses and bowel sounds normal   (female): normal female external genitalia, normal urethral meatus, vaginal mucosa pink, moist, well rugated, and normal cervix/adnexa/uterus without masses or discharge  MS: no gross musculoskeletal defects noted, no edema  SKIN: no suspicious lesions or rashes  NEURO: Normal strength and tone, mentation intact and speech normal  PSYCH: mentation appears normal, affect normal/bright    Diagnostic Test Results:  Labs reviewed in Epic    ASSESSMENT/PLAN:       ICD-10-CM    1. S/P gastrectomy  Z90.3 Vitamin B12     **CBC with platelets FUTURE 14d     **Comprehensive metabolic panel " "FUTURE 14d     **Iron and iron binding capacity FUTURE 14d     **TSH with free T4 reflex FUTURE 2mo     **Vitamin D Deficiency FUTURE 2mo     **Ferritin FUTURE 2mo     Hemoglobin A1c     Lipid panel reflex to direct LDL Non-fasting   2. Routine general medical examination at a health care facility  Z00.00 lisinopril-hydrochlorothiazide (ZESTORETIC) 20-25 MG tablet     NEISSERIA GONORRHOEA PCR     CHLAMYDIA TRACHOMATIS PCR     Vitamin B12     **CBC with platelets FUTURE 14d     **Comprehensive metabolic panel FUTURE 14d     **Iron and iron binding capacity FUTURE 14d     **TSH with free T4 reflex FUTURE 2mo     **Vitamin D Deficiency FUTURE 2mo     **Ferritin FUTURE 2mo     Hemoglobin A1c     Lipid panel reflex to direct LDL Non-fasting   3. Cervical cancer screening  Z12.4 Pap Screen with HPV - recommended age 30 - 65 years     lisinopril-hydrochlorothiazide (ZESTORETIC) 20-25 MG tablet       Patient has been advised of split billing requirements and indicates understanding: Yes    COUNSELING:  Reviewed preventive health counseling, as reflected in patient instructions       Regular exercise       Healthy diet/nutrition    Estimated body mass index is 31.26 kg/m  as calculated from the following:    Height as of 2/28/22: 1.791 m (5' 10.5\").    Weight as of 2/28/22: 100.2 kg (221 lb).    Weight management plan: Discussed healthy diet and exercise guidelines    She reports that she has never smoked. She has never used smokeless tobacco.      Counseling Resources:  ATP IV Guidelines  Pooled Cohorts Equation Calculator  Breast Cancer Risk Calculator  BRCA-Related Cancer Risk Assessment: FHS-7 Tool  FRAX Risk Assessment  ICSI Preventive Guidelines  Dietary Guidelines for Americans, 2010  USDA's MyPlate  ASA Prophylaxis  Lung CA Screening    Violet Humphreys MD  [facility]  "

## 2022-03-07 ENCOUNTER — OFFICE VISIT (OUTPATIENT)
Dept: FAMILY MEDICINE | Facility: CLINIC | Age: 42
End: 2022-03-07
Payer: COMMERCIAL

## 2022-03-07 VITALS
SYSTOLIC BLOOD PRESSURE: 116 MMHG | WEIGHT: 221.5 LBS | BODY MASS INDEX: 32.81 KG/M2 | TEMPERATURE: 98.7 F | HEART RATE: 88 BPM | OXYGEN SATURATION: 97 % | HEIGHT: 69 IN | DIASTOLIC BLOOD PRESSURE: 76 MMHG

## 2022-03-07 DIAGNOSIS — Z98.84 GASTRIC BYPASS STATUS FOR OBESITY: ICD-10-CM

## 2022-03-07 DIAGNOSIS — Z90.3 S/P GASTRECTOMY: ICD-10-CM

## 2022-03-07 DIAGNOSIS — Z12.4 CERVICAL CANCER SCREENING: ICD-10-CM

## 2022-03-07 DIAGNOSIS — I10 BENIGN ESSENTIAL HYPERTENSION: ICD-10-CM

## 2022-03-07 DIAGNOSIS — Z00.00 ROUTINE GENERAL MEDICAL EXAMINATION AT A HEALTH CARE FACILITY: Primary | ICD-10-CM

## 2022-03-07 LAB
ERYTHROCYTE [DISTWIDTH] IN BLOOD BY AUTOMATED COUNT: 12.5 % (ref 10–15)
HBA1C MFR BLD: 5.3 % (ref 0–5.6)
HCT VFR BLD AUTO: 38 % (ref 35–47)
HGB BLD-MCNC: 12.5 G/DL (ref 11.7–15.7)
MCH RBC QN AUTO: 30.6 PG (ref 26.5–33)
MCHC RBC AUTO-ENTMCNC: 32.9 G/DL (ref 31.5–36.5)
MCV RBC AUTO: 93 FL (ref 78–100)
PLATELET # BLD AUTO: 504 10E3/UL (ref 150–450)
RBC # BLD AUTO: 4.08 10E6/UL (ref 3.8–5.2)
VIT B12 SERPL-MCNC: 1814 PG/ML (ref 193–986)
WBC # BLD AUTO: 7.7 10E3/UL (ref 4–11)

## 2022-03-07 PROCEDURE — 82728 ASSAY OF FERRITIN: CPT | Performed by: FAMILY MEDICINE

## 2022-03-07 PROCEDURE — 99396 PREV VISIT EST AGE 40-64: CPT | Performed by: FAMILY MEDICINE

## 2022-03-07 PROCEDURE — 83036 HEMOGLOBIN GLYCOSYLATED A1C: CPT | Performed by: FAMILY MEDICINE

## 2022-03-07 PROCEDURE — 99213 OFFICE O/P EST LOW 20 MIN: CPT | Mod: 25 | Performed by: FAMILY MEDICINE

## 2022-03-07 PROCEDURE — 87491 CHLMYD TRACH DNA AMP PROBE: CPT | Performed by: FAMILY MEDICINE

## 2022-03-07 PROCEDURE — 36415 COLL VENOUS BLD VENIPUNCTURE: CPT | Performed by: FAMILY MEDICINE

## 2022-03-07 PROCEDURE — 85027 COMPLETE CBC AUTOMATED: CPT | Performed by: FAMILY MEDICINE

## 2022-03-07 PROCEDURE — 87624 HPV HI-RISK TYP POOLED RSLT: CPT | Performed by: FAMILY MEDICINE

## 2022-03-07 PROCEDURE — 80053 COMPREHEN METABOLIC PANEL: CPT | Performed by: FAMILY MEDICINE

## 2022-03-07 PROCEDURE — 84443 ASSAY THYROID STIM HORMONE: CPT | Performed by: FAMILY MEDICINE

## 2022-03-07 PROCEDURE — 87591 N.GONORRHOEAE DNA AMP PROB: CPT | Performed by: FAMILY MEDICINE

## 2022-03-07 PROCEDURE — 82607 VITAMIN B-12: CPT | Performed by: FAMILY MEDICINE

## 2022-03-07 PROCEDURE — 83550 IRON BINDING TEST: CPT | Performed by: FAMILY MEDICINE

## 2022-03-07 PROCEDURE — 80061 LIPID PANEL: CPT | Performed by: FAMILY MEDICINE

## 2022-03-07 PROCEDURE — 82306 VITAMIN D 25 HYDROXY: CPT | Performed by: FAMILY MEDICINE

## 2022-03-07 PROCEDURE — G0145 SCR C/V CYTO,THINLAYER,RESCR: HCPCS | Performed by: FAMILY MEDICINE

## 2022-03-07 RX ORDER — LISINOPRIL AND HYDROCHLOROTHIAZIDE 20; 25 MG/1; MG/1
1 TABLET ORAL DAILY
Qty: 90 TABLET | Refills: 3 | Status: SHIPPED | OUTPATIENT
Start: 2022-03-07 | End: 2023-02-28

## 2022-03-07 ASSESSMENT — ENCOUNTER SYMPTOMS
JOINT SWELLING: 0
PALPITATIONS: 0
HEMATURIA: 0
COUGH: 0
MYALGIAS: 0
HEMATOCHEZIA: 0
FREQUENCY: 0
ARTHRALGIAS: 0
BREAST MASS: 0
FEVER: 0
NERVOUS/ANXIOUS: 0
HEADACHES: 0
SHORTNESS OF BREATH: 0
SORE THROAT: 0
DIARRHEA: 0
EYE PAIN: 0
CHILLS: 0
WEAKNESS: 0
PARESTHESIAS: 0
CONSTIPATION: 0
DYSURIA: 0
DIZZINESS: 0
HEARTBURN: 0
ABDOMINAL PAIN: 0
NAUSEA: 0

## 2022-03-07 ASSESSMENT — PATIENT HEALTH QUESTIONNAIRE - PHQ9
10. IF YOU CHECKED OFF ANY PROBLEMS, HOW DIFFICULT HAVE THESE PROBLEMS MADE IT FOR YOU TO DO YOUR WORK, TAKE CARE OF THINGS AT HOME, OR GET ALONG WITH OTHER PEOPLE: NOT DIFFICULT AT ALL
SUM OF ALL RESPONSES TO PHQ QUESTIONS 1-9: 6
SUM OF ALL RESPONSES TO PHQ QUESTIONS 1-9: 6

## 2022-03-07 ASSESSMENT — ANXIETY QUESTIONNAIRES
4. TROUBLE RELAXING: NOT AT ALL
2. NOT BEING ABLE TO STOP OR CONTROL WORRYING: NOT AT ALL
3. WORRYING TOO MUCH ABOUT DIFFERENT THINGS: SEVERAL DAYS
6. BECOMING EASILY ANNOYED OR IRRITABLE: NOT AT ALL
1. FEELING NERVOUS, ANXIOUS, OR ON EDGE: SEVERAL DAYS
GAD7 TOTAL SCORE: 3
GAD7 TOTAL SCORE: 3
7. FEELING AFRAID AS IF SOMETHING AWFUL MIGHT HAPPEN: SEVERAL DAYS
7. FEELING AFRAID AS IF SOMETHING AWFUL MIGHT HAPPEN: SEVERAL DAYS
5. BEING SO RESTLESS THAT IT IS HARD TO SIT STILL: NOT AT ALL

## 2022-03-08 LAB
ALBUMIN SERPL-MCNC: 3.4 G/DL (ref 3.4–5)
ALP SERPL-CCNC: 56 U/L (ref 40–150)
ALT SERPL W P-5'-P-CCNC: 22 U/L (ref 0–50)
ANION GAP SERPL CALCULATED.3IONS-SCNC: 7 MMOL/L (ref 3–14)
AST SERPL W P-5'-P-CCNC: 12 U/L (ref 0–45)
BILIRUB SERPL-MCNC: 0.3 MG/DL (ref 0.2–1.3)
BUN SERPL-MCNC: 10 MG/DL (ref 7–30)
C TRACH DNA SPEC QL NAA+PROBE: NEGATIVE
CALCIUM SERPL-MCNC: 8.9 MG/DL (ref 8.5–10.1)
CHLORIDE BLD-SCNC: 102 MMOL/L (ref 94–109)
CHOLEST SERPL-MCNC: 162 MG/DL
CO2 SERPL-SCNC: 28 MMOL/L (ref 20–32)
CREAT SERPL-MCNC: 0.66 MG/DL (ref 0.52–1.04)
FASTING STATUS PATIENT QL REPORTED: NO
FERRITIN SERPL-MCNC: 23 NG/ML (ref 12–150)
GFR SERPL CREATININE-BSD FRML MDRD: >90 ML/MIN/1.73M2
GLUCOSE BLD-MCNC: 80 MG/DL (ref 70–99)
HDLC SERPL-MCNC: 49 MG/DL
IRON SATN MFR SERPL: 15 % (ref 15–46)
IRON SERPL-MCNC: 51 UG/DL (ref 35–180)
LDLC SERPL CALC-MCNC: 91 MG/DL
N GONORRHOEA DNA SPEC QL NAA+PROBE: NEGATIVE
NONHDLC SERPL-MCNC: 113 MG/DL
POTASSIUM BLD-SCNC: 3.9 MMOL/L (ref 3.4–5.3)
PROT SERPL-MCNC: 7.2 G/DL (ref 6.8–8.8)
SODIUM SERPL-SCNC: 137 MMOL/L (ref 133–144)
TIBC SERPL-MCNC: 333 UG/DL (ref 240–430)
TRIGL SERPL-MCNC: 109 MG/DL
TSH SERPL DL<=0.005 MIU/L-ACNC: 0.63 MU/L (ref 0.4–4)

## 2022-03-08 ASSESSMENT — ANXIETY QUESTIONNAIRES: GAD7 TOTAL SCORE: 3

## 2022-03-09 ENCOUNTER — VIRTUAL VISIT (OUTPATIENT)
Dept: PSYCHOLOGY | Facility: CLINIC | Age: 42
End: 2022-03-09
Payer: COMMERCIAL

## 2022-03-09 DIAGNOSIS — F33.0 MAJOR DEPRESSIVE DISORDER, RECURRENT EPISODE, MILD (H): Primary | ICD-10-CM

## 2022-03-09 DIAGNOSIS — F54 PSYCHOLOGICAL FACTORS AFFECTING MORBID OBESITY (H): ICD-10-CM

## 2022-03-09 DIAGNOSIS — Z56.9 OCCUPATIONAL PROBLEM: ICD-10-CM

## 2022-03-09 DIAGNOSIS — E66.01 PSYCHOLOGICAL FACTORS AFFECTING MORBID OBESITY (H): ICD-10-CM

## 2022-03-09 LAB — DEPRECATED CALCIDIOL+CALCIFEROL SERPL-MC: 40 UG/L (ref 20–75)

## 2022-03-09 PROCEDURE — 90837 PSYTX W PT 60 MINUTES: CPT | Mod: GT | Performed by: PSYCHOLOGIST

## 2022-03-09 SDOH — ECONOMIC STABILITY - INCOME SECURITY: UNSPECIFIED PROBLEMS RELATED TO EMPLOYMENT: Z56.9

## 2022-03-09 NOTE — PROGRESS NOTES
Health Psychology                    Department of Medicine  Julianne Moreno, Ph.D., L.P. [phone]                         [facility] Lis Chavez, Ph.D., L.P. [phone]                     [place] Mail Code [address]   Donald Hills, Ph.D. [phone]      [address] [place] [address] Susana Ugarte, Ph.D., A.B.P.P., L.P. [phone]              [place] [address]           Jayro Elias, Ph.D., A.B.P.P., L.P. [phone]      Melissa Stokes, Ph.D., L.P. [phone]  [facility]   Elizabeth Gaytan, Ph.D., A.B.P.P., L.P. [phone]    [address] [place] [address]    Health Psychology  [facility] Progress Note    Intake:  4/1/13    Ashley Catherine is a 41-year-old single woman referred initially for psychological consultation for workup for a gastric sleeve procedure who is seen for CBT-oriented therapy regardingtweight management, work stresses, and family and social matters. She is seen for problem-solving and supportive counseling.     Son:  Her autistic son is starting speech therapy at [facility] x2/week in home and is on wait list for the 1/2 day program, getting closer.  Hestarted at age 2.5 consultation with weight management as he is past the 99%ile for weight.  She will consult with Dr. Hernandez. He is taking in formula. She is lookng forward to him being on a better schedule.  He will begin the [facility] 1/2 day program 3/21.  She is very excited about it.   He was hospitalized with GI problems x 5 days at [facility] and had 3 seizures.  It was hard on her.  She took him  To the pediatrician x2 and ED x 2 before he got admitted, and then she stayed with him for the whole hospitalization.      Health:   She got her booster in late September.  She has been working 2 days/weekend.  Some is night shift, more are day shifts. She is feeling ready to add one more shift/week.   We discussed trying to find time to begin  exercise the 5 days/week she isn't working. She is amenable to it, once  "he starts at [facility]..  She purchased   Exercise equipment (Bike [employer]),   planning to get invisiline as \"something for myself.\"  She had GI problems from her son.  Not sure of dx.  She was seen at Urgent Care.  Her mother also had it.     Weight: was not addressed, but may be in future as she is more capable of addressing her personal needs.     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 She is now working weekend shifts so as to be more available for her son.  The pandemic has hit [facility] hard.    Family:   Her mother is vaccinated x3 and does childcare for [employer].    She participates fully. Rapport was excellent.      She was not  weighed today as it is a virtual session and we did not address it.      Wt Readings from Last 4 Encounters:   03/07/22 100.5 kg (221 lb 8 oz)   02/28/22 100.2 kg (221 lb)   10/28/21 97.5 kg (215 lb)   10/21/21 97.5 kg (215 lb)     There is no height or weight on file to calculate BMI.     Current Outpatient Medications   Medication     acetaminophen (TYLENOL) 32 mg/mL liquid     cetirizine (ZYRTEC) 10 MG tablet     Cholecalciferol (VITAMIN D3) 25 MCG (1000 UT) CAPS     cyanocobalamin (VITAMIN B-12) 500 MCG SUBL sublingual tablet     cyclobenzaprine (FLEXERIL) 10 MG tablet     ibuprofen (ADVIL/MOTRIN) 200 MG capsule     lisinopril-hydrochlorothiazide (ZESTORETIC) 20-25 MG tablet     Multiple Vitamins-Minerals (ONE DAILY CALCIUM/IRON PO)     naltrexone (DEPADE/REVIA) 50 MG tablet     ondansetron (ZOFRAN-ODT) 4 MG ODT tab     phentermine (LOMAIRA) 8 MG tablet     No current facility-administered medications for this visit.     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14    " hospitalized at [facility] x 1 week     History of steroid therapy      HX ABNL PAP SMEAR OF CERVIX   1995    LEEP AT 15 yo     Hypertension 2004     Hypertrophy of breast      Hypertrophy of tonsils alone      Hypovitaminosis D     with secondary high PTH     Irregular heart beat     palpitations     LBP (low back pain)      LSIL (low grade squamous intraepithelial lesion) on Pap smear 5/2006     Lumbago     RESOLVED     Major depressive disorder, recurrent episode, in partial or unspecified remission 4/1/2013     Morbid obesity (H)     bariatric surgery 5/13/2013     Multiple thyroid nodules      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)      OLIGOMENORRHEA, C/W PCOS      Sciatica      SLEEP APNEA 6/2002    No longer has since tonsillectomy and septoplasty     Thyroid nodule      Past Surgical History:   Procedure Laterality Date     BIOPSY  03/13/2015    Thyroid nodule     ESOPHAGOSCOPY, GASTROSCOPY, DUODENOSCOPY (EGD), COMBINED  5/28/2013    Procedure: COMBINED ESOPHAGOSCOPY, GASTROSCOPY, DUODENOSCOPY (EGD);;  Surgeon: Best Willett MD;  Location:  GI     ESOPHAGOSCOPY, GASTROSCOPY, DUODENOSCOPY (EGD), COMBINED N/A 6/13/2018    Procedure: COMBINED ESOPHAGOSCOPY, GASTROSCOPY, DUODENOSCOPY (EGD), BIOPSY SINGLE OR MULTIPLE;  gastroscopy;  Surgeon: Westley Gibbs MD;  Location: [facility]     LAPAROSCOPIC GASTRIC SLEEVE  5/13/2013    Procedure: LAPAROSCOPIC GASTRIC SLEEVE;  Laparoscopic Sleeve Gastrectomy ;  Surgeon: Best Willett MD;  Location: [facility] OR     LEEP TX, CERVICAL  1995    age 15     partial thyroidectomy  2018     SEPTOPLASTY       THYROIDECTOMY Left 4/3/2018    Procedure: THYROIDECTOMY;  Left Thyroid Lobectomy And Ishtmusectomy;  Surgeon: Delia Harper MD;  Location:  OR     TONSILLECTOMY  age 20s     TONSILLECTOMY  2004?     wisdom teeth extraction       PHQ-9 SCORE 12/17/2020 9/14/2021 3/7/2022   PHQ-9 Total Score - - -   PHQ-9 Total Score MyChart - 4 (Minimal  depression) 6 (Mild depression)   PHQ-9 Total Score 6 4 6     MAXIMO-7 SCORE 9/15/2018 10/28/2019 3/7/2022   Total Score - - -   Total Score 3 (minimal anxiety) - 3 (minimal anxiety)   Total Score 3 3 3     WHODAS 2.0 Total Score 12/4/2018 1/31/2020   Total Score 18 15   Total Score MyChart 18 15     She is  5 foot 11 inches.  Her long-term overall goal is 175 She participates fully. Rapport is excellent.       This telehealth service is appropriate and effective for delivering services in light of the necessity for social distancing to mitigate the COVID-19 epidemic and for conservation of PPE.     Patient has agreed to receiving telehealth services after being informed about it: Yes    Patient prefers video invitation/information to be sent by:   [employer]  Time service started: 4:04  Time service ended: 4:57  Extended session due to complexity of case and length of interval.    Mode of transmission: [email or web address]    Location of originating:  Home of the patient    Distance site:  Home office of provider for [facility]    The patient has been notified that:  Video visits will be conducted via a call with their psychologist to provide the care they need with a video conversation. Video visits may be billed at different rates depending on insurance coverage.  Patients are advised to please contact their insurance provider with any questions about their health insurance coverage. If during the course of a call the psychologist feels a video visit is not appropriate, patients will not be charged for this service.  Diagnosis:  Major depression, recurrent, mild (F33.0).   Psychological factors affecting obesity (F54).   Occupational Problems (Z56.9)  Bereavement    PLAN/RECOMMENDATIONS:   1. Ms. Catherine will return 4/6 @ 10  to address weight loss and social issues with problem solving therapy, which is medically necessary.   She wishes to continue to get support here with her life changes and her son's behavioral/developmental  challenges.  2.  Resume schedule of regular exercise to the level that is possible.    3.  Continue support group of parents of children with autism.  4.  Continue long-term planning for possible advanced degree.    Preference for future meetings:                In-person             x    Remote                Either  Last treatment plan signed: 4/30/2021  Treatment plan due:  4/30/2022

## 2022-03-10 LAB
BKR LAB AP GYN ADEQUACY: NORMAL
BKR LAB AP GYN INTERPRETATION: NORMAL
BKR LAB AP HPV REFLEX: NORMAL
BKR LAB AP PREVIOUS ABNORMAL: NORMAL
PATH REPORT.COMMENTS IMP SPEC: NORMAL
PATH REPORT.COMMENTS IMP SPEC: NORMAL
PATH REPORT.RELEVANT HX SPEC: NORMAL

## 2022-03-11 LAB
HUMAN PAPILLOMA VIRUS 16 DNA: NEGATIVE
HUMAN PAPILLOMA VIRUS 18 DNA: NEGATIVE
HUMAN PAPILLOMA VIRUS FINAL DIAGNOSIS: NORMAL
HUMAN PAPILLOMA VIRUS OTHER HR: NEGATIVE

## 2022-03-14 NOTE — RESULT ENCOUNTER NOTE
Hello,    Great news, your results were normal.  You can reduce your B12 dose since the level was quite high or you can skip days that you are taking this.    The platelets were slightly high at 504 - this could be a reaction to being sick  recently or inflammation  We can recheck this the next time I see you    Violet Humphreys MD

## 2022-03-16 NOTE — PATIENT INSTRUCTIONS
"Increase fluids with water, Pedialyte, Gatorade, or rehydrating beverages. Alternate Tylenol and Ibuprofen as needed for aches, pains or fever. Follow clear liquid to BRAT diet (bananas, rice, applesauce, toast) as needed for any upset stomach. Rest as much as possible. Follow up clinic if symptoms persist or worsen or you show signs of dehydration including decreased urination, lack of tears, dry mouth.     I recommend Simethicone (Gas X) and Zofran  Patient Education     Viral Gastroenteritis (Adult)    Gastroenteritis is commonly called the \"stomach flu,\" although it has nothing to do with influenza. It is most often caused by a virus that affects the stomach and intestinal tract and usually lasts from 2 to 7 days. Common viruses causing gastroenteritis include norovirus, rotavirus, and hepatitis A. Non-viral causes of gastroenteritis include bacteria, parasites, and toxins.  The danger from repeated vomiting or diarrhea is dehydration. This is the loss of too much fluid from the body. When this occurs, body fluids must be replaced. Antibiotics don't help with this illness because it is usually viral. Simple home treatment will be helpful.  Symptoms of viral gastroenteritis may include:    Watery, loose stools    Stomach pain or abdominal cramps    Fever and chills    Nausea and vomiting    Loss of bowel control    Headache  Home care  Gastroenteritis is transmitted by contact with the stool or vomit of an infected person. This can occur from person to person or from contact with a contaminated surface.  Follow these guidelines when caring for yourself at home:    If symptoms are severe, rest at home for the next 24 hours or until you are feeling better.    Wash your hands with soap and water or use alcohol-based  to prevent the spread of infection. Wash your hands after touching anyone who is sick.    Wash your hands or use alcohol-based  after using the toilet and before meals. Clean the " toilet after each use.  Remember these tips when preparing food:    People with diarrhea should not prepare or serve food to others. When preparing foods, wash your hands before and after.    Wash your hands after using cutting boards, countertops, knives, or utensils that have been in contact with raw food.    Dry your hands with a single use towel.    Keep uncooked meats away from cooked and ready-to-eat foods.  Medicine  You may use acetaminophen or NSAID medicines like ibuprofen or naproxen to control fever unless another medicine was given. If you have chronic liver or kidney disease, talk with your healthcare provider before using these medicines. Also talk with your provider if you've had a stomach ulcer or gastrointestinal bleeding. Don't give aspirin to anyone under 18 years of age who is ill with a fever. It may cause severe liver damage. Don't use NSAIDS is you are already taking one for another condition (like arthritis) or are on aspirin (such as for heart disease or after a stroke).  If medicine for vomiting or diarrhea are prescribed, take these only as directed. Nausea and diarrhea medicines are generally OK unless you have bleeding, fever, or severe abdominal pain.  Diet  Follow these guidelines for food:    Water and liquids are important so you don't get dehydrated. Drink a small amount at a time or suck on ice chips if you are vomiting.    If you eat, avoid fatty, greasy, spicy, or fried foods.    Don't eat dairy if you have diarrhea. This can make diarrhea worse.    Avoid tobacco, alcohol, and caffeine which may worsen symptoms.  During the first 24 hours (the first full day), follow the diet below:    Beverages. Sports drinks, soft drinks without caffeine, ginger ale, mineral water (plain or flavored), decaffeinated tea and coffee. If you are very dehydrated, sports drinks aren't a good choice. They have too much sugar and not enough electrolytes. In this case, commercially available products  called oral rehydration solutions, are best.    Soups. Eat clear broth, consommé, and bouillon.    Desserts. Eat gelatin, ice pops, and fruit juice bars.  During the next 24 hours (the second day), you may add the following to the above:    Hot cereal, plain toast, bread, rolls, and crackers    Plain noodles, rice, mashed potatoes, chicken noodle or rice soup    Unsweetened canned fruit (avoid pineapple), bananas    Limit fat intake to less than 15 grams per day. Do this by avoiding margarine, butter, oils, mayonnaise, sauces, gravies, fried foods, peanut butter, meat, poultry, and fish.    Limit fiber and avoid raw or cooked vegetables, fresh fruits (except bananas), and bran cereals.    Limit caffeine and chocolate. Don't use spices or seasonings other than salt.    Limit dairy products.    Avoid alcohol.  During the next 24 hours:    Gradually resume a normal diet as you feel better and your symptoms improve.    If at any time it starts getting worse again, go back to clear liquids until you feel better.  Follow-up care  Follow up with your healthcare provider, or as advised. Call your provider if you don't get better within 24 hours or if diarrhea lasts more than a week. Also follow up if you are unable to keep down liquids and get dehydrated. If a stool (diarrhea) sample was taken, call as directed for the results.  Call 911  Call 911 if any of these occur:    Trouble breathing    Chest pain    Confused    Severe drowsiness or trouble awakening    Fainting or loss of consciousness    Rapid heart rate    Seizure    Stiff neck  When to seek medical advice  Call your healthcare provider right away if any of these occur:    Abdominal pain that gets worse    Continued vomiting (unable to keep liquids down)    Frequent diarrhea (more than 5 times a day)    Blood in vomit or stool (black or red color)    Dark urine, reduced urine output, or extreme thirst    Weakness or dizziness    Drowsiness    Fever of 100.4 F  (38 C) or higher, or as directed by your healthcare provider    New rash  [employer] last reviewed this educational content on 6/1/2018 2000-2021 [employer]. All rights reserved. This information is not intended as a substitute for professional medical care. Always follow your healthcare professional's instructions.            Gabapentin Counseling: I discussed with the patient the risks of gabapentin including but not limited to dizziness, somnolence, fatigue and ataxia.

## 2022-04-03 ENCOUNTER — TRANSFERRED RECORDS (OUTPATIENT)
Dept: HEALTH INFORMATION MANAGEMENT | Facility: CLINIC | Age: 42
End: 2022-04-03
Payer: COMMERCIAL

## 2022-04-06 ENCOUNTER — VIRTUAL VISIT (OUTPATIENT)
Dept: PSYCHOLOGY | Facility: CLINIC | Age: 42
End: 2022-04-06
Payer: COMMERCIAL

## 2022-04-06 DIAGNOSIS — F33.0 MAJOR DEPRESSIVE DISORDER, RECURRENT EPISODE, MILD (H): Primary | ICD-10-CM

## 2022-04-06 DIAGNOSIS — Z63.4 BEREAVEMENT: ICD-10-CM

## 2022-04-06 DIAGNOSIS — Z56.9 OCCUPATIONAL PROBLEM: ICD-10-CM

## 2022-04-06 DIAGNOSIS — F54 PSYCHOLOGICAL FACTORS AFFECTING MORBID OBESITY (H): ICD-10-CM

## 2022-04-06 DIAGNOSIS — E66.01 PSYCHOLOGICAL FACTORS AFFECTING MORBID OBESITY (H): ICD-10-CM

## 2022-04-06 PROCEDURE — 90837 PSYTX W PT 60 MINUTES: CPT | Mod: GT | Performed by: PSYCHOLOGIST

## 2022-04-06 SDOH — ECONOMIC STABILITY - INCOME SECURITY: UNSPECIFIED PROBLEMS RELATED TO EMPLOYMENT: Z56.9

## 2022-04-06 SDOH — SOCIAL STABILITY - SOCIAL INSECURITY: DISSAPEARANCE AND DEATH OF FAMILY MEMBER: Z63.4

## 2022-04-06 NOTE — PROGRESS NOTES
Health Psychology                    Department of Medicine  Julianne Moreno, Ph.D., L.P. [phone]                         [facility] Lis Chavez, Ph.D., L.P. [phone]                     [place] Mail [address]   Donald Hills, Ph.D. [phone]      [address] [place] [address] Susana Ugarte, Ph.D., A.B.P.P., L.P. [phone]              [place] [address]           Jayro Elias, Ph.D., A.B.P.P., L.P. [phone]      Melissa Stokes, Ph.D., L.P. [phone]  [facility]   Elizabeth Gaytan, Ph.D., A.B.P.P., L.P. [phone]    [address] [place] [address]    Health Psychology  [facility] Health Progress Note    Intake:  4/1/13    Ashley Catherine is a 42-year-old single woman referred initially for psychological consultation for workup for a gastric sleeve procedure who is seen for CBT-oriented therapy regardingtweight management, work stresses, and family and social matters. She wass seen today for problem-solving and supportive counseling.  She is quite stressed.  She compares it to an earlier more severe episode of depression, but is different  She feels she  Is having more trouble with concentration and execution.     Son:  Her autistic son is starting speech therapy at [facility] x2/week in home and is in week 3 of [facility]'s 1/2 day program, getting closer. So far, it has been a good experience.He started at age 2.5 consultation with weight management as he is past the 99%ile for weight.  She will consult with Dr. Hernandez about PCIT. He is taking in formula, but not using cups and just lost his eating therapist.  She is lookng forward to him being on a better schedule.    Health:   She got her booster in late September.  She has been working 2 days/weekend.  Some is night shift, more are day shifts. She is feeling ready to add one more shift/week.   We discussed trying to find time to begin  exercise the 5 days/week she isn't working. She is amenable to it, once he starts at [facility]  "Luis.. She purchased  [employer] equipment ([employer]),   planning to get invisiline as \"something for myself.\"She lost a lot of hair last winter and this winter.  She is wondering about developing alopecia.  She rolled her ankle at work on Saturday.   Ankle is now in an air cast.  She is feeling tired, and struggling with having a bigger to do list.     Weight:  Was not addressed, but may be in future as she is more capable of addressing her personal needs. She was not  weighed today as it is a virtual session and we did not address it.    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 She is now working weekend shifts so as to be more available for her son.      Her car is needing to be replaced.  It is making her miss her father who in past would have helped.  She is very frustrated by the experience, and is waiting on others to get back to her. .    Family:   Her mother is vaccinated x3 and does childcare for [employer].  She is trying to deal with forms to get her son's healthcare set up.    She is filing taxes for her parents.  She needs to hire organizations to be involved, but feels she is drowning with the paperwork..       She participates fully. Rapport was excellent.          Wt Readings from Last 4 Encounters:   03/07/22 100.5 kg (221 lb 8 oz)   02/28/22 100.2 kg (221 lb)   10/28/21 97.5 kg (215 lb)   10/21/21 97.5 kg (215 lb)     There is no height or weight on file to calculate BMI.     Current Outpatient Medications   Medication     acetaminophen (TYLENOL) 32 mg/mL liquid     cetirizine (ZYRTEC) 10 MG tablet     Cholecalciferol (VITAMIN D3) 25 MCG (1000 UT) CAPS     cyanocobalamin (VITAMIN B-12) 500 MCG SUBL sublingual tablet     cyclobenzaprine (FLEXERIL) 10 MG tablet     ibuprofen (ADVIL/MOTRIN) 200 MG capsule     lisinopril-hydrochlorothiazide (ZESTORETIC) 20-25 MG tablet     Multiple Vitamins-Minerals (ONE DAILY CALCIUM/IRON PO) "     naltrexone (DEPADE/REVIA) 50 MG tablet     ondansetron (ZOFRAN-ODT) 4 MG ODT tab     phentermine (LOMAIRA) 8 MG tablet     No current facility-administered medications for this visit.     Past Medical History:   Diagnosis Date     Bariatric surgery status 05/13/2013     Depression      Depressive disorder      Esophageal reflux      Family history of hyperparathyroidism 3/6/2015     Forearm fracture childhood    jumping fall     Guillain-Seville disease (H) age 14    hospitalized [facility] week     History of steroid therapy      HX ABNL PAP SMEAR OF CERVIX   1995    LEEP AT 15 yo     Hypertension 2004     Hypertrophy of breast      Hypertrophy of tonsils alone      Hypovitaminosis D     with secondary high PTH     Irregular heart beat     palpitations     LBP (low back pain)      LSIL (low grade squamous intraepithelial lesion) on Pap smear 5/2006     Lumbago     RESOLVED     Major depressive disorder, recurrent episode, in partial or unspecified remission 4/1/2013     Morbid obesity (H)     bariatric surgery 5/13/2013     Multiple thyroid nodules      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)      OLIGOMENORRHEA, C/W PCOS      Sciatica      SLEEP APNEA 6/2002    No longer has since tonsillectomy and septoplasty     Thyroid nodule      Past Surgical History:   Procedure Laterality Date     BIOPSY  03/13/2015    Thyroid nodule     ESOPHAGOSCOPY, GASTROSCOPY, DUODENOSCOPY (EGD), COMBINED  5/28/2013    Procedure: COMBINED ESOPHAGOSCOPY, GASTROSCOPY, DUODENOSCOPY (EGD);;  Surgeon: Best Willett MD;  Location:  GI     ESOPHAGOSCOPY, GASTROSCOPY, DUODENOSCOPY (EGD), COMBINED N/A 6/13/2018    Procedure: COMBINED ESOPHAGOSCOPY, GASTROSCOPY, DUODENOSCOPY (EGD), BIOPSY SINGLE OR MULTIPLE;  gastroscopy;  Surgeon: Westley Gibbs MD;  Location: [facility]     LAPAROSCOPIC GASTRIC SLEEVE  5/13/2013    Procedure: LAPAROSCOPIC GASTRIC SLEEVE;  Laparoscopic Sleeve Gastrectomy ;  Surgeon: Tamie  MD Best;  Location:  OR     [facility] [place], CERVICAL  1995    age 15     partial thyroidectomy  2018     SEPTOPLASTY       THYROIDECTOMY Left 4/3/2018    Procedure: THYROIDECTOMY;  Left Thyroid Lobectomy And Ishtmusectomy;  Surgeon: Delia Harper MD;  Location:  OR     TONSILLECTOMY  age 20s     TONSILLECTOMY  2004?     wisdom teeth extraction       PHQ-9 SCORE 12/17/2020 9/14/2021 3/7/2022   PHQ-9 Total Score - - -   PHQ-9 Total Score MyChart - 4 (Minimal depression) 6 (Mild depression)   PHQ-9 Total Score 6 4 6     MAXIMO-7 SCORE 9/15/2018 10/28/2019 3/7/2022   Total Score - - -   Total Score 3 (minimal anxiety) - 3 (minimal anxiety)   Total Score 3 3 3     WHODAS 2.0 Total Score 12/4/2018 1/31/2020   Total Score 18 15   Total Score MyChart 18 15     She is  5 foot 11 inches.  Her long-term overall goal is 175 She participates fully. Rapport is excellent.       This telehealth service is appropriate and effective for delivering services in light of the necessity for social distancing to mitigate the COVID-19 epidemic and for conservation of PPE.     Patient has agreed to receiving telehealth services after being informed about it: Yes    Patient prefers video invitation/information to be sent by:   [employer]  Time service started: 10:04  Time service ended: 10:57  Extended session due to complexity of case and length of interval.    Mode of transmission: [email or web address]    Location of originating:  Home of the patient    Distance site:  Home office of provider for [facility]    The patient has been notified that:  Video visits will be conducted via a call with their psychologist to provide the care they need with a video conversation. Video visits may be billed at different rates depending on insurance coverage.  Patients are advised to please contact their insurance provider with any questions about their health insurance coverage. If during the course of a call the psychologist feels a video visit is not appropriate,  patients will not be charged for this service.  Diagnosis:  Major depression, recurrent, mild (F33.0).   Psychological factors affecting obesity (F54).   Occupational Problems (Z56.9)  Bereavement    PLAN/RECOMMENDATIONS:   1. Ms. Catherine will return 4/26 @ 9  to address weight loss and social issues with problem solving therapy, which is medically necessary.   She wishes to continue to get support here with her life changes and her son's behavioral/developmental challenges.  2.  Resume schedule of regular exercise to the level that is possible.    3.  Continue support group of parents of children with autism.  4.  Continue long-term planning for possible advanced degree.    Preference for future meetings:                In-person             x    Remote                Either  Last treatment plan signed: 4/30/2021  Treatment plan due:  4/30/2022

## 2022-04-26 ENCOUNTER — VIRTUAL VISIT (OUTPATIENT)
Dept: PSYCHOLOGY | Facility: CLINIC | Age: 42
End: 2022-04-26
Payer: COMMERCIAL

## 2022-04-26 DIAGNOSIS — F33.0 MAJOR DEPRESSIVE DISORDER, RECURRENT EPISODE, MILD (H): Primary | ICD-10-CM

## 2022-04-26 DIAGNOSIS — Z56.9 OCCUPATIONAL PROBLEM: ICD-10-CM

## 2022-04-26 DIAGNOSIS — F54 PSYCHOLOGICAL FACTORS AFFECTING MORBID OBESITY (H): ICD-10-CM

## 2022-04-26 DIAGNOSIS — E66.01 PSYCHOLOGICAL FACTORS AFFECTING MORBID OBESITY (H): ICD-10-CM

## 2022-04-26 PROCEDURE — 90837 PSYTX W PT 60 MINUTES: CPT | Mod: GT | Performed by: PSYCHOLOGIST

## 2022-04-26 SDOH — ECONOMIC STABILITY - INCOME SECURITY: UNSPECIFIED PROBLEMS RELATED TO EMPLOYMENT: Z56.9

## 2022-04-26 NOTE — PROGRESS NOTES
"  Health Psychology                    Department of Medicine  Julianne Moreno, Ph.D., L.P. [phone]                         [facility] Lis Chavez, Ph.D., L.P. [phone]                     [place] Mail Code 645   Donald Hills, Ph.D. [phone]      [address] [place] [address] Susana Ugarte, Ph.D., A.B.P.P., L.P. [phone]              [place] [address]           Jayro Elias, Ph.D., A.B.P.P., L.P. [phone]      Melissa Stokes, Ph.D., L.P. [phone]  [facility]   Elizabeth Gaytan, Ph.D., A.B.P.P., L.P. [phone] [address] [place] [address]    Health Psychology  [facility] Health Progress Note    Intake:  4/1/13    Ashley Catherine is a 42-year-old single woman referred initially for psychological consultation for workup for a gastric sleeve procedure who is seen for CBT-oriented therapy regarding  weight management, work stresses, and family and social matters. She was seen today for problem-solving and supportive counseling.  She was quite stressed until last week..  She compares it to an earlier more severe episode of depression, but is different.  Sh had felt she was having more trouble with concentration and execution. Fortunately, she feels better today, given that the stress of getting a new car has petered out and that her sons finances have been getting figured out.     Son:  Her autistic son is getting speech therapy and OT  at [facility] x2/week in home and is in [facility] 1/2 day program. Itt has been a good experience.   She is doing observations every week. He likes music.They will be starting feeding consultation at [facility] in May.    He started at age 2.5 consultation with weight management as he is past the 99%ile for weight.  She will consult with Dr. Hernandez about PCIT. He is taking in formula, but not using cups and just lost his eating therapist.  She is lookng forward to him being on a better schedule.  He qualified for a disability waiver. \"a " "consumer support plan\" for which he gets a budget for services.   It covers a behavior specialist, onesies (due to his fecal smearing) , payment to her mother for support, a fence for safety, support planner, a communication device.  She wants to get help especially for his meltdowns, looking for help for  behavioral support, an environmental assessment for safety.    He will be aging out of his program at school and will need an IEP based on his age. x    Health:   She got her booster in late September.  She has been working; se is night shift, more are day shifts. She is feeling ready to add one more shift/week.   We discussed trying to find time to begin  exercise the 5 days/week she isn't working She purchased  exercise equipment ([employer]), planning to get invisiline as \"something for myself.\"She lost a lot of hair last winter and this winter.  She is wondering about developing alopecia. She had been  feeling tired, and was struggling with having a bigger to do list, but is starting to feel better.   Plans to keep wearing mask and using curbside pick-up.     Weight:  Was not addressed in detail, but will be in future as she is more capable of addressing her personal needs. She was not  weighed today as it is a virtual session and we did not address it.  Hasn't weighed herself lately.  Reviewed what is on record (below).   Discussed cutting back on flavored coffee drinks.     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 She is now working weekend shifts so as to be more available for her son.   car is needing to be replaced.  It is making her miss her father who in past would have helped.  She is very frustrated by the experience, and is waiting on others to get back to her.    Family:   Her mother is vaccinated x3 and does childcare for [employer].     Donated her father's car.     Other Stress:  She replaced her car. Is pleased with it, " feels safer, relible transportation.    She participates fully. Rapport was excellent.          Wt Readings from Last 4 Encounters:   03/07/22 100.5 kg (221 lb 8 oz)   02/28/22 100.2 kg (221 lb)   10/28/21 97.5 kg (215 lb)   10/21/21 97.5 kg (215 lb)     There is no height or weight on file to calculate BMI.     Current Outpatient Medications   Medication     acetaminophen (TYLENOL) 32 mg/mL liquid     cetirizine (ZYRTEC) 10 MG tablet     Cholecalciferol (VITAMIN D3) 25 MCG (1000 UT) CAPS     cyanocobalamin (VITAMIN B-12) 500 MCG SUBL sublingual tablet     cyclobenzaprine (FLEXERIL) 10 MG tablet     ibuprofen (ADVIL/MOTRIN) 200 MG capsule     lisinopril-hydrochlorothiazide (ZESTORETIC) 20-25 MG tablet     Multiple Vitamins-Minerals (ONE DAILY CALCIUM/IRON PO)     naltrexone (DEPADE/REVIA) 50 MG tablet     ondansetron (ZOFRAN-ODT) 4 MG ODT tab     phentermine (LOMAIRA) 8 MG tablet     No current facility-administered medications for this visit.     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14    hospitalized at [facility] x 1 week     History of steroid therapy      HX ABNL PAP SMEAR OF CERVIX   1995    LEEP AT 15 yo     Hypertension 2004     Hypertrophy of breast      Hypertrophy of tonsils alone      Hypovitaminosis D     with secondary high PTH     Irregular heart beat     palpitations     LBP (low back pain)      LSIL (low grade squamous intraepithelial lesion) on Pap smear 5/2006     Lumbago     RESOLVED     Major depressive disorder, recurrent episode, in partial or unspecified remission 4/1/2013     Morbid obesity (H)     bariatric surgery 5/13/2013     Multiple thyroid nodules      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)      OLIGOMENORRHEA, C/W PCOS      Sciatica       SLEEP APNEA 6/2002    No longer has since tonsillectomy and septoplasty     Thyroid nodule      Past Surgical History:   Procedure Laterality Date     BIOPSY  03/13/2015    Thyroid nodule     ESOPHAGOSCOPY, GASTROSCOPY, DUODENOSCOPY (EGD), COMBINED  5/28/2013    Procedure: COMBINED ESOPHAGOSCOPY, GASTROSCOPY, DUODENOSCOPY (EGD);;  Surgeon: Best Willett MD;  Location:  GI     ESOPHAGOSCOPY, GASTROSCOPY, DUODENOSCOPY (EGD), COMBINED N/A 6/13/2018    Procedure: COMBINED ESOPHAGOSCOPY, GASTROSCOPY, DUODENOSCOPY (EGD), BIOPSY SINGLE OR MULTIPLE;  gastroscopy;  Surgeon: Westley Gibbs MD;  Location:  GI     LAPAROSCOPIC GASTRIC SLEEVE  5/13/2013    Procedure: LAPAROSCOPIC GASTRIC SLEEVE;  Laparoscopic Sleeve Gastrectomy ;  Surgeon: Best Willett MD;  Location: [facility] OR     LEEP TX, CERVICAL  1995    age 15     partial thyroidectomy  2018     SEPTOPLASTY       THYROIDECTOMY Left 4/3/2018    Procedure: THYROIDECTOMY;  Left Thyroid Lobectomy And Ishtmusectomy;  Surgeon: Delia Harper MD;  Location: [facility] OR     TONSILLECTOMY  age 20s     TONSILLECTOMY  2004?     wisdom teeth extraction       PHQ-9 SCORE 12/17/2020 9/14/2021 3/7/2022   PHQ-9 Total Score - - -   PHQ-9 Total Score MyChart - 4 (Minimal depression) 6 (Mild depression)   PHQ-9 Total Score 6 4 6     MAXIMO-7 SCORE 9/15/2018 10/28/2019 3/7/2022   Total Score - - -   Total Score 3 (minimal anxiety) - 3 (minimal anxiety)   Total Score 3 3 3     WHODAS 2.0 Total Score 12/4/2018 1/31/2020   Total Score 18 15   Total Score MyChart 18 15     She is  5 foot 11 inches.  Her long-term overall goal is 175 She participates fully. Rapport is excellent.       This telehealth service is appropriate and effective for delivering services in light of the necessity for social distancing to mitigate the COVID-19 epidemic and for conservation of PPE.     Patient has agreed to receiving telehealth services after being informed about it: Yes    Patient prefers  video invitation/information to be sent by:   emai  Time service started: 9:01  Time service ended: 9:55    Extended session due to complexity of case and length of interval.    Mode of transmission: [email or web address]    Location of originating:  Home of the patient    Distance site:  Home office of provider for [facility]    The patient has been notified that:  Video visits will be conducted via a call with their psychologist to provide the care they need with a video conversation. Video visits may be billed at different rates depending on insurance coverage.  Patients are advised to please contact their insurance provider with any questions about their health insurance coverage. If during the course of a call the psychologist feels a video visit is not appropriate, patients will not be charged for this service.  Diagnosis:  Major depression, recurrent, mild (F33.0).   Psychological factors affecting obesity (F54).   Occupational Problems (Z56.9)  Bereavement    PLAN/RECOMMENDATIONS:   1. Ms. Catherine will return 5/26 @ 9  to address weight loss and social issues with problem solving therapy, which is medically necessary.   She wishes to continue to get support here with her life changes and her son's behavioral/developmental challenges.  2.  Resume schedule of regular exercise to the level that is possible.    3.  Continue support group of parents of children with autism.  4.  Continue long-term planning for possible advanced degree.    Preference for future meetings:                In-person             x    Remote                Either  Last treatment plan signed: 4/30/2021  Treatment plan due:  4/30/2022 (next session)

## 2022-05-26 ENCOUNTER — VIRTUAL VISIT (OUTPATIENT)
Dept: PSYCHOLOGY | Facility: CLINIC | Age: 42
End: 2022-05-26
Payer: COMMERCIAL

## 2022-05-26 DIAGNOSIS — F33.0 MAJOR DEPRESSIVE DISORDER, RECURRENT EPISODE, MILD (H): Primary | ICD-10-CM

## 2022-05-26 DIAGNOSIS — F54 PSYCHOLOGICAL FACTORS AFFECTING MORBID OBESITY (H): ICD-10-CM

## 2022-05-26 DIAGNOSIS — E66.01 PSYCHOLOGICAL FACTORS AFFECTING MORBID OBESITY (H): ICD-10-CM

## 2022-05-26 DIAGNOSIS — Z56.9 OCCUPATIONAL PROBLEM: ICD-10-CM

## 2022-05-26 PROCEDURE — 90837 PSYTX W PT 60 MINUTES: CPT | Mod: GT | Performed by: PSYCHOLOGIST

## 2022-05-26 SDOH — ECONOMIC STABILITY - INCOME SECURITY: UNSPECIFIED PROBLEMS RELATED TO EMPLOYMENT: Z56.9
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
## 2022-06-02 DIAGNOSIS — E66.811 OBESITY, CLASS I, BMI 30-34.9: ICD-10-CM

## 2022-06-06 NOTE — TELEPHONE ENCOUNTER
LOMAIRA 8 MG TABLET   Last Written Prescription Date:   10/5/2021  Last Fill Quantity: 30,   # refills: 3  Last Office Visit :  10/5/2021  Future Office visit:  None    Routing refill request to provider for review/approval because:  Med refused,     Pt needs updated office visit  Please call Pt to schedule.       Ferda Walter RN      Plan:  10/5/2021  - increase phentermine 8 mg daily  - continue naltrexone 50 mg daily  - encourage diet modification and exercise     FOLLOW-UP:    3 months follow up     Start: 10/05/2021 04:12 pm  Stop: 10/05/2021 04:22 pm  VDO duration: 10 minutes  Central Triage Red Flags/Med Refills

## 2022-06-21 ENCOUNTER — VIRTUAL VISIT (OUTPATIENT)
Dept: ENDOCRINOLOGY | Facility: CLINIC | Age: 42
End: 2022-06-21
Payer: COMMERCIAL

## 2022-06-21 VITALS — WEIGHT: 225 LBS | HEIGHT: 71 IN | BODY MASS INDEX: 31.5 KG/M2

## 2022-06-21 DIAGNOSIS — E66.811 OBESITY, CLASS I, BMI 30-34.9: ICD-10-CM

## 2022-06-21 DIAGNOSIS — Z98.84 S/P LAPAROSCOPIC SLEEVE GASTRECTOMY: ICD-10-CM

## 2022-06-21 PROCEDURE — 99212 OFFICE O/P EST SF 10 MIN: CPT | Mod: GT | Performed by: INTERNAL MEDICINE

## 2022-06-21 RX ORDER — NALTREXONE HYDROCHLORIDE 50 MG/1
50 TABLET, FILM COATED ORAL DAILY
Qty: 90 TABLET | Refills: 2 | Status: SHIPPED | OUTPATIENT
Start: 2022-06-21 | End: 2023-07-11 | Stop reason: SINTOL

## 2022-06-21 NOTE — LETTER
"2022       RE: Ashley Catherine  [address]  [facility] [address]     Dear Colleague,    Thank you for referring your patient, Ashley Catherine, to the [facility] [place] at [facility]. Please see a copy of my visit note below.          Return Medical Weight Management Note     Ashley Catherine  MRN:  [id number]  :  1980  ARKEL:  2022    Dear Violet Humphreys MD,    I had the pleasure of seeing your patient Ashley Catherine. She is a 41 year old female who I am continuing to see for treatment of obesity related to mild depression, hypertension, GERD       5/15/2017   I have the following health issues associated with obesity: High Blood Pressure, GERD (Reflux)   I have the following symptoms associated with obesity: GERD (Reflux)     She has hx of sleeve gastrectomy in . She lost from 290 to 186 after surgery with subsequent weight regain.  Was on phentermine and tried for 2 months and had \"dark thought\". She did lose 7 lbs while taking it.  Lowest weight was 184 lbs when taking Qsymia (tingling and mood changes)  Was on Saxenda but stopped in 2021. Does not feel it is working anymore.  She could not tolerate topiramate due to emotional lability.    INTERVAL HISTORY:  [facility] follow up. Last seen 10/5/2021    She is currently on phentermine 8 mg daily and naltrexone 50 mg daily. She ran out of phentermine about 2 weeks ago. she feels that she is munching on fast food more often. She tries to work out regularly.  She gained 10 lbs in 6 months with total loss of 44 lbs since starting the program.    Activity -- more outside in the summer, walk daily    Sleep 6 hours a night    CURRENT WEIGHT:   225 lbs 0 oz    Highest wt in life: 293 lbs  Initial Weight (lbs): 274 lbs  Last Visits Weight: 99.8 kg (220 lb)  Cumulative weight loss (lbs): 49  Weight Loss Percentage: 17.88%    Changes and Difficulties 2022   I have " "made the following changes to my diet since my last visit: More fruits   With regards to my diet, I am still struggling with: Fast food   I have made the following changes to my activity/exercise since my last visit: Walking   With regards to my activity/exercise, I am still struggling with: Regular exercise       VITALS:  Ht 1.791 m (5' 10.5\")   Wt 102.1 kg (225 lb)   BMI 31.83 kg/m      MEDICATIONS:   Current Outpatient Medications   Medication Sig Dispense Refill     acetaminophen (TYLENOL) 32 mg/mL liquid Take 1,000 mg by mouth every 8 hours as needed for fever or mild pain       cetirizine (ZYRTEC) 10 MG tablet Take 10 mg by mouth daily       Cholecalciferol (VITAMIN D3) 25 MCG (1000 UT) CAPS        cyanocobalamin (VITAMIN B-12) 500 MCG SUBL sublingual tablet Place 500 mcg under the tongue every other day        cyclobenzaprine (FLEXERIL) 10 MG tablet Take 1 tablet (10 mg) by mouth daily as needed for muscle spasms 45 tablet 4     ibuprofen (ADVIL/MOTRIN) 200 MG capsule Take 400 mg by mouth as needed for fever       lisinopril-hydrochlorothiazide (ZESTORETIC) 20-25 MG tablet Take 1 tablet by mouth daily 90 tablet 3     Multiple Vitamins-Minerals (ONE DAILY CALCIUM/IRON PO) Take 1 tablet by mouth daily       naltrexone (DEPADE/REVIA) 50 MG tablet Take 1 tablet (50 mg) by mouth daily 90 tablet 2     ondansetron (ZOFRAN-ODT) 4 MG ODT tab Take 1 tablet (4 mg) by mouth every 8 hours as needed for nausea 12 tablet 0     phentermine (LOMAIRA) 8 MG tablet Take 1 tablet (8 mg) by mouth every morning (before breakfast) 30 tablet 3       Weight Loss Medication History Reviewed With Patient 6/21/2022   Which weight loss medications are you currently taking on a regular basis?  Naltrexone, Phentermine   If you are not taking a weight loss medication that was prescribed to you, please indicate why: -   Are you having any side effects from the weight loss medication that we have prescribed you? No   If you are having side " effects please describe: -       ASSESSMENT/PLAN:   Ashley Catherine is a 41 year old female who I am continuing to see for treatment of obesity    Hx of sleeve with some weight regain.   Used to be on Saxenda and maintained weight -- stopped early 2021  Could not tolerate topiramate due to emotional lability    Currently on phentermine and naltrexone but ran out 2 weeks ago -- Appetite increases  Loss of 44 lbs since starting the program.    Plan:  - continue phentermine 8 mg daily  - continue naltrexone 50 mg daily  - encourage diet modification and exercise    FOLLOW-UP:    3-4 months follow up    Start: 06/21/2022 10:46 am  Stop: 06/21/2022 10:53 am  VDO duration: 7 minutes    External notes/medical records independently reviewed, labs and imaging independently reviewed, medical management and tests to be discussed/communicated to patient.    Time: I spent 15 minutes spent on the date of the encounter preparing to see patient (including chart review and preparation), obtaining and or reviewing additional medical history, performing a physical exam and evaluation, documenting clinical information in the electronic health record, independently interpreting results, communicating results to the patient and coordinating care.      Sincerely,    Gagandeep Qureshi MD

## 2022-06-21 NOTE — PROGRESS NOTES
"Return Medical Weight Management Note     Ashley Catherine  MRN:  [id number]  :  1980  RAKEL:  2022    Dear Violet Humphreys MD,    I had the pleasure of seeing your patient Ashley Catherine. She is a 41 year old female who I am continuing to see for treatment of obesity related to mild depression, hypertension, GERD       5/15/2017   I have the following health issues associated with obesity: High Blood Pressure, GERD (Reflux)   I have the following symptoms associated with obesity: GERD (Reflux)     She has hx of sleeve gastrectomy in . She lost from 290 to 186 after surgery with subsequent weight regain.  Was on phentermine and tried for 2 months and had \"dark thought\". She did lose 7 lbs while taking it.  Lowest weight was 184 lbs when taking Qsymia (tingling and mood changes)  Was on Saxenda but stopped in 2021. Does not feel it is working anymore.  She could not tolerate topiramate due to emotional lability.    INTERVAL HISTORY:  [facility] follow up. Last seen 10/5/2021    She is currently on phentermine 8 mg daily and naltrexone 50 mg daily. She ran out of phentermine about 2 weeks ago. she feels that she is munching on fast food more often. She tries to work out regularly.  She gained 10 lbs in 6 months with total loss of 44 lbs since starting the program.    Activity -- more outside in the summer, walk daily    Sleep 6 hours a night    CURRENT WEIGHT:   225 lbs 0 oz    Highest wt in life: 293 lbs  Initial Weight (lbs): 274 lbs  Last Visits Weight: 99.8 kg (220 lb)  Cumulative weight loss (lbs): 49  Weight Loss Percentage: 17.88%    Changes and Difficulties 2022   I have made the following changes to my diet since my last visit: More fruits   With regards to my diet, I am still struggling with: Fast food   I have made the following changes to my activity/exercise since my last visit: Walking   With regards to my activity/exercise, I am still struggling with: Regular exercise       VITALS:  Ht " "1.791 m (5' 10.5\")   Wt 102.1 kg (225 lb)   BMI 31.83 kg/m      MEDICATIONS:   Current Outpatient Medications   Medication Sig Dispense Refill     acetaminophen (TYLENOL) 32 mg/mL liquid Take 1,000 mg by mouth every 8 hours as needed for fever or mild pain       cetirizine (ZYRTEC) 10 MG tablet Take 10 mg by mouth daily       Cholecalciferol (VITAMIN D3) 25 MCG (1000 UT) CAPS        cyanocobalamin (VITAMIN B-12) 500 MCG SUBL sublingual tablet Place 500 mcg under the tongue every other day        cyclobenzaprine (FLEXERIL) 10 MG tablet Take 1 tablet (10 mg) by mouth daily as needed for muscle spasms 45 tablet 4     ibuprofen (ADVIL/MOTRIN) 200 MG capsule Take 400 mg by mouth as needed for fever       lisinopril-hydrochlorothiazide (ZESTORETIC) 20-25 MG tablet Take 1 tablet by mouth daily 90 tablet 3     Multiple Vitamins-Minerals (ONE DAILY CALCIUM/IRON PO) Take 1 tablet by mouth daily       naltrexone (DEPADE/REVIA) 50 MG tablet Take 1 tablet (50 mg) by mouth daily 90 tablet 2     ondansetron (ZOFRAN-ODT) 4 MG ODT tab Take 1 tablet (4 mg) by mouth every 8 hours as needed for nausea 12 tablet 0     phentermine (LOMAIRA) 8 MG tablet Take 1 tablet (8 mg) by mouth every morning (before breakfast) 30 tablet 3       Weight Loss Medication History Reviewed With Patient 6/21/2022   Which weight loss medications are you currently taking on a regular basis?  Naltrexone, Phentermine   If you are not taking a weight loss medication that was prescribed to you, please indicate why: -   Are you having any side effects from the weight loss medication that we have prescribed you? No   If you are having side effects please describe: -       ASSESSMENT/PLAN:   Ashley Catherine is a 41 year old female who I am continuing to see for treatment of obesity    Hx of sleeve with some weight regain.   Used to be on Saxenda and maintained weight -- stopped early 2021  Could not tolerate topiramate due to emotional lability    Currently on " phentermine and naltrexone but ran out 2 weeks ago -- Appetite increases  Loss of 44 lbs since starting the program.    Plan:  - continue phentermine 8 mg daily  - continue naltrexone 50 mg daily  - encourage diet modification and exercise    FOLLOW-UP:    3-4 months follow up    Start: 06/21/2022 10:46 am  Stop: 06/21/2022 10:53 am  VDO duration: 7 minutes    External notes/medical records independently reviewed, labs and imaging independently reviewed, medical management and tests to be discussed/communicated to patient.    Time: I spent 15 minutes spent on the date of the encounter preparing to see patient (including chart review and preparation), obtaining and or reviewing additional medical history, performing a physical exam and evaluation, documenting clinical information in the electronic health record, independently interpreting results, communicating results to the patient and coordinating care.      Sincerely,    Gagandeep Qureshi MD

## 2022-06-21 NOTE — PATIENT INSTRUCTIONS
Plan:  - continue phentermine 8 mg daily  - continue naltrexone 50 mg daily  - encourage diet modification and exercise     FOLLOW-UP:    3-4 months follow up    If you have any questions, please do not hesitate to call Weight management clinic at [phone] or [phone].  If you need to fax, please fax to [phone].    Sincerely,    Gagandeep Qureshi MD  Endocrinology

## 2022-06-21 NOTE — PROGRESS NOTES
Virtual Visit Check-In    During this virtual visit the patient is located in [place], patient verifies this as the location during the entirety of this visit.     Ashley is a 42 year old who is being evaluated via a billable video visit.      How would you like to obtain your AVS? MyChart  If the video visit is dropped, the invitation should be resent by: Email to [email or web address]  Will anyone else be joining your video visit? No        Video-Visit Details    Type of service:  Video Visit    Originating Location (pt. Location): Home    Distant Location (provider location):  [facility] WEIGHT MANAGEMENT CLINIC [place]     Platform used for Video Visit: [employer] [place] NREMT

## 2022-06-30 ENCOUNTER — VIRTUAL VISIT (OUTPATIENT)
Dept: PSYCHOLOGY | Facility: CLINIC | Age: 42
End: 2022-06-30
Payer: COMMERCIAL

## 2022-06-30 DIAGNOSIS — F33.0 MAJOR DEPRESSIVE DISORDER, RECURRENT EPISODE, MILD (H): Primary | ICD-10-CM

## 2022-06-30 DIAGNOSIS — E66.01 PSYCHOLOGICAL FACTORS AFFECTING MORBID OBESITY (H): ICD-10-CM

## 2022-06-30 DIAGNOSIS — F54 PSYCHOLOGICAL FACTORS AFFECTING MORBID OBESITY (H): ICD-10-CM

## 2022-06-30 PROCEDURE — 90837 PSYTX W PT 60 MINUTES: CPT | Mod: GT | Performed by: PSYCHOLOGIST
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
## 2022-08-16 ENCOUNTER — VIRTUAL VISIT (OUTPATIENT)
Dept: PSYCHOLOGY | Facility: CLINIC | Age: 42
End: 2022-08-16
Payer: COMMERCIAL

## 2022-08-16 DIAGNOSIS — F33.0 MAJOR DEPRESSIVE DISORDER, RECURRENT EPISODE, MILD (H): Primary | ICD-10-CM

## 2022-08-16 DIAGNOSIS — Z56.9 OCCUPATIONAL PROBLEM: ICD-10-CM

## 2022-08-16 DIAGNOSIS — F54 PSYCHOLOGICAL FACTORS AFFECTING MORBID OBESITY (H): ICD-10-CM

## 2022-08-16 DIAGNOSIS — E66.01 PSYCHOLOGICAL FACTORS AFFECTING MORBID OBESITY (H): ICD-10-CM

## 2022-08-16 PROCEDURE — 90837 PSYTX W PT 60 MINUTES: CPT | Mod: GT | Performed by: PSYCHOLOGIST

## 2022-08-16 SDOH — ECONOMIC STABILITY - INCOME SECURITY: UNSPECIFIED PROBLEMS RELATED TO EMPLOYMENT: Z56.9

## 2022-08-16 NOTE — PROGRESS NOTES
Health Psychology                     Department of Medicine  Julianne Moreno, Ph.D., L.P. [phone]                         [facility] Lis Chavez, Ph.D., L.P. [phone]                     [place] Mail Code 671   Donald Hills, Ph.D. [phone]      [address] [place] [address] Susana Ugarte, Ph.D., A.B.P.P., L.P. [phone]             [place] [address]           Jayro Elias, Ph.D., A.B.P.P., L.P. [phone]      Melissa Stokes, Ph.D., L.P. [phone]  [facility]   Elizabeth Gaytan, Ph.D., A.B.P.P., L.P. [phone]    [address] [place] [address]    Health Psychology  [facility] Health Progress Note    Intake:  4/1/13    Demographics   Age 42 year old   Sex female   Race Black or    Ethnicity Not  or      Ashley Catherine is a single woman referred initially for psychological consultation for workup for a gastric sleeve procedure who is seen for CBT-oriented therapy regarding  weight management, work stresses, and family and social matters. She was seen today for problem-solving and supportive counseling.  Most of the session focused on her son.     She had felt she was having more trouble with concentration and execution.She is using air tags for things with keys, phone,  braces box.   It is helping.  He locked himself in her room and she panicked when she coodn't find the key.  Has taken precautions.     Last week she took him to a [employer] day in [place] for kids with sensory issues.  It went better than expected, including the 5-hour trip home.  He had only one meltdown.  This gives her hope about future outings.     Working on communication  And on potty training.  He seems to be trying to talk more, so gets frustrated.   Oddly, he's says words when he is having a meltdown.   She will discuss with the speech therapist as he doesn't have words at other times.     She is still frustrated dealing  the formula shortage, which affects [place].   "He behaviorally rejects most formulae and foods.  She has been stressed by having to drive around trying to find it troughout the [facility] area.  His behavioral sensitivities lead to him only accepting 1 brand/product.  She is trying to get it, but is finding it very challenging. He uses [employer] and [employer].   He is starting the [facility] feeding clinic [facility].  He just eats french fries, Leean Chin, and peanut butter.  He can't drink out of a cup and won't drink water out of a bottle.     Rivera  qualified for a disability waiver,  \"a consumer support plan\" for which he gets a budget for services.   It covers a behavior specialist, onesies (due to his fecal smearing) , payment to her mother for support, a fence for safety, support planner, a communication device.  She wants to get help especially for his meltdowns, looking for help for  behavioral support, an environmental assessment for safety.  There is a behavioral therapist who has been helping.     He is now sleeping a bit better.      Health:   She got her booster in late September.  She doesn't think she is eligible for 2nd booster yet.      Weight:  Was not addressed in detail, but will be in future as she is more capable of addressing her personal needs.  She is taking in more calories from [employer] specialty drinks including refreeshers.Will cut down coffee drinks to 2 days/week she works.     Exercise:  She is aware of need to increase exercise.    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 She is now working weekend shifts so as to be more available for her son.  She has been working weekends  The union voted to authorize a strike. Things are bad in terms of staffing/retention f nurses on the unit.      Family:   Her mother is vaccinated x4 and does childcare for Rivera.         She participates fully. Rapport was excellent.        Wt Readings from Last 4 " Encounters:   06/21/22 102.1 kg (225 lb)   03/07/22 100.5 kg (221 lb 8 oz)   02/28/22 100.2 kg (221 lb)   10/28/21 97.5 kg (215 lb)     There is no height or weight on file to calculate BMI.     Current Outpatient Medications   Medication     acetaminophen (TYLENOL) 32 mg/mL liquid     cetirizine (ZYRTEC) 10 MG tablet     Cholecalciferol (VITAMIN D3) 25 MCG (1000 UT) CAPS     cyanocobalamin (VITAMIN B-12) 500 MCG SUBL sublingual tablet     cyclobenzaprine (FLEXERIL) 10 MG tablet     ibuprofen (ADVIL/MOTRIN) 200 MG capsule     lisinopril-hydrochlorothiazide (ZESTORETIC) 20-25 MG tablet     Multiple Vitamins-Minerals (ONE DAILY CALCIUM/IRON PO)     naltrexone (DEPADE/REVIA) 50 MG tablet     ondansetron (ZOFRAN-ODT) 4 MG ODT tab     phentermine (LOMAIRA) 8 MG tablet     No current facility-administered medications for this visit.     Past Medical History:   Diagnosis Date     Bariatric surgery status 05/13/2013     Depression      Depressive disorder      Esophageal reflux      Family history of hyperparathyroidism 3/6/2015     Forearm fracture childhood    jumping fall     Guillain-De Lancey disease (H) age 14    hospitalized at [facility] x 1 week     History of steroid therapy      HX ABNL PAP SMEAR OF CERVIX   1995    LEEP AT 15 yo     Hypertension 2004     Hypertrophy of breast      Hypertrophy of tonsils alone      Hypovitaminosis D     with secondary high PTH     Irregular heart beat     palpitations     LBP (low back pain)      LSIL (low grade squamous intraepithelial lesion) on Pap smear 5/2006     Lumbago     RESOLVED     Major depressive disorder, recurrent episode, in partial or unspecified remission 4/1/2013     Morbid obesity (H)     bariatric surgery 5/13/2013     Multiple thyroid nodules      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)      OLIGOMENORRHEA, C/W PCOS      Sciatica      SLEEP APNEA 6/2002    No longer has since tonsillectomy and septoplasty     Thyroid nodule      Past Surgical  History:   Procedure Laterality Date     BIOPSY  03/13/2015    Thyroid nodule     ESOPHAGOSCOPY, GASTROSCOPY, DUODENOSCOPY (EGD), COMBINED  5/28/2013    Procedure: COMBINED ESOPHAGOSCOPY, GASTROSCOPY, DUODENOSCOPY (EGD);;  Surgeon: Best Willett MD;  Location:  GI     ESOPHAGOSCOPY, GASTROSCOPY, DUODENOSCOPY (EGD), COMBINED N/A 6/13/2018    Procedure: COMBINED ESOPHAGOSCOPY, GASTROSCOPY, DUODENOSCOPY (EGD), BIOPSY SINGLE OR MULTIPLE;  gastroscopy;  Surgeon: Westley Gibbs MD;  Location:  GI     LAPAROSCOPIC GASTRIC SLEEVE  5/13/2013    Procedure: LAPAROSCOPIC GASTRIC SLEEVE;  Laparoscopic Sleeve Gastrectomy ;  Surgeon: Best Willett MD;  Location: [facility] OR     LEEP TX, CERVICAL  1995    age 15     partial thyroidectomy  2018     SEPTOPLASTY       THYROIDECTOMY Left 4/3/2018    Procedure: THYROIDECTOMY;  Left Thyroid Lobectomy And Ishtmusectomy;  Surgeon: Delia Harper MD;  Location: [facility] OR     TONSILLECTOMY  age 20s     TONSILLECTOMY  2004?     wisdom teeth extraction       PHQ-9 SCORE 12/17/2020 9/14/2021 3/7/2022   PHQ-9 Total Score - - -   PHQ-9 Total Score MyChart - 4 (Minimal depression) 6 (Mild depression)   PHQ-9 Total Score 6 4 6     MAXIMO-7 SCORE 9/15/2018 10/28/2019 3/7/2022   Total Score - - -   Total Score 3 (minimal anxiety) - 3 (minimal anxiety)   Total Score 3 3 3     WHODAS 2.0 Total Score 12/4/2018 1/31/2020   Total Score 18 15   Total Score MyChart 18 15     She is  5 foot 11 inches.  Her long-term overall goal is 175 She participates fully. Rapport is excellent.       This telehealth service is appropriate and effective for delivering services in light of the necessity for social distancing to mitigate the COVID-19 epidemic and for conservation of PPE.     Patient has agreed to receiving telehealth services after being informed about it: Yes    Patient prefers video invitation/information to be sent by:   emai  Time service started: 9:03  Time service ended:  9:58    Extended session due to complexity of case and length of interval.    Mode of transmission: [email or web address]    Location of originating:  Car of the patient    Distance site:  Home office of provider for [facility]    The patient has been notified that:  Video visits will be conducted via a call with their psychologist to provide the care they need with a video conversation. Video visits may be billed at different rates depending on insurance coverage.  Patients are advised to please contact their insurance provider with any questions about their health insurance coverage. If during the course of a call the psychologist feels a video visit is not appropriate, patients will not be charged for this service.  Diagnosis:  Major depression, recurrent, mild (F33.0).   Psychological factors affecting obesity (F54).   PLAN/RECOMMENDATIONS:   1. Ms. Catherine will return for video psychotherapy session  9/28 @ 10  to address weight loss and social issues with problem solving therapy, which is medically necessary.   She wishes to continue to get support here with her life changes and her son's behavioral/developmental challenges.  2.  Resume schedule of regular exercise to the level that is possible.  Explore getting cardio apparatus.    Preference for future meetings:                In-person             x    Remote                Either  Last treatment plan signed: 5/26/22  Treatment plan due:  : 5/26/23

## 2022-09-27 ENCOUNTER — VIRTUAL VISIT (OUTPATIENT)
Dept: ENDOCRINOLOGY | Facility: CLINIC | Age: 42
End: 2022-09-27
Payer: COMMERCIAL

## 2022-09-27 VITALS — WEIGHT: 230 LBS | HEIGHT: 70 IN | BODY MASS INDEX: 32.93 KG/M2

## 2022-09-27 DIAGNOSIS — E66.811 OBESITY, CLASS I, BMI 30-34.9: ICD-10-CM

## 2022-09-27 PROCEDURE — 99212 OFFICE O/P EST SF 10 MIN: CPT | Mod: GT | Performed by: INTERNAL MEDICINE

## 2022-09-27 ASSESSMENT — ANXIETY QUESTIONNAIRES
GAD7 TOTAL SCORE: 7
7. FEELING AFRAID AS IF SOMETHING AWFUL MIGHT HAPPEN: MORE THAN HALF THE DAYS
5. BEING SO RESTLESS THAT IT IS HARD TO SIT STILL: NOT AT ALL
3. WORRYING TOO MUCH ABOUT DIFFERENT THINGS: SEVERAL DAYS
7. FEELING AFRAID AS IF SOMETHING AWFUL MIGHT HAPPEN: MORE THAN HALF THE DAYS
2. NOT BEING ABLE TO STOP OR CONTROL WORRYING: SEVERAL DAYS
GAD7 TOTAL SCORE: 7
GAD7 TOTAL SCORE: 7
6. BECOMING EASILY ANNOYED OR IRRITABLE: SEVERAL DAYS
1. FEELING NERVOUS, ANXIOUS, OR ON EDGE: SEVERAL DAYS
8. IF YOU CHECKED OFF ANY PROBLEMS, HOW DIFFICULT HAVE THESE MADE IT FOR YOU TO DO YOUR WORK, TAKE CARE OF THINGS AT HOME, OR GET ALONG WITH OTHER PEOPLE?: SOMEWHAT DIFFICULT
4. TROUBLE RELAXING: SEVERAL DAYS
IF YOU CHECKED OFF ANY PROBLEMS ON THIS QUESTIONNAIRE, HOW DIFFICULT HAVE THESE PROBLEMS MADE IT FOR YOU TO DO YOUR WORK, TAKE CARE OF THINGS AT HOME, OR GET ALONG WITH OTHER PEOPLE: SOMEWHAT DIFFICULT

## 2022-09-27 ASSESSMENT — PAIN SCALES - GENERAL: PAINLEVEL: NO PAIN (0)

## 2022-09-27 ASSESSMENT — PATIENT HEALTH QUESTIONNAIRE - PHQ9
SUM OF ALL RESPONSES TO PHQ QUESTIONS 1-9: 4
SUM OF ALL RESPONSES TO PHQ QUESTIONS 1-9: 4
10. IF YOU CHECKED OFF ANY PROBLEMS, HOW DIFFICULT HAVE THESE PROBLEMS MADE IT FOR YOU TO DO YOUR WORK, TAKE CARE OF THINGS AT HOME, OR GET ALONG WITH OTHER PEOPLE: SOMEWHAT DIFFICULT

## 2022-09-27 NOTE — PROGRESS NOTES
Ashley is a 42 year old who is being evaluated via a billable video visit.      How would you like to obtain your AVS? MyChart  If the video visit is dropped, the invitation should be resent by: [phone]  Will anyone else be joining your video visit? No    During this virtual visit the patient is located in [place], patient verifies this as the location during the entirety of this visit.     Video-Visit Details    Type of service:  Video Visit    Originating Location (pt. Location): Home    Distant Location (provider location):  [facility]NA[facility] [place]     Platform used for Video Visit: [employer]

## 2022-09-27 NOTE — PATIENT INSTRUCTIONS
- increase phentermine to 8 mg in the morning and 8 mg taking around noon to 2 pm  - continue naltrexone 50 mg daily    Follow up in 3-4 months    If you have any questions, please do not hesitate to call Weight management clinic at [phone] or [phone].  If you need to fax, please fax to [phone].    Sincerely,    Gagandeep Qureshi MD  Endocrinology

## 2022-09-27 NOTE — NURSING NOTE
"(   Chief Complaint   Patient presents with     RECHECK     Return MW    )    ( Weight: 104.3 kg (230 lb) (Patient reported) )  ( Height: 177.8 cm (5' 10\") )  ( BMI (Calculated): 33 )  (   )  (   )  (   )  (   )  (   )  (   )    (   )  (   )  (   )  (   )  (   )  (   )  (   )    (   Patient Active Problem List   Diagnosis     OLIGOMENORRHEA, C/W PCOS     HX ABNL PAP SMEAR OF CERVIX       Flat feet     Sciatica     S/P gastrectomy     Elevated parathyroid hormone     Multiple thyroid nodules     Abnormal thyroid cytology-follicular neoplasm     Chronic pain syndrome     Major depressive disorder, recurrent episode, in full remission (H)     Bilateral low back pain with sciatica, sciatica laterality unspecified     Psychological factor affecting physical condition     Hx of Guillain-Redmond syndrome     Adjustment disorder with anxious mood     Thyroid nodule     Benign essential hypertension     Gastric bypass status for obesity     Pre-eclampsia     Myalgia    )  (   Current Outpatient Medications   Medication Sig Dispense Refill     acetaminophen (TYLENOL) 32 mg/mL liquid Take 1,000 mg by mouth every 8 hours as needed for fever or mild pain       cetirizine (ZYRTEC) 10 MG tablet Take 10 mg by mouth daily       Cholecalciferol (VITAMIN D3) 25 MCG (1000 UT) CAPS        cyanocobalamin (VITAMIN B-12) 500 MCG SUBL sublingual tablet Place 500 mcg under the tongue every other day        cyclobenzaprine (FLEXERIL) 10 MG tablet Take 1 tablet (10 mg) by mouth daily as needed for muscle spasms 45 tablet 4     ibuprofen (ADVIL/MOTRIN) 200 MG capsule Take 400 mg by mouth as needed for fever       lisinopril-hydrochlorothiazide (ZESTORETIC) 20-25 MG tablet Take 1 tablet by mouth daily 90 tablet 3     Multiple Vitamins-Minerals (ONE DAILY CALCIUM/IRON PO) Take 1 tablet by mouth daily       naltrexone (DEPADE/REVIA) 50 MG tablet Take 1 tablet (50 mg) by mouth daily 90 tablet 2     phentermine (LOMAIRA) 8 MG tablet Take 1 tablet (8 " mg) by mouth every morning (before breakfast) 30 tablet 3     ondansetron (ZOFRAN-ODT) 4 MG ODT tab Take 1 tablet (4 mg) by mouth every 8 hours as needed for nausea (Patient not taking: Reported on 9/27/2022) 12 tablet 0    )  ( Diabetes Eval:    )    ( Pain Eval:  No Pain (0) )    ( Wound Eval:       )    (   History   Smoking Status     Never Smoker   Smokeless Tobacco     Never Used    )    ( Signed By:  Claire Mccoy, EMT; September 27, 2022; 8:32 AM )

## 2022-09-27 NOTE — LETTER
"2022       RE: Ashley Catherine  [address]  [facility] [address]     Dear Colleague,    Thank you for referring your patient, Ashley Catherine, to the [facility] [place] at [facility]. Please see a copy of my visit note below.    Return Medical Weight Management Note     Ashley Catherine  MRN:  [id number]  :  1980  RAKEL:      Dear Violet Humphreys MD,    I had the pleasure of seeing your patient Ashley Catherine. She is a 42 year old female who I am continuing to see for treatment of obesity related to mild depression, hypertension, GERD       5/15/2017   I have the following health issues associated with obesity: High Blood Pressure, GERD (Reflux)   I have the following symptoms associated with obesity: GERD (Reflux)     She has hx of sleeve gastrectomy in . She lost from 290 to 186 after surgery with subsequent weight regain.  Was on phentermine and tried for 2 months and had \"dark thought\". She did lose 7 lbs while taking it.  Lowest weight was 184 lbs when taking Qsymia (tingling and mood changes)  Was on Saxenda but stopped in 2021. Does not feel it is working anymore.  She could not tolerate topiramate due to emotional lability.    INTERVAL HISTORY:  [facility] follow up. Last seen 2022    She is currently on phentermine 8 mg daily and naltrexone 50 mg daily. She has been struggling with munching on [place] fried and nugget because her son would like to eat this daily. She also drinks more flavored coffee more often. She gained 44 lbs in 3 months with total loss of 39 lbs since starting the program.    Activity -- more outside in the summer, walk daily    Sleep 6 hours a night    CURRENT WEIGHT:   230 lbs 0 oz    Highest wt in life: 293 lbs  Initial Weight (lbs): 274 lbs  Last Visits Weight: 102.1 kg (225 lb)  Cumulative weight loss (lbs): 44  Weight Loss Percentage: 16.06%    Changes and Difficulties 2022 " "  I have made the following changes to my diet since my last visit: More water   With regards to my diet, I am still struggling with: Fast food/invisalign   I have made the following changes to my activity/exercise since my last visit: Taking more steps   With regards to my activity/exercise, I am still struggling with: Daily exercise       VITALS:  Ht 1.778 m (5' 10\")   Wt 104.3 kg (230 lb)   BMI 33.00 kg/m      MEDICATIONS:   Current Outpatient Medications   Medication Sig Dispense Refill     acetaminophen (TYLENOL) 32 mg/mL liquid Take 1,000 mg by mouth every 8 hours as needed for fever or mild pain       cetirizine (ZYRTEC) 10 MG tablet Take 10 mg by mouth daily       Cholecalciferol (VITAMIN D3) 25 MCG (1000 UT) CAPS        cyanocobalamin (VITAMIN B-12) 500 MCG SUBL sublingual tablet Place 500 mcg under the tongue every other day        cyclobenzaprine (FLEXERIL) 10 MG tablet Take 1 tablet (10 mg) by mouth daily as needed for muscle spasms 45 tablet 4     ibuprofen (ADVIL/MOTRIN) 200 MG capsule Take 400 mg by mouth as needed for fever       lisinopril-hydrochlorothiazide (ZESTORETIC) 20-25 MG tablet Take 1 tablet by mouth daily 90 tablet 3     Multiple Vitamins-Minerals (ONE DAILY CALCIUM/IRON PO) Take 1 tablet by mouth daily       naltrexone (DEPADE/REVIA) 50 MG tablet Take 1 tablet (50 mg) by mouth daily 90 tablet 2     ondansetron (ZOFRAN-ODT) 4 MG ODT tab Take 1 tablet (4 mg) by mouth every 8 hours as needed for nausea 12 tablet 0     phentermine (LOMAIRA) 8 MG tablet Take 1 tablet (8 mg) by mouth every morning (before breakfast) 30 tablet 3       Weight Loss Medication History Reviewed With Patient 9/27/2022   Which weight loss medications are you currently taking on a regular basis?  Naltrexone, Phentermine   If you are not taking a weight loss medication that was prescribed to you, please indicate why: -   Are you having any side effects from the weight loss medication that we have prescribed you? No "   If you are having side effects please describe: -       ASSESSMENT/PLAN:   Ashley Catherine is a 42 year old female who I am continuing to see for treatment of obesity    Hx of sleeve with some weight regain.   Used to be on Saxenda and maintained weight -- stopped early 2021  Could not tolerate topiramate due to emotional lability    Currently on phentermine and naltrexone   Gains some weight due to eating more carb and sugar lately  Loss of 44 lbs since starting the program.    Plan:  - increase phentermine 8 mg in the morning and 8 mg taking around noon to 2 pm  - continue naltrexone 50 mg daily  - encourage diet modification and exercise    FOLLOW-UP:    3-4 months follow up    Start 0845 am  Stop: 0853 am  VDO duration: 8 minutes    External notes/medical records independently reviewed, labs and imaging independently reviewed, medical management and tests to be discussed/communicated to patient.    Time: I spent 18 minutes spent on the date of the encounter preparing to see patient (including chart review and preparation), obtaining and or reviewing additional medical history, performing a physical exam and evaluation, documenting clinical information in the electronic health record, independently interpreting results, communicating results to the patient and coordinating care.      Sincerely,    Gagandeep Qureshi MD

## 2022-09-27 NOTE — PROGRESS NOTES
"Return Medical Weight Management Note     Ashley Catherine  MRN:  [id number]  :  1980  RAKEL:      Dear Violet Humphreys MD,    I had the pleasure of seeing your patient Ashley Catherine. She is a 42 year old female who I am continuing to see for treatment of obesity related to mild depression, hypertension, GERD       5/15/2017   I have the following health issues associated with obesity: High Blood Pressure, GERD (Reflux)   I have the following symptoms associated with obesity: GERD (Reflux)     She has hx of sleeve gastrectomy in . She lost from 290 to 186 after surgery with subsequent weight regain.  Was on phentermine and tried for 2 months and had \"dark thought\". She did lose 7 lbs while taking it.  Lowest weight was 184 lbs when taking Qsymia (tingling and mood changes)  Was on Saxenda but stopped in 2021. Does not feel it is working anymore.  She could not tolerate topiramate due to emotional lability.    INTERVAL HISTORY:  [facility] follow up. Last seen 2022    She is currently on phentermine 8 mg daily and naltrexone 50 mg daily. She has been struggling with munching on Polish fried and nugget because her son would like to eat this daily. She also drinks more flavored coffee more often. She gained 44 lbs in 3 months with total loss of 39 lbs since starting the program.    Activity -- more outside in the summer, walk daily    Sleep 6 hours a night    CURRENT WEIGHT:   230 lbs 0 oz    Highest wt in life: 293 lbs  Initial Weight (lbs): 274 lbs  Last Visits Weight: 102.1 kg (225 lb)  Cumulative weight loss (lbs): 44  Weight Loss Percentage: 16.06%    Changes and Difficulties 2022   I have made the following changes to my diet since my last visit: More water   With regards to my diet, I am still struggling with: Fast food/invisalign   I have made the following changes to my activity/exercise since my last visit: Taking more steps   With regards to my activity/exercise, I am still struggling " "with: Daily exercise       VITALS:  Ht 1.778 m (5' 10\")   Wt 104.3 kg (230 lb)   BMI 33.00 kg/m      MEDICATIONS:   Current Outpatient Medications   Medication Sig Dispense Refill     acetaminophen (TYLENOL) 32 mg/mL liquid Take 1,000 mg by mouth every 8 hours as needed for fever or mild pain       cetirizine (ZYRTEC) 10 MG tablet Take 10 mg by mouth daily       Cholecalciferol (VITAMIN D3) 25 MCG (1000 UT) CAPS        cyanocobalamin (VITAMIN B-12) 500 MCG SUBL sublingual tablet Place 500 mcg under the tongue every other day        cyclobenzaprine (FLEXERIL) 10 MG tablet Take 1 tablet (10 mg) by mouth daily as needed for muscle spasms 45 tablet 4     ibuprofen (ADVIL/MOTRIN) 200 MG capsule Take 400 mg by mouth as needed for fever       lisinopril-hydrochlorothiazide (ZESTORETIC) 20-25 MG tablet Take 1 tablet by mouth daily 90 tablet 3     Multiple Vitamins-Minerals (ONE DAILY CALCIUM/IRON PO) Take 1 tablet by mouth daily       naltrexone (DEPADE/REVIA) 50 MG tablet Take 1 tablet (50 mg) by mouth daily 90 tablet 2     ondansetron (ZOFRAN-ODT) 4 MG ODT tab Take 1 tablet (4 mg) by mouth every 8 hours as needed for nausea 12 tablet 0     phentermine (LOMAIRA) 8 MG tablet Take 1 tablet (8 mg) by mouth every morning (before breakfast) 30 tablet 3       Weight Loss Medication History Reviewed With Patient 9/27/2022   Which weight loss medications are you currently taking on a regular basis?  Naltrexone, Phentermine   If you are not taking a weight loss medication that was prescribed to you, please indicate why: -   Are you having any side effects from the weight loss medication that we have prescribed you? No   If you are having side effects please describe: -       ASSESSMENT/PLAN:   Ashley Catherine is a 42 year old female who I am continuing to see for treatment of obesity    Hx of sleeve with some weight regain.   Used to be on Saxenda and maintained weight -- stopped early 2021  Could not tolerate topiramate due to " emotional lability    Currently on phentermine and naltrexone   Gains some weight due to eating more carb and sugar lately  Loss of 44 lbs since starting the program.    Plan:  - increase phentermine 8 mg in the morning and 8 mg taking around noon to 2 pm  - continue naltrexone 50 mg daily  - encourage diet modification and exercise    FOLLOW-UP:    3-4 months follow up    Start 0845 am  Stop: 0853 am  VDO duration: 8 minutes    External notes/medical records independently reviewed, labs and imaging independently reviewed, medical management and tests to be discussed/communicated to patient.    Time: I spent 18 minutes spent on the date of the encounter preparing to see patient (including chart review and preparation), obtaining and or reviewing additional medical history, performing a physical exam and evaluation, documenting clinical information in the electronic health record, independently interpreting results, communicating results to the patient and coordinating care.      Sincerely,    Gagandeep Qureshi MD

## 2022-09-28 ENCOUNTER — VIRTUAL VISIT (OUTPATIENT)
Dept: PSYCHOLOGY | Facility: CLINIC | Age: 42
End: 2022-09-28
Payer: COMMERCIAL

## 2022-09-28 DIAGNOSIS — E66.01 PSYCHOLOGICAL FACTORS AFFECTING MORBID OBESITY (H): ICD-10-CM

## 2022-09-28 DIAGNOSIS — F54 PSYCHOLOGICAL FACTORS AFFECTING MORBID OBESITY (H): ICD-10-CM

## 2022-09-28 DIAGNOSIS — F33.0 MAJOR DEPRESSIVE DISORDER, RECURRENT EPISODE, MILD (H): Primary | ICD-10-CM

## 2022-09-28 PROCEDURE — 90837 PSYTX W PT 60 MINUTES: CPT | Mod: GT | Performed by: PSYCHOLOGIST

## 2022-09-28 NOTE — PROGRESS NOTES
Health Psychology                     Department of Medicine  Julianne Moreno, Ph.D., L.P. [phone]                         [facility] Lis Chavez, Ph.D., L.P. [phone]                     [place] Mail Code 806   Donald Hills, Ph.D. [phone]      [address] [place] [address] Susana Ugarte, Ph.D., A.B.P.P., L.P. [phone]             [place] [address]           Jayro Elias, Ph.D., A.B.P.P., L.P. [phone]      Melissa Stokes, Ph.D., L.P. [phone]  [facility]   Elizabeth Gaytan, Ph.D., A.B.P.P., L.P. [phone]    [address] [place] [address]    Health Psychology  Telemental Health Progress Note    Intake:  4/1/13    Demographics   Age 42 year old   Sex female   Race Black or    Ethnicity Not  or      Ashley Catherine is a single woman referred initially for psychological consultation for workup for a gastric sleeve procedure who is seen for CBT-oriented therapy regarding  weight management, work stresses, and family and social matters. She was seen today for problem-solving and supportive counseling.  Most of the session focused on her son.     They are working on communication and on potty training. He is playing less with his bowel movements.  He seems to be trying to talk more, so gets frustrated.  Oddly, he's says words when he is having a meltdown.      He behaviorally rejects most formulae and foods.  She has been stressed by having to drive around trying to find it troughout the [facility] area.  His behavioral sensitivities lead to him only accepting 1 brand/product.  She is trying to get it, but is finding it very challenging. He uses [employer].   [facility] is [facility] the [facility] feeding [facility].  He just eats french fries, Leean Chin, and peanut butter.  He also eats peanut butter, and Miley Doones.   He can't drink out of a cup and won't drink water out of a bottle yet.      She wants to get help especially for  "Rivera's meltdowns, looking for help for  behavioral support, an environmental assessment for safety.  There is a behavioral therapist who has been helping.  It had been getting better, but then worsened.  He gets frustrated readily.   They are working on \"wait.\"    He is now sleeping a bit better. One night he slept through the night, which is progress.  He did well in terms of his regulation that day.    His eating is a problem.  He uses a bottle most of the time.  He likes french fries  [facility]       His school at [employer] is doing better.  She switched companies she works with for his waiver.  He is using a device to communicate- an iPad talker.  He seems to like it in the therapy settings.  She went through a 1-month training program. He doesn't like to use the iPad at home for communicating.  He has started to say some words, like yes.     She is worried about the increase in crime, the political situation.     Health:   She got her booster in late September.  She got her third booster yet.      Weight:  Was not addressed in detail, but will be in future as she is more capable of addressing her personal needs.  She  Met with weight management.  Discussed ore walking.     Exercise:  She is aware of need to increase exercise, encouraged to while it is still warm out.    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 She is now working weekend shifts so as to be more available for her son.  She has been working weekends  Things are bad in terms of staffing/retention of nurses on the unit.      Family:   Her mother is vaccinated x4 and does childcare for Rivera.       She participates fully. Rapport was excellent.        Wt Readings from Last 4 Encounters:   09/27/22 104.3 kg (230 lb)   06/21/22 102.1 kg (225 lb)   03/07/22 100.5 kg (221 lb 8 oz)   02/28/22 100.2 kg (221 lb)     There is no " height or weight on file to calculate BMI.     Current Outpatient Medications   Medication     acetaminophen (TYLENOL) 32 mg/mL liquid     cetirizine (ZYRTEC) 10 MG tablet     Cholecalciferol (VITAMIN D3) 25 MCG (1000 UT) CAPS     cyanocobalamin (VITAMIN B-12) 500 MCG SUBL sublingual tablet     cyclobenzaprine (FLEXERIL) 10 MG tablet     ibuprofen (ADVIL/MOTRIN) 200 MG capsule     lisinopril-hydrochlorothiazide (ZESTORETIC) 20-25 MG tablet     Multiple Vitamins-Minerals (ONE DAILY CALCIUM/IRON PO)     naltrexone (DEPADE/REVIA) 50 MG tablet     ondansetron (ZOFRAN-ODT) 4 MG ODT tab     phentermine (LOMAIRA) 8 MG tablet     No current facility-administered medications for this visit.     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14    hospitalized at [facility] x 1 week     History of steroid therapy      HX ABNL PAP SMEAR OF CERVIX   1995    LEEP AT 15 yo     Hypertension 2004     Hypertrophy of breast      Hypertrophy of tonsils alone      Hypovitaminosis D     with secondary high PTH     Irregular heart beat     palpitations     LBP (low back pain)      LSIL (low grade squamous intraepithelial lesion) on Pap smear 5/2006     Lumbago     RESOLVED     Major depressive disorder, recurrent episode, in partial or unspecified remission 4/1/2013     Morbid obesity (H)     bariatric surgery 5/13/2013     Multiple thyroid nodules      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)      OLIGOMENORRHEA, C/W PCOS      Sciatica      SLEEP APNEA 6/2002    No longer has since tonsillectomy and septoplasty     Thyroid nodule      Past Surgical History:   Procedure Laterality Date     BIOPSY  03/13/2015    Thyroid nodule     ESOPHAGOSCOPY, GASTROSCOPY, DUODENOSCOPY (EGD), COMBINED  5/28/2013     Procedure: COMBINED ESOPHAGOSCOPY, GASTROSCOPY, DUODENOSCOPY (EGD);;  Surgeon: Best Willett MD;  Location: [facility]     ESOPHAGOSCOPY, GASTROSCOPY, DUODENOSCOPY (EGD), COMBINED N/A 6/13/2018    Procedure: COMBINED ESOPHAGOSCOPY, GASTROSCOPY, DUODENOSCOPY (EGD), BIOPSY SINGLE OR MULTIPLE;  gastroscopy;  Surgeon: Westley Gibbs MD;  Location: [facility]     LAPAROSCOPIC GASTRIC SLEEVE  5/13/2013    Procedure: LAPAROSCOPIC GASTRIC SLEEVE;  Laparoscopic Sleeve Gastrectomy ;  Surgeon: Best Willett MD;  Location: [facility] OR     LEEP TX, CERVICAL  1995    age 15     partial thyroidectomy  2018     SEPTOPLASTY       THYROIDECTOMY Left 4/3/2018    Procedure: THYROIDECTOMY;  Left Thyroid Lobectomy And Ishtmusectomy;  Surgeon: Delia Harper MD;  Location: [facility]     TONSILLECTOMY  age 20s     TONSILLECTOMY  2004?     wisdom teeth extraction       PHQ-9 SCORE 9/14/2021 3/7/2022 9/27/2022   PHQ-9 Total Score - - -   PHQ-9 Total Score MyChart 4 (Minimal depression) 6 (Mild depression) 4 (Minimal depression)   PHQ-9 Total Score 4 6 4     MAXIMO-7 SCORE 10/28/2019 3/7/2022 9/27/2022   Total Score - - -   Total Score - 3 (minimal anxiety) 7 (mild anxiety)   Total Score 3 3 7     WHODAS 2.0 Total Score 12/4/2018 1/31/2020   Total Score 18 15   Total Score MyChart 18 15     She is  5 foot 11 inches.  Her long-term overall goal is 175 She participates fully. Rapport is excellent.       This telehealth service is appropriate and effective for delivering services in light of the necessity for social distancing to mitigate the COVID-19 epidemic and for conservation of PPE.     Patient has agreed to receiving telehealth services after being informed about it: Yes    Patient prefers video invitation/information to be sent by:   emai  Time service started: 10:00  Time service ended: 10:54    Extended session due to complexity of case and length of interval.    Mode of transmission: [email or web address]    Location of originating:  Car of the  patient    Distance site:  Home office of provider for [facility]    The patient has been notified that:  Video visits will be conducted via a call with their psychologist to provide the care they need with a video conversation. Video visits may be billed at different rates depending on insurance coverage.  Patients are advised to please contact their insurance provider with any questions about their health insurance coverage. If during the course of a call the psychologist feels a video visit is not appropriate, patients will not be charged for this service.  Diagnosis:  Major depression, recurrent, mild (F33.0).   Psychological factors affecting obesity (F54).   PLAN/RECOMMENDATIONS:   1. Ms. Catherine will return for video psychotherapy session 10/27 @ 9  to address weight loss and work and family issues with problem solving therapy, which is medically necessary.   She wishes to continue to get support here with her life changes and her son's behavioral/developmental challenges.  2.  Resume schedule of regular exercise to the level that is possible.  Explore getting cardio apparatus.    Preference for future meetings: Remote         Last treatment plan signed: 5/26/22  Treatment plan due:  : 5/26/23

## 2022-09-29 ENCOUNTER — HOSPITAL ENCOUNTER (OUTPATIENT)
Dept: MAMMOGRAPHY | Facility: CLINIC | Age: 42
Discharge: HOME | End: 2022-09-29
Attending: FAMILY MEDICINE | Admitting: FAMILY MEDICINE
Payer: COMMERCIAL

## 2022-09-29 DIAGNOSIS — Z12.31 VISIT FOR SCREENING MAMMOGRAM: ICD-10-CM

## 2022-09-29 PROCEDURE — 77067 SCR MAMMO BI INCL CAD: CPT

## 2022-10-05 ENCOUNTER — OFFICE VISIT (OUTPATIENT)
Dept: PODIATRY | Facility: CLINIC | Age: 42
End: 2022-10-05
Payer: COMMERCIAL

## 2022-10-05 ENCOUNTER — ANCILLARY PROCEDURE (OUTPATIENT)
Dept: GENERAL RADIOLOGY | Facility: CLINIC | Age: 42
End: 2022-10-05
Attending: PODIATRIST
Payer: COMMERCIAL

## 2022-10-05 DIAGNOSIS — M79.672 FOOT PAIN, BILATERAL: ICD-10-CM

## 2022-10-05 DIAGNOSIS — M79.671 FOOT PAIN, BILATERAL: ICD-10-CM

## 2022-10-05 DIAGNOSIS — M72.2 PLANTAR FASCIITIS, BILATERAL: Primary | ICD-10-CM

## 2022-10-05 PROCEDURE — 73630 X-RAY EXAM OF FOOT: CPT | Mod: RT | Performed by: RADIOLOGY

## 2022-10-05 PROCEDURE — 99213 OFFICE O/P EST LOW 20 MIN: CPT | Performed by: PODIATRIST

## 2022-10-05 NOTE — PROGRESS NOTES
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14    hospitalized at [facility] x 1 week     History of steroid therapy      HX ABNL PAP SMEAR OF CERVIX   1995    LEEP AT 15 yo     Hypertension 2004     Hypertrophy of breast      Hypertrophy of tonsils alone      Hypovitaminosis D     with secondary high PTH     Irregular heart beat     palpitations     LBP (low back pain)      LSIL (low grade squamous intraepithelial lesion) on Pap smear 5/2006     Lumbago     RESOLVED     Major depressive disorder, recurrent episode, in partial or unspecified remission 4/1/2013     Morbid obesity (H)     bariatric surgery 5/13/2013     Multiple thyroid nodules      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)      OLIGOMENORRHEA, C/W PCOS      Sciatica      SLEEP APNEA 6/2002    No longer has since tonsillectomy and septoplasty     Thyroid nodule      Patient Active Problem List   Diagnosis     OLIGOMENORRHEA, C/W PCOS     HX ABNL PAP SMEAR OF CERVIX       Flat feet     Sciatica     S/P gastrectomy     Elevated parathyroid hormone     Multiple thyroid nodules     Abnormal thyroid cytology-follicular neoplasm     Chronic pain syndrome     Major depressive disorder, recurrent episode, in full remission (H)     Bilateral low back pain with sciatica, sciatica laterality unspecified     Psychological factor affecting physical condition     Hx of Guillain-[place] syndrome     Adjustment disorder with anxious mood     Thyroid nodule     Benign essential hypertension     Gastric bypass status for obesity     Pre-eclampsia     Myalgia     Past Surgical History:   Procedure Laterality Date     BIOPSY  03/13/2015    Thyroid nodule     ESOPHAGOSCOPY, GASTROSCOPY, DUODENOSCOPY (EGD), COMBINED  5/28/2013     Procedure: COMBINED ESOPHAGOSCOPY, GASTROSCOPY, DUODENOSCOPY (EGD);;  Surgeon: Best Willett MD;  Location:  GI     ESOPHAGOSCOPY, GASTROSCOPY, DUODENOSCOPY (EGD), COMBINED N/A 6/13/2018    Procedure: COMBINED ESOPHAGOSCOPY, GASTROSCOPY, DUODENOSCOPY (EGD), BIOPSY SINGLE OR MULTIPLE;  gastroscopy;  Surgeon: Westley Gibbs MD;  Location: [facility]     LAPAROSCOPIC GASTRIC SLEEVE  5/13/2013    Procedure: LAPAROSCOPIC GASTRIC SLEEVE;  Laparoscopic Sleeve Gastrectomy ;  Surgeon: Best Willett MD;  Location: [facility]     LEEP TX, CERVICAL  1995    age 15     partial thyroidectomy  2018     SEPTOPLASTY       THYROIDECTOMY Left 4/3/2018    Procedure: THYROIDECTOMY;  Left Thyroid Lobectomy And Ishtmusectomy;  Surgeon: Delia Harper MD;  [facility]     TONSILLECTOMY  age 20s     TONSILLECTOMY  2004?     wisdom teeth extraction       Social History     Socioeconomic History     Marital status: Single     Spouse name: Not on file     Number of children: 0     Years of education: Not on file     Highest education level: Not on file   Occupational History     Employer: [employer]   Tobacco Use     Smoking status: Never Smoker     Smokeless tobacco: Never Used   Substance and Sexual Activity     Alcohol use: Yes     Alcohol/week: 1.0 - 4.0 standard drink     Comment: 6 drinks/week     Drug use: No     Sexual activity: Not Currently     Partners: Male     Birth control/protection: Abstinence   Other Topics Concern     Parent/sibling w/ CABG, MI or angioplasty before 65F 55M? No   Social History Narrative    Social Documentation:        Balanced Diet: NO    Calcium intake: more than 2 per day    Caffeine: 3 cups per day    Exercise:  type of activity daily living activites    Sunscreen: Yes    Seatbelts:  Yes    Self Breast Exam:  No    Self Testicular Exam: n/a    Physical/Emotional/Sexual Abuse: No    Do you feel safe in your environment? Yes        Cholesterol screen up to date: Yes    CHOL       171   6/15/09    HDL       54   6/15/09    LDL       89   6/15/09    TRIG      140   6/15/09    CHOLHDLRATIO      3.2   6/15/09        Eye Exam up to date: Yes    Dental Exam up to date: Yes    Pap smear up to date: No: will do today    Mammogram up to date: Does Not Apply    Dexa Scan up to date: Does Not Apply    Colonoscopy up to date: Does Not Apply    Immunizations up to date: No-does not want to do tdap today/does need tb test    Glucose screen if over 40:  No                     Social Determinants of Health     Financial Resource Strain: Not on file   Food Insecurity: Not on file   Transportation Needs: Not on file   Physical Activity: Not on file   Stress: Not on file   Social Connections: Not on file   Intimate Partner Violence: Not on file   Housing Stability: Not on file     Family History   Problem Relation Age of Onset     Hypertension Sister      Hypertension Father      Allergies Father         seasonal     Lipids Father      Obesity Father      Arthritis Mother      Gynecology Mother         problems with menopause     Hypertension Mother      Other Cancer Mother         Endometrial     Osteoporosis Mother      Obesity Mother      Parathyroid Disorders Mother         3 operations     Obesity Sister      Diabetes Maternal Aunt         x several w. DM     Breast Cancer Maternal Aunt      Cancer - colorectal Maternal Uncle         60ish     Hypertension Sister      Obesity Sister      Nephrolithiasis No family hx of      SUBJECTIVE FINDINGS:  A 42-year-old presents for flatfeet bilaterally and her foot pain.  She relates the last couple of weeks, both bottoms of the feet really hurt.  Like this morning, she had to kind of crawl when she got out of bed because it hurt so bad.  She relates that it is on the whole bottom of the foot, right greater than left.  She is also getting some first MPJ pain medially on the left foot.  She relates she has had flatfoot for her entire life.  She has orthotics  that she just started wearing again in her regular shoes and she has them in her work shoes.  She gets those regularly.  She relates she feels like she has tightness in the bottom of her feet and some tightness in the legs as well.  She relates she has had a herniated disk in the lower back on the left that she has been treated for, but it has been years since she has seen anybody for that.  She relates she has rolled her ankles in the past but no recent injuries.  She is on her feet at work.    OBJECTIVE FINDINGS:  DP and PT are 2/4 bilaterally.  She has pain on palpation of the plantar foot bilaterally and in the medial first MPJ on the left.  There are no gross tendon voids, no erythema, no drainage, no odor, no calor.  She has mild pain on palpation of the Achilles tendon bilaterally.  She has a flexible flatfoot type bilaterally with functional hallux limitus bilaterally.    X-rays reviewed with the patient in clinic today.  There is no fracture.  Joint and cortical margins are intact.  She has mild joint space narrowing and subchondral sclerosis and spurring of the talonavicular joint.  She has mild anterior ankle joint spurring.  She has decreased calcaneal inclination bilaterally.  Anterior break in the cyma line bilaterally.  Plantar and posterior calcaneal spurring noted.  Otherwise, joint and cortical margins are intact.    ASSESSMENT AND PLAN:  Plantar fasciitis bilaterally.  She is getting some Achilles tendinopathy bilaterally to a lesser degree and a mild bunion on the left foot that is causing some pain.  Diagnosis and treatment options discussed with her.  I advised her to wear her orthotics.  Prescription for physical therapy given and use discussed with her.  Night splints dispensed and use discussed with her.  I gave her a referral to Sports Medicine to further evaluate and manage her lower back to help rule out any sciatica or radiculopathy.  Prescription for Voltaren gel given and use discussed  with her.  Return to clinic and see me in 6 weeks.  Previous notes reviewed.

## 2022-10-05 NOTE — NURSING NOTE
Ashley Catherine's chief complaint for this visit includes:  Chief Complaint   Patient presents with     Follow Up     Bilateral foot pain,     PCP: Violet Humphreys    Referring Provider:  No referring provider defined for this encounter.    There were no vitals taken for this visit.  Data Unavailable        Allergies   Allergen Reactions     Nkda [No Known Drug Allergies]      No Known Allergies          Do you need any medication refills at today's visit?

## 2022-10-05 NOTE — LETTER
10/5/2022         RE: Ashley Catherine  [address] [facility] [address]        Dear Colleague,    Thank you for referring your patient, Ashley Catherine, to the [facility]. Please see a copy of my visit note below.    Past Medical History:   Diagnosis Date     Bariatric surgery status 05/13/2013     Depression      Depressive disorder      Esophageal reflux      Family history of hyperparathyroidism 3/6/2015     Forearm fracture childhood    jumping fall     Guillain-Patriot disease (H) age 14    hospitalized at [facility] x 1 week     History of steroid therapy      HX ABNL PAP SMEAR OF CERVIX   1995    LEEP AT 15 yo     Hypertension 2004     Hypertrophy of breast      Hypertrophy of tonsils alone      Hypovitaminosis D     with secondary high PTH     Irregular heart beat     palpitations     LBP (low back pain)      LSIL (low grade squamous intraepithelial lesion) on Pap smear 5/2006     Lumbago     RESOLVED     Major depressive disorder, recurrent episode, in partial or unspecified remission 4/1/2013     Morbid obesity (H)     bariatric surgery 5/13/2013     Multiple thyroid nodules      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)      OLIGOMENORRHEA, C/W PCOS      Sciatica      SLEEP APNEA 6/2002    No longer has since tonsillectomy and septoplasty     Thyroid nodule      Patient Active Problem List   Diagnosis     OLIGOMENORRHEA, C/W PCOS     HX ABNL PAP SMEAR OF CERVIX       Flat feet     Sciatica     S/P gastrectomy     Elevated parathyroid hormone     Multiple thyroid nodules     Abnormal thyroid cytology-follicular neoplasm     Chronic pain syndrome     Major depressive disorder, recurrent episode, in full remission (H)     Bilateral low back pain with sciatica, sciatica laterality unspecified     Psychological factor affecting physical condition     Hx of Guillain-Patriot syndrome     Adjustment disorder with anxious mood     Thyroid nodule     Benign essential  hypertension     Gastric bypass status for obesity     Pre-eclampsia     Myalgia     Past Surgical History:   Procedure Laterality Date     BIOPSY  03/13/2015    Thyroid nodule     ESOPHAGOSCOPY, GASTROSCOPY, DUODENOSCOPY (EGD), COMBINED  5/28/2013    Procedure: COMBINED ESOPHAGOSCOPY, GASTROSCOPY, DUODENOSCOPY (EGD);;  Surgeon: Best Willett MD;  Location:  GI     ESOPHAGOSCOPY, GASTROSCOPY, DUODENOSCOPY (EGD), COMBINED N/A 6/13/2018    Procedure: COMBINED ESOPHAGOSCOPY, GASTROSCOPY, DUODENOSCOPY (EGD), BIOPSY SINGLE OR MULTIPLE;  gastroscopy;  Surgeon: Westley Gibbs MD;  Location:  GI     LAPAROSCOPIC GASTRIC SLEEVE  5/13/2013    Procedure: LAPAROSCOPIC GASTRIC SLEEVE;  Laparoscopic Sleeve Gastrectomy ;  Surgeon: Best Willett MD;  [facility]     LEEP TX, CERVICAL  1995    age 15     partial thyroidectomy  2018     SEPTOPLASTY       THYROIDECTOMY Left 4/3/2018    Procedure: THYROIDECTOMY;  Left Thyroid Lobectomy And Ishtmusectomy;  Surgeon: Delia Harper MD;  [facility]     TONSILLECTOMY  age 20s     TONSILLECTOMY  2004?     wisdom teeth extraction       Social History     Socioeconomic History     Marital status: Single     Spouse name: Not on file     Number of children: 0     Years of education: Not on file     Highest education level: Not on file   Occupational History     Employer: [employer]   Tobacco Use     Smoking status: Never Smoker     Smokeless tobacco: Never Used   Substance and Sexual Activity     Alcohol use: Yes     Alcohol/week: 1.0 - 4.0 standard drink     Comment: 6 drinks/week     Drug use: No     Sexual activity: Not Currently     Partners: Male     Birth control/protection: Abstinence   Other Topics Concern     Parent/sibling w/ CABG, MI or angioplasty before 65F 55M? No   Social History Narrative    Social Documentation:        Balanced Diet: NO    Calcium intake: more than 2 per day    Caffeine: 3 cups per day    Exercise:  type of  activity daily living activites    Sunscreen: Yes    Seatbelts:  Yes    Self Breast Exam:  No    Self Testicular Exam: n/a    Physical/Emotional/Sexual Abuse: No    Do you feel safe in your environment? Yes        Cholesterol screen up to date: Yes    CHOL      171   6/15/09    HDL       54   6/15/09    LDL       89   6/15/09    TRIG      140   6/15/09    CHOLHDLRATIO      3.2   6/15/09        Eye Exam up to date: Yes    Dental Exam up to date: Yes    Pap smear up to date: No: will do today    Mammogram up to date: Does Not Apply    Dexa Scan up to date: Does Not Apply    Colonoscopy up to date: Does Not Apply    Immunizations up to date: No-does not want to do tdap today/does need tb test    Glucose screen if over 40:  No                     Social Determinants of Health     Financial Resource Strain: Not on file   Food Insecurity: Not on file   Transportation Needs: Not on file   Physical Activity: Not on file   Stress: Not on file   Social Connections: Not on file   Intimate Partner Violence: Not on file   Housing Stability: Not on file     Family History   Problem Relation Age of Onset     Hypertension Sister      Hypertension Father      Allergies Father         seasonal     Lipids Father      Obesity Father      Arthritis Mother      Gynecology Mother         problems with menopause     Hypertension Mother      Other Cancer Mother         Endometrial     Osteoporosis Mother      Obesity Mother      Parathyroid Disorders Mother         3 operations     Obesity Sister      Diabetes Maternal Aunt         x several w. DM     Breast Cancer Maternal Aunt      Cancer - colorectal Maternal Uncle         60ish     Hypertension Sister      Obesity Sister      Nephrolithiasis No family hx of      SUBJECTIVE FINDINGS:  A 42-year-old presents for flatfeet bilaterally and her foot pain.  She relates the last couple of weeks, both bottoms of the feet really hurt.  Like this morning, she had to kind of crawl when she got out  of bed because it hurt so bad.  She relates that it is on the whole bottom of the foot, right greater than left.  She is also getting some first MPJ pain medially on the left foot.  She relates she has had flatfoot for her entire life.  She has orthotics that she just started wearing again in her regular shoes and she has them in her work shoes.  She gets those regularly.  She relates she feels like she has tightness in the bottom of her feet and some tightness in the legs as well.  She relates she has had a herniated disk in the lower back on the left that she has been treated for, but it has been years since she has seen anybody for that.  She relates she has rolled her ankles in the past but no recent injuries.  She is on her feet at work.    OBJECTIVE FINDINGS:  DP and PT are 2/4 bilaterally.  She has pain on palpation of the plantar foot bilaterally and in the medial first MPJ on the left.  There are no gross tendon voids, no erythema, no drainage, no odor, no calor.  She has mild pain on palpation of the Achilles tendon bilaterally.  She has a flexible flatfoot type bilaterally with functional hallux limitus bilaterally.    X-rays reviewed with the patient in clinic today.  There is no fracture.  Joint and cortical margins are intact.  She has mild joint space narrowing and subchondral sclerosis and spurring of the talonavicular joint.  She has mild anterior ankle joint spurring.  She has decreased calcaneal inclination bilaterally.  Anterior break in the cyma line bilaterally.  Plantar and posterior calcaneal spurring noted.  Otherwise, joint and cortical margins are intact.    ASSESSMENT AND PLAN:  Plantar fasciitis bilaterally.  She is getting some Achilles tendinopathy bilaterally to a lesser degree and a mild bunion on the left foot that is causing some pain.  Diagnosis and treatment options discussed with her.  I advised her to wear her orthotics.  Prescription for physical therapy given and use  discussed with her.  Night splints dispensed and use discussed with her.  I gave her a referral to Sports Medicine to further evaluate and manage her lower back to help rule out any sciatica or radiculopathy.  Prescription for Voltaren gel given and use discussed with her.  Return to clinic and see me in 6 weeks.  Previous notes reviewed.          Again, thank you for allowing me to participate in the care of your patient.        Sincerely,        Marco Delvalle DPM

## 2022-10-27 ENCOUNTER — VIRTUAL VISIT (OUTPATIENT)
Dept: PSYCHOLOGY | Facility: CLINIC | Age: 42
End: 2022-10-27
Payer: COMMERCIAL

## 2022-10-27 DIAGNOSIS — E66.01 PSYCHOLOGICAL FACTORS AFFECTING MORBID OBESITY (H): ICD-10-CM

## 2022-10-27 DIAGNOSIS — F54 PSYCHOLOGICAL FACTORS AFFECTING MORBID OBESITY (H): ICD-10-CM

## 2022-10-27 DIAGNOSIS — Z56.9 OCCUPATIONAL PROBLEM: ICD-10-CM

## 2022-10-27 DIAGNOSIS — F33.0 MAJOR DEPRESSIVE DISORDER, RECURRENT EPISODE, MILD (H): Primary | ICD-10-CM

## 2022-10-27 PROCEDURE — 90837 PSYTX W PT 60 MINUTES: CPT | Mod: GT | Performed by: PSYCHOLOGIST

## 2022-10-27 SDOH — ECONOMIC STABILITY - INCOME SECURITY: UNSPECIFIED PROBLEMS RELATED TO EMPLOYMENT: Z56.9

## 2022-10-27 NOTE — PROGRESS NOTES
Health Psychology                     Department of Medicine  Julianne Moreno, Ph.D., L.P. [phone]                         [facility] Lis Chavez, Ph.D., L.P. [phone]                     Pep Mail Code 961   Donald Hills, Ph.D. [phone]      [address] [place] [address] Susana Ugarte, Ph.D., A.B.P.P., L.P. [phone]             [place] [address]           Jayro Elias, Ph.D., A.B.P.P., L.P. [phone]      Melissa Stokes, Ph.D., L.P. [phone]  [facility]   Elizabeth Gaytan, Ph.D., A.B.P.P., L.P. [phone] [address] [place] [address]    Health Psychology  [facility] Progress Note    Intake:  4/1/13    Demographics   Age 42 year old   Sex female   Race Black or    Ethnicity Not  or      Ashley Catherine is a single woman referred initially for psychological consultation for workup for a gastric sleeve procedure who is seen for CBT-oriented therapy regarding  weight management, work stresses, and family and social matters. She was seen today for problem-solving and supportive counseling.  Most of the session focused on her son.     He is at [facility] some days/week and gets in home therapies as well.  She works weekends at  so as to be able to get him to appointments.      He behaviorally rejects most formulae and foods.  She continues to be stressed by having to drive around trying to find it troughout the [facility] area, checks the [employer] website daily to try to be order it.   His behavioral sensitivities lead to him only accepting 1 brand/product, s Similac Toddler [employer] and Grow.   He start[facility] the [facility] feeding [facility] again in [place]  He just eats french fries, Leean Chin, and peanut butter.  He also eats peanut butter, and Miley Doones.   He can't drink out of a cup and won't drink water out of a bottle yet.     He is now sleeping a bit better. One night he slept through the night, which is progress.  He did  well in terms of his regulation that day.     His school at [employer] is doing better.  She is trying to switch companies she works with for his waiver.  He is using a device to communicate- an iPad talker.  He seems to like it in the therapy settings.  She went through a 1-month training program. He doesn't like to use the iPad at home for communicating.   Looking forwards to  Participating in Halloween activities for autistic  Children.  She is coordinating his [employer] with her Mr. Papa Greene    He was in the ED at [facility] for 7 hours with RSV.  He was dehydrated.  Discussed tyryng to get standing orders as she is frustrated that  ER personnel often don't consider him dehydrated clinically, when she realizes he is     They are also reluctant to trigger outbursts.  She wishes they would be less cautious at times.    She is worried about the increase in crime, the political situation.     Health:   She got her booster in late September.  She got her third booster.     Weight:  Was not addressed in detail, but will be in future as she is more capable of addressing her personal needs.  She met with weight management.  Discussed more walking.   Has only done  Bit.     Exercise:  She is aware of need to increase exercise, encouraged to while it is still warm out.    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 She is now working weekend shifts so as to be more available for her son.  She has been working weekends  Things are bad in terms of staffing/retention of nurses on the unit.  Discussed the recent strike, and her frustrations re: scheduling.     Family:   Her mother is vaccinated x4 and does childcare for [employer].       She participates fully. Rapport was excellent.        Wt Readings from Last 4 Encounters:   09/27/22 104.3 kg (230 lb)   06/21/22 102.1 kg (225 lb)   03/07/22 100.5 kg (221 lb 8 oz)   02/28/22  100.2 kg (221 lb)     There is no height or weight on file to calculate BMI.     Current Outpatient Medications   Medication     acetaminophen (TYLENOL) 32 mg/mL liquid     cetirizine (ZYRTEC) 10 MG tablet     Cholecalciferol (VITAMIN D3) 25 MCG (1000 UT) CAPS     cyanocobalamin (VITAMIN B-12) 500 MCG SUBL sublingual tablet     cyclobenzaprine (FLEXERIL) 10 MG tablet     diclofenac (VOLTAREN) 1 % topical gel     ibuprofen (ADVIL/MOTRIN) 200 MG capsule     lisinopril-hydrochlorothiazide (ZESTORETIC) 20-25 MG tablet     Multiple Vitamins-Minerals (ONE DAILY CALCIUM/IRON PO)     naltrexone (DEPADE/REVIA) 50 MG tablet     ondansetron (ZOFRAN-ODT) 4 MG ODT tab     phentermine (LOMAIRA) 8 MG tablet     No current facility-administered medications for this visit.     Past Medical History:   Diagnosis Date     Bariatric surgery status 05/13/2013     Depression      Depressive disorder      Esophageal reflux      Family history of hyperparathyroidism 3/6/2015     Forearm fracture childhood    jumping fall     Guillain-Columbus disease (H) age 14    hospitalized at [facility] x 1 week     History of steroid therapy      HX ABNL PAP SMEAR OF CERVIX   1995    LEEP AT 15 yo     Hypertension 2004     Hypertrophy of breast      Hypertrophy of tonsils alone      Hypovitaminosis D     with secondary high PTH     Irregular heart beat     palpitations     LBP (low back pain)      LSIL (low grade squamous intraepithelial lesion) on Pap smear 5/2006     Lumbago     RESOLVED     Major depressive disorder, recurrent episode, in partial or unspecified remission 4/1/2013     Morbid obesity (H)     bariatric surgery 5/13/2013     Multiple thyroid nodules      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)      OLIGOMENORRHEA, C/W PCOS      Sciatica      SLEEP APNEA 6/2002    No longer has since tonsillectomy and septoplasty     Thyroid nodule      Past Surgical History:   Procedure Laterality Date     BIOPSY  03/13/2015    Thyroid nodule      ESOPHAGOSCOPY, GASTROSCOPY, DUODENOSCOPY (EGD), COMBINED  5/28/2013    Procedure: COMBINED ESOPHAGOSCOPY, GASTROSCOPY, DUODENOSCOPY (EGD);;  Surgeon: Best Willett MD;  [facility]     ESOPHAGOSCOPY, GASTROSCOPY, DUODENOSCOPY (EGD), COMBINED N/A 6/13/2018    Procedure: COMBINED ESOPHAGOSCOPY, GASTROSCOPY, DUODENOSCOPY (EGD), BIOPSY SINGLE OR MULTIPLE;  gastroscopy;  Surgeon: Westley Gibbs MD;  Location:  GI     LAPAROSCOPIC GASTRIC SLEEVE  5/13/2013    Procedure: LAPAROSCOPIC GASTRIC SLEEVE;  Laparoscopic Sleeve Gastrectomy ;  Surgeon: Best Willett MD;  [facility]     LEEP TX, CERVICAL  1995    age 15     partial thyroidectomy  2018     SEPTOPLASTY       THYROIDECTOMY Left 4/3/2018    Procedure: THYROIDECTOMY;  Left Thyroid Lobectomy And Ishtmusectomy;  Surgeon: Delia Harper MD;  [facility]     TONSILLECTOMY  age 20s     TONSILLECTOMY  2004?     wisdom teeth extraction       PHQ-9 SCORE 9/14/2021 3/7/2022 9/27/2022   PHQ-9 Total Score - - -   PHQ-9 Total Score MyChart 4 (Minimal depression) 6 (Mild depression) 4 (Minimal depression)   PHQ-9 Total Score 4 6 4     MAXIMO-7 SCORE 10/28/2019 3/7/2022 9/27/2022   Total Score - - -   Total Score - 3 (minimal anxiety) 7 (mild anxiety)   Total Score 3 3 7     WHODAS 2.0 Total Score 12/4/2018 1/31/2020   Total Score 18 15   Total Score MyChart 18 15     She is  5 foot 11 inches.  Her long-term overall goal is 175 She participates fully. Rapport is excellent.       This telehealth service is appropriate and effective for delivering services in light of the necessity for social distancing to mitigate the COVID-19 epidemic and for conservation of PPE.     Patient has agreed to receiving telehealth services after being informed about it: Yes    Patient prefers video invitation/information to be sent by:   manan  Time service started: 9:11  Time service ended: 10:04    Extended session due to complexity of case and length of  interval.    Mode of transmission: [email or web address].me    Location of originating:  Car of the patient    Distance site:  Home office of provider for [facility]    The patient has been notified that:  Video visits will be conducted via a call with their psychologist to provide the care they need with a video conversation. Video visits may be billed at different rates depending on insurance coverage.  Patients are advised to please contact their insurance provider with any questions about their health insurance coverage. If during the course of a call the psychologist feels a video visit is not appropriate, patients will not be charged for this service.  Diagnosis:  Major depression, recurrent, mild (F33.0).   Psychological factors affecting obesity (F54).   PLAN/RECOMMENDATIONS:   1. Ms. Catherine will return for video psychotherapy session 11/29 @ 9  to address weight loss and work and family issues with problem solving therapy, which is medically necessary.   She wishes to continue to get support here with her life changes and her son's behavioral/developmental challenges.  2.  Resume schedule of regular exercise to the level that is possible.  Explore getting cardio apparatus.    Preference for future meetings: Remote         Last treatment plan signed: 5/26/22  Treatment plan due:  : 5/26/23

## 2022-11-29 ENCOUNTER — OFFICE VISIT (OUTPATIENT)
Dept: FAMILY MEDICINE | Facility: CLINIC | Age: 42
End: 2022-11-29
Payer: COMMERCIAL

## 2022-11-29 ENCOUNTER — VIRTUAL VISIT (OUTPATIENT)
Dept: PSYCHOLOGY | Facility: CLINIC | Age: 42
End: 2022-11-29
Payer: COMMERCIAL

## 2022-11-29 VITALS
DIASTOLIC BLOOD PRESSURE: 85 MMHG | HEART RATE: 98 BPM | WEIGHT: 230.06 LBS | TEMPERATURE: 98.4 F | OXYGEN SATURATION: 97 % | BODY MASS INDEX: 33.01 KG/M2 | RESPIRATION RATE: 14 BRPM | SYSTOLIC BLOOD PRESSURE: 132 MMHG

## 2022-11-29 DIAGNOSIS — E66.01 PSYCHOLOGICAL FACTORS AFFECTING MORBID OBESITY (H): ICD-10-CM

## 2022-11-29 DIAGNOSIS — F54 PSYCHOLOGICAL FACTORS AFFECTING MORBID OBESITY (H): ICD-10-CM

## 2022-11-29 DIAGNOSIS — J06.9 VIRAL URI: Primary | ICD-10-CM

## 2022-11-29 DIAGNOSIS — F33.0 MAJOR DEPRESSIVE DISORDER, RECURRENT EPISODE, MILD (H): Primary | ICD-10-CM

## 2022-11-29 DIAGNOSIS — Z56.9 OCCUPATIONAL PROBLEM: ICD-10-CM

## 2022-11-29 DIAGNOSIS — R07.0 THROAT PAIN: ICD-10-CM

## 2022-11-29 LAB — DEPRECATED S PYO AG THROAT QL EIA: NEGATIVE

## 2022-11-29 PROCEDURE — U0005 INFEC AGEN DETEC AMPLI PROBE: HCPCS | Performed by: PHYSICIAN ASSISTANT

## 2022-11-29 PROCEDURE — 99213 OFFICE O/P EST LOW 20 MIN: CPT | Mod: CS

## 2022-11-29 PROCEDURE — 90837 PSYTX W PT 60 MINUTES: CPT | Mod: GT | Performed by: PSYCHOLOGIST

## 2022-11-29 PROCEDURE — U0003 INFECTIOUS AGENT DETECTION BY NUCLEIC ACID (DNA OR RNA); SEVERE ACUTE RESPIRATORY SYNDROME CORONAVIRUS 2 (SARS-COV-2) (CORONAVIRUS DISEASE [COVID-19]), AMPLIFIED PROBE TECHNIQUE, MAKING USE OF HIGH THROUGHPUT TECHNOLOGIES AS DESCRIBED BY CMS-2020-01-R: HCPCS | Performed by: PHYSICIAN ASSISTANT

## 2022-11-29 PROCEDURE — 87651 STREP A DNA AMP PROBE: CPT | Performed by: PHYSICIAN ASSISTANT

## 2022-11-29 SDOH — ECONOMIC STABILITY - INCOME SECURITY: UNSPECIFIED PROBLEMS RELATED TO EMPLOYMENT: Z56.9

## 2022-11-29 ASSESSMENT — ENCOUNTER SYMPTOMS
FEVER: 0
COUGH: 0
SORE THROAT: 1
FATIGUE: 0

## 2022-11-29 NOTE — PATIENT INSTRUCTIONS
Strep (-)  Increase fluids with water, Pedialyte, Gatorade, or rehydrating beverages. Alternate Tylenol and Ibuprofen as needed for aches, pains or fever. If needed, follow soft food diet. Rest as much as possible. Use OTC cough and cold medication. Run humidifier at night. Gargle with hot salty water. Have warm tea or water with honey. Follow up in clinic if symptoms persist or worsen. This usually can last 7-10 days.

## 2022-11-29 NOTE — PROGRESS NOTES
Health Psychology                     Department of Medicine  Julianne Moreno, Ph.D., L.P. [phone]                         [facility] Lis Chavez, Ph.D., L.P. [phone]                     [place] Mail Code 388   Donald Hills, Ph.D. [phone]      [address] [place] [address] Susana Ugarte, Ph.D., A.B.P.P., L.P. [phone]             [place] [address]           Jayro Elias, Ph.D., A.B.P.P., L.P. [phone]      Melissa Stokes, Ph.D., L.P. [phone]  [facility]   Elizabeth Gaytan, Ph.D., A.B.P.P., L.P. [phone] [address] [place] [address]    Health Psychology  [facility] [place] Health Progress Note    Intake:  4/1/13    Demographics   Age 42 year old   Sex female   Race Black or    Ethnicity Not  or      Ashley Catherine is a single woman referred initially for psychological consultation for workup for a gastric sleeve procedure who is seen for CBT-oriented therapy regarding  weight management, work stresses, and family and social matters. She was seen today for problem-solving and supportive counseling.  Most of the session focused on her son.     Currently, dealing with a cold.  She has plantar fascitis; is seeking care for it with a podiatrist.  She missed the f/u appointment.    Discussed exercise as part of taking care of herself as her son is more programmed.  Discussed socialization for herself  Explored getting a stationary bicycle.   She seems more willing to try it.    Her son had RSV and Strep in the past month. He stopped eating and drinking, got weak.  when he was sick.  He was exposed to the flu, didn't get it or COVID.  Has had vaccines for COVID and flu. He was in the ED at [facility] for 7 hours with RSV.  He was also seen for the flu at [facility].    He is at [facility] some days/week and gets in home therapies as well.  She works weekends at  so as to be able to get him to appointments.   His school at [employer] is doing  better.    He behaviorally rejects most formulae and foods.  She continues to be stressed by having to drive around trying to find it troughout the [facility] area, checks the [employer] website daily to try to be order it.   His behavioral sensitivities lead to him only accepting 1 brand/product, s [employer] Todd[employer].   He startied the [facility] feeding clinic again in [place]  He just eats french fries, Leean Chin, and peanut butter.  He also eats peanut butter, and Miley Doones.   He can't drink out of a cup and won't drink water out of a bottle yet.     Health:   She got her booster in late September.  She got her third booster.  Feeling ill today.     Weight:  Was not addressed in detail, but will be in future as she is more capable of addressing her personal needs.  She met with weight management.  Discussed cycling given her foot problems.     Exercise:  She is aware of need to increase exercise, encouraged to while it is still warm out.  Will consider getting stationary bicycle.     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 She is now working weekend shifts so as to be more available for her son.  She has been working weekends  Things are bad in terms of staffing/retention of nurses on the unit.  Discussed the recent strike, and her frustrations re: scheduling.  Morale is low.  Many flu patients currently.     Family:   Her mother is vaccinated x5 and does childcare for [place].       She participates fully. Rapport was excellent.        Wt Readings from Last 4 Encounters:   09/27/22 104.3 kg (230 lb)   06/21/22 102.1 kg (225 lb)   03/07/22 100.5 kg (221 lb 8 oz)   02/28/22 100.2 kg (221 lb)     There is no height or weight on file to calculate BMI.     Current Outpatient Medications   Medication     acetaminophen (TYLENOL) 32 mg/mL liquid     cetirizine (ZYRTEC) 10 MG tablet     Cholecalciferol (VITAMIN D3) 25 MCG  (1000 UT) CAPS     cyanocobalamin (VITAMIN B-12) 500 MCG SUBL sublingual tablet     cyclobenzaprine (FLEXERIL) 10 MG tablet     diclofenac (VOLTAREN) 1 % topical gel     ibuprofen (ADVIL/MOTRIN) 200 MG capsule     lisinopril-hydrochlorothiazide (ZESTORETIC) 20-25 MG tablet     Multiple Vitamins-Minerals (ONE DAILY CALCIUM/IRON PO)     naltrexone (DEPADE/REVIA) 50 MG tablet     ondansetron (ZOFRAN-ODT) 4 MG ODT tab     phentermine (LOMAIRA) 8 MG tablet     No current facility-administered medications for this visit.     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14    hospitalized at [facility] x 1 week     History of steroid therapy      HX ABNL PAP SMEAR OF CERVIX   1995    LEEP AT 15 yo     Hypertension 2004     Hypertrophy of breast      Hypertrophy of tonsils alone      Hypovitaminosis D     with secondary high PTH     Irregular heart beat     palpitations     LBP (low back pain)      LSIL (low grade squamous intraepithelial lesion) on Pap smear 5/2006     Lumbago     RESOLVED     Major depressive disorder, recurrent episode, in partial or unspecified remission 4/1/2013     Morbid obesity (H)     bariatric surgery 5/13/2013     Multiple thyroid nodules      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)      OLIGOMENORRHEA, C/W PCOS      Sciatica      SLEEP APNEA 6/2002    No longer has since tonsillectomy and septoplasty     Thyroid nodule      Past Surgical History:   Procedure Laterality Date     BIOPSY  03/13/2015    Thyroid nodule     ESOPHAGOSCOPY, GASTROSCOPY, DUODENOSCOPY (EGD), COMBINED  5/28/2013    Procedure: COMBINED ESOPHAGOSCOPY, GASTROSCOPY, DUODENOSCOPY (EGD);;  Surgeon: Best Willett MD;  Location:  GI     ESOPHAGOSCOPY, GASTROSCOPY, DUODENOSCOPY (EGD),  COMBINED N/A 6/13/2018    Procedure: COMBINED ESOPHAGOSCOPY, GASTROSCOPY, DUODENOSCOPY (EGD), BIOPSY SINGLE OR MULTIPLE;  gastroscopy;  Surgeon: Westley Gibbs MD;  Location:  GI     LAPAROSCOPIC GASTRIC SLEEVE  5/13/2013    Procedure: LAPAROSCOPIC GASTRIC SLEEVE;  Laparoscopic Sleeve Gastrectomy ;  Surgeon: Best Willett MD;  [facility]     LEEP TX, CERVICAL  1995    age 15     partial thyroidectomy  2018     SEPTOPLASTY       THYROIDECTOMY Left 4/3/2018    Procedure: THYROIDECTOMY;  Left Thyroid Lobectomy And Ishtmusectomy;  Surgeon: Delia Harper MD;  [facility]     TONSILLECTOMY  age 20s     TONSILLECTOMY  2004?     wisdom teeth extraction       PHQ-9 SCORE 9/14/2021 3/7/2022 9/27/2022   PHQ-9 Total Score - - -   PHQ-9 Total Score MyChart 4 (Minimal depression) 6 (Mild depression) 4 (Minimal depression)   PHQ-9 Total Score 4 6 4     MAXIMO-7 SCORE 10/28/2019 3/7/2022 9/27/2022   Total Score - - -   Total Score - 3 (minimal anxiety) 7 (mild anxiety)   Total Score 3 3 7     WHODAS 2.0 Total Score 12/4/2018 1/31/2020   Total Score 18 15   Total Score MyChart 18 15     She is  5 foot 11 inches.  Her long-term overall goal is 175 She participates fully. Rapport is excellent.       This telehealth service is appropriate and effective for delivering services in light of the necessity for social distancing to mitigate the COVID-19 epidemic and for conservation of PPE.     Patient has agreed to receiving telehealth services after being informed about it: Yes    Patient prefers video invitation/information to be sent by:   emai  Time service started: 9:05  Time service ended: 9:59    Extended session due to complexity of case and length of interval.    Mode of transmission: Doxy.me    Location of originating:  Car of the patient    Distance site:  Home office of provider for [facility]    The patient has been notified that:  Video visits will be conducted via a call with their psychologist to  provide the care they need with a video conversation. Video visits may be billed at different rates depending on insurance coverage.  Patients are advised to please contact their insurance provider with any questions about their health insurance coverage. If during the course of a call the psychologist feels a video visit is not appropriate, patients will not be charged for this service.  Diagnosis:  Major depression, recurrent, mild (F33.0).   Psychological factors affecting obesity (F54).   PLAN/RECOMMENDATIONS:   1. Ms. Catherine will return for video psychotherapy session 12/22 @ 10  to address weight loss and work and family issues with problem solving therapy, which is medically necessary.   She wishes to continue to get support here with her life changes and her son's behavioral/developmental challenges.  2.  Resume schedule of regular exercise to the level that is possible.  Explore getting cardio apparatus e.g., statinary bicycle given her plantar fascitis..    Preference for future meetings: Remote         Last treatment plan signed: 5/26/22  Treatment plan due:  : 5/26/23

## 2022-11-29 NOTE — PROGRESS NOTES
Assessment & Plan     Viral URI    Throat pain  - Streptococcus A Rapid Scr w Reflx to PCR  - Group A Streptococcus PCR Throat Swab  - Symptomatic; Yes; 11/27/2022 COVID-19 Virus (Coronavirus) by PCR Nose     COVID pending  Strep (-)  Increase fluids with water, Pedialyte, Gatorade, or rehydrating beverages. Alternate Tylenol and Ibuprofen as needed for aches, pains or fever. If needed, follow soft food diet. Rest as much as possible. Use OTC cough and cold medication. Run humidifier at night. Gargle with hot salty water. Have warm tea or water with honey. Follow up in clinic if symptoms persist or worsen. This usually can last 7-10 days.     Return if symptoms worsen or fail to improve, for Follow up.    At the end of the encounter, I discussed results, diagnosis, medications. Discussed red flags for immediate return to clinic/ER, as well as indications for follow up if no improvement. Patient understood and agreed to plan. Patient was stable for discharge.    Subjective     Ashley is a 42 year old female who presents to clinic today for the following health issues:  Chief Complaint   Patient presents with     Urgent Care     Throat Pain     X2days, no fever     Nasal Congestion     Ashley presents with reports of sore throat x 2 days, some nasal congestion. She denies fevers. She had a mild cough that has improved. She has tried throat lozenges which offer some relief.           Review of Systems   Constitutional: Negative for fatigue and fever.   HENT: Positive for congestion and sore throat.    Respiratory: Negative for cough.            Objective    /85   Pulse 98   Temp 98.4  F (36.9  C) (Oral)   Resp 14   Wt 104.4 kg (230 lb 1 oz)   LMP 11/23/2022 (Exact Date)   SpO2 97%   BMI 33.01 kg/m    Physical Exam  Constitutional:       Appearance: Normal appearance.   HENT:      Head: Normocephalic and atraumatic.      Nose: Congestion present.      Mouth/Throat:      Mouth: Mucous membranes are moist.       Pharynx: Posterior oropharyngeal erythema present.   Eyes:      Extraocular Movements: Extraocular movements intact.      Conjunctiva/sclera: Conjunctivae normal.      Pupils: Pupils are equal, round, and reactive to light.   Musculoskeletal:      Cervical back: Normal range of motion and neck supple.   Lymphadenopathy:      Cervical: No cervical adenopathy.   Skin:     General: Skin is warm and dry.   Neurological:      General: No focal deficit present.      Mental Status: She is alert and oriented to person, place, and time.              Clarisse Beltrán PA-C

## 2022-11-30 LAB
GROUP A STREP BY PCR: NOT DETECTED
SARS-COV-2 RNA RESP QL NAA+PROBE: NEGATIVE

## 2022-12-22 ENCOUNTER — VIRTUAL VISIT (OUTPATIENT)
Dept: PSYCHOLOGY | Facility: CLINIC | Age: 42
End: 2022-12-22
Payer: COMMERCIAL

## 2022-12-22 DIAGNOSIS — E66.01 PSYCHOLOGICAL FACTORS AFFECTING MORBID OBESITY (H): ICD-10-CM

## 2022-12-22 DIAGNOSIS — F33.0 MAJOR DEPRESSIVE DISORDER, RECURRENT EPISODE, MILD (H): Primary | ICD-10-CM

## 2022-12-22 DIAGNOSIS — Z56.9 OCCUPATIONAL PROBLEM: ICD-10-CM

## 2022-12-22 DIAGNOSIS — F54 PSYCHOLOGICAL FACTORS AFFECTING MORBID OBESITY (H): ICD-10-CM

## 2022-12-22 PROCEDURE — 90837 PSYTX W PT 60 MINUTES: CPT | Mod: GT | Performed by: PSYCHOLOGIST

## 2022-12-22 SDOH — ECONOMIC STABILITY - INCOME SECURITY: UNSPECIFIED PROBLEMS RELATED TO EMPLOYMENT: Z56.9

## 2022-12-22 NOTE — PROGRESS NOTES
Health Psychology                     Department of Medicine  Julianne Moreno, Ph.D., L.P. [phone]                         [facility] Lsi Chavez, Ph.D., L.P. [phone]                     [place] Mail Code 735   Donald Hills, Ph.D. [phone]      [address] [place] [address] Susana Ugarte, Ph.D., A.B.P.P., L.P. [phone]             [place] [address]           Jayro Elias, Ph.D., A.B.P.P., L.P. [phone]      Melissa Stokes, Ph.D., L.P. [phone]  [facility]   Elizabeth Gaytan, Ph.D., A.B.P.P., L.P. [phone] [address] [place] [address]    Health Psychology  [facility] Health Progress Note    Intake:  4/1/13    Demographics   Age 42 year old   Sex female   Race Black or    Ethnicity Not  or      Ashley Catherine is a single woman referred initially for psychological consultation for workup for a gastric sleeve procedure who is seen for CBT-oriented therapy regarding  weight management, work stresses, and family and social matters. She was seen today for problem-solving and supportive counseling.  Most of the session focused on her son.     Health:   Currently ,She is dealing with COVID.  She got her third booster in late September.    She was feeling ill for weeks.  She just tested positive for COVID Sunday 12/18.  She started to feel ill Saturday, has had multiple sx, but no loss of taste or smell or breathing issues.  Decided against Paxlovid.  She is improving. . .  Her mother is also positive.          Rivera is positive for COVID and has a  sinus infection.. He stopped eating and drinking, got weak. when he was sick with RSV recently. He lea andling COVID better.    Weight:  Was not addressed.   Exercise:  Not addressed.    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 She is now working weekend shifts so as to be more available for her son.  She has been working  weekends Pleased that nurses didn't go out on strike.     Family:   Her mother is vaccinated x5 and does childcare for [place].       She participates fully. Rapport was excellent.        Wt Readings from Last 4 Encounters:   11/29/22 104.4 kg (230 lb 1 oz)   09/27/22 104.3 kg (230 lb)   06/21/22 102.1 kg (225 lb)   03/07/22 100.5 kg (221 lb 8 oz)     There is no height or weight on file to calculate BMI.     Current Outpatient Medications   Medication     acetaminophen (TYLENOL) 32 mg/mL liquid     cetirizine (ZYRTEC) 10 MG tablet     Cholecalciferol (VITAMIN D3) 25 MCG (1000 UT) CAPS     cyanocobalamin (VITAMIN B-12) 500 MCG SUBL sublingual tablet     cyclobenzaprine (FLEXERIL) 10 MG tablet     diclofenac (VOLTAREN) 1 % topical gel     ibuprofen (ADVIL/MOTRIN) 200 MG capsule     lisinopril-hydrochlorothiazide (ZESTORETIC) 20-25 MG tablet     Multiple Vitamins-Minerals (ONE DAILY CALCIUM/IRON PO)     naltrexone (DEPADE/REVIA) 50 MG tablet     ondansetron (ZOFRAN-ODT) 4 MG ODT tab     phentermine (LOMAIRA) 8 MG tablet     No current facility-administered medications for this visit.     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14    hospitalized at [facility] x 1 week     History of steroid therapy      HX ABNL PAP SMEAR OF CERVIX   1995    LEEP AT 15 yo     Hypertension 2004     Hypertrophy of breast      Hypertrophy of tonsils alone      Hypovitaminosis D     with secondary high PTH     Irregular heart beat     palpitations     LBP (low back pain)      LSIL (low grade squamous intraepithelial lesion) on Pap smear 5/2006     Lumbago     RESOLVED     Major depressive disorder, recurrent episode, in partial or unspecified remission 4/1/2013     Morbid obesity (H)     bariatric surgery 5/13/2013      Multiple thyroid nodules      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)      OLIGOMENORRHEA, C/W PCOS      Sciatica      SLEEP APNEA 6/2002    No longer has since tonsillectomy and septoplasty     Thyroid nodule      Past Surgical History:   Procedure Laterality Date     BIOPSY  03/13/2015    Thyroid nodule     ESOPHAGOSCOPY, GASTROSCOPY, DUODENOSCOPY (EGD), COMBINED  5/28/2013    Procedure: COMBINED ESOPHAGOSCOPY, GASTROSCOPY, DUODENOSCOPY (EGD);;  Surgeon: Best Willett MD;  Location:  GI     ESOPHAGOSCOPY, GASTROSCOPY, DUODENOSCOPY (EGD), COMBINED N/A 6/13/2018    Procedure: COMBINED ESOPHAGOSCOPY, GASTROSCOPY, DUODENOSCOPY (EGD), BIOPSY SINGLE OR MULTIPLE;  gastroscopy;  Surgeon: Westley Gibbs MD;  [facility]     LAPAROSCOPIC GASTRIC SLEEVE  5/13/2013    Procedure: LAPAROSCOPIC GASTRIC SLEEVE;  Laparoscopic Sleeve Gastrectomy ;  Surgeon: Best Willett MD;  [facility]     LEEP TX, CERVICAL  1995    age 15     partial thyroidectomy  2018     SEPTOPLASTY       THYROIDECTOMY Left 4/3/2018    Procedure: THYROIDECTOMY;  Left Thyroid Lobectomy And Ishtmusectomy;  Surgeon: Delia Harper MD;  [facility]     TONSILLECTOMY  age 20s     TONSILLECTOMY  2004?     wisdom teeth extraction       PHQ-9 SCORE 9/14/2021 3/7/2022 9/27/2022   PHQ-9 Total Score - - -   PHQ-9 Total Score MyChart 4 (Minimal depression) 6 (Mild depression) 4 (Minimal depression)   PHQ-9 Total Score 4 6 4     MAXIMO-7 SCORE 10/28/2019 3/7/2022 9/27/2022   Total Score - - -   Total Score - 3 (minimal anxiety) 7 (mild anxiety)   Total Score 3 3 7     WHODAS 2.0 Total Score 12/4/2018 1/31/2020   Total Score 18 15   Total Score MyChart 18 15     She is  5 foot 11 inches.  Her long-term overall goal is 175 She participates fully. Rapport is excellent.       This telehealth service is appropriate and effective for delivering services in light of the necessity for social distancing to mitigate the  COVID-19 epidemic and for conservation of PPE.     Patient has agreed to receiving telehealth services after being informed about it: Yes    Patient prefers video invitation/information to be sent by:   emai  Time service started: 10:02  Time service ended: 10:55    Extended session due to complexity of case and length of interval.    Mode of transmission: Doxy.me    Location of originating:  Home  of the patient    Distance site:  Home office of provider for [facility]    The patient has been notified that:  Video visits will be conducted via a call with their psychologist to provide the care they need with a video conversation. Video visits may be billed at different rates depending on insurance coverage.  Patients are advised to please contact their insurance provider with any questions about their health insurance coverage. If during the course of a call the psychologist feels a video visit is not appropriate, patients will not be charged for this service.  Diagnosis:  Major depression, recurrent, mild (F33.0).   Psychological factors affecting obesity (F54).   PLAN/RECOMMENDATIONS:   1. Ms. Catherine will return for video psychotherapy session 1/26 @ 9  to address weight loss and work and family issues with problem solving therapy, which is medically necessary.   She wishes to continue to get support here with her life changes and her son's behavioral/developmental challenges.  2.  Resume schedule of regular exercise to the level that is possible.  Explore getting cardio apparatus e.g., statinary bicycle given her plantar fascitis.    Preference for future meetings: Remote         Last treatment plan signed: 5/26/22  Treatment plan due:  : 5/26/23

## 2023-01-06 ENCOUNTER — ANCILLARY PROCEDURE (OUTPATIENT)
Dept: CT IMAGING | Facility: CLINIC | Age: 43
End: 2023-01-06
Attending: PHYSICIAN ASSISTANT
Payer: COMMERCIAL

## 2023-01-06 ENCOUNTER — OFFICE VISIT (OUTPATIENT)
Dept: URGENT CARE | Facility: URGENT CARE | Age: 43
End: 2023-01-06
Payer: COMMERCIAL

## 2023-01-06 ENCOUNTER — TRANSFERRED RECORDS (OUTPATIENT)
Dept: HEALTH INFORMATION MANAGEMENT | Facility: CLINIC | Age: 43
End: 2023-01-06

## 2023-01-06 VITALS
RESPIRATION RATE: 12 BRPM | HEART RATE: 76 BPM | OXYGEN SATURATION: 100 % | DIASTOLIC BLOOD PRESSURE: 72 MMHG | WEIGHT: 230.06 LBS | BODY MASS INDEX: 33.01 KG/M2 | TEMPERATURE: 98.4 F | SYSTOLIC BLOOD PRESSURE: 107 MMHG

## 2023-01-06 DIAGNOSIS — S39.011A ABDOMINAL WALL STRAIN, INITIAL ENCOUNTER: ICD-10-CM

## 2023-01-06 DIAGNOSIS — R10.32 LLQ ABDOMINAL PAIN: ICD-10-CM

## 2023-01-06 DIAGNOSIS — R82.71 ASYMPTOMATIC BACTERIURIA: ICD-10-CM

## 2023-01-06 DIAGNOSIS — R10.32 LLQ ABDOMINAL PAIN: Primary | ICD-10-CM

## 2023-01-06 LAB
ALBUMIN UR-MCNC: NEGATIVE MG/DL
ANION GAP SERPL CALCULATED.3IONS-SCNC: 5 MMOL/L (ref 3–14)
APPEARANCE UR: CLEAR
BASOPHILS # BLD AUTO: 0 10E3/UL (ref 0–0.2)
BASOPHILS NFR BLD AUTO: 0 %
BILIRUB UR QL STRIP: NEGATIVE
BUN SERPL-MCNC: 10 MG/DL (ref 7–30)
CALCIUM SERPL-MCNC: 9.2 MG/DL (ref 8.5–10.1)
CHLORIDE BLD-SCNC: 100 MMOL/L (ref 94–109)
CO2 SERPL-SCNC: 33 MMOL/L (ref 20–32)
COLOR UR AUTO: ABNORMAL
CREAT SERPL-MCNC: 0.6 MG/DL (ref 0.52–1.04)
EOSINOPHIL # BLD AUTO: 0.1 10E3/UL (ref 0–0.7)
EOSINOPHIL NFR BLD AUTO: 1 %
ERYTHROCYTE [DISTWIDTH] IN BLOOD BY AUTOMATED COUNT: 13 % (ref 10–15)
GFR SERPL CREATININE-BSD FRML MDRD: >90 ML/MIN/1.73M2
GLUCOSE BLD-MCNC: 92 MG/DL (ref 70–99)
GLUCOSE UR STRIP-MCNC: NEGATIVE MG/DL
HCG UR QL: NEGATIVE
HCT VFR BLD AUTO: 36.7 % (ref 35–47)
HGB BLD-MCNC: 12.2 G/DL (ref 11.7–15.7)
HGB UR QL STRIP: NEGATIVE
IMM GRANULOCYTES # BLD: 0.1 10E3/UL
IMM GRANULOCYTES NFR BLD: 1 %
KETONES UR STRIP-MCNC: 15 MG/DL
LEUKOCYTE ESTERASE UR QL STRIP: NEGATIVE
LYMPHOCYTES # BLD AUTO: 1.7 10E3/UL (ref 0.8–5.3)
LYMPHOCYTES NFR BLD AUTO: 30 %
MCH RBC QN AUTO: 30.5 PG (ref 26.5–33)
MCHC RBC AUTO-ENTMCNC: 33.2 G/DL (ref 31.5–36.5)
MCV RBC AUTO: 92 FL (ref 78–100)
MONOCYTES # BLD AUTO: 0.5 10E3/UL (ref 0–1.3)
MONOCYTES NFR BLD AUTO: 8 %
NEUTROPHILS # BLD AUTO: 3.2 10E3/UL (ref 1.6–8.3)
NEUTROPHILS NFR BLD AUTO: 59 %
NITRATE UR QL: NEGATIVE
PH UR STRIP: 7 [PH] (ref 5–7)
PLATELET # BLD AUTO: 386 10E3/UL (ref 150–450)
POTASSIUM BLD-SCNC: 3.4 MMOL/L (ref 3.4–5.3)
RBC # BLD AUTO: 4 10E6/UL (ref 3.8–5.2)
RBC #/AREA URNS AUTO: NORMAL /HPF
SODIUM SERPL-SCNC: 138 MMOL/L (ref 133–144)
SP GR UR STRIP: 1.02 (ref 1–1.03)
UROBILINOGEN UR STRIP-ACNC: 2 E.U./DL
WBC # BLD AUTO: 5.5 10E3/UL (ref 4–11)
WBC #/AREA URNS AUTO: NORMAL /HPF

## 2023-01-06 PROCEDURE — 99214 OFFICE O/P EST MOD 30 MIN: CPT | Performed by: PHYSICIAN ASSISTANT

## 2023-01-06 PROCEDURE — 81001 URINALYSIS AUTO W/SCOPE: CPT | Performed by: PHYSICIAN ASSISTANT

## 2023-01-06 PROCEDURE — 80048 BASIC METABOLIC PNL TOTAL CA: CPT | Performed by: PHYSICIAN ASSISTANT

## 2023-01-06 PROCEDURE — 36415 COLL VENOUS BLD VENIPUNCTURE: CPT | Performed by: PHYSICIAN ASSISTANT

## 2023-01-06 PROCEDURE — 81025 URINE PREGNANCY TEST: CPT | Performed by: PHYSICIAN ASSISTANT

## 2023-01-06 PROCEDURE — 74177 CT ABD & PELVIS W/CONTRAST: CPT | Mod: GC | Performed by: STUDENT IN AN ORGANIZED HEALTH CARE EDUCATION/TRAINING PROGRAM

## 2023-01-06 PROCEDURE — 85025 COMPLETE CBC W/AUTO DIFF WBC: CPT | Performed by: PHYSICIAN ASSISTANT

## 2023-01-06 RX ORDER — CEPHALEXIN 500 MG/1
500 CAPSULE ORAL 3 TIMES DAILY
Qty: 15 CAPSULE | Refills: 0 | Status: SHIPPED | OUTPATIENT
Start: 2023-01-06 | End: 2023-01-11

## 2023-01-06 RX ORDER — IOPAMIDOL 755 MG/ML
125 INJECTION, SOLUTION INTRAVASCULAR ONCE
Status: COMPLETED | OUTPATIENT
Start: 2023-01-06 | End: 2023-01-06

## 2023-01-06 RX ORDER — METHOCARBAMOL 750 MG/1
750 TABLET, FILM COATED ORAL 3 TIMES DAILY
Qty: 30 TABLET | Refills: 0 | Status: SHIPPED | OUTPATIENT
Start: 2023-01-06 | End: 2023-05-15

## 2023-01-06 RX ADMIN — IOPAMIDOL 125 ML: 755 INJECTION, SOLUTION INTRAVASCULAR at 17:03

## 2023-01-06 NOTE — PROGRESS NOTES
"  Assessment & Plan     LLQ abdominal pain    UA slightly POS  Urine culture pending  Urine culture pending    CBC Normal  CMP Normal, no renal or electrolyte involvement    Patient had a CT scan of abdomen, this was normal with no findings to suggest patients abdominal pain  - methocarbamol (ROBAXIN) 750 MG tablet; Take 1 tablet (750 mg) by mouth 3 times daily    Abdominal wall strain, initial encounter    Patient has been working out and there is concern about abdominal wall strain.   Trial course of robaxin for muscle tightness  Trial course of T#3 for abdominal wall strain and tenderness    If symptoms persist and are not improving then advised to go to the ED for further evaluation    - methocarbamol (ROBAXIN) 750 MG tablet; Take 1 tablet (750 mg) by mouth 3 times daily  - acetaminophen-codeine (TYLENOL #3) 300-30 MG tablet; Take 1-2 tablets by mouth every 6 hours as needed for severe pain (7-10)    Asymptomatic bacteriuria    Patient has LLQ abdominal pain  Urine HCG Neg for ectopic pregnancy  Keflex for possible infection  Urine culture pending    - cephALEXin (KEFLEX) 500 MG capsule; Take 1 capsule (500 mg) by mouth 3 times daily for 5 days    Review of external notes as documented elsewhere in note    At today's visit with Ashley Catherine , we discussed results, diagnosis, medications and formulated a plan.  We also discussed red flags for immediate return to clinic/ER, as well as indications for follow up with PCP if not improved in 3 days. Patient understood and agreed to plan. Ashley Catherine was discharged with stable vitals and has no further questions.      BMI:   Estimated body mass index is 33.01 kg/m  as calculated from the following:    Height as of 9/27/22: 1.778 m (5' 10\").    Weight as of this encounter: 104.4 kg (230 lb 1 oz).       Results for orders placed or performed in visit on 01/06/23   CT Abdomen Pelvis w Contrast     Status: None (Preliminary result)    Impression    RESIDENT PRELIMINARY " INTERPRETATION  IMPRESSION:  1. No acute findings in the abdomen or pelvis.  2. Postsurgical changes of sleeve gastrectomy with a small hiatal  hernia.   Results for orders placed or performed in visit on 01/06/23   UA with Microscopic reflex to Culture     Status: Abnormal    Specimen: Urine, Midstream   Result Value Ref Range    Color Urine Dark Yellow (A) Colorless, Straw, Light Yellow, Yellow    Appearance Urine Clear Clear    Glucose Urine Negative Negative mg/dL    Bilirubin Urine Negative Negative    Ketones Urine 15 (A) Negative mg/dL    Specific Gravity Urine 1.020 1.003 - 1.035    Blood Urine Negative Negative    pH Urine 7.0 5.0 - 7.0    Protein Albumin Urine Negative Negative mg/dL    Urobilinogen Urine 2.0 (A) 0.2, 1.0 E.U./dL    Nitrite Urine Negative Negative    Leukocyte Esterase Urine Negative Negative   HCG Qual, Urine ([id number])     Status: Normal   Result Value Ref Range    hCG Urine Qualitative Negative Negative   CBC with platelets and differential     Status: None   Result Value Ref Range    WBC Count 5.5 4.0 - 11.0 10e3/uL    RBC Count 4.00 3.80 - 5.20 10e6/uL    Hemoglobin 12.2 11.7 - 15.7 g/dL    Hematocrit 36.7 35.0 - 47.0 %    MCV 92 78 - 100 fL    MCH 30.5 26.5 - 33.0 pg    MCHC 33.2 31.5 - 36.5 g/dL    RDW 13.0 10.0 - 15.0 %    Platelet Count 386 150 - 450 10e3/uL    % Neutrophils 59 %    % Lymphocytes 30 %    % Monocytes 8 %    % Eosinophils 1 %    % Basophils 0 %    % Immature Granulocytes 1 %    Absolute Neutrophils 3.2 1.6 - 8.3 10e3/uL    Absolute Lymphocytes 1.7 0.8 - 5.3 10e3/uL    Absolute Monocytes 0.5 0.0 - 1.3 10e3/uL    Absolute Eosinophils 0.1 0.0 - 0.7 10e3/uL    Absolute Basophils 0.0 0.0 - 0.2 10e3/uL    Absolute Immature Granulocytes 0.1 <=0.4 10e3/uL   Urine Microscopic     Status: Normal   Result Value Ref Range    RBC Urine 0-2 0-2 /HPF /HPF    WBC Urine 0-5 0-5 /HPF /HPF    Narrative    Urine Culture not indicated   Basic metabolic panel     Status: Abnormal   Result  Value Ref Range    Sodium 138 133 - 144 mmol/L    Potassium 3.4 3.4 - 5.3 mmol/L    Chloride 100 94 - 109 mmol/L    Carbon Dioxide (CO2) 33 (H) 20 - 32 mmol/L    Anion Gap 5 3 - 14 mmol/L    Urea Nitrogen 10 7 - 30 mg/dL    Creatinine 0.60 0.52 - 1.04 mg/dL    Calcium 9.2 8.5 - 10.1 mg/dL    Glucose 92 70 - 99 mg/dL    GFR Estimate >90 >60 mL/min/1.73m2   CBC with platelets and differential     Status: None    Narrative    The following orders were created for panel order CBC with platelets and differential.  Procedure                               Abnormality         Status                     ---------                               -----------         ------                     CBC with platelets and d...[[id number]]                      Final result                 Please view results for these tests on the individual orders.         No follow-ups on file.    Jonathan Lopez, [facility], PA-C  [facility]    Viola Ramirez is a 42 year old, presenting for the following health issues:  Urgent Care and Abdominal Pain (Lower left abdominal pain x6days, sharp pain while doing yoga, constant dull pain, last bowel movement was 1/5/23, no vomiting)      HPI   Review of Systems   Constitutional, HEENT, cardiovascular, pulmonary, GI, , musculoskeletal, neuro, skin, endocrine and psych systems are negative, except as otherwise noted.      Objective    /72   Pulse 76   Temp 98.4  F (36.9  C) (Oral)   Resp 12   Wt 104.4 kg (230 lb 1 oz)   LMP 12/15/2022 (Approximate)   SpO2 100%   BMI 33.01 kg/m    Body mass index is 33.01 kg/m .  Physical Exam   GENERAL: healthy, alert and no distress  EYES: Eyes grossly normal to inspection, PERRL and conjunctivae and sclerae normal  HENT: ear canals and TM's normal, nose and mouth without ulcers or lesions  NECK: no adenopathy, no asymmetry, masses, or scars and thyroid normal to palpation  RESP: lungs clear to auscultation - no rales, rhonchi  or wheezes  BREAST: normal without masses, tenderness or nipple discharge and no palpable axillary masses or adenopathy  CV: regular rate and rhythm, normal S1 S2, no S3 or S4, no murmur, click or rub, no peripheral edema and peripheral pulses strong  ABDOMEN: tenderness LLQ, bowel sounds normal, no palpable or pulsatile masses and umbilicus normal  MS: no gross musculoskeletal defects noted, no edema  SKIN: no suspicious lesions or rashes  NEURO: Normal strength and tone, mentation intact and speech normal  PSYCH: mentation appears normal, affect normal/bright

## 2023-01-07 LAB — RADIOLOGIST FLAGS: NORMAL

## 2023-01-16 ENCOUNTER — VIRTUAL VISIT (OUTPATIENT)
Dept: FAMILY MEDICINE | Facility: CLINIC | Age: 43
End: 2023-01-16
Payer: COMMERCIAL

## 2023-01-16 DIAGNOSIS — Z79.899 ENCOUNTER FOR LONG-TERM (CURRENT) USE OF MEDICATIONS: Primary | ICD-10-CM

## 2023-01-16 DIAGNOSIS — R10.2 PELVIC PAIN IN FEMALE: ICD-10-CM

## 2023-01-16 DIAGNOSIS — R82.90 ABNORMAL FINDING ON URINALYSIS: ICD-10-CM

## 2023-01-16 PROCEDURE — 99213 OFFICE O/P EST LOW 20 MIN: CPT | Mod: GT | Performed by: FAMILY MEDICINE

## 2023-01-16 NOTE — PROGRESS NOTES
"Ashley is a 42 year old who is being evaluated via a billable video visit.      How would you like to obtain your AVS? MyChart  If the video visit is dropped, the invitation should be resent by: Text to cell phone: [phone]  Will anyone else be joining your video visit? No        Assessment & Plan   Pelvic pain in female  Has improved significantly with ABX  No clear cause seen  Right sided ov cyst was noted  Dark urine with ketones  Advised hydration  Repeat UA  If symptoms worsen pelvic ultrasound  - UA with Microscopic reflex to Culture - lab collect; Future  - US Pelvic Complete with Transvaginal; Future    Prescription drug management  15 minutes spent on the date of the encounter doing chart review, history and exam, documentation and further activities per the note        BMI:   Estimated body mass index is 33.01 kg/m  as calculated from the following:    Height as of 9/27/22: 1.778 m (5' 10\").    Weight as of 1/6/23: 104.4 kg (230 lb 1 oz).       See Patient Instructions    No follow-ups on file.    Violet Humphreys MD  [facility]    Subjective   Ashley is a 42 year old, presenting for the following health issues:  No chief complaint on file.      HPI     ED/UC Followup:    Facility:  [facility]  Date of visit: 1/06/2023  Reason for visit: Abdominal pain  Current Status: has been getting better, but still there    Had abd pain after working out  This worsened and was seen in UC for this  CT scan and UA done  Started on ABX  This reaylly helped  Most of the pain resolved  Ovarian cyst was noted on CT  Right side  LLQ pain  Has had ming stools - 1-2 per day    Pain was really bad 8/10 now it is slightly dull and 2        Review of Systems   Constitutional, HEENT, cardiovascular, pulmonary, gi and gu systems are negative, except as otherwise noted.      Objective           Vitals:  No vitals were obtained today due to virtual visit.    Physical " Exam   GENERAL: Healthy, alert and no distress  EYES: Eyes grossly normal to inspection.  No discharge or erythema, or obvious scleral/conjunctival abnormalities.  RESP: No audible wheeze, cough, or visible cyanosis.  No visible retractions or increased work of breathing.    SKIN: Visible skin clear. No significant rash, abnormal pigmentation or lesions.  NEURO: Cranial nerves grossly intact.  Mentation and speech appropriate for age.  PSYCH: Mentation appears normal, affect normal/bright, judgement and insight intact, normal speech and appearance well-groomed.    Ancillary Procedure on 01/06/2023   Component Date Value Ref Range Status     Radiologist flags 01/06/2023 Ovarian cyst   Final               Video-Visit Details    Type of service:  Video Visit   Joined the call at 1/16/2023, 5:43:32 pm.  Left the call at 1/16/2023, 5:52:33 pm.  You were on the call for 9 minutes 1 secon  Originating Location (pt. Location): Home  Distant Location (provider location):  On-site  Platform used for Video Visit: Natividad

## 2023-01-19 ENCOUNTER — TRANSFERRED RECORDS (OUTPATIENT)
Dept: HEALTH INFORMATION MANAGEMENT | Facility: CLINIC | Age: 43
End: 2023-01-19
Payer: COMMERCIAL

## 2023-01-26 ENCOUNTER — LAB (OUTPATIENT)
Dept: LAB | Facility: CLINIC | Age: 43
End: 2023-01-26
Payer: COMMERCIAL

## 2023-01-26 ENCOUNTER — VIRTUAL VISIT (OUTPATIENT)
Dept: PSYCHOLOGY | Facility: CLINIC | Age: 43
End: 2023-01-26
Payer: COMMERCIAL

## 2023-01-26 DIAGNOSIS — E66.01 PSYCHOLOGICAL FACTORS AFFECTING MORBID OBESITY (H): ICD-10-CM

## 2023-01-26 DIAGNOSIS — Z56.9 OCCUPATIONAL PROBLEM: ICD-10-CM

## 2023-01-26 DIAGNOSIS — F33.0 MAJOR DEPRESSIVE DISORDER, RECURRENT EPISODE, MILD (H): Primary | ICD-10-CM

## 2023-01-26 DIAGNOSIS — F54 PSYCHOLOGICAL FACTORS AFFECTING MORBID OBESITY (H): ICD-10-CM

## 2023-01-26 DIAGNOSIS — Z79.899 ENCOUNTER FOR LONG-TERM (CURRENT) USE OF MEDICATIONS: ICD-10-CM

## 2023-01-26 DIAGNOSIS — R10.2 PELVIC PAIN IN FEMALE: ICD-10-CM

## 2023-01-26 LAB
ALBUMIN UR-MCNC: 30 MG/DL
APPEARANCE UR: CLEAR
BACTERIA #/AREA URNS HPF: ABNORMAL /HPF
BILIRUB UR QL STRIP: NEGATIVE
COLOR UR AUTO: YELLOW
GLUCOSE UR STRIP-MCNC: NEGATIVE MG/DL
HGB UR QL STRIP: ABNORMAL
KETONES UR STRIP-MCNC: NEGATIVE MG/DL
LEUKOCYTE ESTERASE UR QL STRIP: NEGATIVE
NITRATE UR QL: NEGATIVE
PH UR STRIP: 7 [PH] (ref 5–7)
RBC #/AREA URNS AUTO: ABNORMAL /HPF
SP GR UR STRIP: 1.02 (ref 1–1.03)
SQUAMOUS #/AREA URNS AUTO: ABNORMAL /LPF
UROBILINOGEN UR STRIP-ACNC: 1 E.U./DL
WBC #/AREA URNS AUTO: ABNORMAL /HPF

## 2023-01-26 PROCEDURE — 81001 URINALYSIS AUTO W/SCOPE: CPT

## 2023-01-26 PROCEDURE — 90837 PSYTX W PT 60 MINUTES: CPT | Mod: GT | Performed by: PSYCHOLOGIST

## 2023-01-26 SDOH — ECONOMIC STABILITY - INCOME SECURITY: UNSPECIFIED PROBLEMS RELATED TO EMPLOYMENT: Z56.9

## 2023-01-26 NOTE — PROGRESS NOTES
Health Psychology                     Department of Medicine  Julianne Moreno, Ph.D., L.P. [phone]                         [facility] Lis Chavez, Ph.D., L.P. [phone]                     [place] Mail Code 997   Chasity Hillser, Ph.D. [phone]      [address] [place] [address] Susana Ugarte, Ph.D., A.B.P.P., L.P. [phone]             [place] [address]           Jayro Elias, Ph.D., A.B.P.P., L.P. [phone]      Melissa Stokes, Ph.D., L.P. [phone]  [facility]   Elizabeth Gaytan, Ph.D., A.B.P.P., L.P. [phone] [address] [place] [address]    Health Psychology  Telemental Health Progress Note    Intake:  4/1/13    Demographics   Age 42 year old   Sex female   Race Black or    Ethnicity Not  or      Ashley Catherine is a single woman referred initially for psychological consultation for workup for a gastric sleeve procedure who is seen for CBT-oriented therapy regarding  weight management, work stresses, and family and social matters. She was seen today for problem-solving and supportive counseling.  Most of the session focused on her son.     Health:   She recovered from COVID.  She got her third booster in late September.  She went to a gym to work out x3.  While working out her stomach hurt.  She went to urgent care, had imaging done,  found an ovarian cyst.  She got abx, felt better after a week.  She is in PT for her foot- she had plantar fascitis in both feet, but now has it in the right foot..  She is hoping to get back to the gym.   She changes shoes x4/year.    Rivera  rcovered from COVID and had a  sinus infection, and croup.  He is still congested.  Not coughing any more. . He stopped eating and drinking, got weak., and is generally doing better. Rad out of toddler formula to the infant formula.  He doesn't know what to give him. Will speak with nutritionist in early February.   Discussed tring to increase speech therapy.  Discussed  barriers.  She is pleased he is in a music class.    Weight:  Was not addressed in detail.  She thinks it is about 225.   She is motivated to lose weight.    Exercise:  She started to go to gym x3, but had stomach pain, ultimately went to urgent care.  Then had to stop, due to the pain. Healing .   Work days she gets 13,000 to 18,000 steps.She is excited about it.  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 She is now working weekend shifts so as to be more available for her son.  She has been working weekends.     Family:   Her mother is vaccinated x5 and does childcare for [employer].       She participates fully. Rapport was excellent.        Wt Readings from Last 4 Encounters:   01/06/23 104.4 kg (230 lb 1 oz)   11/29/22 104.4 kg (230 lb 1 oz)   09/27/22 104.3 kg (230 lb)   06/21/22 102.1 kg (225 lb)     There is no height or weight on file to calculate BMI.     Current Outpatient Medications   Medication     acetaminophen (TYLENOL) 32 mg/mL liquid     cetirizine (ZYRTEC) 10 MG tablet     Cholecalciferol (VITAMIN D3) 25 MCG (1000 UT) CAPS     cyanocobalamin (VITAMIN B-12) 500 MCG SUBL sublingual tablet     cyclobenzaprine (FLEXERIL) 10 MG tablet     diclofenac (VOLTAREN) 1 % topical gel     ibuprofen (ADVIL/MOTRIN) 200 MG capsule     lisinopril-hydrochlorothiazide (ZESTORETIC) 20-25 MG tablet     methocarbamol (ROBAXIN) 750 MG tablet     Multiple Vitamins-Minerals (ONE DAILY CALCIUM/IRON PO)     naltrexone (DEPADE/REVIA) 50 MG tablet     ondansetron (ZOFRAN-ODT) 4 MG ODT tab     phentermine (LOMAIRA) 8 MG tablet     No current facility-administered medications for this visit.     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14    hospitalized at [facility] x 1 week     History of steroid therapy      HX ABNL PAP SMEAR OF CERVIX   1995    LEEP AT 15 yo     Hypertension 2004     Hypertrophy of breast      Hypertrophy of tonsils alone      Hypovitaminosis D     with secondary high PTH     Irregular heart beat     palpitations     LBP (low back pain)      LSIL (low grade squamous intraepithelial lesion) on Pap smear 5/2006     Lumbago     RESOLVED     Major depressive disorder, recurrent episode, in partial or unspecified remission 4/1/2013     Morbid obesity (H)     bariatric surgery 5/13/2013     Multiple thyroid nodules      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)      OLIGOMENORRHEA, C/W PCOS      Sciatica      SLEEP APNEA 6/2002    No longer has since tonsillectomy and septoplasty     Thyroid nodule      Past Surgical History:   Procedure Laterality Date     BIOPSY  03/13/2015    Thyroid nodule     ESOPHAGOSCOPY, GASTROSCOPY, DUODENOSCOPY (EGD), COMBINED  5/28/2013    Procedure: COMBINED ESOPHAGOSCOPY, GASTROSCOPY, DUODENOSCOPY (EGD);;  Surgeon: Best Willett MD;  Location:  GI     ESOPHAGOSCOPY, GASTROSCOPY, DUODENOSCOPY (EGD), COMBINED N/A 6/13/2018    Procedure: COMBINED ESOPHAGOSCOPY, GASTROSCOPY, DUODENOSCOPY (EGD), BIOPSY SINGLE OR MULTIPLE;  gastroscopy;  Surgeon: Westley Gibbs MD;  Location: [facility]     LAPAROSCOPIC GASTRIC SLEEVE  5/13/2013    Procedure: LAPAROSCOPIC GASTRIC SLEEVE;  Laparoscopic Sleeve Gastrectomy ;  Surgeon: Best Willett MD;  Location: [facility] OR     LEEP TX, CERVICAL  1995    age 15     partial thyroidectomy  2018     SEPTOPLASTY       THYROIDECTOMY Left 4/3/2018    Procedure: THYROIDECTOMY;  Left Thyroid Lobectomy And Ishtmusectomy;  Surgeon: Delia Harper MD;  [facility]     TONSILLECTOMY  age 20s     TONSILLECTOMY  2004?     wisdom teeth extraction       PHQ-9 SCORE 9/14/2021 3/7/2022 9/27/2022   PHQ-9 Total Score - - -   PHQ-9  Total Score MyChart 4 (Minimal depression) 6 (Mild depression) 4 (Minimal depression)   PHQ-9 Total Score 4 6 4     MAXIMO-7 SCORE 10/28/2019 3/7/2022 9/27/2022   Total Score - - -   Total Score - 3 (minimal anxiety) 7 (mild anxiety)   Total Score 3 3 7     WHODAS 2.0 Total Score 12/4/2018 1/31/2020   Total Score 18 15   Total Score MyChart 18 15     She is  5 foot 11 inches.  Her long-term overall goal is 175 She participates fully. Rapport is excellent.       This telehealth service is appropriate and effective for delivering services in light of the necessity for social distancing to mitigate the COVID-19 epidemic and for conservation of PPE.     Patient has agreed to receiving telehealth services after being informed about it: Yes    Patient prefers video invitation/information to be sent by:   [place]    Time service started: 9:03  Time service ended: 9:56    Extended session due to complexity of case and length of interval.    Mode of transmission: Doxy.me    Location of originating:  Home  of the patient    Distance site:  Home office of provider for [facility]    The patient has been notified that:  Video visits will be conducted via a call with their psychologist to provide the care they need with a video conversation. Video visits may be billed at different rates depending on insurance coverage.  Patients are advised to please contact their insurance provider with any questions about their health insurance coverage. If during the course of a call the psychologist feels a video visit is not appropriate, patients will not be charged for this service.  Diagnosis:  Major depression, recurrent, mild (F33.0).   Psychological factors affecting obesity (F54).   PLAN/RECOMMENDATIONS:   1. Ms. Catherine will return for video psychotherapy session 3/30 @ 9  to address weight loss and work and family issues with problem solving therapy, which is medically necessary.   She wishes to continue to get support here with her life changes  and her son's behavioral/developmental challenges.  2.  Resume schedule of regular exercise to the level that is possible.  Explore getting cardio apparatus e.g., statinary bicycle given her plantar fascitis.    Preference for future meetings: Remote         Last treatment plan signed: 5/26/22  Treatment plan due:  : 5/26/23

## 2023-01-30 ENCOUNTER — TRANSFERRED RECORDS (OUTPATIENT)
Dept: HEALTH INFORMATION MANAGEMENT | Facility: CLINIC | Age: 43
End: 2023-01-30
Payer: COMMERCIAL

## 2023-02-02 NOTE — RESULT ENCOUNTER NOTE
Hello,    The urine did not show much other than trace blood and some protein.  Lets perhaps try to recheck this after some hydration this can be a lab only.    Violet Humphreys MD

## 2023-03-03 ENCOUNTER — LAB (OUTPATIENT)
Dept: LAB | Facility: CLINIC | Age: 43
End: 2023-03-03
Attending: INTERNAL MEDICINE
Payer: COMMERCIAL

## 2023-03-03 ENCOUNTER — VIRTUAL VISIT (OUTPATIENT)
Dept: PEDIATRICS | Facility: CLINIC | Age: 43
End: 2023-03-03
Payer: COMMERCIAL

## 2023-03-03 DIAGNOSIS — R53.83 OTHER FATIGUE: ICD-10-CM

## 2023-03-03 DIAGNOSIS — R82.90 ABNORMAL FINDING ON URINALYSIS: ICD-10-CM

## 2023-03-03 DIAGNOSIS — R53.83 OTHER FATIGUE: Primary | ICD-10-CM

## 2023-03-03 LAB
ALBUMIN SERPL BCG-MCNC: 3.9 G/DL (ref 3.5–5.2)
ALBUMIN UR-MCNC: NEGATIVE MG/DL
ALP SERPL-CCNC: 70 U/L (ref 35–104)
ALT SERPL W P-5'-P-CCNC: 15 U/L (ref 10–35)
ANION GAP SERPL CALCULATED.3IONS-SCNC: 10 MMOL/L (ref 7–15)
APPEARANCE UR: CLEAR
AST SERPL W P-5'-P-CCNC: 22 U/L (ref 10–35)
BACTERIA #/AREA URNS HPF: ABNORMAL /HPF
BILIRUB SERPL-MCNC: 0.2 MG/DL
BILIRUB UR QL STRIP: NEGATIVE
BUN SERPL-MCNC: 12.3 MG/DL (ref 6–20)
CALCIUM SERPL-MCNC: 10 MG/DL (ref 8.6–10)
CHLORIDE SERPL-SCNC: 103 MMOL/L (ref 98–107)
COLOR UR AUTO: YELLOW
CREAT SERPL-MCNC: 0.62 MG/DL (ref 0.51–0.95)
CREAT UR-MCNC: 71.2 MG/DL
DEPRECATED HCO3 PLAS-SCNC: 29 MMOL/L (ref 22–29)
ERYTHROCYTE [DISTWIDTH] IN BLOOD BY AUTOMATED COUNT: 13 % (ref 10–15)
FLUAV AG SPEC QL IA: NEGATIVE
FLUBV AG SPEC QL IA: NEGATIVE
FOLATE SERPL-MCNC: 26.7 NG/ML (ref 4.6–34.8)
GFR SERPL CREATININE-BSD FRML MDRD: >90 ML/MIN/1.73M2
GLUCOSE SERPL-MCNC: 76 MG/DL (ref 70–99)
GLUCOSE UR STRIP-MCNC: NEGATIVE MG/DL
HCT VFR BLD AUTO: 38 % (ref 35–47)
HGB BLD-MCNC: 12.2 G/DL (ref 11.7–15.7)
HGB UR QL STRIP: NEGATIVE
KETONES UR STRIP-MCNC: NEGATIVE MG/DL
LEUKOCYTE ESTERASE UR QL STRIP: NEGATIVE
MCH RBC QN AUTO: 31 PG (ref 26.5–33)
MCHC RBC AUTO-ENTMCNC: 32.1 G/DL (ref 31.5–36.5)
MCV RBC AUTO: 97 FL (ref 78–100)
MICROALBUMIN UR-MCNC: <12 MG/L
MICROALBUMIN/CREAT UR: NORMAL MG/G{CREAT}
NITRATE UR QL: NEGATIVE
PH UR STRIP: 6 [PH] (ref 5–7)
PLATELET # BLD AUTO: 371 10E3/UL (ref 150–450)
POTASSIUM SERPL-SCNC: 3.8 MMOL/L (ref 3.4–5.3)
PROT SERPL-MCNC: 6.9 G/DL (ref 6.4–8.3)
RBC # BLD AUTO: 3.93 10E6/UL (ref 3.8–5.2)
RBC #/AREA URNS AUTO: ABNORMAL /HPF
SODIUM SERPL-SCNC: 142 MMOL/L (ref 136–145)
SP GR UR STRIP: 1.02 (ref 1–1.03)
SQUAMOUS #/AREA URNS AUTO: ABNORMAL /LPF
TSH SERPL DL<=0.005 MIU/L-ACNC: 1.14 UIU/ML (ref 0.3–4.2)
UROBILINOGEN UR STRIP-ACNC: 0.2 E.U./DL
VIT B12 SERPL-MCNC: 700 PG/ML (ref 232–1245)
WBC # BLD AUTO: 6.8 10E3/UL (ref 4–11)
WBC #/AREA URNS AUTO: ABNORMAL /HPF

## 2023-03-03 PROCEDURE — 81001 URINALYSIS AUTO W/SCOPE: CPT

## 2023-03-03 PROCEDURE — 87804 INFLUENZA ASSAY W/OPTIC: CPT | Mod: VID | Performed by: INTERNAL MEDICINE

## 2023-03-03 PROCEDURE — 99214 OFFICE O/P EST MOD 30 MIN: CPT | Mod: VID | Performed by: INTERNAL MEDICINE

## 2023-03-03 PROCEDURE — 82607 VITAMIN B-12: CPT

## 2023-03-03 PROCEDURE — 85027 COMPLETE CBC AUTOMATED: CPT

## 2023-03-03 PROCEDURE — 82746 ASSAY OF FOLIC ACID SERUM: CPT

## 2023-03-03 PROCEDURE — 82043 UR ALBUMIN QUANTITATIVE: CPT

## 2023-03-03 PROCEDURE — 82306 VITAMIN D 25 HYDROXY: CPT

## 2023-03-03 PROCEDURE — 84443 ASSAY THYROID STIM HORMONE: CPT

## 2023-03-03 PROCEDURE — 80053 COMPREHEN METABOLIC PANEL: CPT

## 2023-03-03 PROCEDURE — 36415 COLL VENOUS BLD VENIPUNCTURE: CPT

## 2023-03-03 PROCEDURE — 82570 ASSAY OF URINE CREATININE: CPT

## 2023-03-03 ASSESSMENT — PATIENT HEALTH QUESTIONNAIRE - PHQ9
SUM OF ALL RESPONSES TO PHQ QUESTIONS 1-9: 5
10. IF YOU CHECKED OFF ANY PROBLEMS, HOW DIFFICULT HAVE THESE PROBLEMS MADE IT FOR YOU TO DO YOUR WORK, TAKE CARE OF THINGS AT HOME, OR GET ALONG WITH OTHER PEOPLE: SOMEWHAT DIFFICULT
SUM OF ALL RESPONSES TO PHQ QUESTIONS 1-9: 5

## 2023-03-03 NOTE — PROGRESS NOTES
"Ashley is a 42 year old who is being evaluated via a billable video visit.        Assessment & Plan     Fatigue, sudden onset, associated with runny nose and 1 episode of diarrhea  Unclear etiology - acute onset sounds more likely viral in nautre.    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  She feels this is out of proportion to a typical viral illness etc.  I provided reassurance that it is less likely to be related to black mold exposure she had last week, in the absence of immune suppressants or asthma.      Will check some basic labs - in light of her hx of gastric sleeve, will check vitamin deficiencies as well.  Has hx of partial thyroidectomy - will check thyroid and calcium levels too.    Will f/up on results and advise further.    - TSH with free T4 reflex; Future  - Vitamin D Deficiency; Future  - Vitamin B12; Future  - Folate; Future  - CBC with platelets; Future  - Comprehensive metabolic panel (BMP + Alb, Alk Phos, ALT, AST, Total. Bili, TP); Future  - Influenza A & B Antigen - Clinic Collect       BMI:   Estimated body mass index is 33.01 kg/m  as calculated from the following:    Height as of 9/27/22: 1.778 m (5' 10\").    Weight as of 1/6/23: 104.4 kg (230 lb 1 oz).       See Patient Instructions    Return in about 1 week (around 3/10/2023), or if symptoms worsen or fail to improve.    Meryl Longoria MD  [facility] THADDEUS Ramirez is a 42 year old, presenting for the following health issues:  No chief complaint on file.      History of Present Illness       Reason for visit:  Mild exposure  Symptom onset:  1-2 weeks ago  Symptoms include:  Fatigue, nasal congestion, sore neck  Symptom intensity:  Moderate  Symptom progression:  Staying the same  Had these symptoms before:  No  What makes it worse:  Activity, screens  What makes it better:  Sleep    She eats 2-3 servings of fruits and vegetables " daily.She consumes 6 sweetened beverage(s) daily.She exercises with enough effort to increase her heart rate 9 or less minutes per day.  She exercises with enough effort to increase her heart rate 3 or less days per week.   She is taking medications regularly.    Today's PHQ-9         PHQ-9 Total Score: 5    PHQ-9 Q9 Thoughts of better off dead/self-harm past 2 weeks :   Not at all    How difficult have these problems made it for you to do your work, take care of things at home, or get along with other people: Somewhat difficult     A week ago, was cleaning the kitchen and noticed some black mold on a mat.  Ever since has felt sick - fatigue, nasal congestion, and fatigue.  Wondering if mold caused this or if this is a coincidence.    No hx of asthma.    No sick contacts.  Had covid for the first time 2 months ago.  Has a 3 1/2 year old who is always sick.  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    Had one episode of diarrhea today.          Review of Systems   All other systems on a 10-point review are negative, unless otherwise noted in HPI        Objective           Vitals:  No vitals were obtained today due to virtual visit.    Physical Exam   GENERAL: Healthy, alert and no distress  EYES: Eyes grossly normal to inspection.  No discharge or erythema, or obvious scleral/conjunctival abnormalities.  RESP: No audible wheeze, cough, or visible cyanosis.  No visible retractions or increased work of breathing.    SKIN: Visible skin clear. No significant rash, abnormal pigmentation or lesions.  NEURO: Cranial nerves grossly intact.  Mentation and speech appropriate for age.  PSYCH: Mentation appears normal, affect normal/bright, judgement and insight intact, normal speech and appearance well-groomed.            Video-Visit Details    Type of service:  Video Visit     Originating Location (pt. Location): Home  Distant Location (provider location):   Off-site  Platform used for Video Visit: Natividad

## 2023-03-03 NOTE — RESULT ENCOUNTER NOTE
Dear Ashley,    Nice to meet you today!    Your flu test and blood counts are normal.  I will be in touch with remainder of test results when they are available.    Please feel free to call with any questions.      Sincerely,    Meryl Longoria MD

## 2023-03-04 LAB — DEPRECATED CALCIDIOL+CALCIFEROL SERPL-MC: 32 UG/L (ref 20–75)

## 2023-03-06 NOTE — RESULT ENCOUNTER NOTE
Dear Ashley,    Great news.    Your lab results are all normal.  At this time, I suspect your fatigue is related to a viral illness.  I do not recommend any changes to your current plan of care.    Please feel free to call with any questions.      Sincerely,    Meryl Longoria MD

## 2023-03-30 ENCOUNTER — VIRTUAL VISIT (OUTPATIENT)
Dept: PSYCHOLOGY | Facility: CLINIC | Age: 43
End: 2023-03-30
Payer: COMMERCIAL

## 2023-03-30 DIAGNOSIS — Z56.9 OCCUPATIONAL PROBLEM: ICD-10-CM

## 2023-03-30 DIAGNOSIS — F33.0 MAJOR DEPRESSIVE DISORDER, RECURRENT EPISODE, MILD (H): Primary | ICD-10-CM

## 2023-03-30 PROCEDURE — 90837 PSYTX W PT 60 MINUTES: CPT | Mod: VID | Performed by: PSYCHOLOGIST

## 2023-03-30 SDOH — ECONOMIC STABILITY - INCOME SECURITY: UNSPECIFIED PROBLEMS RELATED TO EMPLOYMENT: Z56.9

## 2023-03-30 NOTE — PROGRESS NOTES
Health Psychology                     Department of Medicine  Julianne Moreno, Ph.D., L.P. [phone]                         [facility] Lis Chavez, Ph.D., L.P. [phone]                     [place] Mail Code 529   Chasity Hillser, Ph.D. [phone]      [address] [place] [address] Susana Ugarte, Ph.D., A.B.P.P., L.P. [phone]             [place] [address]           Jayro Elias, Ph.D., A.B.P.P., L.P. [phone]      Melissa Stokes, Ph.D., L.P. [phone]  [facility]   Elizabeth Gaytan, Ph.D., A.B.P.P., L.P. [phone] [address] [place] [address]    Health Psychology  Telemental Health Progress Note    Intake:  4/1/13    Demographics   Age 42 year old   Sex female   Race Black or    Ethnicity Not  or      Ashley Catherine is a single woman referred initially for psychological consultation for workup for a gastric sleeve procedure who is seen for CBT-oriented therapy regarding  weight management, work stresses, and family and social matters. She was seen today for problem-solving and supportive counseling.  Most of the session focused on her son.     Health:   She recovered from COVID.  She got her third booster in late September.  She went to a gym to work out x3.  While working out her stomach hurt.  She went to urgent care, had imaging done,  found an ovarian cyst.  She got abx, felt better after a week.  She is in PT for her foot- she had plantar fascitis in both feet, but now has it in the right foot..  She is hoping to get back to the gym.   She changes shoes x4/year.    Rivera  rcovered from COVID and had a  sinus infection, and croup.  He is still congested.  Not coughing any more. . He stopped eating and drinking, got weak., and is generally doing better. Rad out of toddler formula to the infant formula.  He doesn't know what to give him. Will speak with nutritionist in early February.   Discussed tring to increase speech therapy.  Discussed  barriers.  She is pleased he is in a music class.    Weight:   She thinks it is about 230, up from when we last discussed 225.   She is motivated to lose weight. Discussed minimizing coffee drinks.     Exercise:  She started to go to gym x3, but had stomach pain, ult imately went to urgent care.  Then had to stop, due to the pain. Healing .   Work days she gets 13,000 to 18,000 steps.She is excited about it.  Tracks with Apple watch.  Discussed increasing exerise, especially out of doors as Spring arrives.  Discussed safety issues with her son being out of doors given how hard he is to control.     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 She is now working weekend shifts so as to be more available for her son.  She has been working weekends.     Family:   Her mother is vaccinated x5 and does childcare for Rivera.   Rivera is getting potty trained at school, btu it I not generalizing to home.  She will speak with her behavioral conultant about generalizing.  He continues to be overweight Discussed benefit of exercise, and exposure to nature.   She participates fully. Rapport was excellent.        Wt Readings from Last 4 Encounters:   01/06/23 104.4 kg (230 lb 1 oz)   11/29/22 104.4 kg (230 lb 1 oz)   09/27/22 104.3 kg (230 lb)   06/21/22 102.1 kg (225 lb)     There is no height or weight on file to calculate BMI.     Current Outpatient Medications   Medication     acetaminophen (TYLENOL) 32 mg/mL liquid     cetirizine (ZYRTEC) 10 MG tablet     Cholecalciferol (VITAMIN D3) 25 MCG (1000 UT) CAPS     cyanocobalamin (VITAMIN B-12) 500 MCG SUBL sublingual tablet     cyclobenzaprine (FLEXERIL) 10 MG tablet     diclofenac (VOLTAREN) 1 % topical gel     ibuprofen (ADVIL/MOTRIN) 200 MG capsule     lisinopril-hydrochlorothiazide (ZESTORETIC) 20-25 MG tablet     methocarbamol (ROBAXIN) 750 MG tablet     Multiple Vitamins-Minerals (ONE DAILY  CALCIUM/IRON PO)     naltrexone (DEPADE/REVIA) 50 MG tablet     ondansetron (ZOFRAN-ODT) 4 MG ODT tab     phentermine (LOMAIRA) 8 MG tablet     No current facility-administered medications for this visit.     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14    hospitalized at [facility] x 1 week     History of steroid therapy      HX ABNL PAP SMEAR OF CERVIX   1995    LEEP AT 15 yo     Hypertension 2004     Hypertrophy of breast      Hypertrophy of tonsils alone      Hypovitaminosis D     with secondary high PTH     Irregular heart beat     palpitations     LBP (low back pain)      LSIL (low grade squamous intraepithelial lesion) on Pap smear 5/2006     Lumbago     RESOLVED     Major depressive disorder, recurrent episode, in partial or unspecified remission 4/1/2013     Morbid obesity (H)     bariatric surgery 5/13/2013     Multiple thyroid nodules      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)      OLIGOMENORRHEA, C/W PCOS      Sciatica      SLEEP APNEA 6/2002    No longer has since tonsillectomy and septoplasty     Thyroid nodule      Past Surgical History:   Procedure Laterality Date     BIOPSY  03/13/2015    Thyroid nodule     ESOPHAGOSCOPY, GASTROSCOPY, DUODENOSCOPY (EGD), COMBINED  5/28/2013    Procedure: COMBINED ESOPHAGOSCOPY, GASTROSCOPY, DUODENOSCOPY (EGD);;  Surgeon: Best Willett MD;  Location:  GI     ESOPHAGOSCOPY, GASTROSCOPY, DUODENOSCOPY (EGD), COMBINED N/A 6/13/2018    Procedure: COMBINED ESOPHAGOSCOPY, GASTROSCOPY, DUODENOSCOPY (EGD), BIOPSY SINGLE OR MULTIPLE;  gastroscopy;  Surgeon: Westley Gibbs MD;  Location: [facility]     LAPAROSCOPIC GASTRIC SLEEVE  5/13/2013    Procedure: LAPAROSCOPIC GASTRIC SLEEVE;  Laparoscopic Sleeve Gastrectomy ;   Surgeon: Best Willett MD;  Location:  OR     LEEP TX, CERVICAL  1995    age 15     partial thyroidectomy  2018     SEPTOPLASTY       THYROIDECTOMY Left 4/3/2018    Procedure: THYROIDECTOMY;  Left Thyroid Lobectomy And Ishtmusectomy;  Surgeon: Delia Harper MD;  Location:  OR     TONSILLECTOMY  age 20s     TONSILLECTOMY  2004?     wisdom teeth extraction       PHQ-9 SCORE 3/7/2022 9/27/2022 3/3/2023   PHQ-9 Total Score - - -   PHQ-9 Total Score MyChart 6 (Mild depression) 4 (Minimal depression) 5 (Mild depression)   PHQ-9 Total Score 6 4 5     MAXIMO-7 SCORE 10/28/2019 3/7/2022 9/27/2022   Total Score - - -   Total Score - 3 (minimal anxiety) 7 (mild anxiety)   Total Score 3 3 7     WHODAS 2.0 Total Score 12/4/2018 1/31/2020   Total Score 18 15   Total Score MyChart 18 15     She is  5 foot 11 inches.  Her long-term overall goal is 175 She participates fully. Rapport is excellent.       This telehealth service is appropriate and effective for delivering services in light of the necessity for social distancing to mitigate the COVID-19 epidemic and for conservation of PPE.     Patient has agreed to receiving telehealth services after being informed about it: Yes    Patient prefers video invitation/information to be sent by:   emai    Time service started: 9:00  Time service ended: 9:53  Extended session due to complexity of case and length of interval.    Mode of transmission: [email or web address]    Location of originating:  Home  of the patient    Distance site:  Home office of provider for [facility]    The patient has been notified that:  Video visits will be conducted via a call with their psychologist to provide the care they need with a video conversation. Video visits may be billed at different rates depending on insurance coverage.  Patients are advised to please contact their insurance provider with any questions about their health insurance coverage. If during the course of a call the psychologist feels a  video visit is not appropriate, patients will not be charged for this service.  Diagnosis:  Major depression, recurrent, mild (F33.0).   Psychological factors affecting obesity (F54).   PLAN/RECOMMENDATIONS:   1. Ms. Catherine will return for video psychotherapy session 4/26 @ 9  to address weight loss and work and family issues with problem solving therapy, which is medically necessary.   She wishes to continue to get support here with her life changes and her son's behavioral/developmental challenges.  2.  Resume schedule of regular exercise to the level that is possible.  Explore getting cardio apparatus e.g., stationary bicycle given her plantar fascitis.    Preference for future meetings: Remote         Last treatment plan signed: 5/26/22  Treatment plan due:  : 5/26/23

## 2023-04-24 ENCOUNTER — TRANSFERRED RECORDS (OUTPATIENT)
Dept: HEALTH INFORMATION MANAGEMENT | Facility: CLINIC | Age: 43
End: 2023-04-24
Payer: COMMERCIAL

## 2023-04-25 DIAGNOSIS — E66.811 OBESITY, CLASS I, BMI 30-34.9: ICD-10-CM

## 2023-04-26 ENCOUNTER — VIRTUAL VISIT (OUTPATIENT)
Dept: PSYCHOLOGY | Facility: CLINIC | Age: 43
End: 2023-04-26
Payer: COMMERCIAL

## 2023-04-26 DIAGNOSIS — F54 PSYCHOLOGICAL FACTORS AFFECTING MORBID OBESITY (H): ICD-10-CM

## 2023-04-26 DIAGNOSIS — Z56.9 OCCUPATIONAL PROBLEM: ICD-10-CM

## 2023-04-26 DIAGNOSIS — F33.0 MAJOR DEPRESSIVE DISORDER, RECURRENT EPISODE, MILD (H): Primary | ICD-10-CM

## 2023-04-26 DIAGNOSIS — E66.01 PSYCHOLOGICAL FACTORS AFFECTING MORBID OBESITY (H): ICD-10-CM

## 2023-04-26 PROCEDURE — 90837 PSYTX W PT 60 MINUTES: CPT | Mod: VID | Performed by: PSYCHOLOGIST

## 2023-04-26 SDOH — ECONOMIC STABILITY - INCOME SECURITY: UNSPECIFIED PROBLEMS RELATED TO EMPLOYMENT: Z56.9

## 2023-04-26 NOTE — PROGRESS NOTES
"  Health Psychology                     Department of Medicine  Julianne Moreno, Ph.D., L.P. [phone]                         [facility] Lis Chavez, Ph.D., L.P. [phone]                     [place] Mail Code 231   Donald [place], Ph.D. [phone]      [address] [place] [address] Susana Ugarte, Ph.D., A.B.P.P., L.P. [phone]             [place] [address]           Jayro Elias, Ph.D., A.B.P.P., L.P. [phone]      Melissa Stokes, Ph.D., L.P. [phone]  [facility]   Elizabeth Gaytan, Ph.D., A.B.P.P., L.P. [phone] [address] [place] [address]    Health Psychology  [facility] Progress Note    Intake:  4/1/13    Demographics   Age 42 year old   Sex female   Race Black or    Ethnicity Not  or      Ashley Catherine is a single woman referred initially for psychological consultation for workup for a gastric sleeve procedure who is seen for CBT-oriented therapy regarding  weight management, work stresses, and family and social matters. She was seen today for problem-solving and supportive counseling.  Most of the session focused on her son.     Health:   She recovered from COVID in December, 2022.  First sx was massive headache.  S he got her third booster in late September, 2022.   She feels she is always tired; not sure if that is due to COVID.  She also thinks some concussion sx are back:  Screens flashing /scrolling get HA, eyes get fatigued.  \"it feels like when when I had concussion in 2018. \"Back then, she went to vision therapy x2-3- \"that hurt\"   Currently her back hurts. -left upper back is sore all the time.  On antiinflammatories. She is getting PT.     She is in PT for her foot- she had plantar fascitis in both feet, but now has it in the right foot..  She is hoping to get back to the gym.   She changes shoes x4/year.    Rivera  rcovered from COVID and had a  sinus infection, and croup.  He is still congested.  Not coughing any more. " He accepts the infant formula He is now eating some protein (checken nuggets) She is pleased he is in a music class, which he loves.  He now  weighs in the 50s and has grown more.     She has taken him to a restaurant. He doesn't always eat. He had a rough 2 weeks at [facility] due to sensory overload. Now he wears sunglasses outside and ear muffs if  Things are loud, so he can be more engaged.    It is going better naman thinks.  Discussed difficulties getting him out places (e.g., wants to play with doors at the zoo).     Weight:   She thinks he weight is about 230, where she was last time, up from when we previously discussed 225.   She is motivated to lose weight. Discussed minimizing coffee drinks. Her taste profile hanged.  She is finding things too sweet.  She isn't going to [place] for the chocolate drinks.  Explored strategies like spreading out coffee drinks.     Exercise:  She started to go to gym x3, but has avoided due to foot band back pain, no longer the  stomach pain. Work days she gets 13,000 to 18,000 steps;  Less if work isn't busy.   Tracks with Apple watch.  Discussed increasing exerise, especially out of doors as Spring arrive, notiink biking might be better for her back and feet.     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 She is now working weekend shifts so as to be more available for her son.  She has been working weekends.  Work has been okay.     Family:   Her mother is vaccinated x5, wanting 6th.  She does childcare for Rivera.   Rivera is getting potty trained at school, but it had  not been generalizing to home.  She will speak with her behavioral conultant about generalizing.  They come one hour/week and might be engaged in other activities.   He continues to be overweight, but because taller, seems like less of a worry.  Discussed benefit of exercise.    She participates fully. Rapport was excellent.         Wt Readings from Last 4 Encounters:   01/06/23 104.4 kg (230 lb 1 oz)   11/29/22 104.4 kg (230 lb 1 oz)   09/27/22 104.3 kg (230 lb)   06/21/22 102.1 kg (225 lb)     There is no height or weight on file to calculate BMI.     Current Outpatient Medications   Medication     acetaminophen (TYLENOL) 32 mg/mL liquid     cetirizine (ZYRTEC) 10 MG tablet     Cholecalciferol (VITAMIN D3) 25 MCG (1000 UT) CAPS     cyanocobalamin (VITAMIN B-12) 500 MCG SUBL sublingual tablet     cyclobenzaprine (FLEXERIL) 10 MG tablet     diclofenac (VOLTAREN) 1 % topical gel     ibuprofen (ADVIL/MOTRIN) 200 MG capsule     lisinopril-hydrochlorothiazide (ZESTORETIC) 20-25 MG tablet     methocarbamol (ROBAXIN) 750 MG tablet     Multiple Vitamins-Minerals (ONE DAILY CALCIUM/IRON PO)     naltrexone (DEPADE/REVIA) 50 MG tablet     ondansetron (ZOFRAN-ODT) 4 MG ODT tab     phentermine (LOMAIRA) 8 MG tablet     No current facility-administered medications for this visit.     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 x 1 week     History of steroid therapy      HX ABNL PAP SMEAR OF CERVIX   1995    LEEP AT 15 yo     Hypertension 2004     Hypertrophy of breast      Hypertrophy of tonsils alone      Hypovitaminosis D     with secondary high PTH     Irregular heart beat     palpitations     LBP (low back pain)      LSIL (low grade squamous intraepithelial lesion) on Pap smear 5/2006     Lumbago     RESOLVED     Major depressive disorder, recurrent episode, in partial or unspecified remission 4/1/2013     Morbid obesity (H)     bariatric surgery 5/13/2013     Multiple thyroid nodules      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)      OLIGOMENORRHEA, C/W PCOS      Sciatica       SLEEP APNEA 6/2002    No longer has since tonsillectomy and septoplasty     Thyroid nodule      Past Surgical History:   Procedure Laterality Date     BIOPSY  03/13/2015    Thyroid nodule     ESOPHAGOSCOPY, GASTROSCOPY, DUODENOSCOPY (EGD), COMBINED  5/28/2013    Procedure: COMBINED ESOPHAGOSCOPY, GASTROSCOPY, DUODENOSCOPY (EGD);;  Surgeon: Best Willett MD;  Location: [facility]     ESOPHAGOSCOPY, GASTROSCOPY, DUODENOSCOPY (EGD), COMBINED N/A 6/13/2018    Procedure: COMBINED ESOPHAGOSCOPY, GASTROSCOPY, DUODENOSCOPY (EGD), BIOPSY SINGLE OR MULTIPLE;  gastroscopy;  Surgeon: Westley Gibbs MD;  Location:  GI     LAPAROSCOPIC GASTRIC SLEEVE  5/13/2013    Procedure: LAPAROSCOPIC GASTRIC SLEEVE;  Laparoscopic Sleeve Gastrectomy ;  Surgeon: Best Willett MD;  Location: [facility] OR     LEEP TX, CERVICAL  1995    age 15     partial thyroidectomy  2018     SEPTOPLASTY       THYROIDECTOMY Left 4/3/2018    Procedure: THYROIDECTOMY;  Left Thyroid Lobectomy And Ishtmusectomy;  Surgeon: Delia Harper MD;  Location: [facility] OR     TONSILLECTOMY  age 20s     TONSILLECTOMY  2004?     wisdom teeth extraction           3/7/2022    11:28 AM 9/27/2022     9:05 AM 3/3/2023     9:10 AM   PHQ-9 SCORE   PHQ-9 Total Score MyChart 6 (Mild depression) 4 (Minimal depression) 5 (Mild depression)   PHQ-9 Total Score 6 4 5         10/28/2019     1:18 PM 3/7/2022    11:36 AM 9/27/2022     9:06 AM   MAXIMO-7 SCORE   Total Score  3 (minimal anxiety) 7 (mild anxiety)   Total Score 3 3 7         12/4/2018     8:22 AM 1/31/2020     7:20 AM   WHODAS 2.0 Total Score   Total Score 18 15   Total Score MyChart 18 15     She is  5 foot 11 inches.  Her long-term overall goal is 175 She participates fully. Rapport is excellent.       This telehealth service is appropriate and effective for delivering services in light of the necessity for social distancing to mitigate the COVID-19 epidemic and for conservation of PPE.     Patient has agreed to  receiving telehealth services after being informed about it: Yes    Patient prefers video invitation/information to be sent by:   emai    Time service started: 8:50  Time service ended: 9:43  Extended session due to complexity of case and length of interval.    Mode of transmission: [email or web address].me    Location of originating:  Home  of the patient    Distance site:  Home office of provider for [facility]    The patient has been notified that:  Video visits will be conducted via a call with their psychologist to provide the care they need with a video conversation. Video visits may be billed at different rates depending on insurance coverage.  Patients are advised to please contact their insurance provider with any questions about their health insurance coverage. If during the course of a call the psychologist feels a video visit is not appropriate, patients will not be charged for this service.  Diagnosis:  Major depression, recurrent, mild (F33.0).   Psychological factors affecting obesity (F54).   PLAN/RECOMMENDATIONS:   1. Ms. Catherine will return for video psychotherapy session 5/22  @ 9  to address weight loss and work and family issues with problem solving therapy, which is medically necessary.   She wishes to continue to get support here with her life changes and her son's behavioral/developmental challenges.  2.  Resume schedule of regular exercise to the level that is possible.  Explore getting cardio apparatus e.g., stationary bicycle given her plantar fascitis.    Preference for future meetings: Remote         Last treatment plan signed: 5/26/22  Treatment plan due:  : 5/26/23

## 2023-04-28 RX ORDER — PHENTERMINE HYDROCHLORIDE 8 MG/1
TABLET ORAL
Qty: 60 TABLET | OUTPATIENT
Start: 2023-04-28

## 2023-04-28 NOTE — TELEPHONE ENCOUNTER
phentermine (LOMAIRA) 8 MG tablet 60 tablet 3 9/27/2022         Last Written Prescription Date:  9-  Last Fill Quantity: 60,   # refills: 3  Last Office Visit : 9-  Future Office visit:  none    Recieved refill request for phentermine (LOMAIRA) 8 MG tablet   Patient needs appointment scheduled prior to any refills. Clinic Coordinator notified and will follow up with the patient as appropriate. The pharmacy has been notified that the medication will not be refilled prior to an appointment being scheduled.

## 2023-05-05 DIAGNOSIS — E66.811 OBESITY, CLASS I, BMI 30-34.9: ICD-10-CM

## 2023-05-09 RX ORDER — PHENTERMINE HYDROCHLORIDE 8 MG/1
TABLET ORAL
Qty: 60 TABLET | Refills: 0 | Status: SHIPPED | OUTPATIENT
Start: 2023-05-09 | End: 2023-07-11 | Stop reason: SINTOL

## 2023-05-09 NOTE — TELEPHONE ENCOUNTER
LOMAIRA 8 MG TABLET      Last Written Prescription Date:  9/27/22  Last Fill Quantity: 60,   # refills: 3  Last Office Visit : 9/27/22  Future Office visit:  7/11/23    Routing refill request to provider for review/approval because:  CONTROLLED SUBSTANCE - Requires provider review/auth

## 2023-05-15 ENCOUNTER — OFFICE VISIT (OUTPATIENT)
Dept: FAMILY MEDICINE | Facility: CLINIC | Age: 43
End: 2023-05-15
Payer: COMMERCIAL

## 2023-05-15 VITALS
SYSTOLIC BLOOD PRESSURE: 119 MMHG | RESPIRATION RATE: 14 BRPM | WEIGHT: 230.8 LBS | OXYGEN SATURATION: 96 % | BODY MASS INDEX: 33.04 KG/M2 | DIASTOLIC BLOOD PRESSURE: 62 MMHG | HEIGHT: 70 IN | HEART RATE: 82 BPM | TEMPERATURE: 97.7 F

## 2023-05-15 DIAGNOSIS — Z00.00 ROUTINE GENERAL MEDICAL EXAMINATION AT A HEALTH CARE FACILITY: Primary | ICD-10-CM

## 2023-05-15 DIAGNOSIS — D17.30 LIPOMA OF SKIN AND SUBCUTANEOUS TISSUE: ICD-10-CM

## 2023-05-15 DIAGNOSIS — Z12.4 CERVICAL CANCER SCREENING: ICD-10-CM

## 2023-05-15 DIAGNOSIS — Z98.84 GASTRIC BYPASS STATUS FOR OBESITY: ICD-10-CM

## 2023-05-15 LAB — HBA1C MFR BLD: 5.2 % (ref 0–5.6)

## 2023-05-15 PROCEDURE — 83036 HEMOGLOBIN GLYCOSYLATED A1C: CPT | Performed by: FAMILY MEDICINE

## 2023-05-15 PROCEDURE — 36415 COLL VENOUS BLD VENIPUNCTURE: CPT | Performed by: FAMILY MEDICINE

## 2023-05-15 PROCEDURE — 99396 PREV VISIT EST AGE 40-64: CPT | Performed by: FAMILY MEDICINE

## 2023-05-15 RX ORDER — LISINOPRIL AND HYDROCHLOROTHIAZIDE 20; 25 MG/1; MG/1
1 TABLET ORAL DAILY
Qty: 90 TABLET | Refills: 3 | Status: SHIPPED | OUTPATIENT
Start: 2023-05-15 | End: 2023-07-25

## 2023-05-15 RX ORDER — LORATADINE 10 MG/1
10 TABLET ORAL DAILY
COMMUNITY
End: 2023-10-02

## 2023-05-15 ASSESSMENT — ENCOUNTER SYMPTOMS
NAUSEA: 0
PARESTHESIAS: 0
SHORTNESS OF BREATH: 0
HEMATOCHEZIA: 0
NERVOUS/ANXIOUS: 0
JOINT SWELLING: 0
CHILLS: 0
HEMATURIA: 0
COUGH: 0
ARTHRALGIAS: 0
HEADACHES: 0
DYSURIA: 0
HEARTBURN: 0
DIZZINESS: 0
FREQUENCY: 0
SORE THROAT: 0
PALPITATIONS: 0
BREAST MASS: 0
CONSTIPATION: 0
WEAKNESS: 0
ABDOMINAL PAIN: 0
MYALGIAS: 0
FEVER: 0
DIARRHEA: 0
EYE PAIN: 0

## 2023-05-15 ASSESSMENT — ANXIETY QUESTIONNAIRES
8. IF YOU CHECKED OFF ANY PROBLEMS, HOW DIFFICULT HAVE THESE MADE IT FOR YOU TO DO YOUR WORK, TAKE CARE OF THINGS AT HOME, OR GET ALONG WITH OTHER PEOPLE?: NOT DIFFICULT AT ALL
IF YOU CHECKED OFF ANY PROBLEMS ON THIS QUESTIONNAIRE, HOW DIFFICULT HAVE THESE PROBLEMS MADE IT FOR YOU TO DO YOUR WORK, TAKE CARE OF THINGS AT HOME, OR GET ALONG WITH OTHER PEOPLE: NOT DIFFICULT AT ALL
7. FEELING AFRAID AS IF SOMETHING AWFUL MIGHT HAPPEN: NOT AT ALL
5. BEING SO RESTLESS THAT IT IS HARD TO SIT STILL: NOT AT ALL
6. BECOMING EASILY ANNOYED OR IRRITABLE: NOT AT ALL
4. TROUBLE RELAXING: NOT AT ALL
5. BEING SO RESTLESS THAT IT IS HARD TO SIT STILL: NOT AT ALL
8. IF YOU CHECKED OFF ANY PROBLEMS, HOW DIFFICULT HAVE THESE MADE IT FOR YOU TO DO YOUR WORK, TAKE CARE OF THINGS AT HOME, OR GET ALONG WITH OTHER PEOPLE?: NOT DIFFICULT AT ALL
GAD7 TOTAL SCORE: 1
GAD7 TOTAL SCORE: 1
3. WORRYING TOO MUCH ABOUT DIFFERENT THINGS: NOT AT ALL
7. FEELING AFRAID AS IF SOMETHING AWFUL MIGHT HAPPEN: NOT AT ALL
2. NOT BEING ABLE TO STOP OR CONTROL WORRYING: NOT AT ALL
GAD7 TOTAL SCORE: 1
4. TROUBLE RELAXING: NOT AT ALL
GAD7 TOTAL SCORE: 1
IF YOU CHECKED OFF ANY PROBLEMS ON THIS QUESTIONNAIRE, HOW DIFFICULT HAVE THESE PROBLEMS MADE IT FOR YOU TO DO YOUR WORK, TAKE CARE OF THINGS AT HOME, OR GET ALONG WITH OTHER PEOPLE: NOT DIFFICULT AT ALL
1. FEELING NERVOUS, ANXIOUS, OR ON EDGE: SEVERAL DAYS
3. WORRYING TOO MUCH ABOUT DIFFERENT THINGS: NOT AT ALL
7. FEELING AFRAID AS IF SOMETHING AWFUL MIGHT HAPPEN: NOT AT ALL
2. NOT BEING ABLE TO STOP OR CONTROL WORRYING: NOT AT ALL
1. FEELING NERVOUS, ANXIOUS, OR ON EDGE: SEVERAL DAYS
GAD7 TOTAL SCORE: 1
7. FEELING AFRAID AS IF SOMETHING AWFUL MIGHT HAPPEN: NOT AT ALL
6. BECOMING EASILY ANNOYED OR IRRITABLE: NOT AT ALL

## 2023-05-15 ASSESSMENT — PAIN SCALES - GENERAL: PAINLEVEL: NO PAIN (0)

## 2023-05-15 NOTE — PROGRESS NOTES
SUBJECTIVE:   CC: Ashley is an 43 year old who presents for preventive health visit.        View : No data to display.            Patient has been advised of split billing requirements and indicates understanding: Yes  Healthy Habits:     Getting at least 3 servings of Calcium per day:  Yes    Bi-annual eye exam:  NO    Dental care twice a year:  Yes    Sleep apnea or symptoms of sleep apnea:  None    Diet:  Regular (no restrictions)    Frequency of exercise:  None    Taking medications regularly:  Yes    Medication side effects:  None    PHQ-2 Total Score: 0    Additional concerns today:  No    (Z00.00) Routine general medical examination at a health care facility  (primary encounter diagnosis)  Comment: Up-to-date on vaccines and routine screenings we did start doing mammograms because of tenderness that she had in the past and dense breast tissue she would like to continue to do these yearly.    Hypertension is at goal excellent and recent labs are within normal limits no concerns tolerating medication well  Working with nutrition on healthy diet  Plan: lisinopril-hydrochlorothiazide (ZESTORETIC)         20-25 MG tablet                   (Z68.33) BMI 33.0-33.9,adult  Comment: As noted above has been working with weight management clinic is on medications and using lifestyle.  Wt Readings from Last 4 Encounters:   05/15/23 104.7 kg (230 lb 12.8 oz)   01/06/23 104.4 kg (230 lb 1 oz)   11/29/22 104.4 kg (230 lb 1 oz)   09/27/22 104.3 kg (230 lb)   Does seem to have plateaued has an appointment to follow-up and check on this.  Plan: Hemoglobin A1c, Lipid panel reflex to direct         LDL Fasting            (D17.30) Lipoma of skin and subcutaneous tissue  Comment: Has a lipoma along the left side of her back right along her left paraspinal muscle.  Has been having more and more back stiffness and wonders if this may be feeding into this.  We discussed having her see general surgery for this.  Plan: Adult General  Surg Referral            (Z98.84) Gastric bypass status for obesity  Comment: Recently had labs and they are all at goal continues to take multivitamins and supplements.  Plan:        Feels that this has been helping  Has seen [facility] for the back has been working with physical therapy.    Today's PHQ-2 Score:       5/15/2023    10:50 AM   PHQ-2 ( 1999 [employer])   Q1: Little interest or pleasure in doing things 0   Q2: Feeling down, depressed or hopeless 0   PHQ-2 Score 0   Q1: Little interest or pleasure in doing things Not at all   Q2: Feeling down, depressed or hopeless Not at all   PHQ-2 Score 0       Have you ever done Advance Care Planning? (For example, a Health Directive, POLST, or a discussion with a medical provider or your loved ones about your wishes): Yes, advance care planning is on file.    Social History     Tobacco Use     Smoking status: Never     Smokeless tobacco: Never   Vaping Use     Vaping status: Never Used   Substance Use Topics     Alcohol use: Yes     Alcohol/week: 1.0 - 4.0 standard drink of alcohol     Comment: 6 drinks/week             5/15/2023    10:50 AM   Alcohol Use   Prescreen: >3 drinks/day or >7 drinks/week? No     Reviewed orders with patient.  Reviewed health maintenance and updated orders accordingly - Yes      Breast Cancer Screening:        3/7/2022    11:33 AM 5/15/2023    10:50 AM   Breast CA Risk Assessment (FHS-7)   Do you have a family history of breast, colon, or ovarian cancer? Yes No / Unknown           Pertinent mammograms are reviewed under the imaging tab.    History of abnormal Pap smear: NO - age 30-65 PAP every 5 years with negative HPV co-testing recommended      Latest Ref Rng & Units 3/7/2022     2:06 PM 8/12/2016    10:16 AM 8/12/2016    12:00 AM   PAP / HPV   PAP  Negative for Intraepithelial Lesion or Malignancy (NILM)       PAP (Historical)    NIL     HPV 16 DNA Negative Negative   Negative      HPV 18 DNA Negative Negative   Negative      Other HR HPV  Negative Negative   Negative        Reviewed and updated as needed this visit by clinical staff   Tobacco  Allergies  Meds  Problems  Med Hx  Surg Hx  Fam Hx          Reviewed and updated as needed this visit by Provider   Tobacco  Allergies  Meds  Problems  Med Hx  Surg Hx  Fam Hx         Past Medical History:   Diagnosis Date     Bariatric surgery status 05/13/2013     Depression      Depressive disorder      Esophageal reflux      Family history of hyperparathyroidism 3/6/2015     Forearm fracture childhood    jumping fall     Guillain-Cologne disease (H) age 14    [facility]ized [facility]     History of steroid therapy      HX ABNL PAP SMEAR OF CERVIX   1995    LEEP AT 15 yo     Hypertension 2004     Hypertrophy of breast      Hypertrophy of tonsils alone      Hypovitaminosis D     with secondary high PTH     Irregular heart beat     palpitations     LBP (low back pain)      LSIL (low grade squamous intraepithelial lesion) on Pap smear 5/2006     Lumbago     RESOLVED     Major depressive disorder, recurrent episode, in partial or unspecified remission 4/1/2013     Morbid obesity (H)     bariatric surgery 5/13/2013     Multiple thyroid nodules      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)      OLIGOMENORRHEA, C/W PCOS      Sciatica      SLEEP APNEA 6/2002    No longer has since tonsillectomy and septoplasty     Thyroid nodule       Past Surgical History:   Procedure Laterality Date     BIOPSY  03/13/2015    Thyroid nodule     ESOPHAGOSCOPY, GASTROSCOPY, DUODENOSCOPY (EGD), COMBINED  5/28/2013    Procedure: COMBINED ESOPHAGOSCOPY, GASTROSCOPY, DUODENOSCOPY (EGD);;  Surgeon: Best Willett MD;  Location:  GI     ESOPHAGOSCOPY, GASTROSCOPY, DUODENOSCOPY (EGD), COMBINED N/A 6/13/2018    Procedure: COMBINED ESOPHAGOSCOPY, GASTROSCOPY, DUODENOSCOPY (EGD), BIOPSY SINGLE OR MULTIPLE;  gastroscopy;  Surgeon: Westley Gibbs MD;  Location:  GI     LAPAROSCOPIC GASTRIC  "SLEEVE  5/13/2013    Procedure: LAPAROSCOPIC GASTRIC SLEEVE;  Laparoscopic Sleeve Gastrectomy ;  Surgeon: Best Willett MD;  [facility]     LEEP TX, CERVICAL  1995    age 15     partial thyroidectomy  2018     SEPTOPLASTY       THYROIDECTOMY Left 4/3/2018    Procedure: THYROIDECTOMY;  Left Thyroid Lobectomy And Ishtmusectomy;  Surgeon: Delia Harper MD;  [facility]     TONSILLECTOMY  age 20s     TONSILLECTOMY  2004?     wisdom teeth extraction         Review of Systems   Constitutional: Negative for chills and fever.   HENT: Negative for congestion, ear pain, hearing loss and sore throat.    Eyes: Negative for pain and visual disturbance.   Respiratory: Negative for cough and shortness of breath.    Cardiovascular: Negative for chest pain, palpitations and peripheral edema.   Gastrointestinal: Negative for abdominal pain, constipation, diarrhea, heartburn, hematochezia and nausea.   Breasts:  Negative for tenderness, breast mass and discharge.   Genitourinary: Negative for dysuria, frequency, genital sores, hematuria, pelvic pain, urgency, vaginal bleeding and vaginal discharge.   Musculoskeletal: Negative for arthralgias, joint swelling and myalgias.   Skin: Negative for rash.   Neurological: Negative for dizziness, weakness, headaches and paresthesias.   Psychiatric/Behavioral: Negative for mood changes. The patient is not nervous/anxious.           OBJECTIVE:   /62   Pulse 82   Temp 97.7  F (36.5  C) (Temporal)   Resp 14   Ht 1.778 m (5' 10\")   Wt 104.7 kg (230 lb 12.8 oz)   LMP 04/23/2023 (Approximate)   SpO2 96%   BMI 33.12 kg/m    Physical Exam  GENERAL: healthy, alert and no distress  EYES: Eyes grossly normal to inspection, PERRL and conjunctivae and sclerae normal  HENT: ear canals and TM's normal, nose and mouth without ulcers or lesions  NECK: no adenopathy, no asymmetry, masses, or scars and thyroid normal to palpation  RESP: lungs clear to auscultation - no rales, " "rhonchi or wheezes  BREAST: normal without masses, tenderness or nipple discharge and no palpable axillary masses or adenopathy  CV: regular rate and rhythm, normal S1 S2, no S3 or S4, no murmur, click or rub, no peripheral edema and peripheral pulses strong  ABDOMEN: soft, nontender, no hepatosplenomegaly, no masses and bowel sounds normal  MS: no gross musculoskeletal defects noted, no edema  SKIN: no suspicious lesions or rashes  NEURO: Normal strength and tone, mentation intact and speech normal  PSYCH: mentation appears normal, affect normal/bright    Diagnostic Test Results:  Labs reviewed in Epic    ASSESSMENT/PLAN:   (Z00.00) Routine general medical examination at a health care facility  (primary encounter diagnosis)  Comment:    Plan: lisinopril-hydrochlorothiazide (ZESTORETIC)         20-25 MG tablet             (Z68.33) BMI 33.0-33.9,adult  Comment:    Plan: Hemoglobin A1c, Lipid panel reflex to direct         LDL Fasting             (D17.30) Lipoma of skin and subcutaneous tissue  Comment:    Plan: Adult General Surg Referral             (Z98.84) Gastric bypass status for obesity  Comment: Labs are up-to-date taking supplements no labs are due today.  Plan:      Patient has been advised of split billing requirements and indicates understanding: Yes      COUNSELING:  Reviewed preventive health counseling, as reflected in patient instructions      BMI:   Estimated body mass index is 33.12 kg/m  as calculated from the following:    Height as of this encounter: 1.778 m (5' 10\").    Weight as of this encounter: 104.7 kg (230 lb 12.8 oz).   Weight management plan: Patient referred to endocrine and/or weight management specialty Discussed healthy diet and exercise guidelines      She reports that she has never smoked. She has never used smokeless tobacco.      Violet Humphreys MD  [facility]  Answers for HPI/ROS submitted by the patient on 5/15/2023  If you checked off any problems, " how difficult have these problems made it for you to do your work, take care of things at home, or get along with other people?: Not difficult at all  PHQ9 TOTAL SCORE: 2  MAXIMO 7 TOTAL SCORE: 1

## 2023-05-18 ENCOUNTER — OFFICE VISIT (OUTPATIENT)
Dept: SURGERY | Facility: CLINIC | Age: 43
End: 2023-05-18
Payer: COMMERCIAL

## 2023-05-18 VITALS
HEIGHT: 70 IN | DIASTOLIC BLOOD PRESSURE: 78 MMHG | BODY MASS INDEX: 32.93 KG/M2 | WEIGHT: 230 LBS | SYSTOLIC BLOOD PRESSURE: 120 MMHG | HEART RATE: 98 BPM

## 2023-05-18 DIAGNOSIS — D17.1 LIPOMA OF TORSO: Primary | ICD-10-CM

## 2023-05-18 PROCEDURE — 99213 OFFICE O/P EST LOW 20 MIN: CPT | Performed by: SURGERY

## 2023-05-22 ENCOUNTER — VIRTUAL VISIT (OUTPATIENT)
Dept: PSYCHOLOGY | Facility: CLINIC | Age: 43
End: 2023-05-22
Payer: COMMERCIAL

## 2023-05-22 DIAGNOSIS — F54 PSYCHOLOGICAL FACTORS AFFECTING MORBID OBESITY (H): ICD-10-CM

## 2023-05-22 DIAGNOSIS — F33.0 MAJOR DEPRESSIVE DISORDER, RECURRENT EPISODE, MILD (H): Primary | ICD-10-CM

## 2023-05-22 DIAGNOSIS — E66.01 PSYCHOLOGICAL FACTORS AFFECTING MORBID OBESITY (H): ICD-10-CM

## 2023-05-22 PROCEDURE — 90837 PSYTX W PT 60 MINUTES: CPT | Mod: VID | Performed by: PSYCHOLOGIST

## 2023-05-22 NOTE — PROGRESS NOTES
"  Health Psychology                     Department of Medicine  Julianne Moreno, Ph.D., L.P. [phone]                         [facility] Lis Chavez, Ph.D., L.P. [phone]                     [place] Mail Code 961   Donald Hills, Ph.D. [phone]      [address] [place] [address] Susana Ugarte, Ph.D., A.B.P.P., L.P. [phone]             [place] [address]           Jayro Elias, Ph.D., A.B.P.P., L.P. [phone]      Melissa Stokes, Ph.D., L.P. [phone]  [facility]   Elizabeth Gaytan, Ph.D., A.B.P.P., L.P. [phone] [address] [place] [address]    Health Psychology  [facility] Health Progress Note    Intake:  4/1/13    Demographics   Age 42 year old   Sex female   Race Black or    Ethnicity Not  or      Ashley Catherine is a single woman referred initially for psychological consultation for workup for a gastric sleeve procedure who is seen for CBT-oriented therapy regarding weight management, work stresses, and family and social matters. She was seen today for eclectic psychotherapy.  Most of the session focused on her son.     Health:   She recovered from COVID in December, 2022.  First sx was massive headache.  S he got her third booster in late September, 2022.   She feels she is always tired; not sure if that is due to COVID.  She also thinks some concussion sx are back:  Screens flashing /scrolling get HA, eyes get fatigued.  \"it feels like when when I had concussion in 2018. \"Back then, she went to vision therapy x2-3- \"that hurt\"   Currently her back hurts. -left upper back is sore all the time.  On antiinflammatories. She is getting PT for her back.     She was in PT for her foot- she had plantar fascitis in both feet, but now has it in the right foot..  She is still  hoping to get back to the gym.     Her son was sick with viruses x 2 weeks, parainfluenza 3.  He went to ED x2 in the interval.  He had temps.  He has been trying to verbalize " more.  He is learning a lot through [employer] instructional video games. He is still getting PT, OT, Speech therapy, day treatment, and music therapy.  He will get a new IPE next year.  She is thrilled that new state laws will get rid of her costs for his LELAND.  He will also begin an early childhood special education program  next yea, which will give her a sense of whether [employer]shmi will be a good optoion for him.  He would arrive late 4/5 day.  Next month nova be a re-assessment of his autism.     Weight:   Briefly discussed. She thinks her weight is about 230, where she was last time.    Exercise:  On  Hold. She started to go to gym x3, but has avoided due to foot band back pain, no longer the  stomach pain. Work days she gets 13,000 to 18,000 steps;  Less if work isn't busy.   Tracks with Apple watch.  Discussed increasing exerise, especially out of doors as Spring arrive, notiink biking might be better for her back and feet.     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 She is now working weekend shifts so as to be more available for her son.  She has been working weekends.  Work has been okay.     Family:   Her mother is vaccinated x5, wanting 6th.  She does childcare for Rivera.   Rivera is getting potty trained at school, but it had  not been generalizing to home.  She will speak with her behavioral conultant about generalizing.  They come one hour/week and might be engaged in other activities.   He continues to be overweight, but because taller, seems like less of a worry.  Discussed benefit of exercise.    She participates fully. Rapport was excellent.        Wt Readings from Last 4 Encounters:   05/18/23 104.3 kg (230 lb)   05/15/23 104.7 kg (230 lb 12.8 oz)   01/06/23 104.4 kg (230 lb 1 oz)   11/29/22 104.4 kg (230 lb 1 oz)     There is no height or weight on file to calculate BMI.     Current Outpatient Medications   Medication      acetaminophen (TYLENOL) 32 mg/mL liquid     Cholecalciferol (VITAMIN D3) 25 MCG (1000 UT) CAPS     cyanocobalamin (VITAMIN B-12) 500 MCG SUBL sublingual tablet     cyclobenzaprine (FLEXERIL) 10 MG tablet     diclofenac (VOLTAREN) 1 % topical gel     ibuprofen (ADVIL/MOTRIN) 200 MG capsule     lisinopril-hydrochlorothiazide (ZESTORETIC) 20-25 MG tablet     LOMAIRA 8 MG tablet     loratadine (CLARITIN) 10 MG tablet     Multiple Vitamins-Minerals (ONE DAILY CALCIUM/IRON PO)     naltrexone (DEPADE/REVIA) 50 MG tablet     No current facility-administered medications for this visit.     Past Medical History:   Diagnosis Date     Bariatric surgery status 05/13/2013     Depression      Depressive disorder      Esophageal reflux      Family history of hyperparathyroidism 3/6/2015     Forearm fracture childhood    jumping fall     Guillain-Protem disease (H) age 14    hospitalized at [facility] x 1 week     History of steroid therapy      HX ABNL PAP SMEAR OF CERVIX   1995    LEEP AT 15 yo     Hypertension 2004     Hypertrophy of breast      Hypertrophy of tonsils alone      Hypovitaminosis D     with secondary high PTH     Irregular heart beat     palpitations     LBP (low back pain)      LSIL (low grade squamous intraepithelial lesion) on Pap smear 5/2006     Lumbago     RESOLVED     Major depressive disorder, recurrent episode, in partial or unspecified remission 4/1/2013     Morbid obesity (H)     bariatric surgery 5/13/2013     Multiple thyroid nodules      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)      OLIGOMENORRHEA, C/W PCOS      Sciatica      SLEEP APNEA 6/2002    No longer has since tonsillectomy and septoplasty     Thyroid nodule      Past Surgical History:   Procedure Laterality Date     BIOPSY  03/13/2015    Thyroid nodule     ESOPHAGOSCOPY, GASTROSCOPY, DUODENOSCOPY (EGD), COMBINED  5/28/2013    Procedure: COMBINED ESOPHAGOSCOPY, GASTROSCOPY, DUODENOSCOPY (EGD);;  Surgeon: Best Willett MD;   [facility]     ESOPHAGOSCOPY, GASTROSCOPY, DUODENOSCOPY (EGD), COMBINED N/A 6/13/2018    Procedure: COMBINED ESOPHAGOSCOPY, GASTROSCOPY, DUODENOSCOPY (EGD), BIOPSY SINGLE OR MULTIPLE;  gastroscopy;  Surgeon: Westley Gibbs MD;  Location:  GI     LAPAROSCOPIC GASTRIC SLEEVE  5/13/2013    Procedure: LAPAROSCOPIC GASTRIC SLEEVE;  Laparoscopic Sleeve Gastrectomy ;  Surgeon: Best Willett MD;  [facility]     LEEP TX, CERVICAL  1995    age 15     partial thyroidectomy  2018     SEPTOPLASTY       THYROIDECTOMY Left 4/3/2018    Procedure: THYROIDECTOMY;  Left Thyroid Lobectomy And Ishtmusectomy;  Surgeon: Delia Harper MD;  [facility]     TONSILLECTOMY  age 20s     TONSILLECTOMY  2004?     wisdom teeth extraction           9/27/2022     9:05 AM 3/3/2023     9:10 AM 5/15/2023    10:47 AM   PHQ-9 SCORE   PHQ-9 Total Score MyChart 4 (Minimal depression) 5 (Mild depression) 2 (Minimal depression)   PHQ-9 Total Score 4 5 2         3/7/2022    11:36 AM 9/27/2022     9:06 AM 5/15/2023    10:48 AM   MAXIMO-7 SCORE   Total Score 3 (minimal anxiety) 7 (mild anxiety) 1 (minimal anxiety)   Total Score 3 7 1    1         12/4/2018     8:22 AM 1/31/2020     7:20 AM   WHODAS 2.0 Total Score   Total Score 18 15   Total Score MyChart 18 15     She is  5 foot 11 inches.  Her long-term overall goal is 175 She participates fully. Rapport is excellent.       This telehealth service is appropriate and effective for delivering services in light of the necessity for social distancing to mitigate the COVID-19 epidemic and for conservation of PPE.     Patient has agreed to receiving telehealth services after being informed about it: Yes    Patient prefers video invitation/information to be sent by:   emai    Time service started: 9:02  Time service ended: 9:58  Extended session due to complexity of case and length of interval.    Mode of transmission: Doxy[employer]me arrival;  given instructions to go to [facility].  Location  of originating:  Home  of the patient    Distance site:  Home office of provider for [facility]    The patient has been notified that:  Video visits will be conducted via a call with their psychologist to provide the care they need with a video conversation. Video visits may be billed at different rates depending on insurance coverage.  Patients are advised to please contact their insurance provider with any questions about their health insurance coverage. If during the course of a call the psychologist feels a video visit is not appropriate, patients will not be charged for this service.  Diagnosis:  Major depression, recurrent, mild (F33.0).   Psychological factors affecting obesity (F54).   PLAN/RECOMMENDATIONS:   1. Ms. Catherine will return for video psychotherapy session 6/27 @ 10   to address weight loss and work and family issues with problem solving therapy, which is medically necessary.   She wishes to continue to get support here with her life changes and her son's behavioral/developmental challenges.  2.  Resume schedule of regular exercise to the level that is possible.  Explore getting cardio apparatus e.g., stationary bicycle given her plantar fascitis.    Preference for future meetings: Remote         Last treatment plan signed: 5/26/22  Treatment plan due: 5/26/23 (next session)

## 2023-05-25 NOTE — TELEPHONE ENCOUNTER
PRIOR AUTHORIZATION DENIED    Medication: Qsymia 7.5/46mg-Denied    Denial Date: 7/25/2017    Denial Rational: PA denied due to phentermine and orlistat are the formulary drugs for patient's plan and have not been tried.  To cover Qsymia7.5mg requires 1. The medication has been prescribed for a FDA approved indication or medically accepted indication 2. Trial/failure of the A-rated generic due to an allergic reaction if such generic exists. 3. Be participating in a program that addresses diet and exercise and 4. Trial/failure of 2 formulary alternatives.  If an appeal is desired please let the PA team know when a letter of medical necessity has been written.        Appeal Information:            all other ROS negative except as per HPI

## 2023-06-01 NOTE — PROGRESS NOTES
[facility]  Surgery Consultation    PCP:  Violet Humphreys [phone]    HPI: Patient is a 43-year-old female referred by the above provider for consultation regarding lipoma on her back.  She has had this subcutaneous mass for some time.  Is gotten slightly larger over time.  Causes occasional mild discomfort.    PMH:   has a past medical history of Bariatric surgery status (05/13/2013), Depression, Depressive disorder, Esophageal reflux, Family history of hyperparathyroidism (3/6/2015), Forearm fracture (childhood), Guillain-Lakeside disease (H) (age 14), History of steroid therapy, HX ABNL PAP SMEAR OF CERVIX   (1995), Hypertension (2004), Hypertrophy of breast, Hypertrophy of tonsils alone, Hypovitaminosis D, Irregular heart beat, LBP (low back pain), LSIL (low grade squamous intraepithelial lesion) on Pap smear (5/2006), Lumbago, Major depressive disorder, recurrent episode, in partial or unspecified remission (4/1/2013), Morbid obesity (H), Multiple thyroid nodules, 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  PSH:    has a past surgical history that includes leep tx, cervical (1995); tonsillectomy (age 20s); Septoplasty; wisdom teeth extraction; Laparoscopic gastric sleeve (5/13/2013); Esophagoscopy, gastroscopy, duodenoscopy (EGD), combined (5/28/2013); biopsy (03/13/2015); Tonsillectomy (2004?); Thyroidectomy (Left, 4/3/2018); Esophagoscopy, gastroscopy, duodenoscopy (EGD), combined (N/A, 6/13/2018); and partial thyroidectomy (2018).  Social History:   reports that she has never smoked. She has never used smokeless tobacco. She reports current alcohol use of about 1.0 - 4.0 standard drink of alcohol per week. She reports that she does not use drugs.  Family History:   family history includes Allergies in her father; Arthritis in her mother; Breast Cancer in her maternal aunt; Cancer -  "colorectal in her maternal uncle; Diabetes in her maternal aunt; Gynecology in her mother; Hypertension in her father, mother, sister, and sister; Lipids in her father; Obesity in her father, mother, sister, and sister; Osteoporosis in her mother; Other Cancer in her mother; Parathyroid Disorders in her mother.  Medications/Allergies: Home medications and allergies reviewed.    ROS:  The 10 point Review of Systems is negative other than noted in the HPI.    Physical Exam:  /78   Pulse 98   Ht 1.778 m (5' 10\")   Wt 104.3 kg (230 lb)   LMP 04/23/2023 (Approximate)   BMI 33.00 kg/m    GENERAL: Generally appears well.  Psych: Alert and Oriented.  Normal affect  Eyes: Sclera clear  Respiratory:  Lungs clear to ausculation bilaterally with good air excursion  Cardiovascular:  Regular Rate and Rhythm with no murmurs gallops or rubs, normal peripheral pulses  Integumentary:  No rashes, in the subcutaneous tissues of the back she has a well-circumscribed relatively large mass consistent with lipoma  Neurological: grossly intact    All new lab and imaging data was reviewed.     Impression and Plan:  Patient is a 43 year old female with back lipoma    PLAN: Given its size and location I think she would be best served by excision in the operating room.  The potential risks of bleeding, infection, recurrence were discussed.  Questions were answered.  She was encouraged to schedule at her convenience.      Thank you very much for this consult.    Nelson Younger M.D.  [facility]  [phone]    Please route or send letter to:  Primary Care Provider (PCP) and Referring Provider  "

## 2023-06-27 ENCOUNTER — VIRTUAL VISIT (OUTPATIENT)
Dept: PSYCHOLOGY | Facility: CLINIC | Age: 43
End: 2023-06-27
Payer: COMMERCIAL

## 2023-06-27 DIAGNOSIS — E66.01 PSYCHOLOGICAL FACTORS AFFECTING MORBID OBESITY (H): ICD-10-CM

## 2023-06-27 DIAGNOSIS — Z56.9 OCCUPATIONAL PROBLEM: ICD-10-CM

## 2023-06-27 DIAGNOSIS — F33.0 MAJOR DEPRESSIVE DISORDER, RECURRENT EPISODE, MILD (H): Primary | ICD-10-CM

## 2023-06-27 DIAGNOSIS — Z63.4 BEREAVEMENT: ICD-10-CM

## 2023-06-27 DIAGNOSIS — F54 PSYCHOLOGICAL FACTORS AFFECTING MORBID OBESITY (H): ICD-10-CM

## 2023-06-27 PROCEDURE — 90837 PSYTX W PT 60 MINUTES: CPT | Mod: VID | Performed by: PSYCHOLOGIST

## 2023-06-27 SDOH — ECONOMIC STABILITY - INCOME SECURITY: UNSPECIFIED PROBLEMS RELATED TO EMPLOYMENT: Z56.9

## 2023-06-27 SDOH — SOCIAL STABILITY - SOCIAL INSECURITY: DISSAPEARANCE AND DEATH OF FAMILY MEMBER: Z63.4

## 2023-06-27 NOTE — PROGRESS NOTES
Health Psychology                     Department of Medicine  Julianne Moreno, Ph.D., L.P. [phone]                         [facility] Lis Chavez, Ph.D., L.P. [phone]                     [place] Mail [address]   Donald Hills, Ph.D. [phone]      [address] [place] [address] Susana Ugarte, Ph.D., A.B.P.P., L.P. [phone]             [place] [address]           Jayro Elias, Ph.D., A.B.P.P., L.P. [phone]      Melissa Stokes, Ph.D., L.P. [phone]  [facility]   Elizabeth Gaytan, Ph.D., A.B.P.P., L.P. [phone] [address] [place] [address]    Health Psychology  [facility] Health Progress Note    Intake:  4/1/13    Demographics   Age 42 year old   Sex female   Race Black or    Ethnicity Not  or      Ashley Catherine is a single woman referred initially for psychological consultation for workup for a gastric sleeve procedure who is seen for CBT-oriented therapy regarding weight management, work stresses, and family and social matters. She was seen today for eclectic psychotherapy.  Most of the session focused on her son.     She also discussed her PTSD-like experience at work.  She was working a nigh shift.  Her patient coded.  She has had tough patients.  She usually feels good about code blue processes, but that patient she felt overwhelmed.    Uplils:  [place] concert. Son is improving.      Health:    She finished  PT for her back and r her foot.  She hasn't really incorporated going to the gym yet.   She is still  hoping to get back to the gym July 2023.  She has had appointments.  She doesn't have a lot of energy.  She thinks she has developed allergies.    Her body itches.     Her so is still getting PT, OT, Speech therapy, day treatment, and music therapy.  He will get a new IPE next yearHe would arrive late 4/5 day.  Next month nova be a re-assessment of his autism.     Weight:   Briefly discussed. She thinks her weight is stable, w  here she was last time.   She has an appointment with weight management.    Exercise:  On  Hold. She started to go to gym x3, but has avoided due to foot band back pain, no longer the  stomach pain. Work days she gets 13,000 to 18,000 steps;  Less if work isn't busy.   Tracks with Apple watch.  Discussed increasing exerise, especially out of doors as Spring arrive, notiink biking might be better for her back and feet. 12-13k steps at work.     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 She is now working weekend shifts so as to be more available for her son.  She has been working weekends.  Work has been highly stressful the last few weeks as indicated above.     Family:   Her mother  does childcare for [employer]. Mother has tinnitus.  It was the anniversary of her parents and her father's birthday.   She doesn't think she fully mourned her father's passing.  Her son will see Aydin Thurman for assessment.    She participates fully. Rapport was excellent.        Wt Readings from Last 4 Encounters:   05/18/23 104.3 kg (230 lb)   05/15/23 104.7 kg (230 lb 12.8 oz)   01/06/23 104.4 kg (230 lb 1 oz)   11/29/22 104.4 kg (230 lb 1 oz)     There is no height or weight on file to calculate BMI.     Current Outpatient Medications   Medication     acetaminophen (TYLENOL) 32 mg/mL liquid     Cholecalciferol (VITAMIN D3) 25 MCG (1000 UT) CAPS     cyanocobalamin (VITAMIN B-12) 500 MCG SUBL sublingual tablet     cyclobenzaprine (FLEXERIL) 10 MG tablet     diclofenac (VOLTAREN) 1 % topical gel     ibuprofen (ADVIL/MOTRIN) 200 MG capsule     lisinopril-hydrochlorothiazide (ZESTORETIC) 20-25 MG tablet     LOMAIRA 8 MG tablet     loratadine (CLARITIN) 10 MG tablet     Multiple Vitamins-Minerals (ONE DAILY CALCIUM/IRON PO)     naltrexone (DEPADE/REVIA) 50 MG tablet     No current facility-administered medications for this visit.     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14    hospitalized at [facility] x 1 week     History of steroid therapy      HX ABNL PAP SMEAR OF CERVIX   1995    LEEP AT 15 yo     Hypertension 2004     Hypertrophy of breast      Hypertrophy of tonsils alone      Hypovitaminosis D     with secondary high PTH     Irregular heart beat     palpitations     LBP (low back pain)      LSIL (low grade squamous intraepithelial lesion) on Pap smear 5/2006     Lumbago     RESOLVED     Major depressive disorder, recurrent episode, in partial or unspecified remission 4/1/2013     Morbid obesity (H)     bariatric surgery 5/13/2013     Multiple thyroid nodules      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)      OLIGOMENORRHEA, C/W PCOS      Sciatica      SLEEP APNEA 6/2002    No longer has since tonsillectomy and septoplasty     Thyroid nodule      Past Surgical History:   Procedure Laterality Date     BIOPSY  03/13/2015    Thyroid nodule     ESOPHAGOSCOPY, GASTROSCOPY, DUODENOSCOPY (EGD), COMBINED  5/28/2013    Procedure: COMBINED ESOPHAGOSCOPY, GASTROSCOPY, DUODENOSCOPY (EGD);;  Surgeon: Best Willett MD;  Location:  GI     ESOPHAGOSCOPY, GASTROSCOPY, DUODENOSCOPY (EGD), COMBINED N/A 6/13/2018    Procedure: COMBINED ESOPHAGOSCOPY, GASTROSCOPY, DUODENOSCOPY (EGD), BIOPSY SINGLE OR MULTIPLE;  gastroscopy;  Surgeon: Westley Gibbs MD;  Location:  GI     LAPAROSCOPIC GASTRIC SLEEVE  5/13/2013    Procedure: LAPAROSCOPIC GASTRIC SLEEVE;  Laparoscopic Sleeve Gastrectomy ;  Surgeon: Best Willett MD;  Location:  OR     LEEP TX, CERVICAL  1995    age 15     partial thyroidectomy  2018     SEPTOPLASTY       THYROIDECTOMY Left 4/3/2018    Procedure: THYROIDECTOMY;  Left Thyroid Lobectomy And Ishtmusectomy;   Surgeon: Delia Harper MD;  [facility]: [facility]     TONSILLECTOMY  age 20s     TONSILLECTOMY  2004?     wisdom teeth extraction           9/27/2022     9:05 AM 3/3/2023     9:10 AM 5/15/2023    10:47 AM   PHQ-9 SCORE   PHQ-9 Total Score MyChart 4 (Minimal depression) 5 (Mild depression) 2 (Minimal depression)   PHQ-9 Total Score 4 5 2         3/7/2022    11:36 AM 9/27/2022     9:06 AM 5/15/2023    10:48 AM   MAXIMO-7 SCORE   Total Score 3 (minimal anxiety) 7 (mild anxiety) 1 (minimal anxiety)   Total Score 3 7 1    1         12/4/2018     8:22 AM 1/31/2020     7:20 AM   WHODAS 2.0 Total Score   Total Score 18 15   Total Score MyChart 18 15     She is  5 foot 11 inches.  Her long-term overall goal is 175 She participates fully. Rapport is excellent.       This telehealth service is appropriate and effective for delivering services in light of the necessity for social distancing to mitigate the COVID-19 epidemic and for conservation of PPE.     Patient has agreed to receiving telehealth services after being informed about it: Yes    Patient prefers video invitation/information to be sent by:   [employer]    Time service started: 10:02  Time service ended: 10:56  Extended session due to complexity of case and length of interval.    Mode of transmission: Doxy[employer]me arrival;  given instructions to go to [facility].  Location of originating:  Home  of the patient    Distance site:  Home office of provider for [facility]    The patient has been notified that:  Video visits will be conducted via a call with their psychologist to provide the care they need with a video conversation. Video visits may be billed at different rates depending on insurance coverage.  Patients are advised to please contact their insurance provider with any questions about their health insurance coverage. If during the course of a call the psychologist feels a video visit is not appropriate, patients will not be charged for this service.  Diagnosis:  Major  depression, recurrent, mild (F33.0).   Psychological factors affecting obesity (F54).   PLAN/RECOMMENDATIONS:   1. Ms. Catherine will return for video psychotherapy session 7/26 @ 10 to address weight loss and work and family issues with problem solving therapy, which is medically necessary.   She wishes to continue to get support here with her life changes and her son's behavioral/developmental challenges.  2.  Resume schedule of regular exercise to the level that is possible.  Explore getting cardio apparatus e.g., stationary bicycle given her plantar fascitis.    Preference for future meetings: Remote         Last treatment plan signed: 5/26/22  Treatment plan due: 5/26/23 (next session)

## 2023-06-29 ENCOUNTER — VIRTUAL VISIT (OUTPATIENT)
Dept: FAMILY MEDICINE | Facility: CLINIC | Age: 43
End: 2023-06-29
Payer: COMMERCIAL

## 2023-06-29 DIAGNOSIS — L50.9 URTICARIA: Primary | ICD-10-CM

## 2023-06-29 PROCEDURE — 99213 OFFICE O/P EST LOW 20 MIN: CPT | Mod: VID | Performed by: FAMILY MEDICINE

## 2023-06-29 RX ORDER — TRIAMCINOLONE ACETONIDE 1 MG/G
CREAM TOPICAL 2 TIMES DAILY
Qty: 45 G | Refills: 1 | Status: SHIPPED | OUTPATIENT
Start: 2023-06-29 | End: 2023-10-02

## 2023-06-29 RX ORDER — FAMOTIDINE 20 MG/1
20 TABLET, FILM COATED ORAL 2 TIMES DAILY
Qty: 120 TABLET | Refills: 0 | Status: SHIPPED | OUTPATIENT
Start: 2023-06-29 | End: 2023-08-28

## 2023-06-29 RX ORDER — OLOPATADINE HYDROCHLORIDE 2 MG/ML
1 SOLUTION/ DROPS OPHTHALMIC DAILY
Qty: 2.5 ML | Refills: 3 | Status: SHIPPED | OUTPATIENT
Start: 2023-06-29 | End: 2023-10-02

## 2023-06-29 NOTE — PROGRESS NOTES
Ashley is a 43 year old who is being evaluated via a billable video visit.      How would you like to obtain your AVS? MyChart  If the video visit is dropped, the invitation should be resent by: Send to e-mail at: [email or web address]  Will anyone else be joining your video visit? No        Assessment & Plan     Urticaria  Discussed continued use of Free and clear detergent lotions soaps shampoos  We will try famotidine twice daily and try topical agents to see if this helps with itching and have her see allergist  Labs for complete blood count and UA to rule out infection  Pataday and a steroid cream also sent in.  - Adult Allergy/Asthma Referral; Future  - CBC with platelets and differential; Future  - UA with Microscopic reflex to Culture - lab collect; Future  - famotidine (PEPCID) 20 MG tablet; Take 1 tablet (20 mg) by mouth 2 times daily for 60 days    Prescription drug management  15 minutes spent by me on the date of the encounter doing chart review, history and exam, documentation and further activities per the note            Violet Humphreys MD  [facility]   Ashley is a 43 year old, presenting for the following health issues:  No chief complaint on file.         No data to display              History of Present Illness       Reason for visit:  Itching whole body  Symptom onset:  More than a month  Symptoms include:  Whole body itching  Symptom intensity:  Severe  Symptom progression:  Worsening  Had these symptoms before:  Yes  Has tried/received treatment for these symptoms:  Yes  Previous treatment was successful:  Yes  Prior treatment description:  Zyrtec, claritin  What makes it worse:  Missing daily antihistimine pill  What makes it better:  Antihistimine pill    She eats 2-3 servings of fruits and vegetables daily.She consumes 3 sweetened beverage(s) daily.She exercises with enough effort to increase her heart rate 9 or less minutes per day.  She exercises with  enough effort to increase her heart rate 3 or less days per week.   She is taking medications regularly.       Rash  Onset/Duration: 2-3 weeks ago has been worsening over time - since pandemic    Started taking zyrtec  Then 6 months ago started claritin  Less sedated but in the last month has been worsened  Eyes itching  Vaginal area itching  Wonders about allergy testing  Has been year round  Has no rash  Had some welts in the past.  Victor has this too - [facility] seen with allergy they noted mold and some environmental issues    Has used free and clear detergent and soap  Description  Location: All over   Character: No rash on the body   Itching: moderate  Intensity:  moderate  Progression of Symptoms:  improving  Accompanying signs and symptoms:   Fever: No  Body aches or joint pain: No  Sore throat symptoms: No  Recent cold symptoms: No  History:           Previous episodes of similar rash: History of Dermatitis  New exposures:  None  Recent travel: No  Exposure to similar rash: No  Precipitating or alleviating factors: None   Therapies tried and outcome: Claritin/loratadine -  Mild Effective, but states that the effectiveness has now stopped           Review of Systems         Objective           Vitals:  No vitals were obtained today due to virtual visit.    Physical Exam   GENERAL: Healthy, alert and no distress  EYES: Eyes grossly normal to inspection.  No discharge or erythema, or obvious scleral/conjunctival abnormalities.  RESP: No audible wheeze, cough, or visible cyanosis.  No visible retractions or increased work of breathing.    SKIN: Visible skin clear. No significant rash, abnormal pigmentation or lesions.  NEURO: Cranial nerves grossly intact.  Mentation and speech appropriate for age.  PSYCH: Mentation appears normal, affect normal/bright, judgement and insight intact, normal speech and appearance well-groomed.                Video-Visit Details    Type of service:  Video Visit   Joined the call at  6/29/2023, 3:48:25 pm.  Left the call at 6/29/2023, 3:58:18 pm.  You were on the call for 9 minutes 52 seconds  Originating Location (pt. Location): Home  Distant Location (provider location):  On-site  Platform used for Video Visit: [employer]

## 2023-07-06 ENCOUNTER — LAB (OUTPATIENT)
Dept: LAB | Facility: CLINIC | Age: 43
End: 2023-07-06
Payer: COMMERCIAL

## 2023-07-06 DIAGNOSIS — L50.9 URTICARIA: ICD-10-CM

## 2023-07-06 LAB
ALBUMIN UR-MCNC: NEGATIVE MG/DL
APPEARANCE UR: CLEAR
BACTERIA #/AREA URNS HPF: ABNORMAL /HPF
BASOPHILS # BLD AUTO: 0 10E3/UL (ref 0–0.2)
BASOPHILS NFR BLD AUTO: 1 %
BILIRUB UR QL STRIP: NEGATIVE
COLOR UR AUTO: YELLOW
EOSINOPHIL # BLD AUTO: 0.2 10E3/UL (ref 0–0.7)
EOSINOPHIL NFR BLD AUTO: 2 %
ERYTHROCYTE [DISTWIDTH] IN BLOOD BY AUTOMATED COUNT: 12.8 % (ref 10–15)
GLUCOSE UR STRIP-MCNC: NEGATIVE MG/DL
HCT VFR BLD AUTO: 37 % (ref 35–47)
HGB BLD-MCNC: 12.3 G/DL (ref 11.7–15.7)
HGB UR QL STRIP: NEGATIVE
IMM GRANULOCYTES # BLD: 0 10E3/UL
IMM GRANULOCYTES NFR BLD: 0 %
KETONES UR STRIP-MCNC: ABNORMAL MG/DL
LEUKOCYTE ESTERASE UR QL STRIP: ABNORMAL
LYMPHOCYTES # BLD AUTO: 2.5 10E3/UL (ref 0.8–5.3)
LYMPHOCYTES NFR BLD AUTO: 28 %
MCH RBC QN AUTO: 30.7 PG (ref 26.5–33)
MCHC RBC AUTO-ENTMCNC: 33.2 G/DL (ref 31.5–36.5)
MCV RBC AUTO: 92 FL (ref 78–100)
MONOCYTES # BLD AUTO: 0.5 10E3/UL (ref 0–1.3)
MONOCYTES NFR BLD AUTO: 6 %
NEUTROPHILS # BLD AUTO: 5.6 10E3/UL (ref 1.6–8.3)
NEUTROPHILS NFR BLD AUTO: 64 %
NITRATE UR QL: NEGATIVE
PH UR STRIP: 6 [PH] (ref 5–7)
PLATELET # BLD AUTO: 360 10E3/UL (ref 150–450)
RBC # BLD AUTO: 4.01 10E6/UL (ref 3.8–5.2)
RBC #/AREA URNS AUTO: ABNORMAL /HPF
SP GR UR STRIP: >=1.03 (ref 1–1.03)
SQUAMOUS #/AREA URNS AUTO: ABNORMAL /LPF
UROBILINOGEN UR STRIP-ACNC: 0.2 E.U./DL
WBC # BLD AUTO: 8.8 10E3/UL (ref 4–11)
WBC #/AREA URNS AUTO: ABNORMAL /HPF

## 2023-07-06 PROCEDURE — 85025 COMPLETE CBC W/AUTO DIFF WBC: CPT

## 2023-07-06 PROCEDURE — 81001 URINALYSIS AUTO W/SCOPE: CPT

## 2023-07-06 PROCEDURE — 36415 COLL VENOUS BLD VENIPUNCTURE: CPT

## 2023-07-11 ENCOUNTER — TELEPHONE (OUTPATIENT)
Dept: ENDOCRINOLOGY | Facility: CLINIC | Age: 43
End: 2023-07-11

## 2023-07-11 ENCOUNTER — VIRTUAL VISIT (OUTPATIENT)
Dept: ENDOCRINOLOGY | Facility: CLINIC | Age: 43
End: 2023-07-11
Payer: COMMERCIAL

## 2023-07-11 VITALS — WEIGHT: 235 LBS | HEIGHT: 70 IN | BODY MASS INDEX: 33.64 KG/M2

## 2023-07-11 DIAGNOSIS — E66.811 OBESITY, CLASS I, BMI 30-34.9: Primary | ICD-10-CM

## 2023-07-11 DIAGNOSIS — Z98.84 S/P LAPAROSCOPIC SLEEVE GASTRECTOMY: ICD-10-CM

## 2023-07-11 PROCEDURE — 99212 OFFICE O/P EST SF 10 MIN: CPT | Mod: VID | Performed by: INTERNAL MEDICINE

## 2023-07-11 RX ORDER — SEMAGLUTIDE 0.25 MG/.5ML
0.25 INJECTION, SOLUTION SUBCUTANEOUS WEEKLY
Qty: 2 ML | Refills: 0 | Status: SHIPPED | OUTPATIENT
Start: 2023-07-11 | End: 2023-10-09

## 2023-07-11 ASSESSMENT — PAIN SCALES - GENERAL: PAINLEVEL: NO PAIN (0)

## 2023-07-11 NOTE — PROGRESS NOTES
"Return Medical Weight Management Note     Ashley Catherine  MRN:  [id number]  :  1980  RAKEL:  2023    Dear Violet Humphreys MD,    I had the pleasure of seeing your patient Ashley Catherine. She is a 43 year old female who I am continuing to see for treatment of obesity related to mild depression, hypertension, GERD        5/15/2017     8:08 AM   --   I have the following health issues associated with obesity High Blood Pressure    GERD (Reflux)   I have the following symptoms associated with obesity GERD (Reflux)     She has hx of sleeve gastrectomy in . She lost from 290 to 186 after surgery with subsequent weight regain.  Was on phentermine and tried for 2 months and had \"dark thought\". She did lose 7 lbs while taking it.  Lowest weight was 184 lbs when taking Qsymia (tingling and mood changes)  Was on Saxenda but stopped in 2021. Does not feel it is working anymore.  She could not tolerate topiramate due to emotional lability.    INTERVAL HISTORY:  [facility] follow up. Last seen 2022    Stopped naltrexone and phentermine about 1 month ago due to skin itchiness.  She is now on famotidine and is waiting to see allergist.    She continues to struggle with inability to lose more weight.     Total loss of 39 lbs since starting the program.    Activity -- more outside in the summer, walk daily    Sleep 6 hours a night    CURRENT WEIGHT:   235 lbs 0 oz    Highest wt in life: 293 lbs  Initial Weight (lbs): 274 lbs  Last Visits Weight: 104.3 kg (230 lb)  Cumulative weight loss (lbs): 39  Weight Loss Percentage: 14.23%        7/10/2023     1:18 PM   Changes and Difficulties   With regards to my diet, I am still struggling with: Drinks with sugar   I have made the following changes to my activity/exercise since my last visit: Walking more   With regards to my activity/exercise, I am still struggling with: Daily exercise       VITALS:  Ht 1.778 m (5' 10\")   Wt 106.6 kg (235 lb)   LMP 06/15/2023 (Approximate) "   BMI 33.72 kg/m      MEDICATIONS:   Current Outpatient Medications   Medication Sig Dispense Refill     acetaminophen (TYLENOL) 32 mg/mL liquid Take 1,000 mg by mouth every 8 hours as needed for fever or mild pain       Cholecalciferol (VITAMIN D3) 25 MCG (1000 UT) CAPS        cyanocobalamin (VITAMIN B-12) 500 MCG SUBL sublingual tablet Place 500 mcg under the tongue every other day        cyclobenzaprine (FLEXERIL) 10 MG tablet Take 1 tablet (10 mg) by mouth daily as needed for muscle spasms 45 tablet 4     diclofenac (VOLTAREN) 1 % topical gel 1 gram to affected areas on feet 2-3 times daily as needed for pain. 100 g 3     famotidine (PEPCID) 20 MG tablet Take 1 tablet (20 mg) by mouth 2 times daily for 60 days 120 tablet 0     ibuprofen (ADVIL/MOTRIN) 200 MG capsule Take 400 mg by mouth as needed for fever       lisinopril-hydrochlorothiazide (ZESTORETIC) 20-25 MG tablet Take 1 tablet by mouth daily 90 tablet 3     LOMAIRA 8 MG tablet TAKE 1 TABLET (8 MG) BY MOUTH 2 TIMES DAILY TAKE 8 MG IN THE MORNING AND 8 MG TAKING AROUND NOON TO 2 PM 60 tablet 0     loratadine (CLARITIN) 10 MG tablet Take 10 mg by mouth daily       Multiple Vitamins-Minerals (ONE DAILY CALCIUM/IRON PO) Take 1 tablet by mouth daily       naltrexone (DEPADE/REVIA) 50 MG tablet Take 1 tablet (50 mg) by mouth daily 90 tablet 2     olopatadine (PATADAY) 0.2 % ophthalmic solution Place 0.05 mLs (1 drop) into both eyes daily For itchy eyes 2.5 mL 3     triamcinolone (KENALOG) 0.1 % external cream Apply topically 2 times daily To skin or itchy areas as needed.  Limit regular use to 1-2 weeks 45 g 1           7/10/2023     1:18 PM   Weight Loss Medication History Reviewed With Patient   Which weight loss medications are you currently taking on a regular basis? None   If you are not taking a weight loss medication that was prescribed to you, please indicate why: I did not like the side effects    It did not seem to be helping me   Are you having any  side effects from the weight loss medication that we have prescribed you? Yes   If you are having side effects please describe: Itching?       ASSESSMENT/PLAN:   Ashley Catherine is a 43 year old female who I am continuing to see for treatment of obesity    Hx of sleeve with some weight regain.   Used to be on Saxenda and maintained weight -- stopped early 2021  Could not tolerate topiramate due to emotional lability    stopped phentermine and naltrexone due to skin itciness  Unable to lo  Loss of 39 lbs since starting the program.    Plan:  Start Wegovy 0.25 mg weekly x4 weeks;  increase to 0.5 mg weekly x4 weeks;  increase to 1.0 mg weekly for 4 weeks;  increase to 1.7 mg weekly for 4 weeks;  increase to 2.4 mg weekly thereafter    - encourage diet modification and exercise    FOLLOW-UP:    See Lauren Bloch, PharmD in 2 month(s)  See Dr.Tasma MARCH in 4 month(s)    Joined the call at 7/11/2023, 8:30:04?am.  Left the call at 7/11/2023, 8:37:59?am.  You were on the call for 7 minutes 54 seconds .    External notes/medical records independently reviewed, labs and imaging independently reviewed, medical management and tests to be discussed/communicated to patient.    Time: I spent 18 minutes spent on the date of the encounter preparing to see patient (including chart review and preparation), obtaining and or reviewing additional medical history, performing a physical exam and evaluation, documenting clinical information in the electronic health record, independently interpreting results, communicating results to the patient and coordinating care.      Sincerely,    Gagandeep Qureshi MD

## 2023-07-11 NOTE — TELEPHONE ENCOUNTER
PA Initiation    Medication: WEGOVY 0.25 MG/0.5ML SC SOAJ  Insurance Company: [employer] - Phone [phone] Fax [phone]  Pharmacy Filling the Rx:    Filling Pharmacy Phone:    Filling Pharmacy Fax:    Start Date: 7/11/2023    Key: [id number]

## 2023-07-11 NOTE — LETTER
"2023       RE: Ashley Catherine  [address]  [facility] [address]     Dear Colleague,    Thank you for referring your patient, Ashley Catherine, to the [facility] WEIGHT MANAGEMENT CLINIC [place] at [facility]. Please see a copy of my visit note below.    Virtual Visit Details    Type of service:  Video Visit     Originating Location (pt. Location): Home    Distant Location (provider location):  On-site  Platform used for Video Visit: [facility] Medical Weight Management Note     Ashley Catherine  MRN:  [id number]  :  1980  RAKEL:  2023    Dear Violet Humphreys MD,    I had the pleasure of seeing your patient Ashley Catherine. She is a 43 year old female who I am continuing to see for treatment of obesity related to mild depression, hypertension, GERD        5/15/2017     8:08 AM   --   I have the following health issues associated with obesity High Blood Pressure    GERD (Reflux)   I have the following symptoms associated with obesity GERD (Reflux)     She has hx of sleeve gastrectomy in . She lost from 290 to 186 after surgery with subsequent weight regain.  Was on phentermine and tried for 2 months and had \"dark thought\". She did lose 7 lbs while taking it.  Lowest weight was 184 lbs when taking Qsymia (tingling and mood changes)  Was on Saxenda but stopped in 2021. Does not feel it is working anymore.  She could not tolerate topiramate due to emotional lability.    INTERVAL HISTORY:  [facility] follow up. Last seen 2022    Stopped naltrexone and phentermine about 1 month ago due to skin itchiness.  She is now on famotidine and is waiting to see allergist.    She continues to struggle with inability to lose more weight.     Total loss of 39 lbs since starting the program.    Activity -- more outside in the summer, walk daily    Sleep 6 hours a night    CURRENT WEIGHT:   235 lbs 0 oz    Highest wt in life: 293 lbs  Initial Weight " "(lbs): 274 lbs  Last Visits Weight: 104.3 kg (230 lb)  Cumulative weight loss (lbs): 39  Weight Loss Percentage: 14.23%        7/10/2023     1:18 PM   Changes and Difficulties   With regards to my diet, I am still struggling with: Drinks with sugar   I have made the following changes to my activity/exercise since my last visit: Walking more   With regards to my activity/exercise, I am still struggling with: Daily exercise       VITALS:  Ht 1.778 m (5' 10\")   Wt 106.6 kg (235 lb)   LMP 06/15/2023 (Approximate)   BMI 33.72 kg/m      MEDICATIONS:   Current Outpatient Medications   Medication Sig Dispense Refill    acetaminophen (TYLENOL) 32 mg/mL liquid Take 1,000 mg by mouth every 8 hours as needed for fever or mild pain      Cholecalciferol (VITAMIN D3) 25 MCG (1000 UT) CAPS       cyanocobalamin (VITAMIN B-12) 500 MCG SUBL sublingual tablet Place 500 mcg under the tongue every other day       cyclobenzaprine (FLEXERIL) 10 MG tablet Take 1 tablet (10 mg) by mouth daily as needed for muscle spasms 45 tablet 4    diclofenac (VOLTAREN) 1 % topical gel 1 gram to affected areas on feet 2-3 times daily as needed for pain. 100 g 3    famotidine (PEPCID) 20 MG tablet Take 1 tablet (20 mg) by mouth 2 times daily for 60 days 120 tablet 0    ibuprofen (ADVIL/MOTRIN) 200 MG capsule Take 400 mg by mouth as needed for fever      lisinopril-hydrochlorothiazide (ZESTORETIC) 20-25 MG tablet Take 1 tablet by mouth daily 90 tablet 3    LOMAIRA 8 MG tablet TAKE 1 TABLET (8 MG) BY MOUTH 2 TIMES DAILY TAKE 8 MG IN THE MORNING AND 8 MG TAKING AROUND NOON TO 2 PM 60 tablet 0    loratadine (CLARITIN) 10 MG tablet Take 10 mg by mouth daily      Multiple Vitamins-Minerals (ONE DAILY CALCIUM/IRON PO) Take 1 tablet by mouth daily      naltrexone (DEPADE/REVIA) 50 MG tablet Take 1 tablet (50 mg) by mouth daily 90 tablet 2    olopatadine (PATADAY) 0.2 % ophthalmic solution Place 0.05 mLs (1 drop) into both eyes daily For itchy eyes 2.5 mL 3    " triamcinolone (KENALOG) 0.1 % external cream Apply topically 2 times daily To skin or itchy areas as needed.  Limit regular use to 1-2 weeks 45 g 1           7/10/2023     1:18 PM   Weight Loss Medication History Reviewed With Patient   Which weight loss medications are you currently taking on a regular basis? None   If you are not taking a weight loss medication that was prescribed to you, please indicate why: I did not like the side effects    It did not seem to be helping me   Are you having any side effects from the weight loss medication that we have prescribed you? Yes   If you are having side effects please describe: Itching?       ASSESSMENT/PLAN:   Ashley Catherine is a 43 year old female who I am continuing to see for treatment of obesity    Hx of sleeve with some weight regain.   Used to be on Saxenda and maintained weight -- stopped early 2021  Could not tolerate topiramate due to emotional lability    stopped phentermine and naltrexone due to skin itciness  Unable to lo  Loss of 39 lbs since starting the program.    Plan:  Start Wegovy 0.25 mg weekly x4 weeks;  increase to 0.5 mg weekly x4 weeks;  increase to 1.0 mg weekly for 4 weeks;  increase to 1.7 mg weekly for 4 weeks;  increase to 2.4 mg weekly thereafter    - encourage diet modification and exercise    FOLLOW-UP:    See Lauren Bloch, PharmD in 2 month(s)  See Dr.Tasma MARCH in 4 month(s)    Joined the call at 7/11/2023, 8:30:04?am.  Left the call at 7/11/2023, 8:37:59?am.  You were on the call for 7 minutes 54 seconds .    External notes/medical records independently reviewed, labs and imaging independently reviewed, medical management and tests to be discussed/communicated to patient.    Time: I spent 18 minutes spent on the date of the encounter preparing to see patient (including chart review and preparation), obtaining and or reviewing additional medical history, performing a physical exam and evaluation, documenting clinical information in the  electronic health record, independently interpreting results, communicating results to the patient and coordinating care.      Sincerely,    Gagandeep Qureshi MD

## 2023-07-11 NOTE — PATIENT INSTRUCTIONS
Stop phentermine and naltrexone    Start Wegovy    Start Wegovy 0.25 mg weekly x4 weeks;  increase to 0.5 mg weekly x4 weeks;  increase to 1.0 mg weekly for 4 weeks;  increase to 1.7 mg weekly for 4 weeks;  increase to 2.4 mg weekly thereafter      See Lauren Bloch, PharmD in 2 month(s)  See Dr.Tasma MARCH in 4 month(s)    If you have any questions, please do not hesitate to call Weight management clinic at [phone] or [phone].  If you need to fax, please fax to [phone].    Sincerely,    Gagandeep Qureshi MD  Endocrinology

## 2023-07-11 NOTE — PROGRESS NOTES
Virtual Visit Details    Type of service:  Video Visit     Originating Location (pt. Location): Home    Distant Location (provider location):  On-site  Platform used for Video Visit: [employer]

## 2023-07-11 NOTE — TELEPHONE ENCOUNTER
Prior Authorization Approval    Medication: WEGOVY 0.25 MG/0.5ML SC SOAJ  Authorization Effective Date: 6/11/2023  Authorization Expiration Date: 7/10/2024  Approved Dose/Quantity: 2ml/28 days   Reference #: [id number]   Insurance Company: [employer] - Phone [phone] Fax [phone]  Expected CoPay: 35.00     CoPay Card Available:      Financial Assistance Needed: no  Which Pharmacy is filling the prescription:    Pharmacy Notified:    Patient Notified:

## 2023-07-11 NOTE — NURSING NOTE
Is the patient currently in the state of [place]? YES    Visit mode:VIDEO    If the visit is dropped, the patient can be reconnected by: VIDEO VISIT: Text to cell phone: [phone]    Will anyone else be joining the visit? NO      How would you like to obtain your AVS? MyChart    Are changes needed to the allergy or medication list? NO    Reason for visit: RECHECK (JEANNE)        Salome Martin, VF

## 2023-07-13 ENCOUNTER — TELEPHONE (OUTPATIENT)
Dept: ENDOCRINOLOGY | Facility: CLINIC | Age: 43
End: 2023-07-13
Payer: COMMERCIAL

## 2023-07-13 NOTE — TELEPHONE ENCOUNTER
MTM referral from: [facility] visit (referral by provider)    MTM referral outreach attempt #2 on July 13, 2023 at 3:23 PM      Outcome: Patient not reachable after several attempts, will route to MTM Pharmacist/Provider as an FYI.  MT scheduling number is [phone].  Thank you for the referral.    Use VBC for the carrier/Plan on the flowsheet    Tonie Donovan CPhT  MT

## 2023-07-18 ENCOUNTER — TRANSFERRED RECORDS (OUTPATIENT)
Dept: HEALTH INFORMATION MANAGEMENT | Facility: CLINIC | Age: 43
End: 2023-07-18
Payer: COMMERCIAL

## 2023-07-24 ENCOUNTER — MYC MEDICAL ADVICE (OUTPATIENT)
Dept: FAMILY MEDICINE | Facility: CLINIC | Age: 43
End: 2023-07-24
Payer: COMMERCIAL

## 2023-07-24 DIAGNOSIS — I10 BENIGN ESSENTIAL HYPERTENSION: Primary | ICD-10-CM

## 2023-07-25 RX ORDER — HYDROCHLOROTHIAZIDE 25 MG/1
25 TABLET ORAL DAILY
Qty: 90 TABLET | Refills: 3 | Status: SHIPPED | OUTPATIENT
Start: 2023-07-25 | End: 2024-06-03

## 2023-07-25 RX ORDER — AMLODIPINE BESYLATE 5 MG/1
5 TABLET ORAL DAILY
Qty: 90 TABLET | Refills: 0 | Status: SHIPPED | OUTPATIENT
Start: 2023-07-25 | End: 2023-12-07

## 2023-07-26 ENCOUNTER — VIRTUAL VISIT (OUTPATIENT)
Dept: PSYCHOLOGY | Facility: CLINIC | Age: 43
End: 2023-07-26
Payer: COMMERCIAL

## 2023-07-26 DIAGNOSIS — E66.01 PSYCHOLOGICAL FACTORS AFFECTING MORBID OBESITY (H): ICD-10-CM

## 2023-07-26 DIAGNOSIS — F54 PSYCHOLOGICAL FACTORS AFFECTING MORBID OBESITY (H): ICD-10-CM

## 2023-07-26 DIAGNOSIS — F33.0 MAJOR DEPRESSIVE DISORDER, RECURRENT EPISODE, MILD (H): Primary | ICD-10-CM

## 2023-07-26 DIAGNOSIS — Z56.9 OCCUPATIONAL PROBLEM: ICD-10-CM

## 2023-07-26 PROCEDURE — 90837 PSYTX W PT 60 MINUTES: CPT | Mod: VID | Performed by: PSYCHOLOGIST

## 2023-07-26 SDOH — ECONOMIC STABILITY - INCOME SECURITY: UNSPECIFIED PROBLEMS RELATED TO EMPLOYMENT: Z56.9

## 2023-07-26 NOTE — PROGRESS NOTES
Health Psychology                     Department of Medicine  Julianne Moreno, Ph.D., L.P. [phone]                         [facility] Lis Chavez, Ph.D., L.P. [phone]                     [place] Mail Code 611   Donald Hills, Ph.D. [phone]      [address] [place] [address] Susana Ugarte, Ph.D., A.B.P.P., L.P. [phone]             [place] [address]           Jayro Elias, Ph.D., A.B.P.P., L.P. [phone]      Melissa Stokes, Ph.D., L.P. [phone]  [facility]   Elizabeth Gaytan, Ph.D., A.B.P.P., L.P. [phone] [address] [place] [address]    Health Psychology  [facility] Health Progress Note    Intake:  4/1/13    Demographics   Age 43 year old   Sex female   Race Black or    Ethnicity Not  or      Ashley Catherine is a single woman referred initially for psychological consultation for workup for a gastric sleeve procedure who is seen for CBT-oriented therapy regarding weight management, work stresses, and family and social matters. She was seen today for eclectic psychotherapy.  Most of the session focused on her son.     She also discussed her PTSD-like experience at work.  She was working a nigh shift.  Her patient coded.  She has had tough patients.  She usually feels good about code blue processes, but that patient she felt overwhelmed.    Uplifts: .Went to the [employer].. Son is improvign in a few ways..      Health: She finished  PT for her back and r her foot.  She hasn't really incorporated going to the gym yet.   She is still  hoping to get back to the gym July 2023.  She has had appointments.  She doesn't have a lot of energy.  She has developed allergie to mold and dust.    Weight: Briefly discussed. She thinks her weight is stable, w here she was last time.   She has an appointment with weight management.  Started Wegovy; concerned about possible adverse effects- none so far. Not sure of effectiveness.  Has had 2 injections  so far.  Discussed tracking calories, e.g., 1 week/month.    Exercise: On  Hold. She started to go to gym x3, but has avoided due to foot band back pain, no longer the  stomach pain. Work days she gets 13,000 to 18,000 steps;  Less if work isn't busy.   Tracks with Apple watch.  Discussed increasing exerise, especially out of doors as Spring arrive, notiink biking might be better for her back and feet. 12-13k steps at work. No exercise equipment.    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 She is now working weekend shifts so as to be more available for her son.  She works weekends.     Family:  Her mother  does childcare for [employer]. Mother has tinnitus.Son is sleeping through the night.  Diet is still very restricted. He is still using formula. He is getting music therapy.  He is starting to say some words.  They are wondering about aphasia. He is no longer smearing.  He is not yet ready to use the toilet.  Her son is still getting PT, OT, Speech therapy, day treatment, and music therapy. His therapist left; not yet replaced.  He had his annual re-assessment of his autism, which went well with Aydni Thurman.    She participates fully. Rapport was excellent.        Wt Readings from Last 4 Encounters:   07/11/23 106.6 kg (235 lb)   05/18/23 104.3 kg (230 lb)   05/15/23 104.7 kg (230 lb 12.8 oz)   01/06/23 104.4 kg (230 lb 1 oz)     There is no height or weight on file to calculate BMI.     Current Outpatient Medications   Medication     acetaminophen (TYLENOL) 32 mg/mL liquid     amLODIPine (NORVASC) 5 MG tablet     Cholecalciferol (VITAMIN D3) 25 MCG (1000 UT) CAPS     cyanocobalamin (VITAMIN B-12) 500 MCG SUBL sublingual tablet     cyclobenzaprine (FLEXERIL) 10 MG tablet     diclofenac (VOLTAREN) 1 % topical gel     famotidine (PEPCID) 20 MG tablet     hydrochlorothiazide (HYDRODIURIL) 25 MG tablet     ibuprofen (ADVIL/MOTRIN) 200 MG capsule     loratadine (CLARITIN) 10 MG tablet      Multiple Vitamins-Minerals (ONE DAILY CALCIUM/IRON PO)     olopatadine (PATADAY) 0.2 % ophthalmic solution     Semaglutide-Weight Management (WEGOVY) 0.25 MG/0.5ML pen     Semaglutide-Weight Management (WEGOVY) 0.5 MG/0.5ML pen     Semaglutide-Weight Management (WEGOVY) 1 MG/0.5ML pen     triamcinolone (KENALOG) 0.1 % external cream     No current facility-administered medications for this visit.     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14    hospitalized at [facility] x 1 week     History of steroid therapy      HX ABNL PAP SMEAR OF CERVIX   1995    LEEP AT 15 yo     Hypertension 2004     Hypertrophy of breast      Hypertrophy of tonsils alone      Hypovitaminosis D     with secondary high PTH     Irregular heart beat     palpitations     LBP (low back pain)      LSIL (low grade squamous intraepithelial lesion) on Pap smear 5/2006     Lumbago     RESOLVED     Major depressive disorder, recurrent episode, in partial or unspecified remission 4/1/2013     Morbid obesity (H)     bariatric surgery 5/13/2013     Multiple thyroid nodules      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)      OLIGOMENORRHEA, C/W PCOS      Sciatica      SLEEP APNEA 6/2002    No longer has since tonsillectomy and septoplasty     Thyroid nodule      Past Surgical History:   Procedure Laterality Date     BIOPSY  03/13/2015    Thyroid nodule     ESOPHAGOSCOPY, GASTROSCOPY, DUODENOSCOPY (EGD), COMBINED  5/28/2013    Procedure: COMBINED ESOPHAGOSCOPY, GASTROSCOPY, DUODENOSCOPY (EGD);;  Surgeon: Best Willett MD;  Location:  GI     ESOPHAGOSCOPY, GASTROSCOPY, DUODENOSCOPY (EGD), COMBINED N/A 6/13/2018    Procedure: COMBINED ESOPHAGOSCOPY, GASTROSCOPY, DUODENOSCOPY (EGD), BIOPSY SINGLE OR MULTIPLE;   gastroscopy;  Surgeon: Westley Gibbs MD;  Location:  GI     LAPAROSCOPIC GASTRIC SLEEVE  5/13/2013    Procedure: LAPAROSCOPIC GASTRIC SLEEVE;  Laparoscopic Sleeve Gastrectomy ;  Surgeon: Best Willett MD;  Location: [facility] OR     LEEP TX, CERVICAL  1995    age 15     partial thyroidectomy  2018     SEPTOPLASTY       THYROIDECTOMY Left 4/3/2018    Procedure: THYROIDECTOMY;  Left Thyroid Lobectomy And Ishtmusectomy;  Surgeon: Delia Harper MD;  Location: [facility] OR     TONSILLECTOMY  age 20s     TONSILLECTOMY  2004?     wisdom teeth extraction           9/27/2022     9:05 AM 3/3/2023     9:10 AM 5/15/2023    10:47 AM   PHQ-9 SCORE   PHQ-9 Total Score MyChart 4 (Minimal depression) 5 (Mild depression) 2 (Minimal depression)   PHQ-9 Total Score 4 5 2         3/7/2022    11:36 AM 9/27/2022     9:06 AM 5/15/2023    10:48 AM   MAXIMO-7 SCORE   Total Score 3 (minimal anxiety) 7 (mild anxiety) 1 (minimal anxiety)   Total Score 3 7 1    1         12/4/2018     8:22 AM 1/31/2020     7:20 AM   WHODAS 2.0 Total Score   Total Score 18 15   Total Score MyChart 18 15     She is  5 foot 11 inches.  Her long-term overall goal is 175 She participates fully. Rapport is excellent.       This telehealth service is appropriate and effective for delivering services in light of the necessity for social distancing to mitigate the COVID-19 epidemic and for conservation of PPE.     Patient has agreed to receiving telehealth services after being informed about it: Yes    Patient prefers video invitation/information to be sent by:   [employer]    Time service started: 9:59  Time service ended: 10:52  Extended session due to complexity of case and length of interval.    Mode of transmission: D[employer]me arrival;  given instructions to go to [facility].  Location of originating:  Home  of the patient    Distance site:  Home office of provider for [facility]    The patient has been notified that:  Video visits will be conducted via a call with their  psychologist to provide the care they need with a video conversation. Video visits may be billed at different rates depending on insurance coverage.  Patients are advised to please contact their insurance provider with any questions about their health insurance coverage. If during the course of a call the psychologist feels a video visit is not appropriate, patients will not be charged for this service.  Diagnosis:  Major depression, recurrent, mild (F33.0).   Psychological factors affecting obesity (F54).   PLAN/RECOMMENDATIONS:   1. Ms. Catherine will return for video psychotherapy session 8/24 @ 9 to address weight loss and work and family issues with problem solving therapy, which is medically necessary.   She wishes to continue to get support here with her life changes and her son's behavioral/developmental challenges.  2.  Resume schedule of regular exercise to the level that is possible.  Explore getting cardio apparatus e.g., stationary bicycle given her plantar fascitis.    Preference for future meetings: Remote         Last treatment plan signed: 5/26/22  Treatment plan due: 5/26/23 (next session)

## 2023-07-27 NOTE — RESULT ENCOUNTER NOTE
Hello,    The ua is nonspecific and could reflect vaginal fluid contamination.  The ketones are probably a little dehydration.  Let me know if you have questions.    Violet Humphreys MD

## 2023-08-24 ENCOUNTER — VIRTUAL VISIT (OUTPATIENT)
Dept: PSYCHOLOGY | Facility: CLINIC | Age: 43
End: 2023-08-24
Payer: COMMERCIAL

## 2023-08-24 DIAGNOSIS — F54 PSYCHOLOGICAL FACTORS AFFECTING MORBID OBESITY (H): ICD-10-CM

## 2023-08-24 DIAGNOSIS — Z56.9 OCCUPATIONAL PROBLEM: ICD-10-CM

## 2023-08-24 DIAGNOSIS — E66.01 PSYCHOLOGICAL FACTORS AFFECTING MORBID OBESITY (H): ICD-10-CM

## 2023-08-24 DIAGNOSIS — F33.0 MAJOR DEPRESSIVE DISORDER, RECURRENT EPISODE, MILD (H): Primary | ICD-10-CM

## 2023-08-24 PROCEDURE — 90837 PSYTX W PT 60 MINUTES: CPT | Mod: VID | Performed by: PSYCHOLOGIST

## 2023-08-24 SDOH — ECONOMIC STABILITY - INCOME SECURITY: UNSPECIFIED PROBLEMS RELATED TO EMPLOYMENT: Z56.9

## 2023-08-24 NOTE — PROGRESS NOTES
"  Health Psychology                     Department of Medicine  Julianne Moreno, Ph.D., L.P. [phone]                         [facility] Lis Chavez, Ph.D., L.P. [phone]                     [place] Mail [address]   Chasity Hillser, Ph.D. [phone]      [address] [place] [address] Susana Ugarte, Ph.D., A.B.P.P., L.P. [phone]             [place] [address]           Jayro Elias, Ph.D., A.B.P.P., L.P. [phone]      Melissa Stokes, Ph.D., L.P. [phone]  [facility]   Elizabeth Gaytan, Ph.D., A.B.P.P., L.P. [phone] [address] [place] [address]    Health Psychology  [facility] Progress Note    Intake:  4/1/13    Demographics   Age 43 year old   Sex female   Race Black or    Ethnicity Not  or      Ashley Catherine is a single woman referred initially for psychological consultation for workup for a gastric sleeve procedure who is seen for CBT-oriented therapy regarding weight management, work stresses, and family and social matters. She was seen today for eclectic psychotherapy.          She goes to bed early,9:30; wakes up 6-6:30.   Still tired.    Uplifts: Liked [employer]. Son is improving in a few ways.   Discussed aspirations and whether she might want more education.      Health: She finished  PT for her back and r her foot.  She hasn't really incorporated going to the gym yet.   She is still  hoping to get back to the gym July 2023.  She has had appointments.  She doesn't have a lot of energy.  She has developed allergie to mold and dust.    \"I am always tired- doesn't matter how much sleep I get.\" Previous sleep study revealed apnea.  She had a CPAP, but never successfully used it.     Son (4) just got over hand-foot  and mouth disease.  Starting to rebuild  diversification of diet. Still at [facility] treatment, and will get home services through [facility] 1-2/week, speech therapist 1/week. .  Not sure whee to send him " "for .  Son is 62 lbs., 43 inches; husky.  He is on track  for height 97th percentile.     Weight: Briefly discussed. She thinks her weight is stable, w here she was last time.   She has an appointment with weight management.  Started Wegovy; concerned about possible adverse effects- none so far. Not sure of effectiveness.  Has had 6 injections so far.  Will be increasing the dose.   Discussed tracking calories, e.g., 1 week/month.   Hasn't done it for a whole week;  She did 2-3 days a few weeks earlier. \"I drink most of my calories.\"  She still does the coffee drinks and energy drinks. Discussed deleting coffee and creamers.    Exercise:  Not going to gym. Work days she gets 13,000 to 18,000 steps;  Less if work isn't busy.   Tracks with Apple watch.  Discussed increasing exerise, especially out of doors as weather cools, Estimated  12-13k steps at work. No exercise equipment at home.   Discussed getting out on walks with him.  Their allergies have stopped them from going.     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 She is now working weekend shifts so as to be more available for her son.  She works weekends.  Work recently improved.  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  Patient care is less stressful, more manageable.      Family:  Her mother  does childcare for [employer]. Mother has tinnitus.Son is sleeping through the night.  Diet is still very restricted. He is still using formula. He is getting music therapy.  He is starting to say some words.  They are wondering about aphasia. He is no longer smearing.  He is not yet ready to use the toilet.  Her son is still getting PT, OT, Speech therapy, day treatment, and music therapy.      She participates fully. Rapport was excellent.     234 lbs   8/24/23 per self       Wt Readings from Last 4 Encounters:   07/11/23 106.6 kg (235 lb)   05/18/23 104.3 kg (230 lb)   05/15/23 104.7 kg (230 lb " 12.8 oz)   01/06/23 104.4 kg (230 lb 1 oz)     There is no height or weight on file to calculate BMI.     Current Outpatient Medications   Medication    acetaminophen (TYLENOL) 32 mg/mL liquid    amLODIPine (NORVASC) 5 MG tablet    Cholecalciferol (VITAMIN D3) 25 MCG (1000 UT) CAPS    cyanocobalamin (VITAMIN B-12) 500 MCG SUBL sublingual tablet    cyclobenzaprine (FLEXERIL) 10 MG tablet    diclofenac (VOLTAREN) 1 % topical gel    famotidine (PEPCID) 20 MG tablet    hydrochlorothiazide (HYDRODIURIL) 25 MG tablet    ibuprofen (ADVIL/MOTRIN) 200 MG capsule    loratadine (CLARITIN) 10 MG tablet    Multiple Vitamins-Minerals (ONE DAILY CALCIUM/IRON PO)    olopatadine (PATADAY) 0.2 % ophthalmic solution    Semaglutide-Weight Management (WEGOVY) 0.25 MG/0.5ML pen    Semaglutide-Weight Management (WEGOVY) 0.5 MG/0.5ML pen    Semaglutide-Weight Management (WEGOVY) 1 MG/0.5ML pen    triamcinolone (KENALOG) 0.1 % external cream     No current facility-administered medications for this visit.     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14    hospitalized at [facility] x 1 week    History of steroid therapy     HX ABNL PAP SMEAR OF CERVIX   1995    LEEP AT 15 yo    Hypertension 2004    Hypertrophy of breast     Hypertrophy of tonsils alone     Hypovitaminosis D     with secondary high PTH    Irregular heart beat     palpitations    LBP (low back pain)     LSIL (low grade squamous intraepithelial lesion) on Pap smear 5/2006    Lumbago     RESOLVED    Major depressive disorder, recurrent episode, in partial or unspecified remission 4/1/2013    Morbid obesity (H)     bariatric surgery 5/13/2013    Multiple thyroid nodules     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)     OLIGOMENORRHEA, C/W PCOS      Sciatica     SLEEP APNEA 6/2002    No longer has since tonsillectomy and septoplasty    Thyroid nodule      Past Surgical History:   Procedure Laterality Date    BIOPSY  03/13/2015    Thyroid nodule    ESOPHAGOSCOPY, GASTROSCOPY, DUODENOSCOPY (EGD), COMBINED  5/28/2013    Procedure: COMBINED ESOPHAGOSCOPY, GASTROSCOPY, DUODENOSCOPY (EGD);;  Surgeon: Best Willett MD;  Location: [facility]    ESOPHAGOSCOPY, GASTROSCOPY, DUODENOSCOPY (EGD), COMBINED N/A 6/13/2018    Procedure: COMBINED ESOPHAGOSCOPY, GASTROSCOPY, DUODENOSCOPY (EGD), BIOPSY SINGLE OR MULTIPLE;  gastroscopy;  Surgeon: Westley Gibbs MD;  Location:  GI    LAPAROSCOPIC GASTRIC SLEEVE  5/13/2013    Procedure: LAPAROSCOPIC GASTRIC SLEEVE;  Laparoscopic Sleeve Gastrectomy ;  Surgeon: Best Willett MD;  Location: [facility] OR    LEEP TX, CERVICAL  1995    age 15    partial thyroidectomy  2018    SEPTOPLASTY      THYROIDECTOMY Left 4/3/2018    Procedure: THYROIDECTOMY;  Left Thyroid Lobectomy And Ishtmusectomy;  Surgeon: Delia Harper MD;  Location: [facility] OR    TONSILLECTOMY  age 20s    TONSILLECTOMY  2004?    wisdom teeth extraction           9/27/2022     9:05 AM 3/3/2023     9:10 AM 5/15/2023    10:47 AM   PHQ-9 SCORE   PHQ-9 Total Score MyChart 4 (Minimal depression) 5 (Mild depression) 2 (Minimal depression)   PHQ-9 Total Score 4 5 2         3/7/2022    11:36 AM 9/27/2022     9:06 AM 5/15/2023    10:48 AM   MAXIMO-7 SCORE   Total Score 3 (minimal anxiety) 7 (mild anxiety) 1 (minimal anxiety)   Total Score 3 7 1    1         12/4/2018     8:22 AM 1/31/2020     7:20 AM   WHODAS 2.0 Total Score   Total Score 18 15   Total Score MyChart 18 15     She is  5 foot 11 inches.  Her long-term overall goal is 175 She participates fully. Rapport is excellent.       This telehealth service is appropriate and effective for delivering services in light of the necessity for social distancing to mitigate the COVID-19 epidemic and for conservation of PPE.      Patient has agreed to receiving telehealth services after being informed about it: Yes    Patient prefers video invitation/information to be sent by:   emai    Time service started: 1:05  Time service ended: 1:58  Extended session due to complexity of case and length of interval.    Mode of transmission: D[employer]me arrival;  given instructions to go to [facility].  Location of originating:  Home  of the patient    Distance site:  Home office of provider for [facility]    The patient has been notified that:  Video visits will be conducted via a call with their psychologist to provide the care they need with a video conversation. Video visits may be billed at different rates depending on insurance coverage.  Patients are advised to please contact their insurance provider with any questions about their health insurance coverage. If during the course of a call the psychologist feels a video visit is not appropriate, patients will not be charged for this service.  Diagnosis:  Major depression, recurrent, mild (F33.0).   Psychological factors affecting obesity (F54).   PLAN/RECOMMENDATIONS:   1. Ms. Catherine will return for video psychotherapy session 9/27 @ 9 to address weight loss and work and family issues with problem solving therapy, which is medically necessary.   She wishes to continue to get support here with her life changes and her son's behavioral/developmental challenges.  2.  Resume schedule of regular exercise to the level that is possible.  Explore getting cardio apparatus e.g., stationary bicycle given her plantar fascitis.  Decrease sweetened drinks.    Preference for future meetings: Remote         Last treatment plan signed: 5/26/22  Treatment plan due: 5/26/23 (next session)

## 2023-08-27 DIAGNOSIS — L50.9 URTICARIA: ICD-10-CM

## 2023-08-28 RX ORDER — FAMOTIDINE 20 MG/1
20 TABLET, FILM COATED ORAL 2 TIMES DAILY
Qty: 120 TABLET | Refills: 0 | Status: SHIPPED | OUTPATIENT
Start: 2023-08-28 | End: 2023-10-27

## 2023-08-28 NOTE — TELEPHONE ENCOUNTER
Routing refill request to provider for review/approval because:  Script states for 60 days, do you want it to continue longer       Na Yee RN   [facility]

## 2023-09-12 ENCOUNTER — VIRTUAL VISIT (OUTPATIENT)
Dept: PHARMACY | Facility: CLINIC | Age: 43
End: 2023-09-12
Payer: COMMERCIAL

## 2023-09-12 VITALS — HEIGHT: 70 IN | BODY MASS INDEX: 32.78 KG/M2 | WEIGHT: 229 LBS

## 2023-09-12 DIAGNOSIS — E66.09 CLASS 1 OBESITY DUE TO EXCESS CALORIES IN ADULT, UNSPECIFIED BMI, UNSPECIFIED WHETHER SERIOUS COMORBIDITY PRESENT: Primary | ICD-10-CM

## 2023-09-12 DIAGNOSIS — E66.811 CLASS 1 OBESITY DUE TO EXCESS CALORIES IN ADULT, UNSPECIFIED BMI, UNSPECIFIED WHETHER SERIOUS COMORBIDITY PRESENT: Primary | ICD-10-CM

## 2023-09-12 PROCEDURE — 99207 PR NO CHARGE LOS: CPT | Mod: VID | Performed by: PHARMACIST

## 2023-09-12 ASSESSMENT — PAIN SCALES - GENERAL: PAINLEVEL: NO PAIN (0)

## 2023-09-12 NOTE — PROGRESS NOTES
Virtual Visit Details    Type of service:  Video Visit     Originating Location (pt. Location): Home    Distant Location (provider location):  Off-site  Platform used for Video Visit: [employer]ivid[employer]

## 2023-09-12 NOTE — NURSING NOTE
Is the patient currently in the state of [place]? YES    Visit mode:VIDEO    If the visit is dropped, the patient can be reconnected by: VIDEO VISIT: Send to e-mail at: [email or web address]    Will anyone else be joining the visit? NO  (If patient encounters technical issues they should call [phone]    How would you like to obtain your AVS? MyChart    Are changes needed to the allergy or medication list? No, Pt stated no changes to allergies, and Pt stated no med changes    Reason for visit: Consult    Salome GRECO

## 2023-09-12 NOTE — PATIENT INSTRUCTIONS
Recommendations from today's disease management visit:                                                      Continue Wegovy 1 mg once weekly for now  Nutrition goal: Decrease liquid calories on day to day - keep it up.  Exercise goal: consider SMART goal around physical activity.     What are SMART goals?     The SMART method helps you effectively approach reaching your goals - it stands for specific, measurable, attainable, relevant and time-bound. Being accountable to what you set will assist to maintain real long-term consistency.     Specific: Increase the chances that you are able to accomplish the goals by making sure they re well-defined.   Measurable: Develop criteria for measuring progress.  Achievable: Create goals that are attainable and achievable by ensuring that you have the skills and resources to reach the goal.   Relevant: Align goals with the overall objectives.   Time-bound: Give yourself a deadline for reaching your goal     Example: I will exercise for 30 minutes 3 times each week until the end of summer, then will re-evaluate my goal at that time.       Follow-up: Return in about 4 weeks (around 10/10/2023) for Medication Therapy Management Pharmacist will contact you via [employer].    To schedule another MTM appointment, please call the clinic directly or you may call the MTM scheduling line at [phone] or toll-free at [phone].     My Clinical Pharmacist's contact information:                                                      Please feel free to contact me with any questions or concerns you have.      Lauren Bloch, PharmD, BCACP   Medication Therapy Management Pharmacist   [facility] [place]

## 2023-09-12 NOTE — PROGRESS NOTES
Disease State Management Encounter:                          Ashley Catherine is a 43 year old female contacted via secure video for an initial visit. She was referred to me from Dr. Gagandeep Qureshi. Insurance does not cover comprehensive medication review with Medication Therapy Management (MTM). Patient declines private pay. Therefore, completed one time consult today.     Reason for visit: Wegovy start follow up.     Medication Adherence/Access: Medication barriers: obtaining medication from pharmacy.  Difficult to find Wegovy, but has been able to find.     Weight Management:   Wegovy 1 mg once weekly Fridays    Followed by Dr. Gagandeep Qureshi, seen 7/11/2023 for Return Medical Weight Management. Started Wegovy at that time. Just started 1 mg dose. Patient is experiencing the follow side effects: None. She does find when injecting in abdomen that she is having minor spillage of medication. Finds that since increasing to 1 mg weekly much small portions. Frustrated she isn't losing weight more quickly.  History of sleeve gastrectomy in 2013. She lost from 290 to 186 after surgery with subsequent weight regain. Goal is to get back to healthier weight, then had baby then lost baby weight then regained. She does feel it in her joints and want to improve that, feeling stronger and healthier.   Diet/Eating Habits: Patient reports 3 meals daily and does find she is having small snack in between. She will do protein shakes to ensure getting in enough protein. Water intake has also been difficult. She is working on cutting out liquid calories.   Exercise/Activity: Patient reports enjoys fitness classes, accountability. Enjoys walking with outdoor paths. Has tried strength training and enjoys. She wants to start exercising after dropping son off at .     Current weight today: 229 lbs 0 oz  Initial Consult Weight: 235 lb  Cumulative Weight Loss: --6 lb, -2.6% from baseline  Wt Readings from Last 4 Encounters:  "  09/12/23 229 lb (103.9 kg)   07/11/23 235 lb (106.6 kg)   05/18/23 230 lb (104.3 kg)   05/15/23 230 lb 12.8 oz (104.7 kg)       Estimated body mass index is 32.86 kg/m  as calculated from the following:    Height as of this encounter: 5' 10\" (1.778 m).    Weight as of this encounter: 229 lb (103.9 kg).    Today's Vitals: Ht 5' 10\" (1.778 m)   Wt 229 lb (103.9 kg)   BMI 32.86 kg/m      Assessment/Plan:  Would benefit from continuation of Wegovy at 1 mg weekly. Hesitant to send in 1.7 mg weekly dose due to efficacy as of now with the 1 mg dose. Will check in at end of 1 mg box to see how things are going and assess at that time. Encouraged current nutrition goal to decrease liquid calories (lattes) and future exercise goal to get back to the gym. Discussed SMART goals around this.     Motivational Interviewing  Target Behavior:  exercise    Stage of Change: PREPARATION (Decided to change - considering how)    MI Intervention: Expressed Empathy/Understanding, Open-ended questions, and SMART goal implementation      Change Talk Expressed by the Patient: Desire to change Need to change    Provider Response to Change Talk: A - Affirmed patient's thoughts, decisions, or attempts at behavior change and S - Summarized patient's change talk statements    Continue Wegovy 1 mg once weekly for now  Nutrition goal: Decrease liquid calories on day to day - keep it up.  Exercise goal: consider SMART goal around physical activity.     Follow-up: Return in about 4 weeks (around 10/10/2023) for Medication Therapy Management Pharmacist will contact you via [employer].    I spent 15 minutes with this patient today. All changes were made via collaborative practice agreement with Dr. Gagandeep Qureshi. A copy of the visit note was provided to the patient's provider(s).    A summary of these recommendations was sent via [employer].    Lauren Bloch, PharmD, BCACP   Medication Therapy Management Pharmacist   [facility]         Medication Therapy Recommendations  No medication therapy recommendations to display

## 2023-09-20 ENCOUNTER — ALLIED HEALTH/NURSE VISIT (OUTPATIENT)
Dept: FAMILY MEDICINE | Facility: CLINIC | Age: 43
End: 2023-09-20
Payer: COMMERCIAL

## 2023-09-20 VITALS — OXYGEN SATURATION: 98 % | DIASTOLIC BLOOD PRESSURE: 88 MMHG | SYSTOLIC BLOOD PRESSURE: 128 MMHG | HEART RATE: 80 BPM

## 2023-09-20 DIAGNOSIS — I10 BENIGN ESSENTIAL HYPERTENSION: Primary | ICD-10-CM

## 2023-09-20 PROCEDURE — 99207 PR NO CHARGE NURSE ONLY: CPT

## 2023-09-20 NOTE — NURSING NOTE
Ashley Catherine is a 43 year old patient who comes in today for a Blood Pressure check.  Initial BP:  /88 (BP Location: Right arm, Patient Position: Sitting, Cuff Size: Adult Large)   Pulse 80   SpO2 98%      80  Disposition: follow-up as previously indicated by provider and results routed to provider    Nedra aWtkins CMA

## 2023-09-22 DIAGNOSIS — L50.9 URTICARIA: ICD-10-CM

## 2023-09-25 RX ORDER — FAMOTIDINE 20 MG/1
20 TABLET, FILM COATED ORAL 2 TIMES DAILY
Qty: 1 TABLET | Refills: 0 | OUTPATIENT
Start: 2023-09-25 | End: 2023-11-24

## 2023-09-25 RX ORDER — FAMOTIDINE 20 MG/1
20 TABLET, FILM COATED ORAL 2 TIMES DAILY
Qty: 1 TABLET | Refills: 0 | OUTPATIENT
Start: 2023-09-25

## 2023-09-27 ENCOUNTER — VIRTUAL VISIT (OUTPATIENT)
Dept: PSYCHOLOGY | Facility: CLINIC | Age: 43
End: 2023-09-27
Payer: COMMERCIAL

## 2023-09-27 DIAGNOSIS — Z56.9 OCCUPATIONAL PROBLEM: ICD-10-CM

## 2023-09-27 DIAGNOSIS — E66.01 PSYCHOLOGICAL FACTORS AFFECTING MORBID OBESITY (H): ICD-10-CM

## 2023-09-27 DIAGNOSIS — F33.0 MAJOR DEPRESSIVE DISORDER, RECURRENT EPISODE, MILD (H): Primary | ICD-10-CM

## 2023-09-27 DIAGNOSIS — F54 PSYCHOLOGICAL FACTORS AFFECTING MORBID OBESITY (H): ICD-10-CM

## 2023-09-27 PROCEDURE — 90837 PSYTX W PT 60 MINUTES: CPT | Mod: VID | Performed by: PSYCHOLOGIST

## 2023-09-27 SDOH — ECONOMIC STABILITY - INCOME SECURITY: UNSPECIFIED PROBLEMS RELATED TO EMPLOYMENT: Z56.9

## 2023-09-27 NOTE — PROGRESS NOTES
Health Psychology                     Department of Medicine  Julianne Moreno, Ph.D., L.P. [phone]                         [facility] Lis Chavez, Ph.D., L.P. [phone]                     [place] Mail Code 800   Chasity Hillser, Ph.D. [phone]      [address] [place] [address] Susana Ugarte, Ph.D., A.B.P.P., L.P. [phone]             [place] [address]           Jayro Elias, Ph.D., A.B.P.P., L.P. [phone]      Melissa Stokes, Ph.D., L.P. [phone]  [facility]   Elizabeth Gaytan, Ph.D., A.B.P.P., L.P. 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 She was seen today for eclectic psychotherapy.      She goes to bed early,9:30; wakes up 6-6:30.   Still tired.  \    Mood:  Somewhat more emotional and wonders if it is Wegovy. Every weight loss drug she has tried has had adverse effects on her mood.     Health: She hasn't really incorporated going to the gym yet.   S  She has had appointments.  She doesn't have a lot of energy.      She had a CPAP, but never successfully used it. COSA was better a the lower weight.   She had a more regular cycle when she was at a lower weight.     Sister and probably her mother have COVID.     Son (4) is still till at [facility] day treatment, and will get home services through [employer] 1-2/week, speech therapist 1/week. .  Not sure whee to send him for .  Son is 64 lbs., 43 inches; husky.  He is on track  for height 97th percentile.   He is working with an eating therapist.    Weight:  "Discussed  her weight is gently decreasing  She has an appointment with weight management.  Started Wegovy; no side effects.  Can't eat close to bedtime. Has had 15 injections so far.  Will be increasing the dose.   Discussed tracking calories, e.g., 1 week/month.   Hasn't done it for a whole week;  She did 2-3 days a few weeks earlier. \"I drink most of my calories.\"  She still does the coffee drinks and energy drinks. She is having fewer lattes.Discussed deleting coffee and creamers.     She states she has lost 4 lbs. Since last seen; 6 lbs. Since started.   She feels better as she loses weight.     Exercise:  Not going to gym. Work days she gets 13,000 to 18,000 steps;  Less if work isn't busy.   Tracks with Apple watch.  Discussed increasing exerise, especially out of doors as weather cools, Estimated  12-13k steps at work. No exercise equipment at home.  Doing a bit more incidental exercise.She is trying to take stairs, and park far away from doors when she joseph.      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 She is now working weekend shifts so as to be more available for her son.  She works weekends.  Work recently improved.  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  Patient care is less stressful, more manageable.      Family:  Her mother  does childcare for [employer]. Mother has tinnitus.  His diet is still very restricted.; uses bottle and formula. He is getting feeding and music therapy.  He is starting to say some words.  They are wondering about aphasia.He is not yet ready to use the toilet.  Her son is still getting PT, OT, Speech therapy, day treatment, and music therapy.    Discussed pros and cons of starting kindergarden as a 5 year old.    She participates fully. Rapport was excellent.     A treatment plan was completed.      234 lbs   8/24/23 per self       Wt Readings from Last 4 Encounters:   09/12/23 103.9 kg (229 lb)   07/11/23 106.6 kg " (235 lb)   05/18/23 104.3 kg (230 lb)   05/15/23 104.7 kg (230 lb 12.8 oz)     There is no height or weight on file to calculate BMI.     Current Outpatient Medications   Medication    acetaminophen (TYLENOL) 32 mg/mL liquid    amLODIPine (NORVASC) 5 MG tablet    Cholecalciferol (VITAMIN D3) 25 MCG (1000 UT) CAPS    cyanocobalamin (VITAMIN B-12) 500 MCG SUBL sublingual tablet    cyclobenzaprine (FLEXERIL) 10 MG tablet    diclofenac (VOLTAREN) 1 % topical gel    famotidine (PEPCID) 20 MG tablet    hydrochlorothiazide (HYDRODIURIL) 25 MG tablet    ibuprofen (ADVIL/MOTRIN) 200 MG capsule    loratadine (CLARITIN) 10 MG tablet    Multiple Vitamins-Minerals (ONE DAILY CALCIUM/IRON PO)    olopatadine (PATADAY) 0.2 % ophthalmic solution    Semaglutide-Weight Management (WEGOVY) 0.25 MG/0.5ML pen    Semaglutide-Weight Management (WEGOVY) 0.5 MG/0.5ML pen    Semaglutide-Weight Management (WEGOVY) 1 MG/0.5ML pen    triamcinolone (KENALOG) 0.1 % external cream     No current facility-administered medications for this visit.     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14    hospitalized at [facility] x 1 week    History of steroid therapy     HX ABNL PAP SMEAR OF CERVIX   1995    LEEP AT 15 yo    Hypertension 2004    Hypertrophy of breast     Hypertrophy of tonsils alone     Hypovitaminosis D     with secondary high PTH    Irregular heart beat     palpitations    LBP (low back pain)     LSIL (low grade squamous intraepithelial lesion) on Pap smear 5/2006    Lumbago     RESOLVED    Major depressive disorder, recurrent episode, in partial or unspecified remission 4/1/2013    Morbid obesity (H)     bariatric surgery 5/13/2013    Multiple thyroid nodules     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)     OLIGOMENORRHEA, C/W PCOS     Sciatica     SLEEP APNEA 6/2002    No longer has since tonsillectomy and septoplasty    Thyroid nodule      Past Surgical History:   Procedure Laterality Date    BIOPSY  03/13/2015    Thyroid nodule    ESOPHAGOSCOPY, GASTROSCOPY, DUODENOSCOPY (EGD), COMBINED  5/28/2013    Procedure: COMBINED ESOPHAGOSCOPY, GASTROSCOPY, DUODENOSCOPY (EGD);;  Surgeon: Best Willett MD;  Location:  GI    ESOPHAGOSCOPY, GASTROSCOPY, DUODENOSCOPY (EGD), COMBINED N/A 6/13/2018    Procedure: COMBINED ESOPHAGOSCOPY, GASTROSCOPY, DUODENOSCOPY (EGD), BIOPSY SINGLE OR MULTIPLE;  gastroscopy;  Surgeon: Westley Gibbs MD;  Location:  GI    LAPAROSCOPIC GASTRIC SLEEVE  5/13/2013    Procedure: LAPAROSCOPIC GASTRIC SLEEVE;  Laparoscopic Sleeve Gastrectomy ;  Surgeon: Best Willett MD;  Location: [facility] OR    LEEP TX, CERVICAL  1995    age 15    partial thyroidectomy  2018    SEPTOPLASTY      THYROIDECTOMY Left 4/3/2018    Procedure: THYROIDECTOMY;  Left Thyroid Lobectomy And Ishtmusectomy;  Surgeon: Delia Harper MD;  [facility]    TONSILLECTOMY  age 20s    TONSILLECTOMY  2004?    wisdom teeth extraction           9/27/2022     9:05 AM 3/3/2023     9:10 AM 5/15/2023    10:47 AM   PHQ-9 SCORE   PHQ-9 Total Score MyChart 4 (Minimal depression) 5 (Mild depression) 2 (Minimal depression)   PHQ-9 Total Score 4 5 2         3/7/2022    11:36 AM 9/27/2022     9:06 AM 5/15/2023    10:48 AM   MAXIMO-7 SCORE   Total Score 3 (minimal anxiety) 7 (mild anxiety) 1 (minimal anxiety)   Total Score 3 7 1    1         12/4/2018     8:22 AM 1/31/2020     7:20 AM   WHODAS 2.0 Total Score   Total Score 18 15   Total Score MyChart 18 15     She is  5 foot 11 inches.  Her long-term overall goal is 175 She participates fully. Rapport is excellent.       This telehealth service is appropriate and effective for delivering services in light of the necessity for social distancing to mitigate the  COVID-19 epidemic and for conservation of PPE.     Patient has agreed to receiving telehealth services after being informed about it: Yes    Patient prefers video invitation/information to be sent by:   emai    Time service started: 9:02  Time service ended: 9:55  Extended session due to complexity of case and length of interval.    Mode of transmission: [employer];  given instructions to go to [employer].  Location of originating:  Home  of the patient    Distance site:  Home office of provider for [facility]    The patient has been notified that:  Video visits will be conducted via a call with their psychologist to provide the care they need with a video conversation. Video visits may be billed at different rates depending on insurance coverage.  Patients are advised to please contact their insurance provider with any questions about their health insurance coverage. If during the course of a call the psychologist feels a video visit is not appropriate, patients will not be charged for this service.  Diagnosis:  Major depression, recurrent, mild (F33.0).   Psychological factors affecting obesity (F54).   PLAN/RECOMMENDATIONS:   1. Ms. Catherine will return for video psychotherapy session 10/24@ 9 to address weight loss and work and family issues with problem solving therapy, which is medically necessary.   She wishes to continue to get support here with her life changes and her son's behavioral/developmental challenges.  2.  Resume schedule of regular exercise to the level that is possible.  Explore getting cardio apparatus e.g., stationary bicycle given her plantar fascitis.  Decrease sweetened drinks.    Last treatment plan signed: 9/27/23  Treatment plan due: 9/27/24

## 2023-10-01 ENCOUNTER — MYC REFILL (OUTPATIENT)
Dept: ENDOCRINOLOGY | Facility: CLINIC | Age: 43
End: 2023-10-01
Payer: COMMERCIAL

## 2023-10-01 DIAGNOSIS — E66.811 OBESITY, CLASS I, BMI 30-34.9: ICD-10-CM

## 2023-10-01 DIAGNOSIS — Z98.84 S/P LAPAROSCOPIC SLEEVE GASTRECTOMY: ICD-10-CM

## 2023-10-02 ENCOUNTER — VIRTUAL VISIT (OUTPATIENT)
Dept: FAMILY MEDICINE | Facility: CLINIC | Age: 43
End: 2023-10-02
Payer: COMMERCIAL

## 2023-10-02 DIAGNOSIS — Z90.3 S/P GASTRECTOMY: Primary | ICD-10-CM

## 2023-10-02 DIAGNOSIS — I10 BENIGN ESSENTIAL HYPERTENSION: ICD-10-CM

## 2023-10-02 PROCEDURE — 99213 OFFICE O/P EST LOW 20 MIN: CPT | Mod: VID | Performed by: FAMILY MEDICINE

## 2023-10-02 RX ORDER — LOSARTAN POTASSIUM 25 MG/1
25 TABLET ORAL DAILY
Qty: 30 TABLET | Refills: 1 | Status: SHIPPED | OUTPATIENT
Start: 2023-10-02 | End: 2023-10-11

## 2023-10-02 NOTE — PROGRESS NOTES
"Ashley is a 43 year old who is being evaluated via a billable video visit.      How would you like to obtain your AVS? MyChart  If the video visit is dropped, the invitation should be resent by: Send to e-mail at: [email or web address]  Will anyone else be joining your video visit? No          Assessment & Plan     S/P gastrectomy  refills  - Cyanocobalamin 1000 MCG/ML KIT; Inject 1 mL as directed every 30 days    Benign essential hypertension  Will try losartan instead, continue amlodipine  If any reaction to losartan then stop this and presume ACE/ARB are both allergic reactions             BMI:   Estimated body mass index is 32.86 kg/m  as calculated from the following:    Height as of 9/12/23: 1.778 m (5' 10\").    Weight as of 9/12/23: 103.9 kg (229 lb).       See me or check in with me to let me know if this os working in 2-4 weeks    Violet Humphreys MD  [facility]    Subjective   Ashley is a 43 year old, presenting for the following health issues:  Hypertension      History of Present Illness       Hypertension: She presents for follow up of hypertension.  She does check blood pressure  regularly outside of the clinic. Outside blood pressures have been over 140/90. She does not follow a low salt diet.     She eats 2-3 servings of fruits and vegetables daily.She consumes 3 sweetened beverage(s) daily.She exercises with enough effort to increase her heart rate 9 or less minutes per day.  She exercises with enough effort to increase her heart rate 3 or less days per week.   She is taking medications regularly.       Medication Followup of  amLODIPine (NORVASC) 5 MG tablet  Taking Medication as prescribed: yes  Side Effects:  None  Medication Helping Symptoms:  NO      Has noted lisinopril was causing the itch  Did not notice this itch until after pregnancy    Amlodipine is working might need more  Feels heart racing  Bp at work was 142/75 pulse 90  11/68 pulse 94    Review of Systems "   Constitutional, HEENT, cardiovascular, pulmonary, gi and gu systems are negative, except as otherwise noted.      Objective           Vitals:  No vitals were obtained today due to virtual visit.    Physical Exam   GENERAL: Healthy, alert and no distress  EYES: Eyes grossly normal to inspection.  No discharge or erythema, or obvious scleral/conjunctival abnormalities.  RESP: No audible wheeze, cough, or visible cyanosis.  No visible retractions or increased work of breathing.    SKIN: Visible skin clear. No significant rash, abnormal pigmentation or lesions.  NEURO: Cranial nerves grossly intact.  Mentation and speech appropriate for age.  PSYCH: Mentation appears normal, affect normal/bright, judgement and insight intact, normal speech and appearance well-groomed.                Video-Visit Details    Type of service:  Video Visit   Joined the call at 10/2/2023, 5:52:25 pm.  Left the call at 10/2/2023, 6:06:14 pm.  You were on the call for 13 minutes 48 seconds  Originating Location (pt. Location): Home    Distant Location (provider location):  On-site  Platform used for Video Visit: [employer]

## 2023-10-05 ENCOUNTER — TRANSFERRED RECORDS (OUTPATIENT)
Dept: HEALTH INFORMATION MANAGEMENT | Facility: CLINIC | Age: 43
End: 2023-10-05
Payer: COMMERCIAL

## 2023-10-10 DIAGNOSIS — I10 BENIGN ESSENTIAL HYPERTENSION: ICD-10-CM

## 2023-10-11 RX ORDER — LOSARTAN POTASSIUM 25 MG/1
25 TABLET ORAL DAILY
Qty: 180 TABLET | Refills: 1 | Status: SHIPPED | OUTPATIENT
Start: 2023-10-11 | End: 2024-04-15

## 2023-10-11 NOTE — TELEPHONE ENCOUNTER
"Requested Prescriptions   Pending Prescriptions Disp Refills    losartan (COZAAR) 25 MG tablet [Pharmacy Med Name: LOSARTAN POTASSIUM 25 MG TAB] 180 tablet 1     Sig: TAKE 1 TABLET (25 MG) BY MOUTH DAILY CAN INCREASE TO 50 MG IN 2 WEEKS BASED ON RESPONSE       Angiotensin-II Receptors Passed - 10/10/2023  8:32 AM        Passed - Last blood pressure under 140/90 in past 12 months     BP Readings from Last 3 Encounters:   09/20/23 128/88   05/18/23 120/78   05/15/23 119/62                 Passed - Recent (12 mo) or future (30 days) visit within the authorizing provider's specialty     Patient has had an office visit with the authorizing provider or a provider within the authorizing providers department within the previous 12 mos or has a future within next 30 days. See \"Patient Info\" tab in inbasket, or \"Choose Columns\" in Meds & Orders section of the refill encounter.              Passed - Medication is active on med list        Passed - Patient is age 18 or older        Passed - No active pregnancy on record        Passed - Normal serum creatinine on file in past 12 months     Recent Labs   Lab Test 03/03/23  1314   CR 0.62       Ok to refill medication if creatinine is low          Passed - Normal serum potassium on file in past 12 months     Recent Labs   Lab Test 03/03/23  1314   POTASSIUM 3.8                    Passed - No positive pregnancy test in past 12 months           Prescription approved per [facility] Refill Protocol.  Danny Marrero RN  [facility]     "

## 2023-10-24 ENCOUNTER — VIRTUAL VISIT (OUTPATIENT)
Dept: PSYCHOLOGY | Facility: CLINIC | Age: 43
End: 2023-10-24
Payer: COMMERCIAL

## 2023-10-24 DIAGNOSIS — F33.0 MAJOR DEPRESSIVE DISORDER, RECURRENT EPISODE, MILD (H): Primary | ICD-10-CM

## 2023-10-24 DIAGNOSIS — F54 PSYCHOLOGICAL FACTORS AFFECTING MORBID OBESITY (H): ICD-10-CM

## 2023-10-24 DIAGNOSIS — E66.01 PSYCHOLOGICAL FACTORS AFFECTING MORBID OBESITY (H): ICD-10-CM

## 2023-10-24 PROCEDURE — 90837 PSYTX W PT 60 MINUTES: CPT | Mod: VID | Performed by: PSYCHOLOGIST

## 2023-10-24 ASSESSMENT — PAIN SCALES - GENERAL: PAINLEVEL: NO PAIN (0)

## 2023-10-24 NOTE — PROGRESS NOTES
Health Psychology                     Department of Medicine  Julianne Moreno, Ph.D., L.P. [phone]                         [facility] Lis Chavez, Ph.D., L.P. [phone]                     [place] Mail Code 232   Donald [place], Ph.D. [phone]      [address] [place] [address] Susana Ugarte, Ph.D., A.B.P.P., L.P. [phone]             [place] [address]           Jayro Elias, Ph.D., A.B.P.P., L.P. [phone]      Melissa Stokes, Ph.D., L.P. [phone]  [facility]   Elizabeth Gaytan, Ph.D., A.B.P.P., L.P. 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 She was seen today for eclectic psychotherapy.      She goes to bed early,9:30; wakes up 6-6:30.   Still tired.    Mood:  Somewhat more emotional and wonders if it is Wegovy. Every weight loss drug she has tried has had adverse effects on her mood.     Health: She hasn't really incorporated going to the gym yet.  She has had appointments.  She dis starting to have more energy, being able to check things off the to do list.       She had a CPAP, but never successfully used it. COSA was better a the lower weight.   She had a more regular cycle when she was at a lower weight.     Sister and probably her mother have recovered from COVID.   Discussed the difficulty this time of year as she reaches the second anniversary of her father's death.  Discussed how parenting her son has  so fundamentally changed her activities and concerns.    Son Rivera (4) is still at [facility] treatment, and will  get home services through [employer] 1-2/week, speech therapist 1/week. He is expanding on words. Not sure where to send him for . Son is on track  for height 97th percentile.   He is working with an eating therapist.  He enjoys the music therapy. Still trying to decide whether to send him to  next year.  His diet is still very restricted.; uses bottle and formula. He is getting feeding and music therapy.  He is starting to say some words. He s still getting PT, OT, Speech therapy, day treatment, and music therapy. They are wondering about aphasia.He is not yet ready to use the toilet.  Her son    Discussed pros and cons of starting kindergarden as a 5 year old.    Weight: Discussed  her weight is gently decreasing  She has an appointment with weight management.  Started Wegovy; no side effects.  Has had more injections.  Will be increasing the dose.  She feels better as she is losing weight, thought at 224..     Exercise:  Not going to gym yet- hopes to in November. Work days she gets 13,000 to 18,000 steps;  Less if work isn't busy.   Tracks with Apple watch.  Discussed increasing exerise, especially out of doors as weather cools, Estimated  12-13k steps at work. No exercise equipment at home.  Doing a bit more incidental exercise. She is trying to take stairs, and park far away from doors when she joseph.      Work: She is working weekend shifts so as to be more available for her son.  She works weekends.  Work recently improved.  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  Patient care is less stressful, more manageable.      Family:  Her mother does childcare for [employer]. Mother has tinnitus.    She participates fully. Rapport was excellent.     234 lbs   8/24/23 per self       Wt Readings from Last 4 Encounters:   09/12/23 103.9 kg (229 lb)   07/11/23 106.6 kg (235 lb)   05/18/23 104.3 kg (230 lb)   05/15/23 104.7 kg (230 lb 12.8 oz)     There  is no height or weight on file to calculate BMI.     Current Outpatient Medications   Medication    acetaminophen (TYLENOL) 32 mg/mL liquid    amLODIPine (NORVASC) 5 MG tablet    Cholecalciferol (VITAMIN D3) 25 MCG (1000 UT) CAPS    cyanocobalamin (VITAMIN B-12) 500 MCG SUBL sublingual tablet    Cyanocobalamin 1000 MCG/ML KIT    cyclobenzaprine (FLEXERIL) 10 MG tablet    diclofenac (VOLTAREN) 1 % topical gel    famotidine (PEPCID) 20 MG tablet    hydrochlorothiazide (HYDRODIURIL) 25 MG tablet    ibuprofen (ADVIL/MOTRIN) 200 MG capsule    losartan (COZAAR) 25 MG tablet    Multiple Vitamins-Minerals (ONE DAILY CALCIUM/IRON PO)    Semaglutide-Weight Management (WEGOVY) 1 MG/0.5ML pen     No current facility-administered medications for this visit.     Past Medical History:   Diagnosis Date    Bariatric surgery status 05/13/2013    Depression     Depressive disorder     Esophageal reflux     Family history of hyperparathyroidism 3/6/2015    Forearm fracture childhood    jumping fall    Guillain-[place] disease (H24) age 14    hospitalized at [facility] x 1 week    History of steroid therapy     HX ABNL PAP SMEAR OF CERVIX   1995    LEEP AT 15 yo    Hypertension 2004    Hypertrophy of breast     Hypertrophy of tonsils alone     Hypovitaminosis D     with secondary high PTH    Irregular heart beat     palpitations    LBP (low back pain)     LSIL (low grade squamous intraepithelial lesion) on Pap smear 5/2006    Lumbago     RESOLVED    Major depressive disorder, recurrent episode, in partial or unspecified remission 4/1/2013    Morbid obesity (H)     bariatric surgery 5/13/2013    Multiple thyroid nodules     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)     OLIGOMENORRHEA, C/W PCOS     Sciatica     SLEEP APNEA 6/2002    No longer has since tonsillectomy and septoplasty    Thyroid nodule      Past Surgical History:   Procedure Laterality Date    BIOPSY  03/13/2015    Thyroid nodule    ESOPHAGOSCOPY, GASTROSCOPY,  DUODENOSCOPY (EGD), COMBINED  5/28/2013    Procedure: COMBINED ESOPHAGOSCOPY, GASTROSCOPY, DUODENOSCOPY (EGD);;  Surgeon: Best Willett MD;  [facility] GI    ESOPHAGOSCOPY, GASTROSCOPY, DUODENOSCOPY (EGD), COMBINED N/A 6/13/2018    Procedure: COMBINED ESOPHAGOSCOPY, GASTROSCOPY, DUODENOSCOPY (EGD), BIOPSY SINGLE OR MULTIPLE;  gastroscopy;  Surgeon: Westley Gibbs MD;  Location:  GI    LAPAROSCOPIC GASTRIC SLEEVE  5/13/2013    Procedure: LAPAROSCOPIC GASTRIC SLEEVE;  Laparoscopic Sleeve Gastrectomy ;  Surgeon: Best Willett MD;  [facility] OR    LEEP TX, CERVICAL  1995    age 15    partial thyroidectomy  2018    SEPTOPLASTY      THYROIDECTOMY Left 4/3/2018    Procedure: THYROIDECTOMY;  Left Thyroid Lobectomy And Ishtmusectomy;  Surgeon: Delia Harper MD;  [facility]    TONSILLECTOMY  age 20s    TONSILLECTOMY  2004?    wisdom teeth extraction           9/27/2022     9:05 AM 3/3/2023     9:10 AM 5/15/2023    10:47 AM   PHQ-9 SCORE   PHQ-9 Total Score MyChart 4 (Minimal depression) 5 (Mild depression) 2 (Minimal depression)   PHQ-9 Total Score 4 5 2         3/7/2022    11:36 AM 9/27/2022     9:06 AM 5/15/2023    10:48 AM   MAXIMO-7 SCORE   Total Score 3 (minimal anxiety) 7 (mild anxiety) 1 (minimal anxiety)   Total Score 3 7 1    1         12/4/2018     8:22 AM 1/31/2020     7:20 AM   WHODAS 2.0 Total Score   Total Score 18 15   Total Score MyChart 18 15     She is  5 foot 11 inches.  Her long-term overall goal is 175 She participates fully. Rapport is excellent.       This telehealth service is appropriate and effective for delivering services in light of the necessity for social distancing to mitigate the COVID-19 epidemic and for conservation of PPE.     Patient has agreed to receiving telehealth services after being informed about it: Yes    Patient prefers video invitation/information to be sent by:   emai    Time service started: 9:00  Time service ended: 9:55  Extended session  due to complexity of case and length of interval.    Mode of transmission: [employer]  Location of originating:  Home  of the patient    Distance site:  Home office of provider for [facility]    The patient has been notified that:  Video visits will be conducted via a call with their psychologist to provide the care they need with a video conversation. Video visits may be billed at different rates depending on insurance coverage.  Patients are advised to please contact their insurance provider with any questions about their health insurance coverage. If during the course of a call the psychologist feels a video visit is not appropriate, patients will not be charged for this service.  Diagnosis:  Major depression, recurrent, mild (F33.0).   Psychological factors affecting obesity (F54).   PLAN/RECOMMENDATIONS:   1. Ms. Catherine will return for video psychotherapy session 11/27 @ 9 to address weight loss and work and family issues with eclectic therapy, which is medically necessary.   She wishes to continue to get support here with her life changes and her son's behavioral/developmental challenges.  2.  Resume schedule of regular exercise to the level that is possible.  Explore getting cardio apparatus e.g., stationary bicycle given her plantar fascitis.  Decrease sweetened drinks.    Last treatment plan signed: 9/27/23  Treatment plan due: 9/27/24

## 2023-11-22 ENCOUNTER — VIRTUAL VISIT (OUTPATIENT)
Dept: PSYCHOLOGY | Facility: CLINIC | Age: 43
End: 2023-11-22
Payer: COMMERCIAL

## 2023-11-22 DIAGNOSIS — E66.01 PSYCHOLOGICAL FACTORS AFFECTING MORBID OBESITY (H): ICD-10-CM

## 2023-11-22 DIAGNOSIS — F33.0 MAJOR DEPRESSIVE DISORDER, RECURRENT EPISODE, MILD (H): Primary | ICD-10-CM

## 2023-11-22 DIAGNOSIS — Z56.9 OCCUPATIONAL PROBLEM: ICD-10-CM

## 2023-11-22 DIAGNOSIS — F54 PSYCHOLOGICAL FACTORS AFFECTING MORBID OBESITY (H): ICD-10-CM

## 2023-11-22 PROCEDURE — 90837 PSYTX W PT 60 MINUTES: CPT | Mod: VID | Performed by: PSYCHOLOGIST

## 2023-11-22 SDOH — ECONOMIC STABILITY - INCOME SECURITY: UNSPECIFIED PROBLEMS RELATED TO EMPLOYMENT: Z56.9

## 2023-11-22 NOTE — PROGRESS NOTES
Health Psychology                    Department of Medicine  Julianne Moreno, Ph.D., L.P. [phone]                         [facility] Lis Chavez, Ph.D., L.P. [phone]                     [place] Mail Code 692   JeanaDonald, Ph.D. [phone]      [address] [place] [address] Susana Ugarte, Ph.D., A.B.P.P., L.P. [phone]              [place] [address]           Jayro Elias, Ph.D., A.B.P.P., L.P. [phone]      Melissa Stokes, Ph.D., L.P. [phone]  [facility]   Elizabeth Gaytan, Ph.D., A.B.P.P., L.P. [phone]    [address] [place] [address]            Rc Vitale, Ph.D. 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 She was seen today for eclectic psychotherapy.      Her son is sick. They were in the ED yesterday.  He had Strep earlier in the month.  He has a bad cough, has thrown up, became dehydrated. Affecting Thanksgiving plans.    Mood:  Somewhat more emotional and wonders if it is Wegovy. Every weight loss drug she has tried has had adverse effects on her mood.     Health: She hasn't really incorporated going to the gym yet.  She has had appointments.  She dis starting to have more energy, being able to check things off the to do list.       She had a CPAP, but never successfully used it. COSA was better a the lower weight.   She had a more regular cycle when she was at a lower weight.     Sister and her mother have recovered from COVID.     Son Rivera (4) is still at [facility], and will get home services through [facility]  1-2/week, speech therapist 1/week. He is expanding on words. Not sure where to send him for . Son is on track  for height 97th percentile.   He is working with an eating therapist.  He enjoys the music therapy. Still trying to decide whether to send him to  next year.  His diet is still very restricted.; uses bottle and formula. He is getting feeding and music therapy.  He is starting to say some words. He s still getting PT, OT, Speech therapy, day treatment, and music therapy. They are wondering about aphasia. He is not yet ready to use th    She is trying to decide about [employer] school needs for next year. Discussed pros and cons of starting kindergarden as a 5 year old.  This is a big stressor for her.  She will go to [employer] to check them out.  Frustrated about how little information she can glean.    Weight: Discussed  her weight is gently decreasing  She has an appointment with weight management.  Started Wegovy; no side effects; not at a high dose.  Feels sh eis making good decisions; avoiding [employer]; making more of her own coffee. Has had more injections.  Will be increasing the dose.  Will be able to use it for the next year- her insurance drops it. She feels better as she is losing weight, was at 224 last month; 223 today.   She feels she is continuing to lose weight. Clothing is looser.      Exercise:  Not going to gym yet- has membership to [employer] at [place].  Work days she gets 13,000 to 18,000 steps;  Less if work isn't busy.   Tracks with Apple watch.  Discussed increasing exerise, especially out of doors as weather cools, Estimated 12-13k steps at work. No exercise equipment at home., but will consider it.     Work: She is working weekend shifts so as to be more available for her son.  She works weekends.  Work recently improved.  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  Patient care is less stressful, more  manageable.  Aware of generational gaps n coworkers.    Family:  Her mother does childcare for Rivera. Mother has tinnitus.    She participates fully. Rapport was excellent.     234 lbs   8/24/23 per self       Wt Readings from Last 4 Encounters:   09/12/23 103.9 kg (229 lb)   07/11/23 106.6 kg (235 lb)   05/18/23 104.3 kg (230 lb)   05/15/23 104.7 kg (230 lb 12.8 oz)     There is no height or weight on file to calculate BMI.     Current Outpatient Medications   Medication    acetaminophen (TYLENOL) 32 mg/mL liquid    amLODIPine (NORVASC) 5 MG tablet    Cholecalciferol (VITAMIN D3) 25 MCG (1000 UT) CAPS    cyanocobalamin (VITAMIN B-12) 500 MCG SUBL sublingual tablet    Cyanocobalamin 1000 MCG/ML KIT    cyclobenzaprine (FLEXERIL) 10 MG tablet    diclofenac (VOLTAREN) 1 % topical gel    hydrochlorothiazide (HYDRODIURIL) 25 MG tablet    ibuprofen (ADVIL/MOTRIN) 200 MG capsule    losartan (COZAAR) 25 MG tablet    Multiple Vitamins-Minerals (ONE DAILY CALCIUM/IRON PO)    Semaglutide-Weight Management (WEGOVY) 1 MG/0.5ML pen     No current facility-administered medications for this visit.     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14    hospitalized at [facility] x 1 week    History of steroid therapy     HX ABNL PAP SMEAR OF CERVIX   1995    LEEP AT 15 yo    Hypertension 2004    Hypertrophy of breast     Hypertrophy of tonsils alone     Hypovitaminosis D     with secondary high PTH    Irregular heart beat     palpitations    LBP (low back pain)     LSIL (low grade squamous intraepithelial lesion) on Pap smear 5/2006    Lumbago     RESOLVED    Major depressive disorder, recurrent episode, in partial or unspecified remission 4/1/2013    Morbid obesity (H)     bariatric surgery 5/13/2013    Multiple  thyroid nodules     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)     OLIGOMENORRHEA, C/W PCOS     Sciatica     SLEEP APNEA 6/2002    No longer has since tonsillectomy and septoplasty    Thyroid nodule      Past Surgical History:   Procedure Laterality Date    BIOPSY  03/13/2015    Thyroid nodule    ESOPHAGOSCOPY, GASTROSCOPY, DUODENOSCOPY (EGD), COMBINED  5/28/2013    Procedure: COMBINED ESOPHAGOSCOPY, GASTROSCOPY, DUODENOSCOPY (EGD);;  Surgeon: Best Willett MD;  Location:  GI    ESOPHAGOSCOPY, GASTROSCOPY, DUODENOSCOPY (EGD), COMBINED N/A 6/13/2018    Procedure: COMBINED ESOPHAGOSCOPY, GASTROSCOPY, DUODENOSCOPY (EGD), BIOPSY SINGLE OR MULTIPLE;  gastroscopy;  Surgeon: Westley Gibbs MD;  Location:  GI    LAPAROSCOPIC GASTRIC SLEEVE  5/13/2013    Procedure: LAPAROSCOPIC GASTRIC SLEEVE;  Laparoscopic Sleeve Gastrectomy ;  Surgeon: Best Willett MD;  Location: [facility]    LEEP TX, CERVICAL  1995    age 15    partial thyroidectomy  2018    SEPTOPLASTY      THYROIDECTOMY Left 4/3/2018    Procedure: THYROIDECTOMY;  Left Thyroid Lobectomy And Ishtmusectomy;  Surgeon: Delia Harper MD;  [facility]    TONSILLECTOMY  age 20s    TONSILLECTOMY  2004?    wisdom teeth extraction           9/27/2022     9:05 AM 3/3/2023     9:10 AM 5/15/2023    10:47 AM   PHQ-9 SCORE   PHQ-9 Total Score MyChart 4 (Minimal depression) 5 (Mild depression) 2 (Minimal depression)   PHQ-9 Total Score 4 5 2         3/7/2022    11:36 AM 9/27/2022     9:06 AM 5/15/2023    10:48 AM   MAXIMO-7 SCORE   Total Score 3 (minimal anxiety) 7 (mild anxiety) 1 (minimal anxiety)   Total Score 3 7 1    1         12/4/2018     8:22 AM 1/31/2020     7:20 AM   WHODAS 2.0 Total Score   Total Score 18 15   Total Score MyChart 18 15     She is  5 foot 11 inches.  Her long-term overall goal is 175 She participates fully. Rapport is excellent.       This telehealth service is appropriate and effective for delivering services in  light of the necessity for social distancing to mitigate the COVID-19 epidemic and for conservation of PPE.     Patient has agreed to receiving telehealth services after being informed about it: Yes    Patient prefers video invitation/information to be sent by:   emai    Time service started:12:04  Time service ended: 12:57  Extended session due to complexity of case and length of interval.    Mode of transmission: SiOx  Location of originating:  Home  of the patient    Distance site:  Home office of provider for [facility]    The patient has been notified that:  Video visits will be conducted via a call with their psychologist to provide the care they need with a video conversation. Video visits may be billed at different rates depending on insurance coverage.  Patients are advised to please contact their insurance provider with any questions about their health insurance coverage. If during the course of a call the psychologist feels a video visit is not appropriate, patients will not be charged for this service.  Diagnosis:  Major depression, recurrent, mild (F33.0).   Psychological factors affecting obesity (F54).   PLAN/RECOMMENDATI in person  psychotherapy session 1/2 @ 1 to address weight loss and work and family issues with eclectic therapy, which is medically necessary.   She wishes to continue to get support here with her life changes and her son's behavioral/developmental challenges.  2.  Resume schedule of regular exercise to the level that is possible.  Explore getting cardio apparatus e.g., stationary bicycle given her plantar fascitis.  Decrease sweetened drinks.   Last treatment plan signed: 9/27/23  Treatment plan due: 9/27/24

## 2023-12-03 ENCOUNTER — HOSPITAL ENCOUNTER (EMERGENCY)
Facility: CLINIC | Age: 43
Discharge: HOME | End: 2023-12-03
Attending: EMERGENCY MEDICINE | Admitting: EMERGENCY MEDICINE
Payer: COMMERCIAL

## 2023-12-03 VITALS
SYSTOLIC BLOOD PRESSURE: 125 MMHG | OXYGEN SATURATION: 98 % | RESPIRATION RATE: 20 BRPM | HEART RATE: 78 BPM | DIASTOLIC BLOOD PRESSURE: 84 MMHG

## 2023-12-03 DIAGNOSIS — R07.89 ATYPICAL CHEST PAIN: ICD-10-CM

## 2023-12-03 DIAGNOSIS — R00.2 PALPITATIONS: ICD-10-CM

## 2023-12-03 DIAGNOSIS — F41.9 ANXIETY: ICD-10-CM

## 2023-12-03 LAB
ALBUMIN SERPL BCG-MCNC: 4.1 G/DL (ref 3.5–5.2)
ALP SERPL-CCNC: 62 U/L (ref 40–150)
ALT SERPL W P-5'-P-CCNC: 15 U/L (ref 0–50)
ANION GAP SERPL CALCULATED.3IONS-SCNC: 8 MMOL/L (ref 7–15)
AST SERPL W P-5'-P-CCNC: 20 U/L (ref 0–45)
ATRIAL RATE - MUSE: 82 BPM
BASOPHILS # BLD AUTO: 0 10E3/UL (ref 0–0.2)
BASOPHILS NFR BLD AUTO: 1 %
BILIRUB SERPL-MCNC: 0.3 MG/DL
BUN SERPL-MCNC: 9.5 MG/DL (ref 6–20)
CALCIUM SERPL-MCNC: 9.5 MG/DL (ref 8.6–10)
CHLORIDE SERPL-SCNC: 99 MMOL/L (ref 98–107)
CREAT SERPL-MCNC: 0.78 MG/DL (ref 0.51–0.95)
CRP SERPL-MCNC: 5.86 MG/L
D DIMER PPP FEU-MCNC: <0.27 UG/ML FEU (ref 0–0.5)
DEPRECATED HCO3 PLAS-SCNC: 32 MMOL/L (ref 22–29)
DIASTOLIC BLOOD PRESSURE - MUSE: NORMAL MMHG
EGFRCR SERPLBLD CKD-EPI 2021: >90 ML/MIN/1.73M2
EOSINOPHIL # BLD AUTO: 0.1 10E3/UL (ref 0–0.7)
EOSINOPHIL NFR BLD AUTO: 1 %
ERYTHROCYTE [DISTWIDTH] IN BLOOD BY AUTOMATED COUNT: 12.7 % (ref 10–15)
ERYTHROCYTE [SEDIMENTATION RATE] IN BLOOD BY WESTERGREN METHOD: 11 MM/HR (ref 0–20)
GLUCOSE SERPL-MCNC: 107 MG/DL (ref 70–99)
HCG SERPL QL: NEGATIVE
HCT VFR BLD AUTO: 37.5 % (ref 35–47)
HGB BLD-MCNC: 12.5 G/DL (ref 11.7–15.7)
HOLD SPECIMEN: NORMAL
IMM GRANULOCYTES # BLD: 0 10E3/UL
IMM GRANULOCYTES NFR BLD: 0 %
INTERPRETATION ECG - MUSE: NORMAL
LYMPHOCYTES # BLD AUTO: 2.5 10E3/UL (ref 0.8–5.3)
LYMPHOCYTES NFR BLD AUTO: 32 %
MAGNESIUM SERPL-MCNC: 1.8 MG/DL (ref 1.7–2.3)
MCH RBC QN AUTO: 31.4 PG (ref 26.5–33)
MCHC RBC AUTO-ENTMCNC: 33.3 G/DL (ref 31.5–36.5)
MCV RBC AUTO: 94 FL (ref 78–100)
MONOCYTES # BLD AUTO: 0.6 10E3/UL (ref 0–1.3)
MONOCYTES NFR BLD AUTO: 8 %
NEUTROPHILS # BLD AUTO: 4.4 10E3/UL (ref 1.6–8.3)
NEUTROPHILS NFR BLD AUTO: 58 %
NRBC # BLD AUTO: 0 10E3/UL
NRBC BLD AUTO-RTO: 0 /100
P AXIS - MUSE: 46 DEGREES
PLATELET # BLD AUTO: 398 10E3/UL (ref 150–450)
POTASSIUM SERPL-SCNC: 3.8 MMOL/L (ref 3.4–5.3)
PR INTERVAL - MUSE: 180 MS
PROT SERPL-MCNC: 7.1 G/DL (ref 6.4–8.3)
QRS DURATION - MUSE: 86 MS
QT - MUSE: 396 MS
QTC - MUSE: 462 MS
R AXIS - MUSE: 41 DEGREES
RBC # BLD AUTO: 3.98 10E6/UL (ref 3.8–5.2)
SODIUM SERPL-SCNC: 139 MMOL/L (ref 135–145)
SYSTOLIC BLOOD PRESSURE - MUSE: NORMAL MMHG
T AXIS - MUSE: 46 DEGREES
TROPONIN T SERPL HS-MCNC: <6 NG/L
VENTRICULAR RATE- MUSE: 82 BPM
WBC # BLD AUTO: 7.7 10E3/UL (ref 4–11)

## 2023-12-03 PROCEDURE — 84703 CHORIONIC GONADOTROPIN ASSAY: CPT | Performed by: EMERGENCY MEDICINE

## 2023-12-03 PROCEDURE — 258N000003 HC RX IP 258 OP 636: Performed by: EMERGENCY MEDICINE

## 2023-12-03 PROCEDURE — 83735 ASSAY OF MAGNESIUM: CPT | Performed by: EMERGENCY MEDICINE

## 2023-12-03 PROCEDURE — 86140 C-REACTIVE PROTEIN: CPT | Performed by: EMERGENCY MEDICINE

## 2023-12-03 PROCEDURE — 99284 EMERGENCY DEPT VISIT MOD MDM: CPT | Mod: 25

## 2023-12-03 PROCEDURE — 93005 ELECTROCARDIOGRAM TRACING: CPT

## 2023-12-03 PROCEDURE — 96361 HYDRATE IV INFUSION ADD-ON: CPT

## 2023-12-03 PROCEDURE — 85025 COMPLETE CBC W/AUTO DIFF WBC: CPT | Performed by: EMERGENCY MEDICINE

## 2023-12-03 PROCEDURE — 36415 COLL VENOUS BLD VENIPUNCTURE: CPT | Performed by: EMERGENCY MEDICINE

## 2023-12-03 PROCEDURE — 250N000011 HC RX IP 250 OP 636: Performed by: EMERGENCY MEDICINE

## 2023-12-03 PROCEDURE — 80053 COMPREHEN METABOLIC PANEL: CPT | Performed by: EMERGENCY MEDICINE

## 2023-12-03 PROCEDURE — 96374 THER/PROPH/DIAG INJ IV PUSH: CPT

## 2023-12-03 PROCEDURE — 84484 ASSAY OF TROPONIN QUANT: CPT | Performed by: EMERGENCY MEDICINE

## 2023-12-03 PROCEDURE — 85652 RBC SED RATE AUTOMATED: CPT | Performed by: EMERGENCY MEDICINE

## 2023-12-03 PROCEDURE — 85379 FIBRIN DEGRADATION QUANT: CPT | Performed by: EMERGENCY MEDICINE

## 2023-12-03 RX ORDER — LORAZEPAM 2 MG/ML
0.5 INJECTION INTRAMUSCULAR ONCE
Status: COMPLETED | OUTPATIENT
Start: 2023-12-03 | End: 2023-12-03

## 2023-12-03 RX ADMIN — SODIUM CHLORIDE 1000 ML: 9 INJECTION, SOLUTION INTRAVENOUS at 18:24

## 2023-12-03 RX ADMIN — LORAZEPAM 0.5 MG: 2 INJECTION INTRAMUSCULAR; INTRAVENOUS at 18:25

## 2023-12-03 ASSESSMENT — ACTIVITIES OF DAILY LIVING (ADL): ADLS_ACUITY_SCORE: 35

## 2023-12-03 NOTE — ED TRIAGE NOTES
Pt comes in for palpitations since Friday, denies chest pain and SOB. Has been having increased anxiety.   Hx HTN.

## 2023-12-03 NOTE — ED PROVIDER NOTES
"  History     Chief Complaint:  Palpitations       HPI   Ashley Catherine is a 43 year old female, with history below, who presents to the ED for palpitations. Patient reports she feels uncomfortable that heart beat is \"strong and high\" for the last couple of days. States its been in the 90s at a resting period. Patient tried working out on Friday, heart rate was over 160s 170s. Reports her normal pulse is under 70s. Denies any physical pain. She feels soreness on the left arm but states it could be due to sleeping wrong. Denies shortness of breath. Patient states she has been taking her daily medications but hasn't been helping with her symptoms. Couldn't sleep last night, felt heart is beating fast. Denies it skipping a beat. No sickness, dehydration.     Independent Historian:   None - Patient Only    Review of External Notes:   Chart review    Medications:    Amlodipine   Cholecalciferol   Cyanocobalamin   Diclofenac   Hydrochlorothiazide   Cyclobenzaprine   Lomaira     Past Medical History:    Bariatric surgery status   Depression   Esophageal reflux  Forearm fracture   Hypertension   Hypertrophy of breast   Hypertrophy of tonsils   Sleep apnea   Thyroid nodule     Past Surgical History:    Thyroid nodule   EGD x2  Laparoscopic gastric sleeve   Septoplasty   Tonsillectomy   Thyroidectomy      Physical Exam   Patient Vitals for the past 24 hrs:   BP Pulse Resp SpO2   12/03/23 1930 125/84 78 20 98 %   12/03/23 1900 123/84 79 22 100 %   12/03/23 1830 119/77 80 29 97 %   12/03/23 1800 115/75 82 15 99 %   12/03/23 1730 128/86 84 12 --   12/03/23 1724 131/78 86 -- 99 %      Physical Exam  GENERAL: well developed, pleasant  HEAD: atraumatic  EYES: pupils reactive, extraocular muscles intact, conjunctivae normal  ENT:  mucus membranes moist  NECK:  trachea midline, normal range of motion  RESPIRATORY: no tachypnea, breath sounds clear to auscultation   CVS: normal S1/S2, no murmurs, intact distal pulses  ABDOMEN: soft, " nontender, nondistention  MUSCULOSKELETAL: no deformities  SKIN: warm and dry, no acute rashes or ulceration  NEURO: GCS 15, cranial nerves intact, alert and oriented x3  PSYCH:  Mood/affect normal    Emergency Department Course   ECG  ECG results from 12/03/23   EKG 12 lead     Value    Systolic Blood Pressure     Diastolic Blood Pressure     Ventricular Rate 82    Atrial Rate 82    HI Interval 180    QRS Duration 86        QTc 462    P Axis 46    R AXIS 41    T Axis 46    Interpretation ECG      Sinus rhythm with Premature atrial complexes  Otherwise normal ECG  When compared with ECG of 14-MAY-2013 04:36,  Premature atrial complexes are now Present  Confirmed by GENERATED REPORT, COMPUTER ([id number]),  Elzbieta Birmingham ([id number]) on 12/3/2023 5:31:40 PM       Imaging:  No orders to display        Laboratory:  Labs Ordered and Resulted from Time of ED Arrival to Time of ED Departure   COMPREHENSIVE METABOLIC PANEL - Abnormal       Result Value    Sodium 139      Potassium 3.8      Carbon Dioxide (CO2) 32 (*)     Anion Gap 8      Urea Nitrogen 9.5      Creatinine 0.78      GFR Estimate >90      Calcium 9.5      Chloride 99      Glucose 107 (*)     Alkaline Phosphatase 62      AST 20      ALT 15      Protein Total 7.1      Albumin 4.1      Bilirubin Total 0.3     CRP INFLAMMATION - Abnormal    CRP Inflammation 5.86 (*)    HCG QUALITATIVE PREGNANCY - Normal    hCG Serum Qualitative Negative     TROPONIN T, HIGH SENSITIVITY - Normal    Troponin T, High Sensitivity <6     MAGNESIUM - Normal    Magnesium 1.8     D DIMER QUANTITATIVE - Normal    D-Dimer Quantitative <0.27     ERYTHROCYTE SEDIMENTATION RATE AUTO - Normal    Erythrocyte Sedimentation Rate 11     CBC WITH PLATELETS AND DIFFERENTIAL    WBC Count 7.7      RBC Count 3.98      Hemoglobin 12.5      Hematocrit 37.5      MCV 94      MCH 31.4      MCHC 33.3      RDW 12.7      Platelet Count 398      % Neutrophils 58      % Lymphocytes 32      % Monocytes 8      %  Eosinophils 1      % Basophils 1      % Immature Granulocytes 0      NRBCs per 100 WBC 0      Absolute Neutrophils 4.4      Absolute Lymphocytes 2.5      Absolute Monocytes 0.6      Absolute Eosinophils 0.1      Absolute Basophils 0.0      Absolute Immature Granulocytes 0.0      Absolute NRBCs 0.0          Procedures       Emergency Department Course & Assessments:    Interventions:  Medications   sodium chloride 0.9% BOLUS 1,000 mL (0 mLs Intravenous Stopped 12/3/23 1951)   LORazepam (ATIVAN) injection 0.5 mg (0.5 mg Intravenous $Given 12/3/23 1825)        Assessments:  1728 I obtained history and examined the patient as noted above.     Independent Interpretation (X-rays, CTs, rhythm strip):  N/A    Consultations/Discussion of Management or Tests:  None     Social Determinants of Health affecting care:   None    Disposition:  The patient was discharged to home.     Impression & Plan    CMS Diagnoses: None          Medical Decision Making:    Patient presents with feeling strong heartbeat and uncomfortable beats.  EKG shows sinus rhythm at a rate of 80 the rest of her work-up is negative.  She has been kept on the monitor without arrhythmia had an occasional PVC but no other sustained or prolonged arrhythmias.  Consider differential including arrhythmia, anxiety, GERD, acute coronary syndrome less likely PE dehydration pericarditis amongst others.  Patient given fluids and Ativan.  Discussed with her negative work-up.  She does note some chronic stress.  Certainly reflux is considered as well but hemodynamically she looks stable looks safe for discharge.    Diagnosis:    ICD-10-CM    1. Palpitations  R00.2       2. Atypical chest pain  R07.89       3. Anxiety  F41.9          Discharge Medications:  Discharge Medication List as of 12/3/2023  7:51 PM           Scribe Disclosure:  Corry STRATTON, elpidio serving as a scribe at 6:33 PM on 12/3/2023 to document services personally performed by Bruce Le MD  based on my observations and the provider's statements to me.     12/3/2023   Bruce Le MD Adams, Shaun L, MD  12/03/23 [id number]

## 2023-12-04 NOTE — ED NOTES
Pt was informed she would not be able drive for 6 hours after receiving IV ativan. Pt states will take an UBER

## 2023-12-05 ENCOUNTER — VIRTUAL VISIT (OUTPATIENT)
Dept: ENDOCRINOLOGY | Facility: CLINIC | Age: 43
End: 2023-12-05
Payer: COMMERCIAL

## 2023-12-05 VITALS — WEIGHT: 222 LBS | HEIGHT: 70 IN | BODY MASS INDEX: 31.78 KG/M2

## 2023-12-05 DIAGNOSIS — E66.811 OBESITY, CLASS I, BMI 30-34.9: Primary | ICD-10-CM

## 2023-12-05 PROCEDURE — 99212 OFFICE O/P EST SF 10 MIN: CPT | Mod: VID | Performed by: INTERNAL MEDICINE

## 2023-12-05 ASSESSMENT — PAIN SCALES - GENERAL: PAINLEVEL: NO PAIN (0)

## 2023-12-05 NOTE — PROGRESS NOTES
"Return Medical Weight Management Note     Ashley Catherine  MRN:  [id number]  :  1980  RAKEL:  2023    Dear Violet Humphreys MD,    I had the pleasure of seeing your patient Ashley Catherine. She is a 43 year old female who I am continuing to see for treatment of obesity related to mild depression, hypertension, GERD        5/15/2017     8:08 AM   --   I have the following health issues associated with obesity High Blood Pressure    GERD (Reflux)   I have the following symptoms associated with obesity GERD (Reflux)     She has hx of sleeve gastrectomy in . She lost from 290 to 186 after surgery with subsequent weight regain.  Was on phentermine and tried for 2 months and had \"dark thought\". She did lose 7 lbs while taking it.  Lowest weight was 184 lbs when taking Qsymia (tingling and mood changes)  Was on Saxenda but stopped in 2021. Does not feel it is working anymore.  She could not tolerate topiramate due to emotional lability.  Could not tolerate naltrexone and phentermine due to skin itchiness.      INTERVAL HISTORY:  [facility] follow up. Last seen 2023    Currently on Wegovy 1.0 mg weekly. Tolerated medication for now.    Total loss of 52 lbs since starting the program.    Activity --  walk daily    Sleep 6 hours a night    CURRENT WEIGHT:   222 lbs 0 oz    Highest wt in life: 293 lbs  Initial Weight (lbs): 274 lbs  Last Visits Weight: 103.9 kg (229 lb)  Cumulative weight loss (lbs): 52  Weight Loss Percentage: 18.98%        2023     4:13 AM   Changes and Difficulties   I have made the following changes to my diet since my last visit: Less lattes and drinks with sugar   With regards to my diet, I am still struggling with: Boredom   I have made the following changes to my activity/exercise since my last visit: Started working out/counting steps   With regards to my activity/exercise, I am still struggling with: Consistency       VITALS:  Ht 1.778 m (5' 10\")   Wt 100.7 kg (222 lb)   BMI " 31.85 kg/m      MEDICATIONS:   Current Outpatient Medications   Medication Sig Dispense Refill    acetaminophen (TYLENOL) 32 mg/mL liquid Take 1,000 mg by mouth every 8 hours as needed for fever or mild pain      Cholecalciferol (VITAMIN D3) 25 MCG (1000 UT) CAPS       Cyanocobalamin 1000 MCG/ML KIT Inject 1 mL as directed every 30 days 3 kit 4    cyclobenzaprine (FLEXERIL) 10 MG tablet Take 1 tablet (10 mg) by mouth daily as needed for muscle spasms 45 tablet 4    diclofenac (VOLTAREN) 1 % topical gel 1 gram to affected areas on feet 2-3 times daily as needed for pain. 100 g 3    hydrochlorothiazide (HYDRODIURIL) 25 MG tablet Take 1 tablet (25 mg) by mouth daily 90 tablet 3    ibuprofen (ADVIL/MOTRIN) 200 MG capsule Take 400 mg by mouth as needed for fever      losartan (COZAAR) 25 MG tablet TAKE 1 TABLET (25 MG) BY MOUTH DAILY CAN INCREASE TO 50 MG IN 2 WEEKS BASED ON RESPONSE 180 tablet 1    Multiple Vitamins-Minerals (ONE DAILY CALCIUM/IRON PO) Take 1 tablet by mouth daily      Semaglutide-Weight Management (WEGOVY) 1 MG/0.5ML pen Inject 1 mg Subcutaneous once a week 2 mL 1    amLODIPine (NORVASC) 5 MG tablet Take 1 tablet (5 mg) by mouth daily (Patient not taking: Reported on 12/5/2023) 90 tablet 0    cyanocobalamin (VITAMIN B-12) 500 MCG SUBL sublingual tablet Place 500 mcg under the tongue every other day  (Patient not taking: Reported on 12/5/2023)             12/5/2023     4:13 AM   Weight Loss Medication History Reviewed With Patient   Which weight loss medications are you currently taking on a regular basis? Wegovy   Are you having any side effects from the weight loss medication that we have prescribed you? No       ASSESSMENT/PLAN:   Ashley Catherine is a 43 year old female who I am continuing to see for treatment of obesity    Hx of sleeve gastrectomy with some weight regain.   Used to be on Saxenda and maintained weight -- stopped early 2021  Could not tolerate topiramate due to emotional  lability    stopped phentermine and naltrexone due to skin itciness  Loss of 52 lbs since starting the program.    Plan:  - Increase Wegovy 1.7 mg weekly and f/up with Mohini REA in 3 months  - encourage diet modification and exercise    FOLLOW-UP:    See Lauren Bloch, PharmD in 3 month(s)  See Dr.Tasma MARCH in 6 month(s)    Joined the call at 12/5/2023, 10:16:53 am.  Left the call at 12/5/2023, 10:22:18 am.  You were on the call for 5 minutes 25 seconds .    External notes/medical records independently reviewed, labs and imaging independently reviewed, medical management and tests to be discussed/communicated to patient.    Time: I spent  10 minutes spent on the date of the encounter preparing to see patient (including chart review and preparation), obtaining and or reviewing additional medical history, performing a physical exam and evaluation, documenting clinical information in the electronic health record, independently interpreting results, communicating results to the patient and coordinating care.      Sincerely,    Gagandeep Qureshi MD

## 2023-12-05 NOTE — NURSING NOTE
Is the patient currently in the state of [place]? YES    Visit mode:VIDEO    If the visit is dropped, the patient can be reconnected by: VIDEO VISIT: Text to cell phone:   Telephone Information:   Mobile [phone]       Will anyone else be joining the visit? NO  (If patient encounters technical issues they should call [phone]    How would you like to obtain your AVS? MyChart    Are changes needed to the allergy or medication list? No    Reason for visit: Follow Up    Ambika GRECO

## 2023-12-05 NOTE — LETTER
"2023       RE: Ashley Catherine  [address]  [facility] [address]     Dear Colleague,    Thank you for referring your patient, Ashley Catherine, to the [facility] [place] at [facility]. Please see a copy of my visit note below.      Return Medical Weight Management Note     Ashley Catherine  MRN:  [id number]  :  1980  RAKEL:  2023    Dear Violet Humphreys MD,    I had the pleasure of seeing your patient Ashley Catherine. She is a 43 year old female who I am continuing to see for treatment of obesity related to mild depression, hypertension, GERD        5/15/2017     8:08 AM   --   I have the following health issues associated with obesity High Blood Pressure    GERD (Reflux)   I have the following symptoms associated with obesity GERD (Reflux)     She has hx of sleeve gastrectomy in . She lost from 290 to 186 after surgery with subsequent weight regain.  Was on phentermine and tried for 2 months and had \"dark thought\". She did lose 7 lbs while taking it.  Lowest weight was 184 lbs when taking Qsymia (tingling and mood changes)  Was on Saxenda but stopped in 2021. Does not feel it is working anymore.  She could not tolerate topiramate due to emotional lability.  Could not tolerate naltrexone and phentermine due to skin itchiness.      INTERVAL HISTORY:  [facility] follow up. Last seen 2023    Currently on Wegovy 1.0 mg weekly. Tolerated medication for now.    Total loss of 52 lbs since starting the program.    Activity --  walk daily    Sleep 6 hours a night    CURRENT WEIGHT:   222 lbs 0 oz    Highest wt in life: 293 lbs  Initial Weight (lbs): 274 lbs  Last Visits Weight: 103.9 kg (229 lb)  Cumulative weight loss (lbs): 52  Weight Loss Percentage: 18.98%        2023     4:13 AM   Changes and Difficulties   I have made the following changes to my diet since my last visit: Less lattes and drinks with sugar   With " "regards to my diet, I am still struggling with: Boredom   I have made the following changes to my activity/exercise since my last visit: Started working out/counting steps   With regards to my activity/exercise, I am still struggling with: Consistency       VITALS:  Ht 1.778 m (5' 10\")   Wt 100.7 kg (222 lb)   BMI 31.85 kg/m      MEDICATIONS:   Current Outpatient Medications   Medication Sig Dispense Refill    acetaminophen (TYLENOL) 32 mg/mL liquid Take 1,000 mg by mouth every 8 hours as needed for fever or mild pain      Cholecalciferol (VITAMIN D3) 25 MCG (1000 UT) CAPS       Cyanocobalamin 1000 MCG/ML KIT Inject 1 mL as directed every 30 days 3 kit 4    cyclobenzaprine (FLEXERIL) 10 MG tablet Take 1 tablet (10 mg) by mouth daily as needed for muscle spasms 45 tablet 4    diclofenac (VOLTAREN) 1 % topical gel 1 gram to affected areas on feet 2-3 times daily as needed for pain. 100 g 3    hydrochlorothiazide (HYDRODIURIL) 25 MG tablet Take 1 tablet (25 mg) by mouth daily 90 tablet 3    ibuprofen (ADVIL/MOTRIN) 200 MG capsule Take 400 mg by mouth as needed for fever      losartan (COZAAR) 25 MG tablet TAKE 1 TABLET (25 MG) BY MOUTH DAILY CAN INCREASE TO 50 MG IN 2 WEEKS BASED ON RESPONSE 180 tablet 1    Multiple Vitamins-Minerals (ONE DAILY CALCIUM/IRON PO) Take 1 tablet by mouth daily      Semaglutide-Weight Management (WEGOVY) 1 MG/0.5ML pen Inject 1 mg Subcutaneous once a week 2 mL 1    amLODIPine (NORVASC) 5 MG tablet Take 1 tablet (5 mg) by mouth daily (Patient not taking: Reported on 12/5/2023) 90 tablet 0    cyanocobalamin (VITAMIN B-12) 500 MCG SUBL sublingual tablet Place 500 mcg under the tongue every other day  (Patient not taking: Reported on 12/5/2023)             12/5/2023     4:13 AM   Weight Loss Medication History Reviewed With Patient   Which weight loss medications are you currently taking on a regular basis? Wegovy   Are you having any side effects from the weight loss medication that we have " prescribed you? No       ASSESSMENT/PLAN:   Ashley Catherine is a 43 year old female who I am continuing to see for treatment of obesity    Hx of sleeve gastrectomy with some weight regain.   Used to be on Saxenda and maintained weight -- stopped early 2021  Could not tolerate topiramate due to emotional lability    stopped phentermine and naltrexone due to skin itciness  Loss of 52 lbs since starting the program.    Plan:  - Increase Wegovy 1.7 mg weekly and f/up with Mohini REA in 3 months  - encourage diet modification and exercise    FOLLOW-UP:    See Lauren Bloch, PharmD in 3 month(s)  See Dr.Tasma MARCH in 6 month(s)    Joined the call at 12/5/2023, 10:16:53 am.  Left the call at 12/5/2023, 10:22:18 am.  You were on the call for 5 minutes 25 seconds .    External notes/medical records independently reviewed, labs and imaging independently reviewed, medical management and tests to be discussed/communicated to patient.    Time: I spent  10 minutes spent on the date of the encounter preparing to see patient (including chart review and preparation), obtaining and or reviewing additional medical history, performing a physical exam and evaluation, documenting clinical information in the electronic health record, independently interpreting results, communicating results to the patient and coordinating care.      Sincerely,    Gagandeep Qureshi MD

## 2023-12-05 NOTE — PATIENT INSTRUCTIONS
Plan:  - Increase Wegovy 1.7 mg weekly and f/up with Mohini REA in 3 months  - encourage diet modification and exercise    FOLLOW-UP:    See Lauren Bloch, PharmD in 3 month(s)  See Dr.Tasma MARCH in 6 month(s)    If you have any questions, please do not hesitate to call Weight management clinic at [phone] or [phone].  If you need to fax, please fax to [phone].    Sincerely,    Gagandeep Qureshi MD  Endocrinology

## 2023-12-06 ASSESSMENT — PATIENT HEALTH QUESTIONNAIRE - PHQ9
10. IF YOU CHECKED OFF ANY PROBLEMS, HOW DIFFICULT HAVE THESE PROBLEMS MADE IT FOR YOU TO DO YOUR WORK, TAKE CARE OF THINGS AT HOME, OR GET ALONG WITH OTHER PEOPLE: SOMEWHAT DIFFICULT
SUM OF ALL RESPONSES TO PHQ QUESTIONS 1-9: 6
SUM OF ALL RESPONSES TO PHQ QUESTIONS 1-9: 6

## 2023-12-07 ENCOUNTER — VIRTUAL VISIT (OUTPATIENT)
Dept: FAMILY MEDICINE | Facility: CLINIC | Age: 43
End: 2023-12-07
Payer: COMMERCIAL

## 2023-12-07 DIAGNOSIS — R00.0 TACHYCARDIA: Primary | ICD-10-CM

## 2023-12-07 DIAGNOSIS — F43.22 ADJUSTMENT DISORDER WITH ANXIOUS MOOD: ICD-10-CM

## 2023-12-07 DIAGNOSIS — R89.6 ABNORMAL CYTOLOGY: ICD-10-CM

## 2023-12-07 PROCEDURE — 99214 OFFICE O/P EST MOD 30 MIN: CPT | Mod: VID | Performed by: FAMILY MEDICINE

## 2023-12-07 RX ORDER — LORAZEPAM 0.5 MG/1
0.5 TABLET ORAL EVERY 6 HOURS PRN
Qty: 20 TABLET | Refills: 0 | Status: SHIPPED | OUTPATIENT
Start: 2023-12-07

## 2023-12-07 NOTE — PROGRESS NOTES
"Ashley is a 43 year old who is being evaluated via a billable video visit.      How would you like to obtain your AVS? MyChart  If the video visit is dropped, the invitation should be resent by: Text to cell phone: [phone]  Will anyone else be joining your video visit? 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  Comment: tsh pending  Plan    (F43.22) Adjustment disorder with anxious mood  Comment: had been feeling lots of stress -admits this could be due to anxiety and would appreciate a short term medication for rescue  If this is persistent will return to clinic and talk about SSRI  Plan: LORazepam (ATIVAN) 0.5 MG tablet             Prescription drug management       MED REC REQUIRED  Post Medication Reconciliation Status: discharge medications reconciled and changed, per note/orders  BMI:   Estimated body mass index is 31.85 kg/m  as calculated from the following:    Height as of 12/5/23: 1.778 m (5' 10\").    Weight as of 12/5/23: 100.7 kg (222 lb).       Will see me for follow up after seeing cardiology    Violet Humphreys MD  [facility]    Subjective   Ashley is a 43 year old, presenting for the following health issues:  Emergency Visit Follow Up      HPI     ED/UC Followup:    Facility:  [facility] ED  Date of visit: 12/03/2023  Reason for visit: Palpitations; atypical chest pain; anxiety  Current Status: Pt is feeling improved  Went to the gym  Heart was racing  Pulse was 170  Had visit for BP normal  Return to the gym on Friday and heart racing  Continued the whole weekend  Could not get heartrate down  Was seen in the ER  Had ativan and fluids and now heart is feeling normal  Has had thyroid nodule and " abnormal cells in the past  Labs showed elevated C02 slightly  Normal CBC and only a slightly elevated CRP  Normal troponins  Monitored and they noted sinus tachycardia which resolved          Review of Systems   Constitutional, HEENT, cardiovascular, pulmonary, gi and gu systems are negative, except as otherwise noted.      Objective           Vitals:  No vitals were obtained today due to virtual visit.    Physical Exam   GENERAL: Healthy, alert and no distress  EYES: Eyes grossly normal to inspection.  No discharge or erythema, or obvious scleral/conjunctival abnormalities.  RESP: No audible wheeze, cough, or visible cyanosis.  No visible retractions or increased work of breathing.    SKIN: Visible skin clear. No significant rash, abnormal pigmentation or lesions.  NEURO: Cranial nerves grossly intact.  Mentation and speech appropriate for age.  PSYCH: Mentation appears normal, affect normal/bright, judgement and insight intact, normal speech and appearance well-groomed.    Admission on 12/03/2023, Discharged on 12/03/2023   Component Date Value Ref Range Status    Sodium 12/03/2023 139  135 - 145 mmol/L Final    Reference intervals for this test were updated on 09/26/2023 to more accurately reflect our healthy population. There may be differences in the flagging of prior results with similar values performed with this method. Interpretation of those prior results can be made in the context of the updated reference intervals.     Potassium 12/03/2023 3.8  3.4 - 5.3 mmol/L Final    Carbon Dioxide (CO2) 12/03/2023 32 (H)  22 - 29 mmol/L Final    Anion Gap 12/03/2023 8  7 - 15 mmol/L Final    Urea Nitrogen 12/03/2023 9.5  6.0 - 20.0 mg/dL Final    Creatinine 12/03/2023 0.78  0.51 - 0.95 mg/dL Final    GFR Estimate 12/03/2023 >90  >60 mL/min/1.73m2 Final    Calcium 12/03/2023 9.5  8.6 - 10.0 mg/dL Final    Chloride 12/03/2023 99  98 - 107 mmol/L Final    Glucose 12/03/2023 107 (H)  70 - 99 mg/dL Final    Alkaline  Phosphatase 12/03/2023 62  40 - 150 U/L Final    Reference intervals for this test were updated on 11/14/2023 to more accurately reflect our healthy population. There may be differences in the flagging of prior results with similar values performed with this method. Interpretation of those prior results can be made in the context of the updated reference intervals.    AST 12/03/2023 20  0 - 45 U/L Final    Reference intervals for this test were updated on 6/12/2023 to more accurately reflect our healthy population. There may be differences in the flagging of prior results with similar values performed with this method. Interpretation of those prior results can be made in the context of the updated reference intervals.    ALT 12/03/2023 15  0 - 50 U/L Final    Reference intervals for this test were updated on 6/12/2023 to more accurately reflect our healthy population. There may be differences in the flagging of prior results with similar values performed with this method. Interpretation of those prior results can be made in the context of the updated reference intervals.      Protein Total 12/03/2023 7.1  6.4 - 8.3 g/dL Final    Albumin 12/03/2023 4.1  3.5 - 5.2 g/dL Final    Bilirubin Total 12/03/2023 0.3  <=1.2 mg/dL Final    hCG Serum Qualitative 12/03/2023 Negative  Negative Final    This test is for screening purposes.  Results should be interpreted along with the clinical picture.  Confirmation testing is available if warranted by ordering [id number], HCG Quantitative Pregnancy.    Troponin T, High Sensitivity 12/03/2023 <6  <=14 ng/L Final    Either a High Sensitivity Troponin T baseline (0 hours) value = 100 ng/L, or an increase in High Sensitivity Troponin T = 7 ng/L at 2 hours compared to 0 hours (2-0 hours), suggests myocardial injury, and urgent clinical attention is required.    If the 2-0 hours increase is <7 ng/L, a High Sensitivity Troponin T result above gender-specific reference ranges warrants further  evaluation.   Recommendations for further evaluation include correlation with clinical decision-making tool (e.g., HEART), a 3rd High Sensitivity Troponin T test 2 hours after the 2nd (a 20% change from baseline would represent concern), admission for observation, close PCC/cardiology follow-up, or urgent outpatient provocative testing.    Ventricular Rate 12/03/2023 82  BPM Final    Atrial Rate 12/03/2023 82  BPM Final    NV Interval 12/03/2023 180  ms Final    QRS Duration 12/03/2023 86  ms Final    QT 12/03/2023 396  ms Final    QTc 12/03/2023 462  ms Final    P Axis 12/03/2023 46  degrees Final    R AXIS 12/03/2023 41  degrees Final    T Axis 12/03/2023 46  degrees Final    Interpretation ECG 12/03/2023    Final                    Value:Sinus rhythm with Premature atrial complexes  Otherwise normal ECG  When compared with ECG of 14-MAY-2013 04:36,  Premature atrial complexes are now Present  Confirmed by GENERATED REPORT, COMPUTER ([id number]),  Elzbieta [place] ([id number]) on 12/3/2023 5:31:40 PM      WBC Count 12/03/2023 7.7  4.0 - 11.0 10e3/uL Final    RBC Count 12/03/2023 3.98  3.80 - 5.20 10e6/uL Final    Hemoglobin 12/03/2023 12.5  11.7 - 15.7 g/dL Final    Hematocrit 12/03/2023 37.5  35.0 - 47.0 % Final    MCV 12/03/2023 94  78 - 100 fL Final    MCH 12/03/2023 31.4  26.5 - 33.0 pg Final    MCHC 12/03/2023 33.3  31.5 - 36.5 g/dL Final    RDW 12/03/2023 12.7  10.0 - 15.0 % Final    Platelet Count 12/03/2023 398  150 - 450 10e3/uL Final    % Neutrophils 12/03/2023 58  % Final    % Lymphocytes 12/03/2023 32  % Final    % Monocytes 12/03/2023 8  % Final    % Eosinophils 12/03/2023 1  % Final    % Basophils 12/03/2023 1  % Final    % Immature Granulocytes 12/03/2023 0  % Final    NRBCs per 100 WBC 12/03/2023 0  <1 /100 Final    Absolute Neutrophils 12/03/2023 4.4  1.6 - 8.3 10e3/uL Final    Absolute Lymphocytes 12/03/2023 2.5  0.8 - 5.3 10e3/uL Final    Absolute Monocytes 12/03/2023 0.6  0.0 - 1.3 10e3/uL Final     Absolute Eosinophils 12/03/2023 0.1  0.0 - 0.7 10e3/uL Final    Absolute Basophils 12/03/2023 0.0  0.0 - 0.2 10e3/uL Final    Absolute Immature Granulocytes 12/03/2023 0.0  <=0.4 10e3/uL Final    Absolute NRBCs 12/03/2023 0.0  10e3/uL Final    Hold Specimen 12/03/2023 JIC   Final    Magnesium 12/03/2023 1.8  1.7 - 2.3 mg/dL Final    D-Dimer Quantitative 12/03/2023 <0.27  0.00 - 0.50 ug/mL FEU Final    CRP Inflammation 12/03/2023 5.86 (H)  <5.00 mg/L Final    Erythrocyte Sedimentation Rate 12/03/2023 11  0 - 20 mm/hr Final               Video-Visit Details    Type of service:  Video Visit   Joined the call at 12/7/2023, 12:08:05 pm.  Left the call at 12/7/2023, 12:17:33 pm.  You were on the call for 9 minutes 28 seconds .  Originating Location (pt. Location): Home    Distant Location (provider location):  On-site  Platform used for Video Visit: [facility]      Answers submitted by the patient for this visit:  Patient Health Questionnaire (Submitted on 12/6/2023)  If you checked off any problems, how difficult have these problems made it for you to do your work, take care of things at home, or get along with other people?: Somewhat difficult  PHQ9 TOTAL SCORE: 6

## 2023-12-19 ENCOUNTER — HOSPITAL ENCOUNTER (OUTPATIENT)
Dept: MAMMOGRAPHY | Facility: CLINIC | Age: 43
Discharge: HOME | End: 2023-12-19
Attending: FAMILY MEDICINE | Admitting: FAMILY MEDICINE
Payer: COMMERCIAL

## 2023-12-19 DIAGNOSIS — Z12.31 VISIT FOR SCREENING MAMMOGRAM: ICD-10-CM

## 2023-12-19 PROCEDURE — 77067 SCR MAMMO BI INCL CAD: CPT

## 2023-12-21 ENCOUNTER — HOSPITAL ENCOUNTER (OUTPATIENT)
Dept: CARDIOLOGY | Facility: CLINIC | Age: 43
Discharge: HOME | End: 2023-12-21
Attending: FAMILY MEDICINE
Payer: COMMERCIAL

## 2023-12-21 DIAGNOSIS — R00.0 TACHYCARDIA: ICD-10-CM

## 2023-12-21 LAB — LVEF ECHO: NORMAL

## 2023-12-21 PROCEDURE — 93306 TTE W/DOPPLER COMPLETE: CPT

## 2023-12-21 PROCEDURE — 93244 EXT ECG>48HR<7D REV&INTERPJ: CPT | Performed by: INTERNAL MEDICINE

## 2023-12-21 PROCEDURE — 93306 TTE W/DOPPLER COMPLETE: CPT | Mod: 26 | Performed by: INTERNAL MEDICINE

## 2023-12-21 PROCEDURE — 93242 EXT ECG>48HR<7D RECORDING: CPT

## 2023-12-29 NOTE — TELEPHONE ENCOUNTER
Message from MyChart:  Original authorizing provider: MD Ashley Pandya would like a refill of the following medications:  hydrochlorothiazide (HYDRODIURIL) 25 MG tablet [Violet Humphreys MD]    Preferred pharmacy: [facility], [place] - [address], [address]    Comment:     No

## 2024-01-11 NOTE — RESULT ENCOUNTER NOTE
Hello,    Your results were normal.  The echo showed sinus rhythm and predominantly normal heart rhythm there were some occasional ectopic heartbeats but nothing concerning.  All of the symptoms you had were associated with some intermittent ectopic heartbeats.    Violet Humphreys MD

## 2024-01-18 ENCOUNTER — OFFICE VISIT (OUTPATIENT)
Dept: CARDIOLOGY | Facility: CLINIC | Age: 44
End: 2024-01-18
Attending: FAMILY MEDICINE
Payer: COMMERCIAL

## 2024-01-18 VITALS
SYSTOLIC BLOOD PRESSURE: 130 MMHG | HEIGHT: 70 IN | DIASTOLIC BLOOD PRESSURE: 66 MMHG | BODY MASS INDEX: 32.18 KG/M2 | WEIGHT: 224.8 LBS | OXYGEN SATURATION: 95 % | HEART RATE: 94 BPM

## 2024-01-18 DIAGNOSIS — R00.0 TACHYCARDIA: ICD-10-CM

## 2024-01-18 PROCEDURE — 99203 OFFICE O/P NEW LOW 30 MIN: CPT | Performed by: INTERNAL MEDICINE

## 2024-01-18 NOTE — LETTER
1/18/2024    Violet Humphreys MD  [address]  [facility] [address]    RE: Ashley Catherine       Dear Colleague,     I had the pleasure of seeing Ashley Catherine in the [facility].  CARDIOLOGY CLINIC CONSULTATION    PRIMARY CARE PHYSICIAN:  Violet Humphreys    Tests reviewed/interpreted independently in clinic today:   EKG: Sinus rhythm  Echocardiogram: Normal BiV size and function, aneurysmal.  No valvular disease atrial septum  Blood work: CBC, BMP  Zio patch: Rare symptomatic ectopies     The level of medical decision making during this visit was of mild complexity.     HISTORY OF PRESENT ILLNESS:  Today, I had the pleasure of connecting with Ashley Catherine.  She is a very pleasant 43-year-old lady who presents to the clinic in initial consultation for palpitations.  She was seen in the hospital on 12/3/2023 for these complaints.  She underwent an EKG which was normal.  They scheduled a transthoracic echocardiogram which also was normal.  Symptoms have improved since the hospital admission.  They are described as strong and fast heartbeat that occurs for couple of seconds and then disappears.    ASSESSMENT: Pertinent issues addressed/ reviewed during this cardiology visit    Palpitations-rare ectopies    RECOMMENDATIONS:  It was a pleasure to see Ashley Catherine in clinic today.  She had rare ectopies on Zio patch which correlated with her symptoms.  I discussed this in detail with her and provided reassurance.  I told her that this does not lead to any significant cardiac issues and that her heart is structurally normal.  I have told her to look out for any precipitating factors and try to exclude them.     PAST MEDICAL HISTORY:  Past Medical History:   Diagnosis Date    Bariatric surgery status 05/13/2013    Depression     Depressive disorder     Esophageal reflux     Family history of hyperparathyroidism 3/6/2015    Forearm fracture childhood    jumping fall    Guillain-Clinton disease  (H24) age 14    hospitalized at [facility] x 1 week    History of steroid therapy     HX ABNL PAP SMEAR OF CERVIX   1995    LEEP AT 15 yo    Hypertension 2004    Hypertrophy of breast     Hypertrophy of tonsils alone     Hypovitaminosis D     with secondary high PTH    Irregular heart beat     palpitations    LBP (low back pain)     LSIL (low grade squamous intraepithelial lesion) on Pap smear 5/2006    Lumbago     RESOLVED    Major depressive disorder, recurrent episode, in partial or unspecified remission 4/1/2013    Morbid obesity (H)     bariatric surgery 5/13/2013    Multiple thyroid nodules     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)     OLIGOMENORRHEA, C/W PCOS     Sciatica     SLEEP APNEA 6/2002    No longer has since tonsillectomy and septoplasty    Thyroid nodule        MEDICATIONS:  Current Outpatient Medications   Medication    acetaminophen (TYLENOL) 32 mg/mL liquid    Cholecalciferol (VITAMIN D3) 25 MCG (1000 UT) CAPS    Cyanocobalamin 1000 MCG/ML KIT    cyclobenzaprine (FLEXERIL) 10 MG tablet    diclofenac (VOLTAREN) 1 % topical gel    hydrochlorothiazide (HYDRODIURIL) 25 MG tablet    ibuprofen (ADVIL/MOTRIN) 200 MG capsule    LORazepam (ATIVAN) 0.5 MG tablet    losartan (COZAAR) 25 MG tablet    Multiple Vitamins-Minerals (ONE DAILY CALCIUM/IRON PO)    Semaglutide-Weight Management (WEGOVY) 1.7 MG/0.75ML pen     No current facility-administered medications for this visit.       ALLERGIES:  Allergies   Allergen Reactions    Lisinopril Itching       SOCIAL HISTORY:  I have reviewed this patient's social history and updated it with pertinent information if needed. Ashley Catherine  reports that she has never smoked. She has never used smokeless tobacco. She reports current alcohol use of about 1.0 - 4.0 standard drink of alcohol per week. She reports that she does not use drugs.    FAMILY HISTORY:  I have reviewed this patient's family history and updated it with pertinent information if needed.    Family History   Problem Relation Age of Onset    Hypertension Sister     Hypertension Father     Allergies Father         seasonal    Lipids Father     Obesity Father     Arthritis Mother     Gynecology Mother         problems with menopause    Hypertension Mother     Other Cancer Mother         Endometrial    Osteoporosis Mother     Obesity Mother     Parathyroid Disorders Mother         3 operations    Obesity Sister     Diabetes Maternal Aunt         x several w. DM    Breast Cancer Maternal Aunt     Cancer - colorectal Maternal Uncle         60ish    Hypertension Sister     Obesity Sister     Nephrolithiasis No family hx of        REVIEW OF SYSTEMS:  Skin:        Eyes:       ENT:       Respiratory:       Cardiovascular:       Gastroenterology:      Genitourinary:       Musculoskeletal:       Neurologic:       Psychiatric:       Heme/Lymph/Imm:       Endocrine:           PHYSICAL EXAM:      BP: 130/66 Pulse: 94     SpO2: 95 %      Vital Signs with Ranges  Pulse:  [94] 94  BP: (130)/(66) 130/66  SpO2:  [95 %] 95 %  224 lbs 12.8 oz    Constitutional: alert, no distress  Respiratory: Good bilateral air entry  Cardiovascular: s1 s2 normal, no murmurs  GI: nondistended  Neuropsychiatric: appropriate affact    It was a pleasure seeing this patient in clinic today. Please do not hesitate to contact me with any future questions.     Kris VALVERDE, FACC, [facility]  Cardiology - [facility]  January 19, 2024    This note was completed in part using dictation via the Dragon voice recognition software. Some word and grammatical errors may occur and must be interpreted in the appropriate clinical context.  If there are any questions pertaining to this issue, please contact me for further clarification.    Thank you for allowing me to participate in the care of your patient.      Sincerely,     Kris Romero MD     [facility]  cc:   Violet Humphreys MD  [id number] [facility] [address]  [place] [address]

## 2024-01-19 ENCOUNTER — LAB (OUTPATIENT)
Dept: LAB | Facility: CLINIC | Age: 44
End: 2024-01-19
Payer: COMMERCIAL

## 2024-01-19 DIAGNOSIS — R00.0 TACHYCARDIA: ICD-10-CM

## 2024-01-19 PROCEDURE — 36415 COLL VENOUS BLD VENIPUNCTURE: CPT

## 2024-01-19 PROCEDURE — 84443 ASSAY THYROID STIM HORMONE: CPT

## 2024-01-19 PROCEDURE — 80061 LIPID PANEL: CPT

## 2024-01-19 NOTE — PROGRESS NOTES
CARDIOLOGY CLINIC CONSULTATION    PRIMARY CARE PHYSICIAN:  Violet Humphreys    Tests reviewed/interpreted independently in clinic today:   EKG: Sinus rhythm  Echocardiogram: Normal BiV size and function, aneurysmal.  No valvular disease atrial septum  Blood work: CBC, BMP  Zio patch: Rare symptomatic ectopies     The level of medical decision making during this visit was of mild complexity.     HISTORY OF PRESENT ILLNESS:  Today, I had the pleasure of connecting with Ashley Catherine.  She is a very pleasant 43-year-old lady who presents to the clinic in initial consultation for palpitations.  She was seen in the hospital on 12/3/2023 for these complaints.  She underwent an EKG which was normal.  They scheduled a transthoracic echocardiogram which also was normal.  Symptoms have improved since the hospital admission.  They are described as strong and fast heartbeat that occurs for couple of seconds and then disappears.    ASSESSMENT: Pertinent issues addressed/ reviewed during this cardiology visit    Palpitations-rare ectopies    RECOMMENDATIONS:  It was a pleasure to see Ashley Catherine in clinic today.  She had rare ectopies on Zio patch which correlated with her symptoms.  I discussed this in detail with her and provided reassurance.  I told her that this does not lead to any significant cardiac issues and that her heart is structurally normal.  I have told her to look out for any precipitating factors and try to exclude them.     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14    hospitalized at [facility] x 1 week    History of steroid therapy     HX ABNL PAP SMEAR OF CERVIX   1995    LEEP AT 15 yo    Hypertension 2004    Hypertrophy of breast     Hypertrophy of tonsils alone      Hypovitaminosis D     with secondary high PTH    Irregular heart beat     palpitations    LBP (low back pain)     LSIL (low grade squamous intraepithelial lesion) on Pap smear 5/2006    Lumbago     RESOLVED    Major depressive disorder, recurrent episode, in partial or unspecified remission 4/1/2013    Morbid obesity (H)     bariatric surgery 5/13/2013    Multiple thyroid nodules     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)     OLIGOMENORRHEA, C/W PCOS     Sciatica     SLEEP APNEA 6/2002    No longer has since tonsillectomy and septoplasty    Thyroid nodule        MEDICATIONS:  Current Outpatient Medications   Medication    acetaminophen (TYLENOL) 32 mg/mL liquid    Cholecalciferol (VITAMIN D3) 25 MCG (1000 UT) CAPS    Cyanocobalamin 1000 MCG/ML KIT    cyclobenzaprine (FLEXERIL) 10 MG tablet    diclofenac (VOLTAREN) 1 % topical gel    hydrochlorothiazide (HYDRODIURIL) 25 MG tablet    ibuprofen (ADVIL/MOTRIN) 200 MG capsule    LORazepam (ATIVAN) 0.5 MG tablet    losartan (COZAAR) 25 MG tablet    Multiple Vitamins-Minerals (ONE DAILY CALCIUM/IRON PO)    Semaglutide-Weight Management (WEGOVY) 1.7 MG/0.75ML pen     No current facility-administered medications for this visit.       ALLERGIES:  Allergies   Allergen Reactions    Lisinopril Itching       SOCIAL HISTORY:  I have reviewed this patient's social history and updated it with pertinent information if needed. Ashley Catherine  reports that she has never smoked. She has never used smokeless tobacco. She reports current alcohol use of about 1.0 - 4.0 standard drink of alcohol per week. She reports that she does not use drugs.    FAMILY HISTORY:  I have reviewed this patient's family history and updated it with pertinent information if needed.   Family History   Problem Relation Age of Onset    Hypertension Sister     Hypertension Father     Allergies Father         seasonal    Lipids Father     Obesity Father     Arthritis Mother     Gynecology Mother          problems with menopause    Hypertension Mother     Other Cancer Mother         Endometrial    Osteoporosis Mother     Obesity Mother     Parathyroid Disorders Mother         3 operations    Obesity Sister     Diabetes Maternal Aunt         x several w. DM    Breast Cancer Maternal Aunt     Cancer - colorectal Maternal Uncle         60ish    Hypertension Sister     Obesity Sister     Nephrolithiasis No family hx of        REVIEW OF SYSTEMS:  Skin:        Eyes:       ENT:       Respiratory:       Cardiovascular:       Gastroenterology:      Genitourinary:       Musculoskeletal:       Neurologic:       Psychiatric:       Heme/Lymph/Imm:       Endocrine:           PHYSICAL EXAM:      BP: 130/66 Pulse: 94     SpO2: 95 %      Vital Signs with Ranges  Pulse:  [94] 94  BP: (130)/(66) 130/66  SpO2:  [95 %] 95 %  224 lbs 12.8 oz    Constitutional: alert, no distress  Respiratory: Good bilateral air entry  Cardiovascular: s1 s2 normal, no murmurs  GI: nondistended  Neuropsychiatric: appropriate affact    It was a pleasure seeing this patient in clinic today. Please do not hesitate to contact me with any future questions.     Kris VALVERDE, FACC, [facility]  Cardiology - [facility]  January 19, 2024    This note was completed in part using dictation via the Dragon voice recognition software. Some word and grammatical errors may occur and must be interpreted in the appropriate clinical context.  If there are any questions pertaining to this issue, please contact me for further clarification.

## 2024-01-20 LAB
CHOLEST SERPL-MCNC: 193 MG/DL
FASTING STATUS PATIENT QL REPORTED: NO
HDLC SERPL-MCNC: 61 MG/DL
LDLC SERPL CALC-MCNC: 109 MG/DL
NONHDLC SERPL-MCNC: 132 MG/DL
TRIGL SERPL-MCNC: 117 MG/DL
TSH SERPL DL<=0.005 MIU/L-ACNC: 1.18 UIU/ML (ref 0.3–4.2)

## 2024-01-22 NOTE — RESULT ENCOUNTER NOTE
Hello,    The labs look excellent the LDL cholesterol is 109 this is just fine continue to boost exercise and it should come down under 100.    Thyroid level was excellent as well    Violet Humphreys MD

## 2024-01-30 ENCOUNTER — LAB REQUISITION (OUTPATIENT)
Dept: LAB | Facility: CLINIC | Age: 44
End: 2024-01-30
Payer: COMMERCIAL

## 2024-01-30 ENCOUNTER — VIRTUAL VISIT (OUTPATIENT)
Dept: PSYCHOLOGY | Facility: CLINIC | Age: 44
End: 2024-01-30
Payer: COMMERCIAL

## 2024-01-30 DIAGNOSIS — L63.8 OTHER ALOPECIA AREATA: ICD-10-CM

## 2024-01-30 DIAGNOSIS — Z56.9 OCCUPATIONAL PROBLEM: ICD-10-CM

## 2024-01-30 DIAGNOSIS — F33.0 MAJOR DEPRESSIVE DISORDER, RECURRENT EPISODE, MILD (H): Primary | ICD-10-CM

## 2024-01-30 DIAGNOSIS — Z63.8 PARENTAL CONCERN ABOUT CHILD: ICD-10-CM

## 2024-01-30 DIAGNOSIS — F54 PSYCHOLOGICAL FACTORS AFFECTING MORBID OBESITY (H): ICD-10-CM

## 2024-01-30 DIAGNOSIS — E66.01 PSYCHOLOGICAL FACTORS AFFECTING MORBID OBESITY (H): ICD-10-CM

## 2024-01-30 LAB
IRON BINDING CAPACITY (ROCHE): 325 UG/DL (ref 240–430)
IRON SATN MFR SERPL: 16 % (ref 15–46)
IRON SERPL-MCNC: 52 UG/DL (ref 37–145)
T4 FREE SERPL-MCNC: 1.11 NG/DL (ref 0.9–1.7)

## 2024-01-30 PROCEDURE — 82627 DEHYDROEPIANDROSTERONE: CPT | Mod: ORL | Performed by: DERMATOLOGY

## 2024-01-30 PROCEDURE — 82728 ASSAY OF FERRITIN: CPT | Mod: ORL | Performed by: DERMATOLOGY

## 2024-01-30 PROCEDURE — 83550 IRON BINDING TEST: CPT | Mod: ORL | Performed by: DERMATOLOGY

## 2024-01-30 PROCEDURE — 84439 ASSAY OF FREE THYROXINE: CPT | Mod: ORL | Performed by: DERMATOLOGY

## 2024-01-30 PROCEDURE — 90834 PSYTX W PT 45 MINUTES: CPT | Mod: 95 | Performed by: PSYCHOLOGIST

## 2024-01-30 PROCEDURE — 82306 VITAMIN D 25 HYDROXY: CPT | Mod: ORL | Performed by: DERMATOLOGY

## 2024-01-30 SDOH — ECONOMIC STABILITY - INCOME SECURITY: UNSPECIFIED PROBLEMS RELATED TO EMPLOYMENT: Z56.9

## 2024-01-30 SDOH — SOCIAL STABILITY - SOCIAL INSECURITY: OTHER SPECIFIED PROBLEMS RELATED TO PRIMARY SUPPORT GROUP: Z63.8

## 2024-01-30 ASSESSMENT — ANXIETY QUESTIONNAIRES
7. FEELING AFRAID AS IF SOMETHING AWFUL MIGHT HAPPEN: NOT AT ALL
IF YOU CHECKED OFF ANY PROBLEMS ON THIS QUESTIONNAIRE, HOW DIFFICULT HAVE THESE PROBLEMS MADE IT FOR YOU TO DO YOUR WORK, TAKE CARE OF THINGS AT HOME, OR GET ALONG WITH OTHER PEOPLE: SOMEWHAT DIFFICULT
6. BECOMING EASILY ANNOYED OR IRRITABLE: SEVERAL DAYS
3. WORRYING TOO MUCH ABOUT DIFFERENT THINGS: NOT AT ALL
2. NOT BEING ABLE TO STOP OR CONTROL WORRYING: SEVERAL DAYS
7. FEELING AFRAID AS IF SOMETHING AWFUL MIGHT HAPPEN: NOT AT ALL
GAD7 TOTAL SCORE: 3
GAD7 TOTAL SCORE: 3
8. IF YOU CHECKED OFF ANY PROBLEMS, HOW DIFFICULT HAVE THESE MADE IT FOR YOU TO DO YOUR WORK, TAKE CARE OF THINGS AT HOME, OR GET ALONG WITH OTHER PEOPLE?: SOMEWHAT DIFFICULT
5. BEING SO RESTLESS THAT IT IS HARD TO SIT STILL: NOT AT ALL
4. TROUBLE RELAXING: NOT AT ALL
1. FEELING NERVOUS, ANXIOUS, OR ON EDGE: SEVERAL DAYS

## 2024-01-30 NOTE — PROGRESS NOTES
Health Psychology                    Department of Medicine  Julianne Moreno, Ph.D., L.P. [phone]                         [facility] Lis Chavez, Ph.D., L.P. [phone]                     [place] Mail Code 843   JeanaDonald, Ph.D. [phone]      [address] [place] [address] Susana Ugarte, Ph.D., A.B.P.P., L.P. [phone]              [place] [address]           Jayro Elias, Ph.D., A.B.P.P., L.P. [phone]      Melissa Stokes, Ph.D., L.P. [phone]  [facility]   Elizabeth Gaytan, Ph.D., A.B.P.P., L.P. [phone]    [address] [place] [address]            Rc Vitale, Ph.D. 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 She was seen today for eclectic psychotherapy.      Session was cancelled early this month and rescheduled to today.   Mother fell, broke rib.  Rivera got sick. She had a cycst that was painful/popped.  Currently she has a cold  Her hair is falling out-saw a dermatologist.   A lot fell out in two week span. They wonder if it is more related to wt. Loss surgery or drug.  Her hair is break off at about 1 inch.  It has happened x4 years. Had cardiology workup due to exercise-related flutter.  Will be returning to the weight management program.    Mood:  Somewhat more emotional and wonders if it is Wegovy. Every weight loss drug she has tried has had adverse effects on her mood. Seems a bit better today.    Son: Rivera (4) is still at [facility], and will get home services through [facility] 1-2/week, speech therapist  1/week. He is expanding on words. Not sure where to send him for . Visint schools like [employer]. Son is on track  for height 97th percentile.   He is working with an eating therapist.  He is doing better in feeding therapy but still not eating.  He is now playing with food and liking going; not as resistant to exposure to foods.  He enjoys the music therapy.  His diet is still very restricted.; uses bottle and formula. He is getting feeding and music therapy.  He is making more effort to try to speak.  He is starting to say some words. He s still getting PT, OT, Speech therapy, day treatment, and music therapy. They are wondering about aphasia. He is not yet ready to use th      Weight: Discussed  her weight is gently decreasing  She has an appointment with weight management.  Started Wegovy; no side effects; not at a high dose.  She feels she is continuing to lose weight or at least not gaining.     Exercise:  Not going to gym yet- has membership to [employer] at [place].  Work days she gets 13,000 to 18,000 steps;  Less if work isn't busy.   Tracks with Apple watch.    Work: She is working weekend shifts so as to be more available for her son.  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  Patient care is less stressful, more manageable.  Aware of generational gaps n coworkers.    Social:  She joined a book club that meets in person.  Feels she has less to tlak about in general, so looks forward to the experience..    She participates fully. She derives benefit from airing her thoughts, feelings, questions. Rapport was excellent.     234 lbs   8/24/23 per self   220 lbs.  1/30/24     She is  5 foot 11 inches.  Her long-term overall goal is 175      Wt Readings from Last 4 Encounters:   01/18/24 102 kg (224 lb 12.8 oz)   12/05/23 100.7 kg (222 lb)   09/12/23 103.9 kg (229 lb)   07/11/23 106.6 kg (235 lb)     There is no height or weight on file to calculate BMI.      Current Outpatient Medications   Medication    acetaminophen (TYLENOL) 32 mg/mL liquid    Cholecalciferol (VITAMIN D3) 25 MCG (1000 UT) CAPS    Cyanocobalamin 1000 MCG/ML KIT    cyclobenzaprine (FLEXERIL) 10 MG tablet    diclofenac (VOLTAREN) 1 % topical gel    hydrochlorothiazide (HYDRODIURIL) 25 MG tablet    ibuprofen (ADVIL/MOTRIN) 200 MG capsule    LORazepam (ATIVAN) 0.5 MG tablet    losartan (COZAAR) 25 MG tablet    Multiple Vitamins-Minerals (ONE DAILY CALCIUM/IRON PO)    Semaglutide-Weight Management (WEGOVY) 1.7 MG/0.75ML pen     No current facility-administered medications for this visit.     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14    hospitalized at [facility] x 1 week    History of steroid therapy     HX ABNL PAP SMEAR OF CERVIX   1995    LEEP AT 15 yo    Hypertension 2004    Hypertrophy of breast     Hypertrophy of tonsils alone     Hypovitaminosis D     with secondary high PTH    Irregular heart beat     palpitations    LBP (low back pain)     LSIL (low grade squamous intraepithelial lesion) on Pap smear 5/2006    Lumbago     RESOLVED    Major depressive disorder, recurrent episode, in partial or unspecified remission 4/1/2013    Morbid obesity (H)     bariatric surgery 5/13/2013    Multiple thyroid nodules     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)     OLIGOMENORRHEA, C/W PCOS     Sciatica     SLEEP APNEA 6/2002    No longer has since tonsillectomy and septoplasty    Thyroid nodule      Past Surgical History:   Procedure Laterality Date    BIOPSY  03/13/2015    Thyroid nodule    ESOPHAGOSCOPY, GASTROSCOPY, DUODENOSCOPY (EGD), COMBINED  5/28/2013    Procedure: COMBINED ESOPHAGOSCOPY, GASTROSCOPY, DUODENOSCOPY (EGD);;  Surgeon: Best Willett MD;   Location: [facility] GI    ESOPHAGOSCOPY, GASTROSCOPY, DUODENOSCOPY (EGD), COMBINED N/A 6/13/2018    Procedure: COMBINED ESOPHAGOSCOPY, GASTROSCOPY, DUODENOSCOPY (EGD), BIOPSY SINGLE OR MULTIPLE;  gastroscopy;  Surgeon: Westley Gibbs MD;  Location:  GI    LAPAROSCOPIC GASTRIC SLEEVE  5/13/2013    Procedure: LAPAROSCOPIC GASTRIC SLEEVE;  Laparoscopic Sleeve Gastrectomy ;  Surgeon: Best Willett MD;  Location: [facility] OR    LEEP TX, CERVICAL  1995    age 15    partial thyroidectomy  2018    SEPTOPLASTY      THYROIDECTOMY Left 4/3/2018    Procedure: THYROIDECTOMY;  Left Thyroid Lobectomy And Ishtmusectomy;  Surgeon: Delia Harper MD;  Location: [facility] OR    TONSILLECTOMY  age 20s    TONSILLECTOMY  2004?    wisdom teeth extraction           3/3/2023     9:10 AM 5/15/2023    10:47 AM 12/6/2023     8:58 AM   PHQ-9 SCORE   PHQ-9 Total Score MyChart 5 (Mild depression) 2 (Minimal depression) 6 (Mild depression)   PHQ-9 Total Score 5 2 6         9/27/2022     9:06 AM 5/15/2023    10:48 AM 1/30/2024     8:32 AM   MAXIMO-7 SCORE   Total Score 7 (mild anxiety) 1 (minimal anxiety) 3 (minimal anxiety)   Total Score 7 1    1 3         12/4/2018     8:22 AM 1/31/2020     7:20 AM   WHODAS 2.0 Total Score   Total Score 18 15   Total Score MyChart 18 15       This telehealth service is appropriate and effective for delivering services in light of the necessity for social distancing to mitigate the COVID-19 epidemic and for conservation of PPE.     Patient has agreed to receiving telehealth services after being informed about it: Yes    Patient prefers video invitation/information to be sent by:   emai    Time service started: 9:03  Time service ended: 9:54  Extended session due to complexity of case and length of interval.    Mode of transmission: [email or web address]  Location of originating:  Home  of the patient    Distance site:  [facility]    The patient has been notified that:  Video visits will be conducted via a call with their psychologist  to provide the care they need with a video conversation. Video visits may be billed at different rates depending on insurance coverage.  Patients are advised to please contact their insurance provider with any questions about their health insurance coverage. If during the course of a call the psychologist feels a video visit is not appropriate, patients will not be charged for this service.  Diagnosis:  Major depression, recurrent, mild (F33.0).   Psychological factors affecting obesity (F54).   Parental concerns about child (Z63.58)  PLAN/RECOMMENDATI in person  psychotherapy session 2/28 @ 9 to address weight loss and work and family issues with eclectic therapy, which is medically necessary.   She wishes to continue to get support here with her life changes and her son's behavioral/developmental challenges.  2.  Resume schedule of regular exercise to the level that is possible.  Explore getting cardio apparatus e.g., stationary bicycle given her plantar fascitis.  Decrease sweetened drinks.   Last treatment plan signed: 9/27/23  Treatment plan due: 9/27/24

## 2024-01-31 LAB
DHEA-S SERPL-MCNC: 251 UG/DL (ref 35–430)
FERRITIN SERPL-MCNC: 54 NG/ML (ref 6–175)
VIT D+METAB SERPL-MCNC: 39 NG/ML (ref 20–50)

## 2024-02-12 ENCOUNTER — TELEPHONE (OUTPATIENT)
Dept: ENDOCRINOLOGY | Facility: CLINIC | Age: 44
End: 2024-02-12
Payer: COMMERCIAL

## 2024-02-12 NOTE — TELEPHONE ENCOUNTER
Left Voicemail (1st Attempt)  ( Sent Flypaper )for the patient to call back and reschedule the following:    Appointment type: [facility]   Provider:    Return date: 06/18/2024  Specialty phone number: [phone]  Additional appointment(s) needed: no   Additonal Notes: Pt needs to reschedule due to provider unavailability that day

## 2024-02-14 NOTE — TELEPHONE ENCOUNTER
Left Voicemail (2nd Attempt) ( Sent Letter )for the patient to call back and schedule the following:    Appointment type: [facility]  Provider:   Return date: 06/18/2024  Specialty phone number: [phone]  Additional appointment(s) needed: no   Additonal Notes: Pt needs to reschedule, provider not available

## 2024-02-25 ASSESSMENT — ANXIETY QUESTIONNAIRES
7. FEELING AFRAID AS IF SOMETHING AWFUL MIGHT HAPPEN: SEVERAL DAYS
IF YOU CHECKED OFF ANY PROBLEMS ON THIS QUESTIONNAIRE, HOW DIFFICULT HAVE THESE PROBLEMS MADE IT FOR YOU TO DO YOUR WORK, TAKE CARE OF THINGS AT HOME, OR GET ALONG WITH OTHER PEOPLE: SOMEWHAT DIFFICULT
GAD7 TOTAL SCORE: 6
8. IF YOU CHECKED OFF ANY PROBLEMS, HOW DIFFICULT HAVE THESE MADE IT FOR YOU TO DO YOUR WORK, TAKE CARE OF THINGS AT HOME, OR GET ALONG WITH OTHER PEOPLE?: SOMEWHAT DIFFICULT
7. FEELING AFRAID AS IF SOMETHING AWFUL MIGHT HAPPEN: SEVERAL DAYS
5. BEING SO RESTLESS THAT IT IS HARD TO SIT STILL: NOT AT ALL
3. WORRYING TOO MUCH ABOUT DIFFERENT THINGS: SEVERAL DAYS
6. BECOMING EASILY ANNOYED OR IRRITABLE: SEVERAL DAYS
1. FEELING NERVOUS, ANXIOUS, OR ON EDGE: SEVERAL DAYS
4. TROUBLE RELAXING: SEVERAL DAYS
2. NOT BEING ABLE TO STOP OR CONTROL WORRYING: SEVERAL DAYS
GAD7 TOTAL SCORE: 6

## 2024-02-27 ENCOUNTER — OFFICE VISIT (OUTPATIENT)
Dept: FAMILY MEDICINE | Facility: CLINIC | Age: 44
End: 2024-02-27
Payer: COMMERCIAL

## 2024-02-27 VITALS
HEART RATE: 94 BPM | SYSTOLIC BLOOD PRESSURE: 110 MMHG | TEMPERATURE: 97.7 F | WEIGHT: 221.9 LBS | RESPIRATION RATE: 16 BRPM | BODY MASS INDEX: 31.84 KG/M2 | DIASTOLIC BLOOD PRESSURE: 80 MMHG | OXYGEN SATURATION: 98 %

## 2024-02-27 DIAGNOSIS — J01.90 ACUTE SINUSITIS WITH SYMPTOMS > 10 DAYS: Primary | ICD-10-CM

## 2024-02-27 PROCEDURE — 99213 OFFICE O/P EST LOW 20 MIN: CPT | Performed by: FAMILY MEDICINE

## 2024-02-27 RX ORDER — AMOXICILLIN 875 MG
875 TABLET ORAL 2 TIMES DAILY
Qty: 14 TABLET | Refills: 0 | Status: SHIPPED | OUTPATIENT
Start: 2024-02-27 | End: 2024-03-06

## 2024-02-27 ASSESSMENT — PAIN SCALES - GENERAL: PAINLEVEL: NO PAIN (0)

## 2024-02-27 NOTE — PROGRESS NOTES
"  Assessment & Plan     Acute sinusitis with symptoms > 10 days  Has been sick over past 2 weeks with worsening postnasal drainage with sinus tenderness,   Will have her to try abx   - amoxicillin (AMOXIL) 875 MG tablet; Take 1 tablet (875 mg) by mouth 2 times daily    BMI  Estimated body mass index is 31.84 kg/m  as calculated from the following:    Height as of 1/18/24: 1.778 m (5' 10\").    Weight as of this encounter: 100.7 kg (221 lb 14.4 oz).   Weight management plan: Discussed healthy diet and exercise guidelines      FUTURE APPOINTMENTS:       - Follow-up visit in 2 weeks if not improving     Subjective   Ashley is a 43 year old, presenting for the following health issues:  Cold Symptoms      2/27/2024     1:13 PM   Additional Questions   Roomed by Archana CHACON     History of Present Illness       Reason for visit:  Chest/nasal congestion  Symptom onset:  1-2 weeks ago  Symptoms include:  Chest congestion/runny and stuffed nose  Symptom intensity:  Moderate  Symptom progression:  Staying the same  Had these symptoms before:  Yes  Has tried/received treatment for these symptoms:  No    She eats 2-3 servings of fruits and vegetables daily.She consumes 3 sweetened beverage(s) daily.She exercises with enough effort to increase her heart rate 9 or less minutes per day.  She exercises with enough effort to increase her heart rate 3 or less days per week.   She is taking medications regularly.         Headache, congestion. Slight cough x 1 1/2 weeks        Review of Systems  Constitutional, neuro, ENT, endocrine, pulmonary, cardiac, gastrointestinal, genitourinary, musculoskeletal, integument and psychiatric systems are negative, except as otherwise noted.      Objective    /80   Pulse 94   Temp 97.7  F (36.5  C) (Temporal)   Resp 16   Wt 100.7 kg (221 lb 14.4 oz)   SpO2 98%   BMI 31.84 kg/m    Body mass index is 31.84 kg/m .  Physical Exam   GENERAL: alert and no distress  EYES: Eyes grossly normal to " inspection, PERRL and conjunctivae and sclerae normal  HENT: ear canals and TM's normal, nose and mouth without ulcers or lesions  NECK: no adenopathy, no asymmetry, masses, or scars  RESP: lungs clear to auscultation - no rales, rhonchi or wheezes  CV: regular rate and rhythm, normal S1 S2, no S3 or S4, no murmur, click or rub, no peripheral edema  ABDOMEN: soft, nontender, no hepatosplenomegaly, no masses and bowel sounds normal  MS: no gross musculoskeletal defects noted, no edema  SKIN: no suspicious lesions or rashes  NEURO: Normal strength and tone, mentation intact and speech normal  BACK: no CVA tenderness, no paralumbar tenderness            Signed Electronically by: Max To MD

## 2024-02-28 ENCOUNTER — VIRTUAL VISIT (OUTPATIENT)
Dept: PSYCHOLOGY | Facility: CLINIC | Age: 44
End: 2024-02-28
Payer: COMMERCIAL

## 2024-02-28 DIAGNOSIS — Z63.8 PARENTAL CONCERN ABOUT CHILD: ICD-10-CM

## 2024-02-28 DIAGNOSIS — Z56.9 OCCUPATIONAL PROBLEM: ICD-10-CM

## 2024-02-28 DIAGNOSIS — F54 PSYCHOLOGICAL FACTORS AFFECTING MORBID OBESITY (H): ICD-10-CM

## 2024-02-28 DIAGNOSIS — F33.0 MAJOR DEPRESSIVE DISORDER, RECURRENT EPISODE, MILD (H): Primary | ICD-10-CM

## 2024-02-28 DIAGNOSIS — E66.01 PSYCHOLOGICAL FACTORS AFFECTING MORBID OBESITY (H): ICD-10-CM

## 2024-02-28 PROCEDURE — 90837 PSYTX W PT 60 MINUTES: CPT | Mod: 95 | Performed by: PSYCHOLOGIST

## 2024-02-28 SDOH — SOCIAL STABILITY - SOCIAL INSECURITY: OTHER SPECIFIED PROBLEMS RELATED TO PRIMARY SUPPORT GROUP: Z63.8

## 2024-02-28 SDOH — ECONOMIC STABILITY - INCOME SECURITY: UNSPECIFIED PROBLEMS RELATED TO EMPLOYMENT: Z56.9
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
## 2024-03-06 ENCOUNTER — VIRTUAL VISIT (OUTPATIENT)
Dept: CARDIOLOGY | Facility: CLINIC | Age: 44
End: 2024-03-06
Attending: NURSE PRACTITIONER
Payer: COMMERCIAL

## 2024-03-06 VITALS — BODY MASS INDEX: 31.21 KG/M2 | HEIGHT: 70 IN | WEIGHT: 218 LBS

## 2024-03-06 DIAGNOSIS — Z98.84 S/P LAPAROSCOPIC SLEEVE GASTRECTOMY: Primary | ICD-10-CM

## 2024-03-06 RX ORDER — PEDI MULTIVIT NO.25/FOLIC ACID 300 MCG
2 TABLET,CHEWABLE ORAL DAILY
COMMUNITY

## 2024-03-06 ASSESSMENT — PAIN SCALES - GENERAL: PAINLEVEL: MILD PAIN (2)

## 2024-03-06 NOTE — PROGRESS NOTES
Medication Therapy Management (MTM) Encounter    ASSESSMENT:                            Medication Adherence/Access: No issues identified    Weight Management:  Progressing, no change today.     S/P Sleeve Gastrectomy:   Would benefit from starting calcium/vitamin supplementation as per ASMBS Guidelines needs to be getting in 4593-3206 mg calcium/day from diet or supplementation. Will order the other bariatric labs that are necessary, appears that most others were ordered earlier this year. Encouraged follow up at least yearly so can order all post Sleeve Gastrectomy labs yearly (CBC, ferritin, Vitamin D, PTH, B12, B1 whole blood, folate, Vitamin A).     Hypertension:   Stable. Blood pressure at goal < 130/80 mmHg.     PLAN:                            Start calcium/vitamin D supplementation. Example: Celebrate Calcium/Vitamin D Chew 1 chew twice daily   Get the rest of bariatric labs completed.     Follow-up: Return in about 1 year (around 3/6/2025) for Medication Therapy Management Pharmacist Visit, (scheduled today).    SUBJECTIVE/OBJECTIVE:                          Ashley Catherine is a 43 year old female contacted via secure video for a follow-up visit from 9/12/2023.       Reason for visit: wegovy check in, wants to know what other labs should be ordered due to history of Sleeve Gastrectomy.    Allergies/ADRs: Reviewed in chart  Past Medical History: Reviewed in chart  Tobacco: She reports that she has never smoked. She has never used smokeless tobacco.  Alcohol: 1 beverages/day or 3-4 beverages/week  Caffeine: monster zero energy drink or coffee daily     Medication Adherence/Access: no issues reported    Obesity   Weight Management:  Wegovy 1.7 mg once weekly     Patient reports no current medication side effects. Had weight regain years after Sleeve Gastrectomy. Started Wegovy July 2023.   Nutrition/Eating Habits: doing more 4-6 small meal/snacks daily. She is feeling satisfied between meals.   Exercise/Activity:  "she has been sick the last 2-3 weeks, but prior to that was doing elliptical at gym a couple times per week. She reports that she is wanting to work on taking in less liquids.   Medication History:  Phentermine: itching  Naltrexone: itching     Wt Readings from Last 8 Encounters:   03/06/24 98.9 kg (218 lb)   02/27/24 100.7 kg (221 lb 14.4 oz)   01/18/24 102 kg (224 lb 12.8 oz)   12/05/23 100.7 kg (222 lb)   09/12/23 103.9 kg (229 lb)   07/11/23 106.6 kg (235 lb)   05/18/23 104.3 kg (230 lb)   05/15/23 104.7 kg (230 lb 12.8 oz)     Estimated body mass index is 31.28 kg/m  as calculated from the following:    Height as of this encounter: 1.778 m (5' 10\").    Weight as of this encounter: 98.9 kg (218 lb).    S/P Sleeve Gastrectomy:   [place] Multivitamin 2 chews daily   Vitamin B12 injection 1000 mcg once monthly     Patient had sleeve gastrectomy in 2013. She does not complain of acid reflux. She does not have issues with swallowing food/pills. Reports that she hasn't been consistent with supplements. She wants to know what she needs to be taking currently in addition to what she is on.      Hemoglobin   Date Value Ref Range Status   12/03/2023 12.5 11.7 - 15.7 g/dL Final   12/23/2020 11.9 11.7 - 15.7 g/dL Final     Ferritin   Date Value Ref Range Status   01/30/2024 54 6 - 175 ng/mL Final   12/23/2020 50 12 - 150 ng/mL Final     Lab Results   Component Value Date    VITDT 39 01/30/2024     Lab Results   Component Value Date    PTHI 46 10/22/2020     Lab Results   Component Value Date    B12 700 03/03/2023     Lab Results   Component Value Date    HARDY 0.71 12/23/2020     Hypertension:   Losartan 25 mg daily  Hydrochlorothiazide 25 mg daily in AM     Patient reports no current medication side effects.  Patient self-monitors blood pressure.  Home BP monitoring 110s/70s-80.    BP Readings from Last 3 Encounters:   02/27/24 110/80   01/18/24 130/66   12/03/23 125/84     Pulse Readings from Last 3 Encounters: " "  02/27/24 94   01/18/24 94   12/03/23 78     Today's Vitals: Ht 1.778 m (5' 10\")   Wt 98.9 kg (218 lb)   BMI 31.28 kg/m    ----------------      I spent 20 minutes with this patient today. All changes were made via collaborative practice agreement with Dr. Gagandeep Qureshi. A copy of the visit note was provided to the patient's provider(s).    A summary of these recommendations was sent via ActX.    Lauren Bloch, PharmD, BCACP   Medication Therapy Management Pharmacist   [facility]    Telemedicine Visit Details  Type of service:  Video Conference via ESTmob  Start Time:  2:35 PM  End Time:  2:55 PM     Medication Therapy Recommendations  S/P laparoscopic sleeve gastrectomy    Rationale: Preventive therapy - Needs additional medication therapy - Indication   Recommendation: Start Medication - Celebrate Calcium Citrate 500-12.5 MG-MCG Chew   Status: Accepted - no CPA Needed            "

## 2024-03-06 NOTE — PROGRESS NOTES
"Virtual Visit Details    Type of service:  Video Visit     Originating Location (pt. Location): {video visit patient location:924971::\"Home\"}  {PROVIDER LOCATION On-site should be selected for visits conducted from your clinic location or adjoining [facility] hospital, academic office, or other nearby [facility] building. Off-site should be selected for all other provider locations, including home:125856}  Distant Location (provider location):  {virtual location provider:408426}  Platform used for Video Visit: {Virtual Visit Platforms:687030::\"[email or web address]\"}    "

## 2024-03-06 NOTE — NURSING NOTE
Is the patient currently in the state of [place]? YES    Visit mode:VIDEO    If the visit is dropped, the patient can be reconnected by: VIDEO VISIT: Text to cell phone:   Telephone Information:   Mobile [phone]       Will anyone else be joining the visit? NO  (If patient encounters technical issues they should call [phone])    How would you like to obtain your AVS? MyChart    Are changes needed to the allergy or medication list? Pt stated no changes to allergies and Pt stated no med changes    Reason for visit: Medication Therapy Management    Ambika Holbrook VVF

## 2024-03-06 NOTE — PATIENT INSTRUCTIONS
"Recommendations from today's MTM visit:                                                    MTM (medication therapy management) is a service provided by a clinical pharmacist designed to help you get the most of out of your medicines.      Start calcium/vitamin D supplementation. Example: Celebrate Calcium/Vitamin D Chew 1 chew twice daily   Get the rest of bariatric labs completed.     Follow-up: Return in about 1 year (around 3/6/2025) for Medication Therapy Management Pharmacist Visit, (scheduled today).    It was great speaking with you today.  I value your experience and would be very thankful for your time in providing feedback in our clinic survey. In the next few days, you may receive an email or text message from [facility] with a link to a survey related to your  clinical pharmacist.\"     To schedule another MTM appointment, please call the clinic directly or you may call the MTM scheduling line at [phone] or toll-free at [phone].     My Clinical Pharmacist's contact information:                                                      Please feel free to contact me with any questions or concerns you have.      Lauren Bloch, PharmD, BCACP   Medication Therapy Management Pharmacist   [facility]       "

## 2024-03-06 NOTE — LETTER
3/6/2024      RE: Ashley Catherine  [address]  [facility] [address]       Dear Colleague,    Thank you for the opportunity to participate in the care of your patient, Ashley Catherine, at the [facility]. Please see a copy of my visit note below.    Medication Therapy Management (MTM) Encounter    ASSESSMENT:                            Medication Adherence/Access: No issues identified    Weight Management:  Progressing, no change today.     S/P Sleeve Gastrectomy:   Would benefit from starting calcium/vitamin supplementation as per ASMBS Guidelines needs to be getting in 2915-8352 mg calcium/day from diet or supplementation. Will order the other bariatric labs that are necessary, appears that most others were ordered earlier this year. Encouraged follow up at least yearly so can order all post Sleeve Gastrectomy labs yearly (CBC, ferritin, Vitamin D, PTH, B12, B1 whole blood, folate, Vitamin A).     Hypertension:   Stable. Blood pressure at goal < 130/80 mmHg.     PLAN:                            Start calcium/vitamin D supplementation. Example: Celebrate Calcium/Vitamin D Chew 1 chew twice daily   Get the rest of bariatric labs completed.     Follow-up: Return in about 1 year (around 3/6/2025) for Medication Therapy Management Pharmacist Visit, (scheduled today).    SUBJECTIVE/OBJECTIVE:                          Ashley Catherine is a 43 year old female contacted via secure video for a follow-up visit from 9/12/2023.       Reason for visit: wegovy check in, wants to know what other labs should be ordered due to history of Sleeve Gastrectomy.    Allergies/ADRs: Reviewed in chart  Past Medical History: Reviewed in chart  Tobacco: She reports that she has never smoked. She has never used smokeless tobacco.  Alcohol: 1 beverages/day or 3-4 beverages/week  Caffeine: monster zero energy drink or coffee daily     Medication  "Adherence/Access: no issues reported    Obesity  Weight Management:  Wegovy 1.7 mg once weekly     Patient reports no current medication side effects. Had weight regain years after Sleeve Gastrectomy. Started Wegovy July 2023.   Nutrition/Eating Habits: doing more 4-6 small meal/snacks daily. She is feeling satisfied between meals.   Exercise/Activity: she has been sick the last 2-3 weeks, but prior to that was doing elliptical at gym a couple times per week. She reports that she is wanting to work on taking in less liquids.   Medication History:  Phentermine: itching  Naltrexone: itching     Wt Readings from Last 8 Encounters:   03/06/24 98.9 kg (218 lb)   02/27/24 100.7 kg (221 lb 14.4 oz)   01/18/24 102 kg (224 lb 12.8 oz)   12/05/23 100.7 kg (222 lb)   09/12/23 103.9 kg (229 lb)   07/11/23 106.6 kg (235 lb)   05/18/23 104.3 kg (230 lb)   05/15/23 104.7 kg (230 lb 12.8 oz)     Estimated body mass index is 31.28 kg/m  as calculated from the following:    Height as of this encounter: 1.778 m (5' 10\").    Weight as of this encounter: 98.9 kg (218 lb).    S/P Sleeve Gastrectomy:   [place] Multivitamin 2 chews daily   Vitamin B12 injection 1000 mcg once monthly     Patient had sleeve gastrectomy in 2013. She does not complain of acid reflux. She does not have issues with swallowing food/pills. Reports that she hasn't been consistent with supplements. She wants to know what she needs to be taking currently in addition to what she is on.      Hemoglobin   Date Value Ref Range Status   12/03/2023 12.5 11.7 - 15.7 g/dL Final   12/23/2020 11.9 11.7 - 15.7 g/dL Final     Ferritin   Date Value Ref Range Status   01/30/2024 54 6 - 175 ng/mL Final   12/23/2020 50 12 - 150 ng/mL Final     Lab Results   Component Value Date    VITDT 39 01/30/2024     Lab Results   Component Value Date    PTHI 46 10/22/2020     Lab Results   Component Value Date    B12 700 03/03/2023     Lab Results   Component Value Date    HARDY 0.71 " "12/23/2020     Hypertension:   Losartan 25 mg daily  Hydrochlorothiazide 25 mg daily in AM     Patient reports no current medication side effects.  Patient self-monitors blood pressure.  Home BP monitoring 110s/70s-80.    BP Readings from Last 3 Encounters:   02/27/24 110/80   01/18/24 130/66   12/03/23 125/84     Pulse Readings from Last 3 Encounters:   02/27/24 94   01/18/24 94   12/03/23 78     Today's Vitals: Ht 1.778 m (5' 10\")   Wt 98.9 kg (218 lb)   BMI 31.28 kg/m    ----------------      I spent 20 minutes with this patient today. All changes were made via collaborative practice agreement with Dr. Gagandeep Qureshi. A copy of the visit note was provided to the patient's provider(s).    A summary of these recommendations was sent via ADAPTIX.       Medication Therapy Recommendations  S/P laparoscopic sleeve gastrectomy    Rationale: Preventive therapy - Needs additional medication therapy - Indication   Recommendation: Start Medication - Celebrate Calcium Citrate 500-12.5 MG-MCG Chew   Status: Accepted - no CPA Needed                Please do not hesitate to contact me if you have any questions/concerns.     Sincerely,     Lauren T. Bloch, [facility]  "

## 2024-03-16 DIAGNOSIS — E66.811 OBESITY, CLASS I, BMI 30-34.9: ICD-10-CM

## 2024-03-18 ENCOUNTER — LAB (OUTPATIENT)
Dept: LAB | Facility: CLINIC | Age: 44
End: 2024-03-18
Payer: COMMERCIAL

## 2024-03-18 DIAGNOSIS — Z98.84 S/P LAPAROSCOPIC SLEEVE GASTRECTOMY: ICD-10-CM

## 2024-03-18 LAB — FOLATE SERPL-MCNC: >40 NG/ML (ref 4.6–34.8)

## 2024-03-18 PROCEDURE — 36415 COLL VENOUS BLD VENIPUNCTURE: CPT

## 2024-03-18 PROCEDURE — 82607 VITAMIN B-12: CPT

## 2024-03-18 PROCEDURE — 82746 ASSAY OF FOLIC ACID SERUM: CPT

## 2024-03-18 PROCEDURE — 84590 ASSAY OF VITAMIN A: CPT | Mod: 90

## 2024-03-18 PROCEDURE — 99000 SPECIMEN HANDLING OFFICE-LAB: CPT

## 2024-03-18 PROCEDURE — 84425 ASSAY OF VITAMIN B-1: CPT | Mod: 90

## 2024-03-19 LAB — VIT B12 SERPL-MCNC: 1204 PG/ML (ref 232–1245)

## 2024-03-20 LAB
ANNOTATION COMMENT IMP: NORMAL
RETINYL PALMITATE SERPL-MCNC: 0.02 MG/L
VIT A SERPL-MCNC: 0.72 MG/L

## 2024-03-22 RX ORDER — SEMAGLUTIDE 1.7 MG/.75ML
INJECTION, SOLUTION SUBCUTANEOUS
Qty: 3 ML | Refills: 4 | Status: SHIPPED | OUTPATIENT
Start: 2024-03-22 | End: 2024-09-25

## 2024-03-22 NOTE — TELEPHONE ENCOUNTER
WEGOVY 1.7MG/0.75ML PF PEN INJ       Last Written Prescription Date:  12-5-23  Last Fill Quantity: 3 ml,   # refills: 3  Last Office Visit : 12-5-23 Wt Mgmt ( RTC 6 month Dr. Qureshi)                              3-6-24 MTM  Future Office visit:  8-20-24    Wt Mgmt protocol lab  12-3-23    Creatinine  0.51 - 0.95 mg/dL 0.78     Routing refill request to provider for review/approval because:  Next clinic appt outside of  RTC/protocol time frame.

## 2024-03-23 LAB — VIT B1 PYROPHOSHATE BLD-SCNC: 128 NMOL/L

## 2024-03-29 ENCOUNTER — VIRTUAL VISIT (OUTPATIENT)
Dept: PSYCHOLOGY | Facility: CLINIC | Age: 44
End: 2024-03-29
Payer: COMMERCIAL

## 2024-03-29 DIAGNOSIS — Z63.8 PARENTAL CONCERN ABOUT CHILD: ICD-10-CM

## 2024-03-29 DIAGNOSIS — Z56.9 OCCUPATIONAL PROBLEM: ICD-10-CM

## 2024-03-29 DIAGNOSIS — E66.01 PSYCHOLOGICAL FACTORS AFFECTING MORBID OBESITY (H): ICD-10-CM

## 2024-03-29 DIAGNOSIS — F54 PSYCHOLOGICAL FACTORS AFFECTING MORBID OBESITY (H): ICD-10-CM

## 2024-03-29 DIAGNOSIS — F33.0 MAJOR DEPRESSIVE DISORDER, RECURRENT EPISODE, MILD (H): Primary | ICD-10-CM

## 2024-03-29 PROCEDURE — 90837 PSYTX W PT 60 MINUTES: CPT | Mod: 95 | Performed by: PSYCHOLOGIST

## 2024-03-29 SDOH — SOCIAL STABILITY - SOCIAL INSECURITY: OTHER SPECIFIED PROBLEMS RELATED TO PRIMARY SUPPORT GROUP: Z63.8

## 2024-03-29 SDOH — ECONOMIC STABILITY - INCOME SECURITY: UNSPECIFIED PROBLEMS RELATED TO EMPLOYMENT: Z56.9

## 2024-03-29 ASSESSMENT — ANXIETY QUESTIONNAIRES
7. FEELING AFRAID AS IF SOMETHING AWFUL MIGHT HAPPEN: SEVERAL DAYS
8. IF YOU CHECKED OFF ANY PROBLEMS, HOW DIFFICULT HAVE THESE MADE IT FOR YOU TO DO YOUR WORK, TAKE CARE OF THINGS AT HOME, OR GET ALONG WITH OTHER PEOPLE?: NOT DIFFICULT AT ALL
2. NOT BEING ABLE TO STOP OR CONTROL WORRYING: NOT AT ALL
4. TROUBLE RELAXING: NOT AT ALL
GAD7 TOTAL SCORE: 3
IF YOU CHECKED OFF ANY PROBLEMS ON THIS QUESTIONNAIRE, HOW DIFFICULT HAVE THESE PROBLEMS MADE IT FOR YOU TO DO YOUR WORK, TAKE CARE OF THINGS AT HOME, OR GET ALONG WITH OTHER PEOPLE: NOT DIFFICULT AT ALL
3. WORRYING TOO MUCH ABOUT DIFFERENT THINGS: NOT AT ALL
GAD7 TOTAL SCORE: 3
1. FEELING NERVOUS, ANXIOUS, OR ON EDGE: SEVERAL DAYS
6. BECOMING EASILY ANNOYED OR IRRITABLE: SEVERAL DAYS
5. BEING SO RESTLESS THAT IT IS HARD TO SIT STILL: NOT AT ALL
7. FEELING AFRAID AS IF SOMETHING AWFUL MIGHT HAPPEN: SEVERAL DAYS
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
## 2024-04-15 ENCOUNTER — PATIENT OUTREACH (OUTPATIENT)
Dept: CARE COORDINATION | Facility: CLINIC | Age: 44
End: 2024-04-15
Payer: COMMERCIAL

## 2024-04-15 DIAGNOSIS — E66.811 OBESITY, CLASS I, BMI 30-34.9: ICD-10-CM

## 2024-04-15 DIAGNOSIS — I10 BENIGN ESSENTIAL HYPERTENSION: ICD-10-CM

## 2024-04-15 RX ORDER — LOSARTAN POTASSIUM 25 MG/1
25 TABLET ORAL DAILY
Qty: 180 TABLET | Refills: 1 | Status: SHIPPED | OUTPATIENT
Start: 2024-04-15 | End: 2024-07-25

## 2024-04-24 RX ORDER — SEMAGLUTIDE 1.7 MG/.75ML
INJECTION, SOLUTION SUBCUTANEOUS
Qty: 3 ML | Refills: 4 | OUTPATIENT
Start: 2024-04-24

## 2024-04-24 NOTE — TELEPHONE ENCOUNTER
WEGOVY 1.7 MG/0.75ML pen 3 mL 4 3/22/2024 -- No   Sig: INJECT 1.7MG UNDER THE SKIN ONCE A WEEK   [id number] [place], [place] - [address]   ----------------------  Last Office Visit : 12/5/2023  [facility] [place]     Gagandeep Qureshi MD     Future Office visit:    8/20/2024 Status: Avinash    Arrive By: 11:00 AM     Appointment Time: 11:15 AM Length: 15   Visit Type: RETURN WEIGHT MANAGEMENT [2332] BRANDT:     Provider: Gagandeep Qureshi MD Department: [facility] WEIGHT MGMT     ----------------------    Should have refills on file through 8/22/24

## 2024-04-30 ENCOUNTER — VIRTUAL VISIT (OUTPATIENT)
Dept: PSYCHOLOGY | Facility: CLINIC | Age: 44
End: 2024-04-30
Payer: COMMERCIAL

## 2024-04-30 DIAGNOSIS — F54 PSYCHOLOGICAL FACTORS AFFECTING MORBID OBESITY (H): ICD-10-CM

## 2024-04-30 DIAGNOSIS — F33.0 MAJOR DEPRESSIVE DISORDER, RECURRENT EPISODE, MILD (H): Primary | ICD-10-CM

## 2024-04-30 DIAGNOSIS — Z63.8 PARENTAL CONCERN ABOUT CHILD: ICD-10-CM

## 2024-04-30 DIAGNOSIS — E66.01 PSYCHOLOGICAL FACTORS AFFECTING MORBID OBESITY (H): ICD-10-CM

## 2024-04-30 PROCEDURE — 90837 PSYTX W PT 60 MINUTES: CPT | Mod: 95 | Performed by: PSYCHOLOGIST

## 2024-04-30 SDOH — SOCIAL STABILITY - SOCIAL INSECURITY: OTHER SPECIFIED PROBLEMS RELATED TO PRIMARY SUPPORT GROUP: Z63.8
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
## 2024-05-11 DIAGNOSIS — E66.811 OBESITY, CLASS I, BMI 30-34.9: ICD-10-CM

## 2024-05-15 RX ORDER — SEMAGLUTIDE 1.7 MG/.75ML
INJECTION, SOLUTION SUBCUTANEOUS
Qty: 3 ML | Refills: 4 | OUTPATIENT
Start: 2024-05-15

## 2024-05-21 ENCOUNTER — VIRTUAL VISIT (OUTPATIENT)
Dept: ENDOCRINOLOGY | Facility: CLINIC | Age: 44
End: 2024-05-21
Payer: COMMERCIAL

## 2024-05-21 VITALS — WEIGHT: 215 LBS | HEIGHT: 70 IN | BODY MASS INDEX: 30.78 KG/M2

## 2024-05-21 DIAGNOSIS — E66.811 CLASS 1 OBESITY WITHOUT SERIOUS COMORBIDITY WITH BODY MASS INDEX (BMI) OF 30.0 TO 30.9 IN ADULT, UNSPECIFIED OBESITY TYPE: ICD-10-CM

## 2024-05-21 DIAGNOSIS — Z90.3 S/P GASTRECTOMY: Primary | ICD-10-CM

## 2024-05-21 PROCEDURE — 99214 OFFICE O/P EST MOD 30 MIN: CPT | Mod: 95 | Performed by: INTERNAL MEDICINE

## 2024-05-21 ASSESSMENT — PAIN SCALES - GENERAL: PAINLEVEL: NO PAIN (0)

## 2024-05-21 NOTE — PROGRESS NOTES
"Return Medical Weight Management Note     Ashley Catherine  MRN:  [id number]  :  1980  RAKEL:  2024    Dear Violet Humphreys MD,    I had the pleasure of seeing your patient Ashley Catherine. She is a 44 year old female who I am continuing to see for treatment of obesity related to mild depression, hypertension, GERD, s/p gastric sleeve 2013        5/15/2017     8:08 AM   --   I have the following health issues associated with obesity High Blood Pressure    GERD (Reflux)   I have the following symptoms associated with obesity GERD (Reflux)     She has hx of sleeve gastrectomy in . She lost from 290 to 186 after surgery with subsequent weight regain.  Was on phentermine and tried for 2 months and had \"dark thought\". She did lose 7 lbs while taking it.  Lowest weight was 184 lbs when taking Qsymia but developed tingling and mood changes  Was on Saxenda but stopped in 2021. Does not feel it is working anymore.  She could not tolerate topiramate due to emotional lability.  Could not tolerate naltrexone and phentermine due to skin itchiness.      INTERVAL HISTORY:  [facility] follow up. Last seen     Start Wegovy 2023. Currently on Wegovy 1.7 mg weekly. Tolerated medication ok but feels more tired.   Taking [employer], B12 IM injection and calcium supplement. All labs are in good range.    Total loss of 59 lbs since sleeve gastrectomy and lost 20 lbs with Wegovy.    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 WEIGHT:   215 lbs 0 oz    Highest wt in life: 293 lbs  Initial Weight (lbs): 274 lbs  Last Visits Weight: 103.9 kg (229 lb)  Cumulative weight loss (lbs): 59  Weight Loss Percentage: 21.53%        2024     9:36 AM   Changes and Difficulties   I have made the following changes to my diet since my last visit: Lowered beverage caloric intake   With regards to my diet, I am still struggling with: Protein intake, hydration   I have made the following changes " "to my activity/exercise since my last visit: Walk more   With regards to my activity/exercise, I am still struggling with: Exercising with weights       VITALS:  Ht 1.778 m (5' 10\")   Wt 97.5 kg (215 lb)   BMI 30.85 kg/m      MEDICATIONS:   Current Outpatient Medications   Medication Sig Dispense Refill    acetaminophen (TYLENOL) 32 mg/mL liquid Take 1,000 mg by mouth every 8 hours as needed for fever or mild pain      childrens multivitamin w/iron (FLINTSTONES COMPLETE) chewable tablet Take 2 chew tab by mouth daily      Cyanocobalamin 1000 MCG/ML KIT Inject 1 mL as directed every 30 days 3 kit 4    cyclobenzaprine (FLEXERIL) 10 MG tablet Take 1 tablet (10 mg) by mouth daily as needed for muscle spasms 45 tablet 4    diclofenac (VOLTAREN) 1 % topical gel 1 gram to affected areas on feet 2-3 times daily as needed for pain. 100 g 3    hydrochlorothiazide (HYDRODIURIL) 25 MG tablet Take 1 tablet (25 mg) by mouth daily 90 tablet 3    ibuprofen (ADVIL/MOTRIN) 200 MG capsule Take 400 mg by mouth as needed for fever      LORazepam (ATIVAN) 0.5 MG tablet Take 1 tablet (0.5 mg) by mouth every 6 hours as needed for anxiety 20 tablet 0    losartan (COZAAR) 25 MG tablet TAKE 1 TABLET (25 MG) BY MOUTH DAILY CAN INCREASE TO 50 MG IN 2 WEEKS BASED ON RESPONSE 180 tablet 1    WEGOVY 1.7 MG/0.75ML pen INJECT 1.7MG UNDER THE SKIN ONCE A WEEK 3 mL 4           5/21/2024     9:36 AM   Weight Loss Medication History Reviewed With Patient   Which weight loss medications are you currently taking on a regular basis? Wegovy   Are you having any side effects from the weight loss medication that we have prescribed you? Yes   If you are having side effects please describe: Fatigue       ASSESSMENT/PLAN:   Ashley Catherine is a 44 year old female who I am continuing to see for treatment of obesity    Hx of sleeve gastrectomy with some weight regain.   Used to be on Saxenda and maintained weight -- stopped early 2021 because it was not effective " anymore.  Could not tolerate topiramate due to emotional lability  Stopped phentermine and naltrexone due to skin itciness    Currently on Wegovy. Tolerated it well but felt more fatigue.  Loss of 59 lbs since starting the program.    Plan:  - Continue Wegovy 1.7 mg weekly and f/up with Mohini REA in 3 months  - encourage diet modification and exercise    FOLLOW-UP:    See Lauren Bloch, PharmD in 3 month(s)  See Dr.Tasma MARCH in 6 month(s)    Joined the call at 5/21/2024, 10:13:02 am.  Left the call at 5/21/2024, 10:20:53 am.  You were on the call for 7 minutes 51 seconds .    Sincerely,    Gagandeep Qureshi MD

## 2024-05-21 NOTE — NURSING NOTE
Is the patient currently in the state of [place]? YES    Visit mode:VIDEO    If the visit is dropped, the patient can be reconnected by: VIDEO VISIT: Text to cell phone:   Telephone Information:   Mobile [phone]       Will anyone else be joining the visit? NO  (If patient encounters technical issues they should call [phone]    How would you like to obtain your AVS? MyChart    Are changes needed to the allergy or medication list? No    Are refills needed on medications prescribed by this physician? NO    Reason for visit: RECHECK    Mike GRECO

## 2024-05-21 NOTE — LETTER
"2024       RE: Ashley Catherine  [address]  [facility] [address]     Dear Colleague,    Thank you for referring your patient, Ashley Catherine, to the [facility] [place] [facility]. Please see a copy of my visit note below.        Return Medical Weight Management Note     Ashley Catherine  MRN:  [id number]  :  1980  RAKEL:  2024    Dear Violet Humphreys MD,    I had the pleasure of seeing your patient Ashley Catherine. She is a 44 year old female who I am continuing to see for treatment of obesity related to mild depression, hypertension, GERD, s/p gastric sleeve 2013        5/15/2017     8:08 AM   --   I have the following health issues associated with obesity High Blood Pressure    GERD (Reflux)   I have the following symptoms associated with obesity GERD (Reflux)     She has hx of sleeve gastrectomy in . She lost from 290 to 186 after surgery with subsequent weight regain.  Was on phentermine and tried for 2 months and had \"dark thought\". She did lose 7 lbs while taking it.  Lowest weight was 184 lbs when taking Qsymia but developed tingling and mood changes  Was on Saxenda but stopped in 2021. Does not feel it is working anymore.  She could not tolerate topiramate due to emotional lability.  Could not tolerate naltrexone and phentermine due to skin itchiness.      INTERVAL HISTORY:  [facility] follow up. Last seen     Start Wegovy 2023. Currently on Wegovy 1.7 mg weekly. Tolerated medication ok but feels more tired.   Taking [employer] MTV, B12 IM injection and calcium supplement. All labs are in good range.    Total loss of 59 lbs since sleeve gastrectomy and lost 20 lbs with Wegovy.    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 WEIGHT:   215 lbs 0 oz    Highest wt in life: 293 lbs  Initial Weight (lbs): 274 lbs  Last Visits Weight: 103.9 kg (229 lb)  Cumulative " "weight loss (lbs): 59  Weight Loss Percentage: 21.53%        5/21/2024     9:36 AM   Changes and Difficulties   I have made the following changes to my diet since my last visit: Lowered beverage caloric intake   With regards to my diet, I am still struggling with: Protein intake, hydration   I have made the following changes to my activity/exercise since my last visit: Walk more   With regards to my activity/exercise, I am still struggling with: Exercising with weights       VITALS:  Ht 1.778 m (5' 10\")   Wt 97.5 kg (215 lb)   BMI 30.85 kg/m      MEDICATIONS:   Current Outpatient Medications   Medication Sig Dispense Refill    acetaminophen (TYLENOL) 32 mg/mL liquid Take 1,000 mg by mouth every 8 hours as needed for fever or mild pain      childrens multivitamin w/iron (FLINTSTONES COMPLETE) chewable tablet Take 2 chew tab by mouth daily      Cyanocobalamin 1000 MCG/ML KIT Inject 1 mL as directed every 30 days 3 kit 4    cyclobenzaprine (FLEXERIL) 10 MG tablet Take 1 tablet (10 mg) by mouth daily as needed for muscle spasms 45 tablet 4    diclofenac (VOLTAREN) 1 % topical gel 1 gram to affected areas on feet 2-3 times daily as needed for pain. 100 g 3    hydrochlorothiazide (HYDRODIURIL) 25 MG tablet Take 1 tablet (25 mg) by mouth daily 90 tablet 3    ibuprofen (ADVIL/MOTRIN) 200 MG capsule Take 400 mg by mouth as needed for fever      LORazepam (ATIVAN) 0.5 MG tablet Take 1 tablet (0.5 mg) by mouth every 6 hours as needed for anxiety 20 tablet 0    losartan (COZAAR) 25 MG tablet TAKE 1 TABLET (25 MG) BY MOUTH DAILY CAN INCREASE TO 50 MG IN 2 WEEKS BASED ON RESPONSE 180 tablet 1    WEGOVY 1.7 MG/0.75ML pen INJECT 1.7MG UNDER THE SKIN ONCE A WEEK 3 mL 4           5/21/2024     9:36 AM   Weight Loss Medication History Reviewed With Patient   Which weight loss medications are you currently taking on a regular basis? Wegovy   Are you having any side effects from the weight loss medication that we have prescribed you? " Yes   If you are having side effects please describe: Fatigue       ASSESSMENT/PLAN:   Ashley Catherine is a 44 year old female who I am continuing to see for treatment of obesity    Hx of sleeve gastrectomy with some weight regain.   Used to be on Saxenda and maintained weight -- stopped early 2021 because it was not effective anymore.  Could not tolerate topiramate due to emotional lability  Stopped phentermine and naltrexone due to skin itciness    Currently on Wegovy. Tolerated it well but felt more fatigue.  Loss of 59 lbs since starting the program.    Plan:  - Continue Wegovy 1.7 mg weekly and f/up with Mohini REA in 3 months  - encourage diet modification and exercise    FOLLOW-UP:    See Lauren Bloch, PharmD in 3 month(s)  See Dr.Tasma MARCH in 6 month(s)    Joined the call at 5/21/2024, 10:13:02 am.  Left the call at 5/21/2024, 10:20:53 am.  You were on the call for 7 minutes 51 seconds .    Sincerely,    Gagandeep Qureshi MD

## 2024-05-21 NOTE — PATIENT INSTRUCTIONS
Plan:  - Continue Wegovy 1.7 mg weekly and f/up with Mohini REA in 3 months  - encourage diet modification and exercise    FOLLOW-UP:    See Lauren Bloch, PharmD in 3 month(s)  See Dr.Tasma MARCH in 6 month(s)    If you have any questions, please do not hesitate to call Weight management clinic at [phone] or [phone].  If you need to fax, please fax to [phone].    Sincerely,    Gagandeep Qureshi MD  Endocrinology

## 2024-05-23 ENCOUNTER — VIRTUAL VISIT (OUTPATIENT)
Dept: PSYCHOLOGY | Facility: CLINIC | Age: 44
End: 2024-05-23
Payer: COMMERCIAL

## 2024-05-23 DIAGNOSIS — Z63.8 PARENTAL CONCERN ABOUT CHILD: ICD-10-CM

## 2024-05-23 DIAGNOSIS — F33.0 MAJOR DEPRESSIVE DISORDER, RECURRENT EPISODE, MILD (H): Primary | ICD-10-CM

## 2024-05-23 DIAGNOSIS — F54 PSYCHOLOGICAL FACTORS AFFECTING MORBID OBESITY (H): ICD-10-CM

## 2024-05-23 DIAGNOSIS — E66.01 PSYCHOLOGICAL FACTORS AFFECTING MORBID OBESITY (H): ICD-10-CM

## 2024-05-23 PROCEDURE — 90837 PSYTX W PT 60 MINUTES: CPT | Mod: 95 | Performed by: PSYCHOLOGIST

## 2024-05-23 SDOH — SOCIAL STABILITY - SOCIAL INSECURITY: OTHER SPECIFIED PROBLEMS RELATED TO PRIMARY SUPPORT GROUP: Z63.8

## 2024-05-23 ASSESSMENT — ANXIETY QUESTIONNAIRES
8. IF YOU CHECKED OFF ANY PROBLEMS, HOW DIFFICULT HAVE THESE MADE IT FOR YOU TO DO YOUR WORK, TAKE CARE OF THINGS AT HOME, OR GET ALONG WITH OTHER PEOPLE?: NOT DIFFICULT AT ALL
3. WORRYING TOO MUCH ABOUT DIFFERENT THINGS: NOT AT ALL
GAD7 TOTAL SCORE: 3
1. FEELING NERVOUS, ANXIOUS, OR ON EDGE: SEVERAL DAYS
GAD7 TOTAL SCORE: 3
5. BEING SO RESTLESS THAT IT IS HARD TO SIT STILL: NOT AT ALL
6. BECOMING EASILY ANNOYED OR IRRITABLE: NOT AT ALL
7. FEELING AFRAID AS IF SOMETHING AWFUL MIGHT HAPPEN: SEVERAL DAYS
7. FEELING AFRAID AS IF SOMETHING AWFUL MIGHT HAPPEN: SEVERAL DAYS
IF YOU CHECKED OFF ANY PROBLEMS ON THIS QUESTIONNAIRE, HOW DIFFICULT HAVE THESE PROBLEMS MADE IT FOR YOU TO DO YOUR WORK, TAKE CARE OF THINGS AT HOME, OR GET ALONG WITH OTHER PEOPLE: NOT DIFFICULT AT ALL
2. NOT BEING ABLE TO STOP OR CONTROL WORRYING: NOT AT ALL
4. TROUBLE RELAXING: SEVERAL DAYS
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
## 2024-05-28 ENCOUNTER — TELEPHONE (OUTPATIENT)
Dept: ENDOCRINOLOGY | Facility: CLINIC | Age: 44
End: 2024-05-28
Payer: COMMERCIAL

## 2024-05-28 NOTE — TELEPHONE ENCOUNTER
Left Voicemail (1st Attempt) and Sent Mychart (1st Attempt) for the patient to call back and schedule the following:    Appointment type: [facility]   Provider: Lauren Bloch,RPH  Return date: 3 mos ( 8/21/2024 )  Specialty phone number: [phone]  Additional appointment(s) needed: yes  Additonal Notes: Dr.Tasma REUBEN, 6 mo follow-up  ( 11/21/2024 )

## 2024-06-02 SDOH — HEALTH STABILITY: PHYSICAL HEALTH: ON AVERAGE, HOW MANY DAYS PER WEEK DO YOU ENGAGE IN MODERATE TO STRENUOUS EXERCISE (LIKE A BRISK WALK)?: 2 DAYS

## 2024-06-02 SDOH — HEALTH STABILITY: PHYSICAL HEALTH: ON AVERAGE, HOW MANY MINUTES DO YOU ENGAGE IN EXERCISE AT THIS LEVEL?: 30 MIN

## 2024-06-02 ASSESSMENT — SOCIAL DETERMINANTS OF HEALTH (SDOH): HOW OFTEN DO YOU GET TOGETHER WITH FRIENDS OR RELATIVES?: ONCE A WEEK

## 2024-06-03 ENCOUNTER — OFFICE VISIT (OUTPATIENT)
Dept: FAMILY MEDICINE | Facility: CLINIC | Age: 44
End: 2024-06-03
Payer: COMMERCIAL

## 2024-06-03 VITALS
TEMPERATURE: 97.1 F | SYSTOLIC BLOOD PRESSURE: 122 MMHG | HEART RATE: 81 BPM | RESPIRATION RATE: 16 BRPM | HEIGHT: 70 IN | DIASTOLIC BLOOD PRESSURE: 86 MMHG | WEIGHT: 209.8 LBS | OXYGEN SATURATION: 99 % | BODY MASS INDEX: 30.03 KG/M2

## 2024-06-03 DIAGNOSIS — Z00.00 WELL ADULT EXAM: Primary | ICD-10-CM

## 2024-06-03 DIAGNOSIS — M25.511 CHRONIC RIGHT SHOULDER PAIN: ICD-10-CM

## 2024-06-03 DIAGNOSIS — M79.671 FOOT PAIN, BILATERAL: ICD-10-CM

## 2024-06-03 DIAGNOSIS — M79.672 FOOT PAIN, BILATERAL: ICD-10-CM

## 2024-06-03 DIAGNOSIS — M72.2 PLANTAR FASCIITIS, BILATERAL: ICD-10-CM

## 2024-06-03 DIAGNOSIS — G89.29 CHRONIC RIGHT SHOULDER PAIN: ICD-10-CM

## 2024-06-03 DIAGNOSIS — I10 BENIGN ESSENTIAL HYPERTENSION: ICD-10-CM

## 2024-06-03 PROBLEM — E04.1 THYROID NODULE: Status: RESOLVED | Noted: 2018-01-29 | Resolved: 2024-06-03

## 2024-06-03 LAB — HBA1C MFR BLD: 5 % (ref 0–5.6)

## 2024-06-03 PROCEDURE — 99213 OFFICE O/P EST LOW 20 MIN: CPT | Mod: 25 | Performed by: FAMILY MEDICINE

## 2024-06-03 PROCEDURE — 83036 HEMOGLOBIN GLYCOSYLATED A1C: CPT | Performed by: FAMILY MEDICINE

## 2024-06-03 PROCEDURE — 36415 COLL VENOUS BLD VENIPUNCTURE: CPT | Performed by: FAMILY MEDICINE

## 2024-06-03 PROCEDURE — 99396 PREV VISIT EST AGE 40-64: CPT | Performed by: FAMILY MEDICINE

## 2024-06-03 RX ORDER — HYDROCHLOROTHIAZIDE 25 MG/1
25 TABLET ORAL DAILY
Qty: 90 TABLET | Refills: 3 | Status: SHIPPED | OUTPATIENT
Start: 2024-06-03

## 2024-06-03 ASSESSMENT — PAIN SCALES - GENERAL: PAINLEVEL: NO PAIN (0)

## 2024-06-03 NOTE — PATIENT INSTRUCTIONS
"Preventive Care Advice   This is general advice we often give to help people stay healthy. Your care team may have specific advice just for you. Please talk to your care team about your own preventive care needs.  Lifestyle  Exercise at least 150 minutes each week (30 minutes a day, 5 days a week).  Do muscle strengthening activities 2 days a week. These help control your weight and prevent disease.  No smoking.  Wear sunscreen to prevent skin cancer.  Have your home tested for radon every 2 to 5 years. Radon is a colorless, odorless gas that can harm your lungs. To learn more, go to www.health.[facility].mn. and search for \"Radon in Homes.\"  Keep guns unloaded and locked up in a safe place like a safe or gun vault, or, use a gun lock and hide the keys. Always lock away bullets separately. To learn more, visit [employer].[place].gov and search for \"safe gun storage.\"  Nutrition  Eat 5 or more servings of fruits and vegetables each day.  Try wheat bread, brown rice and whole grain pasta (instead of white bread, rice, and pasta).  Get enough calcium and vitamin D. Check the label on foods and aim for 100% of the RDA (recommended daily allowance).  Regular exams  Have a dental exam and cleaning every 6 months.  See your health care team every year to talk about:  Any changes in your health.  Any medicines your care team has prescribed.  Preventive care, family planning, and ways to prevent chronic diseases.  Shots (vaccines)   HPV shots (up to age 26), if you've never had them before.  Hepatitis B shots (up to age 59), if you've never had them before.  COVID-19 shot: Get this shot when it's due.  Flu shot: Get a flu shot every year.  Tetanus shot: Get a tetanus shot every 10 years.  Pneumococcal, hepatitis A, and RSV shots: Ask your care team if you need these based on your risk.  Shingles shot (for age 50 and up).  General health tests  Diabetes screening:  Starting at age 35, Get screened for diabetes at least every 3 years.  If " you are younger than age 35, ask your care team if you should be screened for diabetes.  Cholesterol test: At age 39, start having a cholesterol test every 5 years, or more often if advised.  Bone density scan (DEXA): At age 50, ask your care team if you should have this scan for osteoporosis (brittle bones).  Hepatitis C: Get tested at least once in your life.  Abdominal aortic aneurysm screening: Talk to your doctor about having this screening if you:  Have ever smoked; and  Are biologically male; and  Are between the ages of 65 and 75.  STIs (sexually transmitted infections)  Before age 24: Ask your care team if you should be screened for STIs.  After age 24: Get screened for STIs if you're at risk. You are at risk for STIs (including HIV) if:  You are sexually active with more than one person.  You don't use condoms every time.  You or a partner was diagnosed with a sexually transmitted infection.  If you are at risk for HIV, ask about PrEP medicine to prevent HIV.  Get tested for HIV at least once in your life, whether you are at risk for HIV or not.  Cancer screening tests  Cervical cancer screening: If you have a cervix, begin getting regular cervical cancer screening tests at age 21. Most people who have regular screenings with normal results can stop after age 65. Talk about this with your provider.  Breast cancer scan (mammogram): If you've ever had breasts, begin having regular mammograms starting at age 40. This is a scan to check for breast cancer.  Colon cancer screening: It is important to start screening for colon cancer at age 45.  Have a colonoscopy test every 10 years (or more often if you're at risk) Or, ask your provider about stool tests like a FIT test every year or Cologuard test every 3 years.  To learn more about your testing options, visit: www.[employer]/738653.pdf.  For help making a decision, visit: lele/[email or web address].  Prostate cancer screening test: If you have a prostate and are age 55  to 69, ask your provider if you would benefit from a yearly prostate cancer screening test.  Lung cancer screening: If you are a current or former smoker age 50 to 80, ask your care team if ongoing lung cancer screenings are right for you.  For informational purposes only. Not to replace the advice of your health care provider. Copyright   2023 [employer]. All rights reserved. Clinically reviewed by the [facility] Transitions Program. [employer] [id number] - REV 04/24.    Learning About Stress  What is stress?     Stress is your body's response to a hard situation. Your body can have a physical, emotional, or mental response. Stress is a fact of life for most people, and it affects everyone differently. What causes stress for you may not be stressful for someone else.  A lot of things can cause stress. You may feel stress when you go on a job interview, take a test, or run a race. This kind of short-term stress is normal and even useful. It can help you if you need to work hard or react quickly. For example, stress can help you finish an important job on time.  Long-term stress is caused by ongoing stressful situations or events. Examples of long-term stress include long-term health problems, ongoing problems at work, or conflicts in your family. Long-term stress can harm your health.  How does stress affect your health?  When you are stressed, your body responds as though you are in danger. It makes hormones that speed up your heart, make you breathe faster, and give you a burst of energy. This is called the fight-or-flight stress response. If the stress is over quickly, your body goes back to normal and no harm is done.  But if stress happens too often or lasts too long, it can have bad effects. Long-term stress can make you more likely to get sick, and it can make symptoms of some diseases worse. If you tense up when you are stressed, you may develop neck, shoulder, or low back pain. Stress is  linked to high blood pressure and heart disease.  Stress also harms your emotional health. It can make you heath, tense, or depressed. Your relationships may suffer, and you may not do well at work or school.  What can you do to manage stress?  You can try these things to help manage stress:   Do something active. Exercise or activity can help reduce stress. Walking is a great way to get started. Even everyday activities such as housecleaning or yard work can help.  Try yoga or hugo chi. These techniques combine exercise and meditation. You may need some training at first to learn them.  Do something you enjoy. For example, listen to music or go to a movie. Practice your hobby or do volunteer work.  Meditate. This can help you relax, because you are not worrying about what happened before or what may happen in the future.  Do guided imagery. Imagine yourself in any setting that helps you feel calm. You can use online videos, books, or a teacher to guide you.  Do breathing exercises. For example:  From a standing position, bend forward from the waist with your knees slightly bent. Let your arms dangle close to the floor.  Breathe in slowly and deeply as you return to a standing position. Roll up slowly and lift your head last.  Hold your breath for just a few seconds in the standing position.  Breathe out slowly and bend forward from the waist.  Let your feelings out. Talk, laugh, cry, and express anger when you need to. Talking with supportive friends or family, a counselor, or a cuauhtemoc leader about your feelings is a healthy way to relieve stress. Avoid discussing your feelings with people who make you feel worse.  Write. It may help to write about things that are bothering you. This helps you find out how much stress you feel and what is causing it. When you know this, you can find better ways to cope.  What can you do to prevent stress?  You might try some of these things to help prevent stress:  Manage your time.  "This helps you find time to do the things you want and need to do.  Get enough sleep. Your body recovers from the stresses of the day while you are sleeping.  Get support. Your family, friends, and community can make a difference in how you experience stress.  Limit your news feed. Avoid or limit time on social media or news that may make you feel stressed.  Do something active. Exercise or activity can help reduce stress. Walking is a great way to get started.  Where can you learn more?  Go to https://www.[employer].net/patiented  Enter N032 in the search box to learn more about \"Learning About Stress.\"  Current as of: October 24, 2023               Content Version: 14.0    9469-5919 [employer].   Care instructions adapted under license by your healthcare professional. If you have questions about a medical condition or this instruction, always ask your healthcare professional. [employer] disclaims any warranty or liability for your use of this information.      Substance Use Disorder: Care Instructions  Overview     You can improve your life and health by stopping your use of alcohol or drugs. When you don't drink or use drugs, you may feel and sleep better. You may get along better with your family, friends, and coworkers. There are medicines and programs that can help with substance use disorder.  How can you care for yourself at home?  Here are some ways to help you stay sober and prevent relapse.  If you have been given medicine to help keep you sober or reduce your cravings, be sure to take it exactly as prescribed.  Talk to your doctor about programs that can help you stop using drugs or drinking alcohol.  Do not keep alcohol or drugs in your home.  Plan ahead. Think about what you'll say if other people ask you to drink or use drugs. Try not to spend time with people who drink or use drugs.  Use the time and money spent on drinking or drugs to do something that's important to " you.  Preventing a relapse  Have a plan to deal with relapse. Learn to recognize changes in your thinking that lead you to drink or use drugs. Get help before you start to drink or use drugs again.  Try to stay away from situations, friends, or places that may lead you to drink or use drugs.  If you feel the need to drink alcohol or use drugs again, seek help right away. Call a trusted friend or family member. Some people get support from organizations such as Narcotics Anonymous or [employer] or from treatment facilities.  If you relapse, get help as soon as you can. Some people make a plan with another person that outlines what they want that person to do for them if they relapse. The plan usually includes how to handle the relapse and who to notify in case of relapse.  Don't give up. Remember that a relapse doesn't mean that you have failed. Use the experience to learn the triggers that lead you to drink or use drugs. Then quit again. Recovery is a lifelong process. Many people have several relapses before they are able to quit for good.  Follow-up care is a key part of your treatment and safety. Be sure to make and go to all appointments, and call your doctor if you are having problems. It's also a good idea to know your test results and keep a list of the medicines you take.  When should you call for help?   Call 911  anytime you think you may need emergency care. For example, call if you or someone else:    Has overdosed or has withdrawal signs. Be sure to tell the emergency workers that you are or someone else is using or trying to quit using drugs. Overdose or withdrawal signs may include:  Losing consciousness.  Seizure.  Seeing or hearing things that aren't there (hallucinations).     Is thinking or talking about suicide or harming others.   Where to get help 24 hours a day, 7 days a week   If you or someone you know talks about suicide, self-harm, a mental health crisis, a substance use crisis, or any  "other kind of emotional distress, get help right away. You can:    Call the Suicide and Crisis Lifeline at 988.     Call 8-549-163-XXZC (1-893.547.3828).     Text HOME to 485550 to access the Crisis Text Line.   Consider saving these numbers in your phone.  Go to [employer] for more information or to chat online.  Call your doctor now or seek immediate medical care if:    You are having withdrawal symptoms. These may include nausea or vomiting, sweating, shakiness, and anxiety.   Watch closely for changes in your health, and be sure to contact your doctor if:    You have a relapse.     You need more help or support to stop.   Where can you learn more?  Go to https://www.[employer].net/patiented  Enter H573 in the search box to learn more about \"Substance Use Disorder: Care Instructions.\"  Current as of: November 15, 2023               Content Version: 14.0    6876-1980 [employer].   Care instructions adapted under license by your healthcare professional. If you have questions about a medical condition or this instruction, always ask your healthcare professional. [employer], [employer] disclaims any warranty or liability for your use of this information.      "

## 2024-06-03 NOTE — PROGRESS NOTES
Preventive Care Visit  [facility]  FreedomTanja Humphreys MD, Family Medicine  Javad 3, 2024      Assessment & Plan     (Z00.00) Well adult exam  (primary encounter diagnosis)  Comment:   Plan: Hemoglobin A1c            (I10) Benign essential hypertension  Comment: HOLD losartan for now and see if BP ist stable with hydrochlorothiazide only  Plan: hydrochlorothiazide (HYDRODIURIL) 25 MG tablet            (M72.2) Plantar fasciitis, bilateral  Comment:   Plan: diclofenac (VOLTAREN) 1 % topical gel            (M79.671,  M79.672) Foot pain, bilateral  Comment:   Plan: diclofenac (VOLTAREN) 1 % topical gel            (M25.511,  G89.29) Chronic right shoulder pain  Comment:   Plan: Physical Therapy  Referral            Patient has been advised of split billing requirements and indicates understanding: Yes        Counseling  Appropriate preventive services were discussed with this patient, including applicable screening as appropriate for fall prevention, nutrition, physical activity, Tobacco-use cessation, weight loss and cognition.  Checklist reviewing preventive services available has been given to the patient.  Reviewed patient's diet, addressing concerns and/or questions.   She is at risk for lack of exercise and has been provided with information to increase physical activity for the benefit of her well-being.   She is at risk for psychosocial distress and has been provided with information to reduce risk.       See Patient Instructions    Viola Ramirez is a 44 year old, presenting for the following:  Physical        6/3/2024     6:45 AM   Additional Questions   Roomed by Celina        Health Care Directive  Patient has a Health Care Directive on file  Advance care planning document is on file but is outdated.  Patient encouraged to updated.    HPI        (Z00.00) Well adult exam  (primary encounter diagnosis)  Comment:   Plan: Hemoglobin A1c            (I10) Benign essential  hypertension  Comment: has been holding losartan for the past week - fees more energy  Plan: hydrochlorothiazide (HYDRODIURIL) 25 MG tablet            (M72.2) Plantar fasciitis, bilateral  Comment:   Plan: diclofenac (VOLTAREN) 1 % topical gel            (M79.671,  M79.672) Foot pain, bilateral  Comment:   Plan: diclofenac (VOLTAREN) 1 % topical gel            (M25.511,  G89.29) Chronic right shoulder pain  Comment:   Plan: Physical Therapy  Referral          Ongoing significant weight loss with wegovy  Wt Readings from Last 4 Encounters:   06/03/24 95.2 kg (209 lb 12.8 oz)   05/21/24 97.5 kg (215 lb)   03/06/24 98.9 kg (218 lb)   02/27/24 100.7 kg (221 lb 14.4 oz)             6/2/2024   General Health   How would you rate your overall physical health? Good   Feel stress (tense, anxious, or unable to sleep) Only a little   (!) STRESS CONCERN      6/2/2024   Nutrition   Three or more servings of calcium each day? Yes   Diet: Regular (no restrictions)   How many servings of fruit and vegetables per day? (!) 2-3   How many sweetened beverages each day? 0-1         6/2/2024   Exercise   Days per week of moderate/strenous exercise 2 days   Average minutes spent exercising at this level 30 min   (!) EXERCISE CONCERN      6/2/2024   Social Factors   Frequency of gathering with friends or relatives Once a week   Worry food won't last until get money to buy more No   Food not last or not have enough money for food? No   Do you have housing?  Yes   Are you worried about losing your housing? No   Lack of transportation? No   Unable to get utilities (heat,electricity)? No         6/2/2024   Dental   Dentist two times every year? Yes         6/2/2024   TB Screening   Were you born outside of the [place]? No                 6/2/2024   Substance Use   Alcohol more than 3/day or more than 7/wk No   Do you use any other substances recreationally? (!) ALCOHOL     Social History     Tobacco Use    Smoking status: Never     Smokeless tobacco: Never   Vaping Use    Vaping status: Never Used   Substance Use Topics    Alcohol use: Yes     Alcohol/week: 1.0 - 4.0 standard drink of alcohol     Comment: 6 drinks/week    Drug use: No           12/19/2023   LAST FHS-7 RESULTS   1st degree relative breast or ovarian cancer No   Any relative bilateral breast cancer No   Any male have breast cancer No   Any ONE woman have BOTH breast AND ovarian cancer No   Any woman with breast cancer before 50yrs No   2 or more relatives with breast AND/OR ovarian cancer No   2 or more relatives with breast AND/OR bowel cancer No                6/2/2024   STI Screening   New sexual partner(s) since last STI/HIV test? No     History of abnormal Pap smear: No - age 30-64 HPV with reflex Pap every 5 years recommended        Latest Ref Rng & Units 3/7/2022     2:06 PM 8/12/2016    10:16 AM 8/12/2016    12:00 AM   PAP / HPV   PAP  Negative for Intraepithelial Lesion or Malignancy (NILM)      PAP (Historical)    NIL    HPV 16 DNA Negative Negative  Negative     HPV 18 DNA Negative Negative  Negative     Other HR HPV Negative Negative  Negative       ASCVD Risk   The 10-year ASCVD risk score (Kayley PERRY, et al., 2019) is: 1.3%    Values used to calculate the score:      Age: 44 years      Sex: Female      Is Non- : Yes      Diabetic: No      Tobacco smoker: No      Systolic Blood Pressure: 122 mmHg      Is BP treated: Yes      HDL Cholesterol: 61 mg/dL      Total Cholesterol: 193 mg/dL        6/2/2024   Contraception/Family Planning   Questions about contraception or family planning No        Reviewed and updated as needed this visit by Provider      Problems               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    Bariatric surgery status 05/13/2013    Depression     Depressive disorder     Esophageal reflux     Family history of hyperparathyroidism 03/06/2015    Forearm  "fracture childhood    jumping fall    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14    hospitalized at [facility] 1 week    History of steroid therapy     HX ABNL PAP SMEAR OF CERVIX   1995    LEEP AT 15 yo    Hypertension 2004    Hypertrophy of breast     Hypertrophy of tonsils alone     Hypovitaminosis D     with secondary high PTH    Irregular heart beat     palpitations    LBP (low back pain)     LSIL (low grade squamous intraepithelial lesion) on Pap smear 05/2006    Lumbago     RESOLVED    Major depressive disorder, recurrent episode, in partial or unspecified remission 04/01/2013    Morbid obesity (H)     bariatric surgery 5/13/2013    Multiple thyroid nodules     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    Weight as of this encounter: 95.2 kg (209 lb 12.8 oz).    Physical Exam  GENERAL: alert and no distress  EYES: Eyes grossly normal to inspection, PERRL and conjunctivae and sclerae normal  HENT: ear canals and TM's normal, nose and mouth without ulcers or lesions  NECK: no adenopathy, no asymmetry, masses, or scars  RESP: lungs clear to auscultation - no rales, rhonchi or wheezes  BREAST: normal without masses, tenderness or nipple discharge and no palpable axillary masses or adenopathy  CV: regular rate and rhythm, normal S1 S2, no S3 or S4, no murmur, click or rub, no peripheral edema  ABDOMEN: soft, nontender, no hepatosplenomegaly, no masses and bowel sounds normal  MS: no gross musculoskeletal defects noted, no " edema  SKIN: no suspicious lesions or rashes  NEURO: Normal strength and tone, mentation intact and speech normal  PSYCH: mentation appears normal, affect normal/bright        Signed Electronically by: Violet Humphreys MD

## 2024-06-06 ENCOUNTER — THERAPY VISIT (OUTPATIENT)
Dept: PHYSICAL THERAPY | Facility: CLINIC | Age: 44
End: 2024-06-06
Attending: FAMILY MEDICINE
Payer: COMMERCIAL

## 2024-06-06 DIAGNOSIS — G89.29 CHRONIC RIGHT SHOULDER PAIN: ICD-10-CM

## 2024-06-06 DIAGNOSIS — M25.511 CHRONIC RIGHT SHOULDER PAIN: ICD-10-CM

## 2024-06-06 PROCEDURE — 97112 NEUROMUSCULAR REEDUCATION: CPT | Mod: GP

## 2024-06-06 PROCEDURE — 97161 PT EVAL LOW COMPLEX 20 MIN: CPT | Mod: GP

## 2024-06-06 ASSESSMENT — ACTIVITIES OF DAILY LIVING (ADL)
AT_ITS_WORST?: 7
PUSHING_WITH_THE_INVOLVED_ARM: 5
WASHING_YOUR_BACK: 5
WHEN_LYING_ON_THE_INVOLVED_SIDE: 4
PLACING_AN_OBJECT_ON_A_HIGH_SHELF: 3
PUTTING_ON_AN_UNDERSHIRT_OR_A_PULLOVER_SWEATER: 1
WASHING_YOUR_HAIR?: 2
PLEASE_INDICATE_YOR_PRIMARY_REASON_FOR_REFERRAL_TO_THERAPY:: SHOULDER
PUTTING_ON_A_SHIRT_THAT_BUTTONS_DOWN_THE_FRONT: 0
REACHING_FOR_SOMETHING_ON_A_HIGH_SHELF: 4
CARRYING_A_HEAVY_OBJECT_OF_10_POUNDS: 0
PUTTING_ON_YOUR_PANTS: 0
REMOVING_SOMETHING_FROM_YOUR_BACK_POCKET: 0
TOUCHING_THE_BACK_OF_YOUR_NECK: 3

## 2024-06-06 NOTE — PROGRESS NOTES
PHYSICAL THERAPY EVALUATION  Type of Visit: Evaluation    See electronic medical record for Abuse and Falls Screening details.    Subjective       Presenting condition or subjective complaint: Right Shoulder Pain  Date of onset: 06/03/24 (Referral date)    Relevant medical history: Concussions; Depression; High blood pressure; Overweight; Pregnant or breastfeeding; Sleep disorder like apnea   Dates & types of surgery:      Prior diagnostic imaging/testing results:       Prior therapy history for the same diagnosis, illness or injury: No      Pt is a 44 year old female presenting with complaints of right shoulder pain. Pt reports that she initially thought it was from sleeping on it, but realized it would hurt to reach back to hand food to her child in the backseat of her car.    The symptoms started a few weeks ago and is not getting better.    Pt describes the pain as sharp and rates it a 4/10 at rest and significantly increases with sharp pain    Aggravating Factors: reaching back, reaching up, lifting/holding/pushing something    Alleviating Factors: resting/stopping motion    Prior treatments: none for shoulder     Current level of function: a lot more cautious during the day with motions     Sleep Quality: hard to get comfortable - pt is a side sleeper     Red Flags: none    Pt goals:return to previous mobility     PMH: abnormal thyroid cytology-follicular neoplasm, bariatric surgery (2013), depression, depressive disorder, esophageal reflux, family hx of hyperparathyroidism, forearm fx (childhood), GBS (H24 - age 14), history of steroid therapy, HTN, hypertrophy of breast, hypertrophy of tonsils alone, hypovitaminosis D, irregular heart beat (palpitations), LBP, Lumbago, morbid obesity, multiple thyroid nodules, 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, Oligomenorrhea (C/W PCOS), sciatica, sleep apnea (OK since tonsil removal)  Please see history for all past surgical hx    Living Environment  Social support: With  family members   Type of home: Multi-level   Stairs to enter the home: Yes 1 Is there a railing: No   Ramp: No   Stairs inside the home: Yes 10 Is there a railing: Yes   Help at home: None  Equipment owned:       Employment: Yes RN  Hobbies/Interests:      Patient goals for therapy: Previous mobility without pain     Objective   SHOULDER:    Observation: min forward head/shoulder    Scapulothoracic Screen:   -Hands on Hips:NT  -Flexion: NT  -Scaption: WNL    T-Spine Mobility:  NT    Shoulder ROM (* Denotes Pain):   Left (AROM/PROM) Right (AROM/PROM)   Flexion 155 155   Scaption 155 155   Extension WFL WFL   ER (@0) WFL WFL   ER (@90)     IR (@0) reaching behind back T3  T3   IR (@ 90)       End Feels: firm    Shoulder Strength (* Denotes Pain):    Left  Right    Flexion 4+/5 4/5*   Scaption 5/5 4+/5   Extension     ER (@0) 4+/5 3+/5* (limited due to pain)   ER (@90)     IR (@0) 5/5 5/5   IR (@ 90)       Palpation: TTP to AC jt on R; no other significant TTP    Cervical Screen Relevant Findings: cervical ROM WFL - upper trap/neck pain with R SB and R rotation    Special tests:   Left  Right    Impingement     Neer - +   Ellis Roland + (with horizontal ADD) + (at all ranges)   Coracoid Impingement     Posterior Impingement     O'Brelias     RC Tear     Drop Arm     ER Lag     Lift Off     Belly Press     Empty Can (impingement)/Full Can (RC)     Labrum     O'Marquez     Crank - -   Dynamic Labral Shear      Crossover Test     Compression-Rotation     Biceps     Speed's     Yergason's     AC Joint     Crossover     Shear     Instability      Apprehension + Relocation      Load and Shift     Sulcus               GH/Capsular Mobility  L  R   Posterior glide WNL WNL   Inferior glide WNL WNL   Anterior glide WNL WNL         Assessment & Plan   CLINICAL IMPRESSIONS  Medical Diagnosis: Chronic right shoulder pain    Treatment Diagnosis: Chronic right shoulder pain   Impression/Assessment: Patient is a 44 year old female  with right shoulder pain complaints.  The following significant findings have been identified: Pain, Decreased ROM/flexibility, Decreased joint mobility, Decreased strength, Impaired muscle performance, and Decreased activity tolerance. These impairments interfere with their ability to perform self care tasks, work tasks, recreational activities, household chores, driving , household mobility, and community mobility as compared to previous level of function.     Clinical Decision Making (Complexity):  Clinical Presentation: Stable/Uncomplicated  Clinical Presentation Rationale: based on medical and personal factors listed in PT evaluation  Clinical Decision Making (Complexity): Low complexity    PLAN OF CARE  Treatment Interventions:  Modalities: Cryotherapy, Hot Pack, Ultrasound  Interventions: Manual Therapy, Neuromuscular Re-education, Therapeutic Activity, Therapeutic Exercise, Self-Care/Home Management    Long Term Goals     PT Goal 1  Goal Identifier: Strength  Goal Description: Pt will improve R shoulder ER to at least a 4+/5 in order to help stabilize the shoulder to decrease pain  Rationale: to maximize safety and independence with self cares;to maximize safety and independence with performance of ADLs and functional tasks  Target Date: 08/16/24  PT Goal 2  Goal Identifier: Reaching  Goal Description: Pt will be able to reach behind her back with a pain rating of no more than 2/10 in order to care for child  Rationale: to maximize safety and independence with performance of ADLs and functional tasks;to maximize safety and independence with self cares  Target Date: 08/16/24      Frequency of Treatment: 1x/week, decreasing to every other  Duration of Treatment: 10 weeks    Recommended Referrals to Other Professionals:  none  Education Assessment:   Learner/Method: Patient    Risks and benefits of evaluation/treatment have been explained.   Patient/Family/caregiver agrees with Plan of Care.     Evaluation  Time:          Signing Clinician: Ania Rodriguez PT

## 2024-06-13 ENCOUNTER — THERAPY VISIT (OUTPATIENT)
Dept: PHYSICAL THERAPY | Facility: CLINIC | Age: 44
End: 2024-06-13
Attending: FAMILY MEDICINE
Payer: COMMERCIAL

## 2024-06-13 DIAGNOSIS — G89.29 CHRONIC RIGHT SHOULDER PAIN: Primary | ICD-10-CM

## 2024-06-13 DIAGNOSIS — M25.511 CHRONIC RIGHT SHOULDER PAIN: Primary | ICD-10-CM

## 2024-06-13 PROCEDURE — 97110 THERAPEUTIC EXERCISES: CPT | Mod: GP

## 2024-06-13 PROCEDURE — 97112 NEUROMUSCULAR REEDUCATION: CPT | Mod: GP

## 2024-06-26 ENCOUNTER — TELEPHONE (OUTPATIENT)
Dept: ENDOCRINOLOGY | Facility: CLINIC | Age: 44
End: 2024-06-26
Payer: COMMERCIAL

## 2024-06-26 NOTE — TELEPHONE ENCOUNTER
Prior Authorization Not Needed per Insurance    Medication: WEGOVY 1.7 MG/0.75ML SC SOAJ  Insurance Company: [employer] - Phone [phone] Fax [phone]  Expected CoPay: $    Pharmacy Filling the Rx: [id number] - [place], [place] - [address]    Key: [id number]   Message from Plan: Prior Authorization Not Required

## 2024-06-27 ASSESSMENT — ANXIETY QUESTIONNAIRES
4. TROUBLE RELAXING: SEVERAL DAYS
1. FEELING NERVOUS, ANXIOUS, OR ON EDGE: SEVERAL DAYS
8. IF YOU CHECKED OFF ANY PROBLEMS, HOW DIFFICULT HAVE THESE MADE IT FOR YOU TO DO YOUR WORK, TAKE CARE OF THINGS AT HOME, OR GET ALONG WITH OTHER PEOPLE?: NOT DIFFICULT AT ALL
5. BEING SO RESTLESS THAT IT IS HARD TO SIT STILL: NOT AT ALL
GAD7 TOTAL SCORE: 4
IF YOU CHECKED OFF ANY PROBLEMS ON THIS QUESTIONNAIRE, HOW DIFFICULT HAVE THESE PROBLEMS MADE IT FOR YOU TO DO YOUR WORK, TAKE CARE OF THINGS AT HOME, OR GET ALONG WITH OTHER PEOPLE: NOT DIFFICULT AT ALL
7. FEELING AFRAID AS IF SOMETHING AWFUL MIGHT HAPPEN: SEVERAL DAYS
6. BECOMING EASILY ANNOYED OR IRRITABLE: SEVERAL DAYS
GAD7 TOTAL SCORE: 4
7. FEELING AFRAID AS IF SOMETHING AWFUL MIGHT HAPPEN: SEVERAL DAYS
2. NOT BEING ABLE TO STOP OR CONTROL WORRYING: NOT AT ALL
3. WORRYING TOO MUCH ABOUT DIFFERENT THINGS: NOT AT ALL
GAD7 TOTAL SCORE: 4

## 2024-06-27 ASSESSMENT — PATIENT HEALTH QUESTIONNAIRE - PHQ9
10. IF YOU CHECKED OFF ANY PROBLEMS, HOW DIFFICULT HAVE THESE PROBLEMS MADE IT FOR YOU TO DO YOUR WORK, TAKE CARE OF THINGS AT HOME, OR GET ALONG WITH OTHER PEOPLE: NOT DIFFICULT AT ALL
SUM OF ALL RESPONSES TO PHQ QUESTIONS 1-9: 5
SUM OF ALL RESPONSES TO PHQ QUESTIONS 1-9: 5

## 2024-06-28 ENCOUNTER — VIRTUAL VISIT (OUTPATIENT)
Dept: PSYCHOLOGY | Facility: CLINIC | Age: 44
End: 2024-06-28
Payer: COMMERCIAL

## 2024-06-28 DIAGNOSIS — E66.01 PSYCHOLOGICAL FACTORS AFFECTING MORBID OBESITY (H): ICD-10-CM

## 2024-06-28 DIAGNOSIS — Z63.8 PARENTAL CONCERN ABOUT CHILD: ICD-10-CM

## 2024-06-28 DIAGNOSIS — F33.0 MAJOR DEPRESSIVE DISORDER, RECURRENT EPISODE, MILD (H): Primary | ICD-10-CM

## 2024-06-28 DIAGNOSIS — F54 PSYCHOLOGICAL FACTORS AFFECTING MORBID OBESITY (H): ICD-10-CM

## 2024-06-28 PROCEDURE — 90837 PSYTX W PT 60 MINUTES: CPT | Mod: 95 | Performed by: PSYCHOLOGIST

## 2024-06-28 SDOH — SOCIAL STABILITY - SOCIAL INSECURITY: OTHER SPECIFIED PROBLEMS RELATED TO PRIMARY SUPPORT GROUP: Z63.8

## 2024-06-28 NOTE — PROGRESS NOTES
"  Health Psychology                    Department of Medicine  Julianne Moreno, Ph.D., L.P. [phone]                         [facility] Lis Chavez, Ph.D., L.P. [phone]                     [place] Mail Code 648   Donald Hills, Ph.D. [phone]      [address] [place] [address] Jayro Elias, Ph.D., A.B.P.P., L.P. [phone]              [place] [address]           Melissa Stokes, Ph.D., L.P. [phone]     Elizabeth Gaytan, Ph.D., A.B.P.P., L.P. [phone]    [facility]   Rc Vitale, Ph.D. 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 She was seen today for eclectic psychotherapy.      \"I've been really stressed.\"  She is aware of upcoming transitions for her son and what it will mean for her.     She is trying a pause on ETOH use;  Usually 5 days/week.  Reinforced benefits of it. Explored alternatives that are less caloric as well as non-alcoholic. Dry May turned into a damp May- Not discussed for June.  At home, weighed 5/23 12.6 lbs;  6/28/24 weight 209.  Unfortunately she is eating more of the fries she gets for her son.   Discussed cutting back.     She is still tired, previously thought it was secondary to Wegovy,  but now thinks it is her antihypertensive. She adjusted her BP medication down, and now is less tired.  She thinks her low BP was making for her being tired. She is having more energy and time for exercising. Discussed reinforcing commitment to exercising daily. She expects she will have more time when he starts new school in " August. Discussed a 15-20 min exercise/day. Discussed taking 15 min walk when she arrives to  her son in the afternoon.    Rivera has been healthier. He had the new school orientation and will start mid-August at the [employer] in [place]   It was previously part of [employer].  She has mixed feelings. It is an 11-month school year. It is 4 days/week. Discussed his progress.  He is still getting music therapy and other therapies. Might want to continue  it when he starts school. He was in the room with her which is the first time I have interacted with him.  Kayengthy discussion of upcoming transitions.     Mood:  Mood has improved a bit. Less stressed despite having a lot going on because energy is better.     Son: Yesterday was his birthday. Rivera (5) is still at [facility] day treatment, and will get home services through [employer] 1-2/week, speech therapist 1/week. He is expanding on words. Not sure where to send him for . Son is on track  for height 97th percentile.   He is working with an eating therapist.  He is doing better in feeding therapy but still not eating broadly.  He resumed eating hash browns at [employer]  He is still doing sensory play with food (touch, smell, kiss) . He is now playing with food and liking going; not as resistant to exposure to foods.  He enjoys the music therapy.  His diet is still very restricted. uses bottle and formula. He is getting feeding and music therapy.  He is making more effort to try to speak.  He is starting to say some words. He is still getting PT, OT, Speech therapy, day treatment, and music therapy. They are wondering about aphasia.  He is drinking out of a straw and cup partly , but still using a bottle.  He has limited foods he will eat.  Baby steps.    More open to textures and touching things.  Discussed watering down formula and juice.  She thinks he needs to get back to the weight management program as he is 85 lbs.   She  "was pleased to see him sprint.     Weight: Discussed  her weight is gently decreasing. Discussed more bandwidth for self-care activities as son is doing better.  She meets with weight management.  Continuing  Wegovy; no major side effects other than perhaps fatigue; not at a high dose or wanting a higher dose.    She is trying to be more mindful of eating- stopping eating earlier if she doesn't really like it.     Exercise:  Not going to gym yet- has membership to [employer] at [place].  She hasn't made it back yet- tough had hoped to start after feeling better.  \"Still on my list\" She sees it as essential, but she is tired.  Work days she gets 13,000 to 17,000 steps;  Less if work isn't busy.   Discussed 15 minutes/day of exercise to start a regimen, which she cn expland later.     Work: She is working weekend shifts so as to be more available for her son.  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  Patient care is less stressful, more manageable.  Current  project is stressful.  She can't work more hours currently due to all of the appointments and chauffeuring she does.     Social:  Discussed more bandwidth for social activities as son is doing better.    She participates fully. She derives benefit from airing her thoughts, feelings, questions. Rapport was excellent.      Self-weights  234 lbs   8/24/23 per self   220 lbs.  1/30/24                     3/29/24 did not weigh  218          4/30/24  212.6       5/23/24  209          6/28/24    She is  5 foot 11 inches.  Her long-term overall goal is 175 lbs.      Wt Readings from Last 4 Encounters:   06/03/24 95.2 kg (209 lb 12.8 oz)   05/21/24 97.5 kg (215 lb)   03/06/24 98.9 kg (218 lb)   02/27/24 100.7 kg (221 lb 14.4 oz)     There is no height or weight on file to calculate BMI.   Estimated body mass index is 30.51 kg/m  as calculated from the following:    Height as of 6/3/24: 1.766 m (5' 9.53\").    Weight as of " 6/3/24: 95.2 kg (209 lb 12.8 oz).    Current Outpatient Medications   Medication Sig Dispense Refill    acetaminophen (TYLENOL) 32 mg/mL liquid Take 1,000 mg by mouth every 8 hours as needed for fever or mild pain      childrens multivitamin w/iron (FLINTSTONES COMPLETE) chewable tablet Take 1 chew tab by mouth daily      Cyanocobalamin 1000 MCG/ML KIT Inject 1 mL as directed every 30 days 3 kit 4    cyclobenzaprine (FLEXERIL) 10 MG tablet Take 1 tablet (10 mg) by mouth daily as needed for muscle spasms 45 tablet 4    diclofenac (VOLTAREN) 1 % topical gel 1 gram to affected areas on feet 2-3 times daily as needed for pain. 100 g 3    hydrochlorothiazide (HYDRODIURIL) 25 MG tablet Take 1 tablet (25 mg) by mouth daily 90 tablet 3    ibuprofen (ADVIL/MOTRIN) 200 MG capsule Take 400 mg by mouth as needed for fever      LORazepam (ATIVAN) 0.5 MG tablet Take 1 tablet (0.5 mg) by mouth every 6 hours as needed for anxiety 20 tablet 0    losartan (COZAAR) 25 MG tablet TAKE 1 TABLET (25 MG) BY MOUTH DAILY CAN INCREASE TO 50 MG IN 2 WEEKS BASED ON RESPONSE 180 tablet 1    WEGOVY 1.7 MG/0.75ML pen INJECT 1.7MG UNDER THE SKIN ONCE A WEEK 3 mL 4     No current facility-administered medications for this visit.     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    Bariatric surgery status 05/13/2013    Depression     Depressive disorder     Esophageal reflux     Family history of hyperparathyroidism 03/06/2015    Forearm fracture childhood    jumping fall    Guillain-Tuthill disease (H24) age 14    hospitalized at [facility] x 1 week    History of steroid therapy     HX ABNL PAP SMEAR OF CERVIX   1995    LEEP AT 15 yo    Hypertension 2004    Hypertrophy of breast     Hypertrophy of tonsils alone     Hypovitaminosis D     with secondary high PTH    Irregular heart beat     palpitations    LBP (low back pain)     LSIL (low grade squamous intraepithelial lesion) on Pap smear 05/2006     Lumbago     RESOLVED    Major depressive disorder, recurrent episode, in partial or unspecified remission 04/01/2013    Morbid obesity (H)     bariatric surgery 5/13/2013    Multiple thyroid nodules     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)     OLIGOMENORRHEA, C/W PCOS     Sciatica     SLEEP APNEA 06/2002    No longer has since tonsillectomy and septoplasty    Thyroid nodule      Past Surgical History:   Procedure Laterality Date    BIOPSY  03/13/2015    Thyroid nodule    ESOPHAGOSCOPY, GASTROSCOPY, DUODENOSCOPY (EGD), COMBINED  5/28/2013    Procedure: COMBINED ESOPHAGOSCOPY, GASTROSCOPY, DUODENOSCOPY (EGD);;  Surgeon: Best Willett MD;  Location:  GI    ESOPHAGOSCOPY, GASTROSCOPY, DUODENOSCOPY (EGD), COMBINED N/A 6/13/2018    Procedure: COMBINED ESOPHAGOSCOPY, GASTROSCOPY, DUODENOSCOPY (EGD), BIOPSY SINGLE OR MULTIPLE;  gastroscopy;  Surgeon: Westley Gibbs MD;  Location:  GI    LAPAROSCOPIC GASTRIC SLEEVE  5/13/2013    Procedure: LAPAROSCOPIC GASTRIC SLEEVE;  Laparoscopic Sleeve Gastrectomy ;  Surgeon: Best Willett MD;  Location:  OR    LEEP TX, CERVICAL  1995    age 15    partial thyroidectomy  2018    SEPTOPLASTY      THYROIDECTOMY Left 4/3/2018    Procedure: THYROIDECTOMY;  Left Thyroid Lobectomy And Ishtmusectomy;  Surgeon: Delia Harper MD;  Location:  OR    TONSILLECTOMY  age 20s    TONSILLECTOMY  2004?    wisdom teeth extraction           12/6/2023     8:58 AM 2/27/2024     7:05 AM 6/27/2024     8:58 PM   PHQ-9 SCORE   PHQ-9 Total Score MyChart 6 (Mild depression) 5 (Mild depression) 5 (Mild depression)   PHQ-9 Total Score 6 5 5         3/29/2024    11:54 AM 5/23/2024    11:39 AM 6/27/2024     8:59 PM   MAXIMO-7 SCORE   Total Score 3 (minimal anxiety) 3 (minimal anxiety) 4 (minimal anxiety)   Total Score 3 3    3 4         12/4/2018     8:22 AM 1/31/2020     7:20 AM   WHODAS 2.0 Total Score   Total Score 18 15   Total Score MyChart 18 15     This  telehealth service is appropriate and effective for delivering services in light of the necessity for social distancing to mitigate the COVID-19 epidemic and for conservation of PPE.  Patient has agreed to receiving telehealth services after being informed about it: Yes    Patient prefers video invitation/information to be sent by:   emai    Time service started:8:00  Time service ended: 8:53  Extended session due to complexity of case and length of interval.    Mode of transmission: [employer]  [place] of [place]  of the patient  [place]  [facility] [place]    The [place] has been notified that:  Video visits will be conducted via a call with their psychologist to provide the care they need with a video conversation. Video visits may be billed at different rates depending on insurance coverage.  Patients are advised to please contact their insurance provider with any questions about their health insurance coverage. If during the course of a call the psychologist feels a video visit is not appropriate, patients will not be charged for this service.  Diagnosis:  Major depression, recurrent, mild (F33.0).   Psychological factors affecting obesity (F54).   Parental concerns about child (Z63.58)  PLAN/: She will return for virtual session 7/31 @ 12 for eclectic therapy, which is medically necessary.   She wishes to continue to get support here with her life changes and her son's behavioral/developmental challenges.  2.  Resume schedule of regular exercise to the level that is possible.    Last treatment plan signed: 9/27/23  Treatment plan due: 9/27/24

## 2024-07-01 ENCOUNTER — TRANSFERRED RECORDS (OUTPATIENT)
Dept: HEALTH INFORMATION MANAGEMENT | Facility: CLINIC | Age: 44
End: 2024-07-01
Payer: COMMERCIAL

## 2024-07-11 ENCOUNTER — OFFICE VISIT (OUTPATIENT)
Dept: FAMILY MEDICINE | Facility: CLINIC | Age: 44
End: 2024-07-11
Payer: COMMERCIAL

## 2024-07-11 VITALS
SYSTOLIC BLOOD PRESSURE: 134 MMHG | OXYGEN SATURATION: 99 % | TEMPERATURE: 96.9 F | BODY MASS INDEX: 29.78 KG/M2 | WEIGHT: 208 LBS | HEART RATE: 69 BPM | RESPIRATION RATE: 16 BRPM | HEIGHT: 70 IN | DIASTOLIC BLOOD PRESSURE: 89 MMHG

## 2024-07-11 DIAGNOSIS — D22.9 BENIGN MOLE: Primary | ICD-10-CM

## 2024-07-11 PROCEDURE — 99212 OFFICE O/P EST SF 10 MIN: CPT | Performed by: INTERNAL MEDICINE

## 2024-07-11 ASSESSMENT — PAIN SCALES - GENERAL: PAINLEVEL: NO PAIN (0)

## 2024-07-11 NOTE — PROGRESS NOTES
The patient presents for mole on her left lower abdomen.  She just noticed it about 3 days ago.  She is never noticed it before.  She is completely asymptomatic and has no pain or discomfort in that area.    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14    hospitalized at [facility] week    History of steroid therapy     HX ABNL PAP SMEAR OF CERVIX   1995    LEEP AT 15 yo    Hypertension 2004    Hypertrophy of breast     Hypertrophy of tonsils alone     Hypovitaminosis D     with secondary high PTH    Irregular heart beat     palpitations    LBP (low back pain)     LSIL (low grade squamous intraepithelial lesion) on Pap smear 05/2006    Lumbago     RESOLVED    Major depressive disorder, recurrent episode, in partial or unspecified remission 04/01/2013    Morbid obesity (H)     bariatric surgery 5/13/2013    Multiple thyroid nodules     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)     OLIGOMENORRHEA, C/W PCOS     Sciatica     SLEEP APNEA 06/2002    No longer has since tonsillectomy and septoplasty    Thyroid nodule      Past Surgical History:   Procedure Laterality Date    BIOPSY  03/13/2015    Thyroid nodule    ESOPHAGOSCOPY, GASTROSCOPY, DUODENOSCOPY (EGD), COMBINED  5/28/2013    Procedure: COMBINED ESOPHAGOSCOPY, GASTROSCOPY, DUODENOSCOPY (EGD);;  Surgeon: Best Willett MD;  Location:  GI    ESOPHAGOSCOPY, GASTROSCOPY, DUODENOSCOPY (EGD), COMBINED N/A 6/13/2018    Procedure: COMBINED ESOPHAGOSCOPY, GASTROSCOPY, DUODENOSCOPY (EGD), BIOPSY SINGLE OR MULTIPLE;  gastroscopy;  Surgeon: Westley Gibbs MD;  Location: [facility]    LAPAROSCOPIC GASTRIC SLEEVE  5/13/2013    Procedure: LAPAROSCOPIC GASTRIC SLEEVE;   Laparoscopic Sleeve Gastrectomy ;  Surgeon: Best Willett MD;  Location: [facility] OR    LEEP TX, CERVICAL  1995    age 15    partial thyroidectomy  2018    SEPTOPLASTY      THYROIDECTOMY Left 4/3/2018    Procedure: THYROIDECTOMY;  Left Thyroid Lobectomy And Ishtmusectomy;  Surgeon: Delia Harper MD;  [facility]    TONSILLECTOMY  age 20s    TONSILLECTOMY  2004?    wisdom teeth extraction       Social History     Socioeconomic History    Marital status: Single     Spouse name: Not on file    Number of children: 0    Years of education: Not on file    Highest education level: Not on file   Occupational History     Employer: [employer]   Tobacco Use    Smoking status: Never    Smokeless tobacco: Never   Vaping Use    Vaping status: Never Used   Substance and Sexual Activity    Alcohol use: Yes     Alcohol/week: 1.0 - 4.0 standard drink of alcohol     Comment: 6 drinks/week    Drug use: No    Sexual activity: Not Currently     Partners: Male     Birth control/protection: Abstinence   Other Topics Concern    Parent/sibling w/ CABG, MI or angioplasty before 65F 55M? No   Social History Narrative    Social Documentation:        Balanced Diet: NO    Calcium intake: more than 2 per day    Caffeine: 3 cups per day    Exercise:  type of activity daily living activites    Sunscreen: Yes    Seatbelts:  Yes    Self Breast Exam:  No    Self Testicular Exam: n/a    Physical/Emotional/Sexual Abuse: No    Do you feel safe in your environment? Yes        Cholesterol screen up to date: Yes    CHOL      171   6/15/09    HDL       54   6/15/09    LDL       89   6/15/09    TRIG      140   6/15/09    CHOLHDLRATIO      3.2   6/15/09        Eye Exam up to date: Yes    Dental Exam up to date: Yes    Pap smear up to date: No: will do today    Mammogram up to date: Does Not Apply    Dexa Scan up to date: Does Not Apply    Colonoscopy up to date: Does Not Apply    Immunizations up to date: No-does not want to do tdap  today/does need tb test    Glucose screen if over 40:  No                     Social Determinants of Health     Financial Resource Strain: Low Risk  (6/2/2024)    Financial Resource Strain     Within the past 12 months, have you or your family members you live with been unable to get utilities (heat, electricity) when it was really needed?: No   Food Insecurity: Low Risk  (6/2/2024)    Food Insecurity     Within the past 12 months, did you worry that your food would run out before you got money to buy more?: No     Within the past 12 months, did the food you bought just not last and you didn t have money to get more?: No   Transportation Needs: Low Risk  (6/2/2024)    Transportation Needs     Within the past 12 months, has lack of transportation kept you from medical appointments, getting your medicines, non-medical meetings or appointments, work, or from getting things that you need?: No   Physical Activity: Insufficiently Active (6/2/2024)    Exercise Vital Sign     Days of Exercise per Week: 2 days     Minutes of Exercise per Session: 30 min   Stress: No Stress Concern Present (6/2/2024)    [employer] - Occupational Stress Questionnaire     Feeling of Stress : Only a little   Social Connections: Unknown (6/2/2024)    Social Connection and Isolation Panel [NHANES]     Frequency of Communication with Friends and Family: Not on file     Frequency of Social Gatherings with Friends and Family: Once a week     Attends Yarsani Services: Not on file     Active Member of Clubs or Organizations: Not on file     Attends Club or Organization Meetings: Not on file     Marital Status: Not on file   Interpersonal Safety: Low Risk  (6/3/2024)    Interpersonal Safety     Do you feel physically and emotionally safe where you currently live?: Yes     Within the past 12 months, have you been hit, slapped, kicked or otherwise physically hurt by someone?: No     Within the past 12 months, have you been  "humiliated or emotionally abused in other ways by your partner or ex-partner?: No   Housing Stability: Low Risk  (6/2/2024)    Housing Stability     Do you have housing? : Yes     Are you worried about losing your housing?: No     Current Outpatient Medications   Medication Sig Dispense Refill    acetaminophen (TYLENOL) 32 mg/mL liquid Take 1,000 mg by mouth every 8 hours as needed for fever or mild pain      childrens multivitamin w/iron (FLINTSTONES COMPLETE) chewable tablet Take 1 chew tab by mouth daily      Cyanocobalamin 1000 MCG/ML KIT Inject 1 mL as directed every 30 days 3 kit 4    cyclobenzaprine (FLEXERIL) 10 MG tablet Take 1 tablet (10 mg) by mouth daily as needed for muscle spasms 45 tablet 4    diclofenac (VOLTAREN) 1 % topical gel 1 gram to affected areas on feet 2-3 times daily as needed for pain. 100 g 3    hydrochlorothiazide (HYDRODIURIL) 25 MG tablet Take 1 tablet (25 mg) by mouth daily 90 tablet 3    ibuprofen (ADVIL/MOTRIN) 200 MG capsule Take 400 mg by mouth as needed for fever      LORazepam (ATIVAN) 0.5 MG tablet Take 1 tablet (0.5 mg) by mouth every 6 hours as needed for anxiety 20 tablet 0    losartan (COZAAR) 25 MG tablet TAKE 1 TABLET (25 MG) BY MOUTH DAILY CAN INCREASE TO 50 MG IN 2 WEEKS BASED ON RESPONSE 180 tablet 1    WEGOVY 1.7 MG/0.75ML pen INJECT 1.7MG UNDER THE SKIN ONCE A WEEK 3 mL 4     Allergies   Allergen Reactions    Lisinopril Itching     FAMILY HISTORY NOTED AND REVIEWED    REVIEW OF SYSTEMS: above    PHYSICAL EXAM    /89 (BP Location: Right arm, Patient Position: Sitting, Cuff Size: Adult Large)   Pulse 69   Temp 96.9  F (36.1  C) (Temporal)   Resp 16   Ht 1.766 m (5' 9.53\")   Wt 94.3 kg (208 lb)   LMP 07/01/2024 (Exact Date)   SpO2 99%   BMI 30.25 kg/m      Patient appears non toxic  Please see the picture captured in media.  On exam there is a very small brown slightly raised ball in the left lower abdomen.  There are some punctate cracks in " it.    .  ASSESSMENT:  Low consistent with seborrheic keratosis.    PLAN:  No treatment, call if changes.    Yannick Bustos M.D.

## 2024-07-25 ENCOUNTER — MYC REFILL (OUTPATIENT)
Dept: FAMILY MEDICINE | Facility: CLINIC | Age: 44
End: 2024-07-25
Payer: COMMERCIAL

## 2024-07-25 DIAGNOSIS — I10 BENIGN ESSENTIAL HYPERTENSION: ICD-10-CM

## 2024-07-25 NOTE — TELEPHONE ENCOUNTER
See message from the patient requesting a decrease in the dose. Script pended per pt request.     Please advise. Thank you!    Gloria Bailey RN

## 2024-07-26 RX ORDER — LOSARTAN POTASSIUM 25 MG/1
12.5 TABLET ORAL DAILY
Qty: 45 TABLET | Refills: 0 | Status: SHIPPED | OUTPATIENT
Start: 2024-07-26

## 2024-07-26 NOTE — TELEPHONE ENCOUNTER
"They don't come in a 12.5 mg option  Thanks  Hanna \"Billy\" DEANNA Morgan for Dr. Violet Humphreys while she is out of office   "

## 2024-08-09 PROBLEM — M25.511 CHRONIC RIGHT SHOULDER PAIN: Status: RESOLVED | Noted: 2024-06-06 | Resolved: 2024-08-09

## 2024-08-09 PROBLEM — G89.29 CHRONIC RIGHT SHOULDER PAIN: Status: RESOLVED | Noted: 2024-06-06 | Resolved: 2024-08-09

## 2024-08-09 NOTE — PROGRESS NOTES
DISCHARGE  Reason for Discharge: Patient has failed to schedule further appointments.    Equipment Issued: none    Discharge Plan: Patient to continue home program.    Referring Provider:  Violet Humphreys

## 2024-08-14 ENCOUNTER — VIRTUAL VISIT (OUTPATIENT)
Dept: CARDIOLOGY | Facility: CLINIC | Age: 44
End: 2024-08-14
Attending: INTERNAL MEDICINE
Payer: COMMERCIAL

## 2024-08-14 VITALS — HEIGHT: 70 IN | WEIGHT: 202.8 LBS | BODY MASS INDEX: 29.03 KG/M2

## 2024-08-14 DIAGNOSIS — Z98.84 S/P LAPAROSCOPIC SLEEVE GASTRECTOMY: Primary | ICD-10-CM

## 2024-08-14 ASSESSMENT — PAIN SCALES - GENERAL: PAINLEVEL: NO PAIN (0)

## 2024-08-14 NOTE — PATIENT INSTRUCTIONS
"Recommendations from today's MTM visit:                                                       Continue current regimen for now.   Follow up with Dr. Gagandeep Qureshi in 3 months as planned.     Follow-up: Return in about 6 months (around 2/14/2025) for Medication Therapy Management Pharmacist Visit, Call [phone] to schedule.    It was great speaking with you today.  I value your experience and would be very thankful for your time in providing feedback in our clinic survey. In the next few days, you may receive an email or text message from [facility] [employer] with a link to a survey related to your  clinical pharmacist.\"     To schedule another MTM appointment, please call the clinic directly or you may call the MTM scheduling line at [phone] or toll-free at [phone].     My Clinical Pharmacist's contact information:                                                      Please feel free to contact me with any questions or concerns you have.      Lauren Bloch, PharmD  Medication Therapy Management Pharmacist   [facility] [place]             "

## 2024-08-14 NOTE — PROGRESS NOTES
Medication Therapy Management (MTM) Encounter    ASSESSMENT:                            Medication Adherence/Access: No issues identified    Weight Management   Progressing, no changes today.     S/P Sleeve Gastrectomy:   Stable. Bariatric labs due in March 2025. Reasonable to continue calcium/vitamin D as once daily for now out of patient convenience, discussed working towards 1200 mg/day calcium per ASMBS Guidelines but for now can continue as is. Can reassess labs in future.     PLAN:                            Continue current regimen for now.   Follow up with Dr. Gagandeep Qureshi in 3 months as planned.     Follow-up: Return in about 6 months (around 2/14/2025) for Medication Therapy Management Pharmacist Visit, Call [phone] to schedule.    SUBJECTIVE/OBJECTIVE:                          Ashley Catherine is a 44 year old female seen for a follow-up visit.       Reason for visit: Weight Management check in.    Allergies/ADRs: Reviewed in chart  Past Medical History: Reviewed in chart  Tobacco: She reports that she has never smoked. She has never used smokeless tobacco.  Alcohol: not currently using    Medication Adherence/Access: no issues reported    Weight Management   Wegovy 1.7 mg once weekly     Patient reports no current medication side effects. Had weight regain years after Sleeve Gastrectomy. Then started Wegovy around 1 year ago. She is surprised that she is noticing randomly more weight loss over these months.   Nutrition/Eating Habits: doing more 4-6 small meal/snacks daily. She is feeling satisfied between meals. Working to be more conscious of her foods/meals. She realized she was drinking more calories than she thought. So now changed to low calorie drink options. Significantly less alcohol intake.   Exercise/Activity: she hasn't made it back to the gym yet. Hoping to get back to the gym. Has been getting in steps more to be conscious of this.     Medication History:  Phentermine:  "itching  Naltrexone: itching    Highest wt in life: 293 lbs  Initial Weight (lbs): 274 lbs     Current weight today: 202 lbs 12.8 oz  Cumulative Weight Loss: -90.2 lb, -30.8% from baseline    Wt Readings from Last 4 Encounters:   08/14/24 92 kg (202 lb 12.8 oz)   07/11/24 94.3 kg (208 lb)   06/03/24 95.2 kg (209 lb 12.8 oz)   05/21/24 97.5 kg (215 lb)     Estimated body mass index is 29.5 kg/m  as calculated from the following:    Height as of this encounter: 1.766 m (5' 9.53\").    Weight as of this encounter: 92 kg (202 lb 12.8 oz).    S/P Sleeve Gastrectomy:   [place]th Multivitamin 2 chews daily   Vitamin B12 injection 1000 mcg once monthly   Celebrate Calcium citrate/vitamin D 500 mg-500 international unit(s) once daily     Patient had sleeve gastrectomy in 2013. She does not complain of acid reflux. She does not have issues with swallowing food/pills. Reports drinks 60+ oz water daily. Started calcium supplement last visit. Reports difficult to remember twice daily so getting in once daily. Calcium in diet: 2 cheese/day. Feels she has developed lactose intolerance so other than cheese doesn't tolerate other forms of calcium.     Hemoglobin   Date Value Ref Range Status   12/03/2023 12.5 11.7 - 15.7 g/dL Final   12/23/2020 11.9 11.7 - 15.7 g/dL Final     Ferritin   Date Value Ref Range Status   01/30/2024 54 6 - 175 ng/mL Final   12/23/2020 50 12 - 150 ng/mL Final     Lab Results   Component Value Date    VITDT 39 01/30/2024     Lab Results   Component Value Date    PTHI 46 10/22/2020     Lab Results   Component Value Date    B12 1,204 03/18/2024     Lab Results   Component Value Date    HARDY 0.72 03/18/2024       Today's Vitals: Ht 1.766 m (5' 9.53\")   Wt 92 kg (202 lb 12.8 oz)   LMP 07/01/2024 (Exact Date)   BMI 29.50 kg/m    ----------------      I spent 15 minutes with this patient today. All changes were made via collaborative practice agreement with Dr. Gagandeep Qureshi. A copy of the visit note " was provided to the patient's provider(s).    A summary of these recommendations was sent via [employer].    Lauren Bloch, PharmD, BCACP   Medication Therapy Management Pharmacist   [facility]    Telemedicine Visit Details  Type of service:  Video Conference via [employer]  Start Time:  10:03 AM  End Time:  10:18 AM     Medication Therapy Recommendations  No medication therapy recommendations to display

## 2024-08-14 NOTE — NURSING NOTE
Current patient location: [address] [facility] [address]    Is the patient currently in the state of [place]? YES    Visit mode:VIDEO    If the visit is dropped, the patient can be reconnected by: VIDEO VISIT: Text to cell phone:   Telephone Information:   Mobile [phone]       Will anyone else be joining the visit? NO  (If patient encounters technical issues they should call [phone]    How would you like to obtain your AVS? MyChart    Are changes needed to the allergy or medication list? No    Are refills needed on medications prescribed by this physician? NO    Rooming Documentation:  Not applicable      Reason for visit: RECHECK    Mike GRECO

## 2024-08-19 ENCOUNTER — VIRTUAL VISIT (OUTPATIENT)
Dept: PSYCHOLOGY | Facility: CLINIC | Age: 44
End: 2024-08-19
Payer: COMMERCIAL

## 2024-08-19 DIAGNOSIS — F33.0 MAJOR DEPRESSIVE DISORDER, RECURRENT EPISODE, MILD (H): Primary | ICD-10-CM

## 2024-08-19 DIAGNOSIS — F54 PSYCHOLOGICAL FACTORS AFFECTING MORBID OBESITY (H): ICD-10-CM

## 2024-08-19 DIAGNOSIS — E66.01 PSYCHOLOGICAL FACTORS AFFECTING MORBID OBESITY (H): ICD-10-CM

## 2024-08-19 DIAGNOSIS — Z63.8 PARENTAL CONCERN ABOUT CHILD: ICD-10-CM

## 2024-08-19 PROCEDURE — 90837 PSYTX W PT 60 MINUTES: CPT | Mod: 95 | Performed by: PSYCHOLOGIST

## 2024-08-19 SDOH — SOCIAL STABILITY - SOCIAL INSECURITY: OTHER SPECIFIED PROBLEMS RELATED TO PRIMARY SUPPORT GROUP: Z63.8

## 2024-08-19 ASSESSMENT — ANXIETY QUESTIONNAIRES
IF YOU CHECKED OFF ANY PROBLEMS ON THIS QUESTIONNAIRE, HOW DIFFICULT HAVE THESE PROBLEMS MADE IT FOR YOU TO DO YOUR WORK, TAKE CARE OF THINGS AT HOME, OR GET ALONG WITH OTHER PEOPLE: NOT DIFFICULT AT ALL
1. FEELING NERVOUS, ANXIOUS, OR ON EDGE: SEVERAL DAYS
5. BEING SO RESTLESS THAT IT IS HARD TO SIT STILL: NOT AT ALL
4. TROUBLE RELAXING: SEVERAL DAYS
GAD7 TOTAL SCORE: 3
7. FEELING AFRAID AS IF SOMETHING AWFUL MIGHT HAPPEN: NOT AT ALL
GAD7 TOTAL SCORE: 3
8. IF YOU CHECKED OFF ANY PROBLEMS, HOW DIFFICULT HAVE THESE MADE IT FOR YOU TO DO YOUR WORK, TAKE CARE OF THINGS AT HOME, OR GET ALONG WITH OTHER PEOPLE?: NOT DIFFICULT AT ALL
2. NOT BEING ABLE TO STOP OR CONTROL WORRYING: NOT AT ALL
6. BECOMING EASILY ANNOYED OR IRRITABLE: SEVERAL DAYS
3. WORRYING TOO MUCH ABOUT DIFFERENT THINGS: NOT AT ALL
7. FEELING AFRAID AS IF SOMETHING AWFUL MIGHT HAPPEN: NOT AT ALL

## 2024-08-19 NOTE — PROGRESS NOTES
Health Psychology                    Department of Medicine  Julianne Moreno, Ph.D., L.P. [phone]                         [facility] Lis Chavez, Ph.D., L.P. [phone]                     [place] Mail Code 555   JeanaDonald, Ph.D. [phone]      [address] [place] [address] Jayro Elias, Ph.D., A.B.P.P., L.P. [phone]            [place] [address]           Melissa Stokes, Ph.D., L.P. [phone]     Elizabeth Gaytan, Ph.D., A.B.P.P., L.P. [phone]    [facility]   Rc Vitale, Ph.D. 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 She was seen today for eclectic psychotherapy.      Mood:  Mood has improved a bit. Less stressed despite having a lot going on because energy is better.     Son: Rivera has been healthier. He had the new school orientation and started mid-August at the [employer] in [place] Doing okay. Adjusting to first week of new school.  Somewhat resistant.  He is becoming more verbal.  He wakes her up when she returns from overnight shift, which has been problematic for her in terms of sleep deprivation.  Discussed strategies to try to mitigate it.     Weight: Goal is 175; down 32 lbs. In last year.  Discussed  her weight is gently decreasing. Discussed more bandwidth for self-care activities as son is doing better.  She meets with weight management; is using Wegovy without major side effects other than perhaps fatigue; not at a high dose or wanting a higher dose.    She is trying  "to be more mindful of eating- stopping eating earlier if she doesn't really like it.     Exercise:  Not going to gym yet- has membership to [employer] at [place].  She hasn't made it back yet- tough had hoped to start after feeling better.  \"Still on my list\" She sees it as essential, but she is tired.  Work days she gets 13,000 to 17,000 steps;  Less if work isn't busy.   Discussed 15 minutes/day of exercise to start a regimen, which she can expand later.  Discussed getting bike rack to use bike on trails.     Work: She is working weekend over night shifts so as to be more available for her son.  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      Social:  Discussed more bandwidth for social activities as son is doing better.    She participates fully. She derives benefit from airing her thoughts, feelings, questions. Rapport was excellent.      Self-reported weights  234 lbs   8/24/23 per self   220 lbs.  1/30/24                     3/29/24 did not weigh  218          4/30/24  212.6       5/23/24  209          6/28/24  202.8       8/19/24    She is  5 foot 11 inches.  Her long-term overall goal is 175 lbs.      Wt Readings from Last 4 Encounters:   08/14/24 92 kg (202 lb 12.8 oz)   07/11/24 94.3 kg (208 lb)   06/03/24 95.2 kg (209 lb 12.8 oz)   05/21/24 97.5 kg (215 lb)     There is no height or weight on file to calculate BMI.   Estimated body mass index is 29.5 kg/m  as calculated from the following:    Height as of 8/14/24: 1.766 m (5' 9.53\").    Weight as of 8/14/24: 92 kg (202 lb 12.8 oz).    Current Outpatient Medications   Medication Sig Dispense Refill    acetaminophen (TYLENOL) 32 mg/mL liquid Take 1,000 mg by mouth every 8 hours as needed for fever or mild pain      Calcium Citrate-Vitamin D (CALCIUM CITRATE CHEWY BITE) 500-12.5 MG-MCG CHEW Take 1 tablet by mouth daily      childrens multivitamin w/iron (FLINTSTONES COMPLETE) chewable tablet Take 2 chew tab by mouth " daily      Cyanocobalamin 1000 MCG/ML KIT Inject 1 mL as directed every 30 days 3 kit 4    cyclobenzaprine (FLEXERIL) 10 MG tablet Take 1 tablet (10 mg) by mouth daily as needed for muscle spasms 45 tablet 4    diclofenac (VOLTAREN) 1 % topical gel 1 gram to affected areas on feet 2-3 times daily as needed for pain. 100 g 3    hydrochlorothiazide (HYDRODIURIL) 25 MG tablet Take 1 tablet (25 mg) by mouth daily 90 tablet 3    ibuprofen (ADVIL/MOTRIN) 200 MG capsule Take 400 mg by mouth as needed for fever      LORazepam (ATIVAN) 0.5 MG tablet Take 1 tablet (0.5 mg) by mouth every 6 hours as needed for anxiety 20 tablet 0    losartan (COZAAR) 25 MG tablet Take 0.5 tablets (12.5 mg) by mouth daily 45 tablet 0    WEGOVY 1.7 MG/0.75ML pen INJECT 1.7MG UNDER THE SKIN ONCE A WEEK 3 mL 4     No current facility-administered medications for this visit.     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14    hospitalized at [facility] x 1 week    History of steroid therapy     HX ABNL PAP SMEAR OF CERVIX   1995    LEEP AT 15 yo    Hypertension 2004    Hypertrophy of breast     Hypertrophy of tonsils alone     Hypovitaminosis D     with secondary high PTH    Irregular heart beat     palpitations    LBP (low back pain)     LSIL (low grade squamous intraepithelial lesion) on Pap smear 05/2006    Lumbago     RESOLVED    Major depressive disorder, recurrent episode, in partial or unspecified remission 04/01/2013    Morbid obesity (H)     bariatric surgery 5/13/2013    Multiple thyroid nodules     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)     OLIGOMENORRHEA, C/W PCOS     Sciatica     SLEEP APNEA 06/2002    No longer has since tonsillectomy  and septoplasty    Thyroid nodule      Past Surgical History:   Procedure Laterality Date    BIOPSY  03/13/2015    Thyroid nodule    ESOPHAGOSCOPY, GASTROSCOPY, DUODENOSCOPY (EGD), COMBINED  5/28/2013    Procedure: COMBINED ESOPHAGOSCOPY, GASTROSCOPY, DUODENOSCOPY (EGD);;  Surgeon: Best Willett MD;  Location:  GI    ESOPHAGOSCOPY, GASTROSCOPY, DUODENOSCOPY (EGD), COMBINED N/A 6/13/2018    Procedure: COMBINED ESOPHAGOSCOPY, GASTROSCOPY, DUODENOSCOPY (EGD), BIOPSY SINGLE OR MULTIPLE;  gastroscopy;  Surgeon: Westley Gibbs MD;  Location:  GI    LAPAROSCOPIC GASTRIC SLEEVE  5/13/2013    Procedure: LAPAROSCOPIC GASTRIC SLEEVE;  Laparoscopic Sleeve Gastrectomy ;  Surgeon: Best Willett MD;  Location: [facility] OR    LEEP TX, CERVICAL  1995    age 15    partial thyroidectomy  2018    SEPTOPLASTY      THYROIDECTOMY Left 4/3/2018    Procedure: THYROIDECTOMY;  Left Thyroid Lobectomy And Ishtmusectomy;  Surgeon: Delia Harper MD;  [facility]    TONSILLECTOMY  age 20s    TONSILLECTOMY  2004?    wisdom teeth extraction           12/6/2023     8:58 AM 2/27/2024     7:05 AM 6/27/2024     8:58 PM   PHQ-9 SCORE   PHQ-9 Total Score MyChart 6 (Mild depression) 5 (Mild depression) 5 (Mild depression)   PHQ-9 Total Score 6 5 5         5/23/2024    11:39 AM 6/27/2024     8:59 PM 8/19/2024     9:22 AM   MAXIMO-7 SCORE   Total Score 3 (minimal anxiety) 4 (minimal anxiety) 3 (minimal anxiety)   Total Score 3    3 4 3         12/4/2018     8:22 AM 1/31/2020     7:20 AM   WHODAS 2.0 Total Score   Total Score 18 15   Total Score MyChart 18 15     This telehealth service is appropriate and effective for delivering services in light of the necessity for social distancing to mitigate the COVID-19 epidemic and for conservation of PPE.  Patient has agreed to receiving telehealth services after being informed about it: Yes    Patient prefers video invitation/information to be sent by:   emai    Time service  started:12:00  Time service ended: 12:53  Extended session due to complexity of case and length of interval.    Mode of transmission: Lytro  Location of originating:  Home  of the patient  Distance site:  Home  of the provider    The patient has been notified that:  Video visits will be conducted via a call with their psychologist to provide the care they need with a video conversation. Video visits may be billed at different rates depending on insurance coverage.  Patients are advised to please contact their insurance provider with any questions about their health insurance coverage. If during the course of a call the psychologist feels a video visit is not appropriate, patients will not be charged for this service.  Diagnosis:  Major depression, recurrent, mild (F33.0).   Psychological factors affecting obesity (F54).   Parental concerns about child (Z63.58)  PLAN/: She will return for virtual session 9/25 @ 9 for eclectic therapy, which is medically necessary.   She wishes to continue to get support here with her life changes and her son's behavioral/developmental challenges.  2.  Expand schedule of exercise to the level that is possible.    Last treatment plan signed: 9/27/23  Treatment plan due: 9/27/24

## 2024-09-25 ENCOUNTER — MYC REFILL (OUTPATIENT)
Dept: ENDOCRINOLOGY | Facility: CLINIC | Age: 44
End: 2024-09-25
Payer: COMMERCIAL

## 2024-09-25 ENCOUNTER — VIRTUAL VISIT (OUTPATIENT)
Dept: PSYCHOLOGY | Facility: CLINIC | Age: 44
End: 2024-09-25
Payer: COMMERCIAL

## 2024-09-25 DIAGNOSIS — E66.01 PSYCHOLOGICAL FACTORS AFFECTING MORBID OBESITY (H): ICD-10-CM

## 2024-09-25 DIAGNOSIS — Z63.8 PARENTAL CONCERN ABOUT CHILD: ICD-10-CM

## 2024-09-25 DIAGNOSIS — F33.0 MAJOR DEPRESSIVE DISORDER, RECURRENT EPISODE, MILD (H): Primary | ICD-10-CM

## 2024-09-25 DIAGNOSIS — F54 PSYCHOLOGICAL FACTORS AFFECTING MORBID OBESITY (H): ICD-10-CM

## 2024-09-25 DIAGNOSIS — E66.811 OBESITY, CLASS I, BMI 30-34.9: ICD-10-CM

## 2024-09-25 DIAGNOSIS — Z56.9 OCCUPATIONAL PROBLEM: ICD-10-CM

## 2024-09-25 PROCEDURE — 90837 PSYTX W PT 60 MINUTES: CPT | Mod: 95 | Performed by: PSYCHOLOGIST

## 2024-09-25 SDOH — SOCIAL STABILITY - SOCIAL INSECURITY: OTHER SPECIFIED PROBLEMS RELATED TO PRIMARY SUPPORT GROUP: Z63.8

## 2024-09-25 SDOH — ECONOMIC STABILITY - INCOME SECURITY: UNSPECIFIED PROBLEMS RELATED TO EMPLOYMENT: Z56.9

## 2024-09-25 ASSESSMENT — ANXIETY QUESTIONNAIRES
GAD7 TOTAL SCORE: 2
GAD7 TOTAL SCORE: 2
8. IF YOU CHECKED OFF ANY PROBLEMS, HOW DIFFICULT HAVE THESE MADE IT FOR YOU TO DO YOUR WORK, TAKE CARE OF THINGS AT HOME, OR GET ALONG WITH OTHER PEOPLE?: NOT DIFFICULT AT ALL
7. FEELING AFRAID AS IF SOMETHING AWFUL MIGHT HAPPEN: NOT AT ALL
GAD7 TOTAL SCORE: 2

## 2024-09-25 NOTE — PROGRESS NOTES
Health Psychology                    Department of Medicine  Julianne Moreno, Ph.D., L.P. [phone]                         [facility] Lis Chavez, Ph.D., L.P. [phone]                     [place] Mail Code 577   JeanaDonald, Ph.D. [phone]      [address] [place] [address] Jayro Elias, Ph.D., A.B.P.P., L.P. [phone]            [place] [address]           Melissa Stokes, Ph.D., L.P. [phone]     Elizabeth Gaytan, Ph.D., A.B.P.P., L.P. [phone]    [facility]   Rc Vitale, Ph.D. 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 She was seen today for eclectic psychotherapy.      Mood:  Mood has improved a bit. Less stressed despite having a lot going on because energy is better.     Son: Rivera has been healthier. He started mid-August at the [employer] in [place] Doing okay. Adjusting to first month of new school.  He had his worst day at school in recent days. He is becoming more verbal.  He wakes her up when she returns from overnight shift, which has been problematic for her in terms of sleep deprivation.  She got  mattress to be able to sleep in her car after night shift if things get too disruptive.      Weight: Goal is 175; down 4 lbs in the past month.  Discussed  her weight is gently decreasing.  She has cut back on sweets.  Discussed more bandwidth for self-care activities as son is doing better.  She meets with weight management; is using Wegovy without major side effects other  "not at a high dose or wanting a higher dose.  Might switch to something cheaper in December.   She now thinks that it was blood pressure medication contributing to her fatigue.  She is weight sensitive n terms of her blood pressure.  As she loses weight she can decrease her blood pressure medication.  She would still like to be at a higher energy level. She is trying to be more mindful of eating- stopping eating earlier if she doesn't really like it. Throwing out food at times. Discussed the psychological sense- being noticed more when she is lighter.     Exercise:  Not going to gym yet- has membership to [employer] at [place].  She hasn't made it back yet- tough had hoped to start after feeling better.  \"Still on my list\" She sees it as essential, but she is tired.  Work days she gets 13,000 to 17,000 steps; Higher when split units.   Less if work isn't busy.   Discussed 15 minutes/day of exercise to start a regimen, which she can expand later.  Discussed getting bike rack to use bike on trails.    Non-work days 6-11 k.  Goal is at least 7 k steps/day.    Work: She is working weekend over night shifts so as to be more available for her son.  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  Discussed recent deaths at  [facility]. Processed her feelings.    Social:  Discussed more bandwidth for social activities as son is doing better Went to state fair with son and a friend.  The friend then was incommunicado for several days      She participates fully. She derives benefit from airing her thoughts, feelings, questions. Rapport was excellent.      Self-reported weights  234 lbs   8/24/23 per self   220 lbs.  1/30/24                     3/29/24 did not weigh  218          4/30/24  212.6       5/23/24  209          6/28/24  202.8       8/19/24  197          9/25/24    She is  5 foot 11 inches.  Her long-term overall goal is 175 lbs.      Wt Readings from Last 4 Encounters:   08/14/24 " "92 kg (202 lb 12.8 oz)   07/11/24 94.3 kg (208 lb)   06/03/24 95.2 kg (209 lb 12.8 oz)   05/21/24 97.5 kg (215 lb)     There is no height or weight on file to calculate BMI.   Estimated body mass index is 29.5 kg/m  as calculated from the following:    Height as of 8/14/24: 1.766 m (5' 9.53\").    Weight as of 8/14/24: 92 kg (202 lb 12.8 oz).    Current Outpatient Medications   Medication Sig Dispense Refill    acetaminophen (TYLENOL) 32 mg/mL liquid Take 1,000 mg by mouth every 8 hours as needed for fever or mild pain      Calcium Citrate-Vitamin D (CALCIUM CITRATE CHEWY BITE) 500-12.5 MG-MCG CHEW Take 1 tablet by mouth daily      childrens multivitamin w/iron (FLINTSTONES COMPLETE) chewable tablet Take 2 chew tab by mouth daily      Cyanocobalamin 1000 MCG/ML KIT Inject 1 mL as directed every 30 days 3 kit 4    cyclobenzaprine (FLEXERIL) 10 MG tablet Take 1 tablet (10 mg) by mouth daily as needed for muscle spasms 45 tablet 4    diclofenac (VOLTAREN) 1 % topical gel 1 gram to affected areas on feet 2-3 times daily as needed for pain. 100 g 3    hydrochlorothiazide (HYDRODIURIL) 25 MG tablet Take 1 tablet (25 mg) by mouth daily 90 tablet 3    ibuprofen (ADVIL/MOTRIN) 200 MG capsule Take 400 mg by mouth as needed for fever      LORazepam (ATIVAN) 0.5 MG tablet Take 1 tablet (0.5 mg) by mouth every 6 hours as needed for anxiety 20 tablet 0    losartan (COZAAR) 25 MG tablet Take 0.5 tablets (12.5 mg) by mouth daily 45 tablet 0    WEGOVY 1.7 MG/0.75ML pen INJECT 1.7MG UNDER THE SKIN ONCE A WEEK 3 mL 4     No current facility-administered medications for this visit.     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    Bariatric surgery status 05/13/2013    Depression     Depressive disorder     Esophageal reflux     Family history of hyperparathyroidism 03/06/2015    Forearm fracture childhood    jumping fall    Guillain-Rattan disease (H24) age 14    hospitalized at " [facility] x 1 week    History of steroid therapy     HX ABNL PAP SMEAR OF CERVIX   1995    LEEP AT 15 yo    Hypertension 2004    Hypertrophy of breast     Hypertrophy of tonsils alone     Hypovitaminosis D     with secondary high PTH    Irregular heart beat     palpitations    LBP (low back pain)     LSIL (low grade squamous intraepithelial lesion) on Pap smear 05/2006    Lumbago     RESOLVED    Major depressive disorder, recurrent episode, in partial or unspecified remission 04/01/2013    Morbid obesity (H)     bariatric surgery 5/13/2013    Multiple thyroid nodules     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)     OLIGOMENORRHEA, C/W PCOS     Sciatica     SLEEP APNEA 06/2002    No longer has since tonsillectomy and septoplasty    Thyroid nodule      Past Surgical History:   Procedure Laterality Date    BIOPSY  03/13/2015    Thyroid nodule    ESOPHAGOSCOPY, GASTROSCOPY, DUODENOSCOPY (EGD), COMBINED  5/28/2013    Procedure: COMBINED ESOPHAGOSCOPY, GASTROSCOPY, DUODENOSCOPY (EGD);;  Surgeon: Best Willett MD;  Location:  GI    ESOPHAGOSCOPY, GASTROSCOPY, DUODENOSCOPY (EGD), COMBINED N/A 6/13/2018    Procedure: COMBINED ESOPHAGOSCOPY, GASTROSCOPY, DUODENOSCOPY (EGD), BIOPSY SINGLE OR MULTIPLE;  gastroscopy;  Surgeon: Westley Gibbs MD;  Location: [facility]    LAPAROSCOPIC GASTRIC SLEEVE  5/13/2013    Procedure: LAPAROSCOPIC GASTRIC SLEEVE;  Laparoscopic Sleeve Gastrectomy ;  Surgeon: Best Willett MD;  [facility]    LEEP TX, CERVICAL  1995    age 15    partial thyroidectomy  2018    SEPTOPLASTY      THYROIDECTOMY Left 4/3/2018    Procedure: THYROIDECTOMY;  Left Thyroid Lobectomy And Ishtmusectomy;  Surgeon: Delia Harper MD;  [facility]    TONSILLECTOMY  age 20s    TONSILLECTOMY  2004?    wisdom teeth extraction           12/6/2023     8:58 AM 2/27/2024     7:05 AM 6/27/2024     8:58 PM   PHQ-9 SCORE   PHQ-9 Total Score MyChart 6 (Mild depression) 5 (Mild depression) 5  (Mild depression)   PHQ-9 Total Score 6 5 5         6/27/2024     8:59 PM 8/19/2024     9:22 AM 9/25/2024     7:29 AM   MAXIMO-7 SCORE   Total Score 4 (minimal anxiety) 3 (minimal anxiety) 2 (minimal anxiety)   Total Score 4 3 2         12/4/2018     8:22 AM 1/31/2020     7:20 AM   WHODAS 2.0 Total Score   Total Score 18 15   Total Score MyChart 18 15     This telehealth service is appropriate and effective for delivering services in light of the necessity for social distancing to mitigate the COVID-19 epidemic and for conservation of PPE.  Patient has agreed to receiving telehealth services after being informed about it: Yes    Patient prefers video invitation/information to be sent by:   [employer]    Time service started:9:00  Time service ended: 9:54  Extended session due to complexity of case and length of interval.    Mode of transmission: [employer]  Location of originating:  Home  of the patient  Distance site:  Home  of the provider    The patient has been notified that:  Video visits will be conducted via a call with their psychologist to provide the care they need with a video conversation. Video visits may be billed at different rates depending on insurance coverage.  Patients are advised to please contact their insurance provider with any questions about their health insurance coverage. If during the course of a call the psychologist feels a video visit is not appropriate, patients will not be charged for this service.  Diagnosis:  Major depression, recurrent, mild (F33.0).   Psychological factors affecting obesity (F54).   Parental concerns about child (Z63.58)  PLAN/: She will return for virtual session 10/23 @ 9 for eclectic therapy, which is medically necessary.   She wishes to continue to get support here with her life changes and her son's behavioral/developmental challenges.  2.  Expand schedule of exercise to the level that is possible.    Last treatment plan signed: 9/27/23  Treatment plan due: 9/27/24  (next session)

## 2024-09-30 RX ORDER — SEMAGLUTIDE 1.7 MG/.75ML
1.7 INJECTION, SOLUTION SUBCUTANEOUS WEEKLY
Qty: 3 ML | Refills: 4 | Status: SHIPPED | OUTPATIENT
Start: 2024-09-30

## 2024-10-21 DIAGNOSIS — I10 BENIGN ESSENTIAL HYPERTENSION: ICD-10-CM

## 2024-10-21 RX ORDER — LOSARTAN POTASSIUM 25 MG/1
12.5 TABLET ORAL DAILY
Qty: 45 TABLET | Refills: 0 | Status: SHIPPED | OUTPATIENT
Start: 2024-10-21

## 2024-10-23 ENCOUNTER — VIRTUAL VISIT (OUTPATIENT)
Dept: PSYCHOLOGY | Facility: CLINIC | Age: 44
End: 2024-10-23
Payer: COMMERCIAL

## 2024-10-23 DIAGNOSIS — Z56.9 OCCUPATIONAL PROBLEM: ICD-10-CM

## 2024-10-23 DIAGNOSIS — F54 PSYCHOLOGICAL FACTORS AFFECTING MORBID OBESITY (H): ICD-10-CM

## 2024-10-23 DIAGNOSIS — Z63.8 PARENTAL CONCERN ABOUT CHILD: ICD-10-CM

## 2024-10-23 DIAGNOSIS — E66.01 PSYCHOLOGICAL FACTORS AFFECTING MORBID OBESITY (H): ICD-10-CM

## 2024-10-23 DIAGNOSIS — F33.0 MAJOR DEPRESSIVE DISORDER, RECURRENT EPISODE, MILD (H): Primary | ICD-10-CM

## 2024-10-23 PROCEDURE — 90837 PSYTX W PT 60 MINUTES: CPT | Mod: 95 | Performed by: PSYCHOLOGIST

## 2024-10-23 SDOH — ECONOMIC STABILITY - INCOME SECURITY: UNSPECIFIED PROBLEMS RELATED TO EMPLOYMENT: Z56.9

## 2024-10-23 SDOH — SOCIAL STABILITY - SOCIAL INSECURITY: OTHER SPECIFIED PROBLEMS RELATED TO PRIMARY SUPPORT GROUP: Z63.8

## 2024-10-23 ASSESSMENT — PATIENT HEALTH QUESTIONNAIRE - PHQ9
SUM OF ALL RESPONSES TO PHQ QUESTIONS 1-9: 2
SUM OF ALL RESPONSES TO PHQ QUESTIONS 1-9: 2
10. IF YOU CHECKED OFF ANY PROBLEMS, HOW DIFFICULT HAVE THESE PROBLEMS MADE IT FOR YOU TO DO YOUR WORK, TAKE CARE OF THINGS AT HOME, OR GET ALONG WITH OTHER PEOPLE: NOT DIFFICULT AT ALL

## 2024-10-23 ASSESSMENT — ANXIETY QUESTIONNAIRES
GAD7 TOTAL SCORE: 2
3. WORRYING TOO MUCH ABOUT DIFFERENT THINGS: NOT AT ALL
5. BEING SO RESTLESS THAT IT IS HARD TO SIT STILL: NOT AT ALL
7. FEELING AFRAID AS IF SOMETHING AWFUL MIGHT HAPPEN: SEVERAL DAYS
1. FEELING NERVOUS, ANXIOUS, OR ON EDGE: NOT AT ALL
GAD7 TOTAL SCORE: 2
GAD7 TOTAL SCORE: 2
8. IF YOU CHECKED OFF ANY PROBLEMS, HOW DIFFICULT HAVE THESE MADE IT FOR YOU TO DO YOUR WORK, TAKE CARE OF THINGS AT HOME, OR GET ALONG WITH OTHER PEOPLE?: NOT DIFFICULT AT ALL
2. NOT BEING ABLE TO STOP OR CONTROL WORRYING: NOT AT ALL
7. FEELING AFRAID AS IF SOMETHING AWFUL MIGHT HAPPEN: SEVERAL DAYS
6. BECOMING EASILY ANNOYED OR IRRITABLE: SEVERAL DAYS
4. TROUBLE RELAXING: NOT AT ALL
IF YOU CHECKED OFF ANY PROBLEMS ON THIS QUESTIONNAIRE, HOW DIFFICULT HAVE THESE PROBLEMS MADE IT FOR YOU TO DO YOUR WORK, TAKE CARE OF THINGS AT HOME, OR GET ALONG WITH OTHER PEOPLE: NOT DIFFICULT AT ALL
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
## 2024-10-25 DIAGNOSIS — Z98.84 GASTRIC BYPASS STATUS FOR OBESITY: Primary | ICD-10-CM

## 2024-10-27 RX ORDER — CYANOCOBALAMIN 1000 UG/ML
INJECTION, SOLUTION INTRAMUSCULAR; SUBCUTANEOUS
Qty: 3 ML | Refills: 1 | Status: SHIPPED | OUTPATIENT
Start: 2024-10-27

## 2024-11-04 ENCOUNTER — MYC MEDICAL ADVICE (OUTPATIENT)
Dept: FAMILY MEDICINE | Facility: CLINIC | Age: 44
End: 2024-11-04
Payer: COMMERCIAL

## 2024-11-04 DIAGNOSIS — M79.672 FOOT PAIN, BILATERAL: ICD-10-CM

## 2024-11-04 DIAGNOSIS — M72.2 PLANTAR FASCIITIS, BILATERAL: Primary | ICD-10-CM

## 2024-11-04 DIAGNOSIS — M79.671 FOOT PAIN, BILATERAL: ICD-10-CM

## 2024-11-04 NOTE — TELEPHONE ENCOUNTER
Dr. Lazo,     Please see below MyChart message and advise.   Pt seen for physical on 6/03/24 with associated diagnoses of: Plantar fasciitis, bilateral; Foot pain, bilateral  Orthotic order pended, if approved.    Thanks,   Susana FIGUEROA RN

## 2024-11-06 NOTE — TELEPHONE ENCOUNTER
Patient informed and says Thank You , she knows where to go and get them  [facility] Coordinator

## 2024-11-21 ENCOUNTER — VIRTUAL VISIT (OUTPATIENT)
Dept: PSYCHOLOGY | Facility: CLINIC | Age: 44
End: 2024-11-21
Payer: COMMERCIAL

## 2024-11-21 DIAGNOSIS — Z63.8 PARENTAL CONCERN ABOUT CHILD: ICD-10-CM

## 2024-11-21 DIAGNOSIS — Z56.9 OCCUPATIONAL PROBLEM: ICD-10-CM

## 2024-11-21 DIAGNOSIS — E66.01 PSYCHOLOGICAL FACTORS AFFECTING MORBID OBESITY (H): ICD-10-CM

## 2024-11-21 DIAGNOSIS — F54 PSYCHOLOGICAL FACTORS AFFECTING MORBID OBESITY (H): ICD-10-CM

## 2024-11-21 DIAGNOSIS — F33.0 MAJOR DEPRESSIVE DISORDER, RECURRENT EPISODE, MILD (H): Primary | ICD-10-CM

## 2024-11-21 SDOH — SOCIAL STABILITY - SOCIAL INSECURITY: OTHER SPECIFIED PROBLEMS RELATED TO PRIMARY SUPPORT GROUP: Z63.8

## 2024-11-21 SDOH — ECONOMIC STABILITY - INCOME SECURITY: UNSPECIFIED PROBLEMS RELATED TO EMPLOYMENT: Z56.9

## 2024-11-21 ASSESSMENT — ANXIETY QUESTIONNAIRES
GAD7 TOTAL SCORE: 5
GAD7 TOTAL SCORE: 5
5. BEING SO RESTLESS THAT IT IS HARD TO SIT STILL: NOT AT ALL
GAD7 TOTAL SCORE: 5
4. TROUBLE RELAXING: NOT AT ALL
IF YOU CHECKED OFF ANY PROBLEMS ON THIS QUESTIONNAIRE, HOW DIFFICULT HAVE THESE PROBLEMS MADE IT FOR YOU TO DO YOUR WORK, TAKE CARE OF THINGS AT HOME, OR GET ALONG WITH OTHER PEOPLE: NOT DIFFICULT AT ALL
7. FEELING AFRAID AS IF SOMETHING AWFUL MIGHT HAPPEN: SEVERAL DAYS
8. IF YOU CHECKED OFF ANY PROBLEMS, HOW DIFFICULT HAVE THESE MADE IT FOR YOU TO DO YOUR WORK, TAKE CARE OF THINGS AT HOME, OR GET ALONG WITH OTHER PEOPLE?: NOT DIFFICULT AT ALL
3. WORRYING TOO MUCH ABOUT DIFFERENT THINGS: NOT AT ALL
1. FEELING NERVOUS, ANXIOUS, OR ON EDGE: SEVERAL DAYS
2. NOT BEING ABLE TO STOP OR CONTROL WORRYING: SEVERAL DAYS
7. FEELING AFRAID AS IF SOMETHING AWFUL MIGHT HAPPEN: SEVERAL DAYS
6. BECOMING EASILY ANNOYED OR IRRITABLE: MORE THAN HALF THE DAYS

## 2024-11-21 NOTE — PROGRESS NOTES
Health Psychology                    Department of Medicine  Julianne Moreno, Ph.D., L.P. [phone]  [facility] Lis Chavez, Ph.D., L.P. [phone]   [place] Mail Code 127 Rc Vitale, Ph.D., L.P.  [phone]  [address] [place] [address] Donald Hills, Ph.D. [phone]  [place] [address]  Jayro Elias, Ph.D., A.B.P.P., L.P. [phone]              Melissa Stokes, Ph.D., L.P. [phone]  [facility]   Elizabeth Gaytan, Ph.D., A.B.P.P., L.P. 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 She was seen today for eclectic psychotherapy.      Mood:  Mood has improved a bit, but acknowledging it has been a difficult month.    Son: Rivera has been healthier. He started mid-August at the [employer] in [place].  He is doing well and she is pleased that he is there.  There are many kids with special needs (90% on IEPs). Adjusting to first month of new school. He is in first week in closer to mainstream class.  He is becoming more verbal.  He wakes her up when she returns from overnight shift, which has been problematic for her in terms of sleep deprivation.  She got  mattress to be able to sleep in her car after night shift if things get too disruptive.    Son is 94 pounds, biting and hitting his grandmother.  She and her mother get some mild bruises.  Her mother is on an anti-coagulant.Getting therapies outside of school in the afternoon entailing a lot of driving.     He is broadening his palate.  He was willing to  "eat publicly in restaurant.    Weight: Goal is 175; down 1 lb in the past month.  She was 192.6  two days ago. Discussed  her weight is gently decreasing, not sure as much as she would like.  She has cut back on sweets.  Discussed more bandwidth for self-care activities as son is doing better.  She meets with weight management; is using Wegovy without major side effects other not at a high dose or wanting a higher dose. She will switch to Ozempic n January. She has cut out coffee drinks.  She sees ETOH as the next frontier.  The motivation feels different on Wegovy. She feels she wants to be around and in good shape as a caregiver of her son and mother. She is having a drink/day 5 days week. He goal is cutting back 1-3 drinks/week.     She has been cooking more at home, learning recipes she likes (e.g, preparinging sushi).  Reinforced changes.  Focused on her sense of calories through beverages and decreasing them.     Exercise:  Not going to gym yet- has membership to [employer] [place] may switch to [facility].   \"Still on my list\" She sees it as essential, but she is tired.  Work days she gets 13,000 to 17,000 steps; Higher when split units.   Less if work isn't busy.     Non-work days 6-11 k.  Goal is at least 7 k steps/day.  Discussed looking for ways to expand exercise as her son's school year get more  of a rhythm to it. Discussed home apparatus.     Work: She is working weekend over night shifts so as to be more available for her son.  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   Her schedule is easing up due to the fluid shortage.     Social:  Discussed more bandwidth for social activities as son is doing better.    She participates fully. She derives benefit from airing her thoughts, feelings, questions. Rapport was excellent.      Self-reported weights  234 lbs   8/24/23 per self   220 lbs.  1/30/24                     3/29/24 did not weigh  218          " "4/30/24  212.6       5/23/24  209          6/28/24  202.8       8/19/24  197          9/25/24  196        10/23/24  192.6       11/21/24    She is  5 foot 11 inches.  Her long-term overall goal is 175 lbs.      Wt Readings from Last 4 Encounters:   08/14/24 92 kg (202 lb 12.8 oz)   07/11/24 94.3 kg (208 lb)   06/03/24 95.2 kg (209 lb 12.8 oz)   05/21/24 97.5 kg (215 lb)     There is no height or weight on file to calculate BMI.   Estimated body mass index is 29.5 kg/m  as calculated from the following:    Height as of 8/14/24: 1.766 m (5' 9.53\").    Weight as of 8/14/24: 92 kg (202 lb 12.8 oz).    Current Outpatient Medications   Medication Sig Dispense Refill    acetaminophen (TYLENOL) 32 mg/mL liquid Take 1,000 mg by mouth every 8 hours as needed for fever or mild pain      Calcium Citrate-Vitamin D (CALCIUM CITRATE CHEWY BITE) 500-12.5 MG-MCG CHEW Take 1 tablet by mouth daily      childrens multivitamin w/iron (FLINTSTONES COMPLETE) chewable tablet Take 2 chew tab by mouth daily      cyanocobalamin (CYANOCOBALAMIN) 1000 mcg/mL injection INJECT 1ML EVERY 30 DAYS AS DIRECTED 3 mL 1    Cyanocobalamin 1000 MCG/ML KIT Inject 1 mL as directed every 30 days 3 kit 4    cyclobenzaprine (FLEXERIL) 10 MG tablet Take 1 tablet (10 mg) by mouth daily as needed for muscle spasms 45 tablet 4    diclofenac (VOLTAREN) 1 % topical gel 1 gram to affected areas on feet 2-3 times daily as needed for pain. 100 g 3    hydrochlorothiazide (HYDRODIURIL) 25 MG tablet Take 1 tablet (25 mg) by mouth daily 90 tablet 3    ibuprofen (ADVIL/MOTRIN) 200 MG capsule Take 400 mg by mouth as needed for fever      LORazepam (ATIVAN) 0.5 MG tablet Take 1 tablet (0.5 mg) by mouth every 6 hours as needed for anxiety 20 tablet 0    losartan (COZAAR) 25 MG tablet TAKE 1/2 TABLET BY MOUTH DAILY 45 tablet 0    Semaglutide-Weight Management (WEGOVY) 1.7 MG/0.75ML pen Inject 1.7 mg subcutaneously once a week. 3 mL 4     No current facility-administered " medications for this visit.     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14    hospitalized at [facility] x 1 week    History of steroid therapy     HX ABNL PAP SMEAR OF CERVIX   1995    LEEP AT 15 yo    Hypertension 2004    Hypertrophy of breast     Hypertrophy of tonsils alone     Hypovitaminosis D     with secondary high PTH    Irregular heart beat     palpitations    LBP (low back pain)     LSIL (low grade squamous intraepithelial lesion) on Pap smear 05/2006    Lumbago     RESOLVED    Major depressive disorder, recurrent episode, in partial or unspecified remission 04/01/2013    Morbid obesity (H)     bariatric surgery 5/13/2013    Multiple thyroid nodules     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)     OLIGOMENORRHEA, C/W PCOS     Sciatica     SLEEP APNEA 06/2002    No longer has since tonsillectomy and septoplasty    Thyroid nodule      Past Surgical History:   Procedure Laterality Date    BIOPSY  03/13/2015    Thyroid nodule    ESOPHAGOSCOPY, GASTROSCOPY, DUODENOSCOPY (EGD), COMBINED  5/28/2013    Procedure: COMBINED ESOPHAGOSCOPY, GASTROSCOPY, DUODENOSCOPY (EGD);;  Surgeon: Best Willett MD;  Location:  GI    ESOPHAGOSCOPY, GASTROSCOPY, DUODENOSCOPY (EGD), COMBINED N/A 6/13/2018    Procedure: COMBINED ESOPHAGOSCOPY, GASTROSCOPY, DUODENOSCOPY (EGD), BIOPSY SINGLE OR MULTIPLE;  gastroscopy;  Surgeon: Westley Gibbs MD;  Location: [facility]    LAPAROSCOPIC GASTRIC SLEEVE  5/13/2013    Procedure: LAPAROSCOPIC GASTRIC SLEEVE;  Laparoscopic Sleeve Gastrectomy ;  Surgeon: Best Willett MD;  [facility]    LEEP TX, CERVICAL  1995    age 15    partial thyroidectomy  2018    SEPTOPLASTY       THYROIDECTOMY Left 4/3/2018    Procedure: THYROIDECTOMY;  Left Thyroid Lobectomy And Ishtmusectomy;  Surgeon: Delia Harper MD;  [facility]    TONSILLECTOMY  age 20s    TONSILLECTOMY  2004?    wisdom teeth extraction           2/27/2024     7:05 AM 6/27/2024     8:58 PM 10/23/2024     6:08 AM   PHQ-9 SCORE   PHQ-9 Total Score MyChart 5 (Mild depression) 5 (Mild depression) 2 (Minimal depression)   PHQ-9 Total Score 5 5 2        Patient-reported         9/25/2024     7:29 AM 10/23/2024     6:08 AM 11/21/2024     4:22 AM   MAXIMO-7 SCORE   Total Score 2 (minimal anxiety) 2 (minimal anxiety) 5 (mild anxiety)   Total Score 2 2  5        Patient-reported         12/4/2018     8:22 AM 1/31/2020     7:20 AM   WHODAS 2.0 Total Score   Total Score 18 15   Total Score MyChart 18 15     This telehealth service is appropriate and effective for delivering services in light of the necessity for social distancing to mitigate the COVID-19 epidemic and for conservation of PPE.  Patient has agreed to receiving telehealth services after being informed about it: Yes    Patient prefers video invitation/information to be sent by:   [employer]    Time service started:11:03  Time service ended: 11:57  Extended session due to complexity of case and length of interval.    Mode of transmission: [employer]  Location of originating:  Home  of the patient  Distance site:  Home  of the provider    The patient has been notified that:  Video visits will be conducted via a call with their psychologist to provide the care they need with a video conversation. Video visits may be billed at different rates depending on insurance coverage.  Patients are advised to please contact their insurance provider with any questions about their health insurance coverage. If during the course of a call the psychologist feels a video visit is not appropriate, patients will not be charged for this service.  Diagnosis:  Major depression, recurrent, mild (F33.0).    Psychological factors affecting obesity (F54).   Parental concerns about child (Z63.58)  PLAN/: She will return for virtual session 12/19 @ 9 for eclectic therapy, which is medically necessary.   She wishes to continue to get support here with her life changes and her son's behavioral/developmental challenges.  2.  Expand schedule of exercise to the level that is possible.    Last treatment plan signed: 9/27/23  Treatment plan due: 9/27/24 (next session)

## 2024-12-19 ENCOUNTER — VIRTUAL VISIT (OUTPATIENT)
Dept: PSYCHOLOGY | Facility: CLINIC | Age: 44
End: 2024-12-19
Payer: COMMERCIAL

## 2024-12-19 DIAGNOSIS — F33.0 MAJOR DEPRESSIVE DISORDER, RECURRENT EPISODE, MILD (H): Primary | ICD-10-CM

## 2024-12-19 DIAGNOSIS — Z63.8 PARENTAL CONCERN ABOUT CHILD: ICD-10-CM

## 2024-12-19 DIAGNOSIS — F54 PSYCHOLOGICAL FACTORS AFFECTING MORBID OBESITY (H): ICD-10-CM

## 2024-12-19 DIAGNOSIS — E66.01 PSYCHOLOGICAL FACTORS AFFECTING MORBID OBESITY (H): ICD-10-CM

## 2024-12-19 SDOH — SOCIAL STABILITY - SOCIAL INSECURITY: OTHER SPECIFIED PROBLEMS RELATED TO PRIMARY SUPPORT GROUP: Z63.8

## 2024-12-19 ASSESSMENT — ANXIETY QUESTIONNAIRES
GAD7 TOTAL SCORE: 4
GAD7 TOTAL SCORE: 4
IF YOU CHECKED OFF ANY PROBLEMS ON THIS QUESTIONNAIRE, HOW DIFFICULT HAVE THESE PROBLEMS MADE IT FOR YOU TO DO YOUR WORK, TAKE CARE OF THINGS AT HOME, OR GET ALONG WITH OTHER PEOPLE: NOT DIFFICULT AT ALL
2. NOT BEING ABLE TO STOP OR CONTROL WORRYING: SEVERAL DAYS
5. BEING SO RESTLESS THAT IT IS HARD TO SIT STILL: NOT AT ALL
7. FEELING AFRAID AS IF SOMETHING AWFUL MIGHT HAPPEN: SEVERAL DAYS
4. TROUBLE RELAXING: NOT AT ALL
8. IF YOU CHECKED OFF ANY PROBLEMS, HOW DIFFICULT HAVE THESE MADE IT FOR YOU TO DO YOUR WORK, TAKE CARE OF THINGS AT HOME, OR GET ALONG WITH OTHER PEOPLE?: NOT DIFFICULT AT ALL
GAD7 TOTAL SCORE: 4
1. FEELING NERVOUS, ANXIOUS, OR ON EDGE: SEVERAL DAYS
3. WORRYING TOO MUCH ABOUT DIFFERENT THINGS: NOT AT ALL
6. BECOMING EASILY ANNOYED OR IRRITABLE: SEVERAL DAYS
7. FEELING AFRAID AS IF SOMETHING AWFUL MIGHT HAPPEN: SEVERAL DAYS

## 2024-12-19 NOTE — PROGRESS NOTES
Health Psychology                    Department of Medicine  Julianne Moreno, Ph.D., L.P. [phone]  [facility] Lis Chavez, Ph.D., L.P. [phone]   [place] Mail Code 748 Rc Vitale, Ph.D., L.P.  [phone]  [address] [place] [address] Donald Hills, Ph.D., L.P. [phone]  [place] [address]  Jayro Elias, Ph.D., L.P. [phone]              Melissa Stokes, Ph.D., L.P. [phone]  [facility]   Elizabeth Gaytan, Ph.D., A.B.P.P., L.P. [phone]    [address] [place] [address]               Health Psychology Progress Note     Intake:  4/1/13    REFERRAL SOURCE: Best Willett MD.     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   Referred initially for psychological consultation for workup for a gastric sleeve procedure who is seen for CBT-oriented therapy regarding weight management, work stresses, and family and social matters, especially re: her autistic son. She was seen today for eclectic psychotherapy.      Mood:  Mood has improved a bit, and she is feeling more settled in her son's activities.     Son: Rivera has been healthier. He started mid-August at the [employer] in [place].  He is doing well and she is pleased that he is there.  There are many kids with special needs (90% on IEPs). Adjusting to first month of new school. He is in first week in closer to mainstream class.  He is becoming more verbal.  He wakes her up when she returns from overnight shift, which has been problematic for her in terms of sleep deprivation.  She got  mattress to be able to sleep in her car after night shift if things get too disruptive.    Son is 94 pounds, biting and hitting his grandmother.  She and her mother get some mild bruises.  Her mother is on an anti-coagulant.Getting therapies outside of school in the afternoon entailing  a lot of driving.     This school year he now gets OP Speech and OT at [facility], Feeding therapy elsewhere, music therpay at [facility], and swimming lessons at [employer] [facility] [employer]  She does a lot of driving.  He will have re-assessment with Dr. Thurman .     He is broadening his palate.  He was willing to eat publicly in restaurant.    Weight: Goal is 175; down 1 lb in the past month. She was 194.4 up from  192.6. Discussed  her weight is gently decreasing, not sure as much as she would like.  She has cut back on sweets.  Discussed more bandwidth for self-care activities as son is doing better.  She meets with weight management; is using Wegovy without major side effects other not at a high dose or wanting a higher dose. She will switch to Ozempic in January. She has cut out coffee drinks.  She sees ETOH as the next frontier.  The motivation feels different on Wegovy. She feels she wants to be around and in good shape as a caregiver of her son and mother. She is having a drink/day 5 days week. He goal is cutting back 1-3 drinks/week.         Exercise:  Not going to gym yet- has membership to [employer] [place].; may switch to [facility].   Hopes to investigate shortly.  Work days she gets 13,000 to 17,000 steps; Higher when split units.   Less if work isn't busy.     Non-work days 6-11 k.  Goal is at least 7 k steps/day.  Discussed looking for ways to expand exercise as her son's school year get more  of a rhythm to it. Discussed home apparatus.     Work: She is working weekend over night shifts so as to be more available for her son.  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   Her schedule is okay.  She had thought of becoming an NP, but doesn't think it is practical currently.    Social:  Discussed more bandwidth for social activities as son is doing better.    She participates fully. She derives benefit from airing her thoughts, feelings, questions. Rapport was excellent.     "  Self-reported weights  234 lbs     8/24/23 per self   220 lbs.   1/30/24                      3/29/24 did not weigh  218           4/30/24  212.6        5/23/24  209           6/28/24  202.8        8/19/24  197           9/25/24  196         10/23/24  192.6      11/21/24  194.4      12/19/24    She is  5 foot 11 inches.  Her long-term overall goal is 175 lbs.      Wt Readings from Last 4 Encounters:   08/14/24 92 kg (202 lb 12.8 oz)   07/11/24 94.3 kg (208 lb)   06/03/24 95.2 kg (209 lb 12.8 oz)   05/21/24 97.5 kg (215 lb)     There is no height or weight on file to calculate BMI.   Estimated body mass index is 29.5 kg/m  as calculated from the following:    Height as of 8/14/24: 1.766 m (5' 9.53\").    Weight as of 8/14/24: 92 kg (202 lb 12.8 oz).    Current Outpatient Medications   Medication Sig Dispense Refill    acetaminophen (TYLENOL) 32 mg/mL liquid Take 1,000 mg by mouth every 8 hours as needed for fever or mild pain      Calcium Citrate-Vitamin D (CALCIUM CITRATE CHEWY BITE) 500-12.5 MG-MCG CHEW Take 1 tablet by mouth daily      childrens multivitamin w/iron (FLINTSTONES COMPLETE) chewable tablet Take 2 chew tab by mouth daily      cyanocobalamin (CYANOCOBALAMIN) 1000 mcg/mL injection INJECT 1ML EVERY 30 DAYS AS DIRECTED 3 mL 1    Cyanocobalamin 1000 MCG/ML KIT Inject 1 mL as directed every 30 days 3 kit 4    cyclobenzaprine (FLEXERIL) 10 MG tablet Take 1 tablet (10 mg) by mouth daily as needed for muscle spasms 45 tablet 4    diclofenac (VOLTAREN) 1 % topical gel 1 gram to affected areas on feet 2-3 times daily as needed for pain. 100 g 3    hydrochlorothiazide (HYDRODIURIL) 25 MG tablet Take 1 tablet (25 mg) by mouth daily 90 tablet 3    ibuprofen (ADVIL/MOTRIN) 200 MG capsule Take 400 mg by mouth as needed for fever      LORazepam (ATIVAN) 0.5 MG tablet Take 1 tablet (0.5 mg) by mouth every 6 hours as needed for anxiety 20 tablet 0    losartan (COZAAR) 25 MG tablet TAKE 1/2 TABLET BY MOUTH DAILY 45 " tablet 0    Semaglutide-Weight Management (WEGOVY) 1.7 MG/0.75ML pen Inject 1.7 mg subcutaneously once a week. 3 mL 4     No current facility-administered medications for this visit.     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14    hospitalized at [facility] x 1 week    History of steroid therapy     HX ABNL PAP SMEAR OF CERVIX   1995    LEEP AT 15 yo    Hypertension 2004    Hypertrophy of breast     Hypertrophy of tonsils alone     Hypovitaminosis D     with secondary high PTH    Irregular heart beat     palpitations    LBP (low back pain)     LSIL (low grade squamous intraepithelial lesion) on Pap smear 05/2006    Lumbago     RESOLVED    Major depressive disorder, recurrent episode, in partial or unspecified remission 04/01/2013    Morbid obesity (H)     bariatric surgery 5/13/2013    Multiple thyroid nodules     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)     OLIGOMENORRHEA, C/W PCOS     Sciatica     SLEEP APNEA 06/2002    No longer has since tonsillectomy and septoplasty    Thyroid nodule      Past Surgical History:   Procedure Laterality Date    BIOPSY  03/13/2015    Thyroid nodule    ESOPHAGOSCOPY, GASTROSCOPY, DUODENOSCOPY (EGD), COMBINED  5/28/2013    Procedure: COMBINED ESOPHAGOSCOPY, GASTROSCOPY, DUODENOSCOPY (EGD);;  Surgeon: Best Willett MD;  Location:  GI    ESOPHAGOSCOPY, GASTROSCOPY, DUODENOSCOPY (EGD), COMBINED N/A 6/13/2018    Procedure: COMBINED ESOPHAGOSCOPY, GASTROSCOPY, DUODENOSCOPY (EGD), BIOPSY SINGLE OR MULTIPLE;  gastroscopy;  Surgeon: Westley Gibbs MD;  Location: [facility]    LAPAROSCOPIC GASTRIC SLEEVE  5/13/2013    Procedure: LAPAROSCOPIC GASTRIC SLEEVE;  Laparoscopic Sleeve  Gastrectomy ;  Surgeon: Best Willett MD;  [facility]    LEEP TX, CERVICAL  1995    age 15    partial thyroidectomy  2018    SEPTOPLASTY      THYROIDECTOMY Left 4/3/2018    Procedure: THYROIDECTOMY;  Left Thyroid Lobectomy And Ishtmusectomy;  Surgeon: Delia Harper MD;  [facility]    TONSILLECTOMY  age 20s    TONSILLECTOMY  2004?    wisdom teeth extraction           2/27/2024     7:05 AM 6/27/2024     8:58 PM 10/23/2024     6:08 AM   PHQ-9 SCORE   PHQ-9 Total Score MyChart 5 (Mild depression) 5 (Mild depression) 2 (Minimal depression)   PHQ-9 Total Score 5 5 2        Patient-reported         10/23/2024     6:08 AM 11/21/2024     4:22 AM 12/19/2024     7:11 AM   MAXIMO-7 SCORE   Total Score 2 (minimal anxiety) 5 (mild anxiety) 4 (minimal anxiety)   Total Score 2  5  4        Patient-reported         12/4/2018     8:22 AM 1/31/2020     7:20 AM   WHODAS 2.0 Total Score   Total Score 18 15   Total Score MyChart 18 15     This telehealth service is appropriate and effective for delivering services in light of the necessity for social distancing to mitigate the COVID-19 epidemic and for conservation of PPE.  Patient has agreed to receiving telehealth services after being informed about it: Yes    Patient prefers video invitation/information to be sent by:   emai    Time service started:8:59  Time service ended: 9:58  Extended session due to complexity of case and length of interval.    Mode of transmission: [employer]  Location of originating:  Home  of the patient  Distance site:  Home  of the provider    The patient has been notified that:  Video visits will be conducted via a call with their psychologist to provide the care they need with a video conversation. Video visits may be billed at different rates depending on insurance coverage.  Patients are advised to please contact their insurance provider with any questions about their health insurance coverage. If during the course of a call the  psychologist feels a video visit is not appropriate, patients will not be charged for this service.  Diagnosis:  Major depression, recurrent, mild (F33.0).   Psychological factors affecting obesity (F54).   Parental concerns about child (Z63.58)  PLAN/: She will return for virtual session 1/16 @ 10 for eclectic therapy, which is medically necessary. She wishes to continue to get support here with her life changes and her son's behavioral/developmental challenges.  Expand schedule of exercise to the level that is possible.    Last treatment plan signed: 11/21/24  Treatment plan due: 11/21/25    Jayro Elias, Ph.D., L.P.  Director, Health Psychology  [phone]

## 2024-12-23 ENCOUNTER — HOSPITAL ENCOUNTER (OUTPATIENT)
Dept: MAMMOGRAPHY | Facility: CLINIC | Age: 44
Discharge: HOME | End: 2024-12-23
Attending: FAMILY MEDICINE | Admitting: FAMILY MEDICINE
Payer: COMMERCIAL

## 2024-12-23 DIAGNOSIS — Z12.31 VISIT FOR SCREENING MAMMOGRAM: ICD-10-CM

## 2024-12-23 PROCEDURE — 77063 BREAST TOMOSYNTHESIS BI: CPT

## 2024-12-23 PROCEDURE — 77067 SCR MAMMO BI INCL CAD: CPT

## 2024-12-24 ENCOUNTER — HOSPITAL ENCOUNTER (OUTPATIENT)
Dept: MAMMOGRAPHY | Facility: CLINIC | Age: 44
Discharge: HOME | End: 2024-12-24
Attending: FAMILY MEDICINE
Payer: COMMERCIAL

## 2024-12-24 DIAGNOSIS — R92.8 ABNORMAL MAMMOGRAM: ICD-10-CM

## 2024-12-24 PROCEDURE — 77065 DX MAMMO INCL CAD UNI: CPT | Mod: RT

## 2024-12-24 PROCEDURE — 76642 ULTRASOUND BREAST LIMITED: CPT | Mod: RT

## 2024-12-27 ENCOUNTER — MYC REFILL (OUTPATIENT)
Dept: FAMILY MEDICINE | Facility: CLINIC | Age: 44
End: 2024-12-27
Payer: COMMERCIAL

## 2024-12-27 DIAGNOSIS — F43.22 ADJUSTMENT DISORDER WITH ANXIOUS MOOD: ICD-10-CM

## 2024-12-30 RX ORDER — LORAZEPAM 0.5 MG/1
0.5 TABLET ORAL EVERY 6 HOURS PRN
Qty: 20 TABLET | Refills: 0 | Status: SHIPPED | OUTPATIENT
Start: 2024-12-30

## 2025-01-09 ENCOUNTER — MYC MEDICAL ADVICE (OUTPATIENT)
Dept: FAMILY MEDICINE | Facility: CLINIC | Age: 45
End: 2025-01-09
Payer: COMMERCIAL

## 2025-01-10 NOTE — TELEPHONE ENCOUNTER
Rooming staff,   Please see MyChart message below.  Needs letter of medical necessity.   Thanks!  Emily MONTOYA

## 2025-01-15 ASSESSMENT — ANXIETY QUESTIONNAIRES
3. WORRYING TOO MUCH ABOUT DIFFERENT THINGS: SEVERAL DAYS
1. FEELING NERVOUS, ANXIOUS, OR ON EDGE: SEVERAL DAYS
6. BECOMING EASILY ANNOYED OR IRRITABLE: SEVERAL DAYS
4. TROUBLE RELAXING: NOT AT ALL
GAD7 TOTAL SCORE: 4
GAD7 TOTAL SCORE: 4
IF YOU CHECKED OFF ANY PROBLEMS ON THIS QUESTIONNAIRE, HOW DIFFICULT HAVE THESE PROBLEMS MADE IT FOR YOU TO DO YOUR WORK, TAKE CARE OF THINGS AT HOME, OR GET ALONG WITH OTHER PEOPLE: NOT DIFFICULT AT ALL
8. IF YOU CHECKED OFF ANY PROBLEMS, HOW DIFFICULT HAVE THESE MADE IT FOR YOU TO DO YOUR WORK, TAKE CARE OF THINGS AT HOME, OR GET ALONG WITH OTHER PEOPLE?: NOT DIFFICULT AT ALL
GAD7 TOTAL SCORE: 4
7. FEELING AFRAID AS IF SOMETHING AWFUL MIGHT HAPPEN: SEVERAL DAYS
7. FEELING AFRAID AS IF SOMETHING AWFUL MIGHT HAPPEN: SEVERAL DAYS
5. BEING SO RESTLESS THAT IT IS HARD TO SIT STILL: NOT AT ALL
2. NOT BEING ABLE TO STOP OR CONTROL WORRYING: NOT AT ALL

## 2025-01-16 ENCOUNTER — VIRTUAL VISIT (OUTPATIENT)
Dept: PSYCHOLOGY | Facility: CLINIC | Age: 45
End: 2025-01-16
Payer: COMMERCIAL

## 2025-01-16 DIAGNOSIS — F33.0 MAJOR DEPRESSIVE DISORDER, RECURRENT EPISODE, MILD: Primary | ICD-10-CM

## 2025-01-16 DIAGNOSIS — Z56.9 OCCUPATIONAL PROBLEM: ICD-10-CM

## 2025-01-16 DIAGNOSIS — E66.01 PSYCHOLOGICAL FACTORS AFFECTING MORBID OBESITY (H): ICD-10-CM

## 2025-01-16 DIAGNOSIS — Z63.8 PARENTAL CONCERN ABOUT CHILD: ICD-10-CM

## 2025-01-16 DIAGNOSIS — F54 PSYCHOLOGICAL FACTORS AFFECTING MORBID OBESITY (H): ICD-10-CM

## 2025-01-16 SDOH — SOCIAL STABILITY - SOCIAL INSECURITY: OTHER SPECIFIED PROBLEMS RELATED TO PRIMARY SUPPORT GROUP: Z63.8

## 2025-01-16 SDOH — ECONOMIC STABILITY - INCOME SECURITY: UNSPECIFIED PROBLEMS RELATED TO EMPLOYMENT: Z56.9

## 2025-01-16 NOTE — PROGRESS NOTES
Health Psychology                    Department of Medicine  Julianne Moreno, Ph.D., L.P. [phone]  [facility] Lis Chavez, Ph.D., L.P. [phone]   [place] Mail Code 029 Rc Vitale, Ph.D., L.P.  [phone]  [address] [place] [address] Donald Hills, Ph.D., L.P. [phone]  [place] [address]  Jayro Elias, Ph.D., L.P. [phone]              Melissa Stokes, Ph.D., L.P. [phone]  [facility]   Elizabeth Gaytan, Ph.D., A.B.P.P., L.P. [phone] [address] [place] [address]               Health Psychology Progress Note     Intake:  4/1/13    REFERRAL SOURCE: Best Willett MD.     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   Referred initially for psychological consultation for workup for a gastric sleeve procedure who is seen for CBT-oriented therapy regarding weight management, work stresses, and family and social matters, especially re: her autistic son. She was seen today for eclectic psychotherapy.      Mood:  Mood has improved a bit, and she is feeling more settled in her son's activities.      Son: Rivera has been healthier. He started mid-August at the [employer] in [place].  He is doing well and she is pleased that he is there.  There are many kids with special needs (90% on IEPs). Adjusting to first month of new school. He is in first week in closer to mainstream class.  He is becoming more verbal.  He wakes her up when she returns from overnight shift, which has been problematic for her in terms of sleep deprivation.  She got  mattress to be able to sleep in her car after night shift if things get too disruptive.  Son is 94 pounds, biting and hitting his grandmother.  Her mother is on an anti-coagulant. Son is getting therapies outside of school in the afternoon entailing a lot of driving.     This school  year he now gets OP Speech and OT at [facility], Feeding therapy elsewhere, music therapy at [facility], and swimming lessons at [facility] to get his head we - a challenge because he doesn't want. She does a lot of driving.  He had re-assessment with Dr. Thurman.   Now he talks.  Cognitively ahead improved, socially and developmentally behind (2-3 year old range).  His speech is improving    He is broadening his palate.  He was willing to eat publicly in restaurant.    Weight: She is switching from Wegovy to Ozempic  per her insurance. Goal is 175; down 3 lb in the past month. She was 194, now 191.  Discussed  her weight is gently decreasing, not sure as much as she would like.  She has cut back on sweets.  Discussed more bandwidth for self-care activities as son is doing better.  She meets with weight management; is using Wegovy without major side effects other not at a high dose or wanting a higher dose. She will switch to Ozempic in January. She has cut out coffee drinks.  She sees ETOH as the next frontier.  The motivation feels different on Wegovy. She feels she wants to be around and in good shape as a caregiver of her son and mother. She is having a drink/day 5 days week. He goal is cutting back 1-3 drinks/week.     Exercise:  Not going to gym due to high state of illness (e.g., RSV, Flu, COVID) - has membership to [employer] at [place]. She may switch to [facility]. Hopes to investigate shortly.  Work days she gets 13,000 to 17,000 steps; Higher when split units.  Less if work isn't busy.  Non-work days 6-11 k.  Goal is at least 7 k steps/day.  Discussed looking for ways to expand exercise as her son's school year get more  of a rhythm to it. Discussed home apparatus. Now planning to get a rower; hoping to get reimbursed.     Work: She is working weekend over night shifts so as to be more available for her son.  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   Her schedule  "is okay. Things have seemed to recover from the deaths of two coworkers.     Social:  Discussed more bandwidth for social activities as son is doing better. He is open to doing more.    She participates fully. She derives benefit from airing her thoughts, feelings, questions. Rapport was excellent.      Self-reported weights  234 lbs     8/24/23 per self   220 lbs.   1/30/24                      3/29/24 did not weigh  218           4/30/24  212.6        5/23/24  209           6/28/24  202.8        8/19/24  197           9/25/24  196         10/23/24  192.6      11/21/24  194.4      12/19/24  191            1/16/24 (yesterday)    She is  5 foot 11 inches.  Her long-term overall goal is 175 lbs.      Wt Readings from Last 4 Encounters:   08/14/24 92 kg (202 lb 12.8 oz)   07/11/24 94.3 kg (208 lb)   06/03/24 95.2 kg (209 lb 12.8 oz)   05/21/24 97.5 kg (215 lb)     There is no height or weight on file to calculate BMI.   Estimated body mass index is 29.5 kg/m  as calculated from the following:    Height as of 8/14/24: 1.766 m (5' 9.53\").    Weight as of 8/14/24: 92 kg (202 lb 12.8 oz).    Current Outpatient Medications   Medication Sig Dispense Refill    acetaminophen (TYLENOL) 32 mg/mL liquid Take 1,000 mg by mouth every 8 hours as needed for fever or mild pain      Calcium Citrate-Vitamin D (CALCIUM CITRATE CHEWY BITE) 500-12.5 MG-MCG CHEW Take 1 tablet by mouth daily      childrens multivitamin w/iron (FLINTSTONES COMPLETE) chewable tablet Take 2 chew tab by mouth daily      cyanocobalamin (CYANOCOBALAMIN) 1000 mcg/mL injection INJECT 1ML EVERY 30 DAYS AS DIRECTED 3 mL 1    Cyanocobalamin 1000 MCG/ML KIT Inject 1 mL as directed every 30 days 3 kit 4    cyclobenzaprine (FLEXERIL) 10 MG tablet Take 1 tablet (10 mg) by mouth daily as needed for muscle spasms 45 tablet 4    diclofenac (VOLTAREN) 1 % topical gel 1 gram to affected areas on feet 2-3 times daily as needed for pain. 100 g 3    hydrochlorothiazide " (HYDRODIURIL) 25 MG tablet Take 1 tablet (25 mg) by mouth daily 90 tablet 3    ibuprofen (ADVIL/MOTRIN) 200 MG capsule Take 400 mg by mouth as needed for fever      LORazepam (ATIVAN) 0.5 MG tablet Take 1 tablet (0.5 mg) by mouth every 6 hours as needed for anxiety. 20 tablet 0    losartan (COZAAR) 25 MG tablet TAKE 1/2 TABLET BY MOUTH DAILY 45 tablet 0    Semaglutide-Weight Management (WEGOVY) 1.7 MG/0.75ML pen Inject 1.7 mg subcutaneously once a week. 3 mL 4     No current facility-administered medications for this visit.     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    Bariatric surgery status 05/13/2013    Depression     Depressive disorder     Esophageal reflux     Family history of hyperparathyroidism 03/06/2015    Forearm fracture childhood    jumping fall    Guillain-Wall disease age 14    hospitalized at [facility] x 1 week    History of steroid therapy     HX ABNL PAP SMEAR OF CERVIX   1995    LEEP AT 15 yo    Hypertension 2004    Hypertrophy of breast     Hypertrophy of tonsils alone     Hypovitaminosis D     with secondary high PTH    Irregular heart beat     palpitations    LBP (low back pain)     LSIL (low grade squamous intraepithelial lesion) on Pap smear 05/2006    Lumbago     RESOLVED    Major depressive disorder, recurrent episode, in partial or unspecified remission 04/01/2013    Morbid obesity (H)     bariatric surgery 5/13/2013    Multiple thyroid nodules     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)     OLIGOMENORRHEA, C/W PCOS     Sciatica     SLEEP APNEA 06/2002    No longer has since tonsillectomy and septoplasty    Thyroid nodule      Past Surgical History:   Procedure Laterality Date    BIOPSY  03/13/2015    Thyroid nodule    ESOPHAGOSCOPY, GASTROSCOPY, DUODENOSCOPY (EGD), COMBINED  5/28/2013    Procedure: COMBINED ESOPHAGOSCOPY, GASTROSCOPY, DUODENOSCOPY (EGD);;  Surgeon: Best Willett MD;  Location: [facility]     ESOPHAGOSCOPY, GASTROSCOPY, DUODENOSCOPY (EGD), COMBINED N/A 6/13/2018    Procedure: COMBINED ESOPHAGOSCOPY, GASTROSCOPY, DUODENOSCOPY (EGD), BIOPSY SINGLE OR MULTIPLE;  gastroscopy;  Surgeon: Westley Gibbs MD;  Location:  GI    LAPAROSCOPIC GASTRIC SLEEVE  5/13/2013    Procedure: LAPAROSCOPIC GASTRIC SLEEVE;  Laparoscopic Sleeve Gastrectomy ;  Surgeon: Best Willett MD;  [facility]    LEEP TX, CERVICAL  1995    age 15    partial thyroidectomy  2018    SEPTOPLASTY      THYROIDECTOMY Left 4/3/2018    Procedure: THYROIDECTOMY;  Left Thyroid Lobectomy And Ishtmusectomy;  Surgeon: Delia Harper MD;  [facility]    TONSILLECTOMY  age 20s    TONSILLECTOMY  2004?    wisdom teeth extraction           2/27/2024     7:05 AM 6/27/2024     8:58 PM 10/23/2024     6:08 AM   PHQ-9 SCORE   PHQ-9 Total Score MyChart 5 (Mild depression) 5 (Mild depression) 2 (Minimal depression)   PHQ-9 Total Score 5 5 2        Patient-reported         11/21/2024     4:22 AM 12/19/2024     7:11 AM 1/15/2025     7:01 PM   MAXIMO-7 SCORE   Total Score 5 (mild anxiety) 4 (minimal anxiety) 4 (minimal anxiety)   Total Score 5  4  4        Patient-reported         12/4/2018     8:22 AM 1/31/2020     7:20 AM   WHODAS 2.0 Total Score   Total Score 18 15   Total Score MyChart 18 15     This telehealth service is appropriate and effective for delivering services in light of the necessity for social distancing to mitigate the COVID-19 epidemic and for conservation of PPE.  Patient has agreed to receiving telehealth services after being informed about it: Yes    Patient prefers video invitation/information to be sent by:   emai    Time service started:10:02  Time service ended: 10:57  Extended session due to complexity of case and length of interval.    Mode of transmission: [employer]  Location of originating:  Home  of the patient  Distance site:  Home  of the provider    The patient has been notified that:  Video visits will be  conducted via a call with their psychologist to provide the care they need with a video conversation. Video visits may be billed at different rates depending on insurance coverage.  Patients are advised to please contact their insurance provider with any questions about their health insurance coverage. If during the course of a call the psychologist feels a video visit is not appropriate, patients will not be charged for this service.  Diagnosis:  Major depression, recurrent, mild (F33.0).   Psychological factors affecting obesity (F54).   Parental concerns about child (Z63.58)  PLAN/: She will return for virtual session 2/27 @ 10 for eclectic therapy, which is medically necessary. She wishes to continue to get support here with her life changes and her son's behavioral/developmental challenges.  Expand schedule of exercise to the level that is possible.    Last treatment plan signed: 11/21/24  Treatment plan due: 11/21/25  Jayro Elias, Ph.D., L.P.  Director, Health Psychology  [phone]

## 2025-02-27 ENCOUNTER — VIRTUAL VISIT (OUTPATIENT)
Dept: PSYCHOLOGY | Facility: CLINIC | Age: 45
End: 2025-02-27
Payer: COMMERCIAL

## 2025-02-27 DIAGNOSIS — Z56.9 OCCUPATIONAL PROBLEM: ICD-10-CM

## 2025-02-27 DIAGNOSIS — F33.0 MAJOR DEPRESSIVE DISORDER, RECURRENT EPISODE, MILD: Primary | ICD-10-CM

## 2025-02-27 DIAGNOSIS — E66.01 PSYCHOLOGICAL FACTORS AFFECTING MORBID OBESITY (H): ICD-10-CM

## 2025-02-27 DIAGNOSIS — F54 PSYCHOLOGICAL FACTORS AFFECTING MORBID OBESITY (H): ICD-10-CM

## 2025-02-27 DIAGNOSIS — Z63.8 PARENTAL CONCERN ABOUT CHILD: ICD-10-CM

## 2025-02-27 SDOH — ECONOMIC STABILITY - INCOME SECURITY: UNSPECIFIED PROBLEMS RELATED TO EMPLOYMENT: Z56.9

## 2025-02-27 SDOH — SOCIAL STABILITY - SOCIAL INSECURITY: OTHER SPECIFIED PROBLEMS RELATED TO PRIMARY SUPPORT GROUP: Z63.8

## 2025-02-27 ASSESSMENT — ANXIETY QUESTIONNAIRES
7. FEELING AFRAID AS IF SOMETHING AWFUL MIGHT HAPPEN: SEVERAL DAYS
5. BEING SO RESTLESS THAT IT IS HARD TO SIT STILL: NOT AT ALL
3. WORRYING TOO MUCH ABOUT DIFFERENT THINGS: SEVERAL DAYS
2. NOT BEING ABLE TO STOP OR CONTROL WORRYING: SEVERAL DAYS
1. FEELING NERVOUS, ANXIOUS, OR ON EDGE: SEVERAL DAYS
IF YOU CHECKED OFF ANY PROBLEMS ON THIS QUESTIONNAIRE, HOW DIFFICULT HAVE THESE PROBLEMS MADE IT FOR YOU TO DO YOUR WORK, TAKE CARE OF THINGS AT HOME, OR GET ALONG WITH OTHER PEOPLE: NOT DIFFICULT AT ALL
7. FEELING AFRAID AS IF SOMETHING AWFUL MIGHT HAPPEN: SEVERAL DAYS
4. TROUBLE RELAXING: NOT AT ALL
8. IF YOU CHECKED OFF ANY PROBLEMS, HOW DIFFICULT HAVE THESE MADE IT FOR YOU TO DO YOUR WORK, TAKE CARE OF THINGS AT HOME, OR GET ALONG WITH OTHER PEOPLE?: NOT DIFFICULT AT ALL
6. BECOMING EASILY ANNOYED OR IRRITABLE: NOT AT ALL
GAD7 TOTAL SCORE: 4

## 2025-02-27 ASSESSMENT — PATIENT HEALTH QUESTIONNAIRE - PHQ9
10. IF YOU CHECKED OFF ANY PROBLEMS, HOW DIFFICULT HAVE THESE PROBLEMS MADE IT FOR YOU TO DO YOUR WORK, TAKE CARE OF THINGS AT HOME, OR GET ALONG WITH OTHER PEOPLE: SOMEWHAT DIFFICULT
SUM OF ALL RESPONSES TO PHQ QUESTIONS 1-9: 5
SUM OF ALL RESPONSES TO PHQ QUESTIONS 1-9: 5

## 2025-02-27 NOTE — PROGRESS NOTES
Health Psychology                    Department of Medicine  Julianne Moreno, Ph.D., L.P. [phone]  [facility] Lis Chavez, Ph.D., L.P. [phone]   [place] Mail Code 971 Rc Iam, Ph.D., L.P.  [phone]  [address] [place] [address] Donald Hills, Ph.D., L.P. [phone]  [place] [address]  Jayro Elias, Ph.D., L.P. [phone]              Melissa Stokes, Ph.D., L.P. [phone]  [facility]   Elizabeth Gaytan, Ph.D., A.B.P.P., L.P. [phone]    [address] [place] [address]               Health Psychology Progress Note     Intake:  4/1/13    REFERRAL SOURCE: Best Willett MD.     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   Referred initially for psychological consultation for workup for a gastric sleeve procedure who is seen for CBT-oriented therapy regarding weight management, work stresses, and family and social matters, especially re: her autistic son. She was seen today for eclectic psychotherapy.      Health: Got sick a few weeks ago.  Not sure what it was.  Felt like a mild flu- not like previous COVID in that no headache.  She feels tired, largely recovered, somewhat congested; 80% recovered.  Rivera got a little sick.  He and her mother had runny noses only.  She presumes she got it when she went in to work for training.    Mood:  Mood is variable, somewhat harder lately because life is harder. Not necessarily feeling paola    Son: He started mid-August at the [employer] in [place].  He is doing well and she is pleased that he is there.  There are many kids with special needs (90% on IEPs). He is now going into more regular classes. Adjusting to first year of new school. He still has trouble with transitions.  He is becoming more verbal.  He is not  waking her up when she returns from overnight shift, which has  been problematic for her in terms of sleep deprivation.  She is therefore getting more sleep; he climbs into her bed and uses his iPad.  Son is 94 pounds. He has stopped biting and hitting his grandmother.  Son is getting therapies outside of school in the afternoon entailing a lot of driving. She is feeling more settled in her son's activities.      This school year he now gets OP Speech and OT at [facility]. Feeding therapy at [facility], music therapy at [facility], and swimming lessons at [facility] to get his head we -She does a lot of driving.  Now he talks. Cognitively ahead improved, socially and developmentally behind (2-3 year old range).  His speech is improving.  She notes he has most trouble with transitions, which makes it more difficult for her to envisage his taking the bus service to school in the near term.He is broadening his palate.      Weight: She is switching from Wegovy to Ozempic  per her insurance. Goal is 175; down 3 lb in the past month. She was 192 yesterday,  191 last month.  Discussed  her weight is gently decreasing, not sure as much as she would like.  She has cut back on sweets.  Discussed more bandwidth for self-care activities as son is doing better.  She meets with weight management; is using Wegovy without major side effects other not at a high dose or wanting a higher dose. She will switch to Ozempic in January. She has cut out coffee drinks.  She sees ETOH as the next frontier.  The motivation feels different on Wegovy. She feels she wants to be around and in good shape as a caregiver of her son and mother. She is having a drink/day 5 days week. He goal is cutting back 1-3 drinks/week.     Exercise:  Not going to gym due to high state of illness (e.g., RSV, Flu, COVID) - has membership to [employer] at [facility]- hasn't been yet.  Work days she gets 13,000 to 17,000 steps; Higher when split units.  Less if work isn't busy.  Non-work days 6-11 k.  Goal is at least  "7 k steps/day.  Discussed looking for ways to expand exercise as her son's school year get more  of a rhythm to it. She got a stationary bike from a friend.  She hasn't used it yet. Encouraged to.  Also discussed bike rack to bike outside n safe place.    Work: Missed two days last weekend because of illness. She is working weekend over night shifts so as to be more available for her son.  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  Feels uncomfortable when patients are consuming certain news programs while she cares for them.    Social:  Discussed more bandwidth for social activities as son is doing better. She is open to doing more. Discussed current administration impact on friends.    She participates fully. She derives benefit from airing her thoughts, feelings, questions. Rapport was excellent.      Self-reported weights  234 lbs     8/24/23 per self   220 lbs.   1/30/24                      3/29/24 did not weigh  218           4/30/24  212.6        5/23/24  209           6/28/24  202.8        8/19/24  197           9/25/24  196         10/23/24  192.6      11/21/24  194.4      12/19/24  191            1/16/24 (yesterday)    She is  5 foot 11 inches.  Her long-term overall goal is 175 lbs.      Wt Readings from Last 4 Encounters:   08/14/24 92 kg (202 lb 12.8 oz)   07/11/24 94.3 kg (208 lb)   06/03/24 95.2 kg (209 lb 12.8 oz)   05/21/24 97.5 kg (215 lb)     There is no height or weight on file to calculate BMI.   Estimated body mass index is 29.5 kg/m  as calculated from the following:    Height as of 8/14/24: 1.766 m (5' 9.53\").    Weight as of 8/14/24: 92 kg (202 lb 12.8 oz).    Current Outpatient Medications   Medication Sig Dispense Refill    acetaminophen (TYLENOL) 32 mg/mL liquid Take 1,000 mg by mouth every 8 hours as needed for fever or mild pain      Calcium Citrate-Vitamin D (CALCIUM CITRATE CHEWY BITE) 500-12.5 MG-MCG CHEW Take 1 tablet by mouth daily      childrens " multivitamin w/iron (FLINTSTONES COMPLETE) chewable tablet Take 2 chew tab by mouth daily      cyanocobalamin (CYANOCOBALAMIN) 1000 mcg/mL injection INJECT 1ML EVERY 30 DAYS AS DIRECTED 3 mL 1    Cyanocobalamin 1000 MCG/ML KIT Inject 1 mL as directed every 30 days 3 kit 4    cyclobenzaprine (FLEXERIL) 10 MG tablet Take 1 tablet (10 mg) by mouth daily as needed for muscle spasms 45 tablet 4    diclofenac (VOLTAREN) 1 % topical gel 1 gram to affected areas on feet 2-3 times daily as needed for pain. 100 g 3    hydrochlorothiazide (HYDRODIURIL) 25 MG tablet Take 1 tablet (25 mg) by mouth daily 90 tablet 3    ibuprofen (ADVIL/MOTRIN) 200 MG capsule Take 400 mg by mouth as needed for fever      LORazepam (ATIVAN) 0.5 MG tablet Take 1 tablet (0.5 mg) by mouth every 6 hours as needed for anxiety. 20 tablet 0    losartan (COZAAR) 25 MG tablet TAKE 1/2 TABLET BY MOUTH DAILY 45 tablet 0    Semaglutide-Weight Management (WEGOVY) 1.7 MG/0.75ML pen Inject 1.7 mg subcutaneously once a week. 3 mL 4     No current facility-administered medications for this visit.     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    Bariatric surgery status 05/13/2013    Depression     Depressive disorder     Esophageal reflux     Family history of hyperparathyroidism 03/06/2015    Forearm fracture childhood    jumping fall    Guillain-[place] disease age 14    hospitalized at [facility] x [facility] week    History of steroid therapy     HX ABNL PAP SMEAR OF CERVIX   1995    LEEP AT 15 yo    Hypertension 2004    Hypertrophy of breast     Hypertrophy of tonsils alone     Hypovitaminosis D     with secondary high PTH    Irregular heart beat     palpitations    LBP (low back pain)     LSIL (low grade squamous intraepithelial lesion) on Pap smear 05/2006    Lumbago     RESOLVED    Major depressive disorder, recurrent episode, in partial or unspecified remission 04/01/2013    Morbid obesity (H)     bariatric surgery  5/13/2013    Multiple thyroid nodules     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)     OLIGOMENORRHEA, C/W PCOS     Sciatica     SLEEP APNEA 06/2002    No longer has since tonsillectomy and septoplasty    Thyroid nodule      Past Surgical History:   Procedure Laterality Date    BIOPSY  03/13/2015    Thyroid nodule    ESOPHAGOSCOPY, GASTROSCOPY, DUODENOSCOPY (EGD), COMBINED  5/28/2013    Procedure: COMBINED ESOPHAGOSCOPY, GASTROSCOPY, DUODENOSCOPY (EGD);;  Surgeon: Best Willett MD;  Location:  GI    ESOPHAGOSCOPY, GASTROSCOPY, DUODENOSCOPY (EGD), COMBINED N/A 6/13/2018    Procedure: COMBINED ESOPHAGOSCOPY, GASTROSCOPY, DUODENOSCOPY (EGD), BIOPSY SINGLE OR MULTIPLE;  gastroscopy;  Surgeon: Westley Gibbs MD;  Location:  GI    LAPAROSCOPIC GASTRIC SLEEVE  5/13/2013    Procedure: LAPAROSCOPIC GASTRIC SLEEVE;  Laparoscopic Sleeve Gastrectomy ;  Surgeon: Best Willett MD;  [facility]    LEEP TX, CERVICAL  1995    age 15    partial thyroidectomy  2018    SEPTOPLASTY      THYROIDECTOMY Left 4/3/2018    Procedure: THYROIDECTOMY;  Left Thyroid Lobectomy And Ishtmusectomy;  Surgeon: Delia Harper MD;  [facility]    TONSILLECTOMY  age 20s    TONSILLECTOMY  2004?    wisdom teeth extraction           6/27/2024     8:58 PM 10/23/2024     6:08 AM 2/27/2025     6:51 AM   PHQ-9 SCORE   PHQ-9 Total Score MyChart 5 (Mild depression) 2 (Minimal depression) 5 (Mild depression)   PHQ-9 Total Score 5 2  5        Patient-reported         12/19/2024     7:11 AM 1/15/2025     7:01 PM 2/27/2025     6:51 AM   MAXIMO-7 SCORE   Total Score 4 (minimal anxiety) 4 (minimal anxiety) 4 (minimal anxiety)   Total Score 4  4  4        Patient-reported         12/4/2018     8:22 AM 1/31/2020     7:20 AM   WHODAS 2.0 Total Score   Total Score 18 15   Total Score MyChart 18 15     This telehealth service is appropriate and effective for delivering services in light of the necessity for social  distancing to mitigate the COVID-19 epidemic and for conservation of PPE.  Patient has agreed to receiving telehealth services after being informed about it: Yes    Patient prefers video invitation/information to be sent by:   [employer]    Time service started:10:02  Time service ended: 10:53    Mode of transmission: [employer]  Location of originating:  Home  of the patient  Distance site:  Home  of the provider    The patient has been notified that:  Video visits will be conducted via a call with their psychologist to provide the care they need with a video conversation. Video visits may be billed at different rates depending on insurance coverage.  Patients are advised to please contact their insurance provider with any questions about their health insurance coverage. If during the course of a call the psychologist feels a video visit is not appropriate, patients will not be charged for this service.  Diagnosis:  Major depression, recurrent, mild (F33.0).   Psychological factors affecting obesity (F54).   Parental concerns about child (Z63.58)  PLAN/: She will return for virtual session 3/27 @ 10 for eclectic therapy, which is medically necessary. She wishes to continue to get support here with her life changes and her son's behavioral/developmental challenges.  Expand schedule of exercise to the level that is possible.    Last treatment plan signed: 11/21/24  Treatment plan due: 11/21/25  Jayro Elias, Ph.D., L.P.  Director, Health Psychology  [phone]

## 2025-03-27 ENCOUNTER — VIRTUAL VISIT (OUTPATIENT)
Dept: PSYCHOLOGY | Facility: CLINIC | Age: 45
End: 2025-03-27
Payer: COMMERCIAL

## 2025-03-27 DIAGNOSIS — E66.01 PSYCHOLOGICAL FACTORS AFFECTING MORBID OBESITY (H): ICD-10-CM

## 2025-03-27 DIAGNOSIS — F33.0 MAJOR DEPRESSIVE DISORDER, RECURRENT EPISODE, MILD: Primary | ICD-10-CM

## 2025-03-27 DIAGNOSIS — Z63.8 PARENTAL CONCERN ABOUT CHILD: ICD-10-CM

## 2025-03-27 DIAGNOSIS — F54 PSYCHOLOGICAL FACTORS AFFECTING MORBID OBESITY (H): ICD-10-CM

## 2025-03-27 SDOH — SOCIAL STABILITY - SOCIAL INSECURITY: OTHER SPECIFIED PROBLEMS RELATED TO PRIMARY SUPPORT GROUP: Z63.8

## 2025-03-27 ASSESSMENT — ANXIETY QUESTIONNAIRES
2. NOT BEING ABLE TO STOP OR CONTROL WORRYING: NOT AT ALL
8. IF YOU CHECKED OFF ANY PROBLEMS, HOW DIFFICULT HAVE THESE MADE IT FOR YOU TO DO YOUR WORK, TAKE CARE OF THINGS AT HOME, OR GET ALONG WITH OTHER PEOPLE?: NOT DIFFICULT AT ALL
GAD7 TOTAL SCORE: 4
4. TROUBLE RELAXING: SEVERAL DAYS
IF YOU CHECKED OFF ANY PROBLEMS ON THIS QUESTIONNAIRE, HOW DIFFICULT HAVE THESE PROBLEMS MADE IT FOR YOU TO DO YOUR WORK, TAKE CARE OF THINGS AT HOME, OR GET ALONG WITH OTHER PEOPLE: NOT DIFFICULT AT ALL
1. FEELING NERVOUS, ANXIOUS, OR ON EDGE: SEVERAL DAYS
GAD7 TOTAL SCORE: 4
GAD7 TOTAL SCORE: 4
7. FEELING AFRAID AS IF SOMETHING AWFUL MIGHT HAPPEN: SEVERAL DAYS
6. BECOMING EASILY ANNOYED OR IRRITABLE: SEVERAL DAYS
5. BEING SO RESTLESS THAT IT IS HARD TO SIT STILL: NOT AT ALL
7. FEELING AFRAID AS IF SOMETHING AWFUL MIGHT HAPPEN: SEVERAL DAYS
3. WORRYING TOO MUCH ABOUT DIFFERENT THINGS: NOT AT ALL

## 2025-03-27 NOTE — PROGRESS NOTES
Health Psychology                    Department of Medicine  Julianne Moreno, Ph.D., L.P. [phone]  [facility] Lis Chavez, Ph.D., L.P. [phone]   [place] Mail Code 997 Rc Iam, Ph.D., L.P.  [phone]  [address] [place] [address] Donald Hills, Ph.D., L.P. [phone]  [place] [address]  Jayro Elias, Ph.D., L.P. [phone]              Melissa Stokes, Ph.D., L.P. [phone]  [facility]   Elizabeth Gaytan, Ph.D., A.B.P.P., L.P. [phone] [address] [place] [address]               Confidential Health Psychology Progress Note     Intake:  4/1/13    REFERRAL SOURCE: Best Willett MD.     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   Referred initially for psychological consultation for workup for a gastric sleeve procedure who is seen for CBT-oriented therapy regarding weight management, work stresses, and family and social matters, especially re: her autistic son. She was seen today for eclectic psychotherapy.      Not sleeping well- so tired. She thinks it is in part due to right shoulder being painful.  She got a steroid shot in an ortho urgent care which helped, and it started to hurt again in December after she put out her trust. She can't get comfortable sleeping.  It is a 2-3/10 as we talk.   At night,pain increases to  a 5-6/10- can't get comfortable.  Will go [place] to the urgent care.    Health: Got sick before our last session.  Not sure what it was.  Felt like a mild flu- not like previous COVID in that no headache.  She felt tired, largely recovered, somewhat congested; 100% recovered.  Rivera got a little sick last week.  Doing better now.    Mood:  Mood is variable, somewhat harder lately because life is harder. Not necessarily feeling paola    Son: He started mid-August at the [employer] in [place].  He  is doing well and she is pleased that he is there. . Adjusting to first year of new school. He still has trouble with transitions.  He is becoming more verbal.  He is not  waking her up when she returns from overnight shift, which has been problematic for her in terms of sleep deprivation. Will have next IEP meeting after  spring break (next week). She is excited to see new goals.Son is getting therapies outside of school in the afternoon entailing a lot of driving. She is feeling more settled in her son's activities.      This school year he now gets OP Speech and OT at [facility]. Feeding therapy at [facility], music therapy at [facility], and swimming lessons at [facility] to get his head we -She does a lot of driving.  Now he talks. Cognitively ahead improved, socially and developmentally behind (2-3 year old range).  His speech is improving.  She notes he has most trouble with transitions, which makes it more difficult for her to envisage his taking the bus service to school in the near term.He is broadening his palate.      Weight: She switched from Wegovy to Ozempic in January per her insurance. Goal is 175;She was 190.4 today, down from 192 last month.  Discussed  her weight as gently decreasing, not sure as much as she would like.  She has cut back on sweets, eating out. Previously discussed more bandwidth for self-care activities as son is doing better. She has cut out sweetened coffee drinks.  She sees ETOH as area for additional reduction.   She \ wants to be around and in good shape as a caregiver of her son and mother. She has been having a drink/day 5 days week. He goal was cutting back 1-3 drinks/week. Will plan to address again.    Exercise:  Not going to gym due to high state of illness (e.g., RSV, Flu, COVID) - has membership to [employer] at [facility]- hasn't been yet.  Work days she gets 13,000 to 17,000 steps; Higher when split units.  Less if work isn't busy.  Non-work days 6-11  "k.  Goal is at least 7 k steps/day.  Discussed looking for ways to expand exercise as her son's school year get more  of a rhythm to it. She got a stationary bike from a friend- used just once so far. Also discussed bike rack to bike outside in safe place.    Work: No issues.    Social:  Discussed utilizing the greater bandwidth  she might havefor social activities as son is doing better. She is open to doing more. Discussed current administration impact on friends, education, healthcare.    She participates fully. She derives benefit from airing her thoughts, feelings, questions. Rapport was excellent.      Self-reported weights  234 lbs     8/24/23 per self   220 lbs.   1/30/24                      3/29/24 did not weigh  218           4/30/24  212.6        5/23/24  209           6/28/24  202.8        8/19/24  197           9/25/24  196         10/23/24  192.6      11/21/24  194.4      12/19/24  191            1/16/24 (yesterday)  192             2/27/95  190.4          3/27/25    She is  5 foot 11 inches.  Her long-term overall goal is 175 lbs.      Wt Readings from Last 4 Encounters:   08/14/24 92 kg (202 lb 12.8 oz)   07/11/24 94.3 kg (208 lb)   06/03/24 95.2 kg (209 lb 12.8 oz)   05/21/24 97.5 kg (215 lb)     There is no height or weight on file to calculate BMI.   Estimated body mass index is 29.5 kg/m  as calculated from the following:    Height as of 8/14/24: 1.766 m (5' 9.53\").    Weight as of 8/14/24: 92 kg (202 lb 12.8 oz).    Current Outpatient Medications   Medication Sig Dispense Refill    acetaminophen (TYLENOL) 32 mg/mL liquid Take 1,000 mg by mouth every 8 hours as needed for fever or mild pain      Calcium Citrate-Vitamin D (CALCIUM CITRATE CHEWY BITE) 500-12.5 MG-MCG CHEW Take 1 tablet by mouth daily      childrens multivitamin w/iron (FLINTSTONES COMPLETE) chewable tablet Take 2 chew tab by mouth daily      cyanocobalamin (CYANOCOBALAMIN) 1000 mcg/mL injection INJECT 1ML EVERY 30 DAYS AS DIRECTED " 3 mL 1    Cyanocobalamin 1000 MCG/ML KIT Inject 1 mL as directed every 30 days 3 kit 4    cyclobenzaprine (FLEXERIL) 10 MG tablet Take 1 tablet (10 mg) by mouth daily as needed for muscle spasms 45 tablet 4    diclofenac (VOLTAREN) 1 % topical gel 1 gram to affected areas on feet 2-3 times daily as needed for pain. 100 g 3    hydrochlorothiazide (HYDRODIURIL) 25 MG tablet Take 1 tablet (25 mg) by mouth daily 90 tablet 3    ibuprofen (ADVIL/MOTRIN) 200 MG capsule Take 400 mg by mouth as needed for fever      LORazepam (ATIVAN) 0.5 MG tablet Take 1 tablet (0.5 mg) by mouth every 6 hours as needed for anxiety. 20 tablet 0    losartan (COZAAR) 25 MG tablet TAKE 1/2 TABLET BY MOUTH DAILY 45 tablet 0    Semaglutide-Weight Management (WEGOVY) 1.7 MG/0.75ML pen Inject 1.7 mg subcutaneously once a week. 3 mL 4     No current facility-administered medications for this visit.     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14    hospitalized at [facility] x 1 week    History of steroid therapy     HX ABNL PAP SMEAR OF CERVIX   1995    LEEP AT 15 yo    Hypertension 2004    Hypertrophy of breast     Hypertrophy of tonsils alone     Hypovitaminosis D     with secondary high PTH    Irregular heart beat     palpitations    LBP (low back pain)     LSIL (low grade squamous intraepithelial lesion) on Pap smear 05/2006    Lumbago     RESOLVED    Major depressive disorder, recurrent episode, in partial or unspecified remission 04/01/2013    Morbid obesity (H)     bariatric surgery 5/13/2013    Multiple thyroid nodules     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)     OLIGOMENORRHEA, C/W PCOS     Sciatica     SLEEP APNEA 06/2002    No longer has  since tonsillectomy and septoplasty    Thyroid nodule      Past Surgical History:   Procedure Laterality Date    BIOPSY  03/13/2015    Thyroid nodule    ESOPHAGOSCOPY, GASTROSCOPY, DUODENOSCOPY (EGD), COMBINED  5/28/2013    Procedure: COMBINED ESOPHAGOSCOPY, GASTROSCOPY, DUODENOSCOPY (EGD);;  Surgeon: Best Willett MD;  Location:  GI    ESOPHAGOSCOPY, GASTROSCOPY, DUODENOSCOPY (EGD), COMBINED N/A 6/13/2018    Procedure: COMBINED ESOPHAGOSCOPY, GASTROSCOPY, DUODENOSCOPY (EGD), BIOPSY SINGLE OR MULTIPLE;  gastroscopy;  Surgeon: Westley Gibbs MD;  Location:  GI    LAPAROSCOPIC GASTRIC SLEEVE  5/13/2013    Procedure: LAPAROSCOPIC GASTRIC SLEEVE;  Laparoscopic Sleeve Gastrectomy ;  Surgeon: Best Willett MD;  [facility]    LEEP TX, CERVICAL  1995    age 15    partial thyroidectomy  2018    SEPTOPLASTY      THYROIDECTOMY Left 4/3/2018    Procedure: THYROIDECTOMY;  Left Thyroid Lobectomy And Ishtmusectomy;  Surgeon: Delia Harper MD;  [facility]    TONSILLECTOMY  age 20s    TONSILLECTOMY  2004?    wisdom teeth extraction           6/27/2024     8:58 PM 10/23/2024     6:08 AM 2/27/2025     6:51 AM   PHQ-9 SCORE   PHQ-9 Total Score MyChart 5 (Mild depression) 2 (Minimal depression) 5 (Mild depression)   PHQ-9 Total Score 5 2  5        Patient-reported         1/15/2025     7:01 PM 2/27/2025     6:51 AM 3/27/2025     9:00 AM   MAXIMO-7 SCORE   Total Score 4 (minimal anxiety) 4 (minimal anxiety) 4 (minimal anxiety)   Total Score 4  4  4        Patient-reported         12/4/2018     8:22 AM 1/31/2020     7:20 AM   WHODAS 2.0 Total Score   Total Score 18 15   Total Score MyChart 18 15     This telehealth service is appropriate and effective for delivering services in light of the necessity for social distancing to mitigate the spread of illness.  Patient has agreed to receiving telehealth services after being informed about it: Yes    Patient prefers video invitation/information to be sent  by:   emai    Time service started:10:02  Time service ended: 10:54    Mode of transmission: [employer]  Location of originating:  Home  of the patient  Distance site:  Home  of the provider    The patient has been notified that:  Video visits will be conducted via a call with their psychologist to provide the care they need with a video conversation. Video visits may be billed at different rates depending on insurance coverage.  Patients are advised to please contact their insurance provider with any questions about their health insurance coverage. If during the course of a call the psychologist feels a video visit is not appropriate, patients will not be charged for this service.  Diagnosis:  Major depression, recurrent, mild (F33.0).   Psychological factors affecting obesity (F54).   Parental concerns about child (Z63.58)  PLAN/: She will return for virtual session 4/21 @ 11 for eclectic therapy, which is medically necessary. She wishes to continue to get support here with her life changes and her son's behavioral/developmental challenges.  Last treatment plan signed: 11/21/24  Treatment plan due: 11/21/25  Jayro Elias, Ph.D., L.P.  Director, Health Psychology  [phone]

## 2025-04-21 ENCOUNTER — VIRTUAL VISIT (OUTPATIENT)
Dept: PSYCHOLOGY | Facility: CLINIC | Age: 45
End: 2025-04-21
Payer: COMMERCIAL

## 2025-04-21 DIAGNOSIS — E66.01 PSYCHOLOGICAL FACTORS AFFECTING MORBID OBESITY (H): ICD-10-CM

## 2025-04-21 DIAGNOSIS — F54 PSYCHOLOGICAL FACTORS AFFECTING MORBID OBESITY (H): ICD-10-CM

## 2025-04-21 DIAGNOSIS — Z63.8 PARENTAL CONCERN ABOUT CHILD: ICD-10-CM

## 2025-04-21 DIAGNOSIS — F33.0 MAJOR DEPRESSIVE DISORDER, RECURRENT EPISODE, MILD: Primary | ICD-10-CM

## 2025-04-21 SDOH — SOCIAL STABILITY - SOCIAL INSECURITY: OTHER SPECIFIED PROBLEMS RELATED TO PRIMARY SUPPORT GROUP: Z63.8

## 2025-04-21 ASSESSMENT — ANXIETY QUESTIONNAIRES
8. IF YOU CHECKED OFF ANY PROBLEMS, HOW DIFFICULT HAVE THESE MADE IT FOR YOU TO DO YOUR WORK, TAKE CARE OF THINGS AT HOME, OR GET ALONG WITH OTHER PEOPLE?: NOT DIFFICULT AT ALL
7. FEELING AFRAID AS IF SOMETHING AWFUL MIGHT HAPPEN: SEVERAL DAYS
2. NOT BEING ABLE TO STOP OR CONTROL WORRYING: NOT AT ALL
5. BEING SO RESTLESS THAT IT IS HARD TO SIT STILL: NOT AT ALL
6. BECOMING EASILY ANNOYED OR IRRITABLE: SEVERAL DAYS
GAD7 TOTAL SCORE: 4
3. WORRYING TOO MUCH ABOUT DIFFERENT THINGS: SEVERAL DAYS
IF YOU CHECKED OFF ANY PROBLEMS ON THIS QUESTIONNAIRE, HOW DIFFICULT HAVE THESE PROBLEMS MADE IT FOR YOU TO DO YOUR WORK, TAKE CARE OF THINGS AT HOME, OR GET ALONG WITH OTHER PEOPLE: NOT DIFFICULT AT ALL
7. FEELING AFRAID AS IF SOMETHING AWFUL MIGHT HAPPEN: SEVERAL DAYS
GAD7 TOTAL SCORE: 4
GAD7 TOTAL SCORE: 4
1. FEELING NERVOUS, ANXIOUS, OR ON EDGE: SEVERAL DAYS
4. TROUBLE RELAXING: NOT AT ALL

## 2025-04-21 NOTE — PROGRESS NOTES
Health Psychology                    Department of Medicine  Julianne Moreno, Ph.D., L.P. [phone]  [facility] Lis Chavez, Ph.D., L.P. [phone]   [place] Mail Code 195 Rc Vitale, Ph.D., L.P.  [phone]  [address] [place] [address] Donald Hills, Ph.D., L.P. [phone]  [place] [address]  Jayro Elias, Ph.D., L.P. [phone]              Melissa Stokes, Ph.D., L.P. [phone]  [facility]   Elizabeth Gaytan, Ph.D., A.B.P.P., L.P. [phone]    [address] [place] [address]               Confidential Health Psychology Progress Note     Intake:  4/1/13    REFERRAL SOURCE: Best Willett MD.     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   Referred initially for psychological consultation for workup for a gastric sleeve procedure who is seen for CBT-oriented therapy regarding weight management, work stresses, and family and social matters, especially re: her autistic son. She was seen today for eclectic psychotherapy.      Health:  Not sleeping well- so tired. She thinks it is in part due to right shoulder being painful. . She can't get comfortable sleeping.  It is a 2-3/10 as we talk.   At night,pain increases to  a 5-6/10- can't get comfortable.   She is pretty good; still has shoulder issue, pain wakes her up at night (4-5/10).. She will need rotator-cuff surgery- scheduled for 6/30  Will be off 4 months from work; 6 weeks won't be able to drive.     Mood:  Mood is variable, somewhat harder lately because life is harder. Not necessarily feeling paola    Son: Will be 6 in June. He started mid-August at the [employer] in [place].  He is doing well and she is pleased that he is there. . Adjusting to first year of new school. He still has trouble with transitions.  He is becoming more verbal.  He is not  waking her up when  she returns from overnight shift, which has been problematic for her in terms of sleep deprivation. He had IEP meeting- doing well; not cutting services; some new an social and academic goal.  She is excited to see new goals.Son is getting therapies outside of school (music therapy, speech, OT) in the afternoon entailing a lot of driving. She is feeling more settled in her son's activities.  He also had his first bowel movement in a toilet in the interval.     This school year he now gets OP Speech and OT at [facility] [employer] [facility]. Feeding therapy at [facility], music therapy at [facility], and swimming lessons at [facility] to get his head wet- doing well.   -She does a lot of driving.  Now he talks somewhat. Cognitively ahead improved, socially and developmentally behind (2-3 year old range).  His speech is improving.  She notes he has most trouble with transitions, which makes it more difficult for her to envisage his taking the bus service to school in the near term. He is broadening his food choices.        Weight: She switched from Wegovy to Ozempic in January per her insurance. Goal is 175; Did not check weight today. She was 190.4 last session, down from 192 previous month.  Discussed  her weight as gently decreasing, not sure as much as she would like.  She has cut back on sweets, eating out. Previously discussed more bandwidth for self-care activities as son is doing better. She has cut out sweetened coffee drinks.  She sees ETOH as area for additional reduction. She has been having a drink/day 5 days week. He goal was cutting back 1-3 drinks/week. Will plan to address again.    Exercise:  Not going to gym due to high state of illness (e.g., RSV, Flu, COVID) - has membership to [employer] at [facility]- hasn't been yet.  Work days she gets 13,000 to 17,000 steps; Higher when split units.  Less if work isn't busy.  Non-work days 6-11 k.  Goal is at least 7 k steps/day.  Discussed looking for ways to expand  "exercise as her son's school year get more  of a rhythm to it. She got a stationary bike from a friendAlso discussed bike rack to bike outside in safe place.    Work: No issues.    Social:  Discussed utilizing the greater bandwidth  she might have for social activities as son is doing better. She is open to doing more. Discussed current administration impact on friends, education, healthcare.    She participates fully. She derives benefit from airing her thoughts, feelings, questions. Rapport was excellent.      Self-reported weights  234 lbs     8/24/23 per self   220 lbs.   1/30/24                      3/29/24 did not weigh  218           4/30/24  212.6        5/23/24  209           6/28/24  202.8        8/19/24  197           9/25/24  196         10/23/24  192.6      11/21/24  194.4      12/19/24  191            1/16/24 (yesterday)  192             2/27/95  190.4          3/27/25    She is  5 foot 11 inches.  Her long-term overall goal is 175 lbs.      Wt Readings from Last 4 Encounters:   08/14/24 92 kg (202 lb 12.8 oz)   07/11/24 94.3 kg (208 lb)   06/03/24 95.2 kg (209 lb 12.8 oz)   05/21/24 97.5 kg (215 lb)     There is no height or weight on file to calculate BMI.   Estimated body mass index is 29.5 kg/m  as calculated from the following:    Height as of 8/14/24: 1.766 m (5' 9.53\").    Weight as of 8/14/24: 92 kg (202 lb 12.8 oz).    Current Outpatient Medications   Medication Sig Dispense Refill    acetaminophen (TYLENOL) 32 mg/mL liquid Take 1,000 mg by mouth every 8 hours as needed for fever or mild pain      Calcium Citrate-Vitamin D (CALCIUM CITRATE CHEWY BITE) 500-12.5 MG-MCG CHEW Take 1 tablet by mouth daily      childrens multivitamin w/iron (FLINTSTONES COMPLETE) chewable tablet Take 2 chew tab by mouth daily      cyanocobalamin (CYANOCOBALAMIN) 1000 mcg/mL injection INJECT 1ML EVERY 30 DAYS AS DIRECTED 3 mL 1    Cyanocobalamin 1000 MCG/ML KIT Inject 1 mL as directed every 30 days 3 kit 4    " cyclobenzaprine (FLEXERIL) 10 MG tablet Take 1 tablet (10 mg) by mouth daily as needed for muscle spasms 45 tablet 4    diclofenac (VOLTAREN) 1 % topical gel 1 gram to affected areas on feet 2-3 times daily as needed for pain. 100 g 3    hydrochlorothiazide (HYDRODIURIL) 25 MG tablet Take 1 tablet (25 mg) by mouth daily 90 tablet 3    ibuprofen (ADVIL/MOTRIN) 200 MG capsule Take 400 mg by mouth as needed for fever      LORazepam (ATIVAN) 0.5 MG tablet Take 1 tablet (0.5 mg) by mouth every 6 hours as needed for anxiety. 20 tablet 0    losartan (COZAAR) 25 MG tablet TAKE 1/2 TABLET BY MOUTH DAILY 45 tablet 0    Semaglutide-Weight Management (WEGOVY) 1.7 MG/0.75ML pen Inject 1.7 mg subcutaneously once a week. 3 mL 4     No current facility-administered medications for this visit.     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14    hospitalized at [facility] x 1 week    History of steroid therapy     HX ABNL PAP SMEAR OF CERVIX   1995    LEEP AT 15 yo    Hypertension 2004    Hypertrophy of breast     Hypertrophy of tonsils alone     Hypovitaminosis D     with secondary high PTH    Irregular heart beat     palpitations    LBP (low back pain)     LSIL (low grade squamous intraepithelial lesion) on Pap smear 05/2006    Lumbago     RESOLVED    Major depressive disorder, recurrent episode, in partial or unspecified remission 04/01/2013    Morbid obesity (H)     bariatric surgery 5/13/2013    Multiple thyroid nodules     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)     OLIGOMENORRHEA, C/W PCOS     Sciatica     SLEEP APNEA 06/2002    No longer has since tonsillectomy and septoplasty    Thyroid nodule      Past Surgical History:   Procedure  Laterality Date    BIOPSY  03/13/2015    Thyroid nodule    ESOPHAGOSCOPY, GASTROSCOPY, DUODENOSCOPY (EGD), COMBINED  5/28/2013    Procedure: COMBINED ESOPHAGOSCOPY, GASTROSCOPY, DUODENOSCOPY (EGD);;  Surgeon: Best Willett MD;  [facility]    ESOPHAGOSCOPY, GASTROSCOPY, DUODENOSCOPY (EGD), COMBINED N/A 6/13/2018    Procedure: COMBINED ESOPHAGOSCOPY, GASTROSCOPY, DUODENOSCOPY (EGD), BIOPSY SINGLE OR MULTIPLE;  gastroscopy;  Surgeon: Westley Gibbs MD;  Location:  GI    LAPAROSCOPIC GASTRIC SLEEVE  5/13/2013    Procedure: LAPAROSCOPIC GASTRIC SLEEVE;  Laparoscopic Sleeve Gastrectomy ;  Surgeon: Best Willett MD;  [facility]    LEEP TX, CERVICAL  1995    age 15    partial thyroidectomy  2018    SEPTOPLASTY      THYROIDECTOMY Left 4/3/2018    Procedure: THYROIDECTOMY;  Left Thyroid Lobectomy And Ishtmusectomy;  Surgeon: Delia Harper MD;  [facility]    TONSILLECTOMY  age 20s    TONSILLECTOMY  2004?    wisdom teeth extraction           6/27/2024     8:58 PM 10/23/2024     6:08 AM 2/27/2025     6:51 AM   PHQ-9 SCORE   PHQ-9 Total Score MyChart 5 (Mild depression) 2 (Minimal depression) 5 (Mild depression)   PHQ-9 Total Score 5 2  5        Patient-reported         2/27/2025     6:51 AM 3/27/2025     9:00 AM 4/21/2025     8:54 AM   MAXIMO-7 SCORE   Total Score 4 (minimal anxiety) 4 (minimal anxiety) 4 (minimal anxiety)   Total Score 4  4  4        Patient-reported         12/4/2018     8:22 AM 1/31/2020     7:20 AM   WHODAS 2.0 Total Score   Total Score 18 15   Total Score MyChart 18 15     This telehealth service is appropriate and effective for delivering services in light of the necessity for social distancing to mitigate the spread of illness.  Patient has agreed to receiving telehealth services after being informed about it: Yes    Patient prefers video invitation/information to be sent by:   emai    Time service started:11:00  Time service ended: 11:53    Mode of transmission:  [employer]  Location of originating:  Home  of the patient  Distance site:  Home  of the provider    The patient has been notified that:  Video visits will be conducted via a call with their psychologist to provide the care they need with a video conversation. Video visits may be billed at different rates depending on insurance coverage.  Patients are advised to please contact their insurance provider with any questions about their health insurance coverage. If during the course of a call the psychologist feels a video visit is not appropriate, patients will not be charged for this service.  Diagnosis:  Major depression, recurrent, mild (F33.0).   Psychological factors affecting obesity (F54).   Parental concerns about child (Z63.58)  PLAN/: She will return for virtual session 5/23 @ 12 for eclectic therapy, which is medically necessary. She wishes to continue to get support here with her life changes and her son's behavioral/developmental challenges.  Last treatment plan signed: 11/21/24  Treatment plan due: 11/21/25  Jayro Elias, Ph.D., L.P.  Director, Health Psychology  [phone]

## 2025-06-15 DIAGNOSIS — Z98.84 GASTRIC BYPASS STATUS FOR OBESITY: ICD-10-CM

## 2025-06-16 RX ORDER — CYANOCOBALAMIN 1000 UG/ML
INJECTION, SOLUTION INTRAMUSCULAR; SUBCUTANEOUS
Qty: 3 ML | Refills: 1 | Status: SHIPPED | OUTPATIENT
Start: 2025-06-16

## 2025-08-05 DIAGNOSIS — I10 BENIGN ESSENTIAL HYPERTENSION: ICD-10-CM

## 2025-08-05 RX ORDER — HYDROCHLOROTHIAZIDE 25 MG/1
25 TABLET ORAL DAILY
Qty: 90 TABLET | Refills: 0 | Status: SHIPPED | OUTPATIENT
Start: 2025-08-05

## (undated) DEVICE — ESU ELEC BLADE 2.75" COATED/INSULATED E1455

## (undated) DEVICE — DRSG TEGADERM 2 3/8X2 3/4" 1624W

## (undated) DEVICE — ESU PENCIL SMOKE EVAC W/ROCKER SWITCH 0703-047-000

## (undated) DEVICE — PREP PAD ALCOHOL 6818

## (undated) DEVICE — ESU LIGASURE OPEN SEALER/DIVIDER SM JAW 16.5MM LF1212A

## (undated) DEVICE — LINEN TOWEL PACK X5 5464

## (undated) DEVICE — PACK ENT MINOR CUSTOM ASC

## (undated) DEVICE — TUBE ENDOTRACHEAL NIM TRIVANTAGE 6.0MM 8229706

## (undated) DEVICE — ADHESIVE SWIFTSET 0.8ML OCTYL SS6

## (undated) DEVICE — SUCTION MANIFOLD NEPTUNE 2 SYS 4 PORT 0702-020-000

## (undated) DEVICE — SURGICEL FIBRILLAR HEMOSTAT 2"X4" JJ1962

## (undated) DEVICE — NIM PROBE PRASS INCREMENTING TIP 8225825

## (undated) DEVICE — CLIP HORIZON MED BLUE 002200

## (undated) DEVICE — SPONGE KITTNER 30-101

## (undated) DEVICE — SU SILK 2-0 TIE 12X30" A305H

## (undated) DEVICE — SOL NACL 0.9% IRRIG 500ML BOTTLE 2F7123

## (undated) DEVICE — GOWN LG DISP 9515

## (undated) DEVICE — SU MONOCRYL 5-0 P-3 18" UND Y493G

## (undated) DEVICE — PREP CHLORAPREP W/ORANGE TINT 10.5ML 260715

## (undated) DEVICE — SUCTION SLEEVE NEPTUNE 2 125MM 0703-005-125

## (undated) DEVICE — SU CHROMIC 3-0 SH 27" G122H

## (undated) DEVICE — GLOVE PROTEXIS W/NEU-THERA 6.5  2D73TE65

## (undated) DEVICE — CLIP HORIZON SM RED WIDE SLOT 001201

## (undated) DEVICE — ESU GROUND PAD ADULT W/CORD E7507

## (undated) DEVICE — SU SILK 3-0 TIE 12X30" A304H

## (undated) RX ORDER — FENTANYL CITRATE 50 UG/ML
INJECTION, SOLUTION INTRAMUSCULAR; INTRAVENOUS
Status: DISPENSED
Start: 2018-06-13

## (undated) RX ORDER — PROPOFOL 10 MG/ML
INJECTION, EMULSION INTRAVENOUS
Status: DISPENSED
Start: 2018-04-03

## (undated) RX ORDER — ACETAMINOPHEN 325 MG/1
TABLET ORAL
Status: DISPENSED
Start: 2018-04-03

## (undated) RX ORDER — HYDROMORPHONE HYDROCHLORIDE 1 MG/ML
INJECTION, SOLUTION INTRAMUSCULAR; INTRAVENOUS; SUBCUTANEOUS
Status: DISPENSED
Start: 2018-04-03

## (undated) RX ORDER — REMIFENTANIL HYDROCHLORIDE 1 MG/ML
INJECTION, POWDER, LYOPHILIZED, FOR SOLUTION INTRAVENOUS
Status: DISPENSED
Start: 2018-04-03

## (undated) RX ORDER — EPHEDRINE SULFATE 50 MG/ML
INJECTION, SOLUTION INTRAMUSCULAR; INTRAVENOUS; SUBCUTANEOUS
Status: DISPENSED
Start: 2018-04-03

## (undated) RX ORDER — PHENYLEPHRINE HCL IN 0.9% NACL 1 MG/10 ML
SYRINGE (ML) INTRAVENOUS
Status: DISPENSED
Start: 2018-04-03

## (undated) RX ORDER — OXYCODONE HYDROCHLORIDE 5 MG/1
TABLET ORAL
Status: DISPENSED
Start: 2018-04-03

## (undated) RX ORDER — ONDANSETRON 2 MG/ML
INJECTION INTRAMUSCULAR; INTRAVENOUS
Status: DISPENSED
Start: 2018-04-03

## (undated) RX ORDER — BUPIVACAINE HYDROCHLORIDE 2.5 MG/ML
INJECTION, SOLUTION EPIDURAL; INFILTRATION; INTRACAUDAL
Status: DISPENSED
Start: 2018-04-03

## (undated) RX ORDER — DEXAMETHASONE SODIUM PHOSPHATE 10 MG/ML
INJECTION, SOLUTION INTRAMUSCULAR; INTRAVENOUS
Status: DISPENSED
Start: 2018-04-03

## (undated) RX ORDER — FENTANYL CITRATE 50 UG/ML
INJECTION, SOLUTION INTRAMUSCULAR; INTRAVENOUS
Status: DISPENSED
Start: 2018-04-03